# Patient Record
Sex: MALE | Race: WHITE | Employment: FULL TIME | URBAN - METROPOLITAN AREA
[De-identification: names, ages, dates, MRNs, and addresses within clinical notes are randomized per-mention and may not be internally consistent; named-entity substitution may affect disease eponyms.]

---

## 2017-01-06 ENCOUNTER — OFFICE VISIT (OUTPATIENT)
Dept: FAMILY MEDICINE | Facility: CLINIC | Age: 27
End: 2017-01-06
Payer: COMMERCIAL

## 2017-01-06 VITALS
HEIGHT: 73 IN | DIASTOLIC BLOOD PRESSURE: 82 MMHG | SYSTOLIC BLOOD PRESSURE: 144 MMHG | BODY MASS INDEX: 35.92 KG/M2 | TEMPERATURE: 98.4 F | WEIGHT: 271 LBS | HEART RATE: 72 BPM

## 2017-01-06 DIAGNOSIS — Z00.00 ROUTINE GENERAL MEDICAL EXAMINATION AT A HEALTH CARE FACILITY: Primary | ICD-10-CM

## 2017-01-06 DIAGNOSIS — M79.671 PAIN IN BOTH FEET: ICD-10-CM

## 2017-01-06 DIAGNOSIS — R03.0 ELEVATED BLOOD PRESSURE READING WITHOUT DIAGNOSIS OF HYPERTENSION: ICD-10-CM

## 2017-01-06 DIAGNOSIS — M79.672 PAIN IN BOTH FEET: ICD-10-CM

## 2017-01-06 DIAGNOSIS — E66.9 NON MORBID OBESITY, UNSPECIFIED OBESITY TYPE: ICD-10-CM

## 2017-01-06 PROCEDURE — 99395 PREV VISIT EST AGE 18-39: CPT | Performed by: FAMILY MEDICINE

## 2017-01-06 ASSESSMENT — PAIN SCALES - GENERAL: PAINLEVEL: NO PAIN (0)

## 2017-01-06 NOTE — PROGRESS NOTES
SUBJECTIVE:     CC: Bernard Chapman is an 26 year old male who presents for preventative health visit.   Chief Complaint   Patient presents with     Physical      Mole on back of neck.  Can hurt at times.   Feet smell.  They get sweaty.  Wears steel toe work boots.  Has tried wicking socks.     Healthy Habits:    Do you get at least three servings of calcium containing foods daily (dairy, green leafy vegetables, etc.)? yes    Amount of exercise or daily activities, outside of work: 3 day(s) per week-none lately.  Work out equipment.     Problems taking medications regularly No    Medication side effects: No    Have you had an eye exam in the past two years? yes    Do you see a dentist twice per year? yes  Do you have sleep apnea, excessive snoring or daytime drowsiness?no      Today's PHQ-2 Score:   PHQ-2 ( 1999 Pf[employer]) 1/6/2017 1/19/2015   Q1: Little interest or pleasure in doing things 0 0   Q2: Feeling down, depressed or hopeless 0 0   PHQ-2 Score 0 0     Abuse: Current or Past(Physical, Sexual or Emotional)- No  Do you feel safe in your environment - Yes    Social History   Substance Use Topics     Smoking status: Never Smoker      Smokeless tobacco: Never Used     Alcohol Use: No     The patient does not drink >3 drinks per day nor >7 drinks per week.    Patient Active Problem List    Diagnosis Date Noted     Elevated blood pressure reading without diagnosis of hypertension 04/15/2015     Priority: Medium     Obesity 04/15/2015     Priority: Medium     CARDIOVASCULAR SCREENING; LDL GOAL LESS THAN 160 10/31/2010     Priority: Medium        Family history:  CV disease: no  Prostate cancer: no  Colon cancer: no    Multivitamin or Vit D use: daily MVI    Vaccines:current     Past Colon cancer screening:na     ROS:  General: POSITIVE for:, weight gain, gradual in the past couple years.   Eyes: Negative for vision changes or eye problems  ENT: No problems with ears, nose or throat.  No difficulty  "swallowing.  Resp: No coughing, wheezing or shortness of breath  CV: No chest pains or palpitations  GI: No nausea, vomiting,  heartburn, abdominal pain, diarrhea, constipation or change in bowel habits  : No urinary frequency or dysuria, bladder or kidney problems  Musculoskeletal: POSITIVE  for:, pain in feet.  Some pain in plantar foot.  Worse after getting up in the AM or off feet for a while.   Neurologic: POSITIVE for:, Hx headaches, a little more than normal.  Takes aspirin or ibuprofen.   Psychiatric: No problems with anxiety, depression or mental health  Heme/immune/allergy: No history of bleeding or clotting problems or anemia.  No allergies or immune system problems  Endocrine: No history of thyroid disease, diabetes or other endocrine disorders  Skin: No rashes,worrisome lesions or skin problems    OBJECTIVE:                                                    OBJECTIVE:Blood pressure 144/82, pulse 72, temperature 98.4  F (36.9  C), temperature source Tympanic, height 6' 0.75\" (1.848 m), weight 271 lb (122.925 kg). BMI=Body mass index is 35.99 kg/(m^2).  GENERAL APPEARANCE ADULT: Alert, no acute distress, obese  EYES: PERRL, EOM normal, conjunctiva and lids normal  HENT: Ears and TMs normal, oral mucosa and posterior oropharynx normal  NECK: No adenopathy,masses or thyromegaly  RESP: lungs clear to auscultation   CV: normal rate, regular rhythm, no murmur or gallop  ABDOMEN: soft, no organomegaly, masses or tenderness   (male): penis, scrotum, testes normal, no hernias  MS: extremities normal, no peripheral edema  Feet appear normal.  No skin abnormalities.   SKIN: Scattered benign appearing nevi including three on back of head and neck.  Several large pigmented nevi in groin on either side.     ASSESSMENT/PLAN:                                                    Lifestyle recommendations:regular exercise, at least 150 minutes per week (average 30 minutes 5 times a week)  weight loss recommended (ideal " BMI-body mass index is <25)  always wear seat belt  The following exams/tests were recommended and discussed for health maintenance:  Colon cancer screening beginning at age 50 if at normal risk     (Z00.00) Routine general medical examination at a health care facility  (primary encounter diagnosis)    (R03.0) Elevated blood pressure reading without diagnosis of hypertension  Comment:   Plan: Check BP several times in the next few weeks.  This can be done at home, in clinic, at our pharmacy, at a store or by neighbor or relative with a blood pressure cuff.  You should be sitting and relaxed for several minutes when taking blood pressure.   If BP readings are persistently over 140/90, I recommend a clinic visit for further evaluation and treatment.  Normal blood pressure is 120/80.  Usually high blood pressure does not cause any symptoms but over time can lead to eye and kidney problems.  High blood pressure also increases the chances of having a heart attack or stroke.     Let us know if readings are often high, so we can help get your blood pressure under good control.     Lifestyle changes that can lower blood pressure include:  Limiting sodium in the diet.  Goal of <2.4 grams daily.  Avoiding salty and prepared foods and not adding salt at the table can help.   Regular exercise at least 30 minutes most days of the week.   Weight loss can help even if not dramatic or down to normal BMI range.  DASH type diet can actually lower blood pressure.   Cutting back on alcohol can help for women drinking more than a drink per day and men more than 2 drinks per day on average.   Quitting smoking can help.      (E66.9) Non morbid obesity, unspecified obesity type    (M79.671,  M79.672) Pain in both feet  Comment: some plantar fascitis  Plan: Stretches for feet can help.     Helpful treatments include, heel pads to cushion and elevate heel. Orthotics can also help.    Anti-inflammatory medications like Aleve or ibuprofen are  "useful.   Try hot or cold application.   The most important treatment is foot and calf stretches.  Stretches;  #1. Stand on balls of both foot on a step or a 2x4\" board.  Drop your heels to stretch calf muscles for 10-15 seconds at a time.   #2.  Face door, wall or counter and have arms outstretched.  Have one foot forward and one foot back and lean into door, wall or counter with knee straight and again hold stretch for 10-15 seconds.  #3. Pull toes and ball of foot back to stretch the bottom of your foot with you hand, towel or belt.  For muscle stretches, hold for 10-15 seconds at a time.  Do a set of 10 to 15 of these stretches for 10 minutes or so once or twice a day.     It may take a month or two to notice improvement.  If continuing symptoms, we could try injections, orthotics or night splints.      I can remove moles if desired.  None look like cancer.        COUNSELING:  Reviewed preventive health counseling, as reflected in patient instructions  Special attention given to:        hypertension and obesity    BP Screening:   Last 3 BP Readings:    BP Readings from Last 3 Encounters:   01/06/17 144/82   04/11/16 167/79   04/01/16 150/68       The following was recommended to the patient:  Recommend lifestyle modifications       reports that he has never smoked. He has never used smokeless tobacco.    Estimated body mass index is 35.99 kg/(m^2) as calculated from the following:    Height as of this encounter: 6' 0.75\" (1.848 m).    Weight as of this encounter: 271 lb (122.925 kg).       Counseling Resources:  ATP IV Guidelines  Pooled Cohorts Equation Calculator  FRAX Risk Assessment  ICSI Preventive Guidelines  Dietary Guidelines for Americans, 2010  USDA's MyPlate  ASA Prophylaxis  Lung CA Screening    [facility]  "

## 2017-01-06 NOTE — NURSING NOTE
"Chief Complaint   Patient presents with     Physical     /82 mmHg  Pulse 72  Temp(Src) 98.4  F (36.9  C) (Tympanic)  Ht 6' 0.75\" (1.848 m)  Wt 271 lb (122.925 kg)  BMI 35.99 kg/m2 Estimated body mass index is 35.99 kg/(m^2) as calculated from the following:    Height as of this encounter: 6' 0.75\" (1.848 m).    Weight as of this encounter: 271 lb (122.925 kg).  bp completed using cuff size: large      Health Maintenance that is potentially due pending provider review:  NONE    N/a  Ruba Del Rosario CMA      "

## 2017-01-06 NOTE — MR AVS SNAPSHOT
After Visit Summary   1/6/2017    Bernard Chapman    MRN: [id number]           Patient Information     Date Of Birth          1990        Visit Information        Provider Department      1/6/2017 8:20 AM Leonard Mercado MD [facility]        Today's Diagnoses     Routine general medical examination at a health care facility    -  1     Elevated blood pressure reading without diagnosis of hypertension         Non morbid obesity, unspecified obesity type         Pain in both feet           Care Instructions      Preventive Health Recommendations  Male Ages 26 - 39    Yearly exam:             See your health care provider every year in order to  o   Review health changes.   o   Discuss preventive care.    o   Review your medicines if your doctor has prescribed any.    You should be tested each year for STDs (sexually transmitted diseases), if you re at risk.     After age 35, talk to your provider about cholesterol testing. If you are at risk for heart disease, have your cholesterol tested at least every 5 years.     If you are at risk for diabetes, you should have a diabetes test (fasting glucose).  Shots: Get a flu shot each year. Get a tetanus shot every 10 years.     Nutrition:    Eat at least 5 servings of fruits and vegetables daily.     Eat whole-grain bread, whole-wheat pasta and brown rice instead of white grains and rice.     Talk to your provider about Calcium and Vitamin D.     Lifestyle    Exercise for at least 150 minutes a week (30 minutes a day, 5 days a week). This will help you control your weight and prevent disease.     Limit alcohol to one drink per day.     No smoking.     Wear sunscreen to prevent skin cancer.     See your dentist every six months for an exam and cleaning.     ASSESSMENT/PLAN:                                                    Lifestyle recommendations:regular exercise, at least 150 minutes per week (average 30 minutes 5 times a  week)  weight loss recommended (ideal BMI-body mass index is <25)  always wear seat belt  The following exams/tests were recommended and discussed for health maintenance:  Colon cancer screening beginning at age 50 if at normal risk     (Z00.00) Routine general medical examination at a health care facility  (primary encounter diagnosis)    (R03.0) Elevated blood pressure reading without diagnosis of hypertension  Comment:   Plan: Check BP several times in the next few weeks.  This can be done at home, in clinic, at our pharmacy, at a store or by neighbor or relative with a blood pressure cuff.  You should be sitting and relaxed for several minutes when taking blood pressure.   If BP readings are persistently over 140/90, I recommend a clinic visit for further evaluation and treatment.  Normal blood pressure is 120/80.  Usually high blood pressure does not cause any symptoms but over time can lead to eye and kidney problems.  High blood pressure also increases the chances of having a heart attack or stroke.     Let us know if readings are often high, so we can help get your blood pressure under good control.     Lifestyle changes that can lower blood pressure include:  Limiting sodium in the diet.  Goal of <2.4 grams daily.  Avoiding salty and prepared foods and not adding salt at the table can help.   Regular exercise at least 30 minutes most days of the week.   Weight loss can help even if not dramatic or down to normal BMI range.  DASH type diet can actually lower blood pressure.   Cutting back on alcohol can help for women drinking more than a drink per day and men more than 2 drinks per day on average.   Quitting smoking can help.      (E66.9) Non morbid obesity, unspecified obesity type    (M79.671,  M79.672) Pain in both feet  Comment: some plantar fascitis  Plan: Stretches for feet can help.     Helpful treatments include, heel pads to cushion and elevate heel. Orthotics can also help.    Anti-inflammatory  "medications like Aleve or ibuprofen are useful.   Try hot or cold application.   The most important treatment is foot and calf stretches.  Stretches;  #1. Stand on balls of both foot on a step or a 2x4\" board.  Drop your heels to stretch calf muscles for 10-15 seconds at a time.   #2.  Face door, wall or counter and have arms outstretched.  Have one foot forward and one foot back and lean into door, wall or counter with knee straight and again hold stretch for 10-15 seconds.  #3. Pull toes and ball of foot back to stretch the bottom of your foot with you hand, towel or belt.  For muscle stretches, hold for 10-15 seconds at a time.  Do a set of 10 to 15 of these stretches for 10 minutes or so once or twice a day.     It may take a month or two to notice improvement.  If continuing symptoms, we could try injections, orthotics or night splints.      I can remove moles if desired.  None look like cancer.         Follow-ups after your visit        Who to contact     If you have questions or need follow up information about today's clinic visit or your schedule please contact [facility] directly at [phone].  Normal or non-critical lab and imaging results will be communicated to you by Shineonhart, letter or phone within 4 business days after the clinic has received the results. If you do not hear from us within 7 days, please contact the clinic through iCrederityt or phone. If you have a critical or abnormal lab result, we will notify you by phone as soon as possible.  Submit refill requests through [email or web address] or call your pharmacy and they will forward the refill request to us. Please allow 3 business days for your refill to be completed.          Additional Information About Your Visit        [email or web address] Information     [email or web address] gives you secure access to your electronic health record. If you see a primary care provider, you can also send messages to your care team and make appointments. If you have questions, " "please call your primary care clinic.  If you do not have a primary care provider, please call [phone] and they will assist you.        Care EveryWhere ID     This is your Care EveryWhere ID. This could be used by other organizations to access your [facility] medical records  [id number]        Your Vitals Were     Pulse Temperature Height BMI (Body Mass Index)          72 98.4  F (36.9  C) (Tympanic) 6' 0.75\" (1.848 m) 35.99 kg/m2         Blood Pressure from Last 3 Encounters:   01/06/17 144/82   04/11/16 167/79   04/01/16 150/68    Weight from Last 3 Encounters:   01/06/17 271 lb (122.925 kg)   04/11/16 260 lb (117.935 kg)   04/01/16 266 lb 3.2 oz (120.748 kg)              Today, you had the following     No orders found for display       Primary Care Provider Office Phone # Fax #    Leonard Mercado [phone]       [facility] [address] [place] [id number]        Thank you!     Thank you for choosing [facility]  for your care. Our goal is always to provide you with excellent care. Hearing back from our patients is one way we can continue to improve our services. Please take a few minutes to complete the written survey that you may receive in the mail after your visit with us. Thank you!             Your Updated Medication List - Protect others around you: Learn how to safely use, store and throw away your medicines at www.disposemymeds.org.          This list is accurate as of: 1/6/17  9:06 AM.  Always use your most recent med list.                   Brand Name Dispense Instructions for use    ascorbic acid 500 MG tablet    VITAMIN C     i tab daily       FLAX SEED OIL PO      500 mg. one daily       Multi-vitamin Tabs tablet   Generic drug:  multivitamin, therapeutic with minerals      1 TABLET DAILY       PAPAYA ENZYME PO      three tabs daily         "

## 2017-01-06 NOTE — PATIENT INSTRUCTIONS
Preventive Health Recommendations  Male Ages 26 - 39    Yearly exam:             See your health care provider every year in order to  o   Review health changes.   o   Discuss preventive care.    o   Review your medicines if your doctor has prescribed any.    You should be tested each year for STDs (sexually transmitted diseases), if you re at risk.     After age 35, talk to your provider about cholesterol testing. If you are at risk for heart disease, have your cholesterol tested at least every 5 years.     If you are at risk for diabetes, you should have a diabetes test (fasting glucose).  Shots: Get a flu shot each year. Get a tetanus shot every 10 years.     Nutrition:    Eat at least 5 servings of fruits and vegetables daily.     Eat whole-grain bread, whole-wheat pasta and brown rice instead of white grains and rice.     Talk to your provider about Calcium and Vitamin D.     Lifestyle    Exercise for at least 150 minutes a week (30 minutes a day, 5 days a week). This will help you control your weight and prevent disease.     Limit alcohol to one drink per day.     No smoking.     Wear sunscreen to prevent skin cancer.     See your dentist every six months for an exam and cleaning.     ASSESSMENT/PLAN:                                                    Lifestyle recommendations:regular exercise, at least 150 minutes per week (average 30 minutes 5 times a week)  weight loss recommended (ideal BMI-body mass index is <25)  always wear seat belt  The following exams/tests were recommended and discussed for health maintenance:  Colon cancer screening beginning at age 50 if at normal risk     (Z00.00) Routine general medical examination at a health care facility  (primary encounter diagnosis)    (R03.0) Elevated blood pressure reading without diagnosis of hypertension  Comment:   Plan: Check BP several times in the next few weeks.  This can be done at home, in clinic, at our pharmacy, at a store or by neighbor or  "relative with a blood pressure cuff.  You should be sitting and relaxed for several minutes when taking blood pressure.   If BP readings are persistently over 140/90, I recommend a clinic visit for further evaluation and treatment.  Normal blood pressure is 120/80.  Usually high blood pressure does not cause any symptoms but over time can lead to eye and kidney problems.  High blood pressure also increases the chances of having a heart attack or stroke.     Let us know if readings are often high, so we can help get your blood pressure under good control.     Lifestyle changes that can lower blood pressure include:  Limiting sodium in the diet.  Goal of <2.4 grams daily.  Avoiding salty and prepared foods and not adding salt at the table can help.   Regular exercise at least 30 minutes most days of the week.   Weight loss can help even if not dramatic or down to normal BMI range.  DASH type diet can actually lower blood pressure.   Cutting back on alcohol can help for women drinking more than a drink per day and men more than 2 drinks per day on average.   Quitting smoking can help.      (E66.9) Non morbid obesity, unspecified obesity type    (M79.671,  M79.672) Pain in both feet  Comment: some plantar fascitis  Plan: Stretches for feet can help.     Helpful treatments include, heel pads to cushion and elevate heel. Orthotics can also help.    Anti-inflammatory medications like Aleve or ibuprofen are useful.   Try hot or cold application.   The most important treatment is foot and calf stretches.  Stretches;  #1. Stand on balls of both foot on a step or a 2x4\" board.  Drop your heels to stretch calf muscles for 10-15 seconds at a time.   #2.  Face door, wall or counter and have arms outstretched.  Have one foot forward and one foot back and lean into door, wall or counter with knee straight and again hold stretch for 10-15 seconds.  #3. Pull toes and ball of foot back to stretch the bottom of your foot with you hand, " towel or belt.  For muscle stretches, hold for 10-15 seconds at a time.  Do a set of 10 to 15 of these stretches for 10 minutes or so once or twice a day.     It may take a month or two to notice improvement.  If continuing symptoms, we could try injections, orthotics or night splints.      I can remove moles if desired.  None look like cancer.

## 2017-02-22 ENCOUNTER — OFFICE VISIT (OUTPATIENT)
Dept: FAMILY MEDICINE | Facility: CLINIC | Age: 27
End: 2017-02-22
Payer: COMMERCIAL

## 2017-02-22 VITALS
DIASTOLIC BLOOD PRESSURE: 78 MMHG | BODY MASS INDEX: 35.12 KG/M2 | HEART RATE: 68 BPM | TEMPERATURE: 97.3 F | HEIGHT: 73 IN | SYSTOLIC BLOOD PRESSURE: 135 MMHG | WEIGHT: 265 LBS

## 2017-02-22 DIAGNOSIS — L60.0 INGROWN TOENAIL: Primary | ICD-10-CM

## 2017-02-22 PROCEDURE — 99213 OFFICE O/P EST LOW 20 MIN: CPT | Performed by: PHYSICIAN ASSISTANT

## 2017-02-22 RX ORDER — CEPHALEXIN 500 MG/1
500 CAPSULE ORAL 3 TIMES DAILY
Qty: 30 CAPSULE | Refills: 0 | Status: SHIPPED | OUTPATIENT
Start: 2017-02-22

## 2017-02-22 ASSESSMENT — ENCOUNTER SYMPTOMS
EYE DISCHARGE: 0
ABDOMINAL PAIN: 0
VOMITING: 0
FREQUENCY: 0
NEUROLOGICAL NEGATIVE: 1
DIAPHORESIS: 0
FEVER: 0
WEAKNESS: 0
DIARRHEA: 0
NECK PAIN: 0
EYE PAIN: 0
PALPITATIONS: 0
MYALGIAS: 0
CHILLS: 0
NAUSEA: 0
CONSTIPATION: 0
WHEEZING: 0
HEADACHES: 0
HEARTBURN: 0
SORE THROAT: 0
SHORTNESS OF BREATH: 0
DYSURIA: 0
BACK PAIN: 0
PSYCHIATRIC NEGATIVE: 1
COUGH: 0

## 2017-02-22 NOTE — PATIENT INSTRUCTIONS
Ingrown Toenail, Infected (Antibiotics, No Excision)  An ingrown toenail occurs when the nail grows sideways into the skin alongside the nail. This can cause pain. It can also lead to an infection with redness, swelling and sometimes drainage.  Cause  The most common cause of an ingrown toenail is trimming your nails wrong. Most people trim the nails too close to the skin and try to round the nail too tightly around the shape of the toe. When you do this, the nail can grow into the skin of your toe. While it may look nice, your toenail can grow into the skin and cause an infection.  Other causes include injury or wearing shoes that are too short or tight. This can cause the same problem that happens when trimming your nails. Your genetics can also make this more likely to happen.  Symptoms  The following are the most common symptoms of an ingrown toenail:     Pain    Redness    Swelling    Drainage  Treatment  The best thing to do for an ingrown toenail is treat it as soon as you see there is a problem. The longer you wait to do something, the worse it is likely to get. Sometimes it gets worse quickly, other times it may take awhile. It can even feel better for a while, and then get worse.  Inflammation  If the infection is mild, you may be able to take care of it at home with the following measures:    Frequent warm water soaks    Keeping it clean    Wearing loose, comfortable shoes or sandals  Another method involves using a small piece of cotton or waxed dental floss to gently lift up the corner of the problem nail. Change the cotton or floss frequently, especially if it gets dirty.  Infection  If your infection is mild, and home care isn't working, or if the infection gets worse, see your health care provider. Signs of worsening infection include:    Swelling    Redness    Pus drainage  In some cases, you may need antibiotics along with warm soaks. If after 2 to 3 days of antibiotic  the toenail doesn't get  better or gets worse, part of the nail may need to be removed to drain the infection. With treatment, it can take 1 to 2 weeks to clear up completely.  Home care  Wound care  For the next 3 days, soak and clean your toe in warm water a few times a day.  1) Twice a day for the first 3 days, clean and soak the toe as follows:    Soak your foot in a tub of warm water for 5 minutes. Or, hold your toe under a faucet of warm running water for 5 minutes.    Clean any remaining crust away with soap and water using a cotton swab.    Put a small amount of antibiotic ointment on the infected area.  2) Change the dressing or bandage every time you soak or clean it, or whenever it becomes wet or dirty.  3) If you were prescribed antibiotics, take them as directed until they are all gone.  4) Wear comfortable shoes with a lot of toe room, or open-toe sandals, while your toe is healing.  Medications    You can take acetaminophen or ibuprofen for pain, unless you were given a different pain medicine to use. If you have chronic liver or kidney disease, ever had a stomach ulcer or gastrointestinal bleeding, or are taking blood thinner medications, talk with your doctor before using these medicines.    If you were given antibiotics, take them until they are used up or your doctor tells you to stop, even if the wound looks better.  Prevention  To prevent ingrown toenails:  1) Wear shoes that fit well. Avoid shoes that pinch the toes together.  2) When you trim your toenails, do not cut them too short. Cut straight across at the top and do not round the edges.  3) Do not use a sharp object to clean under your nail since this might cause an infection.  4) If the toenail starts to grow into the skin again, put a small piece of waxed dental floss or cotton under that side of the nail to help it grow out straight.  Follow-up care  Follow up with your doctor or this facility as advised by our staff.  When to seek medical care  Get prompt  medical attention if any of the following occur:    Increasing redness, pain or swelling of the toe    Red streaks in the skin leading away from the wound    Pus or fluid drainage    Fever of 100.4  F (38  C) or higher, or as directed by your health care provider    7526-3831 [employer]. [address] [place] [address]. All rights reserved. This information is not intended as a substitute for professional medical care. Always follow your healthcare professional's instructions.

## 2017-02-22 NOTE — MR AVS SNAPSHOT
After Visit Summary   2/22/2017    Bernard Chapman    MRN: [id number]           Patient Information     Date Of Birth          1990        Visit Information        Provider Department      2/22/2017 8:00 AM Juan Antonio Carroll PA-C [facility]        Today's Diagnoses     Ingrown toenail    -  1      Care Instructions      Ingrown Toenail, Infected (Antibiotics, No Excision)  An ingrown toenail occurs when the nail grows sideways into the skin alongside the nail. This can cause pain. It can also lead to an infection with redness, swelling and sometimes drainage.  Cause  The most common cause of an ingrown toenail is trimming your nails wrong. Most people trim the nails too close to the skin and try to round the nail too tightly around the shape of the toe. When you do this, the nail can grow into the skin of your toe. While it may look nice, your toenail can grow into the skin and cause an infection.  Other causes include injury or wearing shoes that are too short or tight. This can cause the same problem that happens when trimming your nails. Your genetics can also make this more likely to happen.  Symptoms  The following are the most common symptoms of an ingrown toenail:     Pain    Redness    Swelling    Drainage  Treatment  The best thing to do for an ingrown toenail is treat it as soon as you see there is a problem. The longer you wait to do something, the worse it is likely to get. Sometimes it gets worse quickly, other times it may take awhile. It can even feel better for a while, and then get worse.  Inflammation  If the infection is mild, you may be able to take care of it at home with the following measures:    Frequent warm water soaks    Keeping it clean    Wearing loose, comfortable shoes or sandals  Another method involves using a small piece of cotton or waxed dental floss to gently lift up the corner of the problem nail. Change the cotton or floss frequently,  especially if it gets dirty.  Infection  If your infection is mild, and home care isn't working, or if the infection gets worse, see your health care provider. Signs of worsening infection include:    Swelling    Redness    Pus drainage  In some cases, you may need antibiotics along with warm soaks. If after 2 to 3 days of antibiotic  the toenail doesn't get better or gets worse, part of the nail may need to be removed to drain the infection. With treatment, it can take 1 to 2 weeks to clear up completely.  Home care  Wound care  For the next 3 days, soak and clean your toe in warm water a few times a day.  1) Twice a day for the first 3 days, clean and soak the toe as follows:    Soak your foot in a tub of warm water for 5 minutes. Or, hold your toe under a faucet of warm running water for 5 minutes.    Clean any remaining crust away with soap and water using a cotton swab.    Put a small amount of antibiotic ointment on the infected area.  2) Change the dressing or bandage every time you soak or clean it, or whenever it becomes wet or dirty.  3) If you were prescribed antibiotics, take them as directed until they are all gone.  4) Wear comfortable shoes with a lot of toe room, or open-toe sandals, while your toe is healing.  Medications    You can take acetaminophen or ibuprofen for pain, unless you were given a different pain medicine to use. If you have chronic liver or kidney disease, ever had a stomach ulcer or gastrointestinal bleeding, or are taking blood thinner medications, talk with your doctor before using these medicines.    If you were given antibiotics, take them until they are used up or your doctor tells you to stop, even if the wound looks better.  Prevention  To prevent ingrown toenails:  1) Wear shoes that fit well. Avoid shoes that pinch the toes together.  2) When you trim your toenails, do not cut them too short. Cut straight across at the top and do not round the edges.  3) Do not use a sharp  object to clean under your nail since this might cause an infection.  4) If the toenail starts to grow into the skin again, put a small piece of waxed dental floss or cotton under that side of the nail to help it grow out straight.  Follow-up care  Follow up with your doctor or this facility as advised by our staff.  When to seek medical care  Get prompt medical attention if any of the following occur:    Increasing redness, pain or swelling of the toe    Red streaks in the skin leading away from the wound    Pus or fluid drainage    Fever of 100.4  F (38  C) or higher, or as directed by your health care provider    0585-6475 [employer]. [address] [place] [address]. All rights reserved. This information is not intended as a substitute for professional medical care. Always follow your healthcare professional's instructions.              Follow-ups after your visit        Who to contact     If you have questions or need follow up information about today's clinic visit or your schedule please contact [facility] directly at [phone].  Normal or non-critical lab and imaging results will be communicated to you by MyChart, letter or phone within 4 business days after the clinic has received the results. If you do not hear from us within 7 days, please contact the clinic through [employer] or phone. If you have a critical or abnormal lab result, we will notify you by phone as soon as possible.  Submit refill requests through [employer] or call your pharmacy and they will forward the refill request to us. Please allow 3 business days for your refill to be completed.          Additional Information About Your Visit        [employer] Information     [employer] gives you secure access to your electronic health record. If you see a primary care provider, you can also send messages to your care team and make appointments. If you have questions, please call your primary care clinic.  If you  "do not have a primary care provider, please call [phone] and they will assist you.        Care EveryWhere ID     This is your Care EveryWhere ID. This could be used by other organizations to access your [facility] medical records  [id number]        Your Vitals Were     Pulse Temperature Height BMI (Body Mass Index)          68 97.3  F (36.3  C) (Tympanic) 6' 0.75\" (1.848 m) 35.2 kg/m2         Blood Pressure from Last 3 Encounters:   02/22/17 135/78   01/06/17 144/82   04/11/16 167/79    Weight from Last 3 Encounters:   02/22/17 265 lb (120.2 kg)   01/06/17 271 lb (122.9 kg)   04/11/16 260 lb (117.9 kg)              Today, you had the following     No orders found for display         Today's Medication Changes          These changes are accurate as of: 2/22/17  8:26 AM.  If you have any questions, ask your nurse or doctor.               Start taking these medicines.        Dose/Directions    cephALEXin 500 MG capsule   Commonly known as:  KEFLEX   Used for:  Ingrown toenail   Started by:  Juan Antonio Carroll PA-C        Dose:  500 mg   Take 1 capsule (500 mg) by mouth 3 times daily   Quantity:  30 capsule   Refills:  0            Where to get your medicines      These medications were sent to [id number] [address]  [address] [facility] [address]    Hours:  Closed 10-16-08 business to [facility] Phone:  [phone]     cephALEXin 500 MG capsule                Primary Care Provider Office Phone # Fax #    Leonard Mercado [phone]       [facility] [address] [facility] [address]        Thank you!     Thank you for choosing [facility]  for your care. Our goal is always to provide you with excellent care. Hearing back from our patients is one way we can continue to improve our services. Please take a few minutes to complete the written survey that you may receive in the mail after your visit with us. Thank " you!             Your Updated Medication List - Protect others around you: Learn how to safely use, store and throw away your medicines at www.disposemymeds.org.          This list is accurate as of: 2/22/17  8:26 AM.  Always use your most recent med list.                   Brand Name Dispense Instructions for use    ascorbic acid 500 MG tablet    VITAMIN C     i tab daily       cephALEXin 500 MG capsule    KEFLEX    30 capsule    Take 1 capsule (500 mg) by mouth 3 times daily       FLAX SEED OIL PO      500 mg. one daily       Multi-vitamin Tabs tablet   Generic drug:  multivitamin, therapeutic with minerals      1 TABLET DAILY       PAPAYA ENZYME PO      three tabs daily

## 2017-02-22 NOTE — NURSING NOTE
"Chief Complaint   Patient presents with     Toenail       Initial /88 (BP Location: Right arm, Cuff Size: Adult Large)  Pulse 68  Temp 97.3  F (36.3  C) (Tympanic)  Ht 6' 0.75\" (1.848 m)  Wt 265 lb (120.2 kg)  BMI 35.2 kg/m2 Estimated body mass index is 35.2 kg/(m^2) as calculated from the following:    Height as of this encounter: 6' 0.75\" (1.848 m).    Weight as of this encounter: 265 lb (120.2 kg).  Medication Reconciliation: complete   Maddie Saldivar / Certified Medical Assistant......2/22/2017 8:13 AM    "

## 2017-02-22 NOTE — PROGRESS NOTES
HPI    SUBJECTIVE:                                                    Bernard Chapman is a 26 year old male who presents to clinic today for infected left great toenail for the past week. Patient reports toe became red, swollen and tender. He noticed discharge started four days ago. He has been soaking toe in warm Epson salt baths twice a day. He has a history of frequent infections of his left great toe, usually relieved without antibiotic treatment. He has not seen podiatry for this issue.      Problem list and histories reviewed & adjusted, as indicated.  Additional history: as documented    Patient Active Problem List   Diagnosis     CARDIOVASCULAR SCREENING; LDL GOAL LESS THAN 160     Elevated blood pressure reading without diagnosis of hypertension     Obesity     History reviewed. No pertinent past surgical history.    Social History   Substance Use Topics     Smoking status: Never Smoker     Smokeless tobacco: Never Used     Alcohol use No     Family History   Problem Relation Age of Onset     Respiratory Mother      asthma     Hypertension Maternal Grandmother      Arthritis Maternal Grandmother      DIABETES Paternal Grandmother      HEART DISEASE Paternal Grandfather      pacemaker     Colon Cancer No family hx of      Prostate Cancer No family hx of      Coronary Artery Disease No family hx of          Current Outpatient Prescriptions   Medication Sig Dispense Refill     cephALEXin (KEFLEX) 500 MG capsule Take 1 capsule (500 mg) by mouth 3 times daily 30 capsule 0     FLAX SEED OIL  mg. one daily       PAPAYA ENZYME OR three tabs daily       MULTI-VITAMIN OR TABS 1 TABLET DAILY       VITAMIN C 500 MG OR TABS i tab daily       No Known Allergies  Labs reviewed in EPIC  Problem list, Medication list, Allergies, and Medical/Social/Surgical histories reviewed in [facility] and updated as appropriate.      Review of Systems   Constitutional: Negative for chills, diaphoresis, fever and malaise/fatigue.    HENT: Negative for congestion, ear discharge, ear pain and sore throat.    Eyes: Negative for pain and discharge.   Respiratory: Negative for cough, shortness of breath and wheezing.    Cardiovascular: Negative for chest pain and palpitations.   Gastrointestinal: Negative for abdominal pain, constipation, diarrhea, heartburn, nausea and vomiting.   Genitourinary: Negative for dysuria, frequency and urgency.   Musculoskeletal: Negative for back pain, myalgias and neck pain.   Skin: Negative for itching and rash.   Neurological: Negative.  Negative for weakness and headaches.   Endo/Heme/Allergies: Negative.    Psychiatric/Behavioral: Negative.        Physical Exam   Constitutional: He is oriented to person, place, and time and well-developed, well-nourished, and in no distress.   HENT:   Head: Normocephalic and atraumatic.   Neck: Normal range of motion. Neck supple.   Cardiovascular: Normal rate, regular rhythm, normal heart sounds and intact distal pulses.    Pulmonary/Chest: Effort normal and breath sounds normal.   Musculoskeletal: Normal range of motion.        Left ankle: Normal.        Feet:    Infected ingrown toenail of left great toe in lateral nail fold with erythema, edema and purulent discharge   Neurological: He is alert and oriented to person, place, and time. Gait normal.   Skin: Skin is warm and dry.   Psychiatric: Mood, memory, affect and judgment normal.       (L60.0) Ingrown toenail  (primary encounter diagnosis)  Comment:   Plan: cephALEXin (KEFLEX) 500 MG capsule          Continue warm soaks three times daily with antibiotic treatment. Discussed prevention strategies. Patient will call if he would like a referral to podiatry due to frequent reoccurrence.       Abbie Epley, PA-S      I have examined this patient and reviewed above note  Juan Antonio Carroll MS, PA-C

## 2017-04-06 ENCOUNTER — TRANSFERRED RECORDS (OUTPATIENT)
Dept: HEALTH INFORMATION MANAGEMENT | Facility: CLINIC | Age: 27
End: 2017-04-06

## 2017-04-29 ENCOUNTER — OFFICE VISIT (OUTPATIENT)
Dept: URGENT CARE | Facility: URGENT CARE | Age: 27
End: 2017-04-29
Payer: COMMERCIAL

## 2017-04-29 VITALS
DIASTOLIC BLOOD PRESSURE: 91 MMHG | SYSTOLIC BLOOD PRESSURE: 155 MMHG | RESPIRATION RATE: 14 BRPM | TEMPERATURE: 98.5 F | HEART RATE: 84 BPM

## 2017-04-29 DIAGNOSIS — M62.830 BACK MUSCLE SPASM: Primary | ICD-10-CM

## 2017-04-29 DIAGNOSIS — K59.00 CONSTIPATION, UNSPECIFIED CONSTIPATION TYPE: ICD-10-CM

## 2017-04-29 PROCEDURE — 99214 OFFICE O/P EST MOD 30 MIN: CPT | Performed by: NURSE PRACTITIONER

## 2017-04-29 RX ORDER — METHOCARBAMOL 750 MG/1
750 TABLET, FILM COATED ORAL 4 TIMES DAILY PRN
Qty: 40 TABLET | Refills: 0 | Status: SHIPPED | OUTPATIENT
Start: 2017-04-29

## 2017-04-29 NOTE — PATIENT INSTRUCTIONS
Take your muscle relaxer with 400 mg Ibuprofen with food as directed.    Moist heat to back spasm    Increase fiber in your diet and make sure you drink enough water.    Follow up with your primary care provider if symptoms persist or worsen.      Indications for emergent return to emergency department discussed with patient, who verbalized good understanding and agreement.  Patient understands the limitations of today's evaluation.         Back Spasm (No Trauma)    Spasm of the back muscles can occur after a sudden forceful twisting or bending force (such as in a car accident), after a simple awkward movement, or after lifting something heavy with poor body positioning. In either case, muscle spasm adds to the pain. Sleeping in an awkward position or on a poor quality mattress can also cause this. Some persons respond to emotional stress by tensing the muscles of their back.  Pain that continues may require further evaluation or other types of treatment such as physical therapy.  Unless you had a physical injury (for example, a car accident or fall), X-rays are usually not ordered for the initial evaluation of back pain. If pain continues and does not respond to medical treatment, X-rays and other tests may be performed at a later time.   Home care  1. As soon as possible, begin sitting or walking to avoid problems from prolonged bedrest (muscle weakness, worsening back stiffness and pain, blood clots in the legs).  2. When in bed, try to find a position of comfort. A firm mattress is best. Try lying flat on your back with pillows under your knees. You can also try lying on your side with your knees bent up toward your chest and a pillow between your knees.  3. Avoid prolonged sitting, long car rides or travel. This puts more stress on the lower back than standing or walking.   4. During the first 24 to 72 hours after an injury of flare-up apply an ice pack to the painful area for 20 minutes, then remove it for 20  minutes over a period of 60 to 90 minutes or several times a day. This will reduce swelling and pain. Always wrap ice packs in a thin towel.  5. You can start with ice, then switch to heat. Heat (hot shower, hot bath, or heating pad) reduces swelling and pain, and works well for muscle spasms. Heat can be applied to the painful area for 20 minutes , then remove it for 20 minutes over a period of 60 to 90 minutes or several times a day. As a safety precaution, do not sleep on a heating pad as it can burn or damage skin.  6. Ice and heat therapies can be alternated.  7. Gentle stretching will help your back heal faster. Perform this simple routine 2 to 3 times a day until your back is feeling better.     Low back stretch    Lie on your back with your knees bent and both feet on the ground.    Slowly raise your left knee to your chest as you flatten your lower back against the floor. Hold for 20 to 30 seconds.    Relax and repeat the exercise with your right knee.    Do 2 to 3 of these exercises for each leg.    Repeat, hugging both knees to your chest at the same time.    Do not bounce, but use a gentle pull.    8. Be aware of safe lifting methods and do not lift anything over 15 pounds until all the pain is gone.  Medications  Talk to your doctor before using medications, especially if you have other medical problems or are taking other medicines.  You may use acetaminophen (such as Tylenol) or ibuprofen (such as Advil or Motrin) to control pain, unless another pain medicine was prescribed. If you have a chronic condition such as diabetes, liver or kidney disease, stomach ulcer, or gastrointestinal bleeding, or are taking blood thinners, talk with your doctor before taking any medications.  Be careful if you are given prescription pain medications, narcotics, or medication for muscle spasm. They can cause drowsiness, affect your coordination, reflexes or judgment. Do not drive or operate heavy machinery.  Follow-up  care  Follow up with your doctor or this facility if your symptoms do not start to improve after one week. Physical therapy or further tests may be needed.  If X-rays were taken, they will be reviewed by a radiologist. You will be notified of any new findings that may affect your care.  Call 911  Seek emergency medica care if any of the following occur:    Trouble breathing    Confusion    Drowsiness or trouble awakening    Fainting or loss of consciousness    Rapid or very slow heart rate    Loss of bowel or bladder control  When to seek medical care  Get prompt medicat attention if any of the following occur:    Pain becomes worse or spreads to your legs    Weakness or numbness in one or both legs    Numbness in the groin or genital area    Unexplained fever over 100.4 F (38.0 C)    Burning or pain when passing urine    0073-2950 [employer]. [address] [place] [address]. All rights reserved. This information is not intended as a substitute for professional medical care. Always follow your healthcare professional's instructions.        Constipation (Adult)  Constipation means that you have bowel movements that are less frequent than usual. Stools often become very hard and difficult to pass.  Constipation is very common. At some point in life it affects almost everyone. Since everyone's bowel habits are different, what is constipation to one person may not be to another. Your healthcare provider may do tests to diagnose constipation. It depends on what he or she finds when evaluating you.    Symptoms of constipation include:    Abdominal pain    Bloating    Vomiting    Painful bowel movements    Itching, swelling, bleeding, or pain around the anus  Causes  Constipation can have many causes. These include:    Diet low in fiber    Too much dairy    Not drinking enough liquids    Lack of exercise or physical activity. This is especially true for older adults.    Changes in lifestyle or  daily routine, including pregnancy, aging, work, and travel    Frequent use or misuse of laxatives    Ignoring the urge to have a bowel movement or delaying it until later    Medicines, such as certain prescription pain medicines, iron supplements, antacids, certain antidepressants, and calcium supplements    Diseases like irritable bowel syndrome, bowel obstructions, stroke, diabetes, thyroid disease, Parkinson disease, hemorrhoids, and colon cancer  Complications  Potential complications of constipation can include:    Hemorrhoids    Rectal bleeding from hemorrhoids or anal fissures (skin tears)    Hernias    Dependency on laxatives    Chronic constipation    Fecal impaction    Bowel obstruction or perforation  Home care  All treatment should be done after talking with your healthcare provider. This is especially true if you have another medical problems, are taking prescription medicines, or are an older adult. Treatment most often involves lifestyle changes. You may also need medicines. Your healthcare provider will tell you which will work best for you. Follow the advice below to help avoid this problem in the future.  Lifestyle changes  These lifestyle changes can help prevent constipation:    Diet. Eat a high-fiber diet, with fresh fruit and vegetables, and reduce dairy intake, meats, and processed foods    Fluids. It's important to get enough fluids each day. Drink plenty of water when you eat more fiber. If you are on diet that limits the amount of fluid you can have, talk about this with your healthcare provider.    Regular exercise. Check with your healthcare provider first.  Medications  Take any medicines as directed. Some laxatives are safe to use only every now and then. Others can be taken on a regular basis. Talk with your doctor or pharmacist if you have questions.  Prescription pain medicines can cause constipation. If you are taking this kind of medicine, ask your healthcare provider if you  should also take a stool softener.  Medicines you may take to treat constipation include:    Fiber supplements    Stool softeners    Laxatives    Enemas    Rectal suppositories  Follow-up care  Follow up with your healthcare provider if symptoms don't get better in the next few days. You may need to have more tests or see a specialist.  Call 911  Call 911 if any of these occur:    Trouble breathing    Stiff, rigid abdomen that is severely painful to touch    Confusion    Fainting or loss of consciousness    Rapid heart rate    Chest pain  When to seek medical advice  Call your healthcare provider right away if any of these occur:    Fever over 100.4 F (38 C)    Failure to resume normal bowel movements    Pain in your abdomen or back gets worse    Nausea or vomiting    Swelling in your abdomen    Blood in the stool    Black, tarry stool    Involuntary weight loss    Weakness    [id number] [employer]. [address] [place] [address]. All rights reserved. This information is not intended as a substitute for professional medical care. Always follow your healthcare professional's instructions.        Treating Constipation  Constipation is a common and often uncomfortable problem. Constipation means you have bowel movements fewer than three times per week, or strain to pass hard, dry stool. It can last a short time. Or it can be a problem that never seems to go away. The good news is that it can often be treated and controlled.    Eat more fiber  One of the best ways to help treat constipation is to increase your fiber intake. You can do this either through diet or by using fiber supplements. Fiber (in whole grains, fruits, and vegetables) adds bulk and absorbs water to soften the stool. This helps the stool pass through the colon more easily. When you increase your fiber intake, do it slowly to avoid side effects such as bloating. Also increase the amount of water that you drink. Eating more of  the following foods can add fiber to your diet.    High-fiber cereals    Whole grains, bran, and brown rice    Vegetables such as carrots, broccoli, and greens    Fresh fruits (especially apples, pears, and dried fruits like raisins and apricots)    Nuts and legumes (especially beans such as lentils, kidney beans, and lima beans)  Get physically active  Exercise helps improve the working of your colon which helps ease constipation. Try to get some physical activity every day. If you haven t been active for a while, talk to your health care provider before starting again.  Laxatives  Your health care provider may suggest an over-the-counter product to help ease your constipation. He or she may suggest the use of bulk-forming agents or laxatives. The use of laxatives, if used as directed, is common and safe. Follow directions carefully when using them. See your health care provider for new-onset constipation, to rule out other causes such as medications or thyroid disease.    9484-9204 [employer]. [address] [place] [address]. All rights reserved. This information is not intended as a substitute for professional medical care. Always follow your healthcare professional's instructions.        Eating a High-Fiber Diet  Fiber is what gives strength and structure to plants. Most grains, beans, vegetables, and fruits contain fiber. Foods rich in fiber are often low in calories and fat, and they fill you up more. They may also reduce your risks for certain health problems. To find out the amount of fiber in canned, packaged, or frozen foods, read the Nutrition Facts label. It tells you how much fiber is in a serving.    Types of fiber and their benefits  There are two types of fiber: insoluble and soluble. They both aid digestion and help you maintain a healthy weight.    Insoluble fiber. This is found in whole grains, cereals, certain fruits and vegetables such as apple skin, corn, and carrots.  Insoluble fiber may prevent constipation and reduce the risk for certain types of cancer.    Soluble fiber. This type of fiber is in oats, beans, and certain fruits and vegetables such as strawberries and peas. Soluble fiber can reduce cholesterol, which may help lower the risk for heart disease. It also helps control blood sugar levels.  Look for high-fiber foods  Try these foods to add fiber to your diet:    Whole-grain breads and cereals. Try to eat 6 to 8 ounces a day. Include wheat and oat bran cereals, whole-wheat muffins or toast, and corn tortillas in your meals.    Fruits. Try to eat 2 cups a day. Apples, oranges, strawberries, pears, and bananas are good sources. (Note: Fruit juice is low in fiber.)    Vegetables. Try to eat at least 2.5 cups a day. Add asparagus, carrots, broccoli, peas, and corn to your meals.    Beans. One cup of cooked lentils gives you over 15 grams of fiber. Try navy beans, lentils, and chickpeas.    Seeds. A small handful of seeds gives you about 3 grams of fiber. Try sunflower seeds.  Keep track of your fiber  Keep track of how much fiber you eat. Start by reading food labels. Then eat a variety of foods high in fiber. As you begin to eat more fiber, ask your healthcare provider how much water you should be drinking to keep your digestive system working smoothly.  You should aim for a certain amount of fiber in your diet each day. If you are a woman, that amount is between 25 and 28 grams per day. Men should aim for 30 to 33 grams per day. After age 50, your daily fiber needs drop to 22 grams for women and 28 grams for men.  Before you reach for the fiber supplements, think about this. Fiber is found naturally in healthy whole foods. It gives you that feeling of fullness after you eat. Taking fiber supplements or eating fiber-enriched foods will not give you this full feeling.  Your fiber intake is a good measure for the quality of your overall diet. If you are missing out on your  daily amount of fiber, you may be lacking other important nutrients as well.    [id number] [employer]. [address] [place] [address], [place] [address]. All rights reserved. This information is not intended as a substitute for professional medical care. Always follow your healthcare professional's instructions.        Discharge Instructions: Eating a High-Fiber Diet  Your health care provider has prescribed a high-fiber diet for you. Fiber is what gives strength and structure to plants. Most grains, beans, vegetables, and fruits contain fiber. Foods rich in fiber are often low in calories and fat, but they fill you up more. These foods may also reduce the risk of certain health problems.  There are two types of fiber:    Insoluble fiber. This is found in whole grains, cereals, and certain fruits and vegetables (such as apple skins, corn, and beans). Insoluble fiber is made up mainly of plant cell walls. It may prevent constipation and reduce the risk of certain types of cancer.    Soluble fiber. This type of fiber is found in oats, beans, nuts, and certain fruits and vegetables (such as strawberries and peas). Soluble fiber turns to gel in the digestive system, slowing the movement of the digestive tract. It helps control blood sugar levels and can reduce cholesterol, which may help lower the risk of heart disease. Soluble fiber can also help control appetite.     Home care    Know how much fiber you need a day. The recommended daily amount of fiber is 25 grams for women and 38 grams for men. After age 50, daily fiber needs drop to 21 grams for women and 30 grams for men.    Ask your doctor about a fiber supplement. (Always take fiber supplements with a large glass of water.)    Keep track of how much fiber you eat.    Eat a variety of foods high in fiber.    Learn to read and understand food labels.    Ask your healthcare provider how much water you should be drinking.    Look for these high-fiber  foods:    Whole-grain breads and cereals    6 ounces a day give you about 18 grams of fiber (1 ounce is equal to 1 slice of bread, 1 cup of dry cereal, or 1/2 cup of cooked rice).    Include wheat and oat bran cereals, whole-wheat muffins or toast, and corn tortillas in your meals.    Fruits     2 cups a day give you about 8 grams of fiber.    Apples, oranges, strawberries, pears, and bananas are good sources.    Fruit juice does not contain as much fiber as the fruit it was made from.    Vegetables    2  cups a day give you about 11 grams of fiber. Add asparagus, carrots, broccoli, peas, and corn to your meals.    Legumes    1/4 cup a day (in place of meat) gives you about 4 grams of fiber. Try navy beans, lentils, chickpeas, and soybeans.    Seeds     A small handful of seeds gives you about 3 grams of fiber. Try sunflower seeds.  Follow-up  Make a follow-up appointment with a nutritionist as directed by our staff.    [id number] [employer]. [address] [place] [address], [place] [address]. All rights reserved. This information is not intended as a substitute for professional medical care. Always follow your healthcare professional's instructions.

## 2017-04-29 NOTE — NURSING NOTE
"Chief Complaint   Patient presents with     Constipation       Initial BP (!) 155/91  Pulse 84  Temp 98.5  F (36.9  C) (Tympanic)  Resp 14 Estimated body mass index is 35.2 kg/(m^2) as calculated from the following:    Height as of 2/22/17: 6' 0.75\" (1.848 m).    Weight as of 2/22/17: 265 lb (120.2 kg).  Medication Reconciliation: complete    "

## 2017-04-29 NOTE — MR AVS SNAPSHOT
After Visit Summary   4/29/2017    Bernard Chapman    MRN: [id number]           Patient Information     Date Of Birth          1990        Visit Information        Provider Department      4/29/2017 11:45 AM Tamie Knapp APRN [facility]        Today's Diagnoses     Back muscle spasm    -  1    Constipation, unspecified constipation type          Care Instructions    Take your muscle relaxer with 400 mg Ibuprofen with food as directed.    Moist heat to back spasm    Increase fiber in your diet and make sure you drink enough water.    Follow up with your primary care provider if symptoms persist or worsen.      Indications for emergent return to emergency department discussed with patient, who verbalized good understanding and agreement.  Patient understands the limitations of today's evaluation.         Back Spasm (No Trauma)    Spasm of the back muscles can occur after a sudden forceful twisting or bending force (such as in a car accident), after a simple awkward movement, or after lifting something heavy with poor body positioning. In either case, muscle spasm adds to the pain. Sleeping in an awkward position or on a poor quality mattress can also cause this. Some persons respond to emotional stress by tensing the muscles of their back.  Pain that continues may require further evaluation or other types of treatment such as physical therapy.  Unless you had a physical injury (for example, a car accident or fall), X-rays are usually not ordered for the initial evaluation of back pain. If pain continues and does not respond to medical treatment, X-rays and other tests may be performed at a later time.   Home care  1. As soon as possible, begin sitting or walking to avoid problems from prolonged bedrest (muscle weakness, worsening back stiffness and pain, blood clots in the legs).  2. When in bed, try to find a position of comfort. A firm mattress is  best. Try lying flat on your back with pillows under your knees. You can also try lying on your side with your knees bent up toward your chest and a pillow between your knees.  3. Avoid prolonged sitting, long car rides or travel. This puts more stress on the lower back than standing or walking.   4. During the first 24 to 72 hours after an injury of flare-up apply an ice pack to the painful area for 20 minutes, then remove it for 20 minutes over a period of 60 to 90 minutes or several times a day. This will reduce swelling and pain. Always wrap ice packs in a thin towel.  5. You can start with ice, then switch to heat. Heat (hot shower, hot bath, or heating pad) reduces swelling and pain, and works well for muscle spasms. Heat can be applied to the painful area for 20 minutes , then remove it for 20 minutes over a period of 60 to 90 minutes or several times a day. As a safety precaution, do not sleep on a heating pad as it can burn or damage skin.  6. Ice and heat therapies can be alternated.  7. Gentle stretching will help your back heal faster. Perform this simple routine 2 to 3 times a day until your back is feeling better.     Low back stretch    Lie on your back with your knees bent and both feet on the ground.    Slowly raise your left knee to your chest as you flatten your lower back against the floor. Hold for 20 to 30 seconds.    Relax and repeat the exercise with your right knee.    Do 2 to 3 of these exercises for each leg.    Repeat, hugging both knees to your chest at the same time.    Do not bounce, but use a gentle pull.    8. Be aware of safe lifting methods and do not lift anything over 15 pounds until all the pain is gone.  Medications  Talk to your doctor before using medications, especially if you have other medical problems or are taking other medicines.  You may use acetaminophen (such as Tylenol) or ibuprofen (such as Advil or Motrin) to control pain, unless another pain medicine was  prescribed. If you have a chronic condition such as diabetes, liver or kidney disease, stomach ulcer, or gastrointestinal bleeding, or are taking blood thinners, talk with your doctor before taking any medications.  Be careful if you are given prescription pain medications, narcotics, or medication for muscle spasm. They can cause drowsiness, affect your coordination, reflexes or judgment. Do not drive or operate heavy machinery.  Follow-up care  Follow up with your doctor or this facility if your symptoms do not start to improve after one week. Physical therapy or further tests may be needed.  If X-rays were taken, they will be reviewed by a radiologist. You will be notified of any new findings that may affect your care.  Call 911  Seek emergency medica care if any of the following occur:    Trouble breathing    Confusion    Drowsiness or trouble awakening    Fainting or loss of consciousness    Rapid or very slow heart rate    Loss of bowel or bladder control  When to seek medical care  Get prompt medicat attention if any of the following occur:    Pain becomes worse or spreads to your legs    Weakness or numbness in one or both legs    Numbness in the groin or genital area    Unexplained fever over 100.4 F (38.0 C)    Burning or pain when passing urine    1657-2408 [employer]. [address] [place] [address]. All rights reserved. This information is not intended as a substitute for professional medical care. Always follow your healthcare professional's instructions.        Constipation (Adult)  Constipation means that you have bowel movements that are less frequent than usual. Stools often become very hard and difficult to pass.  Constipation is very common. At some point in life it affects almost everyone. Since everyone's bowel habits are different, what is constipation to one person may not be to another. Your healthcare provider may do tests to diagnose constipation. It depends on  what he or she finds when evaluating you.    Symptoms of constipation include:    Abdominal pain    Bloating    Vomiting    Painful bowel movements    Itching, swelling, bleeding, or pain around the anus  Causes  Constipation can have many causes. These include:    Diet low in fiber    Too much dairy    Not drinking enough liquids    Lack of exercise or physical activity. This is especially true for older adults.    Changes in lifestyle or daily routine, including pregnancy, aging, work, and travel    Frequent use or misuse of laxatives    Ignoring the urge to have a bowel movement or delaying it until later    Medicines, such as certain prescription pain medicines, iron supplements, antacids, certain antidepressants, and calcium supplements    Diseases like irritable bowel syndrome, bowel obstructions, stroke, diabetes, thyroid disease, Parkinson disease, hemorrhoids, and colon cancer  Complications  Potential complications of constipation can include:    Hemorrhoids    Rectal bleeding from hemorrhoids or anal fissures (skin tears)    Hernias    Dependency on laxatives    Chronic constipation    Fecal impaction    Bowel obstruction or perforation  Home care  All treatment should be done after talking with your healthcare provider. This is especially true if you have another medical problems, are taking prescription medicines, or are an older adult. Treatment most often involves lifestyle changes. You may also need medicines. Your healthcare provider will tell you which will work best for you. Follow the advice below to help avoid this problem in the future.  Lifestyle changes  These lifestyle changes can help prevent constipation:    Diet. Eat a high-fiber diet, with fresh fruit and vegetables, and reduce dairy intake, meats, and processed foods    Fluids. It's important to get enough fluids each day. Drink plenty of water when you eat more fiber. If you are on diet that limits the amount of fluid you can have,  talk about this with your healthcare provider.    Regular exercise. Check with your healthcare provider first.  Medications  Take any medicines as directed. Some laxatives are safe to use only every now and then. Others can be taken on a regular basis. Talk with your doctor or pharmacist if you have questions.  Prescription pain medicines can cause constipation. If you are taking this kind of medicine, ask your healthcare provider if you should also take a stool softener.  Medicines you may take to treat constipation include:    Fiber supplements    Stool softeners    Laxatives    Enemas    Rectal suppositories  Follow-up care  Follow up with your healthcare provider if symptoms don't get better in the next few days. You may need to have more tests or see a specialist.  Call 911  Call 911 if any of these occur:    Trouble breathing    Stiff, rigid abdomen that is severely painful to touch    Confusion    Fainting or loss of consciousness    Rapid heart rate    Chest pain  When to seek medical advice  Call your healthcare provider right away if any of these occur:    Fever over 100.4 F (38 C)    Failure to resume normal bowel movements    Pain in your abdomen or back gets worse    Nausea or vomiting    Swelling in your abdomen    Blood in the stool    Black, tarry stool    Involuntary weight loss    Weakness    8349-1607 [employer]. [address] [place], [place] [id number]. All rights reserved. This information is not intended as a substitute for professional medical care. Always follow your healthcare professional's instructions.        Treating Constipation  Constipation is a common and often uncomfortable problem. Constipation means you have bowel movements fewer than three times per week, or strain to pass hard, dry stool. It can last a short time. Or it can be a problem that never seems to go away. The good news is that it can often be treated and controlled.    Eat more fiber  One of the best  ways to help treat constipation is to increase your fiber intake. You can do this either through diet or by using fiber supplements. Fiber (in whole grains, fruits, and vegetables) adds bulk and absorbs water to soften the stool. This helps the stool pass through the colon more easily. When you increase your fiber intake, do it slowly to avoid side effects such as bloating. Also increase the amount of water that you drink. Eating more of the following foods can add fiber to your diet.    High-fiber cereals    Whole grains, bran, and brown rice    Vegetables such as carrots, broccoli, and greens    Fresh fruits (especially apples, pears, and dried fruits like raisins and apricots)    Nuts and legumes (especially beans such as lentils, kidney beans, and lima beans)  Get physically active  Exercise helps improve the working of your colon which helps ease constipation. Try to get some physical activity every day. If you haven t been active for a while, talk to your health care provider before starting again.  Laxatives  Your health care provider may suggest an over-the-counter product to help ease your constipation. He or she may suggest the use of bulk-forming agents or laxatives. The use of laxatives, if used as directed, is common and safe. Follow directions carefully when using them. See your health care provider for new-onset constipation, to rule out other causes such as medications or thyroid disease.    8817-8395 [employer]. [address] [place] [address]. All rights reserved. This information is not intended as a substitute for professional medical care. Always follow your healthcare professional's instructions.              Follow-ups after your visit        Follow-up notes from your care team     See patient instructions section of the AVS Return if symptoms worsen or fail to improve, for Follow up with your primary care provider.      Who to contact     If you have questions or need  follow up information about today's clinic visit or your schedule please contact [facility] directly at [phone].  Normal or non-critical lab and imaging results will be communicated to you by [employer], letter or phone within 4 business days after the clinic has received the results. If you do not hear from us within 7 days, please contact the clinic through [employer] or phone. If you have a critical or abnormal lab result, we will notify you by phone as soon as possible.  Submit refill requests through [employer] or call your pharmacy and they will forward the refill request to us. Please allow 3 business days for your refill to be completed.          Additional Information About Your Visit        [employer] Information     [employer] gives you secure access to your electronic health record. If you see a primary care provider, you can also send messages to your care team and make appointments. If you have questions, please call your primary care clinic.  If you do not have a primary care provider, please call [phone] and they will assist you.        Care EveryWhere ID     This is your Care EveryWhere ID. This could be used by other organizations to access your [facility] medical records  [id number]        Your Vitals Were     Pulse Temperature Respirations             84 98.5  F (36.9  C) (Tympanic) 14          Blood Pressure from Last 3 Encounters:   04/29/17 (!) 155/91   02/22/17 135/78   01/06/17 144/82    Weight from Last 3 Encounters:   02/22/17 265 lb (120.2 kg)   01/06/17 271 lb (122.9 kg)   04/11/16 260 lb (117.9 kg)              Today, you had the following     No orders found for display         Today's Medication Changes          These changes are accurate as of: 4/29/17 12:24 PM.  If you have any questions, ask your nurse or doctor.               Start taking these medicines.        Dose/Directions    methocarbamol 750 MG tablet   Commonly known as:  ROBAXIN   Used for:   Back muscle spasm        Dose:  750 mg   Take 1 tablet (750 mg) by mouth 4 times daily as needed for muscle spasms   Quantity:  40 tablet   Refills:  0            Where to get your medicines      These medications were sent to [id number] - [place], [place] - [address]  [address] [address]    Hours:  Closed 10-16-08 business to [facility] Phone:  [phone]     methocarbamol 750 MG tablet                Primary Care Provider Office Phone # Fax #    Leonard Mercado [phone]       [facility] [address] [facility] [address]        Thank you!     Thank you for choosing [facility]  for your care. Our goal is always to provide you with excellent care. Hearing back from our patients is one way we can continue to improve our services. Please take a few minutes to complete the written survey that you may receive in the mail after your visit with us. Thank you!             Your Updated Medication List - Protect others around you: Learn how to safely use, store and throw away your medicines at www.disposemymeds.org.          This list is accurate as of: 4/29/17 12:24 PM.  Always use your most recent med list.                   Brand Name Dispense Instructions for use    ascorbic acid 500 MG tablet    VITAMIN C     i tab daily       cephALEXin 500 MG capsule    KEFLEX    30 capsule    Take 1 capsule (500 mg) by mouth 3 times daily       FLAX SEED OIL PO      500 mg. one daily       methocarbamol 750 MG tablet    ROBAXIN    40 tablet    Take 1 tablet (750 mg) by mouth 4 times daily as needed for muscle spasms       Multi-vitamin Tabs tablet   Generic drug:  multivitamin, therapeutic with minerals      1 TABLET DAILY       PAPAYA ENZYME PO      three tabs daily

## 2017-04-29 NOTE — PROGRESS NOTES
"  SUBJECTIVE:                                                    Bernard Chapman is a 26 year old male who presents to clinic today for the following health issues:      Constipation     Onset: x2 weeks    Description:   Frequency of bowel movements: 1 normal; 1 small loose BM. Two Bm's in the last 2 weeks  Stool consistency: \"smooth\" \"log\"     Progression of Symptoms:  worsening and constant    Accompanying Signs & Symptoms:  Abdominal pain (cramping?): no   Blood in stool: no   Rectal pain: no   Nausea/vomiting: no   Weight loss or gain: no     Back pain/ groin pain intermittent- No known back injury    History:   History of abdominal surgery: no     Precipitating factors:   Recent use of narcotics, anticholinergics, calcium channel blockers, antacids, or iron supplements: no   Chronic Laxative Use: no        Therapies Tried and outcome: x lax       Back pain- intermittent over the last 2 weeks; no position that aggravates it; rt lower back   No numbness, tingling or weakness in legs; intermittent \"groin Pain\"           Problem list and histories reviewed & adjusted, as indicated.  Additional history: as documented    Patient Active Problem List   Diagnosis     CARDIOVASCULAR SCREENING; LDL GOAL LESS THAN 160     Elevated blood pressure reading without diagnosis of hypertension     Obesity     No past surgical history on file.    Social History   Substance Use Topics     Smoking status: Never Smoker     Smokeless tobacco: Never Used     Alcohol use No     Family History   Problem Relation Age of Onset     Respiratory Mother      asthma     Hypertension Maternal Grandmother      Arthritis Maternal Grandmother      DIABETES Paternal Grandmother      HEART DISEASE Paternal Grandfather      pacemaker     Colon Cancer No family hx of      Prostate Cancer No family hx of      Coronary Artery Disease No family hx of          Current Outpatient Prescriptions   Medication Sig Dispense Refill     methocarbamol (ROBAXIN) " 750 MG tablet Take 1 tablet (750 mg) by mouth 4 times daily as needed for muscle spasms 40 tablet 0     FLAX SEED OIL  mg. one daily       PAPAYA ENZYME OR three tabs daily       MULTI-VITAMIN OR TABS 1 TABLET DAILY       VITAMIN C 500 MG OR TABS i tab daily       cephALEXin (KEFLEX) 500 MG capsule Take 1 capsule (500 mg) by mouth 3 times daily 30 capsule 0     No Known Allergies  Labs reviewed in EPIC    Reviewed and updated as needed this visit by clinical staff  Allergies  Meds  Problems       Reviewed and updated as needed this visit by Provider  Allergies  Meds  Problems         ROS:  Constitutional, HEENT, cardiovascular, pulmonary, GI, , musculoskeletal, neuro, skin, endocrine and psych systems are negative, except as otherwise noted.    OBJECTIVE:                                                    BP (!) 155/91  Pulse 84  Temp 98.5  F (36.9  C) (Tympanic)  Resp 14  There is no height or weight on file to calculate BMI.  GENERAL: healthy, alert and no distress, nontoxic in appearance  EYES: Eyes grossly normal to inspection,  conjunctivae and sclerae normal  HENT: ear canals and TM's normal, nose and mouth without ulcers or lesions  NECK: no adenopathy, supple with full ROM  RESP: lungs clear to auscultation - no rales, rhonchi or wheezes  CV: regular rate and rhythm, normal S1 S2, no S3 or S4, no murmur, click or rub, no peripheral edema   ABDOMEN: soft, nontender, no hepatosplenomegaly, no masses and bowel sounds normal  MS: no gross musculoskeletal defects noted, no edema, spine nontender to palpation with normal curvature and alignment. Mild spasm in right lower back.    Diagnostic Test Results:  No results found for this or any previous visit (from the past 24 hour(s)).     ASSESSMENT/PLAN:                                                      Problem List Items Addressed This Visit     None      Visit Diagnoses     Back muscle spasm    -  Primary    Relevant Medications    methocarbamol  (ROBAXIN) 750 MG tablet    Constipation, unspecified constipation type                   Patient Instructions   Take your muscle relaxer with 400 mg Ibuprofen with food as directed.    Moist heat to back spasm    Increase fiber in your diet and make sure you drink enough water.    Follow up with your primary care provider if symptoms persist or worsen.      Indications for emergent return to emergency department discussed with patient, who verbalized good understanding and agreement.  Patient understands the limitations of today's evaluation.         Back Spasm (No Trauma)    Spasm of the back muscles can occur after a sudden forceful twisting or bending force (such as in a car accident), after a simple awkward movement, or after lifting something heavy with poor body positioning. In either case, muscle spasm adds to the pain. Sleeping in an awkward position or on a poor quality mattress can also cause this. Some persons respond to emotional stress by tensing the muscles of their back.  Pain that continues may require further evaluation or other types of treatment such as physical therapy.  Unless you had a physical injury (for example, a car accident or fall), X-rays are usually not ordered for the initial evaluation of back pain. If pain continues and does not respond to medical treatment, X-rays and other tests may be performed at a later time.   Home care  1. As soon as possible, begin sitting or walking to avoid problems from prolonged bedrest (muscle weakness, worsening back stiffness and pain, blood clots in the legs).  2. When in bed, try to find a position of comfort. A firm mattress is best. Try lying flat on your back with pillows under your knees. You can also try lying on your side with your knees bent up toward your chest and a pillow between your knees.  3. Avoid prolonged sitting, long car rides or travel. This puts more stress on the lower back than standing or walking.   4. During the first 24 to  72 hours after an injury of flare-up apply an ice pack to the painful area for 20 minutes, then remove it for 20 minutes over a period of 60 to 90 minutes or several times a day. This will reduce swelling and pain. Always wrap ice packs in a thin towel.  5. You can start with ice, then switch to heat. Heat (hot shower, hot bath, or heating pad) reduces swelling and pain, and works well for muscle spasms. Heat can be applied to the painful area for 20 minutes , then remove it for 20 minutes over a period of 60 to 90 minutes or several times a day. As a safety precaution, do not sleep on a heating pad as it can burn or damage skin.  6. Ice and heat therapies can be alternated.  7. Gentle stretching will help your back heal faster. Perform this simple routine 2 to 3 times a day until your back is feeling better.     Low back stretch    Lie on your back with your knees bent and both feet on the ground.    Slowly raise your left knee to your chest as you flatten your lower back against the floor. Hold for 20 to 30 seconds.    Relax and repeat the exercise with your right knee.    Do 2 to 3 of these exercises for each leg.    Repeat, hugging both knees to your chest at the same time.    Do not bounce, but use a gentle pull.    8. Be aware of safe lifting methods and do not lift anything over 15 pounds until all the pain is gone.  Medications  Talk to your doctor before using medications, especially if you have other medical problems or are taking other medicines.  You may use acetaminophen (such as Tylenol) or ibuprofen (such as Advil or Motrin) to control pain, unless another pain medicine was prescribed. If you have a chronic condition such as diabetes, liver or kidney disease, stomach ulcer, or gastrointestinal bleeding, or are taking blood thinners, talk with your doctor before taking any medications.  Be careful if you are given prescription pain medications, narcotics, or medication for muscle spasm. They can cause  drowsiness, affect your coordination, reflexes or judgment. Do not drive or operate heavy machinery.  Follow-up care  Follow up with your doctor or this facility if your symptoms do not start to improve after one week. Physical therapy or further tests may be needed.  If X-rays were taken, they will be reviewed by a radiologist. You will be notified of any new findings that may affect your care.  Call 911  Seek emergency medica care if any of the following occur:    Trouble breathing    Confusion    Drowsiness or trouble awakening    Fainting or loss of consciousness    Rapid or very slow heart rate    Loss of bowel or bladder control  When to seek medical care  Get prompt medicat attention if any of the following occur:    Pain becomes worse or spreads to your legs    Weakness or numbness in one or both legs    Numbness in the groin or genital area    Unexplained fever over 100.4 F (38.0 C)    Burning or pain when passing urine    4625-4719 [employer]. [address] [place] [address]. All rights reserved. This information is not intended as a substitute for professional medical care. Always follow your healthcare professional's instructions.        Constipation (Adult)  Constipation means that you have bowel movements that are less frequent than usual. Stools often become very hard and difficult to pass.  Constipation is very common. At some point in life it affects almost everyone. Since everyone's bowel habits are different, what is constipation to one person may not be to another. Your healthcare provider may do tests to diagnose constipation. It depends on what he or she finds when evaluating you.    Symptoms of constipation include:    Abdominal pain    Bloating    Vomiting    Painful bowel movements    Itching, swelling, bleeding, or pain around the anus  Causes  Constipation can have many causes. These include:    Diet low in fiber    Too much dairy    Not drinking enough  liquids    Lack of exercise or physical activity. This is especially true for older adults.    Changes in lifestyle or daily routine, including pregnancy, aging, work, and travel    Frequent use or misuse of laxatives    Ignoring the urge to have a bowel movement or delaying it until later    Medicines, such as certain prescription pain medicines, iron supplements, antacids, certain antidepressants, and calcium supplements    Diseases like irritable bowel syndrome, bowel obstructions, stroke, diabetes, thyroid disease, Parkinson disease, hemorrhoids, and colon cancer  Complications  Potential complications of constipation can include:    Hemorrhoids    Rectal bleeding from hemorrhoids or anal fissures (skin tears)    Hernias    Dependency on laxatives    Chronic constipation    Fecal impaction    Bowel obstruction or perforation  Home care  All treatment should be done after talking with your healthcare provider. This is especially true if you have another medical problems, are taking prescription medicines, or are an older adult. Treatment most often involves lifestyle changes. You may also need medicines. Your healthcare provider will tell you which will work best for you. Follow the advice below to help avoid this problem in the future.  Lifestyle changes  These lifestyle changes can help prevent constipation:    Diet. Eat a high-fiber diet, with fresh fruit and vegetables, and reduce dairy intake, meats, and processed foods    Fluids. It's important to get enough fluids each day. Drink plenty of water when you eat more fiber. If you are on diet that limits the amount of fluid you can have, talk about this with your healthcare provider.    Regular exercise. Check with your healthcare provider first.  Medications  Take any medicines as directed. Some laxatives are safe to use only every now and then. Others can be taken on a regular basis. Talk with your doctor or pharmacist if you have questions.  Prescription  pain medicines can cause constipation. If you are taking this kind of medicine, ask your healthcare provider if you should also take a stool softener.  Medicines you may take to treat constipation include:    Fiber supplements    Stool softeners    Laxatives    Enemas    Rectal suppositories  Follow-up care  Follow up with your healthcare provider if symptoms don't get better in the next few days. You may need to have more tests or see a specialist.  Call 911  Call 911 if any of these occur:    Trouble breathing    Stiff, rigid abdomen that is severely painful to touch    Confusion    Fainting or loss of consciousness    Rapid heart rate    Chest pain  When to seek medical advice  Call your healthcare provider right away if any of these occur:    Fever over 100.4 F (38 C)    Failure to resume normal bowel movements    Pain in your abdomen or back gets worse    Nausea or vomiting    Swelling in your abdomen    Blood in the stool    Black, tarry stool    Involuntary weight loss    Weakness    7655-7410 [employer]. [address] [place] [address]. All rights reserved. This information is not intended as a substitute for professional medical care. Always follow your healthcare professional's instructions.        Treating Constipation  Constipation is a common and often uncomfortable problem. Constipation means you have bowel movements fewer than three times per week, or strain to pass hard, dry stool. It can last a short time. Or it can be a problem that never seems to go away. The good news is that it can often be treated and controlled.    Eat more fiber  One of the best ways to help treat constipation is to increase your fiber intake. You can do this either through diet or by using fiber supplements. Fiber (in whole grains, fruits, and vegetables) adds bulk and absorbs water to soften the stool. This helps the stool pass through the colon more easily. When you increase your fiber intake, do  it slowly to avoid side effects such as bloating. Also increase the amount of water that you drink. Eating more of the following foods can add fiber to your diet.    High-fiber cereals    Whole grains, bran, and brown rice    Vegetables such as carrots, broccoli, and greens    Fresh fruits (especially apples, pears, and dried fruits like raisins and apricots)    Nuts and legumes (especially beans such as lentils, kidney beans, and lima beans)  Get physically active  Exercise helps improve the working of your colon which helps ease constipation. Try to get some physical activity every day. If you haven t been active for a while, talk to your health care provider before starting again.  Laxatives  Your health care provider may suggest an over-the-counter product to help ease your constipation. He or she may suggest the use of bulk-forming agents or laxatives. The use of laxatives, if used as directed, is common and safe. Follow directions carefully when using them. See your health care provider for new-onset constipation, to rule out other causes such as medications or thyroid disease.    9840-4205 [employer]. [address] [place] [address]. All rights reserved. This information is not intended as a substitute for professional medical care. Always follow your healthcare professional's instructions.            [facility]

## 2017-06-14 ENCOUNTER — TELEPHONE (OUTPATIENT)
Dept: FAMILY MEDICINE | Facility: CLINIC | Age: 27
End: 2017-06-14

## 2019-11-06 ENCOUNTER — HEALTH MAINTENANCE LETTER (OUTPATIENT)
Age: 29
End: 2019-11-06

## 2020-11-22 ENCOUNTER — HEALTH MAINTENANCE LETTER (OUTPATIENT)
Age: 30
End: 2020-11-22

## 2021-09-19 ENCOUNTER — HEALTH MAINTENANCE LETTER (OUTPATIENT)
Age: 31
End: 2021-09-19

## 2022-01-09 ENCOUNTER — HEALTH MAINTENANCE LETTER (OUTPATIENT)
Age: 32
End: 2022-01-09

## 2022-11-21 ENCOUNTER — HEALTH MAINTENANCE LETTER (OUTPATIENT)
Age: 32
End: 2022-11-21

## 2023-04-16 ENCOUNTER — HEALTH MAINTENANCE LETTER (OUTPATIENT)
Age: 33
End: 2023-04-16

## 2025-07-03 ENCOUNTER — TELEPHONE (OUTPATIENT)
Dept: HEMATOLOGY | Facility: CLINIC | Age: 35
End: 2025-07-03
Payer: COMMERCIAL

## 2025-07-03 NOTE — CONFIDENTIAL NOTE
[id number]  Bernard Chapman  35 year old male  [facility] Diagnosis: PE/DVT  [facility] Provider: Cogan    [phone] : referral     Incoming self referral from Bernard for PE/DVT and rash on Eliquis. He is scheduled for a 1 hour new in person visit with Dr. Cogan on 7/15 at 11 am. Appt notes: acute DVT/PE - rash on Eliquis. Records have been connected via CE.    Norma Caceres  MSN, RN, PHN  [facility]  Office: [phone]  Fax: [phone]

## 2025-07-11 NOTE — PROGRESS NOTES
[facility]  [facility] [place] [address]  Phone: [phone], Fax: [phone]    Outpatient Visit Note:    Patient: Bernard Chapman  MRN: [id number]  : 1990  FARRAH: Jul 15, 2025    Reason for consult: acute DVT/PE - rash on Eliquis     History of Present Illness:  Bernard Chapman is a 35 year old man with a history of hypertension, diabetes, possible ITP, ELENA on CPAP, VTE's, drug rash related to apixaban, referred to hematology.    He was admitted on  with chest pain.  CT showed acute bilateral pulmonary emboli with right heart strain.  Also found to have a DVT in the right popliteal vein.  Started on apixaban.  Then went to the ER on July 3 with a rash which was attributed to apixaban, and he was transitioned to rivaroxaban.    No personal or family history of thrombosis. No long trips or surgeries around time of event. No smoking. No testosterone use. Notes that leg pain predated chest pain.    Works at [employer].    On  he felt shaky and dizzy in the head. Notes spots in eyes. Mother notes that he is lethargic, not remembering things. Threw up in the car.    Medications:  Current Outpatient Medications   Medication Sig Dispense Refill    aluminum chloride (DRYSOL) 20 % external solution Apply 1 Application. topically.      Ascorbic Acid (VITAMIN C) 500 MG CAPS Take by mouth.      B Complex Vitamins (VITAMIN B-COMPLEX PO) Take by mouth.      cyclobenzaprine (FLEXERIL) 10 MG tablet Take 10 mg by mouth.      Dulaglutide 4.5 MG/0.5ML SOAJ Inject 4.5 mg subcutaneously once a week.      fluticasone (FLONASE) 50 MCG/ACT nasal spray Spray 1 spray in nostril.      hydrochlorothiazide (HYDRODIURIL) 25 MG tablet Take 25 mg by mouth daily.      losartan (COZAAR) 100 MG tablet Take 100 mg by mouth daily.      metoprolol succinate ER (TOPROL XL) 25 MG 24 hr tablet Take 25 mg by mouth.      Multiple Vitamin (MULTIVITAMIN PO) Take by mouth.       "Rivaroxaban ANTICOAGULANT 15 & 20 MG TBPK Starter Therapy Pack Take as directed on package.      valACYclovir (VALTREX) 1000 mg tablet Take 500 mg by mouth.          Objectives:  BP (!) 145/85 (BP Location: Right arm, Patient Position: Sitting)   Pulse 97   Temp 97.7  F (36.5  C) (Tympanic)   Ht 1.88 m (6' 2\")   Wt 113.3 kg (249 lb 12.8 oz)   SpO2 99%   BMI 32.07 kg/m      Assessment:  Bernard Chapman is a 35 year old man with a recent pulmonary embolism on rivaroxaban now with new neurologic symptoms.    He has several days of progressive lethargy, vision changes, abnormal arm movements, and cognitive issues.  Given that he recently started rivaroxaban, I was initially concerned for intracranial bleeding, and recommended that he go to the ER.    In the ER, he had a head CT which did not show any evidence of intracranial bleeding.  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  CBC in the ER also revealed severe thrombocytopenia.    I spoke extensively to the ER physician Dr. Haynes about Bernard's case.  Given his new cerebral infarcts and severe thrombocytopenia, I am concerned for thrombotic emergency such as TTP or catastrophic antiphospholipid syndrome.  We discussed that I would recommend starting IV steroids, and consulting the apheresis service for urgent plasma exchange.    I reached out to the apheresis service to discuss the case, and also communicated about the case to the inpatient hematology consult attending and overnight fellow on-call.    I also called the patient and his mother to discuss the situation extensively, the reason for urgent plasma exchange, the expected course of care (daily plasma exchange, awaiting labs to clarify the diagnosis), the need for central line for plasma exchange, and answered all of their questions about Bernard's situation.    I entered the note in the chart later in the day at 6:50 PM to describe my " recommendations for Bernard's management in the ER and initially in the hospital.  The hematology consult service will take over tomorrow.    Plan:  - Pending outcome of hospital admission, will determine plan for outpatient management moving forward  - Follow-up timing to be determined based on the above    Patient understands and agrees with the above plan and recommendation.    Jacob Cogan, MD  Hematology    180 minutes spent by me on the date of the encounter doing chart review, history and exam, documentation and further activities per the note    The longitudinal plan of care for the diagnosis(es)/condition(s) as documented were addressed during this visit. Due to the added complexity in care, I will continue to support Bernard in the subsequent management and with ongoing continuity of care.    This note was completed in part using dictation via the Dragon voice recognition software. Some word and grammatical errors may occur and must be interpreted in the appropriate clinical context. If there are any questions pertaining to this issue, please contact me for further clarification.

## 2025-07-15 ENCOUNTER — APPOINTMENT (OUTPATIENT)
Dept: CT IMAGING | Facility: CLINIC | Age: 35
DRG: 064 | End: 2025-07-15
Attending: FAMILY MEDICINE
Payer: COMMERCIAL

## 2025-07-15 ENCOUNTER — OFFICE VISIT (OUTPATIENT)
Dept: HEMATOLOGY | Facility: CLINIC | Age: 35
End: 2025-07-15
Attending: STUDENT IN AN ORGANIZED HEALTH CARE EDUCATION/TRAINING PROGRAM
Payer: COMMERCIAL

## 2025-07-15 ENCOUNTER — APPOINTMENT (OUTPATIENT)
Dept: CT IMAGING | Facility: CLINIC | Age: 35
DRG: 064 | End: 2025-07-15
Payer: COMMERCIAL

## 2025-07-15 ENCOUNTER — HOSPITAL ENCOUNTER (INPATIENT)
Facility: CLINIC | Age: 35
End: 2025-07-15
Attending: FAMILY MEDICINE | Admitting: PSYCHIATRY & NEUROLOGY
Payer: COMMERCIAL

## 2025-07-15 ENCOUNTER — APPOINTMENT (OUTPATIENT)
Dept: MRI IMAGING | Facility: CLINIC | Age: 35
DRG: 064 | End: 2025-07-15
Attending: FAMILY MEDICINE
Payer: COMMERCIAL

## 2025-07-15 ENCOUNTER — DOCUMENTATION ONLY (OUTPATIENT)
Dept: HEMATOLOGY | Facility: CLINIC | Age: 35
End: 2025-07-15

## 2025-07-15 VITALS
HEIGHT: 74 IN | BODY MASS INDEX: 32.06 KG/M2 | TEMPERATURE: 97.7 F | DIASTOLIC BLOOD PRESSURE: 85 MMHG | OXYGEN SATURATION: 99 % | WEIGHT: 249.8 LBS | HEART RATE: 97 BPM | SYSTOLIC BLOOD PRESSURE: 145 MMHG

## 2025-07-15 DIAGNOSIS — C34.90 PRIMARY ADENOCARCINOMA OF LUNG, UNSPECIFIED LATERALITY (H): ICD-10-CM

## 2025-07-15 DIAGNOSIS — Z86.73 HISTORY OF TIA (TRANSIENT ISCHEMIC ATTACK) AND STROKE: ICD-10-CM

## 2025-07-15 DIAGNOSIS — Z13.6 CARDIOVASCULAR SCREENING; LDL GOAL LESS THAN 160: ICD-10-CM

## 2025-07-15 DIAGNOSIS — R21 RASH AND NONSPECIFIC SKIN ERUPTION: ICD-10-CM

## 2025-07-15 DIAGNOSIS — G47.9 SLEEP DISORDER: ICD-10-CM

## 2025-07-15 DIAGNOSIS — R11.0 NAUSEA: ICD-10-CM

## 2025-07-15 DIAGNOSIS — R03.0 ELEVATED BLOOD PRESSURE READING WITHOUT DIAGNOSIS OF HYPERTENSION: Primary | ICD-10-CM

## 2025-07-15 DIAGNOSIS — G40.909 SEIZURE DISORDER (H): ICD-10-CM

## 2025-07-15 DIAGNOSIS — Z86.711 HISTORY OF PULMONARY EMBOLISM: Primary | ICD-10-CM

## 2025-07-15 DIAGNOSIS — I63.9 ISCHEMIC STROKE (H): ICD-10-CM

## 2025-07-15 DIAGNOSIS — C78.01: ICD-10-CM

## 2025-07-15 DIAGNOSIS — M31.19 T.T.P. SYNDROME (H): ICD-10-CM

## 2025-07-15 DIAGNOSIS — I10 BENIGN ESSENTIAL HYPERTENSION: ICD-10-CM

## 2025-07-15 DIAGNOSIS — C34.91 NON-SMALL CELL CANCER OF RIGHT LUNG (H): ICD-10-CM

## 2025-07-15 DIAGNOSIS — K59.01 SLOW TRANSIT CONSTIPATION: ICD-10-CM

## 2025-07-15 LAB
ABO + RH BLD: NORMAL
ALBUMIN SERPL BCG-MCNC: 4.8 G/DL (ref 3.5–5.2)
ALP SERPL-CCNC: 73 U/L (ref 40–150)
ALT SERPL W P-5'-P-CCNC: 37 U/L (ref 0–70)
ANION GAP SERPL CALCULATED.3IONS-SCNC: 13 MMOL/L (ref 7–15)
APTT PPP: 32 SECONDS (ref 22–38)
AST SERPL W P-5'-P-CCNC: 27 U/L (ref 0–45)
BASOPHILS # BLD AUTO: 0 10E3/UL (ref 0–0.2)
BASOPHILS # BLD AUTO: 0.1 10E3/UL (ref 0–0.2)
BASOPHILS NFR BLD AUTO: 0 %
BASOPHILS NFR BLD AUTO: 0 %
BILIRUB SERPL-MCNC: 1 MG/DL
BLD GP AB SCN SERPL QL: NEGATIVE
BUN SERPL-MCNC: 18.5 MG/DL (ref 6–20)
CALCIUM SERPL-MCNC: 9.5 MG/DL (ref 8.8–10.4)
CHLORIDE SERPL-SCNC: 101 MMOL/L (ref 98–107)
CREAT SERPL-MCNC: 1.06 MG/DL (ref 0.67–1.17)
EGFRCR SERPLBLD CKD-EPI 2021: >90 ML/MIN/1.73M2
EOSINOPHIL # BLD AUTO: 0.1 10E3/UL (ref 0–0.7)
EOSINOPHIL # BLD AUTO: 0.1 10E3/UL (ref 0–0.7)
EOSINOPHIL NFR BLD AUTO: 1 %
EOSINOPHIL NFR BLD AUTO: 1 %
ERYTHROCYTE [DISTWIDTH] IN BLOOD BY AUTOMATED COUNT: 13 % (ref 10–15)
ERYTHROCYTE [DISTWIDTH] IN BLOOD BY AUTOMATED COUNT: 13.1 % (ref 10–15)
GLUCOSE BLDC GLUCOMTR-MCNC: 130 MG/DL (ref 70–99)
GLUCOSE SERPL-MCNC: 123 MG/DL (ref 70–99)
HCO3 SERPL-SCNC: 25 MMOL/L (ref 22–29)
HCT VFR BLD AUTO: 37 % (ref 40–53)
HCT VFR BLD AUTO: 42.3 % (ref 40–53)
HGB BLD-MCNC: 12.5 G/DL (ref 13.3–17.7)
HGB BLD-MCNC: 14.1 G/DL (ref 13.3–17.7)
IMM GRANULOCYTES # BLD: 0.1 10E3/UL
IMM GRANULOCYTES # BLD: 0.1 10E3/UL
IMM GRANULOCYTES NFR BLD: 1 %
IMM GRANULOCYTES NFR BLD: 1 %
INR PPP: 2.12 (ref 0.85–1.15)
LYMPHOCYTES # BLD AUTO: 1.1 10E3/UL (ref 0.8–5.3)
LYMPHOCYTES # BLD AUTO: 1.2 10E3/UL (ref 0.8–5.3)
LYMPHOCYTES NFR BLD AUTO: 11 %
LYMPHOCYTES NFR BLD AUTO: 8 %
MAGNESIUM SERPL-MCNC: 2 MG/DL (ref 1.7–2.3)
MCH RBC QN AUTO: 26.9 PG (ref 26.5–33)
MCH RBC QN AUTO: 27.1 PG (ref 26.5–33)
MCHC RBC AUTO-ENTMCNC: 33.3 G/DL (ref 31.5–36.5)
MCHC RBC AUTO-ENTMCNC: 33.8 G/DL (ref 31.5–36.5)
MCV RBC AUTO: 80 FL (ref 78–100)
MCV RBC AUTO: 81 FL (ref 78–100)
MONOCYTES # BLD AUTO: 0.6 10E3/UL (ref 0–1.3)
MONOCYTES # BLD AUTO: 0.7 10E3/UL (ref 0–1.3)
MONOCYTES NFR BLD AUTO: 4 %
MONOCYTES NFR BLD AUTO: 6 %
NEUTROPHILS # BLD AUTO: 11.5 10E3/UL (ref 1.6–8.3)
NEUTROPHILS # BLD AUTO: 9.4 10E3/UL (ref 1.6–8.3)
NEUTROPHILS NFR BLD AUTO: 81 %
NEUTROPHILS NFR BLD AUTO: 86 %
NRBC # BLD AUTO: 0 10E3/UL
NRBC # BLD AUTO: 0 10E3/UL
NRBC BLD AUTO-RTO: 0 /100
NRBC BLD AUTO-RTO: 0 /100
PLAT MORPH BLD: NORMAL
PLATELET # BLD AUTO: 26 10E3/UL (ref 150–450)
PLATELET # BLD AUTO: 28 10E3/UL (ref 150–450)
POTASSIUM SERPL-SCNC: 4.2 MMOL/L (ref 3.4–5.3)
PROT SERPL-MCNC: 8.1 G/DL (ref 6.4–8.3)
PROTHROMBIN TIME: 24 SECONDS (ref 11.8–14.8)
RBC # BLD AUTO: 4.61 10E6/UL (ref 4.4–5.9)
RBC # BLD AUTO: 5.25 10E6/UL (ref 4.4–5.9)
RBC MORPH BLD: NORMAL
SODIUM SERPL-SCNC: 139 MMOL/L (ref 135–145)
SPECIMEN EXP DATE BLD: NORMAL
TROPONIN T SERPL HS-MCNC: 33 NG/L
TROPONIN T SERPL HS-MCNC: 34 NG/L
TSH SERPL DL<=0.005 MIU/L-ACNC: 1.18 UIU/ML (ref 0.3–4.2)
WBC # BLD AUTO: 11.5 10E3/UL (ref 4–11)
WBC # BLD AUTO: 13.3 10E3/UL (ref 4–11)

## 2025-07-15 PROCEDURE — 255N000002 HC RX 255 OP 636: Performed by: FAMILY MEDICINE

## 2025-07-15 PROCEDURE — 250N000009 HC RX 250: Performed by: FAMILY MEDICINE

## 2025-07-15 PROCEDURE — G2211 COMPLEX E/M VISIT ADD ON: HCPCS | Performed by: STUDENT IN AN ORGANIZED HEALTH CARE EDUCATION/TRAINING PROGRAM

## 2025-07-15 PROCEDURE — 84443 ASSAY THYROID STIM HORMONE: CPT

## 2025-07-15 PROCEDURE — 82607 VITAMIN B-12: CPT

## 2025-07-15 PROCEDURE — 85613 RUSSELL VIPER VENOM DILUTED: CPT

## 2025-07-15 PROCEDURE — 258N000003 HC RX IP 258 OP 636: Performed by: FAMILY MEDICINE

## 2025-07-15 PROCEDURE — 86147 CARDIOLIPIN ANTIBODY EA IG: CPT | Performed by: FAMILY MEDICINE

## 2025-07-15 PROCEDURE — 84484 ASSAY OF TROPONIN QUANT: CPT | Performed by: FAMILY MEDICINE

## 2025-07-15 PROCEDURE — 36415 COLL VENOUS BLD VENIPUNCTURE: CPT

## 2025-07-15 PROCEDURE — 3079F DIAST BP 80-89 MM HG: CPT | Performed by: STUDENT IN AN ORGANIZED HEALTH CARE EDUCATION/TRAINING PROGRAM

## 2025-07-15 PROCEDURE — 250N000013 HC RX MED GY IP 250 OP 250 PS 637: Performed by: EMERGENCY MEDICINE

## 2025-07-15 PROCEDURE — 86900 BLOOD TYPING SEROLOGIC ABO: CPT

## 2025-07-15 PROCEDURE — 99285 EMERGENCY DEPT VISIT HI MDM: CPT | Performed by: FAMILY MEDICINE

## 2025-07-15 PROCEDURE — 93005 ELECTROCARDIOGRAM TRACING: CPT | Performed by: FAMILY MEDICINE

## 2025-07-15 PROCEDURE — 85025 COMPLETE CBC W/AUTO DIFF WBC: CPT | Performed by: FAMILY MEDICINE

## 2025-07-15 PROCEDURE — 82248 BILIRUBIN DIRECT: CPT

## 2025-07-15 PROCEDURE — 82962 GLUCOSE BLOOD TEST: CPT

## 2025-07-15 PROCEDURE — 84439 ASSAY OF FREE THYROXINE: CPT

## 2025-07-15 PROCEDURE — 82728 ASSAY OF FERRITIN: CPT

## 2025-07-15 PROCEDURE — 70450 CT HEAD/BRAIN W/O DYE: CPT

## 2025-07-15 PROCEDURE — 96361 HYDRATE IV INFUSION ADD-ON: CPT | Performed by: FAMILY MEDICINE

## 2025-07-15 PROCEDURE — 250N000011 HC RX IP 250 OP 636: Performed by: FAMILY MEDICINE

## 2025-07-15 PROCEDURE — 83735 ASSAY OF MAGNESIUM: CPT | Performed by: FAMILY MEDICINE

## 2025-07-15 PROCEDURE — 85390 FIBRINOLYSINS SCREEN I&R: CPT | Mod: 26 | Performed by: PATHOLOGY

## 2025-07-15 PROCEDURE — 200N000002 HC R&B ICU UMMC

## 2025-07-15 PROCEDURE — 83010 ASSAY OF HAPTOGLOBIN QUANT: CPT

## 2025-07-15 PROCEDURE — 3077F SYST BP >= 140 MM HG: CPT | Performed by: STUDENT IN AN ORGANIZED HEALTH CARE EDUCATION/TRAINING PROGRAM

## 2025-07-15 PROCEDURE — 86146 BETA-2 GLYCOPROTEIN ANTIBODY: CPT | Performed by: FAMILY MEDICINE

## 2025-07-15 PROCEDURE — 99205 OFFICE O/P NEW HI 60 MIN: CPT | Performed by: STUDENT IN AN ORGANIZED HEALTH CARE EDUCATION/TRAINING PROGRAM

## 2025-07-15 PROCEDURE — A9585 GADOBUTROL INJECTION: HCPCS | Performed by: FAMILY MEDICINE

## 2025-07-15 PROCEDURE — 85025 COMPLETE CBC W/AUTO DIFF WBC: CPT

## 2025-07-15 PROCEDURE — 87389 HIV-1 AG W/HIV-1&-2 AB AG IA: CPT

## 2025-07-15 PROCEDURE — 70553 MRI BRAIN STEM W/O & W/DYE: CPT

## 2025-07-15 PROCEDURE — 70498 CT ANGIOGRAPHY NECK: CPT

## 2025-07-15 PROCEDURE — 83615 LACTATE (LD) (LDH) ENZYME: CPT

## 2025-07-15 PROCEDURE — 36415 COLL VENOUS BLD VENIPUNCTURE: CPT | Performed by: FAMILY MEDICINE

## 2025-07-15 PROCEDURE — 84443 ASSAY THYROID STIM HORMONE: CPT | Performed by: FAMILY MEDICINE

## 2025-07-15 PROCEDURE — 85730 THROMBOPLASTIN TIME PARTIAL: CPT | Performed by: FAMILY MEDICINE

## 2025-07-15 PROCEDURE — 99207 BLOOD MORPHOLOGY PATHOLOGIST REVIEW: CPT | Performed by: STUDENT IN AN ORGANIZED HEALTH CARE EDUCATION/TRAINING PROGRAM

## 2025-07-15 PROCEDURE — 83735 ASSAY OF MAGNESIUM: CPT

## 2025-07-15 PROCEDURE — 85045 AUTOMATED RETICULOCYTE COUNT: CPT

## 2025-07-15 PROCEDURE — 85610 PROTHROMBIN TIME: CPT | Performed by: FAMILY MEDICINE

## 2025-07-15 PROCEDURE — 99285 EMERGENCY DEPT VISIT HI MDM: CPT | Mod: 25 | Performed by: FAMILY MEDICINE

## 2025-07-15 PROCEDURE — 93010 ELECTROCARDIOGRAM REPORT: CPT | Performed by: FAMILY MEDICINE

## 2025-07-15 PROCEDURE — 85730 THROMBOPLASTIN TIME PARTIAL: CPT

## 2025-07-15 PROCEDURE — 82465 ASSAY BLD/SERUM CHOLESTEROL: CPT

## 2025-07-15 PROCEDURE — 84702 CHORIONIC GONADOTROPIN TEST: CPT

## 2025-07-15 PROCEDURE — 85610 PROTHROMBIN TIME: CPT

## 2025-07-15 PROCEDURE — 99417 PROLNG OP E/M EACH 15 MIN: CPT | Performed by: STUDENT IN AN ORGANIZED HEALTH CARE EDUCATION/TRAINING PROGRAM

## 2025-07-15 PROCEDURE — 99213 OFFICE O/P EST LOW 20 MIN: CPT | Performed by: STUDENT IN AN ORGANIZED HEALTH CARE EDUCATION/TRAINING PROGRAM

## 2025-07-15 PROCEDURE — 82947 ASSAY GLUCOSE BLOOD QUANT: CPT | Performed by: FAMILY MEDICINE

## 2025-07-15 PROCEDURE — 85397 CLOTTING FUNCT ACTIVITY: CPT

## 2025-07-15 PROCEDURE — 85384 FIBRINOGEN ACTIVITY: CPT

## 2025-07-15 PROCEDURE — 96374 THER/PROPH/DIAG INJ IV PUSH: CPT | Mod: 59 | Performed by: FAMILY MEDICINE

## 2025-07-15 PROCEDURE — 70496 CT ANGIOGRAPHY HEAD: CPT | Mod: XS

## 2025-07-15 RX ORDER — GADOBUTROL 604.72 MG/ML
11.2 INJECTION INTRAVENOUS ONCE
Status: COMPLETED | OUTPATIENT
Start: 2025-07-15 | End: 2025-07-15

## 2025-07-15 RX ORDER — IOPAMIDOL 755 MG/ML
100 INJECTION, SOLUTION INTRAVASCULAR ONCE
Status: COMPLETED | OUTPATIENT
Start: 2025-07-15 | End: 2025-07-15

## 2025-07-15 RX ORDER — ACETAMINOPHEN 500 MG
1000 TABLET ORAL ONCE
Status: COMPLETED | OUTPATIENT
Start: 2025-07-15 | End: 2025-07-15

## 2025-07-15 RX ORDER — METOPROLOL SUCCINATE 25 MG/1
25 TABLET, EXTENDED RELEASE ORAL
Status: ON HOLD | COMMUNITY
Start: 2025-03-25

## 2025-07-15 RX ORDER — SODIUM CHLORIDE 9 MG/ML
INJECTION, SOLUTION INTRAVENOUS CONTINUOUS
Status: DISCONTINUED | OUTPATIENT
Start: 2025-07-15 | End: 2025-07-16

## 2025-07-15 RX ORDER — IOPAMIDOL 755 MG/ML
100 INJECTION, SOLUTION INTRAVASCULAR ONCE
Status: DISCONTINUED | OUTPATIENT
Start: 2025-07-15 | End: 2025-07-16

## 2025-07-15 RX ORDER — HYDROCHLOROTHIAZIDE 25 MG/1
25 TABLET ORAL DAILY
Status: ON HOLD | COMMUNITY
Start: 2025-03-25

## 2025-07-15 RX ORDER — LOSARTAN POTASSIUM 100 MG/1
100 TABLET ORAL DAILY
Status: ON HOLD | COMMUNITY
Start: 2025-03-25

## 2025-07-15 RX ORDER — VALACYCLOVIR HYDROCHLORIDE 1 G/1
500 TABLET, FILM COATED ORAL
Status: ON HOLD | COMMUNITY

## 2025-07-15 RX ORDER — CYCLOBENZAPRINE HCL 10 MG
10 TABLET ORAL
Status: ON HOLD | COMMUNITY
Start: 2022-06-10

## 2025-07-15 RX ORDER — MULTIVIT-MIN/IRON/FOLIC ACID/K 18-600-40
CAPSULE ORAL
Status: ON HOLD | COMMUNITY

## 2025-07-15 RX ORDER — ONDANSETRON 2 MG/ML
4 INJECTION INTRAMUSCULAR; INTRAVENOUS ONCE
Status: COMPLETED | OUTPATIENT
Start: 2025-07-15 | End: 2025-07-15

## 2025-07-15 RX ORDER — FLUTICASONE PROPIONATE 50 MCG
1 SPRAY, SUSPENSION (ML) NASAL
Status: ON HOLD | COMMUNITY
Start: 2025-03-25

## 2025-07-15 RX ADMIN — ACETAMINOPHEN 1000 MG: 500 TABLET ORAL at 22:40

## 2025-07-15 RX ADMIN — SODIUM CHLORIDE 1000 ML: 0.9 INJECTION, SOLUTION INTRAVENOUS at 12:04

## 2025-07-15 RX ADMIN — SODIUM CHLORIDE: 0.9 INJECTION, SOLUTION INTRAVENOUS at 14:39

## 2025-07-15 RX ADMIN — IOPAMIDOL 67 ML: 755 INJECTION, SOLUTION INTRAVENOUS at 19:42

## 2025-07-15 RX ADMIN — ONDANSETRON 4 MG: 2 INJECTION INTRAMUSCULAR; INTRAVENOUS at 12:03

## 2025-07-15 RX ADMIN — GADOBUTROL 11.2 ML: 604.72 INJECTION INTRAVENOUS at 17:32

## 2025-07-15 RX ADMIN — SODIUM CHLORIDE 80 ML: 9 INJECTION, SOLUTION INTRAVENOUS at 19:42

## 2025-07-15 ASSESSMENT — ACTIVITIES OF DAILY LIVING (ADL)
ADLS_ACUITY_SCORE: 41

## 2025-07-15 ASSESSMENT — COLUMBIA-SUICIDE SEVERITY RATING SCALE - C-SSRS
1. IN THE PAST MONTH, HAVE YOU WISHED YOU WERE DEAD OR WISHED YOU COULD GO TO SLEEP AND NOT WAKE UP?: NO
6. HAVE YOU EVER DONE ANYTHING, STARTED TO DO ANYTHING, OR PREPARED TO DO ANYTHING TO END YOUR LIFE?: NO
2. HAVE YOU ACTUALLY HAD ANY THOUGHTS OF KILLING YOURSELF IN THE PAST MONTH?: NO

## 2025-07-15 NOTE — ED PROVIDER NOTES
"    [facility] [place] [facility] ([facility])    7/15/25      ED PROVIDER NOTE       History     Chief Complaint   Patient presents with    Altered Mental Status     Patient was at his MD \"anticoagulation clinic\" MD concerned that patient might have a brain bleed. Mother stated that \" this Sunday evening has been having altered mental status. Seeing sparklers L eye, like black gray spots\"     HPI  Bernard Chapman is a 35 year old male with a prior medical history of acute DVT and PE (diagnosed on 6/22/25), hypertension, T2DM, ITP, ELENA on CPAP who presents to the ED with altered mental status. Patient reports feeling dizzy and dropping his phone on Sunday. He describes this as spinning. He then went to sleep and was dizzy all day Monday. He has been seeing spots and getting \"sparklers\" which is why he was told to go to the eye doctor, nothing remarkable found. He denies head impact, but endorses mild headache. Patient has been off balance and had sparkler in the waiting room. Today, he presented to the anticoagulation clinic, and while on the way became nauseated and vomitted. While at the clinic, the doctor advised patient to present to the ED due to brain bleed symptoms. Denies fever, chils, diarrhea, drug use, alcohol use, pain, chest pain, tingling/numbness, double vision. He took his HTN meds today (losartan). He has been on xarelto for 10 days since PE after different reactions to other thinners.  He is not currently experiencing any severe headache, visual symptoms, numbness, weakness, tingling or speech problems.  He does admit he is having some trouble concentrating.          Past Medical History  No past medical history on file.  No past surgical history on file.  No current outpatient medications on file.    Allergies   Allergen Reactions    Apixaban Rash    Amlodipine Other (See Comments) and Rash     Family History  Family History   Problem Relation Age of Onset    Respiratory Mother         " "asthma    Hypertension Maternal Grandmother     Arthritis Maternal Grandmother     Diabetes Paternal Grandmother     Heart Disease Paternal Grandfather         pacemaker    Colon Cancer No family hx of     Prostate Cancer No family hx of     Coronary Artery Disease No family hx of      Social History   Social History     Tobacco Use    Smoking status: Never    Smokeless tobacco: Never   Substance Use Topics    Alcohol use: No    Drug use: No      Past medical history, past surgical history, medications, allergies, family history, and social history were reviewed with the patient. No additional pertinent items.   A complete review of systems was attempted but limited due to altered mental status.    Physical Exam   BP: 131/78  Pulse: 105  Temp: 98.4  F (36.9  C)  Resp: 16  Height: 188 cm (6' 2\")  Weight: 112.9 kg (249 lb)  SpO2: 97 %  Physical Exam  Vitals and nursing note reviewed.   Constitutional:       General: He is not in acute distress.     Appearance: Normal appearance. He is not toxic-appearing.   HENT:      Head: Atraumatic.   Eyes:      General: No scleral icterus.     Conjunctiva/sclera: Conjunctivae normal.   Cardiovascular:      Rate and Rhythm: Normal rate.      Heart sounds: Normal heart sounds.   Pulmonary:      Effort: Pulmonary effort is normal. No respiratory distress.      Breath sounds: Normal breath sounds.   Abdominal:      Palpations: Abdomen is soft.      Tenderness: There is no abdominal tenderness.   Musculoskeletal:         General: No deformity.      Cervical back: Neck supple.   Skin:     General: Skin is warm.      Findings: Rash (He has a petechial rash bilaterally in lower extremities consistent with his history of ITP) present.   Neurological:      Mental Status: He is alert and oriented to person, place, and time.      Cranial Nerves: Cranial nerves 2-12 are intact.      Sensory: Sensation is intact.      Motor: Motor function is intact.      Coordination: Coordination is intact. "      Deep Tendon Reflexes: Reflexes are normal and symmetric.      Comments: He answers questions slowly but appropriately.  He is fully oriented.  He appears to have some difficulty concentrating but no focal neurologic deficit.  His stroke score is 0           ED Course, Procedures, & Data      Procedures            EKG Interpretation:      Interpreted by Gabriel Estevez MD  Time reviewed: 1153  Symptoms at time of EKG: Dizziness  Rhythm: normal sinus   Rate: normal  Axis: normal  Ectopy: none  Conduction: normal  ST Segments/ T Waves: No ST-T wave changes  Q Waves: none  Comparison to prior: No old EKG available    Clinical Impression: normal EKG            Results for orders placed or performed during the hospital encounter of 07/15/25   Head CT w/o contrast    Impression    IMPRESSION:  1.  No evidence of acute intracranial abnormality.  2.  Callosal dysgenesis.     MR Brain w/o & w Contrast    Impression    IMPRESSION:  1.  Numerous multifocal acute cortical and white matter left cerebral hemisphere infarcts predominantly involving the left MCA territory with a few acute infarcts in the right cerebral hemisphere, as described. No significant mass effect.  2.  Trace acute subarachnoid hemorrhage versus slow flow or thrombosis of a cortical vessel along the parasagittal left frontal lobe, the latter favored. No confluent intraparenchymal hematoma.      CTA Head Neck with Contrast    Impression    IMPRESSION:   NECK CTA:  1.  Patent neck vasculature.    HEAD CTA:   1.  Near occlusion of the left MCA-M1 segment (8 mm length) with distal opacification throughout the M2 segments.    Findings discussed with Dr. Pond by Dr. Owens at 8:25 PM 7/15/2025.   CTV Head with Contrast    Impression    IMPRESSION:   1.  No intracranial venous thrombosis.   XR Chest Port 1 View    Impression    Impression:   Right IJ CVC with tip at the cavoatrial junction. No focal airspace  opacity.    I have personally reviewed the  examination and initial interpretation  and I agree with the findings.    KOSTAS HARRELL DO         SYSTEM ID:  [id number]   CT Chest/Abdomen/Pelvis w Contrast    Impression    RESIDENT PRELIMINARY INTERPRETATION  IMPRESSION:   1. Somewhat spiculated-appearing nodule within the right lung apex  measuring up to 11 mm, with central lucency. Adjacent  peribronchovascular opacities surrounding the subsegmental bronchi  inferior to this lesion. Findings are nonspecific, but given history  could potentially represent a region of small pulmonary infarction.  Other scattered groundglass nodules or potentially other regions of  infectious/auditory insult or parenchymal ischemia.  2. Bulky right paratracheal and right hilar adenopathy. These findings  are concerning for potential lymphoproliferative malignancy versus  metastatic spread from a unidentified primary site. Additionally,  borderline enlarged spleen further raises the suspicion for a  lymphoproliferative etiology.  3. Irregular soft tissue mass posterior to the upper right thyroid  lobe, potentially heterogenous and irregular extension of the thyroid  gland into the paraesophageal soft tissues or potentially  paraesophageal soheila enlargement. Consider thyroid ultrasound for  further evaluation.  4. Wedge-shaped hypodensity at the inferior lateral aspect of the  spleen, potentially a region of infarction.   CTA  Angiogram Heart    Impression    IMPRESSION:  1.  Non-diagnostic evaluation for left atrial appendage thrombus.   2.  Please review the separate Radiology report for incidental  noncardiac findings.    FINDINGS:      1.  Non-diagnostic evaluation for left atrial appendage thrombus.   2.  The left atrial appendage has a  chicken wing morphology.  3.   Coronary images are non-diagnostic for stenosis/atherosclerosis  due to cardiac motion artifact. No coronary artery calcifications  seen.   4.   The visualized aortic root, ascending aorta, and descending  thoracic  aorta are normal in caliber.    ABDOUL GARCIA MD         SYSTEM ID:  [id number]   Radiologist Consult For Cardiology    Impression    IMPRESSION:  1. Incidental pulmonary emboli bilaterally without obvious right heart  strain.  2. Bulky right hilar and subcarinal lymphadenopathy.    NATALIIA ALCAZAR MD         SYSTEM ID:  [id number]   INR   Result Value Ref Range    INR 2.12 (H) 0.85 - 1.15    PT 24.0 (H) 11.8 - 14.8 Seconds   Partial thromboplastin time   Result Value Ref Range    aPTT 32 22 - 38 Seconds   Comprehensive metabolic panel   Result Value Ref Range    Sodium 139 135 - 145 mmol/L    Potassium 4.2 3.4 - 5.3 mmol/L    Carbon Dioxide (CO2) 25 22 - 29 mmol/L    Anion Gap 13 7 - 15 mmol/L    Urea Nitrogen 18.5 6.0 - 20.0 mg/dL    Creatinine 1.06 0.67 - 1.17 mg/dL    GFR Estimate >90 >60 mL/min/1.73m2    Calcium 9.5 8.8 - 10.4 mg/dL    Chloride 101 98 - 107 mmol/L    Glucose 123 (H) 70 - 99 mg/dL    Alkaline Phosphatase 73 40 - 150 U/L    AST 27 0 - 45 U/L    ALT 37 0 - 70 U/L    Protein Total 8.1 6.4 - 8.3 g/dL    Albumin 4.8 3.5 - 5.2 g/dL    Bilirubin Total 1.0 <=1.2 mg/dL   Result Value Ref Range    Troponin T, High Sensitivity 34 (H) <=22 ng/L   Result Value Ref Range    Magnesium 2.0 1.7 - 2.3 mg/dL   TSH with free T4 reflex   Result Value Ref Range    TSH 1.18 0.30 - 4.20 uIU/mL   CBC with platelets and differential   Result Value Ref Range    WBC Count 13.3 (H) 4.0 - 11.0 10e3/uL    RBC Count 5.25 4.40 - 5.90 10e6/uL    Hemoglobin 14.1 13.3 - 17.7 g/dL    Hematocrit 42.3 40.0 - 53.0 %    MCV 81 78 - 100 fL    MCH 26.9 26.5 - 33.0 pg    MCHC 33.3 31.5 - 36.5 g/dL    RDW 13.0 10.0 - 15.0 %    Platelet Count 28 (LL) 150 - 450 10e3/uL    % Neutrophils 86 %    % Lymphocytes 8 %    % Monocytes 4 %    % Eosinophils 1 %    % Basophils 0 %    % Immature Granulocytes 1 %    NRBCs per 100 WBC 0 <1 /100    Absolute Neutrophils 11.5 (H) 1.6 - 8.3 10e3/uL    Absolute Lymphocytes 1.1 0.8 - 5.3 10e3/uL    Absolute  Monocytes 0.6 0.0 - 1.3 10e3/uL    Absolute Eosinophils 0.1 0.0 - 0.7 10e3/uL    Absolute Basophils 0.1 0.0 - 0.2 10e3/uL    Absolute Immature Granulocytes 0.1 <=0.4 10e3/uL    Absolute NRBCs 0.0 10e3/uL   Glucose by meter   Result Value Ref Range    GLUCOSE BY METER POCT 130 (H) 70 - 99 mg/dL   RBC and Platelet Morphology   Result Value Ref Range    RBC Morphology Confirmed RBC Indices     Platelet Assessment  Automated Count Confirmed. Platelet morphology is normal.     Automated Count Confirmed. Platelet morphology is normal.   Result Value Ref Range    Troponin T, High Sensitivity 33 (H) <=22 ng/L   CBC with platelets and differential   Result Value Ref Range    WBC Count 11.5 (H) 4.0 - 11.0 10e3/uL    RBC Count 4.61 4.40 - 5.90 10e6/uL    Hemoglobin 12.5 (L) 13.3 - 17.7 g/dL    Hematocrit 37.0 (L) 40.0 - 53.0 %    MCV 80 78 - 100 fL    MCH 27.1 26.5 - 33.0 pg    MCHC 33.8 31.5 - 36.5 g/dL    RDW 13.1 10.0 - 15.0 %    Platelet Count 26 (LL) 150 - 450 10e3/uL    % Neutrophils 81 %    % Lymphocytes 11 %    % Monocytes 6 %    % Eosinophils 1 %    % Basophils 0 %    % Immature Granulocytes 1 %    NRBCs per 100 WBC 0 <1 /100    Absolute Neutrophils 9.4 (H) 1.6 - 8.3 10e3/uL    Absolute Lymphocytes 1.2 0.8 - 5.3 10e3/uL    Absolute Monocytes 0.7 0.0 - 1.3 10e3/uL    Absolute Eosinophils 0.1 0.0 - 0.7 10e3/uL    Absolute Basophils 0.0 0.0 - 0.2 10e3/uL    Absolute Immature Granulocytes 0.1 <=0.4 10e3/uL    Absolute NRBCs 0.0 10e3/uL   Result Value Ref Range    [id number] Activity BERKLEY 0.96 0.40 - 1.30 IU/mL   Result Value Ref Range    Haptoglobin <10 (L) 30 - 200 mg/dL   Result Value Ref Range    Lactate Dehydrogenase 514 (H) 0 - 250 U/L   INR   Result Value Ref Range    INR 2.05 (H) 0.85 - 1.15    PT 22.8 (H) 11.8 - 14.8 Seconds   Partial thromboplastin time   Result Value Ref Range    aPTT 30 22 - 38 Seconds   Fibrinogen activity   Result Value Ref Range    Fibrinogen Activity 151 (L) 170 - 510 mg/dL   Result Value  Ref Range    Ferritin 535 (H) 31 - 409 ng/mL   CBC with platelets and differential   Result Value Ref Range    WBC Count 12.8 (H) 4.0 - 11.0 10e3/uL    RBC Count 4.54 4.40 - 5.90 10e6/uL    Hemoglobin 12.3 (L) 13.3 - 17.7 g/dL    Hematocrit 37.1 (L) 40.0 - 53.0 %    MCV 82 78 - 100 fL    MCH 27.1 26.5 - 33.0 pg    MCHC 33.2 31.5 - 36.5 g/dL    RDW 13.1 10.0 - 15.0 %    Platelet Count 25 (LL) 150 - 450 10e3/uL    % Neutrophils 81 %    % Lymphocytes 11 %    % Monocytes 6 %    % Eosinophils 1 %    % Basophils 0 %    % Immature Granulocytes 1 %    NRBCs per 100 WBC 0 <1 /100    Absolute Neutrophils 10.4 (H) 1.6 - 8.3 10e3/uL    Absolute Lymphocytes 1.5 0.8 - 5.3 10e3/uL    Absolute Monocytes 0.8 0.0 - 1.3 10e3/uL    Absolute Eosinophils 0.1 0.0 - 0.7 10e3/uL    Absolute Basophils 0.0 0.0 - 0.2 10e3/uL    Absolute Immature Granulocytes 0.1 <=0.4 10e3/uL    Absolute NRBCs 0.0 10e3/uL   Reticulocyte count   Result Value Ref Range    % Reticulocyte 2.3 (H) 0.5 - 2.0 %    Absolute Reticulocyte 0.104 (H) 0.025 - 0.095 10e6/uL   Hemoglobin A1c   Result Value Ref Range    Estimated Average Glucose 105 <117 mg/dL    Hemoglobin A1C 5.3 <5.7 %   Lipid panel reflex to direct LDL   Result Value Ref Range    Cholesterol 205 (H) <200 mg/dL    Triglycerides 202 (H) <150 mg/dL    Direct Measure HDL 30 (L) >=40 mg/dL    LDL Cholesterol Calculated 135 (H) <100 mg/dL    Non HDL Cholesterol 175 (H) <130 mg/dL   Phosphorus Lab   Result Value Ref Range    Phosphorus 2.3 (L) 2.5 - 4.5 mg/dL   Magnesium Lab   Result Value Ref Range    Magnesium 2.0 1.7 - 2.3 mg/dL   Result Value Ref Range    Vitamin B12 489 232 - 1,245 pg/mL   Result Value Ref Range    TSH 2.84 0.30 - 4.20 uIU/mL   Result Value Ref Range    Free T4 1.20 0.90 - 1.70 ng/dL   Glucose by meter   Result Value Ref Range    GLUCOSE BY METER POCT 122 (H) 70 - 99 mg/dL   Result Value Ref Range    Lactate Dehydrogenase 517 (H) 0 - 250 U/L   Hepatic panel   Result Value Ref Range     Protein Total 6.5 6.4 - 8.3 g/dL    Albumin 3.9 3.5 - 5.2 g/dL    Bilirubin Total 0.9 <=1.2 mg/dL    Alkaline Phosphatase 58 40 - 150 U/L    AST 24 0 - 45 U/L    ALT 25 0 - 70 U/L    Bilirubin Direct 0.32 0.00 - 0.45 mg/dL   Basic metabolic panel   Result Value Ref Range    Sodium 141 135 - 145 mmol/L    Potassium 4.7 3.4 - 5.3 mmol/L    Chloride 105 98 - 107 mmol/L    Carbon Dioxide (CO2) 17 (L) 22 - 29 mmol/L    Anion Gap 19 (H) 7 - 15 mmol/L    Urea Nitrogen 18.1 6.0 - 20.0 mg/dL    Creatinine 0.98 0.67 - 1.17 mg/dL    GFR Estimate >90 >60 mL/min/1.73m2    Calcium 9.5 8.8 - 10.4 mg/dL    Glucose 147 (H) 70 - 99 mg/dL   CBC with platelets and differential   Result Value Ref Range    WBC Count 13.8 (H) 4.0 - 11.0 10e3/uL    RBC Count 4.84 4.40 - 5.90 10e6/uL    Hemoglobin 13.0 (L) 13.3 - 17.7 g/dL    Hematocrit 38.5 (L) 40.0 - 53.0 %    MCV 80 78 - 100 fL    MCH 26.9 26.5 - 33.0 pg    MCHC 33.8 31.5 - 36.5 g/dL    RDW 12.9 10.0 - 15.0 %    Platelet Count 24 (LL) 150 - 450 10e3/uL    % Neutrophils 95 %    % Lymphocytes 4 %    % Monocytes 1 %    % Eosinophils 0 %    % Basophils 0 %    % Immature Granulocytes 1 %    NRBCs per 100 WBC 0 <1 /100    Absolute Neutrophils 13.1 (H) 1.6 - 8.3 10e3/uL    Absolute Lymphocytes 0.5 (L) 0.8 - 5.3 10e3/uL    Absolute Monocytes 0.1 0.0 - 1.3 10e3/uL    Absolute Eosinophils 0.0 0.0 - 0.7 10e3/uL    Absolute Basophils 0.0 0.0 - 0.2 10e3/uL    Absolute Immature Granulocytes 0.1 <=0.4 10e3/uL    Absolute NRBCs 0.0 10e3/uL   Result Value Ref Range    CRP Inflammation 17.50 (H) <5.00 mg/L   Result Value Ref Range    Rheumatoid Factor <10 <14 IU/mL   EKG 12-lead, tracing only   Result Value Ref Range    Systolic Blood Pressure  mmHg    Diastolic Blood Pressure  mmHg    Ventricular Rate 94 BPM    Atrial Rate 94 BPM    WY Interval 132 ms    QRS Duration 76 ms     ms    QTc 410 ms    P Axis 71 degrees    R AXIS 28 degrees    T Axis 3 degrees    Interpretation ECG       Sinus  rhythm  Normal ECG  Unconfirmed report - interpretation of this ECG is computer generated - see medical record for final interpretation    Confirmed by - EMERGENCY ROOM, PHYSICIAN ([id number]),  Eli Carver ([id number]) on 7/16/2025 7:01:13 AM     EKG 12-lead, Tracing only   Result Value Ref Range    Systolic Blood Pressure  mmHg    Diastolic Blood Pressure  mmHg    Ventricular Rate 76 BPM    Atrial Rate 76 BPM    ME Interval 142 ms    QRS Duration 90 ms     ms    QTc 398 ms    P Axis 45 degrees    R AXIS 21 degrees    T Axis 4 degrees    Interpretation ECG       Sinus rhythm with sinus arrhythmia  Normal ECG  Unconfirmed report - interpretation of this ECG is computer generated - see medical record for final interpretation    Confirmed by - EMERGENCY ROOM, PHYSICIAN ([id number]),  Eli Carver ([id number]) on 7/16/2025 7:00:48 AM     Echocardiogram Complete w Bubble study - For age 60 yrs or less   Result Value Ref Range    LVEF  60-65%    Adult Type and Screen   Result Value Ref Range    ABO/RH(D) A POS     Antibody Screen Negative Negative    SPECIMEN EXPIRATION DATE 7/18/2025 11:59:00 PM CDT    Prepare plasma (in mL)   Result Value Ref Range    Blood Component Type Plasma     Product Code P1929O49     Unit Status Issued     Unit Number [id number]     CODING SYSTEM [id number]     ISSUE DATE AND TIME 7/16/2025  1:22:00 AM CDT     UNIT ABO/RH AB+     UNIT TYPE ISBT 8400    Prepare plasma (unit)   Result Value Ref Range    Blood Component Type Plasma     Product Code Q3775A67     Unit Status Issued     Unit Number [id number]     CODING SYSTEM [id number]     ISSUE DATE AND TIME 7/16/2025  1:22:00 AM CDT     UNIT ABO/RH AB+     UNIT TYPE ISBT 8400    Prepare plasma (unit)   Result Value Ref Range    Blood Component Type Plasma     Product Code O7523B95     Unit Status Issued     Unit Number [id number]     CODING SYSTEM [id number]     ISSUE DATE AND TIME 7/16/2025  1:22:00 AM CDT     UNIT ABO/RH AB+     UNIT  TYPE ISBT 8400    Prepare plasma (unit)   Result Value Ref Range    Blood Component Type Plasma     Product Code E8948W85     Unit Status Issued     Unit Number [id number]     CODING SYSTEM [id number]     ISSUE DATE AND TIME 7/16/2025  1:22:00 AM CDT     UNIT ABO/RH AB+     UNIT TYPE ISBT 8400    Prepare plasma (unit)   Result Value Ref Range    Blood Component Type Plasma     Product Code Y6771V16     Unit Status Issued     Unit Number [id number]     CODING SYSTEM [id number]     ISSUE DATE AND TIME 7/16/2025  1:22:00 AM CDT     UNIT ABO/RH AB+     UNIT TYPE ISBT 8400    Prepare plasma (unit)   Result Value Ref Range    Blood Component Type Plasma     Product Code F2480S41     Unit Status Issued     Unit Number [id number]     CODING SYSTEM [id number]     ISSUE DATE AND TIME 7/16/2025  1:22:00 AM CDT     UNIT ABO/RH A-     UNIT TYPE ISBT 0600    Prepare plasma (unit)   Result Value Ref Range    Blood Component Type Plasma     Product Code O3506N97     Unit Status Issued     Unit Number [id number]     CODING SYSTEM [id number]     ISSUE DATE AND TIME 7/16/2025  1:22:00 AM CDT     UNIT ABO/RH AB+     UNIT TYPE ISBT 8400    Prepare plasma (unit)   Result Value Ref Range    Blood Component Type Plasma     Product Code W0734N41     Unit Status Issued     Unit Number [id number]     CODING SYSTEM [id number]     ISSUE DATE AND TIME 7/16/2025  1:22:00 AM CDT     UNIT ABO/RH A-     UNIT TYPE ISBT 0600    Prepare plasma (unit)   Result Value Ref Range    Blood Component Type Plasma     Product Code T5303U93     Unit Status Issued     Unit Number [id number]     CODING SYSTEM [id number]     ISSUE DATE AND TIME 7/16/2025  1:22:00 AM CDT     UNIT ABO/RH AB+     UNIT TYPE ISBT 8400    Prepare plasma (unit)   Result Value Ref Range    Blood Component Type Plasma     Product Code B9204H34     Unit Status Issued     Unit Number [id number]     CODING SYSTEM [id number]     ISSUE DATE AND TIME 7/16/2025  1:22:00 AM CDT     UNIT ABO/RH AB+      UNIT TYPE ISBT 8400    Prepare plasma (unit)   Result Value Ref Range    Blood Component Type Plasma     Product Code C5588X37     Unit Status Issued     Unit Number [id number]     CODING SYSTEM [id number]     ISSUE DATE AND TIME 7/16/2025  1:22:00 AM CDT     UNIT ABO/RH AB-     UNIT TYPE ISBT 2800    Prepare plasma (unit)   Result Value Ref Range    Blood Component Type Plasma     Product Code W8368I09     Unit Status Issued     Unit Number [id number]     CODING SYSTEM [id number]     ISSUE DATE AND TIME 7/16/2025  1:22:00 AM CDT     UNIT ABO/RH AB+     UNIT TYPE ISBT 8400    Prepare plasma (unit)   Result Value Ref Range    Blood Component Type Plasma     Product Code W9955Q63     Unit Status Issued     Unit Number [id number]     CODING SYSTEM WNSC327     ISSUE DATE AND TIME 7/16/2025  1:22:00 AM CDT     UNIT ABO/RH AB+     UNIT TYPE ISBT 8400    Prepare plasma (unit)   Result Value Ref Range    Blood Component Type Plasma     Product Code U1245Z82     Unit Status Issued     Unit Number [id number]     CODING SYSTEM UPST808     ISSUE DATE AND TIME 7/16/2025  1:22:00 AM CDT     UNIT ABO/RH AB+     UNIT TYPE ISBT 8400    Prepare plasma (unit)   Result Value Ref Range    Blood Component Type Plasma     Product Code V8167A69     Unit Status Issued     Unit Number [id number]     CODING SYSTEM [id number]     ISSUE DATE AND TIME 7/16/2025  1:22:00 AM CDT     UNIT ABO/RH AB+     UNIT TYPE ISBT 8400    Direct antiglobulin test, adult   Result Value Ref Range    TRINI Anti-IgG,-C3d Negative     SPECIMEN EXPIRATION DATE 7/19/2025 11:59:00 PM CDT    Prepare pheresed platelets (unit)   Result Value Ref Range    Blood Component Type Platelets     Product Code L9156H56     Unit Status Transfused     Unit Number [id number]     CODING SYSTEM GDMY075     ISSUE DATE AND TIME 7/16/2025  1:09:00 PM CDT     UNIT ABO/RH A+     UNIT TYPE ISBT 6200    Prepare pheresed platelets (unit)   Result Value Ref Range    Blood Component  Type Platelets     Product Code T6313W03     Unit Status Transfused     Unit Number [id number]     CODING SYSTEM [id number]     ISSUE DATE AND TIME 7/16/2025  1:09:00 PM CDT     UNIT ABO/RH A+     UNIT TYPE ISBT 6200    Bld morphology pathology review   Result Value Ref Range    Final Diagnosis       Peripheral Blood Smear:  -Slight normochromic, normocytic anemia with increased red blood cell regeneration  -Slight leukocytosis; neutrophilia  -Marked thrombocytopenia with normal platelet morphology      Comment       There is no definitive morphologic evidence of hemolysis.  However, if hemolysis is a clinical concern, an LDH, haptoglobin, fractionated bilirubin and reticulocyte count could be considered.        Clinical Information       From Epic electronic medical record; 35 year old male with a history of DVT/PE (June 22) on Xarelto, presented with new neurologic symptoms, found to have diffuse acute cerebral infarcts, and acute severe thrombocytopenia. Peripheral smear review requested for concern for TTP vs CAPS.      Peripheral Smear       The red cells appear normochromic. Poikilocytosis is minimal. Polychromasia is not increased. Rouleaux formation is not increased. The morphology of platelets overall unremarkable with rare subset being large in size. Lymphocytes are polymorphous. Neutrophils show unremarkable cytoplasmic granularity and nuclear morphology.       Peripheral Hematologic Data       CBC WITH DIFFERENTIAL (07/16/2025 12:32 AM CDT):     RESULT VALUE REF. RANGE UNITS    WBC Count   RBC Count   Hemoglobin    Hematocrit   MCV  MCH  MCHC  RDW   Platelet Count   12.8  ( H )    4.54 (NORMAL)    12.3  ( L )      37.1  ( L )  82 (NORMAL)     27.1 (NORMAL)     33.2 (NORMAL)     13.1 (NORMAL)   25  ( LL ) 4.0-11.0  4.40-5.90  13.3-17.7  40.0-53.0    26.5-33.0  31.5-36.5  10.0-15.0  150-450 10e3/uL  10e6/uL  g/dL  %  fL  pg  g/dL  %  10e3/uL   % Neutrophils  % Lymphocytes  % Monocytes  %  Eosinophils  % Basophils  % Immature Granulocytes   Absolute Neutrophils   Absolute Lymphocytes  Absolute Monocytes  Absolute Eosinophils  Absolute Basophils  Absolute Immature Granulocytes  NRBCs per 100 WBC  Absolute NRBCs 81  11  6  1  0  1   10.4  ( H )  1.5 (NORMAL)  0.8 (NORMAL)  0.1 (NORMAL)  0.0 (NORMAL)  0.1 (NORMAL)  0 (NORMAL)  0.0 () N/A  N/A  N/A  N/A  N/A  N/A  1.6-8.3  0.8-5.3  0.0-1.3  0.0-0.7  0.0-0.2  <=0.4  <1  <=0.0 %  %  %  %  %  %  10e3/uL  10e3/uL  10e3/uL  10e3/uL  10e3/uL  10e3/uL  /100  10e3/uL     RETICULOCYTE COUNT (07/16/2025 12:30 AM CDT):  RESULT VALUE REF. RANGE UNITS    % Reticulocyte    Absolute Reticulocyte   2.3  ( H )   0.104  ( H ) 0.5-2.0  0.025-0.095 %  10e6/uL         Performing Labs       The technical component of this testing was completed at [facility].     Stain controls for all stains resulted within this report have been reviewed and show appropriate reactivity.        Medications   sodium chloride 0.9 % bag for CT scan flush (has no administration in time range)   lidocaine 1 % 0.1-1 mL (has no administration in time range)   lidocaine (LMX4) cream (has no administration in time range)   sodium chloride (PF) 0.9% PF flush 3 mL (3 mLs Intracatheter Not Given 7/16/25 0630)   sodium chloride (PF) 0.9% PF flush 3 mL (has no administration in time range)   Medication Instruction - Avoid dextrose in IV solutions (has no administration in time range)   ondansetron (ZOFRAN ODT) ODT tab 4 mg (has no administration in time range)     Or   ondansetron (ZOFRAN) injection 4 mg (has no administration in time range)   acetaminophen (TYLENOL) tablet 1,000 mg (1,000 mg Oral or Feeding Tube $Given 7/16/25 1233)   labetalol (NORMODYNE/TRANDATE) injection 10 mg (has no administration in time range)   hydrALAZINE (APRESOLINE) injection 10 mg (has no administration in time range)   prochlorperazine (COMPAZINE)  injection 10 mg (has no administration in time range)     Or   prochlorperazine (COMPAZINE) tablet 10 mg (has no administration in time range)   sodium chloride bacteriostatic 0.9 % flush 5 mL (has no administration in time range)   rosuvastatin (CRESTOR) tablet 10 mg (10 mg Oral $Given 7/16/25 1144)   senna-docusate (SENOKOT-S/PERICOLACE) 8.6-50 MG per tablet 1-2 tablet (1 tablet Oral or Feeding Tube $Given 7/16/25 1144)   polyethylene glycol (MIRALAX) Packet 17 g (has no administration in time range)   ondansetron (ZOFRAN) injection 4 mg (4 mg Intravenous $Given 7/15/25 1203)   sodium chloride 0.9% BOLUS 1,000 mL (0 mLs Intravenous Stopped 7/15/25 1441)   iopamidol (ISOVUE-370) solution 100 mL (67 mLs Intravenous $Given 7/15/25 1942)   gadobutrol (GADAVIST) injection 11.2 mL (11.2 mLs Intravenous $Given 7/15/25 1732)   dexAMETHasone (DECADRON) 40 mg in sodium chloride 0.9 % 59 mL intermittent infusion (0 mg Intravenous Stopped 7/16/25 0140)   acetaminophen (TYLENOL) tablet 1,000 mg (1,000 mg Oral $Given 7/15/25 2240)   Anticoagulant Citrate Dextrose Formula A at ratio of 1:10 with blood (Apheresis Center) (930 mLs Apheresis $Given 7/16/25 0423)   calcium gluconate with plasma (administered by Apheresis Staff ONLY) (5 g Intravenous $New Bag 7/16/25 0423)   sodium chloride (PF) 0.9% PF flush 10 mL (10 mLs Intracatheter $Given 7/16/25 0615)   heparin lock flush 100 unit/mL injection 3 mL (3 mLs Intracatheter $Given 7/16/25 0615)   sodium chloride (PF) 0.9% PF flush 10 mL (10 mLs Intracatheter $Given 7/16/25 0615)   heparin lock flush 100 unit/mL injection 3 mL (3 mLs Intracatheter $Given 7/16/25 0614)   midazolam (VERSED) injection 2 mg (2 mg Intravenous $Given 7/16/25 0209)   fentaNYL (PF) (SUBLIMAZE) injection 100 mcg (100 mcg Intravenous $Given 7/16/25 0210)   Potassium Phosphate 15 mmol in NS intermittent infusion 15 mmol (15 mmol Intravenous $New Bag 7/16/25 0804)   Plasma-Lyte A (electrolyte A) BOLUS 1,000 mL  (1,000 mLs Intravenous $Given 7/16/25 0633)   perflutren diluted 1mL to 2mL with saline (OPTISON) diluted injection 5 mL (diluted) (5 mL (diluted) Intravenous $Given 7/16/25 0801)   iopamidol (ISOVUE-370) solution 135 mL (135 mLs Intravenous $Given 7/16/25 1107)   sodium chloride (PF) 0.9% PF flush 84 mL (84 mLs Intravenous $Given 7/16/25 1108)          Critical care was not performed.     Medical Decision Making  The patient's presentation was of high complexity (an acute health issue posing potential threat to life or bodily function).    The patient's evaluation involved:  an assessment requiring an independent historian (patient's mother provides collateral information)  review of external note(s) from 3+ sources (review of documentation from recent admission for pulmonary embolism, review of documentation from coagulation clinic, review of previous ED visit all June 2025 in July 2025)  review of 3+ test result(s) ordered prior to this encounter (review of prior PE study, prior echo, prior EKG, prior labs)  strong consideration of a test (CTA head and neck, CTV initially ordered but after discussion with neurology was deferred given the timing of his symptoms and lieu of plain head CT followed by MRI brain with and without contrast) that was ultimately deferred  ordering and/or review of 3+ test(s) in this encounter (see separate area of note for details)  independent interpretation of testing performed by another health professional (CT head images personally reviewed, reviewed with radiology)  discussion of management or test interpretation with another health professional (discussed with stroke neurology and general neurology)    The patient's management necessitated moderate risk (prescription drug management including medications given in the ED), moderate risk (IV contrast administration), high risk (a decision regarding hospitalization), and further care after sign-out to Dr. John (see their note  "for further management).    Assessment & Plan    35-year-old male with history of ITP, recent diagnosis of PE and starting anticoagulation currently on Xarelto, who complained of feeling dizzy (vertiginous), lightheaded, bilateral \"sparkles\" in his vision, and mild headache with difficulty concentrating and perceived altered mental status by his mother.  Reporting that he was having difficulty answering questions properly and following commands properly, denies any head trauma.  He was seen by an outside physician and referred emergently to the ED to rule out intracranial catastrophe  Differential diagnosis of acute stroke symptoms includes acute CVA (ischemic, embolic, hemorrhagic) migraine headache, seizure disorder, hypoglycemia (or hyperglycemia), peripheral neuropathy, CNS infection, Toxin ingestion, shock state (e.g. sepsis), also with dizziness consideration of vestibular syndrome peripheral or central.  Exam his vital signs are normal.  He is alert and oriented x 3.  He does answer questions very slowly but appropriately his pupils are equal round and reactive to light, his extract movements intact, his visual fields are intact, his vision is normal.  His cranial nerves are normal, his neurologic exam is normal, stroke score is 0.  He has a petechial rash on his lower extremities bilaterally has a history of ITP this is not acute.  He has no signs of acute trauma or focal neurologic deficit.  His physical exam is otherwise unremarkable.  This placed on the monitor, an EKG was obtained which is normal.  Discussed with neurology a plain head CT was ordered, initially ordered vascular imaging with CTA CTV but after that discussion as he is out of the stroke window this was deferred and a stroke code was not called, his symptoms predate this visit by 72 hours, with the plan to follow with MRI brain with and without contrast if negative.  His troponin is 34, actually lower than his troponin in June when he was " being evaluated for PE, a repeat troponin not significantly changed.  In the absence of EKG changes or chest pain I highly doubt ACS as the etiology of his symptoms.  His platelets are 28,000, down from his baseline of around 100,000.  He has a slight leukocytosis but no signs of sepsis or other acute infection.  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  An MRI of the brain with and without contrast was ordered.  He has been stable in the ED without change in stroke scale.  He is being signed out to Dr. John to review MRI and for further management.      I have reviewed the nursing notes. I have reviewed the findings, diagnosis, plan and need for follow up with the patient.    Current Discharge Medication List          Final diagnoses:   Ischemic stroke (H)   T.T.P. syndrome (H)     I, Jana Juares, am serving as a trained medical scribe to document services personally performed by Gabriel Estevez MD based on the provider's statements to me on July 15, 2025.  This document has been checked and approved by the attending provider.    I, Gabriel Estevez MD, was physically present and have reviewed and verified the accuracy of this note documented by Jana Juares medical scribe.      Gabriel Estevez MD   [facility] EMERGENCY DEPARTMENT  7/15/2025     Gabriel Estevez MD  07/16/25 1557

## 2025-07-15 NOTE — LETTER
Transition Communication Hand-off for Care Transitions to Next Level of Care Provider    Name: Bernard Chapman  : 1990  MRN #: [id number]  Primary Care Provider: Kushal Matthews     Primary Clinic: [facility] [address]     Reason for Hospitalization:  T.T.P. syndrome (H) [M31.19]  Ischemic stroke (H) [I63.9]  Admit Date/Time: 7/15/2025 11:43 AM  Discharge Date: 25  Payor Source: Payor: [employer] / Plan: [employer] / Product Type: HMO /     Readmission Assessment Measure (RIZWANA) Risk Score/category: 24.95         Reason for Communication Hand-off Referral: Other Continuity of care    Discharge Plan: [place] [facility]       Concern for non-adherence with plan of care:   Y/N N  Discharge Needs Assessment:  Needs      Flowsheet Row Most Recent Value   Equipment Currently Used at Home none       Already enrolled in Tele-monitoring program and name of program:  y  Follow-up specialty is recommended: TBD    Follow-up plan:    Future Appointments   Date Time Provider Department Center   2025  8:00 AM Adin Steward, PT [facility] O   2025  8:00 AM Janet Napoles OTR [facility] O   2025  9:00 AM Kiana Lynch, SLP [facility] O       Any outstanding tests or procedures:        Radiology & Cardiology Orders       Future Labs/Procedures Complete By Expires    PET Oncology Whole Body  2025 (Approximate) 2026    Process Instructions:    If your scan is being scheduled at the hospital, call [phone] for Pre-authorization.    If your scan is being scheduledat the [facility], call [phone] for scheduling and prior authorization.     Patient Preparation  1.  NPO (except water and medications) for 6 hours prior to exam  2.  No physical exercise for 24 hours prior to exam (for example: running)  3.  If possible, please schedule known diabetic patients for early AM time slots.  4.  Patient should arrive 1/2 hours prior to  exam scheduled start   5.  Patient should expect the exam to last 2-3 hours  6.  Patient may need to sign ABN or waiver prior to exam.      Technical Questions if scheduled at the hospital?   [phone]  Technical Questions if scheduled at [facility]?  [phone]     Call to schedule:    Monday through Friday [facility] [phone]  Monday through Saturday [facility] [phone]  Monday [facility] [phone]  [facility] [phone]  Wednesday [facility] [phone]  Thursday [facility] [phone]  Friday [facility] (AM) [phone]  Friday [facility] (PM) [phone]*    *Mobile services are also available at [employer] every other Tuesday (AM) [phone] or  *Mobile services are also available at [employer] every Wednesday (PM) [phone]  *Mobile services are also available at [facility] Wednesday (AM) [phone]            Referrals       Future Labs/Procedures    Occupational Therapy  Referral     Process Instructions:    If ordering DME please utilize the DME ordering process. If you are ordering services for pediatric feeding, please utilize order: Speech Therapy  Referral [[id number]]    Comments:    Please be aware that coverage of these services is subject to the terms and limitations of your health insurance plan.  Call member services at your health plan with any benefit or coverage questions.  [facility] will call you to coordinate your care as prescribed by your provider. If you don't hear from a representative within 2 business days, please call [phone].    Adult Oncology/Hematology  Referral     Process Instructions:    Consider Adult E-Consult to Hematology for the following conditions: anticoagulation established condition, isolated anemia, MGUS or elevated light chains, thrombocytopenia.   E-Consult Cost: 0-$125.     Comments:    Please be aware that coverage of these services is subject to the terms and limitations of your health insurance plan.  Call member services at your health plan with any benefit or coverage questions.  [facility] will call you to coordinate your care as prescribed by the provider.  If you don t hear from a representative within 2 business days, please call [phone]                [place] Recommendations:      KIESHA Garcia    AVS/Discharge Summary is the source of truth; this is a helpful guide for improved communication of patient story

## 2025-07-15 NOTE — NURSING NOTE
Report called to [facility] ED after patient arrived.   RN spoke with Juliette DAVENPORT. Let her know that patient was just seen at [facility] for an appt with Dr. Cogan. Dr. Cogan is concerned that patient is showing sign of possible brain bleed. Patient is on anticoagulation.      Philomena Root RN, BSN, PCCN  Nurse Clinician    [facility]  [address] [place] [address], [address], [place] [address]   Office, direct: [phone]  Main office number: [phone]  Pronouns: She, her, hers

## 2025-07-15 NOTE — ED TRIAGE NOTES
"  Patient was at his MD \"anticoagulation clinic\" MD concerned that patient might have a brain bleed. Mother stated that \" this Sunday evening has been having altered mental status. Seeing melissa L eye, like black gray spots\"   Triage Assessment (Adult)       Row Name 07/15/25 1141          Triage Assessment    Airway WDL WDL        Respiratory WDL    Respiratory WDL WDL        Skin Circulation/Temperature WDL    Skin Circulation/Temperature WDL WDL        Cardiac WDL    Cardiac WDL WDL        Peripheral/Neurovascular WDL    Peripheral Neurovascular WDL WDL        Cognitive/Neuro/Behavioral WDL    Cognitive/Neuro/Behavioral WDL WDL                     "

## 2025-07-15 NOTE — LETTER
[facility]: Cancer Care                                                                                          Re: Request for Coverage of Taletrectinib (Ibtrozi)  Patient: Bernard Chapman  Diagnosis: Stage IV Non-Small Cell Lung Cancer, ROS1 Rearrangement-Positive    To Whom It May Concern:    I am writing to request approval for taletrectinib (Ibtrozi) for my patient, who has been newly diagnosed with ROS1-rearranged metastatic non-small cell lung cancer (NSCLC). Taletrectinib received FDA approval on June 11, 2025, and is indicated for the treatment of adults with locally advanced or metastatic ROS1-positive NSCLC.    This request is based on clinical necessity given the patient s molecular profile, the superior efficacy of taletrectinib in both systemic and intracranial disease, and its clear advantages over other ROS1 inhibitors currently available, including entrectinib, repotrectinib, and lorlatinib.    ROS1 gene rearrangements occur in ~1-2% of NSCLC patients, typically younger, non-smoking individuals with adenocarcinoma histology. These tumors frequently metastasize to the brain. Targeted therapy is the standard of care for such patients and associated with improved outcomes versus chemotherapy or immunotherapy.     Taletrectinib was recently FDA Approved June 2025 for patients with advanced NSCLC harboring ROS1 mutations as Mr. Kam has.  This approval was based upon the TRUST-I/II pooled analysis (N=[id number]) which showed:    Confirmed DON (Quinn) in TKI-naïve patients: 88.8%    Median PFS: 45.6 months    Intracranial DON (IC-DON): 76.5% (in patients with measurable brain metastases)    Active against D5952I ROS1 resistance mutation, the most common on-target resistance mechanism to crizotinib and entrectinib.    While other ROS1 inhibitors have good activity for this disease, taletrectinib has these distinct advantages:     Longer PFS than other ROS1 inhibitors (longest PFS with any TKI for  ROS1-rearranged disease)    CNS penetration with durable IC responses    Active against known resistance mutations (genny. I9988K)    More favorable GI toxicity profile than repotrectinib     Therefore, I feel that taletrectinib is the optimal treatment choice for this patient due to:    Superior systemic and intracranial efficacy compared to entrectinib and repotrectinib    Durable PFS nearly double that seen with other ROS1 inhibitors    Activity against resistance mutations such as H6988E (not covered by entrectinib or lorlatinib)    Favorable safety profile, including fewer neurologic or GI adverse effects relative to alternatives    Endorsed in NCCN Guidelines (v5.2025) as a preferred first-line agent for advanced ROS1+ NSCLC    4. References  FDA Approval Notice, June 11, 2025 - FDA.gov    TRUST-I and TRUST-II Data: OncLive 2025 - OncLive    Entrectinib STARTRK-2 Pooled Data - Bayron BURROWS et al. Lancet Oncology. 2020    Repotrectinib TRIDENT-1 Trial - Alejandra RAMÍREZ, et al. ASCO 2023 Abstract 4312    NCCN Guidelines v5.2025: ROS1+ NSCLC pathway updated June 2025 - NCCN.org    Conclusion:  Given this patient s molecular profile, CNS disease risk, and the FDA approval and clinical data supporting taletrectinib, I strongly urge that coverage be authorized. No other currently available ROS1 inhibitor matches its efficacy, intracranial control, and mutation resistance profile. Delaying access to taletrectinib would compromise care quality and violate best-practice standards.    Please feel free to contact me directly for any supporting documentation or clarification.    Signature:  Bernardo aMckenzie, DO

## 2025-07-15 NOTE — ED NOTES
Nurse to nurse report from [facility].  Per nurse pt was seen at the clinic today and the doctor was concerned that patient is having  a brain bleed.   Patient is currently on a blood thinner.  Dropped off at the main ED by the doctor.

## 2025-07-15 NOTE — PROGRESS NOTES
Brief hematology note    35-year-old man with recent DVT/PE (June 22) on rivaroxaban, presenting today with new neurologic symptoms, found to have diffuse acute cerebral infarcts and acute severe thrombocytopenia concerning for thrombotic emergency such as TTP.    The differential includes TTP, catastrophic antiphospholipid syndrome (CAPS), less likely ITP or an unspecified significant thrombophilia refractory to rivaroxaban.    Recommendations:  -Consult apheresis for urgent plasma exchange  -Place central line for access for plasma exchange  -Send [id number] level  -Dexamethasone 40 mg IV once  -Consult benign (classical) hematology    Jacob Cogan, MD  Hematology

## 2025-07-16 ENCOUNTER — APPOINTMENT (OUTPATIENT)
Dept: LAB | Facility: CLINIC | Age: 35
DRG: 064 | End: 2025-07-16
Payer: COMMERCIAL

## 2025-07-16 ENCOUNTER — APPOINTMENT (OUTPATIENT)
Dept: GENERAL RADIOLOGY | Facility: CLINIC | Age: 35
DRG: 064 | End: 2025-07-16
Attending: PSYCHIATRY & NEUROLOGY
Payer: COMMERCIAL

## 2025-07-16 ENCOUNTER — APPOINTMENT (OUTPATIENT)
Dept: CT IMAGING | Facility: CLINIC | Age: 35
DRG: 064 | End: 2025-07-16
Attending: NURSE PRACTITIONER
Payer: COMMERCIAL

## 2025-07-16 ENCOUNTER — APPOINTMENT (OUTPATIENT)
Dept: CT IMAGING | Facility: CLINIC | Age: 35
End: 2025-07-16
Attending: NURSE PRACTITIONER
Payer: COMMERCIAL

## 2025-07-16 ENCOUNTER — APPOINTMENT (OUTPATIENT)
Dept: CARDIOLOGY | Facility: CLINIC | Age: 35
DRG: 064 | End: 2025-07-16
Payer: COMMERCIAL

## 2025-07-16 ENCOUNTER — APPOINTMENT (OUTPATIENT)
Dept: OCCUPATIONAL THERAPY | Facility: CLINIC | Age: 35
DRG: 064 | End: 2025-07-16
Payer: COMMERCIAL

## 2025-07-16 ENCOUNTER — APPOINTMENT (OUTPATIENT)
Dept: SPEECH THERAPY | Facility: CLINIC | Age: 35
DRG: 064 | End: 2025-07-16
Payer: COMMERCIAL

## 2025-07-16 LAB
ALBUMIN MFR UR ELPH: <6 MG/DL
ALBUMIN SERPL BCG-MCNC: 3.9 G/DL (ref 3.5–5.2)
ALBUMIN UR-MCNC: NEGATIVE MG/DL
ALP SERPL-CCNC: 58 U/L (ref 40–150)
ALT SERPL W P-5'-P-CCNC: 25 U/L (ref 0–70)
AMPHETAMINES UR QL SCN: ABNORMAL
ANION GAP SERPL CALCULATED.3IONS-SCNC: 19 MMOL/L (ref 7–15)
APPEARANCE UR: CLEAR
APTT PPP: 30 SECONDS (ref 22–38)
AST SERPL W P-5'-P-CCNC: 24 U/L (ref 0–45)
ATRIAL RATE - MUSE: 76 BPM
ATRIAL RATE - MUSE: 94 BPM
BARBITURATES UR QL SCN: ABNORMAL
BASOPHILS # BLD AUTO: 0 10E3/UL (ref 0–0.2)
BASOPHILS # BLD AUTO: 0 10E3/UL (ref 0–0.2)
BASOPHILS NFR BLD AUTO: 0 %
BASOPHILS NFR BLD AUTO: 0 %
BENZODIAZ UR QL SCN: ABNORMAL
BILIRUB SERPL-MCNC: 0.9 MG/DL
BILIRUB UR QL STRIP: NEGATIVE
BILIRUBIN DIRECT (ROCHE PRO & PURE): 0.32 MG/DL (ref 0–0.45)
BLD PROD TYP BPU: NORMAL
BLOOD COMPONENT TYPE: NORMAL
BUN SERPL-MCNC: 18.1 MG/DL (ref 6–20)
BZE UR QL SCN: ABNORMAL
CALCIUM SERPL-MCNC: 9.5 MG/DL (ref 8.8–10.4)
CANNABINOIDS UR QL SCN: ABNORMAL
CHLORIDE SERPL-SCNC: 105 MMOL/L (ref 98–107)
CHOLEST SERPL-MCNC: 205 MG/DL
CODING SYSTEM: NORMAL
COLOR UR AUTO: ABNORMAL
CREAT SERPL-MCNC: 0.98 MG/DL (ref 0.67–1.17)
CREAT UR-MCNC: 70.3 MG/DL
CREAT UR-MCNC: 71 MG/DL
CRP SERPL-MCNC: 17.5 MG/L
DAT POLY: NEGATIVE
DIASTOLIC BLOOD PRESSURE - MUSE: NORMAL MMHG
DIASTOLIC BLOOD PRESSURE - MUSE: NORMAL MMHG
EGFRCR SERPLBLD CKD-EPI 2021: >90 ML/MIN/1.73M2
EOSINOPHIL # BLD AUTO: 0 10E3/UL (ref 0–0.7)
EOSINOPHIL # BLD AUTO: 0.1 10E3/UL (ref 0–0.7)
EOSINOPHIL NFR BLD AUTO: 0 %
EOSINOPHIL NFR BLD AUTO: 1 %
ERYTHROCYTE [DISTWIDTH] IN BLOOD BY AUTOMATED COUNT: 12.9 % (ref 10–15)
ERYTHROCYTE [DISTWIDTH] IN BLOOD BY AUTOMATED COUNT: 12.9 % (ref 10–15)
ERYTHROCYTE [DISTWIDTH] IN BLOOD BY AUTOMATED COUNT: 13.1 % (ref 10–15)
ERYTHROCYTE [SEDIMENTATION RATE] IN BLOOD BY WESTERGREN METHOD: 4 MM/HR (ref 0–15)
EST. AVERAGE GLUCOSE BLD GHB EST-MCNC: 105 MG/DL
FENTANYL UR QL: ABNORMAL
FERRITIN SERPL-MCNC: 535 NG/ML (ref 31–409)
FIBRINOGEN PPP-MCNC: 151 MG/DL (ref 170–510)
GLUCOSE BLDC GLUCOMTR-MCNC: 122 MG/DL (ref 70–99)
GLUCOSE SERPL-MCNC: 147 MG/DL (ref 70–99)
GLUCOSE UR STRIP-MCNC: NEGATIVE MG/DL
HAPTOGLOB SERPL-MCNC: <10 MG/DL (ref 30–200)
HBA1C MFR BLD: 5.3 %
HCO3 SERPL-SCNC: 17 MMOL/L (ref 22–29)
HCT VFR BLD AUTO: 37.1 % (ref 40–53)
HCT VFR BLD AUTO: 37.7 % (ref 40–53)
HCT VFR BLD AUTO: 38.5 % (ref 40–53)
HDLC SERPL-MCNC: 30 MG/DL
HEPARIN PPP CHRO-ACNC: DETECTED
HGB BLD-MCNC: 12.3 G/DL (ref 13.3–17.7)
HGB BLD-MCNC: 12.6 G/DL (ref 13.3–17.7)
HGB BLD-MCNC: 13 G/DL (ref 13.3–17.7)
HGB UR QL STRIP: NEGATIVE
HIV 1+2 AB+HIV1 P24 AG SERPL QL IA: NONREACTIVE
IMM GRANULOCYTES # BLD: 0.1 10E3/UL
IMM GRANULOCYTES # BLD: 0.1 10E3/UL
IMM GRANULOCYTES NFR BLD: 1 %
IMM GRANULOCYTES NFR BLD: 1 %
INR PPP: 2.05 (ref 0.85–1.15)
INTERPRETATION ECG - MUSE: NORMAL
INTERPRETATION ECG - MUSE: NORMAL
INTERPRETATION SERPL IEP-IMP: NORMAL
ISSUE DATE AND TIME: NORMAL
KETONES UR STRIP-MCNC: NEGATIVE MG/DL
LAB TEST RESULTS REPORTED IN RPTPERIOD: NORMAL
LABORATORY COMMENT REPORT: ABNORMAL
LABORATORY COMMENT REPORT: POSITIVE
LDH SERPL L TO P-CCNC: 514 U/L (ref 0–250)
LDH SERPL L TO P-CCNC: 517 U/L (ref 0–250)
LDLC SERPL CALC-MCNC: 135 MG/DL
LEUKOCYTE ESTERASE UR QL STRIP: NEGATIVE
LVEF ECHO: NORMAL
LYMPHOCYTES # BLD AUTO: 0.5 10E3/UL (ref 0.8–5.3)
LYMPHOCYTES # BLD AUTO: 1.5 10E3/UL (ref 0.8–5.3)
LYMPHOCYTES NFR BLD AUTO: 11 %
LYMPHOCYTES NFR BLD AUTO: 4 %
MAGNESIUM SERPL-MCNC: 2 MG/DL (ref 1.7–2.3)
MCH RBC QN AUTO: 26.9 PG (ref 26.5–33)
MCH RBC QN AUTO: 27.1 PG (ref 26.5–33)
MCH RBC QN AUTO: 27.1 PG (ref 26.5–33)
MCHC RBC AUTO-ENTMCNC: 33.2 G/DL (ref 31.5–36.5)
MCHC RBC AUTO-ENTMCNC: 33.4 G/DL (ref 31.5–36.5)
MCHC RBC AUTO-ENTMCNC: 33.8 G/DL (ref 31.5–36.5)
MCV RBC AUTO: 80 FL (ref 78–100)
MCV RBC AUTO: 80 FL (ref 78–100)
MCV RBC AUTO: 81 FL (ref 78–100)
MCV RBC AUTO: 82 FL (ref 78–100)
MIXING DRVVT/NORMAL: 1.72 %
MONOCYTES # BLD AUTO: 0.1 10E3/UL (ref 0–1.3)
MONOCYTES # BLD AUTO: 0.8 10E3/UL (ref 0–1.3)
MONOCYTES NFR BLD AUTO: 1 %
MONOCYTES NFR BLD AUTO: 6 %
MUCOUS THREADS #/AREA URNS LPF: PRESENT /LPF
NEUTROPHILS # BLD AUTO: 10.4 10E3/UL (ref 1.6–8.3)
NEUTROPHILS # BLD AUTO: 13.1 10E3/UL (ref 1.6–8.3)
NEUTROPHILS NFR BLD AUTO: 81 %
NEUTROPHILS NFR BLD AUTO: 95 %
NITRATE UR QL: NEGATIVE
NONHDLC SERPL-MCNC: 175 MG/DL
NORMALIZED CONFIRM DRVVT STACLOT: 1.58 %
NRBC # BLD AUTO: 0 10E3/UL
NRBC # BLD AUTO: 0 10E3/UL
NRBC BLD AUTO-RTO: 0 /100
NRBC BLD AUTO-RTO: 0 /100
OPIATES UR QL SCN: ABNORMAL
P AXIS - MUSE: 45 DEGREES
P AXIS - MUSE: 71 DEGREES
PATH REPORT.COMMENTS IMP SPEC: NORMAL
PATH REPORT.COMMENTS IMP SPEC: NORMAL
PATH REPORT.FINAL DX SPEC: NORMAL
PATH REPORT.MICROSCOPIC SPEC OTHER STN: NORMAL
PATH REPORT.MICROSCOPIC SPEC OTHER STN: NORMAL
PATH REPORT.RELEVANT HX SPEC: NORMAL
PCP QUAL URINE (ROCHE): ABNORMAL
PH UR STRIP: 7.5 [PH] (ref 5–7)
PHOSPHATE SERPL-MCNC: 2.3 MG/DL (ref 2.5–4.5)
PLATELET # BLD AUTO: 24 10E3/UL (ref 150–450)
PLATELET # BLD AUTO: 25 10E3/UL (ref 150–450)
PLATELET # BLD AUTO: 59 10E3/UL (ref 150–450)
PLATELET # BLD AUTO: 61 10E3/UL (ref 150–450)
POTASSIUM SERPL-SCNC: 4.7 MMOL/L (ref 3.4–5.3)
PR INTERVAL - MUSE: 132 MS
PR INTERVAL - MUSE: 142 MS
PROT SERPL-MCNC: 6.5 G/DL (ref 6.4–8.3)
PROT/CREAT 24H UR: NORMAL MG/G{CREAT}
PROTHROMBIN TIME: 22.8 SECONDS (ref 11.8–14.8)
QRS DURATION - MUSE: 76 MS
QRS DURATION - MUSE: 90 MS
QT - MUSE: 328 MS
QT - MUSE: 354 MS
QTC - MUSE: 398 MS
QTC - MUSE: 410 MS
R AXIS - MUSE: 21 DEGREES
R AXIS - MUSE: 28 DEGREES
RBC # BLD AUTO: 4.54 10E6/UL (ref 4.4–5.9)
RBC # BLD AUTO: 4.65 10E6/UL (ref 4.4–5.9)
RBC # BLD AUTO: 4.84 10E6/UL (ref 4.4–5.9)
RBC URINE: 1 /HPF
RETICS # AUTO: 0.1 10E6/UL (ref 0.03–0.1)
RETICS/RBC NFR AUTO: 2.3 % (ref 0.5–2)
RHEUMATOID FACT SERPL-ACNC: <10 IU/ML
SCREEN APTT/NORMAL: 1.2
SCREEN DRVVT/NORMAL: 2.78 %
SEQUENCING METHOD PNL BLD/T: NORMAL
SODIUM SERPL-SCNC: 141 MMOL/L (ref 135–145)
SP GR UR STRIP: 1 (ref 1–1.03)
SPECIMEN EXP DATE BLD: NORMAL
SPECIMEN TYPE: NORMAL
SYSTOLIC BLOOD PRESSURE - MUSE: NORMAL MMHG
SYSTOLIC BLOOD PRESSURE - MUSE: NORMAL MMHG
T AXIS - MUSE: 3 DEGREES
T AXIS - MUSE: 4 DEGREES
T4 FREE SERPL-MCNC: 1.2 NG/DL (ref 0.9–1.7)
THROMBIN TIME: 22.5 SECONDS (ref 13–19)
TRIGL SERPL-MCNC: 202 MG/DL
TSH SERPL DL<=0.005 MIU/L-ACNC: 2.84 UIU/ML (ref 0.3–4.2)
UNIT ABO/RH: NORMAL
UNIT NUMBER: NORMAL
UNIT STATUS: NORMAL
UNIT TYPE ISBT: 2800
UNIT TYPE ISBT: 2800
UNIT TYPE ISBT: 600
UNIT TYPE ISBT: 6200
UNIT TYPE ISBT: 6200
UNIT TYPE ISBT: 8400
UROBILINOGEN UR STRIP-MCNC: NORMAL MG/DL
VENTRICULAR RATE- MUSE: 76 BPM
VENTRICULAR RATE- MUSE: 94 BPM
VIT B12 SERPL-MCNC: 489 PG/ML (ref 232–1245)
VWF CP ACT PPP CHRO-CCNC: 0.96 IU/ML (ref 0.4–1.3)
WBC # BLD AUTO: 12.8 10E3/UL (ref 4–11)
WBC # BLD AUTO: 13.8 10E3/UL (ref 4–11)
WBC # BLD AUTO: 14.4 10E3/UL (ref 4–11)
WBC URINE: <1 /HPF

## 2025-07-16 PROCEDURE — 97530 THERAPEUTIC ACTIVITIES: CPT | Mod: GO

## 2025-07-16 PROCEDURE — 86431 RHEUMATOID FACTOR QUANT: CPT

## 2025-07-16 PROCEDURE — 88185 FLOWCYTOMETRY/TC ADD-ON: CPT

## 2025-07-16 PROCEDURE — 255N000002 HC RX 255 OP 636: Performed by: INTERNAL MEDICINE

## 2025-07-16 PROCEDURE — 258N000003 HC RX IP 258 OP 636: Performed by: FAMILY MEDICINE

## 2025-07-16 PROCEDURE — 81270 JAK2 GENE: CPT

## 2025-07-16 PROCEDURE — 999N000065 XR CHEST PORT 1 VIEW

## 2025-07-16 PROCEDURE — 250N000009 HC RX 250

## 2025-07-16 PROCEDURE — 92523 SPEECH SOUND LANG COMPREHEN: CPT | Mod: GN

## 2025-07-16 PROCEDURE — 86140 C-REACTIVE PROTEIN: CPT

## 2025-07-16 PROCEDURE — 93306 TTE W/DOPPLER COMPLETE: CPT | Mod: 26 | Performed by: INTERNAL MEDICINE

## 2025-07-16 PROCEDURE — 83615 LACTATE (LD) (LDH) ENZYME: CPT

## 2025-07-16 PROCEDURE — G0452 MOLECULAR PATHOLOGY INTERPR: HCPCS | Mod: 26 | Performed by: PATHOLOGY

## 2025-07-16 PROCEDURE — 88350 IMFLUOR EA ADDL 1ANTB STN PX: CPT | Mod: 26 | Performed by: DERMATOLOGY

## 2025-07-16 PROCEDURE — 82595 ASSAY OF CRYOGLOBULIN: CPT

## 2025-07-16 PROCEDURE — 200N000002 HC R&B ICU UMMC

## 2025-07-16 PROCEDURE — 36011 PLACE CATHETER IN VEIN: CPT | Performed by: PSYCHIATRY & NEUROLOGY

## 2025-07-16 PROCEDURE — 71260 CT THORAX DX C+: CPT

## 2025-07-16 PROCEDURE — 88346 IMFLUOR 1ST 1ANTB STAIN PX: CPT | Mod: TC | Performed by: DERMATOLOGY

## 2025-07-16 PROCEDURE — 250N000011 HC RX IP 250 OP 636: Performed by: PATHOLOGY

## 2025-07-16 PROCEDURE — 81001 URINALYSIS AUTO W/SCOPE: CPT

## 2025-07-16 PROCEDURE — 36415 COLL VENOUS BLD VENIPUNCTURE: CPT

## 2025-07-16 PROCEDURE — 87040 BLOOD CULTURE FOR BACTERIA: CPT | Performed by: NURSE PRACTITIONER

## 2025-07-16 PROCEDURE — 75572 CT HRT W/3D IMAGE: CPT

## 2025-07-16 PROCEDURE — 74177 CT ABD & PELVIS W/CONTRAST: CPT

## 2025-07-16 PROCEDURE — 97165 OT EVAL LOW COMPLEX 30 MIN: CPT | Mod: GO

## 2025-07-16 PROCEDURE — 81339 MPL GENE SEQ ALYS EXON 10: CPT

## 2025-07-16 PROCEDURE — 84156 ASSAY OF PROTEIN URINE: CPT

## 2025-07-16 PROCEDURE — 85025 COMPLETE CBC W/AUTO DIFF WBC: CPT

## 2025-07-16 PROCEDURE — 88305 TISSUE EXAM BY PATHOLOGIST: CPT | Mod: 26 | Performed by: DERMATOLOGY

## 2025-07-16 PROCEDURE — 85049 AUTOMATED PLATELET COUNT: CPT

## 2025-07-16 PROCEDURE — 85014 HEMATOCRIT: CPT

## 2025-07-16 PROCEDURE — 80360 METHYLPHENIDATE: CPT

## 2025-07-16 PROCEDURE — 86880 COOMBS TEST DIRECT: CPT

## 2025-07-16 PROCEDURE — 99222 1ST HOSP IP/OBS MODERATE 55: CPT | Mod: 25 | Performed by: STUDENT IN AN ORGANIZED HEALTH CARE EDUCATION/TRAINING PROGRAM

## 2025-07-16 PROCEDURE — P9037 PLATE PHERES LEUKOREDU IRRAD: HCPCS

## 2025-07-16 PROCEDURE — 80307 DRUG TEST PRSMV CHEM ANLYZR: CPT

## 2025-07-16 PROCEDURE — 88187 FLOWCYTOMETRY/READ 2-8: CPT | Mod: XE | Performed by: PATHOLOGY

## 2025-07-16 PROCEDURE — 36514 APHERESIS PLASMA: CPT

## 2025-07-16 PROCEDURE — 250N000011 HC RX IP 250 OP 636: Performed by: NURSE PRACTITIONER

## 2025-07-16 PROCEDURE — 36415 COLL VENOUS BLD VENIPUNCTURE: CPT | Performed by: NURSE PRACTITIONER

## 2025-07-16 PROCEDURE — 99291 CRITICAL CARE FIRST HOUR: CPT | Mod: 25 | Performed by: PSYCHIATRY & NEUROLOGY

## 2025-07-16 PROCEDURE — 83516 IMMUNOASSAY NONANTIBODY: CPT

## 2025-07-16 PROCEDURE — 80048 BASIC METABOLIC PNL TOTAL CA: CPT

## 2025-07-16 PROCEDURE — 999N000208 ECHOCARDIOGRAM COMPLETE

## 2025-07-16 PROCEDURE — 0HBLXZX EXCISION OF LEFT LOWER LEG SKIN, EXTERNAL APPROACH, DIAGNOSTIC: ICD-10-PCS | Performed by: DERMATOLOGY

## 2025-07-16 PROCEDURE — 84100 ASSAY OF PHOSPHORUS: CPT

## 2025-07-16 PROCEDURE — 85652 RBC SED RATE AUTOMATED: CPT

## 2025-07-16 PROCEDURE — 99223 1ST HOSP IP/OBS HIGH 75: CPT | Mod: GC | Performed by: INTERNAL MEDICINE

## 2025-07-16 PROCEDURE — 88189 FLOWCYTOMETRY/READ 16 & >: CPT | Mod: GC | Performed by: PATHOLOGY

## 2025-07-16 PROCEDURE — 250N000013 HC RX MED GY IP 250 OP 250 PS 637: Performed by: NURSE PRACTITIONER

## 2025-07-16 PROCEDURE — 88346 IMFLUOR 1ST 1ANTB STAIN PX: CPT | Mod: 26 | Performed by: DERMATOLOGY

## 2025-07-16 PROCEDURE — 99292 CRITICAL CARE ADDL 30 MIN: CPT | Mod: 25 | Performed by: PSYCHIATRY & NEUROLOGY

## 2025-07-16 PROCEDURE — 71260 CT THORAX DX C+: CPT | Mod: 26 | Performed by: RADIOLOGY

## 2025-07-16 PROCEDURE — 87385 HISTOPLASMA CAPSUL AG IA: CPT

## 2025-07-16 PROCEDURE — 6A550Z2 PHERESIS OF PLATELETS, SINGLE: ICD-10-PCS | Performed by: PATHOLOGY

## 2025-07-16 PROCEDURE — 74177 CT ABD & PELVIS W/CONTRAST: CPT | Mod: 26 | Performed by: RADIOLOGY

## 2025-07-16 PROCEDURE — 250N000011 HC RX IP 250 OP 636: Performed by: FAMILY MEDICINE

## 2025-07-16 PROCEDURE — 71045 X-RAY EXAM CHEST 1 VIEW: CPT | Mod: 26 | Performed by: STUDENT IN AN ORGANIZED HEALTH CARE EDUCATION/TRAINING PROGRAM

## 2025-07-16 PROCEDURE — 250N000013 HC RX MED GY IP 250 OP 250 PS 637

## 2025-07-16 PROCEDURE — 250N000009 HC RX 250: Performed by: PATHOLOGY

## 2025-07-16 PROCEDURE — P9059 PLASMA, FRZ BETWEEN 8-24HOUR: HCPCS | Performed by: PATHOLOGY

## 2025-07-16 PROCEDURE — 92507 TX SP LANG VOICE COMM INDIV: CPT | Mod: GN

## 2025-07-16 PROCEDURE — 83036 HEMOGLOBIN GLYCOSYLATED A1C: CPT

## 2025-07-16 PROCEDURE — 92610 EVALUATE SWALLOWING FUNCTION: CPT | Mod: GN

## 2025-07-16 PROCEDURE — 258N000003 HC RX IP 258 OP 636

## 2025-07-16 PROCEDURE — 75572 CT HRT W/3D IMAGE: CPT | Mod: 26 | Performed by: STUDENT IN AN ORGANIZED HEALTH CARE EDUCATION/TRAINING PROGRAM

## 2025-07-16 PROCEDURE — 250N000011 HC RX IP 250 OP 636

## 2025-07-16 RX ORDER — HYDROCORTISONE SODIUM SUCCINATE 100 MG/2ML
100 INJECTION INTRAMUSCULAR; INTRAVENOUS
Status: CANCELLED | OUTPATIENT
Start: 2025-07-16

## 2025-07-16 RX ORDER — LIDOCAINE 40 MG/G
CREAM TOPICAL
Status: DISCONTINUED | OUTPATIENT
Start: 2025-07-16 | End: 2025-07-17

## 2025-07-16 RX ORDER — DIPHENHYDRAMINE HYDROCHLORIDE 50 MG/ML
50 INJECTION, SOLUTION INTRAMUSCULAR; INTRAVENOUS
Status: CANCELLED | OUTPATIENT
Start: 2025-07-16

## 2025-07-16 RX ORDER — LABETALOL HYDROCHLORIDE 5 MG/ML
10 INJECTION, SOLUTION INTRAVENOUS EVERY 10 MIN PRN
Status: DISCONTINUED | OUTPATIENT
Start: 2025-07-16 | End: 2025-08-21 | Stop reason: HOSPADM

## 2025-07-16 RX ORDER — ACYCLOVIR 200 MG/1
5 CAPSULE ORAL ONCE
Status: COMPLETED | OUTPATIENT
Start: 2025-07-16 | End: 2025-07-16

## 2025-07-16 RX ORDER — SODIUM CHLORIDE, SODIUM GLUCONATE, SODIUM ACETATE, POTASSIUM CHLORIDE AND MAGNESIUM CHLORIDE 526; 502; 368; 37; 30 MG/100ML; MG/100ML; MG/100ML; MG/100ML; MG/100ML
INJECTION, SOLUTION INTRAVENOUS CONTINUOUS
Status: DISCONTINUED | OUTPATIENT
Start: 2025-07-16 | End: 2025-07-18

## 2025-07-16 RX ORDER — POTASSIUM PHOSPHATES 4.72; 4.48 MG/ML; MG/ML
15 INJECTION, SOLUTION INTRAVENOUS ONCE
Status: COMPLETED | OUTPATIENT
Start: 2025-07-16 | End: 2025-07-16

## 2025-07-16 RX ORDER — HEPARIN SODIUM (PORCINE) LOCK FLUSH IV SOLN 100 UNIT/ML 100 UNIT/ML
3 SOLUTION INTRAVENOUS ONCE
Status: COMPLETED | OUTPATIENT
Start: 2025-07-16 | End: 2025-07-16

## 2025-07-16 RX ORDER — POLYETHYLENE GLYCOL 3350 17 G/17G
17 POWDER, FOR SOLUTION ORAL 2 TIMES DAILY PRN
Status: DISCONTINUED | OUTPATIENT
Start: 2025-07-16 | End: 2025-07-16

## 2025-07-16 RX ORDER — AMOXICILLIN 250 MG
1 CAPSULE ORAL 2 TIMES DAILY PRN
Status: DISCONTINUED | OUTPATIENT
Start: 2025-07-16 | End: 2025-07-16

## 2025-07-16 RX ORDER — HYDRALAZINE HYDROCHLORIDE 20 MG/ML
10 INJECTION INTRAMUSCULAR; INTRAVENOUS EVERY 30 MIN PRN
Status: DISCONTINUED | OUTPATIENT
Start: 2025-07-16 | End: 2025-08-21 | Stop reason: HOSPADM

## 2025-07-16 RX ORDER — CALCIUM GLUCONATE 100 MG/ML
AMPUL (ML) INTRAVENOUS
Status: COMPLETED | OUTPATIENT
Start: 2025-07-16 | End: 2025-07-16

## 2025-07-16 RX ORDER — AMOXICILLIN 250 MG
1-2 CAPSULE ORAL 2 TIMES DAILY
Status: DISCONTINUED | OUTPATIENT
Start: 2025-07-16 | End: 2025-08-21 | Stop reason: HOSPADM

## 2025-07-16 RX ORDER — PROCHLORPERAZINE MALEATE 10 MG
10 TABLET ORAL EVERY 6 HOURS PRN
Status: DISCONTINUED | OUTPATIENT
Start: 2025-07-16 | End: 2025-07-16

## 2025-07-16 RX ORDER — ONDANSETRON 4 MG/1
4 TABLET, ORALLY DISINTEGRATING ORAL EVERY 6 HOURS PRN
Status: DISCONTINUED | OUTPATIENT
Start: 2025-07-16 | End: 2025-08-09

## 2025-07-16 RX ORDER — ROSUVASTATIN CALCIUM 10 MG/1
10 TABLET, COATED ORAL DAILY
Status: DISCONTINUED | OUTPATIENT
Start: 2025-07-16 | End: 2025-07-19

## 2025-07-16 RX ORDER — SODIUM CHLORIDE, SODIUM GLUCONATE, SODIUM ACETATE, POTASSIUM CHLORIDE AND MAGNESIUM CHLORIDE 526; 502; 368; 37; 30 MG/100ML; MG/100ML; MG/100ML; MG/100ML; MG/100ML
INJECTION, SOLUTION INTRAVENOUS CONTINUOUS
Status: DISCONTINUED | OUTPATIENT
Start: 2025-07-16 | End: 2025-07-16

## 2025-07-16 RX ORDER — IOPAMIDOL 755 MG/ML
135 INJECTION, SOLUTION INTRAVASCULAR ONCE
Status: COMPLETED | OUTPATIENT
Start: 2025-07-16 | End: 2025-07-16

## 2025-07-16 RX ORDER — ONDANSETRON 2 MG/ML
4 INJECTION INTRAMUSCULAR; INTRAVENOUS EVERY 6 HOURS PRN
Status: DISCONTINUED | OUTPATIENT
Start: 2025-07-16 | End: 2025-08-09

## 2025-07-16 RX ORDER — SODIUM CHLORIDE, SODIUM GLUCONATE, SODIUM ACETATE, POTASSIUM CHLORIDE AND MAGNESIUM CHLORIDE 526; 502; 368; 37; 30 MG/100ML; MG/100ML; MG/100ML; MG/100ML; MG/100ML
1000 INJECTION, SOLUTION INTRAVENOUS ONCE
Status: COMPLETED | OUTPATIENT
Start: 2025-07-16 | End: 2025-07-16

## 2025-07-16 RX ORDER — FENTANYL CITRATE 50 UG/ML
100 INJECTION, SOLUTION INTRAMUSCULAR; INTRAVENOUS ONCE
Status: COMPLETED | OUTPATIENT
Start: 2025-07-16 | End: 2025-07-16

## 2025-07-16 RX ORDER — ACETAMINOPHEN 500 MG
1000 TABLET ORAL EVERY 8 HOURS PRN
Status: DISCONTINUED | OUTPATIENT
Start: 2025-07-16 | End: 2025-07-19

## 2025-07-16 RX ORDER — POLYETHYLENE GLYCOL 3350 17 G/17G
17 POWDER, FOR SOLUTION ORAL DAILY PRN
Status: DISCONTINUED | OUTPATIENT
Start: 2025-07-16 | End: 2025-07-19

## 2025-07-16 RX ORDER — PROCHLORPERAZINE MALEATE 10 MG
10 TABLET ORAL EVERY 6 HOURS PRN
Status: DISCONTINUED | OUTPATIENT
Start: 2025-07-16 | End: 2025-08-13

## 2025-07-16 RX ADMIN — SODIUM CHLORIDE: 0.9 INJECTION, SOLUTION INTRAVENOUS at 02:47

## 2025-07-16 RX ADMIN — SENNOSIDES AND DOCUSATE SODIUM 1 TABLET: 50; 8.6 TABLET ORAL at 20:09

## 2025-07-16 RX ADMIN — SODIUM CHLORIDE, SODIUM GLUCONATE, SODIUM ACETATE, POTASSIUM CHLORIDE AND MAGNESIUM CHLORIDE: 526; 502; 368; 37; 30 INJECTION, SOLUTION INTRAVENOUS at 03:55

## 2025-07-16 RX ADMIN — ACETAMINOPHEN 1000 MG: 500 TABLET ORAL at 21:24

## 2025-07-16 RX ADMIN — ANTICOAGULANT CITRATE DEXTROSE SOLUTION FORMULA A 930 ML: 12.25; 11; 3.65 SOLUTION INTRAVENOUS at 04:23

## 2025-07-16 RX ADMIN — Medication 3 ML: at 06:14

## 2025-07-16 RX ADMIN — ACETAMINOPHEN 1000 MG: 500 TABLET ORAL at 12:33

## 2025-07-16 RX ADMIN — SENNOSIDES AND DOCUSATE SODIUM 1 TABLET: 50; 8.6 TABLET ORAL at 11:44

## 2025-07-16 RX ADMIN — FENTANYL CITRATE 100 MCG: 50 INJECTION, SOLUTION INTRAMUSCULAR; INTRAVENOUS at 02:10

## 2025-07-16 RX ADMIN — SODIUM CHLORIDE, SODIUM GLUCONATE, SODIUM ACETATE, POTASSIUM CHLORIDE AND MAGNESIUM CHLORIDE: 526; 502; 368; 37; 30 INJECTION, SOLUTION INTRAVENOUS at 16:32

## 2025-07-16 RX ADMIN — SODIUM CHLORIDE, SODIUM GLUCONATE, SODIUM ACETATE, POTASSIUM CHLORIDE AND MAGNESIUM CHLORIDE: 526; 502; 368; 37; 30 INJECTION, SOLUTION INTRAVENOUS at 21:36

## 2025-07-16 RX ADMIN — IOPAMIDOL 135 ML: 755 INJECTION, SOLUTION INTRAVENOUS at 11:07

## 2025-07-16 RX ADMIN — SODIUM CHLORIDE, SODIUM GLUCONATE, SODIUM ACETATE, POTASSIUM CHLORIDE AND MAGNESIUM CHLORIDE: 526; 502; 368; 37; 30 INJECTION, SOLUTION INTRAVENOUS at 12:01

## 2025-07-16 RX ADMIN — DEXAMETHASONE SODIUM PHOSPHATE 40 MG: 10 INJECTION, SOLUTION INTRAMUSCULAR; INTRAVENOUS at 00:58

## 2025-07-16 RX ADMIN — SODIUM CHLORIDE, SODIUM GLUCONATE, SODIUM ACETATE, POTASSIUM CHLORIDE AND MAGNESIUM CHLORIDE 1000 ML: 526; 502; 368; 37; 30 INJECTION, SOLUTION INTRAVENOUS at 06:33

## 2025-07-16 RX ADMIN — HUMAN ALBUMIN MICROSPHERES AND PERFLUTREN 5 ML (DILUTED): 10; .22 INJECTION, SOLUTION INTRAVENOUS at 08:54

## 2025-07-16 RX ADMIN — POTASSIUM PHOSPHATE, MONOBASIC POTASSIUM PHOSPHATE, DIBASIC 15 MMOL: 224; 236 INJECTION, SOLUTION, CONCENTRATE INTRAVENOUS at 08:04

## 2025-07-16 RX ADMIN — ROSUVASTATIN CALCIUM 10 MG: 10 TABLET, FILM COATED ORAL at 11:44

## 2025-07-16 RX ADMIN — CALCIUM GLUCONATE 5 G: 98 INJECTION, SOLUTION INTRAVENOUS at 04:23

## 2025-07-16 RX ADMIN — Medication 3 ML: at 06:15

## 2025-07-16 RX ADMIN — MIDAZOLAM 2 MG: 1 INJECTION INTRAMUSCULAR; INTRAVENOUS at 02:09

## 2025-07-16 ASSESSMENT — ACTIVITIES OF DAILY LIVING (ADL)
ADLS_ACUITY_SCORE: 41
ADLS_ACUITY_SCORE: 59
ADLS_ACUITY_SCORE: 41
ADLS_ACUITY_SCORE: 59
ADLS_ACUITY_SCORE: 41
ADLS_ACUITY_SCORE: 41
ADLS_ACUITY_SCORE: 59

## 2025-07-16 NOTE — PROCEDURES
[facility]    Central line    Date/Time: 7/16/2025 2:30 AM    Performed by: Franklin Aguilera MD  Authorized by: Franklin Aguilera MD  Indications: vascular access      UNIVERSAL PROTOCOL   Site Marked: NA  Prior Images Obtained and Reviewed:  Yes  Required items: Required blood products, implants, devices and special equipment available    Patient identity confirmed:  Verbally with patient and arm band  Patient was reevaluated immediately before administering moderate or deep sedation or anesthesia  Confirmation Checklist:  Patient's identity using two indicators, relevant allergies, procedure was appropriate and matched the consent or emergent situation and correct equipment/implants were available  Time out: Immediately prior to the procedure a time out was called    Universal Protocol: the Joint Commission Universal Protocol was followed    Preparation: Patient was prepped and draped in usual sterile fashion    ESBL (mL):  4     ANESTHESIA    Anesthesia:  Local infiltration  Local Anesthetic:  Lidocaine 1% without epinephrine  Anesthetic Total (mL):  3      SEDATION  Patient Sedated: Yes    Sedation Type:  Moderate (conscious) sedation  Sedation:  Midazolam and fentanyl  Vital signs: Vital signs monitored during sedation      Preparation: skin prepped with 2% chlorhexidine  Skin prep agent dried: skin prep agent completely dried prior to procedure  Sterile barriers: all five maximum sterile barriers used - cap, mask, sterile gown, sterile gloves, and large sterile sheet  Hand hygiene: hand hygiene performed prior to central venous catheter insertion  Patient position: Trendelenburg  Catheter type: triple lumen  Catheter size: 12 Fr  Pre-procedure: landmarks identified  Ultrasound guidance: yes  Sterile ultrasound techniques: sterile gel and sterile probe covers were used  Number of attempts: 1  Successful placement: yes  Post-procedure: line sutured and  dressing applied  Assessment: blood return through all ports, free fluid flow, placement verified by x-ray and no pneumothorax on x-ray      PROCEDURE    Patient Tolerance:  Patient tolerated the procedure well with no immediate complications  Length of time physician/provider present for 1:1 monitoring during sedation: 20     Franklin Aguilera MD  Neurocritical Care  Vascular Neurology  Text Page - 0212

## 2025-07-16 NOTE — PROCEDURES
Laboratory Medicine and Pathology  Transfusion Medicine - Apheresis Procedure    Bernard Chapman MRN# [id number]   YOB: 1990 Age: 35 year old        Reason for consult: Plasma exchange requested for possible TTP or CAPS           Assessment and Plan:   The patient is a 35 year old male with recent PE and DVT admitted with new stroke and thrombocytopenia. Urgent plasma exchange was requested. He tolerated the procedure well. Will followup with team regarding plans for additional procedures.     Please do not start ACE inhibitors throughout the duration of the TPE series as these have been associated with reactions during apheresis.  Please notify the Transfusion Medicine physician of any upcoming procedures, surgeries, or biopsies as TPE with albumin replacement will affect coagulation factor levels.           Procedure Summary:   A single plasma volume plasma exchange was performed with a Spectra Optia cell separator.  The vascular access was a right internal jugular non-tunneled central venous catheter.  ACD-A was used for anticoagulation.  To offset the effects of the citrate, the patient was given calcium  gluconate IV in the return line.  The replacement fluid was plasma (15 units) due to diagnosis differential and thrombocytopenia.  The patient's vital signs were stable throughout.  The patient tolerated the procedure well.    Pre:  /58 P 96 T 98.1 RR 18 O2 sat 98%  Post: /77 P 77 T 96.8 RR 18 O2 sat 99%          Data:     CBC with platelets and differential   Result Value Ref Range    WBC Count 12.8 (H) 4.0 - 11.0 10e3/uL    RBC Count 4.54 4.40 - 5.90 10e6/uL    Hemoglobin 12.3 (L) 13.3 - 17.7 g/dL    Hematocrit 37.1 (L) 40.0 - 53.0 %    MCV 82 78 - 100 fL    MCH 27.1 26.5 - 33.0 pg    MCHC 33.2 31.5 - 36.5 g/dL    RDW 13.1 10.0 - 15.0 %    Platelet Count 25 (LL) 150 - 450 10e3/uL    % Neutrophils 81 %    % Lymphocytes 11 %    % Monocytes 6 %    % Eosinophils 1 %    %  Basophils 0 %    % Immature Granulocytes 1 %    NRBCs per 100 WBC 0 <1 /100    Absolute Neutrophils 10.4 (H) 1.6 - 8.3 10e3/uL    Absolute Lymphocytes 1.5 0.8 - 5.3 10e3/uL    Absolute Monocytes 0.8 0.0 - 1.3 10e3/uL    Absolute Eosinophils 0.1 0.0 - 0.7 10e3/uL    Absolute Basophils 0.0 0.0 - 0.2 10e3/uL    Absolute Immature Granulocytes 0.1 <=0.4 10e3/uL    Absolute NRBCs 0.0 10e3/uL   Reticulocyte count   Result Value Ref Range    % Reticulocyte 2.3 (H) 0.5 - 2.0 %    Absolute Reticulocyte 0.104 (H) 0.025 - 0.095 10e6/uL   Lipid panel reflex to direct LDL   Result Value Ref Range    Cholesterol 205 (H) <200 mg/dL    Triglycerides 202 (H) <150 mg/dL    Direct Measure HDL 30 (L) >=40 mg/dL    LDL Cholesterol Calculated 135 (H) <100 mg/dL    Non HDL Cholesterol 175 (H) <130 mg/dL   Magnesium Lab   Result Value Ref Range    Magnesium 2.0 1.7 - 2.3 mg/dL   Vitamin B12   Result Value Ref Range    Vitamin B12 489 232 - 1,245 pg/mL   TSH   Result Value Ref Range    TSH 2.84 0.30 - 4.20 uIU/mL   T4 free   Result Value Ref Range    Free T4 1.20 0.90 - 1.70 ng/dL   Hepatic panel   Result Value Ref Range    Protein Total 6.5 6.4 - 8.3 g/dL    Albumin 3.9 3.5 - 5.2 g/dL    Bilirubin Total 0.9 <=1.2 mg/dL    Alkaline Phosphatase 58 40 - 150 U/L    AST 24 0 - 45 U/L    ALT 25 0 - 70 U/L    Bilirubin Direct 0.32 0.00 - 0.45 mg/dL   EKG 12-lead, Tracing only   Result Value Ref Range    Systolic Blood Pressure  mmHg    Diastolic Blood Pressure  mmHg    Ventricular Rate 76 BPM    Atrial Rate 76 BPM    OH Interval 142 ms    QRS Duration 90 ms     ms    QTc 398 ms    P Axis 45 degrees    R AXIS 21 degrees    T Axis 4 degrees    Interpretation ECG Sinus rhythm with sinus arrhythmia  Normal ECG      Central line    Narrative    Franklin Aguilera MD     7/16/2025  3:06 AM  [facility]    Central line    Date/Time: 7/16/2025 2:30 AM    Performed by: Franklin Aguilera,  MD  Authorized by: Franklin Aguilera MD  Indications: vascular access      UNIVERSAL PROTOCOL   Site Marked: NA  Prior Images Obtained and Reviewed:  Yes  Required items: Required blood products, implants, devices and special   equipment available    Patient identity confirmed:  Verbally with patient and arm band  Patient was reevaluated immediately before administering moderate or deep   sedation or anesthesia  Confirmation Checklist:  Patient's identity using two indicators, relevant   allergies, procedure was appropriate and matched the consent or emergent   situation and correct equipment/implants were available  Time out: Immediately prior to the procedure a time out was called    Universal Protocol: the Joint Commission Universal Protocol was followed    Preparation: Patient was prepped and draped in usual sterile fashion    ESBL (mL):  4     ANESTHESIA    Anesthesia:  Local infiltration  Local Anesthetic:  Lidocaine 1% without epinephrine  Anesthetic Total (mL):  3      SEDATION  Patient Sedated: Yes    Sedation Type:  Moderate (conscious) sedation  Sedation:  Midazolam and fentanyl  Vital signs: Vital signs monitored during sedation      Preparation: skin prepped with 2% chlorhexidine  Skin prep agent dried: skin prep agent completely dried prior to procedure  Sterile barriers: all five maximum sterile barriers used - cap, mask,   sterile gown, sterile gloves, and large sterile sheet  Hand hygiene: hand hygiene performed prior to central venous catheter   insertion  Patient position: Trendelenburg  Catheter type: triple lumen  Catheter size: 12 Fr  Pre-procedure: landmarks identified  Ultrasound guidance: yes  Sterile ultrasound techniques: sterile gel and sterile probe covers were   used  Number of attempts: 1  Successful placement: yes  Post-procedure: line sutured and dressing applied  Assessment: blood return through all ports, free fluid flow, placement   verified by x-ray and no pneumothorax on  x-ray      PROCEDURE    Patient Tolerance:  Patient tolerated the procedure well with no immediate   complications  Length of time physician/provider present for 1:1 monitoring during   sedation: 20     Franklin Aguilera MD  Neurocritical Care  Vascular Neurology  Text Page - 3433     Glucose by meter   Result Value Ref Range    GLUCOSE BY METER POCT 122 (H) 70 - 99 mg/dL   XR Chest Port 1 View    Impression    RESIDENT PRELIMINARY INTERPRETATION  Impression: Right IJ CVC with tip at the cavoatrial junction. No focal  airspace opacity.   Hemoglobin A1c   Result Value Ref Range    Estimated Average Glucose 105 <117 mg/dL    Hemoglobin A1C 5.3 <5.7 %   Phosphorus Lab   Result Value Ref Range    Phosphorus 2.3 (L) 2.5 - 4.5 mg/dL   Lactate Dehydrogenase   Result Value Ref Range    Lactate Dehydrogenase 517 (H) 0 - 250 U/L       Attestation: During the procedure the patient was directly seen and evaluated by me, Ashely Butler MD, PhD.  I have reviewed the chart and pertinent laboratory findings, and discussed the patient and the current procedure with the Apheresis nursing staff.    Ashely Butler MD, PhD  Transfusion Medicine Attending  Laboratory Medicine & Pathology

## 2025-07-16 NOTE — ED PROVIDER NOTES
Patient arrived from [facility] for evaluation by neurology area.  He had presented to the ED initially with complaints of dizziness, some mental status changes well.  Imaging had shown signs of acute CVA, large vessel occlusion.  When the patient arrived neurology called as they had reviewed his CTA and given the vessel occlusion as well as symptoms had been recommended he go to the ICU for q1 neurochecks.  Stroke neurology was consulted here in the ED on his arrival and saw the patient and they did agree with this as well and admitted the patient to the ICU on their service.       Hemant Garcia MD  07/15/25 [id number]

## 2025-07-16 NOTE — PROGRESS NOTES
07/16/25 1550   Appointment Info   Signing Clinician's Name / Credentials (OT) Helen Palmer, OTR/L   Rehab Comments (OT) R sided weakness   Living Environment   People in Home alone   Current Living Arrangements apartment   Home Accessibility no concerns   Transportation Anticipated family or friend will provide   Living Environment Comments Pt lives alone in apartment with elevator access. Bathroom has tub/shower   Self-Care   Usual Activity Tolerance good   Current Activity Tolerance good   Equipment Currently Used at Home none   Fall history within last six months no   Activity/Exercise/Self-Care Comment Reports IND with ADLs/IADLs, walks without AD. Works at [employer]   General Information   Onset of Illness/Injury or Date of Surgery 07/16/25   Referring Physician Nishi Rodas MD   Additional Occupational Profile Info/Pertinent History of Current Problem Bernard Chapman is a 35 year old male with a past medical history of hypertension, diabetes, possible ITP, sleep apnea, PE and DVT on rivaroxaban admitted on 7/15/2025 for acute ischemic stroke in left MCA territory, near occlusion of left M1, and concern for thrombotic emergency such as TTP or catastrophic antiphospholipid syndrome (in setting of severe thrombocytopenia and acute cerebral infarcts). Admission to neuroICU for close neurologic monitoring and plan for urgent PLEX.   Existing Precautions/Restrictions fall   Left Upper Extremity (Weight-bearing Status) full weight-bearing (FWB)   Right Upper Extremity (Weight-bearing Status) full weight-bearing (FWB)   Left Lower Extremity (Weight-bearing Status) full weight-bearing (FWB)   Right Lower Extremity (Weight-bearing Status) full weight-bearing (FWB)   Cognitive Status Examination   Orientation Status orientation to person, place and time   Cognitive Status Comments Pt with flat affect, following all commands   Visual Perception   Visual Impairment/Limitations WFL   Sensory   Sensory Comments  No deficits reported   Range of Motion Comprehensive   Comment, General Range of Motion BUE WFL   Strength Comprehensive (MMT)   Comment, General Manual Muscle Testing (MMT) Assessment Gross 4/5 in RUE, 5/5 in LUE   Coordination   Upper Extremity Coordination Right UE impaired   Bed Mobility   Comment (Bed Mobility) SBA supine to sit   Transfers   Transfer Comments CGA STS   Activities of Daily Living   BADL Assessment/Intervention bathing;upper body dressing;lower body dressing;grooming;toileting   Bathing Assessment/Intervention   Comment, (Bathing) Per clinical judgment, Min A   Upper Body Dressing Assessment/Training   Comment, (Upper Body Dressing) Per clinical judgment, Min A   Lower Body Dressing Assessment/Training   Comment, (Lower Body Dressing) Per clinical judgment, Min A   Grooming Assessment/Training   Comment, (Grooming) Per clinical judgment, Min A   Toileting   Comment, (Toileting) Per clinical judgement, CGA   Clinical Impression   Criteria for Skilled Therapeutic Interventions Met (OT) Yes, treatment indicated   OT Diagnosis Decline in ADL/IADL performance   Influenced by the following impairments Decreased endurance, decreased strength, impaired balance, impaired coordination   OT Problem List-Impairments impacting ADL problems related to;activity tolerance impaired;balance;coordination;mobility;strength   Assessment of Occupational Performance 3-5 Performance Deficits   Identified Performance Deficits Dressing, grooming, toileting, showering, IADLs   Planned Therapy Interventions (OT) ADL retraining;IADL retraining;balance training;fine motor coordination training;motor coordination training;neuromuscular re-education;strengthening;transfer training;home program guidelines;progressive activity/exercise;risk factor education   Clinical Decision Making Complexity (OT) problem focused assessment/low complexity   Risk & Benefits of therapy have been explained evaluation/treatment results  reviewed;care plan/treatment goals reviewed;risks/benefits reviewed;current/potential barriers reviewed;participants voiced agreement with care plan;participants included;patient   OT Total Evaluation Time   OT Eval, Low Complexity Minutes (51950) 8   OT Goals   Therapy Frequency (OT) Daily   OT Predicted Duration/Target Date for Goal Attainment 07/23/25   OT Goals Hygiene/Grooming;Upper Body Dressing;Lower Body Dressing;Upper Body Bathing;Lower Body Bathing;Toilet Transfer/Toileting;Home Management;OT Goal 1   OT: Hygiene/Grooming modified independent   OT: Upper Body Dressing Modified independent   OT: Lower Body Dressing Modified independent   OT: Upper Body Bathing Modified independent   OT: Lower Body Bathing Modified independent   OT: Toilet Transfer/Toileting Modified independent   OT: Home Management Modified independent;with light demand household tasks   OT: Goal 1 Pt will perform tub transfer with SBA and DME as needed.   Interventions   Interventions Quick Adds Therapeutic Activity   Therapeutic Activities   Therapeutic Activity Minutes (11145) 14   Symptoms noted during/after treatment fatigue   Treatment Detail/Skilled Intervention OT: Upon completion of eval, treatment indicated. Facilitated SPT to recliner with CGA and no AD. Pt declining all ADLs d/t performing with RN prior. Faciliated functional mobility in hallway to progress functional endurance for ADLs/IADL routines. Pt ambulated ~75ft with CGA and no AD. Pt needing occasional Min A d/t lean to R. Pt returned to seated in recliner at end of session with all needs within reach.   OT Discharge Planning   OT Plan ADLs standing at sink, dressing, progress functional mobility, toilet transfer   OT Discharge Recommendation (DC Rec) home with assist;home with home care occupational therapy   OT Rationale for DC Rec Pt performing below functional baseline. Anticipate pt safe to d/c home with assist as needed and HHOT to maximize IND and safety with  ADLs/IADLs. Pt reports parents can assist as needed at home.   OT Brief overview of current status CGA-Min A with no AD   OT Total Distance Amb During Session (feet) 85   Total Session Time   Timed Code Treatment Minutes 14   Total Session Time (sum of timed and untimed services) 22

## 2025-07-16 NOTE — CONSULTS
Hematology Consult Note   Date of Service: 07/16/2025    Patient: Bernard Chapman  MRN: [id number]  Admission Date: 7/15/2025  Hospital Day # Hospital Day: 2  Primary Outpatient Hematologist: Jacob Cogan, MD    Reason for Consult: concern for TTP/CAPS     Assessment & Plan:   Bernard Chapman is a 35 year old male with history of DVT/PE (6/22/25) on Xarelto, presented yesterday with new neurologic symptoms, found to have diffuse acute cerebral infarcts, one of which is a partially occlusive Left MCA thrombus, and acute severe thrombocytopenia concerning for thrombotic thrombocytopenic purpura (TTP) versus catastrophic antiphospholipid syndrome (CAPS). He is s/p PLEX x1 this morning.     Given normal [id number] level and negative schistocytes on peripheral smear, this is less likely TTP.  Still pending labs for CAPS. Haptoglobin < 10 and  suggest some degree of hemolysis. Other differential includes vasculitis and myeloproliferative neoplasm.       Recommendations:   - Next Plasma Exchange to be determine  - Check  TRINI  - Check PNH testing due to thrombosis in atypical locations  - Check JAK2 to rule oue myeloproliferative neoplasm  - Okay to transfuse platelet up to 50k and start heparin   - Repeat CBC this afternoon  - Recommend Dermatology consult for skin biopsy  - Consider Rheumatology consult for vasculitis  - Pending Lupus anticoagulant, anti-beta 2 glycoprotein, and anti-cardiolipin   - Follow CBC and LDH daily      Patient was seen and plan of care was discussed with attending physician Dr. Borjas.    We will continue to follow this patient. Please don't hesitate to contact the Fellow On-Call with questions.    Viviana Steele MD  PGY-4 Fellow  Hematology, Oncology and Transplant  [facility]        History of Present Illness:    Bernard Chapman is a 35 year old male with history of DVT/PE (6/22/25) on Xarelto, hypertension, diabetes, ELENA on CPAP, VTE's, drug rash related to apixaban  (now on Xarelto), who presented to the ED from Vascular clinic after being noticed to be lethargic with vision changes and feeling shaky. In the ED, he had a platelet count of 28k. Reported headache, and nausea (he had vomited at home). Denies numbness or weakness. Head CT did not show any evidence of intracranial bleeding. Brain MRI showed scattered infarcts in the left MCA territory and some in the right cerebral hemisphere. MRI of the brain also mentions concern for CVST versus a trace subarachnoid hemorrhage. CTA showed near occlusion of the left MCA-MA segment. Due to finding on his images and severe thrombocytopenia, anticoagulation is being held. Hematology was consulted for workup of concern for TTP/CAPS.     Of note, he was admitted on 6/22/25 for acute bilateral pulmonary emboli with right heart strain. Also found to have a DVT in the right popliteal vein. Started on apixaban. Then went to the ER on July 3 with a rash which was attributed to apixaban, and he was transitioned to rivaroxaban. He was referred to Hematology for further workup on DVT/PE and rash with apixaban. At that visit, patient reported symptoms for which he was admitted for yesterday.     Upon my evaluation today, he has no history of blood transfusion, no personal or family history of thrombosis and no long trips or recent surgery. He denies diarrhea and smoking.       Hematologic History:  NA    ?Clot hx- DVT/PE (June 22)      Review of Systems: Pertinent positive and negative systems described in HPI; the remainder of the 14 systems are negative    Past Medical History:  No past medical history on file.    Past Surgical History:  No past surgical history on file.    Social History:  Social History     Socioeconomic History    Marital status: Single   Tobacco Use    Smoking status: Never    Smokeless tobacco: Never   Substance and Sexual Activity    Alcohol use: No    Drug use: No    Sexual activity: Yes     Partners: Female   Other  Topics Concern    Parent/sibling w/ CABG, MI or angioplasty before 65F 55M? No     Social Drivers of Health     Financial Resource Strain: Low Risk  (2/25/2025)    Received from [facility] [employer] [facility]    Financial Resource Strain     Difficulty of Paying Living Expenses: 3   Food Insecurity: No Food Insecurity (6/23/2025)    Received from [facility] [employer] [facility]    Food Insecurity     Do you worry your food will run out before you are able to buy more?: 1   Transportation Needs: No Transportation Needs (6/23/2025)    Received from [facility] [employer] [facility]    Transportation Needs     Does lack of transportation keep you from medical appointments?: 1     Does lack of transportation keep you from work, meetings or getting things that you need?: 1   Social Connections: Socially Integrated (6/23/2025)    Received from [facility] [employer] [facility]    Social Connections     Do you often feel lonely or isolated from those around you?: 0   Housing Stability: Low Risk  (6/23/2025)    Received from [facility] [employer] [facility]    Housing Stability     What is your housing situation today?: 1        Family History  Family History   Problem Relation Age of Onset    Respiratory Mother         asthma    Hypertension Maternal Grandmother     Arthritis Maternal Grandmother     Diabetes Paternal Grandmother     Heart Disease Paternal Grandfather         pacemaker    Colon Cancer No family hx of     Prostate Cancer No family hx of     Coronary Artery Disease No family hx of        Outpatient Medications:  Current Facility-Administered Medications   Medication Dose Route Frequency Provider Last Rate Last Admin    acetaminophen (TYLENOL) tablet 1,000 mg  1,000 mg Oral or Feeding Tube Q8H PRN Nishi Rodas MD   1,000 mg at 07/16/25 1233    hydrALAZINE (APRESOLINE) injection 10 mg  10 mg Intravenous Q30 Min PRN Nishi Rodas MD      "   labetalol (NORMODYNE/TRANDATE) injection 10 mg  10 mg Intravenous Q10 Min PRN Nishi Rodas MD        lidocaine (LMX4) cream   Topical Q1H PRN Nishi Rodas MD        lidocaine 1 % 0.1-1 mL  0.1-1 mL Other Q1H PRN Nishi Rodas MD        Medication Instruction - Avoid dextrose in IV solutions   Intravenous Continuous PRN Nishi Rodas MD        ondansetron (ZOFRAN ODT) ODT tab 4 mg  4 mg Oral Q6H PRN Nishi Rodas MD        Or    ondansetron (ZOFRAN) injection 4 mg  4 mg Intravenous Q6H PRN Nishi Rodas MD        Plasma-Lyte A (electrolyte A) infusion   Intravenous Continuous Nishi Rodas  mL/hr at 07/16/25 1201 New Bag at 07/16/25 1201    polyethylene glycol (MIRALAX) Packet 17 g  17 g Oral Daily PRN Griselda Ha CNP        prochlorperazine (COMPAZINE) injection 10 mg  10 mg Intravenous Q6H PRN Franklin Aguilera MD        Or    prochlorperazine (COMPAZINE) tablet 10 mg  10 mg Oral or Feeding Tube Q6H PRN Franklin Aguilera MD        rosuvastatin (CRESTOR) tablet 10 mg  10 mg Oral Daily Griselda Ha CNP   10 mg at 07/16/25 1144    senna-docusate (SENOKOT-S/PERICOLACE) 8.6-50 MG per tablet 1-2 tablet  1-2 tablet Oral or Feeding Tube BID Griselda Ha CNP   1 tablet at 07/16/25 1144    sodium chloride (PF) 0.9% PF flush 3 mL  3 mL Intracatheter Q8H MOHINDER Nishi Rodas MD        sodium chloride (PF) 0.9% PF flush 3 mL  3 mL Intracatheter q1 min prn Nishi Rodas MD        sodium chloride 0.9 % bag for CT scan flush  100 mL Intravenous Once Segundo Pond MD        sodium chloride bacteriostatic 0.9 % flush 5 mL  5 mL Intravenous Once PIPPA Freeman MD            Physical Exam:    BP (!) 151/84   Pulse 98   Temp 97.8  F (36.6  C) (Axillary)   Resp 23   Ht 1.88 m (6' 2\")   Wt 113.9 kg (251 lb 1.7 oz)   SpO2 94%   BMI 32.24 kg/m    Gen: Well appearing, in NAD  HEENT: EOMI, PERRL, mmm, oropharynx clear  CV: " Normal rate, regular rhythm. No m/r/g  Pulm: CTAB, no wheezing, normal work of breathing  Abd: Soft, nt/nd, no rebound/guarding  Ext: Warm and well perfused. No lower extremity edema  Skin: No rash, cyanosis or petechial lesion  Neuro: Alert and answering questions appropriately. CNII-XII grossly intact. Moving all extremities without issue or focal neurologic deficits. Biceps/tricpes/deltoid strength 3/5 in RUE and 5/5 in LUE. Strength 5/5 bilaterally with hip flexion, knee flexion/extension and dorsi/plantar flexion.    Labs & Studies: I personally reviewed the following studies:  ROUTINE LABS (Last four results):  CMP  Recent Labs   Lab 07/16/25  0414 07/16/25  0259 07/15/25  2349 07/15/25  1202 07/15/25  1158   NA  --   --   --   --  139   POTASSIUM  --   --   --   --  4.2   CHLORIDE  --   --   --   --  101   CO2  --   --   --   --  25   ANIONGAP  --   --   --   --  13   GLC  --  122*  --  130* 123*   BUN  --   --   --   --  18.5   CR  --   --   --   --  1.06   GFRESTIMATED  --   --   --   --  >90   SHARAN  --   --   --   --  9.5   MAG  --   --  2.0  --  2.0   PHOS 2.3*  --   --   --   --    PROTTOTAL  --   --  6.5  --  8.1   ALBUMIN  --   --  3.9  --  4.8   BILITOTAL  --   --  0.9  --  1.0   ALKPHOS  --   --  58  --  73   AST  --   --  24  --  27   ALT  --   --  25  --  37     CBC  Recent Labs   Lab 07/15/25  2349 07/15/25  1807 07/15/25  1158   WBC 12.8* 11.5* 13.3*   RBC 4.54 4.61 5.25   HGB 12.3* 12.5* 14.1   HCT 37.1* 37.0* 42.3   MCV 82 80 81   MCH 27.1 27.1 26.9   MCHC 33.2 33.8 33.3   RDW 13.1 13.1 13.0   PLT 25* 26* 28*     INR  Recent Labs   Lab 07/15/25  2349 07/15/25  1158   INR 2.05* 2.12*     Images:    MRI brain:  IMPRESSION:  1.  Numerous multifocal acute cortical and white matter left cerebral hemisphere infarcts predominantly involving the left MCA territory with a few acute infarcts in the right cerebral hemisphere, as described. No significant mass effect.  2.  Trace acute subarachnoid  hemorrhage versus slow flow or thrombosis of a cortical vessel along the parasagittal left frontal lobe, the latter favored. No confluent intraparenchymal hematoma      IMPRESSION:   NECK CTA:  1.  Patent neck vasculature.     HEAD CTA:   1.  Near occlusion of the left MCA-M1 segment (8 mm length) with distal opacification throughout the M2 segments.

## 2025-07-16 NOTE — ED NOTES
"Transferred from [facility] side for ischemic stroke and neuro eval. Denied chest pain or SOB.   BP (!) 144/79   Pulse 90   Temp 98.9  F (37.2  C) (Oral)   Resp 12   Ht 1.88 m (6' 2\")   Wt 112.9 kg (249 lb)   SpO2 98%   BMI 31.97 kg/m     "

## 2025-07-16 NOTE — CONSULTS
Rheumatology Consult Note     Bernard Chapman MRN# [id number]   Age: 35 year old YOB: 1990     Date of Admission: 7/15/2025    Reason for consult: Concern for vasculitis or (CAPS) catastrophic antiphospholipid syndrome     Requesting Physician: Noel Bains MBBS     Attestation:     Staff addendum    I confirmed key aspects of history and physical examination  and discussed the management with the fellow/resident. I reviewed the available lab and imaging studies. I was directly involved in medical decision making and management of this patient, and I agree with the documentation, findings, and plan and outlined below.     In brief, pt with recent DVT/PE now admitted with new CVA, likely splenic and renal infarcts, and filling defect in the left external iliac vein. CT C/A also showing spiculated-appearing nodule within the right lung apex (pulmonary infarct?), multiple enlarged mediastinal and hilar lymph nodes with some areas of necrotic changes, and mild splenomegaly most concerning for malignancy vs infection. Of note, pt does endorse night sweats and weight loss. Agree with empiric PLEX while awaiting remainder of APS/CAPS labs. I am less concerned for inflammatory etiologies such as sarcoid or vasculitis given the necrotic lymph nodes, but work-up for these entities is pending. No previous history of autoimmune disease. Recommend further work-up with lymph node biopsy.       60 MINUTES SPENT BY ME on the date of service doing chart review, history, exam, documentation & further activities per the note.     Elaine Romero MD  Rheumatology         ASSESSMENT & PLAN      ASSESSMENT:  Bernard Cahpman is a 35 year old male with PMHx: DVT & PE on 6/22/25 on Xarelto, diabetes on Trulicity with 75 lbs weight loss, and HTN, that presented with dizziness, abnormal vision, confusion, headache, and vomiting since the evening of 7/13 and was found to have partially occlusive left MCA  thrombus.    DDx includes TTP, APS, vasculitis, malignancy or infection related hypercoagulable state.     Pt with +LAC but on anticoagulation making interpretation difficult. Cardiolipin and beta -2 glycoprotein antibodies still pending.  Factors reassuring against CAPS include no known history of autoimmune disease or previous APS diagnosis and no current multiorgan involvement or schistocytes on peripheral smear.  ROS is reassuring against vasculitis, but vasculitis labs currently pending.   Given normal [id number] level and negative schistocytes on peripheral smear, this is less likely TTP. Haptoglobin < 10 and  suggest some degree of hemolysis, but Mark negative. Other differential includes vasculitis and myeloproliferative neoplasm. Patient endorses recent significant weight loss and night sweats (details in HPI).   Patient has not had overseas travel, does not smoke, and has no reason for a hypercoagulation state.  CT abdomen pelvis pending.       Problem List:  -- Elevated CRP  -- Hypercoagulable state   -- History of DVT & PE, and now MCA stroke  -- Rule out malignancy: recent significant weight loss, night sweats    Plan:  -- Labs pending: ESR, cryoglobulins, and AMY  Vasculitis: AL-3, MPO, ANCA  Catastrophic antiphospholipid syndrome (CAPS):  beta2 glycoprotein, anticardiolipin   Histo/blasto/quant gold pending    I discussed the findings and recommendations with the patient.  I communicated the assessment and plan to the consulting team.    Case seen and discussed with ROBERTO Prakash.     Thank you Franklin Aguilera,* for involving Rheumatology in the care of this patient. Please contact us if there are any questions.     Yamile Burnett MD   PGY1  Internal Medicine Resident    HISTORY OF PRESENT ILLNESS   Bernard Chapman is a 35 year old male with PMHx DVT/PE on 6/22/25 on Xarelto, diabetes on Trulicity with 75 lbs weight loss, and HTN, that presented with dizziness,  abnormal vision, confusion, headache, and vomiting since the evening of 7/13 and was found to have partially occlusive left MCA thrombus.    Patient's parents stated that they brought the patient to the ED because Bernard was confused as to how he got to their house and disoriented. They went to their hematologist appointment and the hematologist told them to come to the ER. Patient sees hematology due to a PE and DVT on 6/22/25 and was put on Elequis. He had an allergic reaction to this medication and was switched to Xarelto, which he has been on for the last 10 days (7/6). Patient has not had overseas travel, does not smoke, and has no reason for a hypercoagulation state. He has no history of prior thrombotic event.    Patient states that he had night sweats 2 months ago, and attributes this to the muscle relaxer he was talking for his back pain. He stated that his back pain was in the upper right side of his back. He states that his night sweats resolved after stopping the medication. He has been on Trulicity for diabetes for approximately the last 3 years and has lost 75 lbs in the last 2 years. He has noticed increasing weight loss with the increasing dose of the Trulicity.     Patient denies any current SOB or other symptoms.     Patient has no pertinent family history other than a grandmother with diabetes.     ROS: Patient denies any current fevers, chills, vision changes, headaches, focal weakness or numbness.  Denies any arthralgias, morning stiffness, Raynaud's, myalgias,  Alopecia, rash, vitiligo, photosensitivity, nasal or oral ulcers, ear fullness or drainage.   No cough or dyspnea, no hematuria.    Serology  Positive: CRP elevated at 17.5, platelets at 24  Negative/Normal: RF  Pending: ESR, cryoglobulins, and AMY  -Vasculitis: MS-3, MPO, ANCA  -Catastrophic antiphospholipid syndrome (CAPS): Lupus anticoagulant, beta2 glycoprotein, anticardiolipin    Other labs  Histo/Blasto: pending  HIV: Pending  Tb:  Quant gold pending    HISTORY REVIEW:  No past medical history on file.  No past surgical history on file.  Family History   Problem Relation Age of Onset    Respiratory Mother         asthma    Hypertension Maternal Grandmother     Arthritis Maternal Grandmother     Diabetes Paternal Grandmother     Heart Disease Paternal Grandfather         pacemaker    Colon Cancer No family hx of     Prostate Cancer No family hx of     Coronary Artery Disease No family hx of      Social History     Socioeconomic History    Marital status: Single     Spouse name: Not on file    Number of children: Not on file    Years of education: Not on file    Highest education level: Not on file   Occupational History    Not on file   Tobacco Use    Smoking status: Never    Smokeless tobacco: Never   Substance and Sexual Activity    Alcohol use: No    Drug use: No    Sexual activity: Yes     Partners: Female   Other Topics Concern    Parent/sibling w/ CABG, MI or angioplasty before 65F 55M? No   Social History Narrative    Not on file     Social Drivers of Health     Financial Resource Strain: Low Risk  (2/25/2025)    Received from [employer]    Financial Resource Strain     Difficulty of Paying Living Expenses: 3     Difficulty of Paying Living Expenses: Not on file   Food Insecurity: No Food Insecurity (6/23/2025)    [facility]    Food Insecurity     Do you worry your food will run out before you are able to buy more?: 1   Transportation Needs: No Transportation Needs (6/23/2025)    Received from [facility]    Transportation Needs     Does lack of transportation keep you from medical appointments?: 1     Does lack of transportation keep you from work, meetings or getting things that you need?: 1   Physical Activity: Not on file   Stress: Not on file (11/10/2024)   Social Connections: Socially Integrated (6/23/2025)    Received  "from [facility]    Social Connections     Do you often feel lonely or isolated from those around you?: 0   Interpersonal Safety: Not on file   Housing Stability: Low Risk  (6/23/2025)    Received from [facility]    Housing Stability     What is your housing situation today?: 1     Patient Active Problem List   Diagnosis    CARDIOVASCULAR SCREENING; LDL GOAL LESS THAN 160    Elevated blood pressure reading without diagnosis of hypertension    Obesity    T.T.P. syndrome (H)    Ischemic stroke (H)     Allergies   Allergen Reactions    Apixaban Rash    Amlodipine Other (See Comments) and Rash         ROS     A 10 point ROS was performed with pertinent findings listed above.      OBJECTIVE     PHYSICAL EXAM  BP (!) 140/76   Pulse 95   Temp 98.8  F (37.1  C) (Oral)   Resp 16   Ht 1.88 m (6' 2\")   Wt 113.9 kg (251 lb 1.7 oz)   SpO2 96%   BMI 32.24 kg/m    Wt Readings from Last 4 Encounters:   07/16/25 113.9 kg (251 lb 1.7 oz)   07/15/25 113.3 kg (249 lb 12.8 oz)   02/22/17 120.2 kg (265 lb)   01/06/17 122.9 kg (271 lb)     Constitutional: WD-WN-WG cooperative  Eyes: nl EOM, PERRLA, vision, conjunctiva, sclera  ENT: nl external ears, nose, hearing, lips, teeth, gums, throat  No mucous membrane lesions, normal saliva pool  Neck: no mass or thyroid enlargement  Resp: lungs clear to auscultation, nl to palpation  CV: No murmurs, rubs or gallops, no edema. Tachycardia present, regular rhythm.   GI: no ABD mass or tenderness, no HSM  : not tested  Lymph: no cervical, supraclavicular, inguinal or epitrochlear nodes  MS: All TMJ, neck, shoulder, elbow, wrist, MCP/PIP/DIP, spine, hip, knee, ankle, and foot MTP/IP joints were examined and  found normal. No active synovitis or deformity. Full ROM.  Normal  strength. Left calf greater in size than right calf. No pain with extension or flexion of foot.    Skin: no nail pitting, alopecia, rash, nodules or " "lesions  Neuro: nl cranial nerves, strength, sensation, DTRs.   Psych: nl judgement, orientation, memory, affect.    CBC:  Recent Labs   Lab Test 07/16/25  1227 07/15/25  2349 07/15/25  1807   WBC 13.8* 12.8* 11.5*   RBC 4.84 4.54 4.61   HGB 13.0* 12.3* 12.5*   HCT 38.5* 37.1* 37.0*   MCV 80 82 80   MCH 26.9 27.1 27.1   MCHC 33.8 33.2 33.8   RDW 12.9 13.1 13.1   PLT 24* 25* 26*       BMP:  Recent Labs   Lab Test 07/16/25  0414 07/16/25  0259 07/15/25  1202 07/15/25  1158     --   --  139   POTASSIUM 4.7  --   --  4.2   CHLORIDE 105  --   --  101   CO2 17*  --   --  25   ANIONGAP 19*  --   --  13   * 122* 130* 123*   BUN 18.1  --   --  18.5   CR 0.98  --   --  1.06   GFRESTIMATED >90  --   --  >90   SHARAN 9.5  --   --  9.5       LFT:  Recent Labs   Lab Test 07/15/25  2349 07/15/25  1158   PROTTOTAL 6.5 8.1   ALBUMIN 3.9 4.8   BILITOTAL 0.9 1.0   ALKPHOS 58 73   AST 24 27   ALT 25 37       No results found for: \"CKTOTAL\"  TSH   Date Value Ref Range Status   07/15/2025 2.84 0.30 - 4.20 uIU/mL Final     No results found for: \"URIC\"    Inflammatory markers  No results found for: \"CRP\"  No results found for: \"SED\"  Ferritin   Date Value Ref Range Status   07/15/2025 535 (H) 31 - 409 ng/mL Final       [facility] LABS     Lab Results   Component Value Date    RHF <10 07/16/2025     No results found for: \"CCPIGG\"  No results found for: \"ANCA\"  No results found for: \"R9DAJIO\"  No results found for: \"H8QAUVZ\"  No results found for: \"STAN\"  No results found for: \"DNA\"  No results found for: \"RNPIGG\", \"SMIGG\", \"SSAIGG\", \"SSBIGG\", \"SCLIGG\"    "

## 2025-07-16 NOTE — PLAN OF CARE
Admitted/transferred from: Emergency Dept.  Reason for admission/transfer: Altered mental status  2 RN skin assessment: completed by  and Linda TUCKER RN  Result of skin assessment and interventions/actions: Isolibrium bed.   Height, weight, drug calc weight: Done  Patient belongings (see Flowsheet)Ginger phone at bedside.  MDRO education added to care planN/A  ?

## 2025-07-16 NOTE — PROGRESS NOTES
Neurocritical Care Progress Note    Reason for critical care admission: left M1 subtotal occlusion, urgent PLEX   Admitting Team: Neurocritical Care   Date of Service:  07/16/2025  Date of Admission:  7/15/2025  Hospital Day: 2    Assessment/Plan  Bernard Chapman is a 35 year old male with a past medical history of hypertension, diabetes, possible ITP, sleep apnea, PE and DVT on rivaroxaban admitted on 7/15/2025 for acute ischemic stroke in left MCA territory, near occlusion of left M1, and concern for thrombotic emergency such as TTP or catastrophic antiphospholipid syndrome (in setting of severe thrombocytopenia and acute cerebral infarcts). Admission to neuroICU for close neurologic monitoring and plan for urgent PLEX.     24 hour events:    Neuro  #Multifocal Acute ischemic strokes, in L MCA territory, Right parietal lobe, right frontal centrum semiovale  #Sensory and visual extinction on right  -Neurochecks every 1 hrs  -neuroIR aware  -SBP goal < 220 mmHg  - HOB > 30   - PT/OT/SLP  -   - Triglycerides - 202  - A1c 5.3%  - TTE   - CT CAP r/o malignancy with 3T sign on MRI brain   - Cardiac CTA  - will repeat CTH once platelets > 75K    #Analgesics & sedation  RASS goal: 0  - PRN acetaminophen  - PRN oxycodone 4 mg Q6H      CV  #Hypertension  -Cardiac monitoring  -SBP goal < 220 mmHg  -PRN Labetalol and Hydralazine  - hold PTA metoprolol, hydrochlorothiazide, losartan  - 7/16: TTE with mobile echodensities on tricuspid valve and aortic valve  - TIMO evaluate for valvular vegetation, concern for endocarditis   - blood cultures x2 pending   - Crestor 10 mg daily        Resp  #recent PE  #ELENA on CPAP  Oxygen/vent: room air  -Continuous pulse ox  -Maintain O2 saturations greater than 92%  -home CPAP    GI  # SABRINA  Diet: NPO for now  Last BM: PTA  GI prophylaxis: not indicated  -Bowel regimen: PRN senna-docusate and Miralax    Renal/  #SABRINA  #urinary retention  -Daily BMP  -IV fluids: 100ml/hr  "NS  -Electrolyte replacement protocol  -bladder scan, consider reyes if retaining   -I's and O's    Endo  # Diabetes  -Hgb A1c 5.3%  -Monitor glucose levels    Heme  #C/f thrombotic emergency such as TTP vs CAPS  #Possible ITP  #recent DVT/PE  - s/p urgent PLEX 7/16  - hold rivaroxaban  - s/p dexamethasone 40mg in ED  - INR/PT 2.05/22.8 , PTT 30 (wnl)  - B2 glycoprotein and anticardiolipin pending  - Daily CBC  - Hgb goal >7, plt goal >100k  - Transfuse to meet Hgb and plt goals  - hematology consulted. Appreciate recommendations  - Ok to transfuse platelets to 50K then start heparin   - Recommend Dermatology consult for skin biopsy   - Rheumatology consult for vasculitis   - labs below   - transfused 2u platelets 7/16       Pending labs:   - 7/15: fibrinogen 151   - 7/15: Ferritin 535  - 7/15: haptoglobin < 10   - 7/15: retic 2.3   - 7/15: lupus anticoagulant pending   - 7/15: MKGHTR32 BERKLEY negative  - 7/16: beta2 glycoprotein pending   - 7/16: cardiolipin panel pending   - 7/16:    - 7/16: blood cultures x2 pending   - 7/16: CRP pending  - 7/16: ESR pending   - 7/16: proteinase 3 Ab IgG pending   - 7/16: ANCA IgG by IFA pending  - 7/16: myeloperoxidase pending   - 7/16: Jak2 antibody pending   - 7/16: TRINI Anti-IgG-C3d negative   - 7/16: PND panel pending   - 7/16: peripheral smear pending     ID  #SABRINA   -daily CBC  -follow temp curve     ICU Checklist  Lines/tubes/drains: PIV, central line   FEN: Regular diet, NPO midnight for possible TIMO 7/17  PPX: DVT - hold d/t thrombocytopenia ; GI - senna BID, PRN miralax.  Code: Full code, discussed with patient   Dispo: ICU - NCC         # Obesity: Estimated body mass index is 32.24 kg/m  as calculated from the following:    Height as of this encounter: 1.88 m (6' 2\").    Weight as of this encounter: 113.9 kg (251 lb 1.7 oz).               TIME SPENT ON THIS ENCOUNTER  I spent 60 minutes of critical care time on the unit/floor managing the care of Bernard Chapman " excluding time performing procedures. Upon evaluation, this patient had a high probability of imminent or life-threatening deterioration due to significant stroke burden and severe thrombocytopenia, which required my direct attention, intervention, and personal management. Greater than 50% of my time was spent at the bedside counseling the patient and/or coordinating care including chart review, history, exam, documentation, and further activities per this note. I have personally reviewed the following data/imaging over the past 24 hours.     The patient was seen and discussed with the NCC attending, Dr. Aguilera.    Nilsa Daly DO on 2025 at 3:16 PM     24 Hour Vital Signs Summary:  Temp: 96.8  F (36  C) Temp  Min: 96.8  F (36  C)  Max: 99.1  F (37.3  C)  Resp: 18 Resp  Min: 12  Max: 23  SpO2: 98 % SpO2  Min: 92 %  Max: 99 %  Pulse: 72 Pulse  Min: 64  Max: 105    No data recorded  BP: 131/77 Systolic (24hrs), Av , Min:116 , Max:168   Diastolic (24hrs), Av, Min:58, Max:102      Respiratory monitoring:   Resp: 18    I/O last 3 completed shifts:  In: 336.67 [I.V.:336.67]  Out: -     Current Medications:  Current Facility-Administered Medications   Medication Dose Route Frequency Provider Last Rate Last Admin    iopamidol (ISOVUE-370) solution 100 mL  100 mL Intravenous Once Segundo Pond MD        Potassium Phosphate 15 mmol in NS intermittent infusion 15 mmol  15 mmol Intravenous Once Nishi Rodas MD        sodium chloride (PF) 0.9% PF flush 3 mL  3 mL Intracatheter Q8H [facility] Nishi Rodas MD        sodium chloride 0.9 % bag for CT scan flush  100 mL Intravenous Once Segundo Pond MD           PRN Medications:  Current Facility-Administered Medications   Medication Dose Route Frequency Provider Last Rate Last Admin    acetaminophen (TYLENOL) tablet 1,000 mg  1,000 mg Oral or Feeding Tube Q8H PRN Nishi Rodas MD        hydrALAZINE (APRESOLINE) injection 10 mg  " 10 mg Intravenous Q30 Min PRN Nishi Rodas MD        labetalol (NORMODYNE/TRANDATE) injection 10 mg  10 mg Intravenous Q10 Min PRN Nishi Rodas MD        lidocaine (LMX4) cream   Topical Q1H PRN Nishi Rodas MD        lidocaine 1 % 0.1-1 mL  0.1-1 mL Other Q1H PRN Nishi Rodas MD        Medication Instruction - Avoid dextrose in IV solutions   Intravenous Continuous PRN Nishi Rodas MD        ondansetron (ZOFRAN ODT) ODT tab 4 mg  4 mg Oral Q6H PRN Nishi Rodas MD        Or    ondansetron (ZOFRAN) injection 4 mg  4 mg Intravenous Q6H PRN Nishi Rodas MD        polyethylene glycol (MIRALAX) Packet 17 g  17 g Oral or Feeding Tube BID PRN Franklin Aguilera MD        prochlorperazine (COMPAZINE) injection 10 mg  10 mg Intravenous Q6H PRN Franklin Aguilera MD        Or    prochlorperazine (COMPAZINE) tablet 10 mg  10 mg Oral or Feeding Tube Q6H PRN Franklin Aguilera MD        senna-docusate (SENOKOT-S/PERICOLACE) 8.6-50 MG per tablet 1 tablet  1 tablet Oral or Feeding Tube BID PRN Franklin Aguilera MD        sodium chloride (PF) 0.9% PF flush 3 mL  3 mL Intracatheter q1 min prn Nishi Rodas MD           Infusions:  Current Facility-Administered Medications   Medication Dose Route Frequency Provider Last Rate Last Admin    Medication Instruction - Avoid dextrose in IV solutions   Intravenous Continuous PRN Nishi Rodas MD        Plasma-Lyte A (electrolyte A) infusion   Intravenous Continuous Nishi Rodas  mL/hr at 07/16/25 0634 Rate Change at 07/16/25 0634       Allergies   Allergen Reactions    Apixaban Rash    Amlodipine Other (See Comments) and Rash       Physical Examination:  Vitals: /77   Pulse 72   Temp 96.8  F (36  C) (Oral)   Resp 18   Ht 1.88 m (6' 2\")   Wt 113.9 kg (251 lb 1.7 oz)   SpO2 98%   BMI 32.24 kg/m    General: Adult male patient, lying in bed, in no apparent distress  HEENT: " Normocephalic, atraumatic, no icterus, oral cavity/oropharynx pink and moist, wearing NC  Cardiac: RRR, s1/s2 auscultated without murmur  Pulm: CTAB, unlabored, expansion symmetric, no retractions or use of accessory muscles  Abdomen: Soft, non-distended, bowel sounds present  Extremities: Warm, no edema, distal pulses +2, well perfused  Skin: purpuric rash on BLE        Left leg          Right leg         Psych: Calm and cooperative, affect congruent with mood   Neuro:  Mental status: Awake, alert, attentive, oriented to self, time, place, and circumstance. Language is fluent and coherent with intact comprehension of complex commands, naming and repetition.  Cranial nerves: right hemianopia, PERRL, conjugate gaze, EOMI, facial sensation intact, mild right facial droop, shoulder shrug strong, tongue midline, no uvular deviation, mild-moderate dysarthria.   Motor: Normal bulk and tone. No abnormal movements. RUE drift but not to bed, BLE without drift, 5/5 hip flexion   Sensory: Intact to light touch x 4 extremities, extinction to simultaneous touch on right  Coordination: FNF and HS without ataxia or dysmetria  Gait: MILAN, deferred.  Reflexes: 2+ and symmetric, toes down going         NIHSS  1a. Level of Consciousness 0-->Alert, keenly responsive   1b. LOC Questions 0-->Answers both questions correctly   1c. LOC Commands 0-->Performs both tasks correctly   2.   Best Gaze 0-->Normal   3.   Visual 2 -> complete hemianopia    4.   Facial Palsy 1-->Minor paralysis (flattened nasolabial fold, asymmetry on smiling)   5a. Motor Arm, Left 0-->No drift, limb holds 90 (or 45) degrees for full 10 secs   5b. Motor Arm, Right 1-->Drift, limb holds 90 (or 45) degrees, but drifts down before full 10 secs, does not hit bed or other support   6a. Motor Leg, Left 0-->No drift, leg holds 30 degree position for full 5 secs   6b. Motor Leg, right 0-->No drift, leg holds 30 degree position for full 5 secs   7.   Limb Ataxia 0-->Absent    8.   Sensory 0-->Normal, no sensory loss   9.   Best Language 0-->No aphasia, normal   10. Dysarthria 0-->Normal   11. Extinction and Inattention  2-->Profound melvina-inattention/extinction more than 1 modality   Total 6 (07/16/25 1000)       Labs and Imaging:    All relevant imaging and laboratory values personally reviewed.     Cardiac CTA 7/16    IMPRESSION:  1.  Non-diagnostic evaluation for left atrial appendage thrombus.   2.  Please review the separate Radiology report for incidental  noncardiac findings.     FINDINGS:      1.  Non-diagnostic evaluation for left atrial appendage thrombus.   2.  The left atrial appendage has a  chicken wing morphology.  3.   Coronary images are non-diagnostic for stenosis/atherosclerosis  due to cardiac motion artifact. No coronary artery calcifications  seen.   4.   The visualized aortic root, ascending aorta, and descending  thoracic aorta are normal in caliber.     Radiologist Cardiology consult 7/16    INDICATION: Stroke     COMPARISON: None      unremarkable.  Detailed included portion of the lungs post punctate calcified  granuloma right lung there are dependent atelectasis bilaterally.  Calcified granuloma left lower lobe. No suspicious dominant nodule.  However, there is right hilar fullness consistent with enlarged hilar  lymph node measuring approximately 25 x 24 mm. Bulky subcarinal node  measures approximately 26 x 29 mm. The right hilar soheila mass appears  to abut the posterior surface of the right upper lobar pulmonary  artery. There are incidental hypodense filling defects in the  right-sided pulmonary arterial system consistent with pulmonary emboli  which appear nonobstructive. There is also left lower lobe hypodense  filling defect again nonobstructive consistent with pulmonary embolus.  No left atrial or obvious atrial appendage thrombus. No effusions. No  nodules.                                                                      IMPRESSION:  1.  Incidental pulmonary emboli bilaterally without obvious right heart  strain.  2. Bulky right hilar and subcarinal lymphadenopathy.    TTE 7/16    Interpretation Summary  Mobile echodensities noted on tricuspid valve and aortic valve. 2 masses on TV  measuring 1.5x1cm and 0.6x0.6cms. Aortic mass measures 0.8x0.8cm. Mi;ld TR.  Mild to moderate AI.cannot exclude vegetation.  The atrial septum is intact as assessed by agitated saline bubble study .

## 2025-07-16 NOTE — PLAN OF CARE
Major Shift Events:  Patient arrived near 0200 from ED for altered mental status.  Tera is alert, oriented x 4 with flat affect.  He displayed some gait imbalance when standing from   W/C to bed.  VSS.  Dr. Aguilera placed a dialysis catheter in right neck, then patient received  Plasmapheresis for 2.5 hours.  Labs drawn. Patient passed dysphagia screen.  Remains NPO except sips and chips per Order at this time.    Tera continues to display right arm pronator drift, and right sided sensory deficits.  He exhibits right hand weakness, intermittent word finding difficulty and some subtle  Slurred speech from right sided facial droop.    NIHSS scale done q hour.  Resident at bedside for checks often.  Plasmalyte IV increased to  150 ml/hr, and 1 liter bolus to increase blood pressure and cranial perfusion.  MAPs have been 80-90 so far.  Platelets 25.      Plan: Continue neuro exams q hour.  Repeat labs per order following PLEX.  For vital signs and complete assessments, please see documentation flowsheets.                                Take zyrtec - cetirizine start with one a day for 3 days if no improvement then can take twice a day until you see me. Use unscented soap such as Aveeno for eczema  Tide free detergent   no perfumes     Hives: Care Instructions  Your Care Instructions  Hives are raised, red, itchy patches of skin. They are also called wheals or welts. They usually have red borders and pale centers. Hives range in size from ¼ inch to 3 inches or more across. They may seem to move from place to place on the skin. Several hives may form a large area of raised, red skin. You can get hives after an insect sting, after taking medicine or eating certain foods, or because of infection or stress. Other causes include plants, things you breathe in, makeup, heat, cold, sunlight, and latex. You cannot spread hives to other people. Hives may last a few minutes or a few days, but a single spot may last less than 36 hours. Follow-up care is a key part of your treatment and safety. Be sure to make and go to all appointments, and call your doctor if you are having problems. It's also a good idea to know your test results and keep a list of the medicines you take. How can you care for yourself at home? · Avoid whatever you think may have caused your hives, such as a certain food or medicine. However, you may not know the cause. · Put a cool, wet towel on the area to relieve itching. · Take an over-the-counter antihistamine, such as diphenhydramine (Benadryl), cetirizine (Zyrtec), or loratadine (Claritin), to help stop the hives and calm the itching. Read and follow directions on the label. These medicines can make you feel sleepy. Do not drive while using them. · Stay away from strong soaps, detergents, and chemicals. These can make itching worse. When should you call for help? Call 911 anytime you think you may need emergency care. For example, call if:  ? · You have symptoms of a severe allergic reaction.  These may include:  ¨ Sudden raised, red areas (hives) all over your body. ¨ Swelling of the throat, mouth, lips, or tongue. ¨ Trouble breathing. ¨ Passing out (losing consciousness). Or you may feel very lightheaded or suddenly feel weak, confused, or restless. ?Call your doctor now or seek immediate medical care if:  ? · You have symptoms of an allergic reaction, such as:  ¨ A rash or hives (raised, red areas on the skin). ¨ Itching. ¨ Swelling. ¨ Belly pain, nausea, or vomiting. ? · You get hives after you start a new medicine. ? · Hives have not gone away after 24 hours. ? Watch closely for changes in your health, and be sure to contact your doctor if:  ? · You do not get better as expected. Where can you learn more? Go to http://eusebia-chuck.info/. Enter D566 in the search box to learn more about \"Hives: Care Instructions. \"  Current as of: March 20, 2017  Content Version: 11.4  © 7216-0741 [employer]. Care instructions adapted under license by [employer] (which disclaims liability or warranty for this information). If you have questions about a medical condition or this instruction, always ask your healthcare professional. [address] any warranty or liability for your use of this information.

## 2025-07-16 NOTE — ED PROVIDER NOTES
Patient initially seen and evaluated by Dr. 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  I have discussed the case with stroke neurology as well as extensive discussion with hematology.  It was noted that the patient has platelet count of 28,000 and I noted petechia on his lower extremities I discussed his presentation with his hematologist that sent him to the emergency room and at this time we agreed to start 40 mg of Decadron.  There is some concern that all of this may represent TTP and he may require plasmapheresis.  I contacted the on-call transfusion specialist to discuss the possibility of plasmapheresis.  Patient was then discussed with the [facility] emergency room and at this time was transported to the [facility] to be seen by neurology hematology and transfusion medicine.     Segundo Pond MD  07/16/25 0159

## 2025-07-16 NOTE — H&P
Neurocritical Care History & Physical    Reason for critical care admission: L M1 near occlusion  Admitting Team: SHEY  Date of Service:  7/15/25  Date of Admission:  7/15/2025    Assessment/Plan  Bernard Chapman is a 35 year old male with a past medical history of hypertension, diabetes, possible ITP, sleep apnea, PE and DVT on rivaroxaban admitted on 7/15/2025 for acute ischemic stroke in left MCA territory, near occlusion of left M1, and concern for thrombotic emergency such as TTP or catastrophic antiphospholipid syndrome (in setting of severe thrombocytopenia and acute cerebral infarcts). Admission to neuroICU for close neurologic monitoring and plan for urgent PLEX.     Neuro  #Multifocal Acute ischemic strokes, in L MCA territory, Right parietal lobe, right frontal centrum semiovale  #Sensory and visual extinction on right  -Neurochecks every 1 hrs  -neuroIR aware  -SBP goal < 220 mmHg  -HOB > 30   -PT/OT/SLP    #Analgesics & sedation  RASS goal: 0  - PRN acetaminophen    CV  #Hypertension  -Cardiac monitoring  -SBP goal < 220 mmHg  -PRN Labetalol and Hydralazine  - hold PTA metoprolol, hydrochlorothiazide, losartan  -TTE    Resp  #recent PE  #ELENA on CPAP  Oxygen/vent: room air  -Continuous pulse ox  -Maintain O2 saturations greater than 92%    GI  # SABRINA  Diet: NPO for now  Last BM: PTA  GI prophylaxis: not indicated  -Bowel regimen: PRN senna-docusate and Miralax    Renal/  #SABRINA  -Daily BMP  -IV fluids: 100ml/hr NS  -Electrolyte replacement protocol    Endo  # Diabetes  -Hgb A1c pending  -Monitor glucose levels    Heme  #C/f thrombotic emergency such as TTP vs CAPS  #Possible ITP  #recent DVT/PE  - hematology consult  - urgent PLEX, apheresis consult  - hold rivaroxaban  - s/p dexamethasone 40mg in ED  - type and screen  - Lupus anticoagulant, fibrinogen, LDH, haptoglobin, peripheral smear, INR/PT, PTT  - B2 glycoprotein and anticardiolipin pending  - Daily CBC  - Hgb goal >7, plt goal >100k  - Transfuse  "to meet Hgb and plt goals    ID  #SABRINA  -Daily CBC  -Follow temperature curve    ICU Checklist  Lines/tubes/drains: PIV, plan for central line placement  FEN: NPO; mIVF  PPX: DVT - SCDs; GI - not indicated.  Code: Full code, discussed with patient   Dispo: ICU - NCC    Clinically Significant Risk Factors Present on Admission                # Drug Induced Coagulation Defect: home medication list includes an anticoagulant medication  # Thrombocytopenia: Lowest platelets = 26 in last 2 days, will monitor for bleeding   # Hypertension: Home medication list includes antihypertensive(s)           # Obesity: Estimated body mass index is 31.97 kg/m  as calculated from the following:    Height as of this encounter: 1.88 m (6' 2\").    Weight as of this encounter: 112.9 kg (249 lb).                The patient was seen and discussed with the NCC attending, Dr. Aguilera.    Nishi Rodas MD  Neurology Resident, PGY-3    History of Present Illness:  Bernard Chapman is a 35 year old male with a past medical history of hypertension, diabetes, possible ITP, sleep apnea, PE and DVT on rivaroxaban admitted on 7/15/2025 for acute ischemic stroke in left MCA territory, near occlusion of left M1, and concern for thrombotic emergency such as TTP or catastrophic antiphospholipid syndrome (in setting of severe thrombocytopenia and acute cerebral infarcts).    Bernard shared that he started to have an episode of dizziness and abnormal vision in his left visual field on Sunday night.  He also did not feel well.  He is felt a little bit more confused than normal.  His mother shared that he has been more confused and has been having some difficulty finding the right word.  He was evaluated by his hematology team today and due to concern for neurologic symptoms, was recommended to present to the emergency department.    Today he reports feeling okay, he has had a mild headache, which he has had intermittently since Sunday.  He has not had any " "new symptoms, but continues to have some intermittent visual changes on the left side and intermittent word finding difficulty.  He is left-handed.    No abdominal pain.  He has had some intermittent cramping in his left lower extremity.    Allergies   Allergen Reactions    Apixaban Rash    Amlodipine Other (See Comments) and Rash       Current Medications:  Current Facility-Administered Medications   Medication Dose Route Frequency Provider Last Rate Last Admin    dexAMETHasone (DECADRON) 40 mg in sodium chloride 0.9 % 59 mL intermittent infusion  40 mg Intravenous Once Segundo Pond MD        iopamidol (ISOVUE-370) solution 100 mL  100 mL Intravenous Once Segundo Pond MD        sodium chloride 0.9 % bag for CT scan flush  100 mL Intravenous Once Segundo Pond MD           PRN Medications:  Current Facility-Administered Medications   Medication Dose Route Frequency Provider Last Rate Last Admin       Infusions:  Current Facility-Administered Medications   Medication Dose Route Frequency Provider Last Rate Last Admin    sodium chloride 0.9 % infusion   Intravenous Continuous Gabriel Estevez  mL/hr at 07/15/25 1439 New Bag at 07/15/25 1439       Physical Examination:  Vitals: BP (!) 144/79   Pulse 90   Temp 98.9  F (37.2  C) (Oral)   Resp 12   Ht 1.88 m (6' 2\")   Wt 112.9 kg (249 lb)   SpO2 98%   BMI 31.97 kg/m    General: Adult male patient, lying in bed  HEENT: Normocephalic, atraumatic, no icterus, oral cavity/oropharynx pink and moist  Cardiac: RRR, s1/s2 auscultated without murmur  Pulm: CTAB, unlabored, expansion symmetric, no retractions or use of accessory muscles  Abdomen: Soft, non-distended  Extremities: Warm, no edema  Skin: petechial rash on bilateral LE  Psych: Calm and cooperative  Neuro:  Mental status: Awake, alert, attentive, oriented to self, time, place, and circumstance. Language is fluent and coherent with intact comprehension of complex " commands, naming and repetition.  Cranial nerves: VFF but visual extinction on right on double simultaneous stimulation, PERRL, conjugate gaze, EOMI, facial sensation intact, face symmetric, shoulder shrug strong, tongue midline, no dysarthria.   Motor: Normal bulk and tone. No abnormal movements. No drift in all extremtiies. 5/5 strength in 4/4 extremities.   Sensory: Intact to light touch x 4 extremities, extinction on right on double simultaneous stimulation  Coordination: FNF and HS without ataxia or dysmetria. Rapid alternating movements intact.   Gait: MILAN, deferred.    NIHSS  Interval     1a. Level of Consciousness 0-->Alert, keenly responsive   1b. LOC Questions 0-->Answers both questions correctly   1c. LOC Commands 0-->Performs both tasks correctly   2.   Best Gaze 0-->Normal   3.   Visual 0-->No visual loss   4.   Facial Palsy 1-->Minor paralysis (flattened nasolabial fold, asymmetry on smiling)   5a. Motor Arm, Left 0-->No drift, limb holds 90 (or 45) degrees for full 10 secs   5b. Motor Arm, Right 0-->No drift, limb holds 90 (or 45) degrees for full 10 secs   6a. Motor Leg, Left 0-->No drift, leg holds 30 degree position for full 5 secs   6b. Motor Leg, right 0-->No drift, leg holds 30 degree position for full 5 secs   7.   Limb Ataxia 0-->Absent   8.   Sensory 0-->Normal, no sensory loss   9.   Best Language 0-->No aphasia, normal   10. Dysarthria 0-->Normal   11. Extinction and Inattention  2-->Profound melvina-inattention/extinction more than 1 modality   Total 3 (07/15/25 2200)       Labs and Imaging:    Recent Results (from the past 24 hours)   EKG 12-lead, tracing only   Result Value Ref Range    Systolic Blood Pressure  mmHg    Diastolic Blood Pressure  mmHg    Ventricular Rate 94 BPM    Atrial Rate 94 BPM    MI Interval 132 ms    QRS Duration 76 ms     ms    QTc 410 ms    P Axis 71 degrees    R AXIS 28 degrees    T Axis 3 degrees    Interpretation ECG Sinus rhythm  Normal ECG      CBC with  platelets differential    Narrative    The following orders were created for panel order CBC with platelets differential.  Procedure                               Abnormality         Status                     ---------                               -----------         ------                     CBC with platelets and ...[[id number]]  Abnormal            Final result               RBC and Platelet Morpho...[[id number]]                      Final result                 Please view results for these tests on the individual orders.   INR   Result Value Ref Range    INR 2.12 (H) 0.85 - 1.15    PT 24.0 (H) 11.8 - 14.8 Seconds   Partial thromboplastin time   Result Value Ref Range    aPTT 32 22 - 38 Seconds   Comprehensive metabolic panel   Result Value Ref Range    Sodium 139 135 - 145 mmol/L    Potassium 4.2 3.4 - 5.3 mmol/L    Carbon Dioxide (CO2) 25 22 - 29 mmol/L    Anion Gap 13 7 - 15 mmol/L    Urea Nitrogen 18.5 6.0 - 20.0 mg/dL    Creatinine 1.06 0.67 - 1.17 mg/dL    GFR Estimate >90 >60 mL/min/1.73m2    Calcium 9.5 8.8 - 10.4 mg/dL    Chloride 101 98 - 107 mmol/L    Glucose 123 (H) 70 - 99 mg/dL    Alkaline Phosphatase 73 40 - 150 U/L    AST 27 0 - 45 U/L    ALT 37 0 - 70 U/L    Protein Total 8.1 6.4 - 8.3 g/dL    Albumin 4.8 3.5 - 5.2 g/dL    Bilirubin Total 1.0 <=1.2 mg/dL   Troponin T, High Sensitivity   Result Value Ref Range    Troponin T, High Sensitivity 34 (H) <=22 ng/L   Magnesium   Result Value Ref Range    Magnesium 2.0 1.7 - 2.3 mg/dL   TSH with free T4 reflex   Result Value Ref Range    TSH 1.18 0.30 - 4.20 uIU/mL   CBC with platelets and differential   Result Value Ref Range    WBC Count 13.3 (H) 4.0 - 11.0 10e3/uL    RBC Count 5.25 4.40 - 5.90 10e6/uL    Hemoglobin 14.1 13.3 - 17.7 g/dL    Hematocrit 42.3 40.0 - 53.0 %    MCV 81 78 - 100 fL    MCH 26.9 26.5 - 33.0 pg    MCHC 33.3 31.5 - 36.5 g/dL    RDW 13.0 10.0 - 15.0 %    Platelet Count 28 (LL) 150 - 450 10e3/uL    % Neutrophils 86 %    %  Lymphocytes 8 %    % Monocytes 4 %    % Eosinophils 1 %    % Basophils 0 %    % Immature Granulocytes 1 %    NRBCs per 100 WBC 0 <1 /100    Absolute Neutrophils 11.5 (H) 1.6 - 8.3 10e3/uL    Absolute Lymphocytes 1.1 0.8 - 5.3 10e3/uL    Absolute Monocytes 0.6 0.0 - 1.3 10e3/uL    Absolute Eosinophils 0.1 0.0 - 0.7 10e3/uL    Absolute Basophils 0.1 0.0 - 0.2 10e3/uL    Absolute Immature Granulocytes 0.1 <=0.4 10e3/uL    Absolute NRBCs 0.0 10e3/uL   RBC and Platelet Morphology   Result Value Ref Range    RBC Morphology Confirmed RBC Indices     Platelet Assessment  Automated Count Confirmed. Platelet morphology is normal.     Automated Count Confirmed. Platelet morphology is normal.   Glucose by meter   Result Value Ref Range    GLUCOSE BY METER POCT 130 (H) 70 - 99 mg/dL   Head CT w/o contrast    Narrative    EXAM: CT HEAD W/O CONTRAST  LOCATION: [facility]  DATE: 7/15/2025    INDICATION: Nausea, headache, dizziness, on anticoagulation  COMPARISON: None.  TECHNIQUE: Routine CT Head without IV contrast. Multiplanar reformats. Dose reduction techniques were used.    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 Associated dysmorphic appearance of the lateral ventricles. No hydrocephalus. No intracranial hemorrhage, extraaxial   collection, or mass effect.  No CT evidence of acute infarct. Normal parenchymal attenuation.      VISUALIZED ORBITS/SINUSES/MASTOIDS: No intraorbital abnormality. No paranasal sinus mucosal disease. No middle ear or mastoid effusion.    BONES/SOFT TISSUES: No acute abnormality.      Impression    IMPRESSION:  1.  No evidence of acute intracranial abnormality.  2.  Callosal dysgenesis.     Extra Tube    Narrative    The following orders were created for panel order Extra Tube.  Procedure                               Abnormality         Status                      ---------                               -----------         ------                     Extra Urine Collection[[id number]]                                                       Please view results for these tests on the individual orders.   Troponin T, High Sensitivity   Result Value Ref Range    Troponin T, High Sensitivity 33 (H) <=22 ng/L   MR Brain w/o & w Contrast    Narrative    EXAM: MR BRAIN W/O and W CONTRAST  LOCATION: [facility]  DATE: 7/15/2025    INDICATION: Dizziness, altered mental status, and headache  COMPARISON: CT performed earlier today  CONTRAST: 11.2 mL Gadavist  TECHNIQUE: Routine multiplanar multisequence head MRI without and with intravenous contrast    FINDINGS:  INTRACRANIAL CONTENTS: Numerous scattered punctate and patchy cortical and white matter foci of restricted diffusion involving the left middle cerebral artery territory with relative sparing of the left temporal as well as parasagittal left frontal and   occipital lobes. A few smaller foci of restricted diffusion on the right predominantly centered at the occipitotemporal junction with additional foci in the right parietal lobe and frontal centrum semiovale. Corresponding low ADC values and FLAIR   hyperintensity without significant associated mass effect. Curvilinear FLAIR hyperintensity along the posterior aspect of the left middle frontal gyrus with corresponding loss of signal on the susceptibility weighted images without a correlate on the T1-   or diffusion-weighted images, which may represent slow flow or thrombosis of a cortical vessel versus trace subarachnoid hemorrhage (for example, series 9 image 24 and series 12 image 59). No confluent intraparenchymal hematoma. No mass or pathologic   enhancement. No significant brain parenchymal signal abnormality elsewhere. Redemonstrated callosal dysgenesis with absence of the genu as well as the posterior body, isthmus, and  splenium. Normal size of the ventricles and sulci for age. Normal position   of the cerebellar tonsils.     SELLA: Within technique limitations, no gross abnormality.    OSSEOUS STRUCTURES/SOFT TISSUES: No suspicious bone marrow signal intensity. The major intracranial vascular flow voids are maintained. Increased FLAIR signal intensity within multiple distal M2-M4 segment branches of the left MCA, likely due to slow   flow.    SINUSES/MASTOIDS: No evidence of significant paranasal sinus or mastoid mucosal disease.        Impression    IMPRESSION:  1.  Numerous multifocal acute cortical and white matter left cerebral hemisphere infarcts predominantly involving the left MCA territory with a few acute infarcts in the right cerebral hemisphere, as described. No significant mass effect.  2.  Trace acute subarachnoid hemorrhage versus slow flow or thrombosis of a cortical vessel along the parasagittal left frontal lobe, the latter favored. No confluent intraparenchymal hematoma.      CBC with platelets differential    Narrative    The following orders were created for panel order CBC with platelets differential.  Procedure                               Abnormality         Status                     ---------                               -----------         ------                     CBC with platelets and ...[[id number]]  Abnormal            Final result               RBC and Platelet Morpho...[[id number]]                                                   Please view results for these tests on the individual orders.   CBC with platelets and differential   Result Value Ref Range    WBC Count 11.5 (H) 4.0 - 11.0 10e3/uL    RBC Count 4.61 4.40 - 5.90 10e6/uL    Hemoglobin 12.5 (L) 13.3 - 17.7 g/dL    Hematocrit 37.0 (L) 40.0 - 53.0 %    MCV 80 78 - 100 fL    MCH 27.1 26.5 - 33.0 pg    MCHC 33.8 31.5 - 36.5 g/dL    RDW 13.1 10.0 - 15.0 %    Platelet Count 26 (LL) 150 - 450 10e3/uL    % Neutrophils 81 %    % Lymphocytes 11 %     % Monocytes 6 %    % Eosinophils 1 %    % Basophils 0 %    % Immature Granulocytes 1 %    NRBCs per 100 WBC 0 <1 /100    Absolute Neutrophils 9.4 (H) 1.6 - 8.3 10e3/uL    Absolute Lymphocytes 1.2 0.8 - 5.3 10e3/uL    Absolute Monocytes 0.7 0.0 - 1.3 10e3/uL    Absolute Eosinophils 0.1 0.0 - 0.7 10e3/uL    Absolute Basophils 0.0 0.0 - 0.2 10e3/uL    Absolute Immature Granulocytes 0.1 <=0.4 10e3/uL    Absolute NRBCs 0.0 10e3/uL   CTA Head Neck with Contrast    Narrative    EXAM: CTA HEAD NECK W CONTRAST  LOCATION: [facility]  DATE: 7/15/2025    INDICATION: stroke on MRI  COMPARISON: Same day noncontrast head CT and MRI brain.  CONTRAST: 67mL Isovue 370  TECHNIQUE: Head and neck CT angiogram with IV contrast. CT images of the head and neck vessels obtained during the arterial phase of intravenous contrast administration. Axial 2D reconstructed images and multiplanar 3D MIP reconstructed images of the   head and neck vessels were performed by the technologist. Dose reduction techniques were used. All stenosis measurements made according to NASCET criteria unless otherwise specified.    FINDINGS:   NECK CTA:  AORTIC ARCH: Normal caliber two-vessel aortic arch. Patent proximal great vessels.    RIGHT CAROTID: Patent.    LEFT CAROTID: Patent.    VERTEBRAL ARTERIES: Patent. Balanced vertebral arteries.    HEAD CTA:  ANTERIOR CIRCULATION: Patent intracranial carotid vasculature. Near occlusion of the left MCA-M1 segment (8 mm length), measuring 8 mm in length; distal opacification throughout the M2 segments.    POSTERIOR CIRCULATION: Patent intracranial vertebral vasculature. Patent basilar artery. No large vessel occlusion or aneurysm.    DURAL VENOUS SINUSES: Expected enhancement of the major dural venous sinuses.    NONVASCULAR STRUCTURES: Unremarkable.        Impression    IMPRESSION:   NECK CTA:  1.  Patent neck vasculature.    HEAD CTA:   1.  Near occlusion of the left  MCA-M1 segment (8 mm length) with distal opacification throughout the M2 segments.    Findings discussed with Dr. Pond by Dr. Owens at 8:25 PM 7/15/2025.   CTV Head with Contrast    Narrative    EXAM: CTV HEAD WITH CONTRAST  LOCATION: [facility]  DATE: 7/15/2025    INDICATION: r o CVST  COMPARISON: Same day noncontrast head CT and MRI.  CONTRAST: 67mL Isovue 370  TECHNIQUE: Head CT venogram with IV contrast. CT images of the intracranial vessels obtained during the venous phase of intravenous contrast administration. Axial helical 2D reconstructed images and multiplanar 3D MIP reconstructed images of the   intracranial vessels were performed by the technologist. Dose reduction techniques were used.     FINDINGS:   DURAL SINUSES: No significant stenosis or occlusion. Dominant right and smaller left transverse and sigmoid dural sinuses.    INTERNAL CEREBRAL VEINS: No significant stenosis or occlusion.      MAJOR CORTICAL VENOUS BRANCHES: No significant stenosis or occlusion; specifically, no findings to suggest thrombus along the left cerebral hemisphere as queried on recent MRI..      Impression    IMPRESSION:   1.  No intracranial venous thrombosis.   Haptoglobin   Result Value Ref Range    Haptoglobin <10 (L) 30 - 200 mg/dL   Lactate Dehydrogenase   Result Value Ref Range    Lactate Dehydrogenase 514 (H) 0 - 250 U/L   ABO/Rh type and screen    Narrative    The following orders were created for panel order ABO/Rh type and screen.  Procedure                               Abnormality         Status                     ---------                               -----------         ------                     Adult Type and Screen[[id number]]                           Final result                 Please view results for these tests on the individual orders.   INR   Result Value Ref Range    INR 2.05 (H) 0.85 - 1.15    PT 22.8 (H) 11.8 - 14.8 Seconds   Partial  thromboplastin time   Result Value Ref Range    aPTT 30 22 - 38 Seconds   Fibrinogen activity   Result Value Ref Range    Fibrinogen Activity 151 (L) 170 - 510 mg/dL   Ferritin   Result Value Ref Range    Ferritin 535 (H) 31 - 409 ng/mL   Lab Blood Morphology Pathologist Review    Narrative    The following orders were created for panel order Lab Blood Morphology Pathologist Review.  Procedure                               Abnormality         Status                     ---------                               -----------         ------                     Bld morphology patholog...[[id number]]                      In process                 CBC with platelets and ...[[id number]]  Abnormal            Final result               RBC and Platelet Morpho...[[id number]]                                                 Reticulocyte count[[id number]]          Abnormal            Final result               Morphology Tracking[[id number]]                             Final result                 Please view results for these tests on the individual orders.   Adult Type and Screen   Result Value Ref Range    ABO/RH(D) A POS     Antibody Screen Negative Negative    SPECIMEN EXPIRATION DATE 7/18/2025 11:59:00 PM CDT    CBC with platelets and differential   Result Value Ref Range    WBC Count 12.8 (H) 4.0 - 11.0 10e3/uL    RBC Count 4.54 4.40 - 5.90 10e6/uL    Hemoglobin 12.3 (L) 13.3 - 17.7 g/dL    Hematocrit 37.1 (L) 40.0 - 53.0 %    MCV 82 78 - 100 fL    MCH 27.1 26.5 - 33.0 pg    MCHC 33.2 31.5 - 36.5 g/dL    RDW 13.1 10.0 - 15.0 %    Platelet Count 25 (LL) 150 - 450 10e3/uL    % Neutrophils 81 %    % Lymphocytes 11 %    % Monocytes 6 %    % Eosinophils 1 %    % Basophils 0 %    % Immature Granulocytes 1 %    NRBCs per 100 WBC 0 <1 /100    Absolute Neutrophils 10.4 (H) 1.6 - 8.3 10e3/uL    Absolute Lymphocytes 1.5 0.8 - 5.3 10e3/uL    Absolute Monocytes 0.8 0.0 - 1.3 10e3/uL    Absolute Eosinophils 0.1 0.0 - 0.7 10e3/uL     Absolute Basophils 0.0 0.0 - 0.2 10e3/uL    Absolute Immature Granulocytes 0.1 <=0.4 10e3/uL    Absolute NRBCs 0.0 10e3/uL   Reticulocyte count   Result Value Ref Range    % Reticulocyte 2.3 (H) 0.5 - 2.0 %    Absolute Reticulocyte 0.104 (H) 0.025 - 0.095 10e6/uL   Lipid panel reflex to direct LDL   Result Value Ref Range    Cholesterol 205 (H) <200 mg/dL    Triglycerides 202 (H) <150 mg/dL    Direct Measure HDL 30 (L) >=40 mg/dL    LDL Cholesterol Calculated 135 (H) <100 mg/dL    Non HDL Cholesterol 175 (H) <130 mg/dL    Narrative    Cholesterol  Desirable: < 200 mg/dL  Borderline High: 200 - 239 mg/dL  High: >= 240 mg/dL    Triglycerides  Normal: < 150 mg/dL  Borderline High: 150 - 199 mg/dL  High: 200-499 mg/dL  Very High: >= 500 mg/dL    Direct Measure HDL  Female: >= 50 mg/dL   Male: >= 40 mg/dL    LDL Cholesterol  Desirable: < 100 mg/dL  Above Desirable: 100 - 129 mg/dL   Borderline High: 130 - 159 mg/dL   High:  160 - 189 mg/dL   Very High: >= 190 mg/dL    Non HDL Cholesterol  Desirable: < 130 mg/dL  Above Desirable: 130 - 159 mg/dL  Borderline High: 160 - 189 mg/dL  High: 190 - 219 mg/dL  Very High: >= 220 mg/dL   Magnesium Lab   Result Value Ref Range    Magnesium 2.0 1.7 - 2.3 mg/dL   Prepare plasma (in mL)   Result Value Ref Range    Blood Component Type Plasma     Product Code L0670W74     Unit Status Issued     Unit Number [id number]     CODING SYSTEM [id number]     ISSUE DATE AND TIME 7/16/2025  1:22:00 AM CDT     UNIT ABO/RH AB+     UNIT TYPE ISBT 8400    Prepare plasma (unit)   Result Value Ref Range    Blood Component Type Plasma     Product Code H5407R00     Unit Status Issued     Unit Number [id number]     CODING SYSTEM [id number]     ISSUE DATE AND TIME 7/16/2025  1:22:00 AM CDT     UNIT ABO/RH AB+     UNIT TYPE ISBT 8400    Prepare plasma (unit)   Result Value Ref Range    Blood Component Type Plasma     Product Code M9982P23     Unit Status Issued     Unit Number [id number]     CODING SYSTEM  [id number]     ISSUE DATE AND TIME 7/16/2025  1:22:00 AM CDT     UNIT ABO/RH AB+     UNIT TYPE ISBT 8400    Prepare plasma (unit)   Result Value Ref Range    Blood Component Type Plasma     Product Code K5033M67     Unit Status Issued     Unit Number [id number]     CODING SYSTEM [id number]     ISSUE DATE AND TIME 7/16/2025  1:22:00 AM CDT     UNIT ABO/RH AB+     UNIT TYPE ISBT 8400    Prepare plasma (unit)   Result Value Ref Range    Blood Component Type Plasma     Product Code J4370W00     Unit Status Issued     Unit Number [id number]     CODING SYSTEM [id number]     ISSUE DATE AND TIME 7/16/2025  1:22:00 AM CDT     UNIT ABO/RH AB+     UNIT TYPE ISBT 8400    Prepare plasma (unit)   Result Value Ref Range    Blood Component Type Plasma     Product Code R3181M88     Unit Status Issued     Unit Number [id number]     CODING SYSTEM [id number]     ISSUE DATE AND TIME 7/16/2025  1:22:00 AM CDT     UNIT ABO/RH A-     UNIT TYPE ISBT 0600    Prepare plasma (unit)   Result Value Ref Range    Blood Component Type Plasma     Product Code W4852P25     Unit Status Issued     Unit Number [id number]     CODING SYSTEM [id number]     ISSUE DATE AND TIME 7/16/2025  1:22:00 AM CDT     UNIT ABO/RH AB+     UNIT TYPE ISBT 8400    Prepare plasma (unit)   Result Value Ref Range    Blood Component Type Plasma     Product Code I1086L07     Unit Status Issued     Unit Number [id number]     CODING SYSTEM [id number]     ISSUE DATE AND TIME 7/16/2025  1:22:00 AM CDT     UNIT ABO/RH A-     UNIT TYPE ISBT 0600    Prepare plasma (unit)   Result Value Ref Range    Blood Component Type Plasma     Product Code Q3002A58     Unit Status Issued     Unit Number [id number]     CODING SYSTEM [id number]     ISSUE DATE AND TIME 7/16/2025  1:22:00 AM CDT     UNIT ABO/RH AB+     UNIT TYPE ISBT 8400    Prepare plasma (unit)   Result Value Ref Range    Blood Component Type Plasma     Product Code M5624A95     Unit Status Issued     Unit Number [id number]      CODING SYSTEM [id number]     ISSUE DATE AND TIME 7/16/2025  1:22:00 AM CDT     UNIT ABO/RH AB+     UNIT TYPE ISBT 8400    Prepare plasma (unit)   Result Value Ref Range    Blood Component Type Plasma     Product Code C7323N72     Unit Status Issued     Unit Number [id number]     CODING SYSTEM [id number]     ISSUE DATE AND TIME 7/16/2025  1:22:00 AM CDT     UNIT ABO/RH AB-     UNIT TYPE ISBT 2800    Prepare plasma (unit)   Result Value Ref Range    Blood Component Type Plasma     Product Code R2735V05     Unit Status Issued     Unit Number [id number]     CODING SYSTEM [id number]     ISSUE DATE AND TIME 7/16/2025  1:22:00 AM CDT     UNIT ABO/RH AB+     UNIT TYPE ISBT 8400    Prepare plasma (unit)   Result Value Ref Range    Blood Component Type Plasma     Product Code A1596E29     Unit Status Issued     Unit Number [id number]     CODING SYSTEM [id number]     ISSUE DATE AND TIME 7/16/2025  1:22:00 AM CDT     UNIT ABO/RH AB+     UNIT TYPE ISBT 8400    Prepare plasma (unit)   Result Value Ref Range    Blood Component Type Plasma     Product Code O5699Y06     Unit Status Issued     Unit Number [id number]     CODING SYSTEM [id number]     ISSUE DATE AND TIME 7/16/2025  1:22:00 AM CDT     UNIT ABO/RH AB+     UNIT TYPE ISBT 8400    Prepare plasma (unit)   Result Value Ref Range    Blood Component Type Plasma     Product Code L0002B84     Unit Status Issued     Unit Number [id number]     CODING SYSTEM [id number]     ISSUE DATE AND TIME 7/16/2025  1:22:00 AM CDT     UNIT ABO/RH AB+     UNIT TYPE ISBT 8400    EKG 12-lead, Tracing only   Result Value Ref Range    Systolic Blood Pressure  mmHg    Diastolic Blood Pressure  mmHg    Ventricular Rate 76 BPM    Atrial Rate 76 BPM    NC Interval 142 ms    QRS Duration 90 ms     ms    QTc 398 ms    P Axis 45 degrees    R AXIS 21 degrees    T Axis 4 degrees    Interpretation ECG Sinus rhythm with sinus arrhythmia  Normal ECG          All relevant imaging and laboratory values  personally reviewed.

## 2025-07-16 NOTE — CONSULTS
[facility]    Stroke Telephone Note    I was called by Gabriel Estevez on 07/15/25 regarding patient Bernard Chapman. The patient is a 35 year old male history of hypertension, diabetes, possible ITP, ELENA on CPAP, VTE's, drug rash related to apixaban (now on Xarelto) who comes into the ED from Vascular clinic after being noticed to be lethargic, potentially seeing spots in his eyes and feeling shaky and dizzy.  Upon chart review, he has noted to be lethargic and not remembering things and threw up in the car.    Upon discussion with the ED attending, the patient has a platelet count of 28k. Dr. Cogan, the patient's hematologist would like to provide the patient dexamethasone with concern for APLS flareup or TTP.     MRI of the brain was obtained and is revealing for scattered infarcts in the left MCA territory and some in the right cerebral hemisphere.  MRI of the brain also mentions concern for CVST versus a trace subarachnoid hemorrhage.  These strokes appear to be embolic in nature and likely  come from known DVT versus other embolic source.     Vitals  BP: 137/79   Pulse: 97   Resp: 12   Temp: 98.4  F (36.9  C)   Weight: 112.9 kg (249 lb)    Imaging Findings  CT head:   IMPRESSION:  1.  No evidence of acute intracranial abnormality.  2.  Callosal dysgenesis.    CTA head/neck: Pending    MRI brain:  IMPRESSION:  1.  Numerous multifocal acute cortical and white matter left cerebral hemisphere infarcts predominantly involving the left MCA territory with a few acute infarcts in the right cerebral hemisphere, as described. No significant mass effect.  2.  Trace acute subarachnoid hemorrhage versus slow flow or thrombosis of a cortical vessel along the parasagittal left frontal lobe, the latter favored. No confluent intraparenchymal hematoma    Impression:  35 year old male history of hypertension, diabetes, possible ITP, ELENA on CPAP, VTE's, drug rash related to apixaban  "(now on Xarelto) who comes into the ED from Vascular clinic after being noticed to be lethargic, potentially seeing spots in his eyes and feeling shaky and dizzy.  Upon chart review, he has noted to be lethargic and not remembering things and threw up in the car since Sunday evening.  Current presentation makes CVST concerning given mention of blurry vision, vomiting in setting of potential increased ICP, and lethargy and should be ruled out.    #Acute ischemic stroke of bilateral cerebral hemispheres due to embolic stroke of undetermined source (ESUS)  #Concern for CVST    Recommendations  - Recommend transfer to [facility] for further stroke workup  - Obtain stat CTA/CTV images prior to transfer  - Okay for dexamethasone per hematology recommendations from neurology standpoint  - TTE  - Use orderset: \"Ischemic Stroke Routine Admission\" or \"Ischemic Stroke No Thrombolytics/No Thrombectomy ICU Admission\"  - Place Neurology IP Stroke Consult order   - Neurochecks and Vital Signs every Q4H   - Permissive HTN; goal SBP < 220 mmHg  - Telemetry, EKG  - Bedside Glucose Monitoring  - A1c, Lipid Panel, Troponin x 3  - PT/OT/SLP  - Stroke Education  - Euthermia, Euglycemia    My recommendations are based on the information provided over the phone by Bernard Chapman's in-person providers. They are not intended to replace the clinical judgment of his in-person providers. I was not requested to personally see or examine the patient at this time.     The Stroke Staff is Dr. Conklin.    Kamlesh Hoang MD  Vascular Neurology Fellow    To page me or covering stroke neurology team member, click here: [email or web address]  Choose \"On Call\" tab at top, then select \"NEUROLOGY/ALL SITES\" from middle drop-down box, press Enter, then look for \"stroke\" or \"telestroke\" for your site.    "

## 2025-07-16 NOTE — PROGRESS NOTES
"Speech-Language Pathology: Clinical Swallow Evaluation and Speech & Language Evaluation     07/16/25 0900   Appointment Info   Signing Clinician's Name / Credentials (SLP) Cyndee Sunshine MS, CCC-SLP   General Information   Onset of Illness/Injury or Date of Surgery 07/15/25   Referring Physician Nishi Rodas MD   Pertinent History of Current Problem Per EMR, \"35 year old male with a past medical history of hypertension, diabetes, possible ITP, sleep apnea, PE and DVT on rivaroxaban admitted on 7/15/2025 for acute ischemic stroke in left MCA territory, near occlusion of left M1, and concern for thrombotic emergency such as TTP or catastrophic antiphospholipid syndrome (in setting of severe thrombocytopenia and acute cerebral infarcts). Admission to neuroICU for close neurologic monitoring and plan for urgent PLEX. \" Per primary, pt with blood clot in MCA that \"could break off at any time\", so warrants close monitoring. Clinical swallow evaluation & Speech and Language evaluation completed per MD orders.   General Observations Alert and cooperative.   Type of Evaluation   Type of Evaluation Swallow Evaluation;Speech, Language, Cognition   Oral Motor   Oral Musculature generally intact   Structural Abnormalities none present   Mucosal Quality good   Dentition (Oral Motor)   Dentition (Oral Motor) natural dentition;adequate dentition   Facial Symmetry (Oral Motor)   Facial Symmetry (Oral Motor) WNL   Lip Function (Oral Motor)   Lip Range of Motion (Oral Motor) WNL   Lip Strength (Oral Motor) WNL   Lip Coordination (Oral Motor) bilateral   Tongue Function (Oral Motor)   Tongue ROM (Oral Motor) WNL   Tongue Strength (Oral Motor) WNL   Tongue Coordination/Speed (Oral Motor) WNL   Jaw Function (Oral Motor)   Jaw Function (Oral Motor) WNL   Cough/Swallow/Gag Reflex (Oral Motor)   Soft Palate/Velum (Oral Motor) WNL   Volitional Throat Clear/Cough (Oral Motor) WNL   Volitional Swallow (Oral Motor) WNL   Vocal " Quality/Secretion Management (Oral Motor)   Vocal Quality (Oral Motor) WNL   Secretion Management (Oral Motor) WNL   General Swallowing Observations   Current Diet/Method of Nutritional Intake (General Swallowing Observations, NIS) NPO   Respiratory Support nasal cannula  (2L)   Past History of Dysphagia Per EMR review & pt report, none.   Comment, General Swallowing Observations RN endorsed pt passed nursing dysphagia screening.   Swallowing Evaluation Clinical swallow evaluation   Clinical Swallow Evaluation   Feeding Assistance set up only required   Additional evaluation(s) completed today No   Clinical Swallow Evaluation Textures Trialed thin liquids;pureed;solid foods   Clinical Swallow Eval: Thin Liquid Texture Trial   Mode of Presentation, Thin Liquids cup;straw;self-fed   Volume of Liquid or Food Presented 6 oz   Oral Phase of Swallow WFL   Pharyngeal Phase of Swallow intact   Diagnostic Statement No overt s/s of aspiration.   Clinical Swallow Evaluation: Puree Solid Texture Trial   Mode of Presentation, Puree spoon;self-fed   Volume of Puree Presented 2 oz   Oral Phase, Puree WFL   Pharyngeal Phase, Puree intact   Diagnostic Statement No overt s/s of aspiration or dysphagia. Pt denies globus sensation.   Clinical Swallow Evaluation: Solid Food Texture Trial   Mode of Presentation self-fed   Volume Presented 1 cracker   Oral Phase WFL   Pharyngeal Phase intact   Diagnostic Statement No overt s/s of aspiration or dysphagia. Pt denies globus sensation. Timely and complete mastication.   Esophageal Phase of Swallow   Patient reports or presents with symptoms of esophageal dysphagia No   Swallowing Recommendations   Diet Consistency Recommendations thin liquids (level 0);regular diet   Supervision Level for Intake patient independent   Mode of Delivery Recommendations bolus size, small;slow rate of intake   Postural Recommendations none   Monitoring/Assistance Required (Eating/Swallowing) stop eating activities  when fatigue is present;monitor for cough or change in vocal quality with intake   Recommended Feeding/Eating Techniques (Swallow Eval) maintain upright sitting position for eating;patient is independent, no specific recommendations   Medication Administration Recommendations, Swallowing (SLP) as tolerated   Instrumental Assessment Recommendations instrumental evaluation not recommended at this time   Motor Speech   Respiration (motor speech) None   Phonation (motor speech) Adequate   Rate/Prosody (Motor Speech) WFL;slow overall rate   Articulation (Motor Speech) imprecise articulation   Vocal Loudness (Motor Speech) WNL   Breath Support (Motor Speech) intact   Comment, Motor Speech Assessment Mild dysarthria   Western Aphasia Battery- Revised Bedside Record From   Spontaneous Speech Content Score (out of 10) 9   Spontaneous Speech Fluency Score (out of 10) 6   Auditory Verbal Comprehension Score (out of 10) 9   Sequential Commands Score (out of 10) 10   Repetition Score (out of 10) 8   Object Naming Score (out of 10) 9   Bedside Aphasia Sum 51   WAB-R Bedside Aphasia Score 85   Reading Score (out of 10) 4   Writing Score (out of 10) 6   Bedside Language Sum 61   Bedside Language Score 76.25   Bedside Aphasia Classification Anomic Aphasia   Aphasia Severity Level Mild Aphasia   General Therapy Interventions   Planned Therapy Interventions Language   Language Verbal expression;Written expression   Clinical Impression   Criteria for Skilled Therapeutic Interventions Met (SLP Eval) Yes, treatment indicated   SLP Diagnosis Functional oropharyngeal swallowing mechanism; Mild expressive aphasia   Risks & Benefits of therapy have been explained evaluation/treatment results reviewed;care plan/treatment goals reviewed;risks/benefits reviewed;current/potential barriers reviewed;participants voiced agreement with care plan;participants included;patient   Clinical Impression Comments Clinical Swallow Evaluation completed per MD  orders. Oral motor function was WNL. Patient's voice was WFL. Patient assessed with thin liquids, pureed textures, and regular textures. Oral phase c/b WNL. Timely & complete mastication. Pharyngeal phase c/b no overt s/s of aspiration. Pt denied globus sensation across all textures trialed. Recommend regular diet and thin liquids with safe swallowing strategies. Patient should be sitting fully upright and alert, take small bites/sips, alternate solids/liquids, and slow rate of intake. Meds as tolerated. No further ST for dysphagia therapy warranted at this time.         Speech & Language evaluation completed per MD orders. The Western Aphasia Battery - Revised (WAB-R) Bedside evaluation administered. Patient presents with mild anomic aphasia. Pt with a Bedside Aphasia Score of 85/100. Patient's verbal expression characterized by mildly halting speech, intermittently, at the conversational/sentence level; intact for simple confrontational naming tasks or concrete generative naming tasks. Auditory comprehension WNL. Oral reading c/b halting and effortful speech with frequent rereading words/sentences or intermittently missing short words. Reading comprehension impaired, which may have been impacted by effort/halting speech during oral reading, but pt with poor recall for simple questions. Written language impaired with notable including extra letters in writing and reduced awareness of spacing and writing in lines, though note grammatical simple sentences. Patient will benefit from direct therapy targeting aphasia. Recommend ongoing outpatient speech therapy upon discharge. SLP will continue to follow for language therapy.   SLP Total Evaluation Time   Eval: oral/pharyngeal swallow function, clinical swallow Minutes (52381) 12   Eval: Sound production with lang comprehension and expression Minutes (50390) 20   Interventions   Interventions Quick Adds Speech, Language, Voice & Communication   SLP Discharge Planning    SLP Plan dysarthria strategies; high level word-finding; train word-finding strategies; oral reading & written comprehension tasks   SLP Discharge Recommendation [facility] patient will benefit from intensive, interdisciplinary therapy.  Anticipate will be able to tolerate 3 hours of therapy per day   SLP Rationale for DC Rec Pt at baseline swallowing fx. Warrants ongoing speech therapy for language tx.   SLP Brief overview of current status  Recommend regular diet and thin liquids. Patient should be sitting fully upright & alert and take small bites/sips at a slow rate. Meds as tolerated. No further dysphagia therapy. Patient presents with mild expressive aphasia & mild dysarthria. Pt will warrant ongoing speech therapy. SLP will continue to follow.

## 2025-07-16 NOTE — CONSULTS
Laboratory Medicine and Pathology  Transfusion Medicine - Apheresis     Bernard Chapman MRN# [id number]   YOB: 1990 Age: 35 year old        Reason for consult: Plasma exchange requested for possible TTP or CAPS           Assessment and Plan:   The patient is a 35 year old male with recent PE and DVT admitted with new stroke and thrombocytopenia.  The requesting teams differential includes thrombotic thrombocytopenic purpura (TTP) and catastrophic antiphospholipid syndrome (CAPS).  A series of therapeutic plasma exchanges (TPE) have been requested. The procedure was reviewed with the patient including the potential risks and benefits. We discussed that due to recent stroke, he may be at increased risk for seizures during the procedure.  We discussed the use of blood in the procedures and the risks and benefits of transfusion. All of his questions were answered. Consent was obtained for a series of TPE and blood transfusion.    - First procedure to be performed urgent today and then daily. For TTP, this is typically until platelet count is >150x10^9/L.  - Patient to have central line placed - Neurology team coordinating placement  - Plasma for replacement is recommended initially for both TTP and CAPS, but may introduce some 5% albumin later in the series.    - ACD-A for anticoagulation - will give calcium gluconate in the return line  - Please check LDH and CBC each day to guide management.  - SREMYR01, lupus anticoagulant, anti-beta 2 glycoprotein, and anti-cardiolipin pending  - Please do not start ACE inhibitors throughout the duration of the TPE series as these have been associated with reactions during apheresis.    - Please notify the Transfusion Medicine physician of any upcoming procedures, surgeries, or biopsies as TPE with albumin replacement will affect coagulation factor levels.  - Please consider the impact of TPE on laboratory studies and medications. Drugs such as IVIG  and rituximab are effectively removed and should be dosed after the procedure.          Chief Complaint:   Confusion         History of Present Illness:   The patient is a 35 year old male admitted with new stroke and thrombocytopenia. He presented to an outside provider with bilateral calf pain on 6/2/2025. Then represented on 6/12/2025 with leg pain, rash, bruising, and chest tightness. On 6/22/2025 admitted with bilateral extensive PE and DVT. He was started on heparin and then switched to apixaban. He developed a rash and then was switched to rivaroxaban. The rash has improved. During this period he was noted to have thrombocytopenia. Platelet count in [facility] record was normal on 6/12/2025 and 2/2/2025, but subsequent below normal and on admission today 28x109/L.  He presented today with new confusion - dropped his phone and had difficulty coordinating to pick it up, headache, and nausea (he had vomited at home).  No numbness or weakness. MRI demonstrated multifocal acute cortical and white mater left cerebral infarcts involving the left MCA territory and a few acute infarcts in the right cerebral hemisphere. CTA showed near occlusion of the left MCA-MA segment. Anticoagulation is being held. He was transferred [facility] to the [place] ED and will be admitted to ICU.  No history of blood transfusion.  No known food allergies.         Past Medical History:   Hypertension  Sleep apnea - uses CPAP  Diabetes  Back injury  Hemorrhoids  Bilateral PE and DVT          Past Surgical History:   No history of surgery          Social History:   Single, no children, works at [employer]          Family History:   No family history of bleeding and clotting disorders          Immunizations:   Not reviewed with patient          Allergies:     Allergies   Allergen Reactions    Apixaban Rash    Amlodipine Other (See Comments) and Rash           Medications:     Current Facility-Administered Medications    Medication Dose Route Frequency Provider Last Rate Last Admin    dexAMETHasone (DECADRON) 40 mg in sodium chloride 0.9 % 59 mL intermittent infusion  40 mg Intravenous Once Segundo Podn MD        iopamidol (ISOVUE-370) solution 100 mL  100 mL Intravenous Once Segundo Pond MD        sodium chloride 0.9 % bag for CT scan flush  100 mL Intravenous Once Segundo Pond MD        sodium chloride 0.9 % infusion   Intravenous Continuous Gabriel Estevez  mL/hr at 07/15/25 1439 New Bag at 07/15/25 1439     Current Outpatient Medications   Medication Sig    aluminum chloride (DRYSOL) 20 % external solution Apply 1 Application. topically.    Ascorbic Acid (VITAMIN C) 500 MG CAPS Take by mouth.    B Complex Vitamins (VITAMIN B-COMPLEX PO) Take by mouth.    cyclobenzaprine (FLEXERIL) 10 MG tablet Take 10 mg by mouth.    Dulaglutide 4.5 MG/0.5ML SOAJ Inject 4.5 mg subcutaneously once a week.    fluticasone (FLONASE) 50 MCG/ACT nasal spray Spray 1 spray in nostril.    hydrochlorothiazide (HYDRODIURIL) 25 MG tablet Take 25 mg by mouth daily.    losartan (COZAAR) 100 MG tablet Take 100 mg by mouth daily.    metoprolol succinate ER (TOPROL XL) 25 MG 24 hr tablet Take 25 mg by mouth.    Multiple Vitamin (MULTIVITAMIN PO) Take by mouth.    Rivaroxaban ANTICOAGULANT 15 & 20 MG TBPK Starter Therapy Pack Take as directed on package.    valACYclovir (VALTREX) 1000 mg tablet Take 500 mg by mouth.           Review of Systems:   +headache - but now resolved  +nausea, vomiting  +leg swelling  Denied fever, chills, cough, shortness of breath, diarrhea         Exam:   P 92 /76 O2 sat 96% T 98.9  Alert, no apparent distress  No jaundice or scleral icterus  Breathing appears comfortable on room air  Moves all extremities  Right PIV          Data:     Recent Results (from the past 24 hours)   EKG 12-lead, tracing only   Result Value Ref Range    Systolic Blood Pressure  mmHg    Diastolic Blood  Pressure  mmHg    Ventricular Rate 94 BPM    Atrial Rate 94 BPM    MN Interval 132 ms    QRS Duration 76 ms     ms    QTc 410 ms    P Axis 71 degrees    R AXIS 28 degrees    T Axis 3 degrees    Interpretation ECG Sinus rhythm  Normal ECG      INR   Result Value Ref Range    INR 2.12 (H) 0.85 - 1.15    PT 24.0 (H) 11.8 - 14.8 Seconds   Partial thromboplastin time   Result Value Ref Range    aPTT 32 22 - 38 Seconds   Comprehensive metabolic panel   Result Value Ref Range    Sodium 139 135 - 145 mmol/L    Potassium 4.2 3.4 - 5.3 mmol/L    Carbon Dioxide (CO2) 25 22 - 29 mmol/L    Anion Gap 13 7 - 15 mmol/L    Urea Nitrogen 18.5 6.0 - 20.0 mg/dL    Creatinine 1.06 0.67 - 1.17 mg/dL    GFR Estimate >90 >60 mL/min/1.73m2    Calcium 9.5 8.8 - 10.4 mg/dL    Chloride 101 98 - 107 mmol/L    Glucose 123 (H) 70 - 99 mg/dL    Alkaline Phosphatase 73 40 - 150 U/L    AST 27 0 - 45 U/L    ALT 37 0 - 70 U/L    Protein Total 8.1 6.4 - 8.3 g/dL    Albumin 4.8 3.5 - 5.2 g/dL    Bilirubin Total 1.0 <=1.2 mg/dL   Troponin T, High Sensitivity   Result Value Ref Range    Troponin T, High Sensitivity 34 (H) <=22 ng/L   Magnesium   Result Value Ref Range    Magnesium 2.0 1.7 - 2.3 mg/dL   TSH with free T4 reflex   Result Value Ref Range    TSH 1.18 0.30 - 4.20 uIU/mL   CBC with platelets and differential   Result Value Ref Range    WBC Count 13.3 (H) 4.0 - 11.0 10e3/uL    RBC Count 5.25 4.40 - 5.90 10e6/uL    Hemoglobin 14.1 13.3 - 17.7 g/dL    Hematocrit 42.3 40.0 - 53.0 %    MCV 81 78 - 100 fL    MCH 26.9 26.5 - 33.0 pg    MCHC 33.3 31.5 - 36.5 g/dL    RDW 13.0 10.0 - 15.0 %    Platelet Count 28 (LL) 150 - 450 10e3/uL    % Neutrophils 86 %    % Lymphocytes 8 %    % Monocytes 4 %    % Eosinophils 1 %    % Basophils 0 %    % Immature Granulocytes 1 %    NRBCs per 100 WBC 0 <1 /100    Absolute Neutrophils 11.5 (H) 1.6 - 8.3 10e3/uL    Absolute Lymphocytes 1.1 0.8 - 5.3 10e3/uL    Absolute Monocytes 0.6 0.0 - 1.3 10e3/uL    Absolute  Eosinophils 0.1 0.0 - 0.7 10e3/uL    Absolute Basophils 0.1 0.0 - 0.2 10e3/uL    Absolute Immature Granulocytes 0.1 <=0.4 10e3/uL    Absolute NRBCs 0.0 10e3/uL   RBC and Platelet Morphology   Result Value Ref Range    RBC Morphology Confirmed RBC Indices     Platelet Assessment  Automated Count Confirmed. Platelet morphology is normal.     Automated Count Confirmed. Platelet morphology is normal.   Glucose by meter   Result Value Ref Range    GLUCOSE BY METER POCT 130 (H) 70 - 99 mg/dL   Head CT w/o contrast    Narrative    EXAM: CT HEAD W/O CONTRAST  LOCATION: [facility]  DATE: 7/15/2025    INDICATION: Nausea, headache, dizziness, on anticoagulation  COMPARISON: None.  TECHNIQUE: Routine CT Head without IV contrast. Multiplanar reformats. Dose reduction techniques were used.    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 Associated dysmorphic appearance of the lateral ventricles. No hydrocephalus. No intracranial hemorrhage, extraaxial   collection, or mass effect.  No CT evidence of acute infarct. Normal parenchymal attenuation.      VISUALIZED ORBITS/SINUSES/MASTOIDS: No intraorbital abnormality. No paranasal sinus mucosal disease. No middle ear or mastoid effusion.    BONES/SOFT TISSUES: No acute abnormality.      Impression    IMPRESSION:  1.  No evidence of acute intracranial abnormality.  2.  Callosal dysgenesis.     Troponin T, High Sensitivity   Result Value Ref Range    Troponin T, High Sensitivity 33 (H) <=22 ng/L   MR Brain w/o & w Contrast    Narrative    EXAM: MR BRAIN W/O and W CONTRAST  LOCATION: [facility]  DATE: 7/15/2025    INDICATION: Dizziness, altered mental status, and headache  COMPARISON: CT performed earlier today  CONTRAST: 11.2 mL Gadavist  TECHNIQUE: Routine multiplanar multisequence head MRI  without and with intravenous contrast    FINDINGS:  INTRACRANIAL CONTENTS: Numerous scattered punctate and patchy cortical and white matter foci of restricted diffusion involving the left middle cerebral artery territory with relative sparing of the left temporal as well as parasagittal left frontal and   occipital lobes. A few smaller foci of restricted diffusion on the right predominantly centered at the occipitotemporal junction with additional foci in the right parietal lobe and frontal centrum semiovale. Corresponding low ADC values and FLAIR   hyperintensity without significant associated mass effect. Curvilinear FLAIR hyperintensity along the posterior aspect of the left middle frontal gyrus with corresponding loss of signal on the susceptibility weighted images without a correlate on the T1-   or diffusion-weighted images, which may represent slow flow or thrombosis of a cortical vessel versus trace subarachnoid hemorrhage (for example, series 9 image 24 and series 12 image 59). No confluent intraparenchymal hematoma. No mass or pathologic   enhancement. No significant brain parenchymal signal abnormality elsewhere. Redemonstrated callosal dysgenesis with absence of the genu as well as the posterior body, isthmus, and splenium. Normal size of the ventricles and sulci for age. Normal position   of the cerebellar tonsils.     SELLA: Within technique limitations, no gross abnormality.    OSSEOUS STRUCTURES/SOFT TISSUES: No suspicious bone marrow signal intensity. The major intracranial vascular flow voids are maintained. Increased FLAIR signal intensity within multiple distal M2-M4 segment branches of the left MCA, likely due to slow   flow.    SINUSES/MASTOIDS: No evidence of significant paranasal sinus or mastoid mucosal disease.        Impression    IMPRESSION:  1.  Numerous multifocal acute cortical and white matter left cerebral hemisphere infarcts predominantly involving the left MCA territory with a few  acute infarcts in the right cerebral hemisphere, as described. No significant mass effect.  2.  Trace acute subarachnoid hemorrhage versus slow flow or thrombosis of a cortical vessel along the parasagittal left frontal lobe, the latter favored. No confluent intraparenchymal hematoma.      CBC with platelets and differential   Result Value Ref Range    WBC Count 11.5 (H) 4.0 - 11.0 10e3/uL    RBC Count 4.61 4.40 - 5.90 10e6/uL    Hemoglobin 12.5 (L) 13.3 - 17.7 g/dL    Hematocrit 37.0 (L) 40.0 - 53.0 %    MCV 80 78 - 100 fL    MCH 27.1 26.5 - 33.0 pg    MCHC 33.8 31.5 - 36.5 g/dL    RDW 13.1 10.0 - 15.0 %    Platelet Count 26 (LL) 150 - 450 10e3/uL    % Neutrophils 81 %    % Lymphocytes 11 %    % Monocytes 6 %    % Eosinophils 1 %    % Basophils 0 %    % Immature Granulocytes 1 %    NRBCs per 100 WBC 0 <1 /100    Absolute Neutrophils 9.4 (H) 1.6 - 8.3 10e3/uL    Absolute Lymphocytes 1.2 0.8 - 5.3 10e3/uL    Absolute Monocytes 0.7 0.0 - 1.3 10e3/uL    Absolute Eosinophils 0.1 0.0 - 0.7 10e3/uL    Absolute Basophils 0.0 0.0 - 0.2 10e3/uL    Absolute Immature Granulocytes 0.1 <=0.4 10e3/uL    Absolute NRBCs 0.0 10e3/uL   CTA Head Neck with Contrast    Narrative    EXAM: CTA HEAD NECK W CONTRAST  LOCATION: [facility]  DATE: 7/15/2025    INDICATION: stroke on MRI  COMPARISON: Same day noncontrast head CT and MRI brain.  CONTRAST: 67mL Isovue 370  TECHNIQUE: Head and neck CT angiogram with IV contrast. CT images of the head and neck vessels obtained during the arterial phase of intravenous contrast administration. Axial 2D reconstructed images and multiplanar 3D MIP reconstructed images of the   head and neck vessels were performed by the technologist. Dose reduction techniques were used. All stenosis measurements made according to NASCET criteria unless otherwise specified.    FINDINGS:   NECK CTA:  AORTIC ARCH: Normal caliber two-vessel aortic arch. Patent proximal great  vessels.    RIGHT CAROTID: Patent.    LEFT CAROTID: Patent.    VERTEBRAL ARTERIES: Patent. Balanced vertebral arteries.    HEAD CTA:  ANTERIOR CIRCULATION: Patent intracranial carotid vasculature. Near occlusion of the left MCA-M1 segment (8 mm length), measuring 8 mm in length; distal opacification throughout the M2 segments.    POSTERIOR CIRCULATION: Patent intracranial vertebral vasculature. Patent basilar artery. No large vessel occlusion or aneurysm.    DURAL VENOUS SINUSES: Expected enhancement of the major dural venous sinuses.    NONVASCULAR STRUCTURES: Unremarkable.        Impression    IMPRESSION:   NECK CTA:  1.  Patent neck vasculature.    HEAD CTA:   1.  Near occlusion of the left MCA-M1 segment (8 mm length) with distal opacification throughout the M2 segments.    Findings discussed with Dr. Pond by Dr. Owens at 8:25 PM 7/15/2025.   CTV Head with Contrast    Narrative    EXAM: CTV HEAD WITH CONTRAST  LOCATION: [facility]  DATE: 7/15/2025    INDICATION: r o CVST  COMPARISON: Same day noncontrast head CT and MRI.  CONTRAST: 67mL Isovue 370  TECHNIQUE: Head CT venogram with IV contrast. CT images of the intracranial vessels obtained during the venous phase of intravenous contrast administration. Axial helical 2D reconstructed images and multiplanar 3D MIP reconstructed images of the   intracranial vessels were performed by the technologist. Dose reduction techniques were used.     FINDINGS:   DURAL SINUSES: No significant stenosis or occlusion. Dominant right and smaller left transverse and sigmoid dural sinuses.    INTERNAL CEREBRAL VEINS: No significant stenosis or occlusion.      MAJOR CORTICAL VENOUS BRANCHES: No significant stenosis or occlusion; specifically, no findings to suggest thrombus along the left cerebral hemisphere as queried on recent MRI..      Impression    IMPRESSION:   1.  No intracranial venous thrombosis.   Lactate Dehydrogenase    Result Value Ref Range    Lactate Dehydrogenase 514 (H) 0 - 250 U/L   INR   Result Value Ref Range    INR 2.05 (H) 0.85 - 1.15    PT 22.8 (H) 11.8 - 14.8 Seconds   Partial thromboplastin time   Result Value Ref Range    aPTT 30 22 - 38 Seconds   Fibrinogen activity   Result Value Ref Range    Fibrinogen Activity 151 (L) 170 - 510 mg/dL   Ferritin   Result Value Ref Range    Ferritin 535 (H) 31 - 409 ng/mL   Adult Type and Screen   Result Value Ref Range    ABO/RH(D) A POS     Antibody Screen Negative Negative    SPECIMEN EXPIRATION DATE 7/18/2025 11:59:00 PM CDT    CBC with platelets and differential   Result Value Ref Range    WBC Count 12.8 (H) 4.0 - 11.0 10e3/uL    RBC Count 4.54 4.40 - 5.90 10e6/uL    Hemoglobin 12.3 (L) 13.3 - 17.7 g/dL    Hematocrit 37.1 (L) 40.0 - 53.0 %    MCV 82 78 - 100 fL    MCH 27.1 26.5 - 33.0 pg    MCHC 33.2 31.5 - 36.5 g/dL    RDW 13.1 10.0 - 15.0 %    Platelet Count 25 (LL) 150 - 450 10e3/uL    % Neutrophils 81 %    % Lymphocytes 11 %    % Monocytes 6 %    % Eosinophils 1 %    % Basophils 0 %    % Immature Granulocytes 1 %    NRBCs per 100 WBC 0 <1 /100    Absolute Neutrophils 10.4 (H) 1.6 - 8.3 10e3/uL    Absolute Lymphocytes 1.5 0.8 - 5.3 10e3/uL    Absolute Monocytes 0.8 0.0 - 1.3 10e3/uL    Absolute Eosinophils 0.1 0.0 - 0.7 10e3/uL    Absolute Basophils 0.0 0.0 - 0.2 10e3/uL    Absolute Immature Granulocytes 0.1 <=0.4 10e3/uL    Absolute NRBCs 0.0 10e3/uL   Reticulocyte count   Result Value Ref Range    % Reticulocyte 2.3 (H) 0.5 - 2.0 %    Absolute Reticulocyte 0.104 (H) 0.025 - 0.095 10e6/uL     Attestation:  I, Ashely Butler MD, PhD saw and evaluated this patient directly.  I have reviewed the patient's records and data, this includes all of the above testing results.  Proceed with series of therapeutic plasma exchanges.  45  minutes spent on the date of the encounter doing chart review, history and exam, documentation and review of test results.    Ashely Qureshi  MD Luke, PhD  Transfusion Medicine Attending  Laboratory Medicine and Pathology

## 2025-07-17 ENCOUNTER — APPOINTMENT (OUTPATIENT)
Dept: CARDIOLOGY | Facility: CLINIC | Age: 35
End: 2025-07-17
Payer: COMMERCIAL

## 2025-07-17 ENCOUNTER — PROCEDURE ONLY VISIT (OUTPATIENT)
Dept: MULTI SPECIALTY CLINIC | Facility: CLINIC | Age: 35
End: 2025-07-17

## 2025-07-17 ENCOUNTER — APPOINTMENT (OUTPATIENT)
Dept: CT IMAGING | Facility: CLINIC | Age: 35
End: 2025-07-17
Attending: PHYSICIAN ASSISTANT
Payer: COMMERCIAL

## 2025-07-17 ENCOUNTER — APPOINTMENT (OUTPATIENT)
Dept: CT IMAGING | Facility: CLINIC | Age: 35
DRG: 064 | End: 2025-07-17
Attending: PHYSICIAN ASSISTANT
Payer: COMMERCIAL

## 2025-07-17 ENCOUNTER — APPOINTMENT (OUTPATIENT)
Dept: PHYSICAL THERAPY | Facility: CLINIC | Age: 35
DRG: 064 | End: 2025-07-17
Payer: COMMERCIAL

## 2025-07-17 VITALS
TEMPERATURE: 98.9 F | DIASTOLIC BLOOD PRESSURE: 64 MMHG | BODY MASS INDEX: 36.36 KG/M2 | WEIGHT: 283.29 LBS | OXYGEN SATURATION: 97 % | RESPIRATION RATE: 16 BRPM | HEIGHT: 74 IN | SYSTOLIC BLOOD PRESSURE: 114 MMHG | HEART RATE: 86 BPM

## 2025-07-17 DIAGNOSIS — R59.1 LYMPHADENOPATHY: Primary | ICD-10-CM

## 2025-07-17 LAB
1,25(OH)2D SERPL-MCNC: 58.6 PG/ML (ref 19.9–79.3)
ANION GAP SERPL CALCULATED.3IONS-SCNC: 10 MMOL/L (ref 7–15)
APTT PPP: 28 SECONDS (ref 22–38)
APTT PPP: 30 SECONDS (ref 22–38)
APTT PPP: 51 SECONDS (ref 22–38)
B2 GLYCOPROT1 IGG SERPL IA-ACNC: <0.8 U/ML
B2 GLYCOPROT1 IGM SERPL IA-ACNC: <2.4 U/ML
BACTERIA SPEC CULT: NORMAL
BACTERIA SPEC CULT: NORMAL
BLD PROD TYP BPU: NORMAL
BLOOD COMPONENT TYPE: NORMAL
BUN SERPL-MCNC: 15.6 MG/DL (ref 6–20)
C3 SERPL-MCNC: 104 MG/DL (ref 81–157)
C4 SERPL-MCNC: 14 MG/DL (ref 13–39)
CALCIUM SERPL-MCNC: 8.7 MG/DL (ref 8.8–10.4)
CHLORIDE SERPL-SCNC: 104 MMOL/L (ref 98–107)
CODING SYSTEM: NORMAL
CREAT SERPL-MCNC: 0.91 MG/DL (ref 0.67–1.17)
CRP SERPL-MCNC: 9.23 MG/L
EGFRCR SERPLBLD CKD-EPI 2021: >90 ML/MIN/1.73M2
ERYTHROCYTE [DISTWIDTH] IN BLOOD BY AUTOMATED COUNT: 13.1 % (ref 10–15)
ERYTHROCYTE [DISTWIDTH] IN BLOOD BY AUTOMATED COUNT: 13.2 % (ref 10–15)
ERYTHROCYTE [SEDIMENTATION RATE] IN BLOOD BY WESTERGREN METHOD: 4 MM/HR (ref 0–15)
GLUCOSE BLDC GLUCOMTR-MCNC: 133 MG/DL (ref 70–99)
GLUCOSE BLDC GLUCOMTR-MCNC: 178 MG/DL (ref 70–99)
GLUCOSE SERPL-MCNC: 139 MG/DL (ref 70–99)
HBV SURFACE AG SERPL QL IA: NONREACTIVE
HCO3 SERPL-SCNC: 26 MMOL/L (ref 22–29)
HCT VFR BLD AUTO: 32.4 % (ref 40–53)
HCT VFR BLD AUTO: 33.8 % (ref 40–53)
HGB BLD-MCNC: 10.7 G/DL (ref 13.3–17.7)
HGB BLD-MCNC: 11.3 G/DL (ref 13.3–17.7)
HOLD SPECIMEN: NORMAL
IGA SERPL-MCNC: 173 MG/DL (ref 84–499)
IGG SERPL-MCNC: 810 MG/DL (ref 610–1616)
IGM SERPL-MCNC: 84 MG/DL (ref 35–242)
INR PPP: 1.71 (ref 0.85–1.15)
ISSUE DATE AND TIME: NORMAL
KAPPA LC FREE SER-MCNC: 1.35 MG/DL (ref 0.33–1.94)
KAPPA LC FREE/LAMBDA FREE SER NEPH: 1.25 {RATIO} (ref 0.26–1.65)
LAMBDA LC FREE SERPL-MCNC: 1.08 MG/DL (ref 0.57–2.63)
LDH SERPL L TO P-CCNC: 449 U/L (ref 0–250)
LVEF ECHO: NORMAL
MAGNESIUM SERPL-MCNC: 2.1 MG/DL (ref 1.7–2.3)
MCH RBC QN AUTO: 26.7 PG (ref 26.5–33)
MCH RBC QN AUTO: 26.8 PG (ref 26.5–33)
MCHC RBC AUTO-ENTMCNC: 33 G/DL (ref 31.5–36.5)
MCHC RBC AUTO-ENTMCNC: 33.4 G/DL (ref 31.5–36.5)
MCV RBC AUTO: 80 FL (ref 78–100)
MCV RBC AUTO: 81 FL (ref 78–100)
MCV RBC AUTO: 81 FL (ref 78–100)
MCV RBC AUTO: 82 FL (ref 78–100)
PATH REPORT.COMMENTS IMP SPEC: NORMAL
PATH REPORT.FINAL DX SPEC: NORMAL
PATH REPORT.GROSS SPEC: NORMAL
PATH REPORT.MICROSCOPIC SPEC OTHER STN: NORMAL
PATH REPORT.RELEVANT HX SPEC: NORMAL
PHOSPHATE SERPL-MCNC: 3.5 MG/DL (ref 2.5–4.5)
PHOTO IMAGE: NORMAL
PLATELET # BLD AUTO: 38 10E3/UL (ref 150–450)
PLATELET # BLD AUTO: 44 10E3/UL (ref 150–450)
PLATELET # BLD AUTO: 54 10E3/UL (ref 150–450)
PLATELET # BLD AUTO: 62 10E3/UL (ref 150–450)
POTASSIUM SERPL-SCNC: 3.9 MMOL/L (ref 3.4–5.3)
PROTHROMBIN TIME: 19.8 SECONDS (ref 11.8–14.8)
RBC # BLD AUTO: 3.99 10E6/UL (ref 4.4–5.9)
RBC # BLD AUTO: 4.23 10E6/UL (ref 4.4–5.9)
SODIUM SERPL-SCNC: 140 MMOL/L (ref 135–145)
UFH PPP CHRO-ACNC: <0.1 IU/ML (ref ?–1.1)
UNIT ABO/RH: NORMAL
UNIT NUMBER: NORMAL
UNIT STATUS: NORMAL
UNIT TYPE ISBT: 6200
WBC # BLD AUTO: 14 10E3/UL (ref 4–11)
WBC # BLD AUTO: 14.5 10E3/UL (ref 4–11)

## 2025-07-17 PROCEDURE — 87799 DETECT AGENT NOS DNA QUANT: CPT

## 2025-07-17 PROCEDURE — 71275 CT ANGIOGRAPHY CHEST: CPT

## 2025-07-17 PROCEDURE — 85730 THROMBOPLASTIN TIME PARTIAL: CPT

## 2025-07-17 PROCEDURE — 86038 ANTINUCLEAR ANTIBODIES: CPT

## 2025-07-17 PROCEDURE — P9037 PLATE PHERES LEUKOREDU IRRAD: HCPCS

## 2025-07-17 PROCEDURE — 85610 PROTHROMBIN TIME: CPT | Performed by: NURSE PRACTITIONER

## 2025-07-17 PROCEDURE — 97161 PT EVAL LOW COMPLEX 20 MIN: CPT | Mod: GP | Performed by: PHYSICAL THERAPIST

## 2025-07-17 PROCEDURE — 97530 THERAPEUTIC ACTIVITIES: CPT | Mod: GP | Performed by: PHYSICAL THERAPIST

## 2025-07-17 PROCEDURE — 99233 SBSQ HOSP IP/OBS HIGH 50: CPT | Mod: GC | Performed by: INTERNAL MEDICINE

## 2025-07-17 PROCEDURE — 82164 ANGIOTENSIN I ENZYME TEST: CPT | Performed by: STUDENT IN AN ORGANIZED HEALTH CARE EDUCATION/TRAINING PROGRAM

## 2025-07-17 PROCEDURE — 85652 RBC SED RATE AUTOMATED: CPT

## 2025-07-17 PROCEDURE — 85014 HEMATOCRIT: CPT | Performed by: NURSE PRACTITIONER

## 2025-07-17 PROCEDURE — 70496 CT ANGIOGRAPHY HEAD: CPT | Mod: 26 | Performed by: RADIOLOGY

## 2025-07-17 PROCEDURE — 83735 ASSAY OF MAGNESIUM: CPT | Performed by: NURSE PRACTITIONER

## 2025-07-17 PROCEDURE — 70498 CT ANGIOGRAPHY NECK: CPT

## 2025-07-17 PROCEDURE — 99291 CRITICAL CARE FIRST HOUR: CPT | Mod: GC | Performed by: PSYCHIATRY & NEUROLOGY

## 2025-07-17 PROCEDURE — 80048 BASIC METABOLIC PNL TOTAL CA: CPT | Performed by: NURSE PRACTITIONER

## 2025-07-17 PROCEDURE — 70450 CT HEAD/BRAIN W/O DYE: CPT

## 2025-07-17 PROCEDURE — 250N000009 HC RX 250

## 2025-07-17 PROCEDURE — 83521 IG LIGHT CHAINS FREE EACH: CPT

## 2025-07-17 PROCEDURE — 83615 LACTATE (LD) (LDH) ENZYME: CPT | Performed by: NURSE PRACTITIONER

## 2025-07-17 PROCEDURE — 87449 NOS EACH ORGANISM AG IA: CPT

## 2025-07-17 PROCEDURE — 0042T CT HEAD PERFUSION W CONTRAST: CPT

## 2025-07-17 PROCEDURE — 250N000011 HC RX IP 250 OP 636

## 2025-07-17 PROCEDURE — 99222 1ST HOSP IP/OBS MODERATE 55: CPT | Performed by: STUDENT IN AN ORGANIZED HEALTH CARE EDUCATION/TRAINING PROGRAM

## 2025-07-17 PROCEDURE — 86780 TREPONEMA PALLIDUM: CPT

## 2025-07-17 PROCEDURE — 86160 COMPLEMENT ANTIGEN: CPT

## 2025-07-17 PROCEDURE — 93320 DOPPLER ECHO COMPLETE: CPT | Mod: 26 | Performed by: STUDENT IN AN ORGANIZED HEALTH CARE EDUCATION/TRAINING PROGRAM

## 2025-07-17 PROCEDURE — 250N000011 HC RX IP 250 OP 636: Performed by: STUDENT IN AN ORGANIZED HEALTH CARE EDUCATION/TRAINING PROGRAM

## 2025-07-17 PROCEDURE — 85730 THROMBOPLASTIN TIME PARTIAL: CPT | Performed by: NURSE PRACTITIONER

## 2025-07-17 PROCEDURE — 99152 MOD SED SAME PHYS/QHP 5/>YRS: CPT | Performed by: STUDENT IN AN ORGANIZED HEALTH CARE EDUCATION/TRAINING PROGRAM

## 2025-07-17 PROCEDURE — 87040 BLOOD CULTURE FOR BACTERIA: CPT | Performed by: PSYCHIATRY & NEUROLOGY

## 2025-07-17 PROCEDURE — 82652 VIT D 1 25-DIHYDROXY: CPT | Performed by: STUDENT IN AN ORGANIZED HEALTH CARE EDUCATION/TRAINING PROGRAM

## 2025-07-17 PROCEDURE — 93325 DOPPLER ECHO COLOR FLOW MAPG: CPT | Mod: 26 | Performed by: STUDENT IN AN ORGANIZED HEALTH CARE EDUCATION/TRAINING PROGRAM

## 2025-07-17 PROCEDURE — 82784 ASSAY IGA/IGD/IGG/IGM EACH: CPT

## 2025-07-17 PROCEDURE — 76376 3D RENDER W/INTRP POSTPROCES: CPT

## 2025-07-17 PROCEDURE — 87340 HEPATITIS B SURFACE AG IA: CPT

## 2025-07-17 PROCEDURE — 71275 CT ANGIOGRAPHY CHEST: CPT | Mod: 26 | Performed by: STUDENT IN AN ORGANIZED HEALTH CARE EDUCATION/TRAINING PROGRAM

## 2025-07-17 PROCEDURE — 0042T CT HEAD PERFUSION W CONTRAST: CPT | Mod: GC | Performed by: RADIOLOGY

## 2025-07-17 PROCEDURE — 85049 AUTOMATED PLATELET COUNT: CPT

## 2025-07-17 PROCEDURE — 99153 MOD SED SAME PHYS/QHP EA: CPT | Performed by: STUDENT IN AN ORGANIZED HEALTH CARE EDUCATION/TRAINING PROGRAM

## 2025-07-17 PROCEDURE — 85520 HEPARIN ASSAY: CPT

## 2025-07-17 PROCEDURE — 36415 COLL VENOUS BLD VENIPUNCTURE: CPT | Performed by: PSYCHIATRY & NEUROLOGY

## 2025-07-17 PROCEDURE — 97116 GAIT TRAINING THERAPY: CPT | Mod: GP | Performed by: PHYSICAL THERAPIST

## 2025-07-17 PROCEDURE — 93325 DOPPLER ECHO COLOR FLOW MAPG: CPT

## 2025-07-17 PROCEDURE — 76376 3D RENDER W/INTRP POSTPROCES: CPT | Performed by: STUDENT IN AN ORGANIZED HEALTH CARE EDUCATION/TRAINING PROGRAM

## 2025-07-17 PROCEDURE — 70498 CT ANGIOGRAPHY NECK: CPT | Mod: 26 | Performed by: RADIOLOGY

## 2025-07-17 PROCEDURE — 93312 ECHO TRANSESOPHAGEAL: CPT | Mod: 26 | Performed by: STUDENT IN AN ORGANIZED HEALTH CARE EDUCATION/TRAINING PROGRAM

## 2025-07-17 PROCEDURE — 250N000009 HC RX 250: Performed by: STUDENT IN AN ORGANIZED HEALTH CARE EDUCATION/TRAINING PROGRAM

## 2025-07-17 PROCEDURE — 86036 ANCA SCREEN EACH ANTIBODY: CPT

## 2025-07-17 PROCEDURE — 250N000013 HC RX MED GY IP 250 OP 250 PS 637: Performed by: NURSE PRACTITIONER

## 2025-07-17 PROCEDURE — 86140 C-REACTIVE PROTEIN: CPT

## 2025-07-17 PROCEDURE — 84100 ASSAY OF PHOSPHORUS: CPT

## 2025-07-17 PROCEDURE — 85730 THROMBOPLASTIN TIME PARTIAL: CPT | Performed by: PSYCHIATRY & NEUROLOGY

## 2025-07-17 PROCEDURE — 250N000011 HC RX IP 250 OP 636: Performed by: PHYSICIAN ASSISTANT

## 2025-07-17 PROCEDURE — 200N000002 HC R&B ICU UMMC

## 2025-07-17 RX ORDER — FLUMAZENIL 0.1 MG/ML
0.2 INJECTION, SOLUTION INTRAVENOUS
Status: DISCONTINUED | OUTPATIENT
Start: 2025-07-17 | End: 2025-07-17

## 2025-07-17 RX ORDER — LIDOCAINE HYDROCHLORIDE 20 MG/ML
15 SOLUTION OROPHARYNGEAL ONCE
Status: COMPLETED | OUTPATIENT
Start: 2025-07-17 | End: 2025-07-17

## 2025-07-17 RX ORDER — NALOXONE HYDROCHLORIDE 0.4 MG/ML
0.4 INJECTION, SOLUTION INTRAMUSCULAR; INTRAVENOUS; SUBCUTANEOUS
Status: DISCONTINUED | OUTPATIENT
Start: 2025-07-17 | End: 2025-07-17

## 2025-07-17 RX ORDER — DIPHENHYDRAMINE HCL 25 MG
25 CAPSULE ORAL EVERY 6 HOURS PRN
Status: DISCONTINUED | OUTPATIENT
Start: 2025-07-17 | End: 2025-07-19

## 2025-07-17 RX ORDER — NALOXONE HYDROCHLORIDE 0.4 MG/ML
0.2 INJECTION, SOLUTION INTRAMUSCULAR; INTRAVENOUS; SUBCUTANEOUS
Status: DISCONTINUED | OUTPATIENT
Start: 2025-07-17 | End: 2025-07-17

## 2025-07-17 RX ORDER — FUROSEMIDE 10 MG/ML
20 INJECTION INTRAMUSCULAR; INTRAVENOUS ONCE
Status: COMPLETED | OUTPATIENT
Start: 2025-07-17 | End: 2025-07-17

## 2025-07-17 RX ORDER — HEPARIN SODIUM 10000 [USP'U]/100ML
0-5000 INJECTION, SOLUTION INTRAVENOUS CONTINUOUS
Status: DISCONTINUED | OUTPATIENT
Start: 2025-07-17 | End: 2025-07-19

## 2025-07-17 RX ORDER — SODIUM CHLORIDE 9 MG/ML
1000 INJECTION, SOLUTION INTRAVENOUS CONTINUOUS
Status: DISCONTINUED | OUTPATIENT
Start: 2025-07-17 | End: 2025-07-17

## 2025-07-17 RX ORDER — LIDOCAINE 40 MG/G
CREAM TOPICAL
Status: DISCONTINUED | OUTPATIENT
Start: 2025-07-17 | End: 2025-07-18 | Stop reason: HOSPADM

## 2025-07-17 RX ORDER — DIPHENHYDRAMINE HYDROCHLORIDE 50 MG/ML
25 INJECTION, SOLUTION INTRAMUSCULAR; INTRAVENOUS EVERY 6 HOURS PRN
Status: DISCONTINUED | OUTPATIENT
Start: 2025-07-17 | End: 2025-07-19

## 2025-07-17 RX ORDER — FENTANYL CITRATE 50 UG/ML
50 INJECTION, SOLUTION INTRAMUSCULAR; INTRAVENOUS
Refills: 0 | Status: COMPLETED | OUTPATIENT
Start: 2025-07-17 | End: 2025-07-17

## 2025-07-17 RX ORDER — IOPAMIDOL 755 MG/ML
188 INJECTION, SOLUTION INTRAVASCULAR ONCE
Status: COMPLETED | OUTPATIENT
Start: 2025-07-17 | End: 2025-07-17

## 2025-07-17 RX ORDER — ACYCLOVIR 200 MG/1
9.5 CAPSULE ORAL
Status: DISCONTINUED | OUTPATIENT
Start: 2025-07-17 | End: 2025-07-17

## 2025-07-17 RX ORDER — FENTANYL CITRATE 50 UG/ML
25 INJECTION, SOLUTION INTRAMUSCULAR; INTRAVENOUS
Refills: 0 | Status: DISCONTINUED | OUTPATIENT
Start: 2025-07-17 | End: 2025-07-17

## 2025-07-17 RX ADMIN — MIDAZOLAM 2 MG: 1 INJECTION INTRAMUSCULAR; INTRAVENOUS at 11:11

## 2025-07-17 RX ADMIN — MIDAZOLAM 2 MG: 1 INJECTION INTRAMUSCULAR; INTRAVENOUS at 11:19

## 2025-07-17 RX ADMIN — FENTANYL CITRATE 50 MCG: 50 INJECTION, SOLUTION INTRAMUSCULAR; INTRAVENOUS at 10:41

## 2025-07-17 RX ADMIN — MIDAZOLAM 2 MG: 1 INJECTION INTRAMUSCULAR; INTRAVENOUS at 10:50

## 2025-07-17 RX ADMIN — TOPICAL ANESTHETIC 0.5 ML: 200 SPRAY DENTAL; PERIODONTAL at 10:33

## 2025-07-17 RX ADMIN — FUROSEMIDE 20 MG: 10 INJECTION, SOLUTION INTRAMUSCULAR; INTRAVENOUS at 18:59

## 2025-07-17 RX ADMIN — MIDAZOLAM 2 MG: 1 INJECTION INTRAMUSCULAR; INTRAVENOUS at 10:43

## 2025-07-17 RX ADMIN — SENNOSIDES AND DOCUSATE SODIUM 1 TABLET: 50; 8.6 TABLET ORAL at 20:01

## 2025-07-17 RX ADMIN — MIDAZOLAM 2 MG: 1 INJECTION INTRAMUSCULAR; INTRAVENOUS at 10:41

## 2025-07-17 RX ADMIN — IOPAMIDOL 188 ML: 755 INJECTION, SOLUTION INTRAVENOUS at 12:08

## 2025-07-17 RX ADMIN — SODIUM CHLORIDE, SODIUM GLUCONATE, SODIUM ACETATE, POTASSIUM CHLORIDE AND MAGNESIUM CHLORIDE: 526; 502; 368; 37; 30 INJECTION, SOLUTION INTRAVENOUS at 15:28

## 2025-07-17 RX ADMIN — LIDOCAINE HYDROCHLORIDE 15 ML: 20 SOLUTION ORAL at 10:33

## 2025-07-17 RX ADMIN — SODIUM CHLORIDE, SODIUM GLUCONATE, SODIUM ACETATE, POTASSIUM CHLORIDE AND MAGNESIUM CHLORIDE 100 ML/HR: 526; 502; 368; 37; 30 INJECTION, SOLUTION INTRAVENOUS at 09:38

## 2025-07-17 RX ADMIN — HEPARIN SODIUM 1800 UNITS/HR: 10000 INJECTION, SOLUTION INTRAVENOUS at 17:43

## 2025-07-17 RX ADMIN — ROSUVASTATIN CALCIUM 10 MG: 10 TABLET, FILM COATED ORAL at 15:27

## 2025-07-17 ASSESSMENT — ACTIVITIES OF DAILY LIVING (ADL)
ADLS_ACUITY_SCORE: 63

## 2025-07-17 NOTE — CONSULTS
THORACIC SURGERY CONSULT NOTE    Consult Reason: multiple enlarged mediastinal and hilar lymph nodes with necrotic changes in the paraesophageal lymph nodes concerning for neoplastic vs lymphoproliferative vs metastasis     HPI: Bernard Chapman is a 35 year old male with a past medical history of hypertension, diabetes, possible ITP, sleep apnea, PE and DVT on rivaroxaban admitted on 7/15/2025 for acute ischemic stroke in left MCA territory, near occlusion of left M1, and concern for thrombotic emergency such as TTP or catastrophic antiphospholipid syndrome (in setting of severe thrombocytopenia and acute cerebral infarcts).     Bernard shared that he started to have an episode of dizziness and abnormal vision in his left visual field on Sunday night.  He also did not feel well.  He is felt a little bit more confused than normal.  His mother shared that he has been more confused and has been having some difficulty finding the right word.  He was evaluated by his hematology team today and due to concern for neurologic symptoms, was recommended to present to the emergency department.    A/P: Bernard Chapman is a 35 year old male admitted to the hospital for acute partial MCA occlusion stroke. CT scan showed enlarged mediastinal hilar lymph nodes with necrotic changes.     - Please get IR and IP onboard for core needle biopsy, if unsuccessful, please reach out to thoracic surgery again.    Patient and plan discussed with Staff    Thank you for the opportunity to participate in the care of this patient.    PMH:  No past medical history on file.      PSH:  No past surgical history on file.      FH:  family history includes Arthritis in his maternal grandmother; Diabetes in his paternal grandmother; Heart Disease in his paternal grandfather; Hypertension in his maternal grandmother; Respiratory in his mother.      SH:  Social History     Tobacco Use    Smoking status: Never    Smokeless tobacco: Never   Substance Use  Topics    Alcohol use: No    Drug use: No        Allergies:  Allergies   Allergen Reactions    Apixaban Rash    Amlodipine Other (See Comments) and Rash       Home Meds:  Medications Prior to Admission   Medication Sig Dispense Refill Last Dose/Taking    aluminum chloride (DRYSOL) 20 % external solution Apply 1 Application. topically.       Ascorbic Acid (VITAMIN C) 500 MG CAPS Take by mouth.       B Complex Vitamins (VITAMIN B-COMPLEX PO) Take by mouth.       cyclobenzaprine (FLEXERIL) 10 MG tablet Take 10 mg by mouth.       Dulaglutide 4.5 MG/0.5ML SOAJ Inject 4.5 mg subcutaneously once a week.       fluticasone (FLONASE) 50 MCG/ACT nasal spray Spray 1 spray in nostril.       hydrochlorothiazide (HYDRODIURIL) 25 MG tablet Take 25 mg by mouth daily.       losartan (COZAAR) 100 MG tablet Take 100 mg by mouth daily.       metoprolol succinate ER (TOPROL XL) 25 MG 24 hr tablet Take 25 mg by mouth.       Multiple Vitamin (MULTIVITAMIN PO) Take by mouth.       Rivaroxaban ANTICOAGULANT 15 & 20 MG TBPK Starter Therapy Pack Take as directed on package.       valACYclovir (VALTREX) 1000 mg tablet Take 500 mg by mouth.          ROS: Chart checked.  Remainder of pertinent 12pt ROS negative.    Physical Exam:  Temp:  [97  F (36.1  C)-98.9  F (37.2  C)] 98  F (36.7  C)  Pulse:  [] 84  Resp:  [0-28] 25  BP: (111-167)/(72-94) 158/84  SpO2:  [87 %-100 %] 100 %    Chart checked    Labs:  ABG No lab results found in last 7 days.  CBC  Recent Labs   Lab 07/17/25  0423 07/16/25  1648 07/16/25  1227 07/15/25  2349   WBC 14.5* 14.4* 13.8* 12.8*   HGB 11.3* 12.6* 13.0* 12.3*   PLT 38* 59*  61* 24* 25*     BMP  Recent Labs   Lab 07/17/25  0423 07/17/25  0028 07/16/25  0414 07/16/25  0259 07/15/25  1202 07/15/25  1158     --  141  --   --  139   POTASSIUM 3.9  --  4.7  --   --  4.2   CHLORIDE 104  --  105  --   --  101   CO2 26  --  17*  --   --  25   BUN 15.6  --  18.1  --   --  18.5   CR 0.91  --  0.98  --   --  1.06   GLC  139* 133* 147* 122*   < > 123*    < > = values in this interval not displayed.     LFT  Recent Labs   Lab 07/17/25  0423 07/15/25  2349 07/15/25  1158   AST  --  24 27   ALT  --  25 37   ALKPHOS  --  58 73   BILITOTAL  --  0.9 1.0   ALBUMIN  --  3.9 4.8   INR 1.71* 2.05* 2.12*     PancreasNo lab results found in last 7 days.    Imaging:    Chest CT scan (7/17/2025)  IMPRESSION:  1. Exam is negative for new pulmonary embolism. Multiple segmental  chronic pulmonary emboli as above. There is bowing of the interatrial  septum and mild enlargement of the pulmonary artery, consistent with  mild right heart strain.      2. Dependent subsegmental lower lobe atelectasis with engorgement of  the pulmonary vasculature & confluent left lower lobe atelectasis  & interlobular septal thickening may reflect a degree of  superimposed pulmonary edema.            Michael Kent MD   General Surgery PGY-1

## 2025-07-17 NOTE — PLAN OF CARE
Major Shift Events:  Neuro status unchanged. HA controlled w/ tylenol. Deficits include right facial droop, partial right visual field cut (RLQ), RUE pronator drift, RUE weakness, RLE weakness, aphasia / word finding difficulty improving. HR NSR. BP stable. Lungs clear, on room air. Voiding. Last BM prior to admission. Numerous specimens sent to lab including, punch biopsy, blood, urine. Patient denies pain, denies nausea. Up w/ SBA, minor gait instability. Tolerating regular diet, taking adequate PO.  Plan: NPO at midnight for TIMO tomorrow AM.  For vital signs and complete assessments, please see documentation flowsheets.

## 2025-07-17 NOTE — PLAN OF CARE
Major Shift Events:  Patient A&Ox4. Aphasic. Slight right sided facial droop. RUE weakness and pronator drift. RLE slight weakness but barely noticeable. Sensation impaired in all extremities, unable to distinguished which side at times. Slight garbled speech and word finding difficulty noted. Right sided visual field cut in both eyes coming from lower, pupils equal, round, reactive, 4-5 mm. Denies numbness and tingling. Tylenol given x1 for headache 2/10 with relief of symptoms. Sinus - sinus tachycardia 60s-110s, afebrile. Maintaining SBP<220 without interventions. On room air when awake, 2 L NC when sleeping for ELENA. Clear lung sounds. NPO at midnight, swallows pills whole. Skin unchanged from previous shift. Plasmalyte A infusing 100 ml/hr. 2 units of platelets given for plt count 38.    Plan: TIMO today. Continue serial neuro exams. Notify primary team of any changes, continue with plan of care.     For vital signs and complete assessments, please see documentation flowsheets.   Goal Outcome Evaluation:    Overall Patient Progress: no changeOverall Patient Progress: no change    Outcome Evaluation: Neuros unchanged. VSS. No pain noted. NPO at midnight for TIMO today.

## 2025-07-17 NOTE — CONSULTS
"Consult received for Vascular Access Team.  See LDA for details. For additional needs place \"Consult for Inpatient Vascular Access Care\"  [id number] order in EPIC.  "

## 2025-07-17 NOTE — PROGRESS NOTES
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 He is s/p PLEX x1 on 7/16.      Lupus anticoagulant positive and beta2 glycoprotein undetected but cardiolipin panel still pending. Labs notable for negative HIV, hepatitis B sAg, complement 3 and 4, serum free light chains and immunoglobulins, and normal vitamin D level. He has CVT type picture along with evidence of hemolysis raising concern for malignancy-associated process such as myeloproliferative neoplasm and lymphoma. Workup as below.       Recommendations:   - continue to transfuse platelet up to 50k   - hold PLEX today. Next Plasma Exchange to be determine   - would hold off on prednisone given concern of lymphoma  - Pending cardiolipin panel, Jak2 antibody and PNH panel   - check Flow cytometry, EBV and CMV  - Recommend obtaining excisional biopsy for better tissue analysis  - follow CBC and LDH daily        Patient was seen and plan of care was discussed with attending physician Dr. Borjas.     We will continue to follow this patient. Please don't hesitate to contact the Fellow On-Call with questions.    Viviana Steele MD  PGY-4 Fellow  Hematology, Oncology and Transplant  [facility]      ___________________________________________________________________    Subjective & Interval History:    No acute events noted overnight.   Transfused 2u platelets this morning for plt  "38K.       Physical Exam:    BP (!) 158/84 (BP Location: Left arm)   Pulse 84   Temp 98  F (36.7  C)   Resp 25   Ht 1.88 m (6' 2\")   Wt 117 kg (257 lb 15 oz)   SpO2 100%   BMI 33.12 kg/m    Gen: Well appearing, in NAD  HEENT: EOMI, PERRL, mmm, oropharynx clear  CV: Normal rate, regular rhythm. No m/r/g  Pulm: CTAB, no wheezing, normal work of breathing  Abd: Soft, nt/nd, no rebound/guarding  Ext: Warm and well perfused. No lower extremity edema  Skin: No rash, cyanosis or petechial lesion  Neuro: Alert and answering questions appropriately. CNII-XII grossly intact. Moving all extremities without issue or focal neurologic deficits. Biceps/tricpes/deltoid strength 5/5 bilaterally. Strength 5/5 bilaterally with hip flexion, knee flexion/extension and dorsi/plantar flexion.    Labs & Studies: I personally reviewed the following studies:  ROUTINE LABS (Last four results):  CMP  Recent Labs   Lab 07/17/25  0423 07/17/25  0028 07/16/25  0414 07/16/25  0259 07/15/25  2349 07/15/25  1202 07/15/25  1158     --  141  --   --   --  139   POTASSIUM 3.9  --  4.7  --   --   --  4.2   CHLORIDE 104  --  105  --   --   --  101   CO2 26  --  17*  --   --   --  25   ANIONGAP 10  --  19*  --   --   --  13   * 133* 147* 122*  --    < > 123*   BUN 15.6  --  18.1  --   --   --  18.5   CR 0.91  --  0.98  --   --   --  1.06   GFRESTIMATED >90  --  >90  --   --   --  >90   SHARAN 8.7*  --  9.5  --   --   --  9.5   MAG 2.1  --   --   --  2.0  --  2.0   PHOS 3.5  --  2.3*  --   --   --   --    PROTTOTAL  --   --   --   --  6.5  --  8.1   ALBUMIN  --   --   --   --  3.9  --  4.8   BILITOTAL  --   --   --   --  0.9  --  1.0   ALKPHOS  --   --   --   --  58  --  73   AST  --   --   --   --  24  --  27   ALT  --   --   --   --  25  --  37    < > = values in this interval not displayed.     CBC  Recent Labs   Lab 07/17/25  1333 07/17/25  0423 07/16/25  1648 07/16/25  1227 07/15/25  2349   WBC  --  14.5* 14.4* 13.8* 12.8*   RBC  --  " 4.23* 4.65 4.84 4.54   HGB  --  11.3* 12.6* 13.0* 12.3*   HCT  --  33.8* 37.7* 38.5* 37.1*   MCV 81 80 81  80 80 82   MCH  --  26.7 27.1 26.9 27.1   MCHC  --  33.4 33.4 33.8 33.2   RDW  --  13.1 12.9 12.9 13.1   PLT 54* 38* 59*  61* 24* 25*     INR  Recent Labs   Lab 07/17/25  0423 07/15/25  2349 07/15/25  1158   INR 1.71* 2.05* 2.12*       Medications list for reference:  Current Facility-Administered Medications   Medication Dose Route Frequency Provider Last Rate Last Admin    [Held by provider] acetaminophen (TYLENOL) tablet 1,000 mg  1,000 mg Oral or Feeding Tube Q8H PRN Nishi Rodas MD   1,000 mg at 07/16/25 2124    hydrALAZINE (APRESOLINE) injection 10 mg  10 mg Intravenous Q30 Min PRN Zora Boone PA-C        labetalol (NORMODYNE/TRANDATE) injection 10 mg  10 mg Intravenous Q10 Min PRN Zora Boone PA-C        Medication Instruction - Avoid dextrose in IV solutions   Intravenous Continuous PRN Nishi Rodas MD        ondansetron (ZOFRAN ODT) ODT tab 4 mg  4 mg Oral Q6H PRN Nishi Rodas MD        Or    ondansetron (ZOFRAN) injection 4 mg  4 mg Intravenous Q6H PRN Nishi Rodas MD        Plasma-Lyte A (electrolyte A) infusion   Intravenous Continuous Nilsa Daly  mL/hr at 07/17/25 1528 New Bag at 07/17/25 1528    polyethylene glycol (MIRALAX) Packet 17 g  17 g Oral Daily PRN Griselda Ha CNP        prochlorperazine (COMPAZINE) injection 10 mg  10 mg Intravenous Q6H PRN Franklin Aguilera MD        Or    prochlorperazine (COMPAZINE) tablet 10 mg  10 mg Oral or Feeding Tube Q6H PRN Franklin Aguilera MD        rosuvastatin (CRESTOR) tablet 10 mg  10 mg Oral Daily Griselda Ha CNP   10 mg at 07/17/25 1527    senna-docusate (SENOKOT-S/PERICOLACE) 8.6-50 MG per tablet 1-2 tablet  1-2 tablet Oral or Feeding Tube BID Griselda aH CNP   1 tablet at 07/16/25 2009    sodium chloride (PF) 0.9% PF flush 3 mL  3 mL Intracatheter Q8H Nishi Hernandez,  MD   3 mL at 07/17/25 1528    sodium chloride (PF) 0.9% PF flush 3 mL  3 mL Intracatheter q1 min prn Nishi Rodas MD        sodium chloride 0.9 % bag for CT scan flush  100 mL Intravenous Once Segundo Pond MD

## 2025-07-17 NOTE — CONSULTS
"    Interventional Pulmonology  Initial Inpatient Consultation Note                                     July 17, 2025            Patient: Bernard Chapman    Date of Admission: 7/15/2025  Reason for Consultation: Lymphadenopathy.   Requesting Physician: No referring provider defined for this encounter.      Assessment and recommendations:   Bernard Chapman is a 35 year old male who presents with thrombocytopenia, blood clots, leukocytosis, skin rash, diffuse lymphadenopathy in his chest.    Interventional pulmonary consulted for possible biopsy of his lymphadenopathy in his chest.  I reviewed his CT scan from today which reveals prominent mediastinal and hilar nodes.     Unclear if this is all related to his other problems, but it is certainly not normal to have lymph nodes this large with necrosis and bulky appearance.     I explained to him how we do EBUS and that given his thrombocytopenia he is at an increased risk of bleeding.    Will plan on EBUS TBNA tomorrow in the OR.     Please consult anesthesia prior to tomorrow as this is high risk given hx of PE and right heart strain and try to have the PLT > 50 prior to the case. We will run a bag of PLT during the case as well.     Binh Bains  Interventional Pulmonary Staff            Chief Complaint: \"lymphadenopathy\"    History of Present Illness:   Bernard Chapman is a 35 year old male who was relatively healthy up until last month.  June 22 patient was having chest pain and went to the hospital and was found to have a pulmonary embolism in addition to lower extremity DVTs.  He was placed on apixaban however ended up having a allergic reaction to this and was subsequently switched to Xarelto.  Over the past few days his mother has noticed he has been not speaking properly and just not himself.  While he was on his way to the hematology appointment for his blood clots patient vomited and was instructed to go to the emergency department.  In the emergency " department patient was found to have thrombocytopenia, leukocytosis, and a rash on his lower extremity.  Brain imaging was performed which revealed numerous multifocal acute cortical and white matter left cerebellar hemisphere infarcts.  My evaluation patient has some weakness on his right upper and lower extremity and he is slurring some of his words.  Denies any shortness of breath currently although he was short of breath originally in June.           Data:   All pertinent laboratory and imaging data reviewed.           Past Medical History:   No past medical history on file.         Past Surgical History:   No past surgical history on file.         Social History:     Social History     Socioeconomic History    Marital status: Single     Spouse name: Not on file    Number of children: Not on file    Years of education: Not on file    Highest education level: Not on file   Occupational History    Not on file   Tobacco Use    Smoking status: Never    Smokeless tobacco: Never   Substance and Sexual Activity    Alcohol use: No    Drug use: No    Sexual activity: Yes     Partners: Female   Other Topics Concern    Parent/sibling w/ CABG, MI or angioplasty before 65F 55M? No   Social History Narrative    Not on file     Social Drivers of Health     Financial Resource Strain: Low Risk  (2/25/2025)    Received from [facility]    Financial Resource Strain     Difficulty of Paying Living Expenses: 3     Difficulty of Paying Living Expenses: Not on file   Food Insecurity: No Food Insecurity (6/23/2025)    Received from [facility]    Food Insecurity     Do you worry your food will run out before you are able to buy more?: 1   Transportation Needs: No Transportation Needs (6/23/2025)    Received from "[facility]    Transportation Needs     Does lack of transportation keep you from medical appointments?: 1     Does lack of  transportation keep you from work, meetings or getting things that you need?: 1   Physical Activity: Not on file   Stress: Not on file (11/10/2024)   Social Connections: Socially Integrated (6/23/2025)    Received from [facility]    Social Connections     Do you often feel lonely or isolated from those around you?: 0   Interpersonal Safety: Not on file   Housing Stability: Low Risk  (6/23/2025)    Received from [facility]    Housing Stability     What is your housing situation today?: 1            Family History:     Family History   Problem Relation Age of Onset    Respiratory Mother         asthma    Hypertension Maternal Grandmother     Arthritis Maternal Grandmother     Diabetes Paternal Grandmother     Heart Disease Paternal Grandfather         pacemaker    Colon Cancer No family hx of     Prostate Cancer No family hx of     Coronary Artery Disease No family hx of             Allergies:     Allergies   Allergen Reactions    Apixaban Rash    Amlodipine Other (See Comments) and Rash            Medications:     Current Facility-Administered Medications   Medication Dose Route Frequency Provider Last Rate Last Admin    rosuvastatin (CRESTOR) tablet 10 mg  10 mg Oral Daily Griselda Ha CNP   10 mg at 07/16/25 1144    senna-docusate (SENOKOT-S/PERICOLACE) 8.6-50 MG per tablet 1-2 tablet  1-2 tablet Oral or Feeding Tube BID Griselda Ha CNP   1 tablet at 07/16/25 2009    sodium chloride (PF) 0.9% PF flush 3 mL  3 mL Intracatheter Q8H [facility] Nishi Rodas MD   3 mL at 07/16/25 1702    sodium chloride 0.9 % bag for CT scan flush  100 mL Intravenous Once Segundo Pond MD                Physical Exam:   Temp:  [97  F (36.1  C)-98.9  F (37.2  C)] 98  F (36.7  C)  Pulse:  [] 84  Resp:  [0-28] 25  BP: (111-167)/(72-94) 158/84  SpO2:  [87 %-100 %] 100 %  General: Awake, alert and in no apparent distress

## 2025-07-17 NOTE — PROGRESS NOTES
Pt arrived in ECHO department for scheduled TIMO.   Procedure explained, questions answered and consent signed.   Pt's throat sprayed at 1030, therefore pt will not be able to eat or drink until 2 hours after at 1230. Informed pt of this time and encouraged to start with warm fluids and soft foods.    Pt tolerated procedure well, and was given a total of 50 mcg IV fentanyl and 10 mg IV versed for conscious sedation. Pt denied throat or chest pain after TIMO complete.   TIMO probe 61 used for procedure.  Pt denied chest or throat pain after procedure and was transferred back to 4E after awake and VSS.   Report given to TALIA Valle RN.

## 2025-07-17 NOTE — CONSULTS
[facility] Inpatient Dermatology Consult Note    Assessment and Plan:  # Numerous palpable purpura across the scalp and lower extremities  This 35-year-old male with a history of DVT/PE on Xarelto presenting with new neurologic symptoms, diffuse acute cerebral infarcts including a partially occlusive left MCA thrombus, acute severe thrombocytopenia, and palpable purpura has a broad differential diagnosis in dermatology. Small vessel vasculitis, particularly leukocytoclastic vasculitis, is a leading consideration given the presence of palpable purpura and systemic involvement, which can affect the central nervous system and cause thrombotic complications. Vasculitis may be triggered by medications such as anticoagulants or associated with underlying autoimmune disorders. Although catastrophic antiphospholipid syndrome (CAPS) remains an important possibility--especially in light of his prior thrombotic events, current thrombocytopenia, and evidence of hemolysis--it is still pending laboratory confirmation. Thrombotic thrombocytopenic purpura (TTP) is less likely given the normal UCVRLK03 level and absence of schistocytes on peripheral smear. Other considerations include disseminated intravascular coagulation (DIC), which can present with microvascular thrombosis and purpura in severe systemic illness, and myeloproliferative neoplasms that can promote thrombosis and vascular injury with skin manifestations. Infectious causes such as infective endocarditis with septic emboli and drug-induced vasculitis should also be considered. Immune complex-mediated disorders like cryoglobulinemia or IgA vasculitis, though less common in adults, remain on the differential. A skin biopsy was obtained for diagnostic clarification.  -- L leg s/p punch biopsy 7/26/25 -- change dressing daily and remove stitches 7/30/25  -- Obtain complements, blood cultures, AMY for now    Procedure note:   After verbal consent was  obtained, using alcohol cleansing and 1% Lidocaine without epinephrine for anesthetic, a 4mm punch biopsy was used to obtain a biopsy specimen of the lesion of the L leg. Hemostasis was obtained by pressure and the wound was sutured. The specimen was labeled and sent to pathology for evaluation. The procedure was well tolerated without complications.       Discussed with staff.     Marito Camarena MD   Staff: Dr. Aguilera    Encounter Date: Jul 15, 2025    Reason for Consultation: Skin Biopsy      History of Present Illness:  He is a 35-year-old male with a history of DVT/PE on Xarelto presenting with new neurologic symptoms, diffuse acute cerebral infarcts including a partially occlusive left MCA thrombus, acute severe thrombocytopenia, and palpable purpura. He developed a morbilliform rash on his first anticoagulant, Eliquis, that has since resolved. Over the last week or so, he has developed pink and red bumps across his scalp and lower extremities. They have bled. Otherwise they are asymptomatic. He has no personal or family history of any skin or autoimmune conditions. He has no oral or genital lesions.     Past Medical History:   No past medical history on file.  No past surgical history on file.    Social History:  Social History     Socioeconomic History    Marital status: Single     Spouse name: Not on file    Number of children: Not on file    Years of education: Not on file    Highest education level: Not on file   Occupational History    Not on file   Tobacco Use    Smoking status: Never    Smokeless tobacco: Never   Substance and Sexual Activity    Alcohol use: No    Drug use: No    Sexual activity: Yes     Partners: Female   Other Topics Concern    Parent/sibling w/ CABG, MI or angioplasty before 65F 55M? No   Social History Narrative    Not on file     Social Drivers of Health     Financial Resource Strain: Low Risk  (2/25/2025)    Received from [employer] [facility]    Financial  Resource Strain     Difficulty of Paying Living Expenses: 3     Difficulty of Paying Living Expenses: Not on file   Food Insecurity: No Food Insecurity (6/23/2025)    [facility]    Food Insecurity     Do you worry your food will run out before you are able to buy more?: 1   Transportation Needs: No Transportation Needs (6/23/2025)    Received from [facility]    Transportation Needs     Does lack of transportation keep you from medical appointments?: 1     Does lack of transportation keep you from work, meetings or getting things that you need?: 1   Physical Activity: Not on file   Stress: Not on file (11/10/2024)   Social Connections: Socially Integrated (6/23/2025)    Received from [facility]    Social Connections     Do you often feel lonely or isolated from those around you?: 0   Interpersonal Safety: Not on file   Housing Stability: Low Risk  (6/23/2025)    [facility]    Housing Stability     What is your housing situation today?: 1       Family History:  Family History   Problem Relation Age of Onset    Respiratory Mother         asthma    Hypertension Maternal Grandmother     Arthritis Maternal Grandmother     Diabetes Paternal Grandmother     Heart Disease Paternal Grandfather         pacemaker    Colon Cancer No family hx of     Prostate Cancer No family hx of     Coronary Artery Disease No family hx of         Medications:  Current Facility-Administered Medications   Medication Dose Route Frequency Provider Last Rate Last Admin    acetaminophen (TYLENOL) tablet 1,000 mg  1,000 mg Oral or Feeding Tube Q8H PRN Nishi Rodas MD   1,000 mg at 07/16/25 2124    hydrALAZINE (APRESOLINE) injection 10 mg  10 mg Intravenous Q30 Min PRN Nishi Rodas MD        labetalol (NORMODYNE/TRANDATE) injection 10 mg  10 mg Intravenous Q10 Min PRN  "Nishi Rodas MD        lidocaine (LMX4) cream   Topical Q1H PRN Nishi Rodas MD        lidocaine 1 % 0.1-1 mL  0.1-1 mL Other Q1H PRN Nishi Rodas MD        Medication Instruction - Avoid dextrose in IV solutions   Intravenous Continuous PRN Nishi Rodas MD        ondansetron (ZOFRAN ODT) ODT tab 4 mg  4 mg Oral Q6H PRN Nishi Rodas MD        Or    ondansetron (ZOFRAN) injection 4 mg  4 mg Intravenous Q6H PRN Nishi Rodas MD        Plasma-Lyte A (electrolyte A) infusion   Intravenous Continuous Nilsa Daly  mL/hr at 07/16/25 2136 New Bag at 07/16/25 2136    polyethylene glycol (MIRALAX) Packet 17 g  17 g Oral Daily PRN Griselda Ha CNP        prochlorperazine (COMPAZINE) injection 10 mg  10 mg Intravenous Q6H PRN Franklin Aguilera MD        Or    prochlorperazine (COMPAZINE) tablet 10 mg  10 mg Oral or Feeding Tube Q6H PRN Franklin Aguilera MD        rosuvastatin (CRESTOR) tablet 10 mg  10 mg Oral Daily Griselda Ha CNP   10 mg at 07/16/25 1144    senna-docusate (SENOKOT-S/PERICOLACE) 8.6-50 MG per tablet 1-2 tablet  1-2 tablet Oral or Feeding Tube BID Griselda Ha CNP   1 tablet at 07/16/25 2009    sodium chloride (PF) 0.9% PF flush 3 mL  3 mL Intracatheter Q8H MOHINDER Nishi Rodas MD   3 mL at 07/16/25 1702    sodium chloride (PF) 0.9% PF flush 3 mL  3 mL Intracatheter q1 min prn Nishi Rodas MD        sodium chloride 0.9 % bag for CT scan flush  100 mL Intravenous Once Segundo Pond MD            Allergies:  Allergies   Allergen Reactions    Apixaban Rash    Amlodipine Other (See Comments) and Rash       Review of Systems:  As noted in HPI.     Physical exam:  /80   Pulse 103   Temp 98.5  F (36.9  C) (Oral)   Resp 23   Ht 1.88 m (6' 2\")   Wt 117 kg (257 lb 15 oz)   SpO2 96%   BMI 33.12 kg/m    GEN:This is a well developed, well-nourished male in no acute distress, in a pleasant mood.    SKIN: " Wooten type I. Total skin excluding the undergarment areas was performed. The exam included the head/face, neck, both arms, chest, back, abdomen, both legs, digits and/or nails.   - Numerous palpable purpura across the scalp and lower extremities   - Prior morbilliform-like rash has resolved     Laboratory:    Recent Results (from the past 24 hours)   LJBNGI51 Activity BERKLEY   Result Value Ref Range    HKOHNQ84 Activity BERKLEY 0.96 0.40 - 1.30 IU/mL   Haptoglobin   Result Value Ref Range    Haptoglobin <10 (L) 30 - 200 mg/dL   Lactate Dehydrogenase   Result Value Ref Range    Lactate Dehydrogenase 514 (H) 0 - 250 U/L   ABO/Rh type and screen    Narrative    The following orders were created for panel order ABO/Rh type and screen.  Procedure                               Abnormality         Status                     ---------                               -----------         ------                     Adult Type and Screen[[id number]]                           Final result                 Please view results for these tests on the individual orders.   Lupus Anticoagulant Panel   Result Value Ref Range    PTT Ratio 1.20 <1.30    DRVVT Screen Ratio 2.78 (H) <1.08    DRVVT Confirm Normalized Ratio 1.58 (H) <1.21    DRVVT Screen Mix Ratio 1.72 (H) <1.08    Thrombin Time 22.5 (H) 13.0 - 19.0 Seconds    Lupus ANTI-XA Detected (A) Not Detected    Lupus Result Positive (A) Negative    Lupus Interpretation       Results  APTT ratio is normal.  DRVVT Screen ratio is elevated.  DRVVT Confirm Normalized ratio is positive.  DRVVT Screen Mix ratio is elevated.  The PT/INR is elevated.  Thrombin time is prolonged.  Reactivity is detected in the Anti-Xa assay.    Interpretation  POSITIVE TEST; LUPUS ANTICOAGULANT TESTING IS POSITIVE; HOWEVER ANTICOAGULATION DETECTED     Recommendations  DRVVT Screen ratio, DRVVT Confirm Normalized ratio, and DRVVT Screen Mix ratio  are consistent with a Lupus Anticoagulant.  Clinical  correlation is recommended.  Recommend repeat testing in 12 weeks to determine if the Lupus Anticoagulant is persisite or transient, since a transient one does not confer an increased thrombotic risk.  Patient with a Lupus Anticoagulant on vitamin K antagonist therapy (e.g., warfarin) should be considered for monitoring with a chromogenic factor 10 (factor X) assay or a factor 2 (factor II) assay.  Reactivity is detected in the Anti-Xa assay.  The patient has recently been on  rivaroxaban, which may potentially explain the anti-Xa, PT/INR, and DRVVT findings.  Recommend repeat testing without anticoagulant interference, as anticoagulation may cause false positive or false negative   Testing for lupus anticoagulant during the acute phase of illness, such as during a hospital admission,  may cause false positive or false negative findings.  If the clinical picture is strongly suggestive of an antiphospholipid syndrome, recommend anticardiolipin and beta-2-glycoprotein (IgG and IgM) antibody tests.    Ashely Butler MD, PhD  [facility]       INR   Result Value Ref Range    INR 2.05 (H) 0.85 - 1.15    PT 22.8 (H) 11.8 - 14.8 Seconds   Partial thromboplastin time   Result Value Ref Range    aPTT 30 22 - 38 Seconds   Fibrinogen activity   Result Value Ref Range    Fibrinogen Activity 151 (L) 170 - 510 mg/dL   Ferritin   Result Value Ref Range    Ferritin 535 (H) 31 - 409 ng/mL   Lab Blood Morphology Pathologist Review    Narrative    The following orders were created for panel order Lab Blood Morphology Pathologist Review.  Procedure                               Abnormality         Status                     ---------                               -----------         ------                     Bld morphology patholog...[[id number]]                      Final result               CBC with platelets and ...[[id number]]  Abnormal            Final result               RBC and Platelet Morpho...[[id number]]                                                  Reticulocyte count[[id number]]          Abnormal            Final result               Morphology Tracking[[id number]]                             Final result                 Please view results for these tests on the individual orders.   Adult Type and Screen   Result Value Ref Range    ABO/RH(D) A POS     Antibody Screen Negative Negative    SPECIMEN EXPIRATION DATE 7/18/2025 11:59:00 PM CDT    Bld morphology pathology review   Result Value Ref Range    Final Diagnosis       Peripheral Blood Smear:  -Slight normochromic, normocytic anemia with increased red blood cell regeneration  -Slight leukocytosis; neutrophilia  -Marked thrombocytopenia with normal platelet morphology      Comment       There is no definitive morphologic evidence of hemolysis.  However, if hemolysis is a clinical concern, an LDH, haptoglobin, fractionated bilirubin and reticulocyte count could be considered.        Clinical Information       From Epic electronic medical record; 35 year old male with a history of DVT/PE (June 22) on Xarelto, presented with new neurologic symptoms, found to have diffuse acute cerebral infarcts, and acute severe thrombocytopenia. Peripheral smear review requested for concern for TTP vs CAPS.      Peripheral Smear       The red cells appear normochromic. Poikilocytosis is minimal. Polychromasia is not increased. Rouleaux formation is not increased. The morphology of platelets overall unremarkable with rare subset being large in size. Lymphocytes are polymorphous. Neutrophils show unremarkable cytoplasmic granularity and nuclear morphology.       Peripheral Hematologic Data       CBC WITH DIFFERENTIAL (07/16/2025 12:32 AM CDT):     RESULT VALUE REF. RANGE UNITS    WBC Count   RBC Count   Hemoglobin    Hematocrit   MCV  MCH  MCHC  RDW   Platelet Count   12.8  ( H )    4.54 (NORMAL)    12.3  ( L )      37.1  ( L )  82 (NORMAL)     27.1 (NORMAL)     33.2 (NORMAL)      13.1 (NORMAL)   25  ( LL ) 4.0-11.0  4.40-5.90  13.3-17.7  40.0-53.0    26.5-33.0  31.5-36.5  10.0-15.0  150-450 10e3/uL  10e6/uL  g/dL  %  fL  pg  g/dL  %  10e3/uL   % Neutrophils  % Lymphocytes  % Monocytes  % Eosinophils  % Basophils  % Immature Granulocytes   Absolute Neutrophils   Absolute Lymphocytes  Absolute Monocytes  Absolute Eosinophils  Absolute Basophils  Absolute Immature Granulocytes  NRBCs per 100 WBC  Absolute NRBCs 81  11  6  1  0  1   10.4  ( H )  1.5 (NORMAL)  0.8 (NORMAL)  0.1 (NORMAL)  0.0 (NORMAL)  0.1 (NORMAL)  0 (NORMAL)  0.0 () N/A  N/A  N/A  N/A  N/A  N/A  1.6-8.3  0.8-5.3  0.0-1.3  0.0-0.7  0.0-0.2  <=0.4  <1  <=0.0 %  %  %  %  %  %  10e3/uL  10e3/uL  10e3/uL  10e3/uL  10e3/uL  10e3/uL  /100  10e3/uL     RETICULOCYTE COUNT (07/16/2025 12:30 AM CDT):  RESULT VALUE REF. RANGE UNITS    % Reticulocyte    Absolute Reticulocyte   2.3  ( H )   0.104  ( H ) 0.5-2.0  0.025-0.095 %  10e6/uL         Performing Labs       The technical component of this testing was completed at [facility] and [facility].     Stain controls for all stains resulted within this report have been reviewed and show appropriate reactivity.      CBC with platelets and differential   Result Value Ref Range    WBC Count 12.8 (H) 4.0 - 11.0 10e3/uL    RBC Count 4.54 4.40 - 5.90 10e6/uL    Hemoglobin 12.3 (L) 13.3 - 17.7 g/dL    Hematocrit 37.1 (L) 40.0 - 53.0 %    MCV 82 78 - 100 fL    MCH 27.1 26.5 - 33.0 pg    MCHC 33.2 31.5 - 36.5 g/dL    RDW 13.1 10.0 - 15.0 %    Platelet Count 25 (LL) 150 - 450 10e3/uL    % Neutrophils 81 %    % Lymphocytes 11 %    % Monocytes 6 %    % Eosinophils 1 %    % Basophils 0 %    % Immature Granulocytes 1 %    NRBCs per 100 WBC 0 <1 /100    Absolute Neutrophils 10.4 (H) 1.6 - 8.3 10e3/uL    Absolute Lymphocytes 1.5 0.8 - 5.3 10e3/uL    Absolute Monocytes 0.8 0.0 - 1.3 10e3/uL    Absolute Eosinophils 0.1 0.0 - 0.7 10e3/uL     Absolute Basophils 0.0 0.0 - 0.2 10e3/uL    Absolute Immature Granulocytes 0.1 <=0.4 10e3/uL    Absolute NRBCs 0.0 10e3/uL   Reticulocyte count   Result Value Ref Range    % Reticulocyte 2.3 (H) 0.5 - 2.0 %    Absolute Reticulocyte 0.104 (H) 0.025 - 0.095 10e6/uL   Lipid panel reflex to direct LDL   Result Value Ref Range    Cholesterol 205 (H) <200 mg/dL    Triglycerides 202 (H) <150 mg/dL    Direct Measure HDL 30 (L) >=40 mg/dL    LDL Cholesterol Calculated 135 (H) <100 mg/dL    Non HDL Cholesterol 175 (H) <130 mg/dL    Narrative    Cholesterol  Desirable: < 200 mg/dL  Borderline High: 200 - 239 mg/dL  High: >= 240 mg/dL    Triglycerides  Normal: < 150 mg/dL  Borderline High: 150 - 199 mg/dL  High: 200-499 mg/dL  Very High: >= 500 mg/dL    Direct Measure HDL  Female: >= 50 mg/dL   Male: >= 40 mg/dL    LDL Cholesterol  Desirable: < 100 mg/dL  Above Desirable: 100 - 129 mg/dL   Borderline High: 130 - 159 mg/dL   High:  160 - 189 mg/dL   Very High: >= 190 mg/dL    Non HDL Cholesterol  Desirable: < 130 mg/dL  Above Desirable: 130 - 159 mg/dL  Borderline High: 160 - 189 mg/dL  High: 190 - 219 mg/dL  Very High: >= 220 mg/dL   Magnesium Lab   Result Value Ref Range    Magnesium 2.0 1.7 - 2.3 mg/dL   Vitamin B12   Result Value Ref Range    Vitamin B12 489 232 - 1,245 pg/mL   TSH   Result Value Ref Range    TSH 2.84 0.30 - 4.20 uIU/mL   T4 free   Result Value Ref Range    Free T4 1.20 0.90 - 1.70 ng/dL   Hepatic panel   Result Value Ref Range    Protein Total 6.5 6.4 - 8.3 g/dL    Albumin 3.9 3.5 - 5.2 g/dL    Bilirubin Total 0.9 <=1.2 mg/dL    Alkaline Phosphatase 58 40 - 150 U/L    AST 24 0 - 45 U/L    ALT 25 0 - 70 U/L    Bilirubin Direct 0.32 0.00 - 0.45 mg/dL   Prepare plasma (in mL)   Result Value Ref Range    Blood Component Type Plasma     Product Code Z5251I76     Unit Status Issued     Unit Number [id number]     CODING SYSTEM [id number]     ISSUE DATE AND TIME 7/16/2025  1:22:00 AM CDT     UNIT ABO/RH AB+      UNIT TYPE ISBT 8400    Prepare plasma (in mL)   Result Value Ref Range    ISSUE DATE AND TIME 7/16/2025  1:22:00 AM CDT     Blood Component Type Plasma     Product Code V9364L42     Unit Status Transfused     Unit Number [id number]     UNIT ABO/RH AB+     CODING SYSTEM [id number]     UNIT TYPE ISBT 8400    Prepare plasma (unit)   Result Value Ref Range    Blood Component Type Plasma     Product Code U8405E24     Unit Status Issued     Unit Number [id number]     CODING SYSTEM [id number]     ISSUE DATE AND TIME 7/16/2025  1:22:00 AM CDT     UNIT ABO/RH AB+     UNIT TYPE ISBT 8400    Prepare plasma (unit)   Result Value Ref Range    Blood Component Type Plasma     Product Code I1624D74     Unit Status Issued     Unit Number [id number]     CODING SYSTEM ZMQT129     ISSUE DATE AND TIME 7/16/2025  1:22:00 AM CDT     UNIT ABO/RH AB+     UNIT TYPE ISBT 8400    Prepare plasma (unit)   Result Value Ref Range    Blood Component Type Plasma     Product Code I4695U41     Unit Status Issued     Unit Number [id number]     CODING SYSTEM JVXH195     ISSUE DATE AND TIME 7/16/2025  1:22:00 AM CDT     UNIT ABO/RH AB+     UNIT TYPE ISBT 8400    Prepare plasma (unit)   Result Value Ref Range    Blood Component Type Plasma     Product Code O7598M92     Unit Status Issued     Unit Number [id number]     CODING SYSTEM [id number]     ISSUE DATE AND TIME 7/16/2025  1:22:00 AM CDT     UNIT ABO/RH AB+     UNIT TYPE ISBT 8400    Prepare plasma (unit)   Result Value Ref Range    Blood Component Type Plasma     Product Code K0631W40     Unit Status Issued     Unit Number [id number]     CODING SYSTEM BHUM445     ISSUE DATE AND TIME 7/16/2025  1:22:00 AM CDT     UNIT ABO/RH A-     UNIT TYPE ISBT 0600    Prepare plasma (unit)   Result Value Ref Range    Blood Component Type Plasma     Product Code M3708J89     Unit Status Issued     Unit Number [id number]     CODING SYSTEM BOBJ076     ISSUE DATE AND TIME 7/16/2025  1:22:00 AM CDT     UNIT  ABO/RH AB+     UNIT TYPE ISBT 8400    Prepare plasma (unit)   Result Value Ref Range    Blood Component Type Plasma     Product Code E7336T19     Unit Status Issued     Unit Number [id number]     CODING SYSTEM LLQF595     ISSUE DATE AND TIME 7/16/2025  1:22:00 AM CDT     UNIT ABO/RH A-     UNIT TYPE ISBT 0600    Prepare plasma (unit)   Result Value Ref Range    Blood Component Type Plasma     Product Code V7023T75     Unit Status Issued     Unit Number [id number]     CODING SYSTEM [id number]     ISSUE DATE AND TIME 7/16/2025  1:22:00 AM CDT     UNIT ABO/RH AB+     UNIT TYPE ISBT 8400    Prepare plasma (unit)   Result Value Ref Range    Blood Component Type Plasma     Product Code F6474C03     Unit Status Issued     Unit Number [id number]     CODING SYSTEM VTEZ203     ISSUE DATE AND TIME 7/16/2025  1:22:00 AM CDT     UNIT ABO/RH AB+     UNIT TYPE ISBT 8400    Prepare plasma (unit)   Result Value Ref Range    Blood Component Type Plasma     Product Code I1370X19     Unit Status Issued     Unit Number [id number]     CODING SYSTEM LPER953     ISSUE DATE AND TIME 7/16/2025  1:22:00 AM CDT     UNIT ABO/RH AB-     UNIT TYPE ISBT 2800    Prepare plasma (unit)   Result Value Ref Range    Blood Component Type Plasma     Product Code W9129W09     Unit Status Issued     Unit Number [id number]     CODING SYSTEM YDLT042     ISSUE DATE AND TIME 7/16/2025  1:22:00 AM CDT     UNIT ABO/RH AB+     UNIT TYPE ISBT 8400    Prepare plasma (unit)   Result Value Ref Range    Blood Component Type Plasma     Product Code K2979U03     Unit Status Issued     Unit Number [id number]     CODING SYSTEM INPJ837     ISSUE DATE AND TIME 7/16/2025  1:22:00 AM CDT     UNIT ABO/RH AB+     UNIT TYPE ISBT 8400    Prepare plasma (unit)   Result Value Ref Range    Blood Component Type Plasma     Product Code L1200X67     Unit Status Issued     Unit Number [id number]     CODING SYSTEM NTIU422     ISSUE DATE AND TIME 7/16/2025  1:22:00 AM CDT      UNIT ABO/RH AB+     UNIT TYPE ISBT 8400    Prepare plasma (unit)   Result Value Ref Range    Blood Component Type Plasma     Product Code N6977W14     Unit Status Issued     Unit Number [id number]     CODING SYSTEM HYEU609     ISSUE DATE AND TIME 7/16/2025  1:22:00 AM CDT     UNIT ABO/RH AB+     UNIT TYPE ISBT 8400    Prepare plasma (unit)   Result Value Ref Range    ISSUE DATE AND TIME 7/16/2025  1:22:00 AM CDT     Blood Component Type Plasma     Product Code L8153C90     Unit Status Transfused     Unit Number [id number]     UNIT ABO/RH AB+     CODING SYSTEM WYVE871     UNIT TYPE ISBT 8400    Prepare plasma (unit)   Result Value Ref Range    ISSUE DATE AND TIME 7/16/2025  1:22:00 AM CDT     Blood Component Type Plasma     Product Code D7451K34     Unit Status Transfused     Unit Number [id number]     UNIT ABO/RH AB+     CODING SYSTEM SECJ083     UNIT TYPE ISBT 8400    Prepare plasma (unit)   Result Value Ref Range    ISSUE DATE AND TIME 7/16/2025  1:22:00 AM CDT     Blood Component Type Plasma     Product Code A3285O48     Unit Status Transfused     Unit Number [id number]     UNIT ABO/RH AB+     CODING SYSTEM KNLW472     UNIT TYPE ISBT 8400    Prepare plasma (unit)   Result Value Ref Range    ISSUE DATE AND TIME 7/16/2025  1:22:00 AM CDT     Blood Component Type Plasma     Product Code O0017E60     Unit Status Transfused     Unit Number [id number]     UNIT ABO/RH AB+     CODING SYSTEM JTUP432     UNIT TYPE ISBT 8400    Prepare plasma (unit)   Result Value Ref Range    ISSUE DATE AND TIME 7/16/2025  1:22:00 AM CDT     Blood Component Type Plasma     Product Code D3811X24     Unit Status Transfused     Unit Number [id number]     UNIT ABO/RH A-     CODING SYSTEM QVBT572     UNIT TYPE ISBT 0600    Prepare plasma (unit)   Result Value Ref Range    ISSUE DATE AND TIME 7/16/2025  1:22:00 AM CDT     Blood Component Type Plasma     Product Code W1566O16     Unit Status Transfused     Unit Number  [id number]     UNIT ABO/RH A-     CODING SYSTEM BULI700     UNIT TYPE ISBT 0600    Prepare plasma (unit)   Result Value Ref Range    ISSUE DATE AND TIME 7/16/2025  1:22:00 AM CDT     Blood Component Type Plasma     Product Code O6845N68     Unit Status Transfused     Unit Number [id number]     UNIT ABO/RH AB+     CODING SYSTEM UYYE737     UNIT TYPE ISBT 8400    Prepare plasma (unit)   Result Value Ref Range    ISSUE DATE AND TIME 7/16/2025  1:22:00 AM CDT     Blood Component Type Plasma     Product Code Z2403A94     Unit Status Transfused     Unit Number [id number]     UNIT ABO/RH AB+     CODING SYSTEM [id number]     UNIT TYPE ISBT 8400    Prepare plasma (unit)   Result Value Ref Range    ISSUE DATE AND TIME 7/16/2025  1:22:00 AM CDT     Blood Component Type Plasma     Product Code O7157E98     Unit Status Transfused     Unit Number [id number]     UNIT ABO/RH AB-     CODING SYSTEM GXYE286     UNIT TYPE ISBT 2800    Prepare plasma (unit)   Result Value Ref Range    ISSUE DATE AND TIME 7/16/2025  1:22:00 AM CDT     Blood Component Type Plasma     Product Code Z7219T31     Unit Status Transfused     Unit Number [id number]     UNIT ABO/RH AB+     CODING SYSTEM ABVK960     UNIT TYPE ISBT 8400    Prepare plasma (unit)   Result Value Ref Range    ISSUE DATE AND TIME 7/16/2025  1:22:00 AM CDT     Blood Component Type Plasma     Product Code U5543M15     Unit Status Transfused     Unit Number [id number]     UNIT ABO/RH AB+     CODING SYSTEM MYXU034     UNIT TYPE ISBT 8400    Prepare plasma (unit)   Result Value Ref Range    ISSUE DATE AND TIME 7/16/2025  1:22:00 AM CDT     Blood Component Type Plasma     Product Code H1543D97     Unit Status Transfused     Unit Number [id number]     UNIT ABO/RH AB+     CODING SYSTEM HKOI223     UNIT TYPE ISBT 8400    Prepare plasma (unit)   Result Value Ref Range    ISSUE DATE AND TIME 7/16/2025  1:22:00 AM CDT     Blood Component Type Plasma     Product Code S2947C55      Unit Status Transfused     Unit Number [id number]     UNIT ABO/RH AB+     CODING SYSTEM [id number]     UNIT TYPE ISBT 8400    Prepare plasma (unit)   Result Value Ref Range    ISSUE DATE AND TIME 7/16/2025  1:22:00 AM CDT     Blood Component Type Plasma     Product Code J3920B29     Unit Status Transfused     Unit Number [id number]     UNIT ABO/RH AB+     CODING SYSTEM IVTK613     UNIT TYPE ISBT 8400    EKG 12-lead, Tracing only   Result Value Ref Range    Systolic Blood Pressure  mmHg    Diastolic Blood Pressure  mmHg    Ventricular Rate 76 BPM    Atrial Rate 76 BPM    HI Interval 142 ms    QRS Duration 90 ms     ms    QTc 398 ms    P Axis 45 degrees    R AXIS 21 degrees    T Axis 4 degrees    Interpretation ECG       Sinus rhythm with sinus arrhythmia  Normal ECG  Unconfirmed report - interpretation of this ECG is computer generated - see medical record for final interpretation    Confirmed by - EMERGENCY ROOM, PHYSICIAN ([id number]),  Eli Carver ([id number]) on 7/16/2025 7:00:48 AM     Central line    Narrative    Franklin Aguilera MD     7/16/2025  3:06 AM  [facility]    Central line    Date/Time: 7/16/2025 2:30 AM    Performed by: Franklin Aguilera MD  Authorized by: Franklin Aguilera MD  Indications: vascular access      UNIVERSAL PROTOCOL   Site Marked: NA  Prior Images Obtained and Reviewed:  Yes  Required items: Required blood products, implants, devices and special   equipment available    Patient identity confirmed:  Verbally with patient and arm band  Patient was reevaluated immediately before administering moderate or deep   sedation or anesthesia  Confirmation Checklist:  Patient's identity using two indicators, relevant   allergies, procedure was appropriate and matched the consent or emergent   situation and correct equipment/implants were available  Time out: Immediately prior to the procedure a time out was called     Universal Protocol: the Joint Commission Universal Protocol was followed    Preparation: Patient was prepped and draped in usual sterile fashion    ESBL (mL):  4     ANESTHESIA    Anesthesia:  Local infiltration  Local Anesthetic:  Lidocaine 1% without epinephrine  Anesthetic Total (mL):  3      SEDATION  Patient Sedated: Yes    Sedation Type:  Moderate (conscious) sedation  Sedation:  Midazolam and fentanyl  Vital signs: Vital signs monitored during sedation      Preparation: skin prepped with 2% chlorhexidine  Skin prep agent dried: skin prep agent completely dried prior to procedure  Sterile barriers: all five maximum sterile barriers used - cap, mask,   sterile gown, sterile gloves, and large sterile sheet  Hand hygiene: hand hygiene performed prior to central venous catheter   insertion  Patient position: Trendelenburg  Catheter type: triple lumen  Catheter size: 12 Fr  Pre-procedure: landmarks identified  Ultrasound guidance: yes  Sterile ultrasound techniques: sterile gel and sterile probe covers were   used  Number of attempts: 1  Successful placement: yes  Post-procedure: line sutured and dressing applied  Assessment: blood return through all ports, free fluid flow, placement   verified by x-ray and no pneumothorax on x-ray      PROCEDURE    Patient Tolerance:  Patient tolerated the procedure well with no immediate   complications  Length of time physician/provider present for 1:1 monitoring during   sedation: 20     Franklin Aguilera MD  Neurocritical Care  Vascular Neurology  Text Page - [phone]     Glucose by meter   Result Value Ref Range    GLUCOSE BY METER POCT 122 (H) 70 - 99 mg/dL   XR Chest Port 1 View    Narrative    Exam: XR CHEST PORT 1 VIEW, 7/16/2025 3:40 AM    Comparison: 11/3/2015    History: line placement    Findings:  Portable AP view of the semiupright chest. Right IJ CVC with tip at  the cavoatrial junction. Trachea is midline. Cardiomediastinal  silhouette is within normal limits. No  focal airspace opacity. No  pneumothorax or pleural effusion. The visualized upper abdomen is  unremarkable. No acute osseous abnormalities.      Impression    Impression:   Right IJ CVC with tip at the cavoatrial junction. No focal airspace  opacity.    I have personally reviewed the examination and initial interpretation  and I agree with the findings.    KOSTAS LACEYDO ALPHONSO         SYSTEM ID:  [id number]   Hemoglobin A1c   Result Value Ref Range    Estimated Average Glucose 105 <117 mg/dL    Hemoglobin A1C 5.3 <5.7 %   Phosphorus Lab   Result Value Ref Range    Phosphorus 2.3 (L) 2.5 - 4.5 mg/dL   Lactate Dehydrogenase   Result Value Ref Range    Lactate Dehydrogenase 517 (H) 0 - 250 U/L   Basic metabolic panel   Result Value Ref Range    Sodium 141 135 - 145 mmol/L    Potassium 4.7 3.4 - 5.3 mmol/L    Chloride 105 98 - 107 mmol/L    Carbon Dioxide (CO2) 17 (L) 22 - 29 mmol/L    Anion Gap 19 (H) 7 - 15 mmol/L    Urea Nitrogen 18.1 6.0 - 20.0 mg/dL    Creatinine 0.98 0.67 - 1.17 mg/dL    GFR Estimate >90 >60 mL/min/1.73m2    Calcium 9.5 8.8 - 10.4 mg/dL    Glucose 147 (H) 70 - 99 mg/dL   Direct antiglobulin test    Narrative    The following orders were created for panel order Direct antiglobulin test.  Procedure                               Abnormality         Status                     ---------                               -----------         ------                     Direct antiglobulin jamar...[[id number]]                      Final result                 Please view results for these tests on the individual orders.   Direct antiglobulin test, adult   Result Value Ref Range    TRINI Anti-IgG,-C3d Negative     SPECIMEN EXPIRATION DATE 7/19/2025 11:59:00 PM CDT    CRP inflammation   Result Value Ref Range    CRP Inflammation 17.50 (H) <5.00 mg/L   Rheumatoid factor   Result Value Ref Range    Rheumatoid Factor <10 <14 IU/mL   Apheresis Plasma Exchange    Ashely Bateman MD     7/16/2025 12:31 PM                 Laboratory Medicine and Pathology  Transfusion Medicine - Apheresis Procedure    Bernard Chapman MRN# [id number]   YOB: 1990 Age: 35 year old        Reason for consult: Plasma exchange requested for possible TTP or   CAPS           Assessment and Plan:   The patient is a 35 year old male with recent PE and DVT admitted   with new stroke and thrombocytopenia. Urgent plasma exchange was   requested. He tolerated the procedure well. Will followup with   team regarding plans for additional procedures.     Please do not start ACE inhibitors throughout the duration of the   TPE series as these have been associated with reactions during   apheresis.  Please notify the Transfusion Medicine physician of   any upcoming procedures, surgeries, or biopsies as TPE with   albumin replacement will affect coagulation factor levels.           Procedure Summary:   A single plasma volume plasma exchange was performed with a   Spectra Optia cell separator.  The vascular access was a right   internal jugular non-tunneled central venous catheter.  ACD-A was   used for anticoagulation.  To offset the effects of the citrate,   the patient was given calcium  gluconate IV in the return line.    The replacement fluid was plasma (15 units) due to diagnosis   differential and thrombocytopenia.  The patient's vital signs   were stable throughout.  The patient tolerated the procedure   well.    Pre:  /58 P 96 T 98.1 RR 18 O2 sat 98%  Post: /77 P 77 T 96.8 RR 18 O2 sat 99%          Data:     CBC with platelets and differential   Result Value Ref Range    WBC Count 12.8 (H) 4.0 - 11.0 10e3/uL    RBC Count 4.54 4.40 - 5.90 10e6/uL    Hemoglobin 12.3 (L) 13.3 - 17.7 g/dL    Hematocrit 37.1 (L) 40.0 - 53.0 %    MCV 82 78 - 100 fL    MCH 27.1 26.5 - 33.0 pg    MCHC 33.2 31.5 - 36.5 g/dL    RDW 13.1 10.0 - 15.0 %    Platelet Count 25 (LL) 150 - 450 10e3/uL    % Neutrophils 81 %    % Lymphocytes 11 %    % Monocytes  6 %    % Eosinophils 1 %    % Basophils 0 %    % Immature Granulocytes 1 %    NRBCs per 100 WBC 0 <1 /100    Absolute Neutrophils 10.4 (H) 1.6 - 8.3 10e3/uL    Absolute Lymphocytes 1.5 0.8 - 5.3 10e3/uL    Absolute Monocytes 0.8 0.0 - 1.3 10e3/uL    Absolute Eosinophils 0.1 0.0 - 0.7 10e3/uL    Absolute Basophils 0.0 0.0 - 0.2 10e3/uL    Absolute Immature Granulocytes 0.1 <=0.4 10e3/uL    Absolute NRBCs 0.0 10e3/uL   Reticulocyte count   Result Value Ref Range    % Reticulocyte 2.3 (H) 0.5 - 2.0 %    Absolute Reticulocyte 0.104 (H) 0.025 - 0.095 10e6/uL   Lipid panel reflex to direct LDL   Result Value Ref Range    Cholesterol 205 (H) <200 mg/dL    Triglycerides 202 (H) <150 mg/dL    Direct Measure HDL 30 (L) >=40 mg/dL    LDL Cholesterol Calculated 135 (H) <100 mg/dL    Non HDL Cholesterol 175 (H) <130 mg/dL   Magnesium Lab   Result Value Ref Range    Magnesium 2.0 1.7 - 2.3 mg/dL   Vitamin B12   Result Value Ref Range    Vitamin B12 489 232 - 1,245 pg/mL   TSH   Result Value Ref Range    TSH 2.84 0.30 - 4.20 uIU/mL   T4 free   Result Value Ref Range    Free T4 1.20 0.90 - 1.70 ng/dL   Hepatic panel   Result Value Ref Range    Protein Total 6.5 6.4 - 8.3 g/dL    Albumin 3.9 3.5 - 5.2 g/dL    Bilirubin Total 0.9 <=1.2 mg/dL    Alkaline Phosphatase 58 40 - 150 U/L    AST 24 0 - 45 U/L    ALT 25 0 - 70 U/L    Bilirubin Direct 0.32 0.00 - 0.45 mg/dL   EKG 12-lead, Tracing only   Result Value Ref Range    Systolic Blood Pressure  mmHg    Diastolic Blood Pressure  mmHg    Ventricular Rate 76 BPM    Atrial Rate 76 BPM    NE Interval 142 ms    QRS Duration 90 ms     ms    QTc 398 ms    P Axis 45 degrees    R AXIS 21 degrees    T Axis 4 degrees    Interpretation ECG Sinus rhythm with sinus arrhythmia  Normal ECG      Central line    Narrative    Franklin Aguilera MD     7/16/2025  3:06 AM  [facility]    Central line    Date/Time: 7/16/2025 2:30 AM    Performed by:  Franklin Aguilera MD  Authorized by: Franklin Aguilera MD  Indications: vascular   access      UNIVERSAL PROTOCOL   Site Marked: NA  Prior Images Obtained and Reviewed:  Yes  Required items: Required blood products, implants, devices and   special   equipment available    Patient identity confirmed:  Verbally with patient and arm band  Patient was reevaluated immediately before administering moderate   or deep   sedation or anesthesia  Confirmation Checklist:  Patient's identity using two indicators,   relevant   allergies, procedure was appropriate and matched the consent or   emergent   situation and correct equipment/implants were available  Time out: Immediately prior to the procedure a time out was   called    Universal Protocol: the Joint Commission Universal Protocol was   followed    Preparation: Patient was prepped and draped in usual sterile   fashion    ESBL (mL):  4     ANESTHESIA    Anesthesia:  Local infiltration  Local Anesthetic:  Lidocaine 1% without epinephrine  Anesthetic Total (mL):  3      SEDATION  Patient Sedated: Yes    Sedation Type:  Moderate (conscious) sedation  Sedation:  Midazolam and fentanyl  Vital signs: Vital signs monitored during sedation      Preparation: skin prepped with 2% chlorhexidine  Skin prep agent dried: skin prep agent completely dried prior to   procedure  Sterile barriers: all five maximum sterile barriers used - cap,   mask,   sterile gown, sterile gloves, and large sterile sheet  Hand hygiene: hand hygiene performed prior to central venous   catheter   insertion  Patient position: Trendelenburg  Catheter type: triple lumen  Catheter size: 12 Fr  Pre-procedure: landmarks identified  Ultrasound guidance: yes  Sterile ultrasound techniques: sterile gel and sterile probe   covers were   used  Number of attempts: 1  Successful placement: yes  Post-procedure: line sutured and dressing applied  Assessment: blood return through all ports, free fluid flow,    placement   verified by x-ray and no pneumothorax on x-ray      PROCEDURE    Patient Tolerance:  Patient tolerated the procedure well with no   immediate   complications  Length of time physician/provider present for 1:1 monitoring   during   sedation: 20     Franklin Aguilera MD  Neurocritical Care  Vascular Neurology  Text Page - [phone]     Glucose by meter   Result Value Ref Range    GLUCOSE BY METER POCT 122 (H) 70 - 99 mg/dL   XR Chest Port 1 View    Impression    RESIDENT PRELIMINARY INTERPRETATION  Impression: Right IJ CVC with tip at the cavoatrial junction. No   focal  airspace opacity.   Hemoglobin A1c   Result Value Ref Range    Estimated Average Glucose 105 <117 mg/dL    Hemoglobin A1C 5.3 <5.7 %   Phosphorus Lab   Result Value Ref Range    Phosphorus 2.3 (L) 2.5 - 4.5 mg/dL   Lactate Dehydrogenase   Result Value Ref Range    Lactate Dehydrogenase 517 (H) 0 - 250 U/L       Attestation: During the procedure the patient was directly seen   and evaluated by me, Ashely Butler MD, PhD.  I have   reviewed the chart and pertinent laboratory findings, and   discussed the patient and the current procedure with the   Apheresis nursing staff.    Ashely Butler MD, PhD  Transfusion Medicine Attending  Laboratory Medicine & Pathology              Echocardiogram Complete w Bubble study - For age 60 yrs or less   Result Value Ref Range    LVEF  60-65%     Narrative    [id number]  [id number]  [id number]  [id number]^JAY^MEGAN^BARON     [facility],[place]  Echocardiography Laboratory  [address] [place] [id number]     Name: RONI TAYLOR  MRN: [id number]  : 1990  Study Date: 2025 07:51 AM  Age: 35 yrs  Gender: Male  Patient Location: [facility]  Reason For Study: CVA  Ordering Physician: MEGAN THOMPSON  Performed By: Susannah Fowler     BSA: 2.4 m2  Height: 74 in  Weight: 249 lb  HR: 76  BP: 144/79  mmHg  ______________________________________________________________________________  Procedure  Echocardiogram with two-dimensional, color and spectral Doppler. Contrast  Optison. Optison (NDC #4087-4318-40) given intravenously. Patient was given 5  ml mixture of 3 ml Optison and 6 ml saline. 4 ml wasted.  ______________________________________________________________________________  Interpretation Summary  Mobile echodensities noted on tricuspid valve and aortic valve. 2 masses on TV  measuring 1.5x1cm and 0.6x0.6cms. Aortic mass measures 0.8x0.8cm. Mi;ld TR.  Mild to moderate AI.cannot exclude vegetation.  The atrial septum is intact as assessed by agitated saline bubble study .  ______________________________________________________________________________  Left Ventricle  Global and regional left ventricular function is normal with an EF of 60-65%.  Left ventricular wall thickness is normal. Left ventricular size is normal.  Left ventricular diastolic function is normal. No regional wall motion  abnormalities are seen.     Right Ventricle  Right ventricular function, chamber size, wall motion, and thickness are  normal.     Atria  Both atria appear normal. The atrial septum is intact as assessed by agitated  saline bubble study .     Mitral Valve  The mitral valve is normal. Trace mitral insufficiency is present.     Aortic Valve  Mobile echodensities noted on tricuspid valve and aortic valve. 2 masses on TV  measuring 1.5x1cm and 0.6x0.6cms. Aortic mass measures 0.8x0.8cm. Mi;ld TR.  Mild to moderate AI.cannot exclude vegetation.     Pulmonic Valve  The pulmonic valve is normal.     Vessels  The thoracic aorta is normal. The pulmonary artery and bifurcation cannot be  assessed. The inferior vena cava is normal.     Pericardium  No pericardial effusion is present.     Compared to Previous Study  There is no prior study for direct comparison.      ______________________________________________________________________________  MMode/2D Measurements & Calculations  IVSd: 0.71 cm  LVIDd: 5.3 cm  LVIDs: 3.2 cm  LVPWd: 0.76 cm  FS: 38.4 %     LV mass(C)d: 133.4 grams  LV mass(C)dI: 55.9 grams/m2  Ao root diam: 2.5 cm  asc Aorta Diam: 2.8 cm  LVOT diam: 2.0 cm  LVOT area: 3.1 cm2  Ao root diam index Ht(cm/m): 1.3  Ao root diam index BSA (cm/m2): 1.1  Asc Ao diam index BSA (cm/m2): 1.2  Asc Ao diam index Ht(cm/m): 1.5  LA Volume (BP): 46.7 ml  LA Volume Index (BP): 19.5 ml/m2     RWT: 0.29  TAPSE: 2.6 cm     Doppler Measurements & Calculations  MV E max tomer: 69.5 cm/sec  MV A max tomer: 52.2 cm/sec  MV E/A: 1.3  MV dec slope: 365.4 cm/sec2  MV dec time: 0.19 sec  Ao V2 max: 208.5 cm/sec  Ao max P.9 mmHg  Ao V2 mean: 172.1 cm/sec  Ao mean P.6 mmHg  Ao V2 VTI: 35.0 cm  ROGERS(I,D): 1.8 cm2  ROGERS(V,D): 1.6 cm2  LV V1 max P.9 mmHg  LV V1 max: 110.1 cm/sec  LV V1 VTI: 19.7 cm  SV(LVOT): 61.5 ml  SI(LVOT): 25.8 ml/m2  TV V2 max: 68.8 cm/sec  TV max P.9 mmHg  TV mean P.2 mmHg  TV V2 VTI: 14.8 cm  PA acc time: 0.08 sec  TR max tomer: 215.8 cm/sec  TR max P.7 mmHg  AV Tomer Ratio (DI): 0.53  ROGERS Index (cm2/m2): 0.74  E/E' av.7  Lateral E/e': 5.5     Medial E/e': 5.8  RV S Tomer: 16.6 cm/sec     ______________________________________________________________________________  Report approved by: ESTEPHANIE Latham MD on 2025 09:45 AM         CT Chest/Abdomen/Pelvis w Contrast    Narrative    EXAMINATION: CT CHEST/ABDOMEN/PELVIS W CONTRAST, 2025 12:08 PM    INDICATION: malignancy, 3 vessel territrory stroke    COMPARISON STUDY: CTA chest 2025    TECHNIQUE: CT scan of the chest, abdomen and pelvis was performed on  multidetector CT scanner using volumetric acquisition technique and  images were reconstructed in multiple planes with variable thickness  and reviewed on dedicated workstations.     CONTRAST: 135cc of isovue 370 injected IV without oral  contrast    CT scan radiation dose is optimized to minimum requisite dose using  automated dose modulation techniques.    FINDINGS:    Lungs/pleura: 11 x 11 mm spiculated density with central lucency  within the right apex. Inferior peribronchovascular pulmonary  opacities within the subsegmental right upper lobe bronchi leading to  this lesion. Bronchial wall thickening most notably within the right  upper lobe. Scattered groundglass pulmonary nodules in the left upper  lobe and right middle and lower lobes. Solid pulmonary nodule in the  lateral right lung base adjacent to the dome of the liver, measuring  approximately 9 mm (8/105). Bibasilar atelectasis.    Mediastinum: Superior left thyroid lobe nodule measuring approximately  10 x 7 mm. Irregular heterogenous nodular extension of the superior  aspect of the right thyroid lobe projecting into the paraesophageal  soft tissues; consider sonographic correlation. The trachea and  central airways are clear. Heart size is normal without pericardial  effusion. Normal caliber ascending thoracic aorta and pulmonary trunk.  There are scattered areas of filling defect in bilateral lower lobe  segmental pulmonary arteries. Right IJ central line with tip in the  low SVC. Enlarged right paratracheal nodes measuring up to 14 mm in  short axis, best appreciated on coronal image (series 10 image 58).  Necrotic-appearing paraesophageal node inferior to the gerald near the  right hilum (6/100), measuring up to 1.9 cm. Additional enlarged right  hilar nodes measuring up to 14 mm (6/85).    Liver: No mass. No intrahepatic biliary ductal dilation.    Biliary System: Normal gallbladder. No extrahepatic biliary ductal  dilation.    Pancreas: No mass or pancreatic ductal dilation.    Adrenal glands: No mass or nodules    Spleen: Measures approximately 14 mm, borderline enlarged.  Wedge-shaped hypodensity at the periphery of the spleen (6/186 and  10/83).    Kidneys: No suspicious mass,  obstructing calculus or hydronephrosis.  Subtle scattered wedge-shaped hypodensities in both renal parenchymas.    Gastrointestinal tract: Normal appendix. Normal caliber small bowel.    Mesentery/peritoneum/retroperitoneum: 11 mm soft tissue density nodule  in the left retroperitoneum (6/307). No free fluid or air.    Lymph nodes: No mesenteric or retroperitoneal lymphadenopathy.     Vasculature: Patent major abdominal vasculature.  Eccentric filling  defect in the left external iliac vein (10/66).    Pelvis: Urinary bladder is normal.    Osseous structures: No aggressive or acute osseous lesion.      Soft tissues: Fat-containing left inguinal hernia.      Impression    IMPRESSION:     1. Multiple enlarged mediastinal and hilar lymph nodes with necrotic  changes in the paraesophageal lymph nodes. Leading consideration is  neoplastic etiology, lymphoproliferative versus metastasis. Recommend  correlation with tissue sampling.    2. Spiculated-appearing nodule within the right lung apex measuring up  to 11 mm with central lucency. Adjacent peribronchovascular opacities  surrounding the subsegmental bronchi inferior to this lesion. Findings  are nonspecific, but given history could potentially represent a  region of small pulmonary infarction. Other scattered subcentimeter  groundglass nodules are probably infective/inflammatory in etiology.  Recommend comparison with any prior imaging (none available in the  system) and attention on short interval follow-up.    3. Mild splenomegaly.    4. 11 mm soft tissue density nodule in the left retroperitoneum is  indeterminate, metastasis is not excluded.    5. Small wedge-shaped hypodensity at the periphery of the spleen and  few scattered wedge-shaped areas in both kidneys likely represent  infarcts.    6. Scattered areas of filling defect in bilateral lower lobe segmental  pulmonary arteries are indeterminate for flow artifacts versus emboli  on this nondedicated exam.  Consider CT PE for further evaluation.  Additionally, focal filling defect in the left external iliac vein is  indeterminate, consider dedicated Doppler for further evaluation.        I have personally reviewed the examination and initial interpretation  and I agree with the findings.    WARNER ROBLES MD         SYSTEM ID:  [id number]   CTA  Angiogram Heart    Narrative    Procedure: CTA HEART STRUCTURE   Examination Date: 7/16/2025 12:09 PM   INDICATION: Evaluate for intracardiac thrombus after stroke.  Ordering Provider: Neurology.  Overall quality of the study: Non-diagnostic due to poor contrast  opacification and cardiac motion artifact. .     PROCEDURE: ECG gated multi-slice computed tomography of the heart   with intravenous contrast (Isovue 370, 135 mL) was performed on a  [employer] Dual Source Flash scanner without incident. CTA was performed  in the spiral mode at a heart rate of 101 bpm with 120 kVp. A delayed  Flash scan was performed after 1 minute to evaluate for thrombus.  Images were reconstructed and analyzed on a [employer] workstation.  Scan protocol was optimized to minimize radiation exposure. The total  radiation exposure was calculated to be 2123 DLP and 29.7 mSv.      Impression    IMPRESSION:  1.  Non-diagnostic evaluation for left atrial appendage thrombus.   2.  Please review the separate Radiology report for incidental  noncardiac findings.    FINDINGS:      1.  Non-diagnostic evaluation for left atrial appendage thrombus.   2.  The left atrial appendage has a  chicken wing morphology.  3.   Coronary images are non-diagnostic for stenosis/atherosclerosis  due to cardiac motion artifact. No coronary artery calcifications  seen.   4.   The visualized aortic root, ascending aorta, and descending  thoracic aorta are normal in caliber.    ABDOUL GARCIA MD         SYSTEM ID:  [id number]   Radiologist Consult For Cardiology    Narrative    Radiologist consult cardiology    INDICATION:  Stroke    COMPARISON: None     unremarkable.  Detailed included portion of the lungs post punctate calcified  granuloma right lung there are dependent atelectasis bilaterally.  Calcified granuloma left lower lobe. No suspicious dominant nodule.  However, there is right hilar fullness consistent with enlarged hilar  lymph node measuring approximately 25 x 24 mm. Bulky subcarinal node  measures approximately 26 x 29 mm. The right hilar soheila mass appears  to abut the posterior surface of the right upper lobar pulmonary  artery. There are incidental hypodense filling defects in the  right-sided pulmonary arterial system consistent with pulmonary emboli  which appear nonobstructive. There is also left lower lobe hypodense  filling defect again nonobstructive consistent with pulmonary embolus.  No left atrial or obvious atrial appendage thrombus. No effusions. No  nodules.      Impression    IMPRESSION:  1. Incidental pulmonary emboli bilaterally without obvious right heart  strain.  2. Bulky right hilar and subcarinal lymphadenopathy.    NATALIIA ALCAZAR MD         SYSTEM ID:  [id number]   Prepare pheresed platelets (unit)   Result Value Ref Range    Blood Component Type Platelets     Product Code [id number]     Unit Status Transfused     Unit Number [id number]     CODING SYSTEM [id number]     ISSUE DATE AND TIME 7/16/2025  1:09:00 PM CDT     UNIT ABO/RH A+     UNIT TYPE ISBT 6200    Prepare pheresed platelets (unit)   Result Value Ref Range    Blood Component Type Platelets     Product Code [id number]     Unit Status Transfused     Unit Number [id number]     CODING SYSTEM [id number]     ISSUE DATE AND TIME 7/16/2025  1:09:00 PM CDT     UNIT ABO/RH A+     UNIT TYPE ISBT 6200    CBC with Platelets & Differential    Narrative    The following orders were created for panel order CBC with Platelets & Differential.  Procedure                               Abnormality         Status                     ---------                                -----------         ------                     CBC with platelets and ...[[id number]]  Abnormal            Final result               RBC and Platelet Morpho...[[id number]]                                                   Please view results for these tests on the individual orders.   CBC with platelets and differential   Result Value Ref Range    WBC Count 13.8 (H) 4.0 - 11.0 10e3/uL    RBC Count 4.84 4.40 - 5.90 10e6/uL    Hemoglobin 13.0 (L) 13.3 - 17.7 g/dL    Hematocrit 38.5 (L) 40.0 - 53.0 %    MCV 80 78 - 100 fL    MCH 26.9 26.5 - 33.0 pg    MCHC 33.8 31.5 - 36.5 g/dL    RDW 12.9 10.0 - 15.0 %    Platelet Count 24 (LL) 150 - 450 10e3/uL    % Neutrophils 95 %    % Lymphocytes 4 %    % Monocytes 1 %    % Eosinophils 0 %    % Basophils 0 %    % Immature Granulocytes 1 %    NRBCs per 100 WBC 0 <1 /100    Absolute Neutrophils 13.1 (H) 1.6 - 8.3 10e3/uL    Absolute Lymphocytes 0.5 (L) 0.8 - 5.3 10e3/uL    Absolute Monocytes 0.1 0.0 - 1.3 10e3/uL    Absolute Eosinophils 0.0 0.0 - 0.7 10e3/uL    Absolute Basophils 0.0 0.0 - 0.2 10e3/uL    Absolute Immature Granulocytes 0.1 <=0.4 10e3/uL    Absolute NRBCs 0.0 10e3/uL   Urine Drug Screen    Narrative    The following orders were created for panel order Urine Drug Screen.  Procedure                               Abnormality         Status                     ---------                               -----------         ------                     Urine Drug Screen Panel[[id number]]     Abnormal            Final result                 Please view results for these tests on the individual orders.   UA with Microscopic   Result Value Ref Range    Color Urine Light Yellow Colorless, Straw, Light Yellow, Yellow    Appearance Urine Clear Clear    Glucose Urine Negative Negative mg/dL    Bilirubin Urine Negative Negative    Ketones Urine Negative Negative mg/dL    Specific Gravity Urine 1.005 1.003 - 1.035    Blood Urine Negative Negative    pH Urine 7.5 (H)  5.0 - 7.0    Protein Albumin Urine Negative Negative mg/dL    Urobilinogen Urine Normal Normal mg/dL    Nitrite Urine Negative Negative    Leukocyte Esterase Urine Negative Negative    Mucus Urine Present (A) None Seen /LPF    RBC Urine 1 <=2 /HPF    WBC Urine <1 <=5 /HPF   Protein  random urine   Result Value Ref Range    Total Protein Urine mg/dL <6.0   mg/dL    Total Protein Urine mg/mg Creat      Creatinine Urine mg/dL 70.3 mg/dL   Urine Drug Screen Panel   Result Value Ref Range    Amphetamines Urine Screen Negative Screen Negative    Barbituates Urine Screen Negative Screen Negative    Benzodiazepine Urine Screen Positive (A) Screen Negative    Cannabinoids Urine Screen Negative Screen Negative    Cocaine Urine Screen Negative Screen Negative    Fentanyl Qual Urine Screen Positive (A) Screen Negative    Opiates Urine Screen Negative Screen Negative    PCP Urine Screen Negative Screen Negative   Urine Creatinine for Drug Screen Panel   Result Value Ref Range    Creatinine Urine for Drug Screen 71 mg/dL   Platelet count   Result Value Ref Range    Platelet Count 61 (L) 150 - 450 10e3/uL    MCV 80 78 - 100 fL   JAK2 Reflex to CALR MPL Myeloproliferative Neoplasms NGS Panel    Narrative    The following orders were created for panel order JAK2 Reflex to CALR MPL Myeloproliferative Neoplasms NGS Panel.  Procedure                               Abnormality         Status                     ---------                               -----------         ------                     JAK2 Reflex to CALR MPL...[[id number]]                      In process                 CALR MPL Myeloprolifera...[[id number]]                      In process                   Please view results for these tests on the individual orders.   CBC with platelets   Result Value Ref Range    WBC Count 14.4 (H) 4.0 - 11.0 10e3/uL    RBC Count 4.65 4.40 - 5.90 10e6/uL    Hemoglobin 12.6 (L) 13.3 - 17.7 g/dL    Hematocrit 37.7 (L) 40.0 - 53.0 %    MCV 81  78 - 100 fL    MCH 27.1 26.5 - 33.0 pg    MCHC 33.4 31.5 - 36.5 g/dL    RDW 12.9 10.0 - 15.0 %    Platelet Count 59 (L) 150 - 450 10e3/uL   Erythrocyte sedimentation rate auto   Result Value Ref Range    Erythrocyte Sedimentation Rate 4 0 - 15 mm/hr   Quantiferon-TB Gold Plus    Specimen: Line, venous; Blood    Narrative    The following orders were created for panel order Quantiferon-TB Gold Plus.  Procedure                               Abnormality         Status                     ---------                               -----------         ------                     Quantiferon TB Gold Plu...[[id number]]                                                   Please view results for these tests on the individual orders.       Staff Involved:  Resident(Marito Camarena MD)/Staff(as above)

## 2025-07-17 NOTE — CONSULTS
SPIRITUAL HEALTH SERVICES   [facility] ([place]) [address]     Summary: Saw pt Bernard Chapman per consult order request for emotional/spiritual support.    Pt/family declines spiritual care support at this time.     Plan: Pt/ family is oriented to availability of chaplains during admission.     Mauri Roblero MA  Associate Staff   [facility]      To reach Spiritual Health, securely message with the [employer] Web Console   or   enter an ASAP/STAT consult in [employer], which will also page the on-call .

## 2025-07-17 NOTE — PROGRESS NOTES
07/17/25 1400   Appointment Info   Signing Clinician's Name / Credentials (PT) keren marshall,pt   Living Environment   People in Home alone   Current Living Arrangements apartment   Home Accessibility no concerns   Transportation Anticipated car, drives self;family or friend will provide   Living Environment Comments Pt lives alone in apartment with elevator access. Bathroom has tub/shower   Self-Care   Usual Activity Tolerance good   Current Activity Tolerance good   Regular Exercise No   Equipment Currently Used at Home none   Fall history within last six months no   Activity/Exercise/Self-Care Comment IND with functional mobility and ADLs prior to admit.  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   General Information   Onset of Illness/Injury or Date of Surgery 07/15/25   Referring Physician Nishi Rodas MD   Patient/Family Therapy Goals Statement (PT) return home   Pertinent History of Current Problem (include personal factors and/or comorbidities that impact the POC) Bernard Chapman is a 35 year old male with a past medical history of hypertension, diabetes, possible ITP, sleep apnea, PE and DVT on rivaroxaban admitted on 7/15/2025 for acute ischemic stroke in left MCA territory, near occlusion of left M1, and concern for thrombotic emergency such as TTP or catastrophic antiphospholipid syndrome (in setting of severe thrombocytopenia and acute cerebral infarcts).Admission to neuroICU for close neurologic monitoring and plan for urgent PLEX.   Cognition   Orientation Status (Cognition) oriented x 4   Follows Commands (Cognition) WFL   Posture    Posture Not impaired   Range of Motion (ROM)   ROM Comment B LE grossly WFL   Strength (Manual Muscle Testing)   Strength Comments R dorsiflexion 4+/5; rest of B LE grossly 5/5   Bed Mobility   Comment, (Bed Mobility) supine > sit IND   Transfers   Comment, (Transfers) sit > stand IND   Gait/Stairs (Locomotion)   Comment,  (Gait/Stairs) ambulated in room with min assist.  slow ngoc, decreased step length.   Balance   Balance Comments LOB with romberg eyes closed   Sensory Examination   Sensory Perception Comments light touch/proprioception intact B LE   Coordination   Coordination Comments heel > shin intact B   Muscle Tone   Muscle Tone no deficits were identified   Clinical Impression   Criteria for Skilled Therapeutic Intervention Yes, treatment indicated   PT Diagnosis (PT) impaired functional mobility in setting of stroke   Influenced by the following impairments decreased strength, impaired balance, decreased actvity tolerance   Functional limitations due to impairments impaired gait   Clinical Presentation (PT Evaluation Complexity) evolving   Clinical Presentation Rationale clinical judgement   Clinical Decision Making (Complexity) low complexity   Planned Therapy Interventions (PT) balance training;gait training;neuromuscular re-education;progressive activity/exercise   Risk & Benefits of therapy have been explained evaluation/treatment results reviewed;care plan/treatment goals reviewed   PT Total Evaluation Time   PT Darian, Low Complexity Minutes (22944) 8   Physical Therapy Goals   PT Frequency Daily   PT Predicted Duration/Target Date for Goal Attainment 07/24/25   PT Goals Gait   PT: Gait Independent;Greater than 200 feet   PT Discharge Planning   PT Plan gait without AD, static/dynamic balance   PT Discharge Recommendation (DC Rec) home with assist;home with outpatient physical therapy   PT Rationale for DC Rec mobilizing well, anticipate he will IND/mod IND with functional mobility for household distances during expected length of acute stay. will benefit from OP to continue to work on balance/gait and R sided deficits.   PT Brief overview of current status OOB IND.  ambulated 225' with min assist   PT Total Distance Amb During Session (feet) 225   Physical Therapy Time and Intention   Total Session Time (sum of  timed and untimed services) 8

## 2025-07-17 NOTE — PROGRESS NOTES
Neurocritical Care Progress Note    Reason for critical care admission: left M1 subtotal occlusion, urgent PLEX   Admitting Team: Neurocritical Care   Date of Service:  07/17/2025  Date of Admission:  7/15/2025  Hospital Day: 3    Assessment/Plan  Bernard Cahpman is a 35 year old male with a past medical history of hypertension, diabetes, possible ITP, sleep apnea, PE and DVT on rivaroxaban admitted on 7/15/2025 for acute ischemic stroke in left MCA territory, near occlusion of left M1, and concern for thrombotic emergency such as TTP or catastrophic antiphospholipid syndrome (in setting of severe thrombocytopenia and acute cerebral infarcts). Admission to neuroICU for close neurologic monitoring and plan for urgent PLEX.     24 hour events:  - no acute overnight events  - transfused 2u platelets for plt 38K       Neuro  #Multifocal Acute ischemic strokes, in L MCA territory, Right parietal lobe, right frontal centrum semiovale of unclear etiology   #subtotal Left M1 occlusion   #Sensory and visual extinction on right  Etiology remains unclear, though malignancy high on the differential given multiple findings on CT CAP, h/o hypercoagulability, and 3 territory CVA on head imaging. Endocarditis also a possibility given mobile masses on tricuspid and aortic valves on TTE 7/16  - repeat CTH, CTA head and neck, CTP to evaluate for thrombotic burden   - Neurochecks every 2 hrs  - SBP goal < 180 mmHg  - HOB > 30   - PT/OT/SLP  -   - Triglycerides - 202  - A1c 5.3%  - Continue Crestor 10 mg once daily   - neuroIR aware  - HOLD antiplatelet and anticoagulation for secondary stroke prevention due to severe thrombocytopenia     #Analgesics & sedation  RASS goal: 0  - HOLD PRN acetaminophen to prevent masking fever   - PRN oxycodone 4 mg Q6H      CV  #Hypertension  # mobile echodensity on tricuspid and aortic valve 7/16  - TIMO 7/17 to evaluate for valvular vegetation, concerning for endocarditis   -Cardiac  monitoring  -SBP goal < 180 mmHg  -DBP goal < 105  -PRN Labetalol and Hydralazine  -hold PTA metoprolol, hydrochlorothiazide, losartan  -blood cultures x2 NG 12H   -Crestor 10 mg daily   -HOLD anticoagulation and antiplatelets due to severe thrombocytopenia      Workup thus far:   - 7/16: cardiac CTA non diagnostic for left atrial appendage thrombus and stenosis/athero  - 7/16: TTE with mobile echodensities on tricuspid valve and aortic valve, EF 60-65%  - 7/17: TIMO diffuse investment of the aortic and tricuspid valves with thrombus, moderate aortic and tricuspid regurgitation       Resp  #recent PE  #ELENA on CPAP  Oxygen/vent: room air  -Continuous pulse ox  -Maintain O2 saturations greater than 92%  -patient refusing home CPAP  -2L NC while sleeping  - DVT US BLE     #11 mm right lung nodule  # multiple enlarged mediastinal, paraesophageal, and hilar lymph nodes cf paraneoplastic vs lymphoproliferative vs malignancy   - CT CAP 7/16 with spiculated-appearing nodule in the right lung apex    - Thoracic vs Interventional Pulmonology Consult for Core needle biopsy   - 7/17 CT PE protocol     GI  # SABRINA  Diet: NPO for now  Last BM: PTA  GI prophylaxis: not indicated  -Bowel regimen: PRN senna-docusate and Miralax  Last BM 7/16     #splenic infarction  - CT CAP 7/16 showed wedge-shaped hypodensity at the periphery of the spleen   - HOLD off on anticoagulation due to severe thrombocytopenia    Renal/  #urinary retention, resolved  #renal infarct on   -Daily BMP  -IV fluids: 100ml/hr plasmalyte   -Electrolyte replacement protocol  -bladder scan, consider reyes if retaining   -I's and O's  - HOLD anticoagulation (see above)     Endo  # Diabetes  -Hgb A1c 5.3%  -POCT glucose       Heme  #C/f thrombotic emergency such as TTP vs CAPS  #Possible ITP  #recent DVT/PE  #hemolytic anemia   - s/p urgent PLEX 7/16  - hold rivaroxaban  - s/p dexamethasone 40mg in ED  - INR/PT 2.05/22.8 , PTT 30 (wnl)  - Daily CBC  - Hgb goal >7, plt  "goal >100k  - Transfuse to meet Hgb and plt goals  - hematology consulted. Appreciate recommendations  - core needle biopsy of lymph nodes preferred   - transfused 2u platelets 7/17 for plt 38K       Pending labs:   - 7/15: fibrinogen 151   - 7/15: Ferritin 535  - 7/15: haptoglobin < 10   - 7/15: retic 2.3   - 7/15: lupus anticoagulant positive   - 7/15: [id number] BERKLEY negative  - 7/16: beta2 glycoprotein undetected   - 7/16: cardiolipin panel pending   - 7/16:    - 7/16: blood cultures x2 NGTD   - 7/16: CRP 17.50 -> 9.3   - 7/16: ESR 4   - 7/16: proteinase 3 Ab IgG pending   - 7/16: ANCA IgG by IFA pending  - 7/16: myeloperoxidase pending   - 7/16: Jak2 antibody pending   - 7/16: TRINI Anti-IgG-C3d negative   - 7/16: PND panel pending   - 7/16: peripheral smear normochromic, normocytic anemia, increased RBC regeneration, leukocytosis with neutrophilia, marked thrombocytopenia with normal platelet morphology, normal RBC morphology  - 7/16: hepatitis B sAg negative   - 7/16: complement 3 and 4 negative   - 7/16: ghislaine/norma light chain ratio wnl  - 7/16: IgA, IgG, IgM negative   - 7/17: vitamin d level wnl    ID  #Leukocytosis  - UA 7/16 unremarkable   - CXR 7/16 without signs of infection   - Afebrile however receiving tylenol  -daily CBC  -follow temp curve  - blood cultures repeated 7/17     Integument/skin  #palpable purpura bilateral lower extremity   - s/p LLE biopsy 7/16 - follow results     ICU Checklist  Lines/tubes/drains: PIV, central line   FEN: NPO for TIMO   PPX: DVT - hold d/t thrombocytopenia ; GI - senna BID, PRN miralax.  Code: Full code, discussed with patient   Dispo: ICU - NCC           # Obesity: Estimated body mass index is 33.12 kg/m  as calculated from the following:    Height as of this encounter: 1.88 m (6' 2\").    Weight as of this encounter: 117 kg (257 lb 15 oz)., PRESENT ON ADMISSION              TIME SPENT ON THIS ENCOUNTER  I spent 60 minutes of critical care time on the unit/floor " managing the care of Bernard Chapman excluding time performing procedures. Upon evaluation, this patient had a high probability of imminent or life-threatening deterioration due to significant stroke burden and severe thrombocytopenia, which required my direct attention, intervention, and personal management. Greater than 50% of my time was spent at the bedside counseling the patient and/or coordinating care including chart review, history, exam, documentation, and further activities per this note. I have personally reviewed the following data/imaging over the past 24 hours.     The patient was seen and discussed with the NCC attending, Dr. Aguilera.    Nilsa Daly DO on 2025 at 3:16 PM     24 Hour Vital Signs Summary:  Temp: 97.8  F (36.6  C) Temp  Min: 97.7  F (36.5  C)  Max: 98.9  F (37.2  C)  Resp: 16 Resp  Min: 9  Max: 28  SpO2: 100 % SpO2  Min: 94 %  Max: 100 %  Pulse: 73 Pulse  Min: 60  Max: 114    No data recorded  BP: 127/88 Systolic (24hrs), Av , Min:111 , Max:151   Diastolic (24hrs), Av, Min:72, Max:88      Respiratory monitoring:   Resp: 16    I/O last 3 completed shifts:  In: 4935.34 [P.O.:480; I.V.:3618.34; IV Piggyback:250]  Out: 3050 [Urine:3050]    Current Medications:  Current Facility-Administered Medications   Medication Dose Route Frequency Provider Last Rate Last Admin    rosuvastatin (CRESTOR) tablet 10 mg  10 mg Oral Daily Griselda Ha CNP   10 mg at 25 1144    senna-docusate (SENOKOT-S/PERICOLACE) 8.6-50 MG per tablet 1-2 tablet  1-2 tablet Oral or Feeding Tube BID Griselda Ha CNP   1 tablet at 25    sodium chloride (PF) 0.9% PF flush 3 mL  3 mL Intracatheter Q8H [facility] Nishi Rodas MD   3 mL at 25 1702    sodium chloride 0.9 % bag for CT scan flush  100 mL Intravenous Once Segundo Pond MD           PRN Medications:  Current Facility-Administered Medications   Medication Dose Route Frequency Provider Last Rate Last Admin  "   acetaminophen (TYLENOL) tablet 1,000 mg  1,000 mg Oral or Feeding Tube Q8H PRN Nishi Rodas MD   1,000 mg at 07/16/25 2124    hydrALAZINE (APRESOLINE) injection 10 mg  10 mg Intravenous Q30 Min PRN Nishi Rodas MD        labetalol (NORMODYNE/TRANDATE) injection 10 mg  10 mg Intravenous Q10 Min PRN Nishi Rodas MD        lidocaine (LMX4) cream   Topical Q1H PRN Nishi Rodas MD        lidocaine 1 % 0.1-1 mL  0.1-1 mL Other Q1H PRN Nishi Rodas MD        Medication Instruction - Avoid dextrose in IV solutions   Intravenous Continuous PRN Nishi Rodas MD        ondansetron (ZOFRAN ODT) ODT tab 4 mg  4 mg Oral Q6H PRN Nishi Rodas MD        Or    ondansetron (ZOFRAN) injection 4 mg  4 mg Intravenous Q6H PRN Nishi Rodas MD        polyethylene glycol (MIRALAX) Packet 17 g  17 g Oral Daily PRN Griselda Ha CNP        prochlorperazine (COMPAZINE) injection 10 mg  10 mg Intravenous Q6H PRN Franklin Aguilera MD        Or    prochlorperazine (COMPAZINE) tablet 10 mg  10 mg Oral or Feeding Tube Q6H PRN Franklin Aguilera MD        sodium chloride (PF) 0.9% PF flush 3 mL  3 mL Intracatheter q1 min prn Nishi Rodas MD           Infusions:  Current Facility-Administered Medications   Medication Dose Route Frequency Provider Last Rate Last Admin    Medication Instruction - Avoid dextrose in IV solutions   Intravenous Continuous PRN Nishi Rodas MD        Plasma-Lyte A (electrolyte A) infusion   Intravenous Continuous Nilsa Daly  mL/hr at 07/17/25 0700 Rate Verify at 07/17/25 0700       Allergies   Allergen Reactions    Apixaban Rash    Amlodipine Other (See Comments) and Rash       Physical Examination:  Vitals: /88   Pulse 73   Temp 97.8  F (36.6  C) (Oral)   Resp 16   Ht 1.88 m (6' 2\")   Wt 117 kg (257 lb 15 oz)   SpO2 100%   BMI 33.12 kg/m    General: Adult male patient, lying in bed, in no apparent " distress  HEENT: Normocephalic, atraumatic, no icterus, oral cavity/oropharynx pink and moist, wearing NC  Cardiac: RRR, s1/s2 auscultated without murmur  Pulm: CTAB, unlabored, expansion symmetric, no retractions or use of accessory muscles  Abdomen: Soft, non-distended, bowel sounds present  Extremities: Warm, no edema, distal pulses +2, well perfused  Skin: purpuric rash on BLE        Left leg 7/16         Right leg 7/16        Psych: Calm and cooperative, affect congruent with mood   Neuro:  Mental status: Awake, alert, attentive, oriented to self, time, place, and circumstance. Language is fluent and coherent with intact comprehension of complex commands, naming and repetition.  Cranial nerves: right hemianopia, PERRL, conjugate gaze, EOMI, facial sensation intact, mild right facial droop, no uvular deviation, mild-moderate dysarthria.   Motor: RUE drift but not to bed, BLE without drift, 5/5 hip flexion bilaterally   Sensory: Intact to light touch x 4 extremities, extinction to simultaneous touch on right in LE but not in UE   Coordination: FNF and HS without ataxia or dysmetria  Gait: MILAN, deferred.  Reflexes: deferred        NIHSS  1a. Level of Consciousness 0-->Alert, keenly responsive   1b. LOC Questions 0-->Answers both questions correctly   1c. LOC Commands 0-->Performs both tasks correctly   2.   Best Gaze 0-->Normal   3.   Visual 1 -> partial hemianopia    4.   Facial Palsy 1-->Minor paralysis (flattened nasolabial fold, asymmetry on smiling)   5a. Motor Arm, Left 0-->No drift, limb holds 90 (or 45) degrees for full 10 secs   5b. Motor Arm, Right 1-->Drift, limb holds 90 (or 45) degrees, but drifts down before full 10 secs, does not hit bed or other support   6a. Motor Leg, Left 0-->No drift, leg holds 30 degree position for full 5 secs   6b. Motor Leg, right 0-->No drift, leg holds 30 degree position for full 5 secs   7.   Limb Ataxia 0-->Absent   8.   Sensory 0-->Normal, no sensory loss   9.   Best  Language 0-->No aphasia, normal   10. Dysarthria 1-->mild-to-moderate dysarthria    11. Extinction and Inattention  1-->Visual, tactile, auditory, spatial, or personal inattention or extinction to bilateral simultaneous stimulation in one of the sensory modalities    Total 5 (07/17/25 1345)       Labs and Imaging:    All relevant imaging and laboratory values personally reviewed.       The atrial septum is intact as assessed by agitated saline bubble study .    CT CAP 7/16/25    IMPRESSION:      1. Multiple enlarged mediastinal and hilar lymph nodes with necrotic  changes in the paraesophageal lymph nodes. Leading consideration is  neoplastic etiology, lymphoproliferative versus metastasis. Recommend  correlation with tissue sampling.     2. Spiculated-appearing nodule within the right lung apex measuring up  to 11 mm with central lucency. Adjacent peribronchovascular opacities  surrounding the subsegmental bronchi inferior to this lesion. Findings  are nonspecific, but given history could potentially represent a  region of small pulmonary infarction. Other scattered subcentimeter  groundglass nodules are probably infective/inflammatory in etiology.  Recommend comparison with any prior imaging (none available in the  system) and attention on short interval follow-up.     3. Mild splenomegaly.     4. 11 mm soft tissue density nodule in the left retroperitoneum is  indeterminate, metastasis is not excluded.     5. Small wedge-shaped hypodensity at the periphery of the spleen and  few scattered wedge-shaped areas in both kidneys likely represent  infarcts.     6. Scattered areas of filling defect in bilateral lower lobe segmental  pulmonary arteries are indeterminate for flow artifacts versus emboli  on this nondedicated exam. Consider CT PE for further evaluation.  Additionally, focal filling defect in the left external iliac vein is  indeterminate, consider dedicated Doppler for further evaluation.    TIMO  7/17/25    Interpretation Summary  TIMO ordered to assess valvular disorder.     There is diffuse investment of the aortic and tricuspid valves with thrombus  that result in moderate aortic regurgitation and moderate tricuspid  regurgitation. Normal mitral and pulmonic valves.     Of note, an infectious etiology of the valvular lesions cannot be excluded on  the basis of this study alone.  ______________________________________________________________________________  Procedure  Transesophageal Echocardiogram with two-dimensional, color and spectral  Doppler. 3D image acquisition, reconstruction, and real-time interpretation  was performed. Procedure location Echo Lab. Informed consent for  Transesophegeal echo obtained. TIMO Probe #61 was used during the procedure.  Patient was sedated using Fentanyl 50 mcg. Patient was sedated using Versed 10  mg. The heart rate, respiratory rate, oxygen saturations, blood pressure, and  response to care were monitored throughout the procedure with the assistance  of the nurse. I determined this patient to be an appropriate candidate for the  planned sedation and procedure and have reassessed the patient immediately  prior to sedation and procedure. Total sedation time: 45 minutes of continuous  bedside 1:1 monitoring. The Transducer was inserted without difficulty . The  patient tolerated the procedure well. Complications None. Good quality two-  dimensional was performed and interpreted.     Left Ventricle  Left ventricular size, wall motion and function are normal. The ejection  fraction is 60-65%.     Right Ventricle  The right ventricle is normal size. Global right ventricular function is  normal.     Atria  The left atrial appendage is normal. It is free of spontaneous echo contrast  and thrombus. The atrial septum is intact as assessed by color Doppler .     Mitral Valve  The mitral valve is normal. Trace mitral insufficiency is present.     Aortic Valve  There is diffuse  investment of the aortic valve with echodense material. This  involves both the ventricular and aortic size of the valve and includes a  independently mobile component attached to the ventricular side of the  noncoronary cusp that measures 0.8 x 0.4 cm and right coronary cusp that  measures 1.2 x 0.67 cm. There is concurrent prolapse of the right coronary  cusp resulting in moderate aortic regurgitation. No aortic stenosis is  present. Holodiastolic flow reversal is not visualized in the descending  aorta.     Tricuspid Valve  There is diffuse investment of the tricuspid valve with echodense material  predominantly attached to the atrial side of the valve. Appears to be  predominantly involving the posterior leaflet though there is thickening of  the anterior and septal leaflet as well. The largest mass attached to the  posterior leaflet is 1.9 x 0.9 cm and results in moderate tricuspid  regurgitation. Moderate tricuspid insufficiency is present. Pulmonary artery  systolic pressure is normal.     Pulmonic Valve  The pulmonic valve is normal. Trace pulmonic insufficiency is present.     Vessels  The aorta root is normal. The thoracic aorta is normal.     Pericardium  Trivial pericardial effusion.     Compared to Previous Study  No prior TIMO for comparison.

## 2025-07-17 NOTE — PLAN OF CARE
Problem: Stroke, Ischemic (Includes Transient Ischemic Attack)  Goal: Optimal Coping  Outcome: Progressing  Intervention: Support Psychosocial Response to Stroke  Recent Flowsheet Documentation  Taken 7/17/2025 1700 by Leonard Betancourt RN  Supportive Measures:   verbalization of feelings encouraged   self-responsibility promoted   self-care encouraged   relaxation techniques promoted  Taken 7/17/2025 1600 by Leonard Betancourt RN  Supportive Measures:   verbalization of feelings encouraged   self-responsibility promoted   self-care encouraged   relaxation techniques promoted  Taken 7/17/2025 1400 by Leonard Betancourt RN  Supportive Measures:   verbalization of feelings encouraged   self-responsibility promoted   self-care encouraged   relaxation techniques promoted  Taken 7/17/2025 1300 by Leonard Betancourt RN  Supportive Measures:   verbalization of feelings encouraged   self-responsibility promoted   self-care encouraged   relaxation techniques promoted  Taken 7/17/2025 1200 by Leonard Betancourt RN  Supportive Measures:   verbalization of feelings encouraged   self-responsibility promoted   self-care encouraged   relaxation techniques promoted  Taken 7/17/2025 0800 by Leonard Betancourt RN  Supportive Measures:   verbalization of feelings encouraged   self-responsibility promoted   self-care encouraged   relaxation techniques promoted  Goal: Optimal Cerebral Tissue Perfusion  Intervention: Protect and Optimize Cerebral Perfusion  Recent Flowsheet Documentation  Taken 7/17/2025 1700 by Leonard Betancourt RN  Cerebral Perfusion Promotion:   blood pressure monitored   normothermia promoted  Taken 7/17/2025 1600 by Leonard Betancourt RN  Cerebral Perfusion Promotion:   blood pressure monitored   normothermia promoted  Taken 7/17/2025 1400 by Leonard Betancourt RN  Cerebral Perfusion Promotion:   blood pressure monitored   normothermia promoted  Taken 7/17/2025 1300 by Leonard Betancourt RN  Cerebral Perfusion  Promotion:   blood pressure monitored   normothermia promoted  Taken 7/17/2025 1200 by Leonard Betancourt RN  Cerebral Perfusion Promotion:   blood pressure monitored   normothermia promoted  Taken 7/17/2025 0800 by Leonard Betancourt RN  Cerebral Perfusion Promotion:   blood pressure monitored   normothermia promoted  Goal: Optimal Cognitive Function  Intervention: Optimize Cognitive Function  Recent Flowsheet Documentation  Taken 7/17/2025 1700 by Leonard Betancourt RN  Environment Familiarity/Consistency: daily routine followed  Taken 7/17/2025 1600 by Leonard Betancourt RN  Environment Familiarity/Consistency: daily routine followed  Taken 7/17/2025 1400 by Leonard Betancourt RN  Environment Familiarity/Consistency: daily routine followed  Taken 7/17/2025 1300 by Leonard Betancourt RN  Environment Familiarity/Consistency: daily routine followed  Taken 7/17/2025 1200 by Leonard Betancourt RN  Environment Familiarity/Consistency: daily routine followed  Taken 7/17/2025 0800 by Leonard Betancourt RN  Environment Familiarity/Consistency: daily routine followed  Goal: Optimal Functional Ability  Intervention: Optimize Functional Ability  Recent Flowsheet Documentation  Taken 7/17/2025 1700 by Leonard Betancourt RN  Activity Management:   activity adjusted per tolerance   ambulated in room  Taken 7/17/2025 1600 by Leonard Betancourt RN  Activity Management:   activity adjusted per tolerance   ambulated in room  Taken 7/17/2025 1400 by Leonard Betancourt RN  Activity Management:   activity adjusted per tolerance   ambulated in room  Taken 7/17/2025 1300 by Leonard Betancourt, RN  Activity Management:   activity adjusted per tolerance   ambulated in room  Taken 7/17/2025 1200 by Leonard Betancourt, RN  Activity Management:   activity adjusted per tolerance   ambulated in room  Taken 7/17/2025 0800 by Leonard Betancourt RN  Activity Management: (Simultaneous filing. User may be unaware of other data.)   activity adjusted per tolerance    ambulated in room  Goal: Effective Oxygenation and Ventilation  Intervention: Optimize Oxygenation and Ventilation  Recent Flowsheet Documentation  Taken 7/17/2025 1700 by Leonard Betancourt RN  Head of Bed (HOB) Positioning: HOB at 30 degrees  Taken 7/17/2025 1600 by Leonard Betancourt RN  Head of Bed (HOB) Positioning: HOB at 30 degrees  Taken 7/17/2025 1400 by Leonard Betancourt RN  Head of Bed (HOB) Positioning: HOB at 30 degrees  Taken 7/17/2025 1300 by Leonard Betancourt RN  Head of Bed (HOB) Positioning: HOB at 30 degrees  Taken 7/17/2025 1200 by Leonard Betancourt RN  Head of Bed (HOB) Positioning: HOB at 30 degrees  Taken 7/17/2025 0800 by Leonard Betancourt RN  Head of Bed (HOB) Positioning: HOB at 30 degrees  Goal: Improved Sensorimotor Function  Intervention: Optimize Range of Motion, Motor Control and Function  Recent Flowsheet Documentation  Taken 7/17/2025 1700 by Leonard Betancourt RN  Positioning/Transfer Devices:   pillows   in use  Taken 7/17/2025 1600 by Leonard Betancourt RN  Positioning/Transfer Devices:   pillows   in use  Taken 7/17/2025 1400 by Leonard Betancourt RN  Positioning/Transfer Devices:   pillows   in use  Taken 7/17/2025 1300 by Leonard Betancourt RN  Positioning/Transfer Devices:   pillows   in use  Taken 7/17/2025 1200 by Leonard Betancourt RN  Positioning/Transfer Devices:   pillows   in use  Taken 7/17/2025 0800 by Leonard Betancourt RN  Positioning/Transfer Devices:   pillows   in use  Goal: Safe and Effective Swallow  Intervention: Promote and Optimize Fluid and Food Intake  Recent Flowsheet Documentation  Taken 7/17/2025 1700 by Leonard Betancourt RN  Aspiration Precautions: awake/alert before oral intake  Taken 7/17/2025 1600 by Leonard Betancourt RN  Aspiration Precautions: awake/alert before oral intake  Taken 7/17/2025 1400 by Leonard Betancourt RN  Aspiration Precautions: awake/alert before oral intake  Taken 7/17/2025 1300 by Leonard Betancourt RN  Aspiration Precautions: awake/alert  before oral intake  Taken 7/17/2025 1200 by Leonard Betancourt, RN  Aspiration Precautions: awake/alert before oral intake  Taken 7/17/2025 0800 by Leonard Betancourt, RN  Aspiration Precautions: awake/alert before oral intake   Goal Outcome Evaluation:       D.Awake alert follows commands - cont to be weak R side - Arm more than leg - up out of bed ambulating.  Good po intake . Post procedure- visual impairment cont - R side field cut No change from earlier.   A stable  neuro  I cont to monitor.

## 2025-07-18 ENCOUNTER — APPOINTMENT (OUTPATIENT)
Dept: PHYSICAL THERAPY | Facility: CLINIC | Age: 35
DRG: 064 | End: 2025-07-18
Payer: COMMERCIAL

## 2025-07-18 ENCOUNTER — APPOINTMENT (OUTPATIENT)
Dept: CT IMAGING | Facility: CLINIC | Age: 35
End: 2025-07-18
Payer: COMMERCIAL

## 2025-07-18 ENCOUNTER — APPOINTMENT (OUTPATIENT)
Dept: OCCUPATIONAL THERAPY | Facility: CLINIC | Age: 35
DRG: 064 | End: 2025-07-18
Payer: COMMERCIAL

## 2025-07-18 LAB
AFP SERPL-MCNC: <1.8 NG/ML
ANA PAT SER IF-IMP: ABNORMAL
ANA SER QL IF: ABNORMAL
ANA TITR SER IF: ABNORMAL {TITER}
ANCA AB PATTERN SER IF-IMP: NORMAL
ANION GAP SERPL CALCULATED.3IONS-SCNC: 10 MMOL/L (ref 7–15)
APTT PPP: 64 SECONDS (ref 22–38)
BLD PROD TYP BPU: NORMAL
BLOOD COMPONENT TYPE: NORMAL
BUN SERPL-MCNC: 15.1 MG/DL (ref 6–20)
C-ANCA TITR SER IF: NORMAL {TITER}
CA-I BLD-MCNC: 4.6 MG/DL (ref 4.4–5.2)
CALCIUM SERPL-MCNC: 8.1 MG/DL (ref 8.8–10.4)
CARDIOLIPIN IGA SER IA-ACNC: 2.2 APL-U/ML
CARDIOLIPIN IGA SER IA-ACNC: NEGATIVE
CARDIOLIPIN IGG SER IA-ACNC: <2 GPL-U/ML
CARDIOLIPIN IGG SER IA-ACNC: NEGATIVE
CARDIOLIPIN IGM SER IA-ACNC: <2 MPL-U/ML
CARDIOLIPIN IGM SER IA-ACNC: NEGATIVE
CHLORIDE SERPL-SCNC: 107 MMOL/L (ref 98–107)
CMV DNA SPEC NAA+PROBE-ACNC: NOT DETECTED IU/ML
CODING SYSTEM: NORMAL
CREAT SERPL-MCNC: 1.11 MG/DL (ref 0.67–1.17)
EBV DNA SERPL NAA+PROBE-ACNC: NOT DETECTED IU/ML
EGFRCR SERPLBLD CKD-EPI 2021: 89 ML/MIN/1.73M2
ERYTHROCYTE [DISTWIDTH] IN BLOOD BY AUTOMATED COUNT: 13.4 % (ref 10–15)
GLUCOSE BLDC GLUCOMTR-MCNC: 121 MG/DL (ref 70–99)
GLUCOSE BLDC GLUCOMTR-MCNC: 98 MG/DL (ref 70–99)
GLUCOSE SERPL-MCNC: 112 MG/DL (ref 70–99)
H CAPSUL AG UR QL IA: NOT DETECTED
H CAPSUL AG UR-MCNC: NOT DETECTED NG/ML
HCG-TM SERPL-ACNC: <3 IU/L
HCO3 SERPL-SCNC: 26 MMOL/L (ref 22–29)
HCT VFR BLD AUTO: 31.2 % (ref 40–53)
HGB BLD-MCNC: 10.2 G/DL (ref 13.3–17.7)
HOLD SPECIMEN: NORMAL
INR PPP: 1.5 (ref 0.85–1.15)
ISSUE DATE AND TIME: NORMAL
LDH SERPL L TO P-CCNC: 562 U/L (ref 0–250)
MAGNESIUM SERPL-MCNC: 2 MG/DL (ref 1.7–2.3)
MCH RBC QN AUTO: 26.6 PG (ref 26.5–33)
MCHC RBC AUTO-ENTMCNC: 32.7 G/DL (ref 31.5–36.5)
MCV RBC AUTO: 82 FL (ref 78–100)
MCV RBC AUTO: 83 FL (ref 78–100)
MYELOPEROXIDASE AB SER IA-ACNC: 0.7 U/ML
MYELOPEROXIDASE AB SER IA-ACNC: NEGATIVE
PHOSPHATE SERPL-MCNC: 3.3 MG/DL (ref 2.5–4.5)
PLATELET # BLD AUTO: 56 10E3/UL (ref 150–450)
PLATELET # BLD AUTO: 57 10E3/UL (ref 150–450)
POTASSIUM SERPL-SCNC: 3.7 MMOL/L (ref 3.4–5.3)
PROTEINASE3 AB SER IA-ACNC: <1 U/ML
PROTEINASE3 AB SER IA-ACNC: NEGATIVE
PROTHROMBIN TIME: 18.3 SECONDS (ref 11.8–14.8)
RBC # BLD AUTO: 3.83 10E6/UL (ref 4.4–5.9)
SODIUM SERPL-SCNC: 143 MMOL/L (ref 135–145)
SPECIMEN TYPE: NORMAL
UNIT ABO/RH: NORMAL
UNIT NUMBER: NORMAL
UNIT STATUS: NORMAL
UNIT TYPE ISBT: 6200
WBC # BLD AUTO: 13.5 10E3/UL (ref 4–11)

## 2025-07-18 PROCEDURE — 99232 SBSQ HOSP IP/OBS MODERATE 35: CPT | Mod: 25 | Performed by: STUDENT IN AN ORGANIZED HEALTH CARE EDUCATION/TRAINING PROGRAM

## 2025-07-18 PROCEDURE — 85049 AUTOMATED PLATELET COUNT: CPT

## 2025-07-18 PROCEDURE — 80048 BASIC METABOLIC PNL TOTAL CA: CPT | Performed by: NURSE PRACTITIONER

## 2025-07-18 PROCEDURE — 83615 LACTATE (LD) (LDH) ENZYME: CPT | Performed by: NURSE PRACTITIONER

## 2025-07-18 PROCEDURE — 200N000002 HC R&B ICU UMMC

## 2025-07-18 PROCEDURE — 250N000009 HC RX 250

## 2025-07-18 PROCEDURE — 85730 THROMBOPLASTIN TIME PARTIAL: CPT | Performed by: PSYCHIATRY & NEUROLOGY

## 2025-07-18 PROCEDURE — 88344 IMHCHEM/IMCYTCHM EA MLT ANTB: CPT | Mod: 26 | Performed by: PATHOLOGY

## 2025-07-18 PROCEDURE — 250N000011 HC RX IP 250 OP 636

## 2025-07-18 PROCEDURE — 97530 THERAPEUTIC ACTIVITIES: CPT | Mod: GP | Performed by: PHYSICAL THERAPIST

## 2025-07-18 PROCEDURE — 250N000009 HC RX 250: Performed by: STUDENT IN AN ORGANIZED HEALTH CARE EDUCATION/TRAINING PROGRAM

## 2025-07-18 PROCEDURE — 97530 THERAPEUTIC ACTIVITIES: CPT | Mod: GO

## 2025-07-18 PROCEDURE — 250N000013 HC RX MED GY IP 250 OP 250 PS 637: Performed by: PHYSICIAN ASSISTANT

## 2025-07-18 PROCEDURE — 99291 CRITICAL CARE FIRST HOUR: CPT | Mod: GC | Performed by: PSYCHIATRY & NEUROLOGY

## 2025-07-18 PROCEDURE — 250N000011 HC RX IP 250 OP 636: Performed by: PHYSICIAN ASSISTANT

## 2025-07-18 PROCEDURE — 70450 CT HEAD/BRAIN W/O DYE: CPT

## 2025-07-18 PROCEDURE — 85610 PROTHROMBIN TIME: CPT | Performed by: NURSE PRACTITIONER

## 2025-07-18 PROCEDURE — 88305 TISSUE EXAM BY PATHOLOGIST: CPT | Mod: 26 | Performed by: PATHOLOGY

## 2025-07-18 PROCEDURE — 0BJ08ZZ INSPECTION OF TRACHEOBRONCHIAL TREE, VIA NATURAL OR ARTIFICIAL OPENING ENDOSCOPIC: ICD-10-PCS | Performed by: STUDENT IN AN ORGANIZED HEALTH CARE EDUCATION/TRAINING PROGRAM

## 2025-07-18 PROCEDURE — 88341 IMHCHEM/IMCYTCHM EA ADD ANTB: CPT | Mod: 26 | Performed by: PATHOLOGY

## 2025-07-18 PROCEDURE — 84100 ASSAY OF PHOSPHORUS: CPT | Performed by: NURSE PRACTITIONER

## 2025-07-18 PROCEDURE — 250N000013 HC RX MED GY IP 250 OP 250 PS 637: Performed by: PSYCHIATRY & NEUROLOGY

## 2025-07-18 PROCEDURE — 31652 BRONCH EBUS SAMPLNG 1/2 NODE: CPT | Performed by: STUDENT IN AN ORGANIZED HEALTH CARE EDUCATION/TRAINING PROGRAM

## 2025-07-18 PROCEDURE — 88344 IMHCHEM/IMCYTCHM EA MLT ANTB: CPT | Mod: TC | Performed by: STUDENT IN AN ORGANIZED HEALTH CARE EDUCATION/TRAINING PROGRAM

## 2025-07-18 PROCEDURE — 70450 CT HEAD/BRAIN W/O DYE: CPT | Mod: 26 | Performed by: RADIOLOGY

## 2025-07-18 PROCEDURE — 99233 SBSQ HOSP IP/OBS HIGH 50: CPT | Performed by: INTERNAL MEDICINE

## 2025-07-18 PROCEDURE — 250N000013 HC RX MED GY IP 250 OP 250 PS 637: Performed by: NURSE PRACTITIONER

## 2025-07-18 PROCEDURE — 88173 CYTOPATH EVAL FNA REPORT: CPT | Mod: 26 | Performed by: PATHOLOGY

## 2025-07-18 PROCEDURE — 82330 ASSAY OF CALCIUM: CPT | Performed by: PHYSICIAN ASSISTANT

## 2025-07-18 PROCEDURE — 97535 SELF CARE MNGMENT TRAINING: CPT | Mod: GO

## 2025-07-18 PROCEDURE — 88342 IMHCHEM/IMCYTCHM 1ST ANTB: CPT | Mod: 26 | Performed by: PATHOLOGY

## 2025-07-18 PROCEDURE — 83735 ASSAY OF MAGNESIUM: CPT | Performed by: NURSE PRACTITIONER

## 2025-07-18 PROCEDURE — 360N000084 HC SURGERY LEVEL 4 W/ FLUORO, PER MIN: Performed by: STUDENT IN AN ORGANIZED HEALTH CARE EDUCATION/TRAINING PROGRAM

## 2025-07-18 PROCEDURE — P9037 PLATE PHERES LEUKOREDU IRRAD: HCPCS

## 2025-07-18 PROCEDURE — 97116 GAIT TRAINING THERAPY: CPT | Mod: GP | Performed by: PHYSICAL THERAPIST

## 2025-07-18 PROCEDURE — 82105 ALPHA-FETOPROTEIN SERUM: CPT | Performed by: INTERNAL MEDICINE

## 2025-07-18 PROCEDURE — 370N000017 HC ANESTHESIA TECHNICAL FEE, PER MIN: Performed by: STUDENT IN AN ORGANIZED HEALTH CARE EDUCATION/TRAINING PROGRAM

## 2025-07-18 PROCEDURE — 88172 CYTP DX EVAL FNA 1ST EA SITE: CPT | Mod: 26 | Performed by: PATHOLOGY

## 2025-07-18 PROCEDURE — 272N000001 HC OR GENERAL SUPPLY STERILE: Performed by: STUDENT IN AN ORGANIZED HEALTH CARE EDUCATION/TRAINING PROGRAM

## 2025-07-18 PROCEDURE — 85027 COMPLETE CBC AUTOMATED: CPT | Performed by: NURSE PRACTITIONER

## 2025-07-18 PROCEDURE — 88360 TUMOR IMMUNOHISTOCHEM/MANUAL: CPT | Mod: 26 | Performed by: PATHOLOGY

## 2025-07-18 RX ORDER — POTASSIUM CHLORIDE 750 MG/1
20 TABLET, EXTENDED RELEASE ORAL ONCE
Status: COMPLETED | OUTPATIENT
Start: 2025-07-18 | End: 2025-07-18

## 2025-07-18 RX ORDER — FUROSEMIDE 10 MG/ML
20 INJECTION INTRAMUSCULAR; INTRAVENOUS ONCE
Status: COMPLETED | OUTPATIENT
Start: 2025-07-18 | End: 2025-07-18

## 2025-07-18 RX ORDER — MAGNESIUM OXIDE 400 MG/1
400 TABLET ORAL EVERY 4 HOURS
Status: COMPLETED | OUTPATIENT
Start: 2025-07-18 | End: 2025-07-18

## 2025-07-18 RX ORDER — LIDOCAINE HYDROCHLORIDE 20 MG/ML
INJECTION, SOLUTION INFILTRATION; PERINEURAL PRN
Status: DISCONTINUED | OUTPATIENT
Start: 2025-07-18 | End: 2025-07-18 | Stop reason: HOSPADM

## 2025-07-18 RX ORDER — CALCIUM GLUCONATE 20 MG/ML
2 INJECTION, SOLUTION INTRAVENOUS ONCE
Status: COMPLETED | OUTPATIENT
Start: 2025-07-18 | End: 2025-07-18

## 2025-07-18 RX ADMIN — SODIUM CHLORIDE, SODIUM GLUCONATE, SODIUM ACETATE, POTASSIUM CHLORIDE AND MAGNESIUM CHLORIDE: 526; 502; 368; 37; 30 INJECTION, SOLUTION INTRAVENOUS at 11:47

## 2025-07-18 RX ADMIN — HEPARIN SODIUM 1950 UNITS/HR: 10000 INJECTION, SOLUTION INTRAVENOUS at 06:21

## 2025-07-18 RX ADMIN — ACETAMINOPHEN 1000 MG: 500 TABLET ORAL at 20:31

## 2025-07-18 RX ADMIN — ROSUVASTATIN CALCIUM 10 MG: 10 TABLET, FILM COATED ORAL at 08:00

## 2025-07-18 RX ADMIN — SODIUM CHLORIDE, SODIUM GLUCONATE, SODIUM ACETATE, POTASSIUM CHLORIDE AND MAGNESIUM CHLORIDE: 526; 502; 368; 37; 30 INJECTION, SOLUTION INTRAVENOUS at 01:39

## 2025-07-18 RX ADMIN — SENNOSIDES AND DOCUSATE SODIUM 2 TABLET: 50; 8.6 TABLET ORAL at 20:16

## 2025-07-18 RX ADMIN — POTASSIUM CHLORIDE 20 MEQ: 750 TABLET, EXTENDED RELEASE ORAL at 06:21

## 2025-07-18 RX ADMIN — MAGNESIUM OXIDE TAB 400 MG (241.3 MG ELEMENTAL MG) 400 MG: 400 (241.3 MG) TAB at 10:13

## 2025-07-18 RX ADMIN — MAGNESIUM OXIDE TAB 400 MG (241.3 MG ELEMENTAL MG) 400 MG: 400 (241.3 MG) TAB at 06:21

## 2025-07-18 RX ADMIN — CALCIUM GLUCONATE 2 G: 20 INJECTION, SOLUTION INTRAVENOUS at 11:47

## 2025-07-18 RX ADMIN — SENNOSIDES AND DOCUSATE SODIUM 1 TABLET: 50; 8.6 TABLET ORAL at 08:00

## 2025-07-18 ASSESSMENT — ACTIVITIES OF DAILY LIVING (ADL)
ADLS_ACUITY_SCORE: 63
ADLS_ACUITY_SCORE: 64
ADLS_ACUITY_SCORE: 63
ADLS_ACUITY_SCORE: 64
ADLS_ACUITY_SCORE: 63
ADLS_ACUITY_SCORE: 64
ADLS_ACUITY_SCORE: 63
ADLS_ACUITY_SCORE: 64
ADLS_ACUITY_SCORE: 63
DEPENDENT_IADLS:: INDEPENDENT
ADLS_ACUITY_SCORE: 63
ADLS_ACUITY_SCORE: 65
ADLS_ACUITY_SCORE: 64
ADLS_ACUITY_SCORE: 63
ADLS_ACUITY_SCORE: 63
ADLS_ACUITY_SCORE: 64
ADLS_ACUITY_SCORE: 63

## 2025-07-18 NOTE — PLAN OF CARE
Neuro intact, A&Ox4, word finding difficulty and garbled at times. Able to make needs known. R field cut. Slight R droop. RUE slightly weaker than LUE. Decreased sensation on R side. R pronator drift. SR/ST. On hep gtt, following PTT protocol, next check at 1130. NPO since midnight d/t procedure today.    No

## 2025-07-18 NOTE — PLAN OF CARE
Writer cared for patient from [id number].    Major Shift Events:  Patient A&Ox4.  Aphasic. Slight right sided facial droop. RUE weakness and pronator drift. RLE slight weakness but barely noticeable. Sensation impaired in right extremities, unable to distinguished which side at times. Slight garbled speech and word finding difficulty noted. Right sided visual field cut in both eyes coming from lower, pupils equal, round, reactive, 4-5 mm. Denies pain, numbness and tingling. Sinus rhythm 70s-100s, afebrile. Maintaining SBP<160 without interventions. On room air, clear lung sounds. NPO for procedure today. Voids spontaneously without difficulty, BM PTA. Plasmalyte 100 ml/hr.     Pt left for Bronchoscopy at 1300.    Plan: Continue serial neuro exams. Notify primary team of any changes, continue with plan of care.     For vital signs and complete assessments, please see documentation flowsheets.  Goal Outcome Evaluation:      Plan of Care Reviewed With: patient    Overall Patient Progress: no changeOverall Patient Progress: no change    Outcome Evaluation: Neuros unchanged, VSS, pt in OR for procedure.

## 2025-07-18 NOTE — PROVIDER NOTIFICATION
Notified DO Bains regarding no pre-op order besides the NPO order. DO Bains responded and said he will place pre-op orders.

## 2025-07-18 NOTE — PLAN OF CARE
N: AO, flat affect, some word finding difficulty. Pupils equal, brisk. Right field cut. Right facial droop - mild. R pronator drift and slight ataxia. Reports decreased sensation in right site.   C: NSR. SBP < 160. Pulses palpable. Afebrile.   R: RA. Lungs clear. No cough/ sputum. 1U plt in OR with bronch/biopsy. Recheck > 50  GI: Regular diet, tolerating well with tray set up. LBM PTA   : Voiding per urinal  SKIN: Rash BLE  ACCESS: PIV, Trialysis   GTTS: Heparin - PTT check due at 0000  ACTIVITY: Assist X1  SOCIAL: Parents at bedside    PLAN: Monitor closely, notify MD of changes/concerns.

## 2025-07-18 NOTE — PLAN OF CARE
Goal Outcome Evaluation:      Plan of Care Reviewed With: patient          Outcome Evaluation: Patient will discharge home with OP physical therapy when medically stable.

## 2025-07-18 NOTE — PROGRESS NOTES
Neurocritical Care Progress Note    Reason for critical care admission: left M1 subtotal occlusion, urgent PLEX   Admitting Team: Neurocritical Care   Date of Service:  07/18/2025  Date of Admission:  7/15/2025  Hospital Day: 4    Assessment/Plan  Bernard Chapman is a 35 year old male with a past medical history of hypertension, diabetes, possible ITP, sleep apnea, PE and DVT on rivaroxaban admitted on 7/15/2025 for acute ischemic stroke in left MCA territory, near occlusion of left M1, and concern for thrombotic emergency such as TTP or catastrophic antiphospholipid syndrome (in setting of severe thrombocytopenia and acute cerebral infarcts). Admission to neuroICU for close neurologic monitoring and plan for urgent PLEX.     24 hour events:  - started on heparin gtt 7/17 for subacute PE and Left M1 occlusion  - s/p 1u platelets 7/17    - s/p 20 mg lasix IV 7/17   - no acute overnight events       Neuro  #Multifocal Acute ischemic strokes, in L MCA territory, Right parietal lobe, right frontal centrum semiovale of unclear etiology   #subtotal Left M1 occlusion   #Sensory and visual extinction on right  Etiology remains unclear, though malignancy high on the differential given multiple findings on CT CAP, h/o hypercoagulability, and 3 territory CVA on head imaging. Endocarditis also a possibility given mobile masses on tricuspid and aortic valves on TTE 7/16  - 7/18 CTH stable   - Neurochecks every 2 hrs  - SBP goal < 160 mmHg, DBP < 100  - HOB > 30   - PT/OT/SLP  -   - Triglycerides - 202  - A1c 5.3%  - Continue Crestor 10 mg once daily   - high intensity heparin gtt started 7/17     #Analgesics & sedation  RASS goal: 0  - resume Tylenol PRN (no fevers overnight)   - PRN oxycodone 4 mg Q6H      CV  #Hypertension  # mobile echodensity on tricuspid and aortic valve 7/16  - 7/17 TIMO with diffuse investment of the aortic and tricuspid valves with thrombus, moderate aortic and tricuspid regurgitation   -Cardiac  monitoring  -SBP goal < 160 mmHg  -DBP goal < 100  -PRN Labetalol and Hydralazine  -hold PTA metoprolol, hydrochlorothiazide, losartan  -blood cultures x2 NGTD  -Crestor 10 mg daily   -heparin gtt started 7/17      Workup thus far:   - 7/16: cardiac CTA non diagnostic for left atrial appendage thrombus and stenosis/athero  - 7/16: TTE with mobile echodensities on tricuspid valve and aortic valve, EF 60-65%  - 7/17: TIMO diffuse investment of the aortic and tricuspid valves with thrombus, moderate aortic and tricuspid regurgitation       Resp  #recent PE  #ELENA on CPAP  Oxygen/vent: room air  -Continuous pulse ox  -Maintain O2 saturations greater than 92%  -patient refusing home CPAP  -2L NC while sleeping  - anesthesia consult prior to OR     #11 mm right lung nodule  # multiple enlarged mediastinal, paraesophageal, and hilar lymph nodes cf paraneoplastic vs lymphoproliferative vs malignancy   - CT CAP 7/16 with spiculated-appearing nodule in the right lung apex    - EBUS and TBNA with interventional pulmonology 7/18   - transfuse 1u platelets en route to OR 7/18  - lasix 20 mg prior to transfusion to prevent pulmonary edema     #Pulmonary embolism  - 7/17 CT PE protocol positive for PE unchanged from 7/16/25   - heparin gtt started 7/17  - monitor PTT    GI  # SABRINA  Diet: NPO for procedure  Last BM: 7/16  GI prophylaxis: not indicated  -Bowel regimen: PRN senna-docusate and Miralax    #splenic infarction  - CT CAP 7/16 showed wedge-shaped hypodensity at the periphery of the spleen   - heparin gtt 7/17     Renal/  #urinary retention, resolved  #renal infarct   #hypocalcemia    -Daily BMP  -IV fluids: 100ml/hr plasmalyte   -Electrolyte replacement protocol  -bladder scan, consider reyes if retaining   -I's and O's  - see above for anticoagulation   - 2g calcium IV replaced   - ionized calcium ordered       Endo  # Diabetes  -Hgb A1c 5.3%  -POCT glucose       Heme  #C/f thrombotic emergency such as TTP vs  CAPS  #Possible ITP  #recent DVT/PE  #hemolytic anemia   - s/p urgent PLEX 7/16  - hold rivaroxaban  - s/p dexamethasone 40mg in ED  - INR/PT 2.05/22.8 , PTT 30 (wnl)  - Daily CBC  - Hgb goal >7, plt goal >50k  - Transfuse to meet Hgb and plt goals  - hematology consulted. Appreciate recommendations  - excisional biopsy of lymph nodes preferred   - no further PLEX  - daily CBC, retic, LDH   - transfuse 1u platelets en route to OR       Pending labs:   - 7/15: fibrinogen 151   - 7/15: Ferritin 535  - 7/15: haptoglobin < 10   - 7/15: retic 2.3   - 7/15: lupus anticoagulant positive   - 7/15: [id number] BERKLEY negative  - 7/16: beta2 glycoprotein undetected   - 7/16: cardiolipin panel negative   - 7/16:    - 7/16: blood cultures x2 NGTD   - 7/16: CRP 17.50 -> 9.3   - 7/16: ESR 4   - 7/16: proteinase 3 Ab IgG negative   - 7/16: ANCA IgG by IFA pending  - 7/16: myeloperoxidase negative   - 7/16: Jak2 antibody pending   - 7/16: TRINI Anti-IgG-C3d negative   - 7/16: PND panel pending   - 7/16: peripheral smear normochromic, normocytic anemia, increased RBC regeneration, leukocytosis with neutrophilia, marked thrombocytopenia with normal platelet morphology, normal RBC morphology  - 7/16: hepatitis B sAg negative   - 7/16: complement 3 and 4 negative   - 7/16: ghislaine/norma light chain ratio wnl  - 7/16: IgA, IgG, IgM negative   - 7/17: vitamin d level wnl  - 7/16: flow cytometry with polytypic B cells, no aberrant ummnophenotype on T cells, no immunophenotypic evidence of non-Hodgkin lymphoma lymphoid leukiema, or acute leukemia   - 7/18: AFP negative   - 7/18: HcG marker pending    ID  #Leukocytosis, improving   - UA 7/16 unremarkable   - CXR 7/16 without signs of infection   - no fevers despite holding tylenol   -daily CBC  -follow temp curve  - blood cultures NGTD    Integument/skin  #palpable purpura bilateral lower extremity   - 7/16 LLE skin biopsy with ruptured folliculitis, bacterial vs pityrosporum folliculitis  "possible, no definite features of cutaneous small vessel vasculitis or thrombotic vasculopathy      ICU Checklist  Lines/tubes/drains: PIV, central line   FEN: NPO for lymph node biopsy, plasmalyte    PPX: DVT - heparin gtt ; GI - senna BID, PRN miralax.  Code: Full code, discussed with patient   Dispo: ICU - NCC           # Obesity: Estimated body mass index is 36.37 kg/m  as calculated from the following:    Height as of this encounter: 1.88 m (6' 2\").    Weight as of this encounter: 128.5 kg (283 lb 4.7 oz)., PRESENT ON ADMISSION              TIME SPENT ON THIS ENCOUNTER  I spent 60 minutes of critical care time on the unit/floor managing the care of Bernard Chapman excluding time performing procedures. Upon evaluation, this patient had a high probability of imminent or life-threatening deterioration due to significant stroke burden and severe thrombocytopenia, which required my direct attention, intervention, and personal management. Greater than 50% of my time was spent at the bedside counseling the patient and/or coordinating care including chart review, history, exam, documentation, and further activities per this note. I have personally reviewed the following data/imaging over the past 24 hours.     The patient was seen and discussed with the NCC attending, Dr. Matt.    Nilsa Daly DO on 2025 at 3:16 PM     24 Hour Vital Signs Summary:  Temp: 99  F (37.2  C) Temp  Min: 97  F (36.1  C)  Max: 99  F (37.2  C)  Resp: 16 Resp  Min: 0  Max: 28  SpO2: 95 % SpO2  Min: 93 %  Max: 100 %  Pulse: 86 Pulse  Min: 79  Max: 129    No data recorded  BP: 117/66 Systolic (24hrs), Av , Min:114 , Max:167   Diastolic (24hrs), Av, Min:51, Max:94      Respiratory monitoring:   Resp: 16    I/O last 3 completed shifts:  In: 3205.03 [P.O.:200; I.V.:2430.03]  Out: 3250 [Urine:3250]    Current Medications:  Current Facility-Administered Medications   Medication Dose Route Frequency Provider Last Rate Last Admin    " magnesium oxide (MAG-OX) tablet 400 mg  400 mg Oral Q4H Franklin Aguilera MD   400 mg at 07/18/25 0621    rosuvastatin (CRESTOR) tablet 10 mg  10 mg Oral Daily Griselda Ha CNP   10 mg at 07/17/25 1527    senna-docusate (SENOKOT-S/PERICOLACE) 8.6-50 MG per tablet 1-2 tablet  1-2 tablet Oral or Feeding Tube BID Griselda Ha CNP   1 tablet at 07/17/25 2001    sodium chloride (PF) 0.9% PF flush 3 mL  3 mL Intravenous Q8H Nilsa Daly DO   3 mL at 07/18/25 0410    sodium chloride (PF) 0.9% PF flush 3 mL  3 mL Intracatheter Q8H MOHINDER Nishi Rodas MD   3 mL at 07/18/25 0624    sodium chloride 0.9 % bag for CT scan flush  100 mL Intravenous Once Segundo Pond MD           PRN Medications:  Current Facility-Administered Medications   Medication Dose Route Frequency Provider Last Rate Last Admin    [Held by provider] acetaminophen (TYLENOL) tablet 1,000 mg  1,000 mg Oral or Feeding Tube Q8H PRN Nishi Rodas MD   1,000 mg at 07/16/25 2124    diphenhydrAMINE (BENADRYL) capsule 25 mg  25 mg Oral Q6H PRN Nilsa Daly DO        Or    diphenhydrAMINE (BENADRYL) injection 25 mg  25 mg Intravenous Q6H PRN Nilsa Daly DO        hydrALAZINE (APRESOLINE) injection 10 mg  10 mg Intravenous Q30 Min PRN Zora Boone PA-C        labetalol (NORMODYNE/TRANDATE) injection 10 mg  10 mg Intravenous Q10 Min PRN Zora Boone PA-C        lidocaine (LMX4) cream   Topical Q1H PRN Nilsa Daly DO        lidocaine 1 % 1 mL  1 mL Other Q1H PRN Nilsa Daly DO        May take oral meds with a sip of water, the morning of TIMO procedure.   Does not apply Continuous PRN Nilsa Daly DO        Medication Instruction - Avoid dextrose in IV solutions   Intravenous Continuous PRN Nishi Rodas MD        ondansetron (ZOFRAN ODT) ODT tab 4 mg  4 mg Oral Q6H PRN Nishi Rodas MD        Or    ondansetron (ZOFRAN) injection 4 mg  4 mg Intravenous Q6H PRN Nishi Rodas MD         "polyethylene glycol (MIRALAX) Packet 17 g  17 g Oral Daily PRN Griselda Ha, SAADIA        prochlorperazine (COMPAZINE) injection 10 mg  10 mg Intravenous Q6H PRN Franklin Aguilera MD        Or    prochlorperazine (COMPAZINE) tablet 10 mg  10 mg Oral or Feeding Tube Q6H PRN Franklin Aguilera MD        sodium chloride (PF) 0.9% PF flush 3 mL  3 mL Intravenous Q1H PRN Nilsa Daly DO        sodium chloride (PF) 0.9% PF flush 3 mL  3 mL Intracatheter q1 min prn Nishi Rodas MD           Infusions:  Current Facility-Administered Medications   Medication Dose Route Frequency Provider Last Rate Last Admin    heparin 25,000 units in 0.45% NaCl 250 mL ANTICOAGULANT infusion  0-5,000 Units/hr Intravenous Continuous Nilsa Daly DO 19.5 mL/hr at 07/18/25 0621 1,950 Units/hr at 07/18/25 0621    May take oral meds with a sip of water, the morning of TIMO procedure.   Does not apply Continuous PRN Nilsa Daly DO        Medication Instruction - Avoid dextrose in IV solutions   Intravenous Continuous PRN Nishi Rodas MD        Plasma-Lyte A (electrolyte A) infusion   Intravenous Continuous Nilsa Daly  mL/hr at 07/18/25 0300 Rate Verify at 07/18/25 0300       Allergies   Allergen Reactions    Apixaban Rash    Amlodipine Other (See Comments) and Rash       Physical Examination:  Vitals: /66   Pulse 86   Temp 99  F (37.2  C) (Oral)   Resp 16   Ht 1.88 m (6' 2\")   Wt 128.5 kg (283 lb 4.7 oz)   SpO2 95%   BMI 36.37 kg/m    General: Adult male patient, sitting up in chair, in no apparent distress  HEENT: Normocephalic, atraumatic, no icterus, oral cavity/oropharynx pink and moist, wearing NC  Cardiac: RRR, s1/s2 auscultated without murmur  Pulm: CTAB, unlabored, expansion symmetric, no retractions or use of accessory muscles  Abdomen: Soft, non-distended, bowel sounds present  Extremities: Warm, no edema, distal pulses +2, well perfused  Skin: purpuric rash on " BLE        Left leg 7/16         Right leg 7/16        Psych: Calm and cooperative, affect congruent with mood   Neuro:  Mental status: Awake, alert, attentive, oriented to self, time, place, and circumstance. Language is fluent and coherent with intact comprehension of complex commands, naming and repetition.  Cranial nerves: right partial hemianopia, PERRL, conjugate gaze, EOMI, facial sensation intact, mild right facial droop, no uvular deviation, mild-moderate dysarthria.   Motor: RUE drift but not to bed, BLE without drift, 5/5 hip flexion bilaterally   Sensory: Intact to light touch x 4 extremities, extinction to simultaneous touch on right in UE but not in LE   Coordination: FNF and HS without ataxia or dysmetria  Gait: MILAN, deferred.  Reflexes: deferred        NIHSS  1a. Level of Consciousness 0-->Alert, keenly responsive   1b. LOC Questions 0-->Answers both questions correctly   1c. LOC Commands 0-->Performs both tasks correctly   2.   Best Gaze 0-->Normal   3.   Visual 1 -> partial hemianopia    4.   Facial Palsy 1-->Minor paralysis (flattened nasolabial fold, asymmetry on smiling)   5a. Motor Arm, Left 0-->No drift, limb holds 90 (or 45) degrees for full 10 secs   5b. Motor Arm, Right 1-->Drift, limb holds 90 (or 45) degrees, but drifts down before full 10 secs, does not hit bed or other support   6a. Motor Leg, Left 0-->No drift, leg holds 30 degree position for full 5 secs   6b. Motor Leg, right 0-->No drift, leg holds 30 degree position for full 5 secs   7.   Limb Ataxia 0-->Absent   8.   Sensory 0-->Normal, no sensory loss   9.   Best Language 0-->No aphasia, normal   10. Dysarthria 1-->mild-to-moderate dysarthria    11. Extinction and Inattention  1-->Visual, tactile, auditory, spatial, or personal inattention or extinction to bilateral simultaneous stimulation in one of the sensory modalities    Total 5 (07/18/25 1100)       Labs and Imaging:    All relevant imaging and laboratory values personally  reviewed.       The atrial septum is intact as assessed by agitated saline bubble study .    CTH/CTA/CTP 7/17    Impression:   Noncontrast head CT: Patchy hypodensities in the left frontal,  parietal, temporal and occipital lobes, which correlate with the  diffusion restricting infarcts seen on MR brain from 7/15/2025. No  hemorrhage. No midline shift.     CTA head and neck: Similar to slightly increased occlusion of a 7 mm  segment of incompletely opacified distal M1 branch of the left middle  cerebral artery. The remaining major intracranial arterial vasculature  is patent without significant stenosis.     CT PERFUSION: There is hypoperfusion demonstrated diffusely in the  left middle cerebral artery territory.      I have personally reviewed the examination and initial interpretation  and I agree with the findings    TIMO 7/17/25    Interpretation Summary  TIMO ordered to assess valvular disorder.     There is diffuse investment of the aortic and tricuspid valves with thrombus  that result in moderate aortic regurgitation and moderate tricuspid  regurgitation. Normal mitral and pulmonic valves.     Of note, an infectious etiology of the valvular lesions cannot be excluded on  the basis of this study alone.  ______________________________________________________________________________  Procedure  Transesophageal Echocardiogram with two-dimensional, color and spectral  Doppler. 3D image acquisition, reconstruction, and real-time interpretation  was performed. Procedure location Echo Lab. Informed consent for  Transesophegeal echo obtained. TIMO Probe #61 was used during the procedure.  Patient was sedated using Fentanyl 50 mcg. Patient was sedated using Versed 10  mg. The heart rate, respiratory rate, oxygen saturations, blood pressure, and  response to care were monitored throughout the procedure with the assistance  of the nurse. I determined this patient to be an appropriate candidate for the  planned sedation and  procedure and have reassessed the patient immediately  prior to sedation and procedure. Total sedation time: 45 minutes of continuous  bedside 1:1 monitoring. The Transducer was inserted without difficulty . The  patient tolerated the procedure well. Complications None. Good quality two-  dimensional was performed and interpreted.     Left Ventricle  Left ventricular size, wall motion and function are normal. The ejection  fraction is 60-65%.     Right Ventricle  The right ventricle is normal size. Global right ventricular function is  normal.     Atria  The left atrial appendage is normal. It is free of spontaneous echo contrast  and thrombus. The atrial septum is intact as assessed by color Doppler .     Mitral Valve  The mitral valve is normal. Trace mitral insufficiency is present.     Aortic Valve  There is diffuse investment of the aortic valve with echodense material. This  involves both the ventricular and aortic size of the valve and includes a  independently mobile component attached to the ventricular side of the  noncoronary cusp that measures 0.8 x 0.4 cm and right coronary cusp that  measures 1.2 x 0.67 cm. There is concurrent prolapse of the right coronary  cusp resulting in moderate aortic regurgitation. No aortic stenosis is  present. Holodiastolic flow reversal is not visualized in the descending  aorta.     Tricuspid Valve  There is diffuse investment of the tricuspid valve with echodense material  predominantly attached to the atrial side of the valve. Appears to be  predominantly involving the posterior leaflet though there is thickening of  the anterior and septal leaflet as well. The largest mass attached to the  posterior leaflet is 1.9 x 0.9 cm and results in moderate tricuspid  regurgitation. Moderate tricuspid insufficiency is present. Pulmonary artery  systolic pressure is normal.     Pulmonic Valve  The pulmonic valve is normal. Trace pulmonic insufficiency is present.     Vessels  The  aorta root is normal. The thoracic aorta is normal.     Pericardium  Trivial pericardial effusion.     Compared to Previous Study  No prior TIMO for comparison.    CT PE 7/17/25    FINDINGS:      Vascular: There is good contrast opacification of the pulmonary  arterial vasculature. Proximal segmental pulmonary emboli (e.G. on the  left on series 5 image 124, in the right lower lobe image 185). Heart  is normal size without pericardial effusion. There is bowing of the  interatrial septum. No thoracic aortic aneurysm. The main pulmonary  artery measures at the upper limit of normal, 3.1 cm (series 7 image  106).     Remaining Chest: Central tracheobronchial tree is patent. Right apical  confluent opacity with spiculated margins adjacent to a thin-walled  pulmonary cyst. (series 7 image 44). Subpleural right lower lobe 9 mm  nodule (series 7 image 166). No pleural effusion or pneumothorax.  Bulky right hilar and subcarinal soft tissue attenuating confluent  adenopathy, with necrotic changes. Bibasilar subsegmental atelectasis  with confluent nodular atelectasis/consolidation in the left lung  base.     Upper Abdomen: Limited evaluation demonstrates no acute findings.  Excreted contrast in the kidneys.     Bones/Soft Tissues: No acute abnormalities or suspicious lesions.                                                                      IMPRESSION:  1. Exam is positive for pulmonary embolism, unchanged from 7/16/2025.  Multiple segmental pulmonary emboli as above. There is bowing of the  interatrial septum and mild enlargement of the pulmonary artery,  consistent with mild right heart strain.      2. Dependent subsegmental lower lobe atelectasis with engorgement of  the pulmonary vasculature & interlobular septal thickening may  reflect a degree of superimposed pulmonary edema.

## 2025-07-18 NOTE — CONSULTS
Care Management Initial Consult    General Information  Assessment completed with: Patient, VM-chart review,    Type of CM/SW Visit: Initial Assessment    Primary Care Provider verified and updated as needed: Yes   Readmission within the last 30 days: no previous admission in last 30 days      Reason for Consult: discharge planning  Advance Care Planning: Advance Care Planning Reviewed: no concerns identified          Communication Assessment  Patient's communication style: spoken language (English or Bilingual)             Cognitive  Cognitive/Neuro/Behavioral: .WDL except  Level of Consciousness: alert  Arousal Level: opens eyes spontaneously  Orientation: oriented x 4  Mood/Behavior: behavior appropriate to situation  Best Language: 1 - Mild to moderate  Speech: spontaneous    Living Environment:   People in home: alone     Current living Arrangements: apartment      Able to return to prior arrangements: yes       Family/Social Support:  Care provided by: self  Provides care for: no one  Marital Status: Single  Support system: Parent(s)          Description of Support System: Supportive, Involved         Current Resources:   Patient receiving home care services: No        Community Resources: None  Equipment currently used at home: none  Supplies currently used at home: None    Employment/Financial:  Employment Status: employed full-time        Financial Concerns: none           Does the patient's insurance plan have a 3 day qualifying hospital stay waiver?  No    Lifestyle & Psychosocial Needs:  Social Drivers of Health     Food Insecurity: No Food Insecurity (6/23/2025)    Received from [employer]    Food Insecurity     Do you worry your food will run out before you are able to buy more?: 1   Depression: Not at risk (6/30/2025)    Received from [employer]    PHQ-2     PHQ-2 TOTAL SCORE: 0   Housing Stability: Low Risk  (6/23/2025)    Received  from [facility] [employer] [facility]    Housing Stability     What is your housing situation today?: 1   Tobacco Use: Low Risk  (7/15/2025)    Patient History     Smoking Tobacco Use: Never     Smokeless Tobacco Use: Never     Passive Exposure: Not on file   Financial Resource Strain: Low Risk  (2/25/2025)    Received from [employer] [facility]    Financial Resource Strain     Difficulty of Paying Living Expenses: 3     Difficulty of Paying Living Expenses: Not on file   Alcohol Use: Not on file   Transportation Needs: No Transportation Needs (6/23/2025)    Received from [employer] [facility]    Transportation Needs     Does lack of transportation keep you from medical appointments?: 1     Does lack of transportation keep you from work, meetings or getting things that you need?: 1   Physical Activity: Not on file   Interpersonal Safety: Not on file   Stress: Not on file (11/10/2024)   Social Connections: Socially Integrated (6/23/2025)    Received from [employer] [facility]    Social Connections     Do you often feel lonely or isolated from those around you?: 0   Health Literacy: Not on file       Functional Status:  Prior to admission patient needed assistance:   Dependent ADLs:: Independent  Dependent IADLs:: Independent       Mental Health Status:  Mental Health Status: No Current Concerns       Chemical Dependency Status:  Chemical Dependency Status: No Current Concerns             Values/Beliefs:  Spiritual, Cultural Beliefs, Taoist Practices, Values that affect care: no               Discussed  Partnership in Safe Discharge Planning  document with patient/family: Yes    Additional Information:  Care management consulted to assist in discharge planning. Care management assessment completed at bedside with patient after chart review. Patient reportedly lives alone. He is independent of ADL/IADL. No current services or DME.  Family can transport at discharge. Physical and Occupational therapy recommending home with assist; home with outpatient physical/occupational therapy. Patient denies any additional discharge needs at this time.     Next Steps:   Continue to follow for discharge planning.     Hemant Harvey RN  FloatCovering Nurse Care Coordinator  [facility] Acute Care Management  Searchable on Vocera: 4A CVICU RNCC

## 2025-07-18 NOTE — PROGRESS NOTES
Brief inpatient dermatology progress note:    Skin biopsy report, as below, is consistent with folliculitis. This does not appear to be consistent with his clinical exam. Pending discussion with staff, we may consider re-biopsy, especially if his rash worsens.     Marito Camarena MD  Staff: Dr. العراقي    Final Diagnosis   Left leg:  - Consistent with ruptured folliculitis - (see comment)     Direct immunofluorescence:  - Negative study, with no immunoreactant deposition identified - (see description)    Electronically signed by James Aguilera MD on 7/17/2025 at 1955 CDT   Comment  [id number]   Chart history and clinical photos were reviewed with this case.  Either a bacterial or Pityrosporum folliculitis is possible, as both bacterial colonies and spore forms are present within the affected follicle.  No definite features of cutaneous small vessel vasculitis or thrombotic vasculopathy are identified on level sections or on the negative DIF study.  Ongoing clinical correlation is recommended.

## 2025-07-18 NOTE — PROCEDURES
INTERVENTIONAL PULMONOLOGY       Procedure(s):    A flexible bronchoscopy  Airway exam  EBUS-TBNA (1 sites)    Indication:  Lymphadenopathy    Attending of Record:  Binh Bains DO    Interventional Pulmonary Fellow   none    Trainees Present:   None     Medications:    General Anesthesia - See anesthesia flowsheet for details    Sedation Time:   Per Anesthesia Care Provider    Time Out:  Performed    The patient's medical record has been reviewed.  The indication for the procedure was reviewed.  The necessary history and physical examination was performed and reviewed.  The risks, benefits and alternatives of the procedure were discussed with the the patient in detail and the patients respresentative (Mother, joel) had the opportunity to ask questions.  I discussed in particular the potential complications including risks of minor or life-threatening bleeding and/or infection, respiratory failure, vocal cord trauma / paralysis, pneumothorax, and discomfort. Sedation risks were also discussed including abnormal heart rhythms, low blood pressure, and respiratory failure. All questions were answered to the best of my ability.  Verbal and written informed consent was obtained.  The proposed procedure and the patient's identification were verified prior to the procedure by the physician and the nurse.    The patient was assessed for the adequacy for the procedure and to receive medications.   Mental Status:  Alert and oriented x 3  Airway examination:  Class I (Complete visualization of soft palate)  Pulmonary:  Non labored respirations  CV:  Regular rate  ASA Grade:  (II)  Mild systemic disease    After clinical evaluation and reviewing the indication, risks, alternatives and benefits of the procedure the patient was deemed to be in satisfactory condition to undergo the procedure.           A Tuberculosis risk assessment was performed:  The patient has no known RISK of Tuberculosis    The procedure was  performed in a negative airflow room: The patient could not be moved to a negative airflow room because of needed OR for the procedure    Maneuvers / Procedure:      Airway Examination: A complete airway examination was performed from the distal trachea to the subsegmental level in each lobe of both lungs.  Pertinent findings include normal appearing airways with the exception of petechia in the airways L>R. No endobronchial lesions identified.     Next EBUS TBNA was performed.   Station 7 FNA 7 passes. JIMENEZ present.       All lymph nodes examined very large and bulky including 11L, 4L, 4R, and 11R.     Any disposable equipment was visually inspected and deemed to be intact immediately post procedure.      Relevant Pictures  none    Assessment and Recommendations:     Successful completion of EBUS TBNA station 7. Malignancy seen by JIMENEZ.  Return to ICU  Ok to resume heparin drip at 3pm today. Ok to resume diet now.          Binh Bains  Staff Interventional Pulmonologist

## 2025-07-18 NOTE — CONSULTS
"      [facility] Consult Service Note  Interventional Radiology  07/18/25   9:06 AM    Consult Requested: \"large biopsy of one multiple enlarged lymph nodes (mediastinal, paraesophagea, hilar, left retroperitoneal)\"    Recommendations/Plan:    Left RP mass is too small to obtain core biopsy.    Recommend endobronchial biopsy of hilar mass as planned for today.      Case and imaging discussed with IR attending Dr. Wendi Bonner MD with Cass Olivia DNP.     Recommendations were reviewed with Dr. Anastasiya Daly    History of Present Illness:  Bernard Chapman is a 35 year old English speaking male who was relatively healthy until last month who developed chest pain on June 22 and was found to have PE with LE DVT.  Placed on apixaban,  He presented to ED and was found to have thrombocytopenia, leukocytosis, and a rash on his lower extremity.  Brain imaging was performed which revealed numerous multifocal acute cortical and white matter left cerebellar hemisphere infarcts.    CT Chest/Abdomen/Pelvis w Contrast (07/16/2025 12:08 PM): Multiple enlarged mediastinal and hilar lymph nodes with necrotic  changes in the paraesophageal lymph nodes. Leading consideration is neoplastic etiology, lymphoproliferative versus metastasis and is now requested for biopsy.      Also see IP Consults by Binh Bains DO (07/17/2025 2:34 PM)   With plan for EBUS TBNA today in OR.      Pertinent Imaging Reviewed:   CT Chest/Abdomen/Pelvis w Contrast (07/16/2025 12:08 PM)     Expected date of discharge:  07/23/2025    Vitals:   /66   Pulse 86   Temp 98.7  F (37.1  C) (Oral)   Resp 16   Ht 1.88 m (6' 2\")   Wt 128.5 kg (283 lb 4.7 oz)   SpO2 95%   BMI 36.37 kg/m      Pertinent Labs Reviewed:  CBC:  Lab Results   Component Value Date    WBC 13.5 (H) 07/18/2025    WBC 14.0 (H) 07/17/2025    WBC 14.5 (H) 07/17/2025     Lab Results   Component Value Date    HGB 10.2 07/18/2025    HGB 10.7 07/17/2025    HGB 11.3 " 07/17/2025     Lab Results   Component Value Date    PLT 57 07/18/2025    PLT 62 07/17/2025    PLT 44 07/17/2025    INR:  Lab Results   Component Value Date    INR 1.50 (H) 07/18/2025    PTT 64 (H) 07/18/2025          COVID Results:  COVID-19 Antibody Results, Testing for Immunity           No data to display              COVID-19 PCR Results           No data to display             Potassium   Date Value Ref Range Status   07/18/2025 3.7 3.4 - 5.3 mmol/L Final          Rina Gusman PA-C  Interventional Radiology  Pager: [phone]

## 2025-07-18 NOTE — PROGRESS NOTES
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 He is s/p PLEX x1 on 7/16.      Multiple enlarged mediastinal and hilar lymph nodes, potential lymphoma  Acute thrombocytopenia  Acute anemia, potential hemolytic, TRINI negative, PNH negative, [id number] normal with no schistocytes  Acute bilateral PE with Left popliteal thrombosis. Ddx 6/23/25  Multifocal acute ischemic strokes in L MCA, R parietal lobe, R frontal centrum semiovale. Ddx 7/15/25  Diffuse deposition of thrombus on aortic and tricuspid valves    Bernard continues to improve neurologically.  Workup continues to not reveal a unifying diagnosis.  Most recently patient was noted to have negative PNH testing.  Peripheral blood flow cytometry did not yield any abnormal cells, however this was expected given that he did not have any leukocytosis.  I think continues to need supportive platelet transfusions to maintain a platelet count greater than 50 so he can continue anticoagulation for his known pulmonary embolisms.  Unfortunately his thrombocytopenia is also limiting options for performing a adequate biopsy of his mediastinal and hilar lymph nodes.  Ongoing multidisciplinary discussion with primary team, interventional pulmonology, thoracic surgery, vascular interventional radiology.  Fearful that in the setting of documented necrosis on the imaging along with concerned that he may have a  "lymphoma that FNA or EBUS which only is a 21-gauge needle will not yield enough tissue or histologic morphology to diagnose a lymphoma.  However does not appear to have any other identifiable sources.  To avoid fixation bias we will also broaden differential to include other malignancies that are common for individuals in his age type which include testicular cancer.  Have ordered beta-hCG and alpha-fetoprotein to assess those markers.    T EE and echo both had thrombus on aortic and tricuspid valves.  Blood cultures have been negative to date.  At this juncture endocarditis secondary to an infectious complication is lower on differential.  Patient also did not have a PFO.    Patient had negative beta-2 glycoprotein and cardiolipin Actilyse with a lupus inhibitor screen that was mildly positive in the context of patient recently taking Xarelto.  Overall catastrophic antiphospholipid antibody syndrome is excluded from this possibility.  Other inflammatory markers including complement levels, IgG, and kappa lambda free light chains are also normal.    Recommendations:   - continue to transfuse platelet up to 50k   - do not anticipate further episodes of PLEX   - would hold off on prednisone given concern of lymphoma  - Pending  Jak2 molecular testing  - Recommend obtaining excisional biopsy for better tissue analysis  - follow CBC, reticulocyte, and LDH daily           We will continue to follow this patient. Please don't hesitate to contact the Fellow On-Call with questions.    Kerry Borjas MD/PhD   of Medicine  Division of Hematology, Oncology and Transplantation        ___________________________________________________________________    Subjective & Interval History:    No acute events noted overnight.   Throat sore from TIMO. NPO at present. Less neglect of right side.      Physical Exam:    /66   Pulse 86   Temp 98.7  F (37.1  C) (Oral)   Resp 16   Ht 1.88 m (6' 2\")   Wt 128.5 " kg (283 lb 4.7 oz)   SpO2 95%   BMI 36.37 kg/m    Gen: Well appearing, in NAD  HEENT: EOMI, PERRL, mmm, oropharynx clear  CV: Normal rate, regular rhythm. No m/r/g  Pulm: CTAB, no wheezing, normal work of breathing  Abd: Soft, nt/nd, no rebound/guarding  Ext: Warm and well perfused. No lower extremity edema  Skin: No rash, cyanosis or petechial lesion  Neuro: Alert and answering questions appropriately. CNII-XII grossly intact.     Labs & Studies: I personally reviewed the following studies:  ROUTINE LABS (Last four results):  CMP  Recent Labs   Lab 07/18/25  0759 07/18/25  0403 07/17/25 2000 07/17/25  0423 07/17/25  0028 07/16/25  0414 07/16/25  0259 07/15/25  2349 07/15/25  1202 07/15/25  1158   NA  --  143  --  140  --  141  --   --   --  139   POTASSIUM  --  3.7  --  3.9  --  4.7  --   --   --  4.2   CHLORIDE  --  107  --  104  --  105  --   --   --  101   CO2  --  26  --  26  --  17*  --   --   --  25   ANIONGAP  --  10  --  10  --  19*  --   --   --  13   * 112* 178* 139*   < > 147*   < >  --    < > 123*   BUN  --  15.1  --  15.6  --  18.1  --   --   --  18.5   CR  --  1.11  --  0.91  --  0.98  --   --   --  1.06   GFRESTIMATED  --  89  --  >90  --  >90  --   --   --  >90   SHARAN  --  8.1*  --  8.7*  --  9.5  --   --   --  9.5   MAG  --  2.0  --  2.1  --   --   --  2.0  --  2.0   PHOS  --  3.3  --  3.5  --  2.3*  --   --   --   --    PROTTOTAL  --   --   --   --   --   --   --  6.5  --  8.1   ALBUMIN  --   --   --   --   --   --   --  3.9  --  4.8   BILITOTAL  --   --   --   --   --   --   --  0.9  --  1.0   ALKPHOS  --   --   --   --   --   --   --  58  --  73   AST  --   --   --   --   --   --   --  24  --  27   ALT  --   --   --   --   --   --   --  25  --  37    < > = values in this interval not displayed.     CBC  Recent Labs   Lab 07/18/25  0403 07/17/25  2046 07/17/25  1730 07/17/25  1333 07/17/25  0423 07/16/25  1648   WBC 13.5*  --  14.0*  --  14.5* 14.4*   RBC 3.83*  --  3.99*  --  4.23* 4.65    HGB 10.2*  --  10.7*  --  11.3* 12.6*   HCT 31.2*  --  32.4*  --  33.8* 37.7*   MCV 82 82 81 81 80 81  80   MCH 26.6  --  26.8  --  26.7 27.1   MCHC 32.7  --  33.0  --  33.4 33.4   RDW 13.4  --  13.2  --  13.1 12.9   PLT 57* 62* 44* 54* 38* 59*  61*     INR  Recent Labs   Lab 07/18/25  0403 07/17/25  0423 07/15/25  2349 07/15/25  1158   INR 1.50* 1.71* 2.05* 2.12*       Medications list for reference:  Current Facility-Administered Medications   Medication Dose Route Frequency Provider Last Rate Last Admin    [Held by provider] acetaminophen (TYLENOL) tablet 1,000 mg  1,000 mg Oral or Feeding Tube Q8H PRN Nishi Rodas MD   1,000 mg at 07/16/25 2124    diphenhydrAMINE (BENADRYL) capsule 25 mg  25 mg Oral Q6H PRN Nilsa Daly DO        Or    diphenhydrAMINE (BENADRYL) injection 25 mg  25 mg Intravenous Q6H PRN Nilsa Daly DO        [Held by provider] heparin 25,000 units in 0.45% NaCl 250 mL ANTICOAGULANT infusion  0-5,000 Units/hr Intravenous Continuous Nilsa Daly DO   Stopped at 07/18/25 0852    hydrALAZINE (APRESOLINE) injection 10 mg  10 mg Intravenous Q30 Min PRN Zora Boone PA-C        labetalol (NORMODYNE/TRANDATE) injection 10 mg  10 mg Intravenous Q10 Min PRN Zora Boone PA-C        lidocaine (LMX4) cream   Topical Q1H PRN Nilsa Daly DO        lidocaine 1 % 1 mL  1 mL Other Q1H PRN Nilsa Daly DO        May take oral meds with a sip of water, the morning of TIMO procedure.   Does not apply Continuous PRN Nilsa Daly DO        Medication Instruction - Avoid dextrose in IV solutions   Intravenous Continuous PRN Nishi Rodas MD        ondansetron (ZOFRAN ODT) ODT tab 4 mg  4 mg Oral Q6H PRN Nishi Rodas MD        Or    ondansetron (ZOFRAN) injection 4 mg  4 mg Intravenous Q6H PRN Nishi Rodas MD        Plasma-Lyte A (electrolyte A) infusion   Intravenous Continuous Nilsa Daly  mL/hr at 07/18/25 0300 Rate Verify at 07/18/25 0300     polyethylene glycol (MIRALAX) Packet 17 g  17 g Oral Daily PRN Griselda Ha CNP        prochlorperazine (COMPAZINE) injection 10 mg  10 mg Intravenous Q6H PRN Franklin Aguilera MD        Or    prochlorperazine (COMPAZINE) tablet 10 mg  10 mg Oral or Feeding Tube Q6H PRN Franklin Aguilera MD        rosuvastatin (CRESTOR) tablet 10 mg  10 mg Oral Daily Griselda Ha CNP   10 mg at 07/18/25 0800    senna-docusate (SENOKOT-S/PERICOLACE) 8.6-50 MG per tablet 1-2 tablet  1-2 tablet Oral or Feeding Tube BID Griselda Ha CNP   1 tablet at 07/18/25 0800    sodium chloride (PF) 0.9% PF flush 3 mL  3 mL Intravenous Q1H PRN Nilsa Daly DO        sodium chloride (PF) 0.9% PF flush 3 mL  3 mL Intravenous Q8H Nilsa Daly DO   3 mL at 07/18/25 1018    sodium chloride (PF) 0.9% PF flush 3 mL  3 mL Intracatheter Q8H MOHINDER Nishi Rodas MD   3 mL at 07/18/25 0624    sodium chloride (PF) 0.9% PF flush 3 mL  3 mL Intracatheter q1 min prn Nishi Rodas MD        sodium chloride 0.9 % bag for CT scan flush  100 mL Intravenous Once Segundo Pond MD

## 2025-07-18 NOTE — PROGRESS NOTES
INTERVENTIONAL PULMONARY  Progress Note                                       July 18, 2025            Patient: Bernard Chapman  Date of Service: 7/18/2025    Date of Admission: 7/15/2025      Assessment and recommendations:   Bernard Chapman is a 35 year old male who presents with multiple symptoms including thrombocytopenia, DVT, skin rash, and embolic strokes.    CT chest shows necrotic lymphadenopathy concerning for malignancy vs infection vs inflammatory disorder.     Primary team would like to proceed with EBUS TBNA. I explained to him this high risk given thrombocytopenia. He understands.     Plan for EBUS TBNA later this afternoon.       Binh Bains  Interventional Pulmonology Staff           Interval History:   No acute overnight events.                        Physical Exam:   Temp:  [98  F (36.7  C)-99  F (37.2  C)] 98.7  F (37.1  C)  Pulse:  [] 87  Resp:  [13-28] 22  BP: (114-158)/(51-90) 128/67  SpO2:  [93 %-100 %] 98 %    Intake/Output Summary (Last 24 hours) at 7/18/2025 1158  Last data filed at 7/18/2025 1100  Gross per 24 hour   Intake 2985.93 ml   Output 2575 ml   Net 410.93 ml

## 2025-07-19 ENCOUNTER — APPOINTMENT (OUTPATIENT)
Dept: CT IMAGING | Facility: CLINIC | Age: 35
DRG: 064 | End: 2025-07-19
Payer: COMMERCIAL

## 2025-07-19 ENCOUNTER — APPOINTMENT (OUTPATIENT)
Dept: ULTRASOUND IMAGING | Facility: CLINIC | Age: 35
End: 2025-07-19
Payer: COMMERCIAL

## 2025-07-19 ENCOUNTER — APPOINTMENT (OUTPATIENT)
Dept: CT IMAGING | Facility: CLINIC | Age: 35
End: 2025-07-19
Payer: COMMERCIAL

## 2025-07-19 ENCOUNTER — APPOINTMENT (OUTPATIENT)
Dept: MRI IMAGING | Facility: CLINIC | Age: 35
End: 2025-07-19
Payer: COMMERCIAL

## 2025-07-19 ENCOUNTER — APPOINTMENT (OUTPATIENT)
Dept: CT IMAGING | Facility: CLINIC | Age: 35
DRG: 064 | End: 2025-07-19
Attending: NURSE PRACTITIONER
Payer: COMMERCIAL

## 2025-07-19 ENCOUNTER — APPOINTMENT (OUTPATIENT)
Dept: SPEECH THERAPY | Facility: CLINIC | Age: 35
DRG: 064 | End: 2025-07-19
Payer: COMMERCIAL

## 2025-07-19 LAB
ACE SERPL-CCNC: 28 U/L
ANION GAP SERPL CALCULATED.3IONS-SCNC: 9 MMOL/L (ref 7–15)
APTT PPP: 71 SECONDS (ref 22–38)
BLD PROD TYP BPU: NORMAL
BLD PROD TYP BPU: NORMAL
BLOOD COMPONENT TYPE: NORMAL
BLOOD COMPONENT TYPE: NORMAL
BUN SERPL-MCNC: 14.8 MG/DL (ref 6–20)
CA-I BLD-MCNC: 4.6 MG/DL (ref 4.4–5.2)
CALCIUM SERPL-MCNC: 8.5 MG/DL (ref 8.8–10.4)
CHLORIDE SERPL-SCNC: 106 MMOL/L (ref 98–107)
CODING SYSTEM: NORMAL
CODING SYSTEM: NORMAL
CREAT SERPL-MCNC: 1.02 MG/DL (ref 0.67–1.17)
EGFRCR SERPLBLD CKD-EPI 2021: >90 ML/MIN/1.73M2
ERYTHROCYTE [DISTWIDTH] IN BLOOD BY AUTOMATED COUNT: 13.4 % (ref 10–15)
ERYTHROCYTE [DISTWIDTH] IN BLOOD BY AUTOMATED COUNT: 13.4 % (ref 10–15)
GLUCOSE SERPL-MCNC: 115 MG/DL (ref 70–99)
HCO3 SERPL-SCNC: 25 MMOL/L (ref 22–29)
HCT VFR BLD AUTO: 30.2 % (ref 40–53)
HCT VFR BLD AUTO: 31.7 % (ref 40–53)
HGB BLD-MCNC: 10.4 G/DL (ref 13.3–17.7)
HGB BLD-MCNC: 9.8 G/DL (ref 13.3–17.7)
INR PPP: 1.46 (ref 0.85–1.15)
ISSUE DATE AND TIME: NORMAL
ISSUE DATE AND TIME: NORMAL
LDH SERPL L TO P-CCNC: 562 U/L (ref 0–250)
MAGNESIUM SERPL-MCNC: 1.9 MG/DL (ref 1.7–2.3)
MCH RBC QN AUTO: 26.7 PG (ref 26.5–33)
MCH RBC QN AUTO: 26.8 PG (ref 26.5–33)
MCHC RBC AUTO-ENTMCNC: 32.5 G/DL (ref 31.5–36.5)
MCHC RBC AUTO-ENTMCNC: 32.8 G/DL (ref 31.5–36.5)
MCV RBC AUTO: 82 FL (ref 78–100)
MCV RBC AUTO: 83 FL (ref 78–100)
OSMOLALITY SERPL: 292 MMOL/KG (ref 275–295)
OSMOLALITY UR: 389 MMOL/KG (ref 100–1200)
PHOSPHATE SERPL-MCNC: 3.4 MG/DL (ref 2.5–4.5)
PLAT MORPH BLD: NORMAL
PLATELET # BLD AUTO: 35 10E3/UL (ref 150–450)
PLATELET # BLD AUTO: 53 10E3/UL (ref 150–450)
POTASSIUM SERPL-SCNC: 3.9 MMOL/L (ref 3.4–5.3)
PROTHROMBIN TIME: 17.6 SECONDS (ref 11.8–14.8)
RADIOLOGIST FLAGS: ABNORMAL
RBC # BLD AUTO: 3.66 10E6/UL (ref 4.4–5.9)
RBC # BLD AUTO: 3.89 10E6/UL (ref 4.4–5.9)
RBC MORPH BLD: NORMAL
SODIUM SERPL-SCNC: 136 MMOL/L (ref 135–145)
SODIUM SERPL-SCNC: 140 MMOL/L (ref 135–145)
UFH PPP CHRO-ACNC: <0.1 IU/ML (ref ?–1.1)
UNIT ABO/RH: NORMAL
UNIT ABO/RH: NORMAL
UNIT NUMBER: NORMAL
UNIT NUMBER: NORMAL
UNIT STATUS: NORMAL
UNIT STATUS: NORMAL
UNIT TYPE ISBT: 6200
UNIT TYPE ISBT: 6200
WBC # BLD AUTO: 12.3 10E3/UL (ref 4–11)
WBC # BLD AUTO: 14.1 10E3/UL (ref 4–11)

## 2025-07-19 PROCEDURE — 70496 CT ANGIOGRAPHY HEAD: CPT | Mod: 26 | Performed by: RADIOLOGY

## 2025-07-19 PROCEDURE — 85027 COMPLETE CBC AUTOMATED: CPT | Performed by: NURSE PRACTITIONER

## 2025-07-19 PROCEDURE — 83930 ASSAY OF BLOOD OSMOLALITY: CPT

## 2025-07-19 PROCEDURE — 250N000011 HC RX IP 250 OP 636

## 2025-07-19 PROCEDURE — 70450 CT HEAD/BRAIN W/O DYE: CPT

## 2025-07-19 PROCEDURE — A9585 GADOBUTROL INJECTION: HCPCS

## 2025-07-19 PROCEDURE — 250N000009 HC RX 250: Performed by: FAMILY MEDICINE

## 2025-07-19 PROCEDURE — 200N000002 HC R&B ICU UMMC

## 2025-07-19 PROCEDURE — 93976 VASCULAR STUDY: CPT

## 2025-07-19 PROCEDURE — 93005 ELECTROCARDIOGRAM TRACING: CPT

## 2025-07-19 PROCEDURE — 250N000011 HC RX IP 250 OP 636: Performed by: NURSE PRACTITIONER

## 2025-07-19 PROCEDURE — 258N000003 HC RX IP 258 OP 636

## 2025-07-19 PROCEDURE — 92610 EVALUATE SWALLOWING FUNCTION: CPT | Mod: GN

## 2025-07-19 PROCEDURE — 85730 THROMBOPLASTIN TIME PARTIAL: CPT | Performed by: PSYCHIATRY & NEUROLOGY

## 2025-07-19 PROCEDURE — 92526 ORAL FUNCTION THERAPY: CPT | Mod: GN

## 2025-07-19 PROCEDURE — 250N000009 HC RX 250

## 2025-07-19 PROCEDURE — 70498 CT ANGIOGRAPHY NECK: CPT

## 2025-07-19 PROCEDURE — P9037 PLATE PHERES LEUKOREDU IRRAD: HCPCS

## 2025-07-19 PROCEDURE — 83735 ASSAY OF MAGNESIUM: CPT | Performed by: NURSE PRACTITIONER

## 2025-07-19 PROCEDURE — 70450 CT HEAD/BRAIN W/O DYE: CPT | Mod: 76

## 2025-07-19 PROCEDURE — 85520 HEPARIN ASSAY: CPT

## 2025-07-19 PROCEDURE — 250N000013 HC RX MED GY IP 250 OP 250 PS 637: Performed by: NURSE PRACTITIONER

## 2025-07-19 PROCEDURE — 250N000011 HC RX IP 250 OP 636: Performed by: PHYSICIAN ASSISTANT

## 2025-07-19 PROCEDURE — 70498 CT ANGIOGRAPHY NECK: CPT | Mod: 26 | Performed by: RADIOLOGY

## 2025-07-19 PROCEDURE — 82248 BILIRUBIN DIRECT: CPT | Performed by: INTERNAL MEDICINE

## 2025-07-19 PROCEDURE — 85610 PROTHROMBIN TIME: CPT | Performed by: NURSE PRACTITIONER

## 2025-07-19 PROCEDURE — 76870 US EXAM SCROTUM: CPT | Mod: 26 | Performed by: RADIOLOGY

## 2025-07-19 PROCEDURE — 70553 MRI BRAIN STEM W/O & W/DYE: CPT

## 2025-07-19 PROCEDURE — 84100 ASSAY OF PHOSPHORUS: CPT | Performed by: NURSE PRACTITIONER

## 2025-07-19 PROCEDURE — 0042T CT HEAD PERFUSION W CONTRAST: CPT

## 2025-07-19 PROCEDURE — 99233 SBSQ HOSP IP/OBS HIGH 50: CPT | Performed by: INTERNAL MEDICINE

## 2025-07-19 PROCEDURE — 83615 LACTATE (LD) (LDH) ENZYME: CPT | Performed by: NURSE PRACTITIONER

## 2025-07-19 PROCEDURE — 99291 CRITICAL CARE FIRST HOUR: CPT | Mod: 25 | Performed by: PSYCHIATRY & NEUROLOGY

## 2025-07-19 PROCEDURE — 82330 ASSAY OF CALCIUM: CPT | Performed by: PHYSICIAN ASSISTANT

## 2025-07-19 PROCEDURE — 255N000002 HC RX 255 OP 636

## 2025-07-19 PROCEDURE — 70450 CT HEAD/BRAIN W/O DYE: CPT | Mod: 77

## 2025-07-19 PROCEDURE — 3E043XZ INTRODUCTION OF VASOPRESSOR INTO CENTRAL VEIN, PERCUTANEOUS APPROACH: ICD-10-PCS | Performed by: PSYCHIATRY & NEUROLOGY

## 2025-07-19 PROCEDURE — 93010 ELECTROCARDIOGRAM REPORT: CPT | Performed by: INTERNAL MEDICINE

## 2025-07-19 PROCEDURE — 999N000175 HC STATISTIC STROKE CODE W/ ACCESS

## 2025-07-19 PROCEDURE — 0042T CT HEAD PERFUSION W CONTRAST: CPT | Performed by: RADIOLOGY

## 2025-07-19 PROCEDURE — 85014 HEMATOCRIT: CPT

## 2025-07-19 PROCEDURE — 84295 ASSAY OF SERUM SODIUM: CPT

## 2025-07-19 PROCEDURE — 36620 INSERTION CATHETER ARTERY: CPT | Mod: GC | Performed by: PSYCHIATRY & NEUROLOGY

## 2025-07-19 PROCEDURE — 80048 BASIC METABOLIC PNL TOTAL CA: CPT | Performed by: NURSE PRACTITIONER

## 2025-07-19 PROCEDURE — 250N000013 HC RX MED GY IP 250 OP 250 PS 637: Performed by: PSYCHIATRY & NEUROLOGY

## 2025-07-19 PROCEDURE — 70553 MRI BRAIN STEM W/O & W/DYE: CPT | Mod: 26 | Performed by: RADIOLOGY

## 2025-07-19 PROCEDURE — 83935 ASSAY OF URINE OSMOLALITY: CPT

## 2025-07-19 RX ORDER — POLYETHYLENE GLYCOL 3350 17 G/17G
17 POWDER, FOR SOLUTION ORAL 2 TIMES DAILY
Status: DISCONTINUED | OUTPATIENT
Start: 2025-07-19 | End: 2025-07-20

## 2025-07-19 RX ORDER — ACETAMINOPHEN 325 MG/1
650 TABLET ORAL EVERY 4 HOURS
Status: DISCONTINUED | OUTPATIENT
Start: 2025-07-19 | End: 2025-07-20

## 2025-07-19 RX ORDER — POLYETHYLENE GLYCOL 3350 17 G/17G
17 POWDER, FOR SOLUTION ORAL DAILY PRN
Status: DISCONTINUED | OUTPATIENT
Start: 2025-07-19 | End: 2025-07-19

## 2025-07-19 RX ORDER — HYDROCORTISONE 25 MG/G
CREAM TOPICAL 2 TIMES DAILY PRN
Status: ON HOLD | COMMUNITY

## 2025-07-19 RX ORDER — IOPAMIDOL 755 MG/ML
117 INJECTION, SOLUTION INTRAVASCULAR ONCE
Status: COMPLETED | OUTPATIENT
Start: 2025-07-19 | End: 2025-07-19

## 2025-07-19 RX ORDER — DIPHENHYDRAMINE HCL 25 MG
25 CAPSULE ORAL EVERY 6 HOURS PRN
Status: DISCONTINUED | OUTPATIENT
Start: 2025-07-19 | End: 2025-08-21 | Stop reason: HOSPADM

## 2025-07-19 RX ORDER — FUROSEMIDE 10 MG/ML
20 INJECTION INTRAMUSCULAR; INTRAVENOUS ONCE
Status: COMPLETED | OUTPATIENT
Start: 2025-07-19 | End: 2025-07-19

## 2025-07-19 RX ORDER — GABAPENTIN 100 MG/1
100 CAPSULE ORAL 3 TIMES DAILY PRN
Status: ON HOLD | COMMUNITY

## 2025-07-19 RX ORDER — BISACODYL 10 MG
10 SUPPOSITORY, RECTAL RECTAL DAILY PRN
Status: DISCONTINUED | OUTPATIENT
Start: 2025-07-19 | End: 2025-08-21 | Stop reason: HOSPADM

## 2025-07-19 RX ORDER — OXYCODONE HYDROCHLORIDE 5 MG/1
5 TABLET ORAL EVERY 6 HOURS PRN
Status: ON HOLD | COMMUNITY

## 2025-07-19 RX ORDER — HEPARIN SODIUM 10000 [USP'U]/100ML
0-5000 INJECTION, SOLUTION INTRAVENOUS CONTINUOUS
Status: DISCONTINUED | OUTPATIENT
Start: 2025-07-19 | End: 2025-07-21

## 2025-07-19 RX ORDER — NOREPINEPHRINE BITARTRATE 0.06 MG/ML
.01-.6 INJECTION, SOLUTION INTRAVENOUS CONTINUOUS
Status: DISCONTINUED | OUTPATIENT
Start: 2025-07-19 | End: 2025-07-19

## 2025-07-19 RX ORDER — GADOBUTROL 604.72 MG/ML
10 INJECTION INTRAVENOUS ONCE
Status: COMPLETED | OUTPATIENT
Start: 2025-07-19 | End: 2025-07-19

## 2025-07-19 RX ORDER — ROSUVASTATIN CALCIUM 10 MG/1
10 TABLET, COATED ORAL DAILY
Status: DISCONTINUED | OUTPATIENT
Start: 2025-07-19 | End: 2025-08-21 | Stop reason: HOSPADM

## 2025-07-19 RX ORDER — DIPHENHYDRAMINE HYDROCHLORIDE 50 MG/ML
25 INJECTION, SOLUTION INTRAMUSCULAR; INTRAVENOUS EVERY 6 HOURS PRN
Status: DISCONTINUED | OUTPATIENT
Start: 2025-07-19 | End: 2025-08-21 | Stop reason: HOSPADM

## 2025-07-19 RX ORDER — ACETAMINOPHEN 650 MG/1
650 SUPPOSITORY RECTAL EVERY 4 HOURS
Status: DISCONTINUED | OUTPATIENT
Start: 2025-07-19 | End: 2025-07-20

## 2025-07-19 RX ADMIN — FUROSEMIDE 20 MG: 10 INJECTION, SOLUTION INTRAMUSCULAR; INTRAVENOUS at 17:57

## 2025-07-19 RX ADMIN — ROSUVASTATIN CALCIUM 10 MG: 10 TABLET, FILM COATED ORAL at 10:18

## 2025-07-19 RX ADMIN — NICARDIPINE HYDROCHLORIDE 2.5 MG/HR: 0.2 INJECTION INTRAVENOUS at 09:01

## 2025-07-19 RX ADMIN — GADOBUTROL 10 ML: 604.72 INJECTION INTRAVENOUS at 08:19

## 2025-07-19 RX ADMIN — POLYETHYLENE GLYCOL 3350 17 G: 17 POWDER, FOR SOLUTION ORAL at 20:00

## 2025-07-19 RX ADMIN — POLYETHYLENE GLYCOL 3350 17 G: 17 POWDER, FOR SOLUTION ORAL at 10:19

## 2025-07-19 RX ADMIN — SENNOSIDES AND DOCUSATE SODIUM 1 TABLET: 50; 8.6 TABLET ORAL at 19:59

## 2025-07-19 RX ADMIN — NOREPINEPHRINE BITARTRATE 0.03 MCG/KG/MIN: 0.06 INJECTION, SOLUTION INTRAVENOUS at 06:13

## 2025-07-19 RX ADMIN — SODIUM CHLORIDE 1000 ML: 0.9 INJECTION, SOLUTION INTRAVENOUS at 06:01

## 2025-07-19 RX ADMIN — ACETAMINOPHEN 650 MG: 325 TABLET ORAL at 13:00

## 2025-07-19 RX ADMIN — HEPARIN SODIUM 1200 UNITS/HR: 10000 INJECTION, SOLUTION INTRAVENOUS at 17:02

## 2025-07-19 RX ADMIN — ACETAMINOPHEN 650 MG: 325 TABLET ORAL at 10:19

## 2025-07-19 RX ADMIN — SENNOSIDES AND DOCUSATE SODIUM 1 TABLET: 50; 8.6 TABLET ORAL at 10:18

## 2025-07-19 RX ADMIN — SODIUM CHLORIDE 100 ML: 9 INJECTION, SOLUTION INTRAVENOUS at 04:54

## 2025-07-19 RX ADMIN — HYDRALAZINE HYDROCHLORIDE 10 MG: 20 INJECTION INTRAMUSCULAR; INTRAVENOUS at 07:28

## 2025-07-19 RX ADMIN — ACETAMINOPHEN 650 MG: 325 TABLET ORAL at 21:47

## 2025-07-19 RX ADMIN — ACETAMINOPHEN 650 MG: 325 TABLET ORAL at 17:57

## 2025-07-19 RX ADMIN — IOPAMIDOL 117 ML: 755 INJECTION, SOLUTION INTRAVENOUS at 04:53

## 2025-07-19 RX ADMIN — HYDRALAZINE HYDROCHLORIDE 10 MG: 20 INJECTION INTRAMUSCULAR; INTRAVENOUS at 08:01

## 2025-07-19 RX ADMIN — HEPARIN SODIUM 1950 UNITS/HR: 10000 INJECTION, SOLUTION INTRAVENOUS at 01:43

## 2025-07-19 ASSESSMENT — ACTIVITIES OF DAILY LIVING (ADL)
ADLS_ACUITY_SCORE: 65
ADLS_ACUITY_SCORE: 69
ADLS_ACUITY_SCORE: 62
ADLS_ACUITY_SCORE: 65
ADLS_ACUITY_SCORE: 64
ADLS_ACUITY_SCORE: 65
ADLS_ACUITY_SCORE: 62
ADLS_ACUITY_SCORE: 68
ADLS_ACUITY_SCORE: 69
ADLS_ACUITY_SCORE: 65
ADLS_ACUITY_SCORE: 62
ADLS_ACUITY_SCORE: 65
ADLS_ACUITY_SCORE: 64
ADLS_ACUITY_SCORE: 69
ADLS_ACUITY_SCORE: 62
ADLS_ACUITY_SCORE: 65
ADLS_ACUITY_SCORE: 68
ADLS_ACUITY_SCORE: 62

## 2025-07-19 NOTE — PROCEDURES
[facility]    Arterial line placement    Date/Time: July 19, 2025    Performed by: Carlos Lind MD  Authorized by: Carlos Lind MD      UNIVERSAL PROTOCOL   Site Marked: Yes  Prior Images Obtained and Reviewed:  Yes  Required items: Required blood products, implants, devices and special equipment available    Patient identity confirmed:  Arm band and provided demographic data  NA - No sedation, light sedation, or local anesthesia  Confirmation Checklist:  Patient's identity using two indicators, relevant allergies, correct equipment/implants were available and procedure was appropriate and matched the consent or emergent situation  Time out: Immediately prior to the procedure a time out was called    Universal Protocol: the Joint Commission Universal Protocol was followed    Preparation: Patient was prepped and draped in usual sterile fashion    ESBL (mL):  5  Indication:  multiple ABGs hemodynamic monitoring  Location: LEFT Radial       ANESTHESIA    Anesthesia:  Local infiltration  Local Anesthetic:  Lidocaine 1% without epinephrine  Anesthetic Total (mL):  3      SEDATION  Patient Sedated: Yes    Sedation Type:  Anxiolysis  Sedation:  None  Vital signs: Vital signs monitored during sedation      PROCEDURE DETAILS  Josh's Test Normal?: Josh's test not abnormal    Seldinger technique: Seldinger technique used    Number of Attempts:  1  Post-procedure:  Line sutured and dressing applied      PROCEDURE    Length of time physician/provider present for 1:1 monitoring during sedation: 15         present

## 2025-07-19 NOTE — INTERIM SUMMARY
Brief Neurology Note:     CT head showed findings concerning for subarachnoid hemorrhage in left frontoparietal region. Highest BP was 158/81 on cuff pressure.     Plan:   - continue off heparin  - STAT MRI brain  - SBP<140  - neurochecks q1hr  - stopped levophed (had been briefly started prior to sudden onset headache)    The patient was discussed with neurology attending, Dr. Matt.     Nishi Rodas MD  Neurology Resident, PGY-3

## 2025-07-19 NOTE — PROGRESS NOTES
Neurocritical Care Progress Note    Reason for critical care admission: left M1 subtotal occlusion, urgent PLEX   Admitting Team: Neurocritical Care   Date of Service:  07/19/2025  Date of Admission:  7/15/2025  Hospital Day: 5    Assessment/Plan  Bernard Chapman is a 35 year old male with a past medical history of hypertension, diabetes, possible ITP, sleep apnea, PE and DVT on rivaroxaban admitted on 7/15/2025 for acute ischemic stroke in left MCA territory, near occlusion of left M1, and concern for thrombotic emergency such as TTP or catastrophic antiphospholipid syndrome (in setting of severe thrombocytopenia and acute cerebral infarcts). Admission to neuroICU for close neurologic monitoring and plan for urgent PLEX.     CTH showed new subarachnoid hemorrhage in left parietal region, MRI showing some increased areas of diffusion restriction with ADC correlate in left MCA territory including left basal ganglia structures compared to prior. Will hold heparin drip for now and repeat CTH at 1500. If stable, can resume heparin low intensity.     24 hour events:  - stroke code called 0430 due to new dense right hemiplegia, aphasia, and increase right facial droop while on heparin gt. BP by cuff 122/77 at that time. Heparin gtt was stopped. CTH without evidence of hemorrhage, CTA with near total M1 occulsion with distal recon, no high-grade stnoesis, CTP without core infarct. Patient discussed with neuro IR who did not feel thrombectomy was indicated at this time as risk would likely outweigh benefit.   - started on levo, however discontinued after patient became whitley to 50s  - Shortly after stroke code was de-escalated, heparin gtt was resumed and patient endorsed acute onset 10/10 headache with stable neuro exam. Repeat CTH revealed new subarachnoid hemorrhage in the left parietal region.   - /76 on A line       Neuro  #Multifocal Acute ischemic strokes, in L MCA territory, Right parietal lobe, right  frontal centrum semiovale of unclear etiology   #subtotal Left M1 occlusion   #Sensory and visual extinction on right  #left parietal SAH   Etiology of stroke remains unclear, though malignancy high on the differential given multiple findings on CT CAP, h/o hypercoagulability, and 3 territory CVA on head imaging. Endocarditis also a possibility given mobile masses on tricuspid and aortic valves on TTE 7/16  - 7/19 CTH with new SAH    - STAT MRI brain   - repeat CTH in the afternoon   - Neurochecks every 1 hrs  - SBP goal < 120-160 mmHg, DBP < 100 via A line   - HOB > 30   - PT/OT/SLP  -   - Triglycerides - 202  - A1c 5.3%  - Continue Crestor 10 mg once daily   - HOLD heparin gtt until repeat CTH   - discontinue levophed gtt       #Analgesics & sedation  RASS goal: 0  - Tylenol 650 Q4H    - PRN oxycodone 4 mg Q6H        CV  #Hypertension  # mobile echodensity on tricuspid and aortic valve 7/16  - 7/17 TIMO with diffuse investment of the aortic and tricuspid valves with thrombus, moderate aortic and tricuspid regurgitation   -Cardiac monitoring  -SBP goal < 120 - 160 mmHg  -DBP goal < 100  -BP measurements via a line   -PRN Labetalol and Hydralazine  -celvidipine gtt   -hold PTA metoprolol, hydrochlorothiazide, losartan  -blood cultures x2 NGTD  -Crestor 10 mg daily   - HOLD heparin gtt      Workup thus far:   - 7/16: cardiac CTA non diagnostic for left atrial appendage thrombus and stenosis/athero  - 7/16: TTE with mobile echodensities on tricuspid valve and aortic valve, EF 60-65%  - 7/17: TIMO diffuse investment of the aortic and tricuspid valves with thrombus, moderate aortic and tricuspid regurgitation       Resp  #recent PE  #ELENA on CPAP  Oxygen/vent: room air  -Continuous pulse ox  -Maintain O2 saturations greater than 92%  -patient refusing home CPAP  -2L NC while sleeping  - anesthesia consult prior to OR     #11 mm right lung nodule  # multiple enlarged mediastinal, paraesophageal, and hilar lymph nodes  cf paraneoplastic vs lymphoproliferative vs malignancy   - CT CAP 7/16 with spiculated-appearing nodule in the right lung apex    - s/p EBUS and TBNA with interventional pulmonology 7/18   - transfuse 1u platelets en route to OR 7/18    - 7/18: EBUS and TBNA of mediastinal lymph nodes, path pending     #Pulmonary embolism  - 7/17 CT PE protocol positive for PE unchanged from 7/16/25   - hold heparin gtt until repeat CTH   - Monitor Xa levels once heparin gtt resumed     GI  # SABRINA  Diet: NPO for procedure  Last BM: 7/16  GI prophylaxis: not indicated  -Bowel regimen: PRN senna-docusate, Miralax, and bisacodyl suppository     #splenic infarction  - CT CAP 7/16 showed wedge-shaped hypodensity at the periphery of the spleen   - hold heparin for reasons above     Renal/  #urinary retention, resolved  #renal infarct   #hypocalcemia, improved    -Daily BMP  -IV fluids: 100ml/hr plasmalyte   -Electrolyte replacement protocol  -bladder scan, consider reyes if retaining   -I's and O's  - see above for anticoagulation   - 2g calcium IV replaced   - ionized calcium wnl       Endo  # Diabetes  -Hgb A1c 5.3%  -POCT glucose       Heme  #C/f thrombotic emergency such as TTP vs CAPS  #Possible ITP  #recent DVT/PE  #hemolytic anemia   - s/p urgent PLEX 7/16  - hold rivaroxaban  - s/p dexamethasone 40mg in ED  - INR/PT 2.05/22.8 , PTT 30 (wnl)  - Daily CBC  - Hgb goal >7, plt goal >50k  - Transfuse to meet Hgb and plt goals  - hematology consulted. Appreciate recommendations      Pending labs:   - 7/15: fibrinogen 151   - 7/15: Ferritin 535  - 7/15: haptoglobin < 10   - 7/15: retic 2.3   - 7/15: lupus anticoagulant positive   - 7/15: [id number] BERKLEY negative  - 7/16: beta2 glycoprotein undetected   - 7/16: cardiolipin panel negative   - 7/16:    - 7/16: blood cultures x2 NGTD   - 7/16: CRP 17.50 -> 9.3   - 7/16: ESR 4   - 7/16: proteinase 3 Ab IgG negative   - 7/16: ANCA IgG by IFA negative   - 7/16: myeloperoxidase negative  "  - 7/16: Jak2 antibody pending   - 7/16: TRINI Anti-IgG-C3d negative   - 7/16: PND panel pending   - 7/16: peripheral smear normochromic, normocytic anemia, increased RBC regeneration, leukocytosis with neutrophilia, marked thrombocytopenia with normal platelet morphology, normal RBC morphology  - 7/16: hepatitis B sAg negative   - 7/16: complement 3 and 4 negative   - 7/16: ghislaine/norma light chain ratio wnl  - 7/16: IgA, IgG, IgM negative   - 7/17: vitamin d level wnl  - 7/16: flow cytometry with polytypic B cells, no aberrant ummnophenotype on T cells, no immunophenotypic evidence of non-Hodgkin lymphoma lymphoid leukiema, or acute leukemia   - 7/18: AFP negative   - 7/18: HcG marker negative   - 7/18: AMY borderline positve   - 7/18: histoplasma negative     ID  #Leukocytosis  - UA 7/16 unremarkable   - CXR 7/16 without signs of infection   -tylenol scheduled   -daily CBC  -follow temp curve  - blood cultures NGTD    Integument/skin  #palpable purpura bilateral lower extremity   - 7/16 LLE skin biopsy with ruptured folliculitis, bacterial vs pityrosporum folliculitis possible, no definite features of cutaneous small vessel vasculitis or thrombotic vasculopathy      ICU Checklist  Lines/tubes/drains: PIV, central line   FEN: Regular diet  PPX: DVT - HOLD heparin gtt ; GI - senna BID, PRN miralax, bisacodyl supp.  Code: Full code, discussed with patient   Dispo: ICU - NCC           # Obesity: Estimated body mass index is 36.37 kg/m  as calculated from the following:    Height as of this encounter: 1.88 m (6' 2\").    Weight as of this encounter: 128.5 kg (283 lb 4.7 oz)., PRESENT ON ADMISSION     # Financial/Environmental Concerns: none           TIME SPENT ON THIS ENCOUNTER  I spent 60 minutes of critical care time on the unit/floor managing the care of Bernard Chapman excluding time performing procedures. Upon evaluation, this patient had a high probability of imminent or life-threatening deterioration due to " significant stroke burden and severe thrombocytopenia, which required my direct attention, intervention, and personal management. Greater than 50% of my time was spent at the bedside counseling the patient and/or coordinating care including chart review, history, exam, documentation, and further activities per this note. I have personally reviewed the following data/imaging over the past 24 hours.     The patient was seen and discussed with the NCC attending, Dr. Matt.    Nilsa Daly DO on 2025 at 3:16 PM     24 Hour Vital Signs Summary:  Temp: 99.3  F (37.4  C) Temp  Min: 98.1  F (36.7  C)  Max: 99.3  F (37.4  C)  Resp: 16 Resp  Min: 12  Max: 31  SpO2: 93 % SpO2  Min: 88 %  Max: 99 %  Pulse: 83 Pulse  Min: 72  Max: 102    No data recorded  BP: 136/75 Systolic (24hrs), Av , Min:103 , Max:145   Diastolic (24hrs), Av, Min:54, Max:91      Respiratory monitoring:   Resp: 16    I/O last 3 completed shifts:  In: 2430.65 [P.O.:480; I.V.:1647.65]  Out: 1550 [Urine:1550]    Current Medications:  Current Facility-Administered Medications   Medication Dose Route Frequency Provider Last Rate Last Admin    rosuvastatin (CRESTOR) tablet 10 mg  10 mg Oral or Feeding Tube Daily Carine Matt MD        senna-docusate (SENOKOT-S/PERICOLACE) 8.6-50 MG per tablet 1-2 tablet  1-2 tablet Oral or Feeding Tube BID Griselda Ha CNP   2 tablet at 25    sodium chloride (PF) 0.9% PF flush 3 mL  3 mL Intracatheter Q8H [facility] Nishi Rodas MD   3 mL at 25    sodium chloride 0.9 % bag for CT scan flush  100 mL As instructed Once Nishi Rodas MD           PRN Medications:  Current Facility-Administered Medications   Medication Dose Route Frequency Provider Last Rate Last Admin    acetaminophen (TYLENOL) tablet 1,000 mg  1,000 mg Oral or Feeding Tube Q8H PRN Zora Boone PA-C   1,000 mg at 25    diphenhydrAMINE (BENADRYL) capsule 25 mg  25 mg Oral or Feeding Tube Q6H PRN  "Carine Matt MD        Or    diphenhydrAMINE (BENADRYL) injection 25 mg  25 mg Intravenous Q6H PRN Carine Matt MD        hydrALAZINE (APRESOLINE) injection 10 mg  10 mg Intravenous Q30 Min PRN Zora Boone PA-C   10 mg at 07/19/25 0728    labetalol (NORMODYNE/TRANDATE) injection 10 mg  10 mg Intravenous Q10 Min PRN Zora Boone PA-C        Medication Instruction - Avoid dextrose in IV solutions   Intravenous Continuous PRN Nishi Rodas MD        ondansetron (ZOFRAN ODT) ODT tab 4 mg  4 mg Oral Q6H PRN Nishi Rodas MD        Or    ondansetron (ZOFRAN) injection 4 mg  4 mg Intravenous Q6H PRN Nishi Rodas MD        polyethylene glycol (MIRALAX) Packet 17 g  17 g Oral or Feeding Tube Daily PRN Carine Matt MD        prochlorperazine (COMPAZINE) injection 10 mg  10 mg Intravenous Q6H PRN Franklin Aguilera MD        Or    prochlorperazine (COMPAZINE) tablet 10 mg  10 mg Oral or Feeding Tube Q6H PRN Franklin Aguilera MD        sodium chloride (PF) 0.9% PF flush 3 mL  3 mL Intracatheter q1 min prn Nishi Rodas MD           Infusions:  Current Facility-Administered Medications   Medication Dose Route Frequency Provider Last Rate Last Admin    [Held by provider] heparin 25,000 units in 0.45% NaCl 250 mL ANTICOAGULANT infusion  0-5,000 Units/hr Intravenous Continuous Griselda Ha CNP   Stopped at 07/19/25 0655    Medication Instruction - Avoid dextrose in IV solutions   Intravenous Continuous PRN Nishi Rodas MD        [Held by provider] norepinephrine (LEVOPHED) 16 mg in  mL infusion MAX CONC CENTRAL LINE  0.01-0.6 mcg/kg/min (Dosing Weight) Intravenous Continuous Nishi Rodas MD   Stopped at 07/19/25 0658       Allergies   Allergen Reactions    Apixaban Rash    Amlodipine Other (See Comments) and Rash       Physical Examination:  Vitals: /75   Pulse 83   Temp 99.3  F (37.4  C) (Axillary)   Resp 16   Ht 1.88 m (6' 2\")   " Wt 128.5 kg (283 lb 4.7 oz)   SpO2 93%   BMI 36.37 kg/m    General: Adult male patient, sitting up in chair, in no apparent distress  HEENT: Normocephalic, atraumatic, no icterus, oral cavity/oropharynx pink and moist, wearing NC  Cardiac: tachy, regular rhythm via monito  Pulm: unlabored, expansion symmetric, no retractions or use of accessory muscles  Abdomen: Soft, non-distended  Extremities: no edema, well perfused  Skin: purpuric rash on BLE        Left leg 7/16         Right leg 7/16        Psych: Calm and cooperative, affect congruent with mood   Neuro:  Mental status: Awake, alert, attentive, oriented to self, time, place, and circumstance. Language is fluent and coherent with intact comprehension of complex commands, naming and repetition.  Cranial nerves: right partial hemianopia, PERRL, conjugate gaze, EOMI, facial sensation intact, mild right facial droop, no uvular deviation, mild-moderate dysarthria.   Motor: RUE drift but not to bed, BLE without drift, 5/5 hip flexion bilaterally   Sensory: Intact to light touch x 4 extremities, extinction to simultaneous touch on right in UE but not in LE   Coordination: FNF and HS without ataxia or dysmetria  Gait: MILAN, deferred.  Reflexes: deferred        [employer] Stroke Scale  Exam Interval: Baseline   Score    Level of consciousness: (0)   Alert, keenly responsive    LOC questions: (0)   Answers both questions correctly    LOC commands: (0)   Performs both tasks correctly    Best gaze: (0)   Normal    Visual: (1)   Partial hemianopia    Facial palsy: (2)   Partial paralysis (total/near total of lower face)    Motor arm (left): (0)   No drift    Motor arm (right): (2)   Some effort against gravity    Motor leg (left): (0)   No drift    Motor leg (right): (1)   Drift    Limb ataxia: (0)   Absent    Sensory: (0)   Normal- no sensory loss    Best language: (1)   Mild to moderate aphasia    Dysarthria: (1)   Mild to moderate dysarthria     Extinction and inattention: (1)   Visual, tacile, auditory, spatial, person inattention        Total Score:  9 (7/19/25 1100)          Labs and Imaging:    All relevant imaging and laboratory values personally reviewed.     CTH 7/19/25      Findings: Subarachnoid hemorrhage in the sulci along the left cerebral  convexity. Evolution of the multifocal, patchy acute to subacute  infarcts in the left middle cerebral artery territory. 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 Ventricles are proportionate to the  cerebral sulci. The basal cisterns are clear.     The bony calvaria and the bones of the skull base are normal. The  visualized portions of the paranasal sinuses and mastoid air cells are  clear.                                                                      Impression:  1. Subarachnoid hemorrhage in the sulci along the left cerebral  convexity.  2. Evolution of the patchy left middle cerebral artery territory  infarcts.  2. 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    MRI brain w and wo     FINDINGS:  New areas of diffusion restriction noted in the left anterior temporal  lobe and left caudate nucleus and putamen as compared to previous MRI  dated 7/15/2025, however these correlate with the hypodensities seen  on CT dated 7/19/2025 suggestive of acute to subacute infarcts  and  appear stable with other unchanged areas of diffusion restriction  noted in the left middle cerebral artery territory in the cortex and  subcortical white matter.  FLAIR hyperintensity noted in the sulci overlying the left cerebral  convexity with blooming on corresponding SWI images consistent with  known subarachnoid hemorrhage..   Hyperintensity in left M2 MCA segmental branches remains unchanged.  Corpus callosum dysgenesis remains unchanged.     There is no mass effect or midline shift, . The ventricles are  proportionate to the cerebral sulci.   No suspicious abnormality of the skull marrow signal. Clear  paranasal  sinuses. Mastoid air cells are clear. No suspicious abnormality of the  pituitary gland, sella, skull base and upper cervical spinal  structures on sagittal images. Orbits are within normal limits.                                                                      IMPRESSION:  1. Increased areas of diffusion restriction with corresponding ADC  dropout in the left MCA territory cortical and subcortical regions  including the left basal ganglia structures when compared to the prior  MRI, but likely similar to the hypodensities seen on the recent CT.  Areas of restricted diffusion with corresponding ADC dropout in the  right cerebral hemisphere appear unchanged as compared to the previous  MRI dated 07/15/2025.  2. FLAIR hyperintensity with corresponding blooming on SWI left  cerebral convexity sulci consistent with known subarachnoid  hemorrhage.

## 2025-07-19 NOTE — PHARMACY-ADMISSION MEDICATION HISTORY
Pharmacy Intern Admission Medication History    Admission medication history is complete. The information provided in this note is only as accurate as the sources available at the time of the update.    Information Source(s): Patient, Family member, and CareEverywhere/SureScripts via in-person    Pertinent Information:   I went over the patient's home medications with him and his parents.   All the vitamins and blood pressure meds are currently on hold since the a week from the day he came to the hospital.  Dulaglutide injection is injected every Wednesday, and the last dose was on the Wednesday of the week before he came to the hospital, which was 7/9.  Both patient and his parents are not sure about valacyclovir. [facility], it is stated to take as needed for cold sores.   For FISH-OIL (docosahexaenoic acid/epa), patient explained he is not taking it because he experienced a bad reaction like ibuprofen.   Patient confirmed he still has gabapentin, oxycodone and hydrocortisone and they are used as needed.   For rivaroxaban starter pack, patient explained that he started with 20 mg and he gets to the first month now.     Changes made to PTA medication list:  Added:   Gabapentin capsule  Hydrocortisone rectal cream  Oxycodone tablet   Deleted: None  Changed:   Updated Sig or Strength for the following medications based on the Current Outpatient Medications section from the [facility] Note on 07/14:  Aluminum chloride (DRYSOL) solution  Ascorbic Acid (Vitamin C)  B Complex Vitamins  Cyclobenzaprine (FLEXERIL) tablet  Flonase nasal spray  Multiple Vitamin PO  Valacyclovir    Allergies reviewed with patient and updates made in EHR: yes    Medication History Completed By: Kasey Marroquin 7/19/2025 2:45 PM    PTA Med List   Medication Sig Last Dose/Taking    aluminum chloride (DRYSOL) 20 % external solution Apply 35 mLs topically nightly as needed. Taking As Needed    Ascorbic Acid (VITAMIN C) 500 MG  CHEW Take 1 chew tab by mouth daily. 7/8/2025    B Complex Vitamins (VITAMIN B-COMPLEX PO) Take 1 capsule by mouth daily. 7/8/2025    gabapentin (NEURONTIN) 100 MG capsule Take 100 mg by mouth 3 times daily as needed for other (back and leg pain flare ups). Taking As Needed    hydrocortisone 2.5 % cream Place rectally 2 times daily as needed. Taking As Needed    oxyCODONE (ROXICODONE) 5 MG tablet Take 5 mg by mouth every 6 hours as needed for pain. Taking As Needed    valACYclovir (VALTREX) 1000 mg tablet Take 500 mg by mouth daily as needed. Taking As Needed    cyclobenzaprine (FLEXERIL) 10 MG tablet Take 10 mg by mouth 3 times daily as needed for muscle spasms. Taking As Needed    Dulaglutide 4.5 MG/0.5ML SOAJ Inject 4.5 mg subcutaneously once a week. 7/9/2025    fluticasone (FLONASE) 50 MCG/ACT nasal spray Spray 1 spray into both nostrils daily. 7/15/2025    hydrochlorothiazide (HYDRODIURIL) 25 MG tablet Take 25 mg by mouth daily. 7/8/2025    losartan (COZAAR) 100 MG tablet Take 100 mg by mouth daily. 7/8/2025    metoprolol succinate ER (TOPROL XL) 25 MG 24 hr tablet Take 25 mg by mouth. 7/8/2025    Multiple Vitamin (MULTIVITAMIN PO) Take 1 tablet by mouth daily. 7/8/2025    Rivaroxaban ANTICOAGULANT 15 & 20 MG TBPK Starter Therapy Pack Take as directed on package. 7/15/2025

## 2025-07-19 NOTE — CARE PLAN
Major Shift Events:  Stroke code called at 0412 due to neuro changes of right sided drift, right sided weakness and droop, MD called to the bedside, CT was completed. Levo was started at 0613 to keep SBP > 160.  At 0632 patient complained of a severe headache and was whitley. MD notified. 2nd CT completed. Levo and hep gtt stopped.   Tracey placed at 0710.   Patient was sent to MRI  Plan: Serial neuro checks, close vitals monitoring, NPO until cleared by speech.   For vital signs and complete assessments, please see documentation flowsheets.

## 2025-07-19 NOTE — PLAN OF CARE
N: MRI completed. Follow up CT completed. Tylenol scheduled q4 - headache once this morning, improved. Pupils equal and reactive. Right field cut. Right facial droop. RUE weakness. Unable to planter/dorsi right foot and slightly more weak gross. Right side decreased sensation.   C: -160, nicardipine on this morning. Plt 35 -2U ordered. Pulses palpable. Afebrile.   R: RA. Lungs clear.   GI: SLP cleared for soft and bite sized diet, thin liquids. Passing gas. Mirilax added.   : Voiding large volumes - sodium and osmo sent.   SKIN: Derm at bedside to assess.   ACCESS: RIJ trialysis, PIV  GTTS: Heparin  ACTIVITY: Refused PT/ OOB   SOCIAL: Family at bedside    Plan: 2u plt to be given. XA check due at 2230. Monitor closely.

## 2025-07-19 NOTE — PROGRESS NOTES
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 Admission to neuroICU for close neurologic monitoring and plan for urgent PLEX.  Stroke code called 0430 due to new dense right hemiplegia, aphasia, and increase right facial droop. Clinical swallow eval completed per MD order.   General Observations Pt alert, w/ flat affect, but willing to participate. Pt's family present.   Type of Evaluation   Type of Evaluation Swallow Evaluation   Oral Motor   Oral Musculature anomalies present   Structural Abnormalities none present   Mucosal Quality good   Dentition (Oral Motor)   Dentition (Oral Motor) natural dentition;adequate dentition   Facial Symmetry (Oral Motor)   Facial Symmetry (Oral Motor) right side impairment   Right Side Facial Asymmetry moderate impairment   Lip Function (Oral Motor)   Lip Range of Motion (Oral Motor) retraction impairment   Retraction, Lip Range of Motion right side;moderate impairment   Tongue Function (Oral Motor)   Tongue ROM (Oral Motor) WNL   Jaw Function (Oral Motor)   Jaw Function (Oral Motor) WNL   Cough/Swallow/Gag Reflex (Oral Motor)   Volitional Throat Clear/Cough (Oral Motor) WNL   Vocal Quality/Secretion Management (Oral Motor)   Vocal Quality (Oral Motor) WFL   Secretion Management (Oral Motor) WNL   General Swallowing  Observations   Current Diet/Method of Nutritional Intake (General Swallowing Observations, NIS) NPO   Past History of Dysphagia Pt was tolerating a regular diet and thin liquids and working w/ SLP on language. Stroke code called overnight, pt w/ new facial droop and worseing dysarthria.   Swallowing Evaluation Clinical swallow evaluation   Clinical Swallow Evaluation   Feeding Assistance frequent cues/help required   Clinical Swallow Evaluation Textures Trialed thin liquids;pureed;soft & bite-sized;solid foods   Clinical Swallow Eval: Thin Liquid Texture Trial   Mode of Presentation, Thin Liquids cup;straw;fed by clinician   Volume of Liquid or Food Presented 4 oz   Oral Phase of Swallow   (anterior loss of the bolus)   Pharyngeal Phase of Swallow repeated swallows   Diagnostic Statement No overt s/sx of aspiration   Clinical Swallow Evaluation: Puree Solid Texture Trial   Mode of Presentation, Puree spoon;self-fed   Volume of Puree Presented 4 tbsp   Oral Phase, Puree WFL   Pharyngeal Phase, Puree intact   Diagnostic Statement No overt s/sx of aspiration   Clinical Swallow Eval: Soft & Bite Sized   Mode of Presentation spoon;fed by clinician   Volume Presented 2 tbsp   Oral Phase   (Prolonged mastication)   Oral Residue mid posterior tongue   Pharyngeal Phase intact   Diagnostic Statement No overt s/sx of aspiration   Clinical Swallow Evaluation: Solid Food Texture Trial   Mode of Presentation self-fed   Volume Presented 1 cracker   Oral Phase   (prolonged mastication)   Oral Residue mid posterior tongue   Pharyngeal Phase intact   Diagnostic Statement No overt s/sx of aspiration   Esophageal Phase of Swallow   Patient reports or presents with symptoms of esophageal dysphagia No   Swallowing Recommendations   Diet Consistency Recommendations soft & bite-sized (level 6);thin liquids (level 0)   Supervision Level for Intake close supervision needed   Mode of Delivery Recommendations bolus size, small;slow rate of  intake   Swallowing Maneuver Recommendations alternate food and liquid intake   Medication Administration Recommendations, Swallowing (SLP) As tolerated   Instrumental Assessment Recommendations instrumental evaluation not recommended at this time   General Therapy Interventions   Planned Therapy Interventions Dysphagia Treatment   Dysphagia treatment Oropharyngeal exercise training;Modified diet education;Instruction of safe swallow strategies;Compensatory strategies for swallowing   Clinical Impression   Criteria for Skilled Therapeutic Interventions Met (SLP Eval) Yes, treatment indicated   SLP Diagnosis Mild oral stage dysphagia in setting of new R sided facial weakness   Risks & Benefits of therapy have been explained evaluation/treatment results reviewed;care plan/treatment goals reviewed;risks/benefits reviewed;current/potential barriers reviewed;participants voiced agreement with care plan;participants included;patient   Clinical Impression Comments Clinical swallow eval completed per MD order. Pt presents w/ mild oral stage dysphagia in setting of new R sided facial weakness. Pt assessed w/ thin liquids, puree, semisolids, and solids. Oral phase c/b anterior loss of the bolus from the R. Mild-mod oral residue w/ solids. Pharyngeal phase unremarkable for overt s/sx of aspiration, however, noted double swallows w/ liquids. Recommend soft and bite sized diet (IDDSI 6) and thin liquids w/ close supervision. Meds OK as tolerated. Please encourage pt to perform finger sweep b/w bites and sips. SLP to follow for dysphagia management and language therapy.   SLP Total Evaluation Time   Eval: oral/pharyngeal swallow function, clinical swallow Minutes (86990) 15   SLP Discharge Planning   SLP Plan Diet tolerance/upgrade, train finger sweep, dysarthria strategies; high level word-finding; train word-finding strategies; oral reading & written comprehension tasks   SLP Discharge Recommendation Acute Rehab  Center-Motivated patient will benefit from intensive, interdisciplinary therapy.  Anticipate will be able to tolerate 3 hours of therapy per day   SLP Rationale for DC Rec dysphagia, aphasia   SLP Brief overview of current status  Recommend soft and bite sized diet (IDDSI 6) and thin liquids w/ close supervision. Meds OK as tolerated. Patient presents with mild expressive aphasia & mild dysarthria. SLP will continue to follow.   SLP Time and Intention   Total Session Time (sum of timed and untimed services) 19

## 2025-07-19 NOTE — PROGRESS NOTES
Brief dermatology inpatient progress note:    I went to the bedside to perform a skin exam of the patient given possibly non-diagnostic results from a prior biopsy of his lower leg. This exam was largely unchanged from his initial exam. As such, there is not a new lesion to biopsy. Dermatology will continue to follow peripherally.     Marito Camarena MD  Staff: Dr. العراقي     Staff Physician Comments:  I discussed and evaluated the patient with the resident and I agree with the assessment and plan as documented in the resident's note.    Daquan العراقي MD  Professor  Head of Dermato-Allergy Division  Department of Dermatology  [facility]

## 2025-07-19 NOTE — PROGRESS NOTES
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 He is s/p PLEX x1 on 7/16.      Malignancy- etiology not yet determined by pathology- currently known to be in multiple enlarged mediastinal and hilar lymph nodes, potential lymphoma verses metastatic p  Acute thrombocytopenia  Acute anemia, potential hemolytic, TRINI negative, PNH negative, [id number] normal with no schistocytes  Acute bilateral PE with Left popliteal thrombosis. Ddx 6/23/25  Multifocal acute ischemic strokes in L MCA, R parietal lobe, R frontal centrum semiovale. Ddx 7/15/25  Diffuse deposition of thrombus on aortic and tricuspid valves  subarachnoid hemorrhage in right frontoparietal region.     Biopsy yesterday with pathology (JIMENEZ) on sit confirming malignancy. LIkely not lymphoma- however formal evaluation of specimens is on-going.   Overnight patient had deterioration of his neurologic status. Was found to have a new subarachnoid hemorrhage along with further embolization in new locations. Currently has anticoagulation on HOLD    Discussed with patient and family that he has cancer, which is the likely unifying diagnosis for his bleeding and clotting. We will need information from pathology to determine what type, prognosis, and treatment options. This will likely NOT come over the weekend and more likely will be early to mid week.   For this bleeding and clotting, he has known  "L femoral vein and vavular deposition of his thrombosis along with MCA, Would not like to hold anticoagulation long. If >12 h would entertain IVC filter. Discussed with neurocritical care team and will rescan and if not progressing will restart and low intensity no bolus. Goal platelet level of >50.     T EE and echo both had thrombus on aortic and tricuspid valves.  Blood cultures have been negative to date.  At this juncture endocarditis secondary to an infectious complication is lower on differential.  Patient also did not have a PFO.      Recommendations:   - continue to transfuse platelet up to 50k   - given biopsy has been obtain- if needed steroids are an option- however will continue to defer at present  - Pending  Jak2 molecular testing  - follow CBC, reticulocyte, and LDH daily         We will continue to follow this patient. Please don't hesitate to contact the Fellow On-Call with questions.    Kerry Borjas MD/PhD   of Medicine  Division of Hematology, Oncology and Transplantation    I spent 60 minutes in the care of this patient today, which included review of chart, vitals, medications, obtaining history, ordering medications/tests/procedures as medically indicated, review of pertinent medical literature, counseling of the patient and/or family, communication of recommendations to the care team, and documentation time.     ___________________________________________________________________    Subjective & Interval History:    O/N patient had deterioration of his neurologic exam prompting STAT imaging which revealed subarchnoid hemorrhage. In room this AM patient is tired, continues to have word finding difficulty. Parents are with him.       Physical Exam:    BP (!) 145/79 (BP Location: Right arm)   Pulse 101   Temp 98.8  F (37.1  C) (Oral)   Resp 20   Ht 1.88 m (6' 2\")   Wt 128.5 kg (283 lb 4.7 oz)   SpO2 93%   BMI 36.37 kg/m    Gen: Sleepy  CV: Normal rate, regular " rhythm. No m/r/g  Pulm: CTAB, no wheezing, normal work of breathing  Abd: Soft, nt/nd, no rebound/guarding  Ext: Warm and well perfused. No lower extremity edema  Skin: No rash, cyanosis or petechial lesion    Labs & Studies: I personally reviewed the following studies:  ROUTINE LABS (Last four results):  CMP  Recent Labs   Lab 07/19/25  0327 07/18/25  1611 07/18/25  0759 07/18/25  0403 07/17/25 2000 07/17/25  0423 07/17/25  0028 07/16/25  0414 07/16/25  0259 07/15/25  2349 07/15/25  1202 07/15/25  1158     --   --  143  --  140  --  141  --   --   --  139   POTASSIUM 3.9  --   --  3.7  --  3.9  --  4.7  --   --   --  4.2   CHLORIDE 106  --   --  107  --  104  --  105  --   --   --  101   CO2 25  --   --  26  --  26  --  17*  --   --   --  25   ANIONGAP 9  --   --  10  --  10  --  19*  --   --   --  13   * 98 121* 112*   < > 139*   < > 147*   < >  --    < > 123*   BUN 14.8  --   --  15.1  --  15.6  --  18.1  --   --   --  18.5   CR 1.02  --   --  1.11  --  0.91  --  0.98  --   --   --  1.06   GFRESTIMATED >90  --   --  89  --  >90  --  >90  --   --   --  >90   SHARAN 8.5*  --   --  8.1*  --  8.7*  --  9.5  --   --   --  9.5   MAG 1.9  --   --  2.0  --  2.1  --   --   --  2.0  --  2.0   PHOS 3.4  --   --  3.3  --  3.5  --  2.3*  --   --   --   --    PROTTOTAL  --   --   --   --   --   --   --   --   --  6.5  --  8.1   ALBUMIN  --   --   --   --   --   --   --   --   --  3.9  --  4.8   BILITOTAL  --   --   --   --   --   --   --   --   --  0.9  --  1.0   ALKPHOS  --   --   --   --   --   --   --   --   --  58  --  73   AST  --   --   --   --   --   --   --   --   --  24  --  27   ALT  --   --   --   --   --   --   --   --   --  25  --  37    < > = values in this interval not displayed.     CBC  Recent Labs   Lab 07/19/25  0327 07/18/25  1804 07/18/25  0403 07/17/25  2046 07/17/25  1730 07/17/25  1333 07/17/25  0423   WBC 14.1*  --  13.5*  --  14.0*  --  14.5*   RBC 3.66*  --  3.83*  --  3.99*  --  4.23*    HGB 9.8*  --  10.2*  --  10.7*  --  11.3*   HCT 30.2*  --  31.2*  --  32.4*  --  33.8*   MCV 83 83 82 82 81   < > 80   MCH 26.8  --  26.6  --  26.8  --  26.7   MCHC 32.5  --  32.7  --  33.0  --  33.4   RDW 13.4  --  13.4  --  13.2  --  13.1   PLT 53* 56* 57* 62* 44*   < > 38*    < > = values in this interval not displayed.     INR  Recent Labs   Lab 07/19/25  0327 07/18/25  0403 07/17/25  0423 07/15/25  2349   INR 1.46* 1.50* 1.71* 2.05*     Results for orders placed or performed during the hospital encounter of 07/15/25   Head CT w/o contrast    Impression    IMPRESSION:  1.  No evidence of acute intracranial abnormality.  2.  Callosal dysgenesis.     MR Brain w/o & w Contrast    Impression    IMPRESSION:  1.  Numerous multifocal acute cortical and white matter left cerebral hemisphere infarcts predominantly involving the left MCA territory with a few acute infarcts in the right cerebral hemisphere, as described. No significant mass effect.  2.  Trace acute subarachnoid hemorrhage versus slow flow or thrombosis of a cortical vessel along the parasagittal left frontal lobe, the latter favored. No confluent intraparenchymal hematoma.      CTA Head Neck with Contrast    Impression    IMPRESSION:   NECK CTA:  1.  Patent neck vasculature.    HEAD CTA:   1.  Near occlusion of the left MCA-M1 segment (8 mm length) with distal opacification throughout the M2 segments.    Findings discussed with Dr. Pond by Dr. Owens at 8:25 PM 7/15/2025.   CTV Head with Contrast    Impression    IMPRESSION:   1.  No intracranial venous thrombosis.   XR Chest Port 1 View    Impression    Impression:   Right IJ CVC with tip at the cavoatrial junction. No focal airspace  opacity.    I have personally reviewed the examination and initial interpretation  and I agree with the findings.    KOSTAS HARRELL DO         SYSTEM ID:  [id number]   CT Chest/Abdomen/Pelvis w Contrast    Impression    IMPRESSION:     1. Multiple enlarged mediastinal  and hilar lymph nodes with necrotic  changes in the paraesophageal lymph nodes. Leading consideration is  neoplastic etiology, lymphoproliferative versus metastasis. Recommend  correlation with tissue sampling.    2. Spiculated-appearing nodule within the right lung apex measuring up  to 11 mm with central lucency. Adjacent peribronchovascular opacities  surrounding the subsegmental bronchi inferior to this lesion. Findings  are nonspecific, but given history could potentially represent a  region of small pulmonary infarction. Other scattered subcentimeter  groundglass nodules are probably infective/inflammatory in etiology.  Recommend comparison with any prior imaging (none available in the  system) and attention on short interval follow-up.    3. Mild splenomegaly.    4. 11 mm soft tissue density nodule in the left retroperitoneum is  indeterminate, metastasis is not excluded.    5. Small wedge-shaped hypodensity at the periphery of the spleen and  few scattered wedge-shaped areas in both kidneys likely represent  infarcts.    6. Scattered areas of filling defect in bilateral lower lobe segmental  pulmonary arteries are indeterminate for flow artifacts versus emboli  on this nondedicated exam. Consider CT PE for further evaluation.  Additionally, focal filling defect in the left external iliac vein is  indeterminate, consider dedicated Doppler for further evaluation.        I have personally reviewed the examination and initial interpretation  and I agree with the findings.    WARNER ROBLES MD         SYSTEM ID:  [id number]   CTA  Angiogram Heart    Impression    IMPRESSION:  1.  Non-diagnostic evaluation for left atrial appendage thrombus.   2.  Please review the separate Radiology report for incidental  noncardiac findings.    FINDINGS:      1.  Non-diagnostic evaluation for left atrial appendage thrombus.   2.  The left atrial appendage has a  chicken wing morphology.  3.   Coronary images are non-diagnostic  for stenosis/atherosclerosis  due to cardiac motion artifact. No coronary artery calcifications  seen.   4.   The visualized aortic root, ascending aorta, and descending  thoracic aorta are normal in caliber.    ABDOUL GARCIA MD         SYSTEM ID:  [id number]   Radiologist Consult For Cardiology    Impression    IMPRESSION:  1. Incidental pulmonary emboli bilaterally without obvious right heart  strain.  2. Bulky right hilar and subcarinal lymphadenopathy.    NATALIIA ALCAZAR MD         SYSTEM ID:  [id number]   CT Head w/o Contrast    Impression    Impression:   Noncontrast head CT: Patchy hypodensities in the left frontal,  parietal, temporal and occipital lobes, which correlate with the  diffusion restricting infarcts seen on MR brain from 7/15/2025. No  hemorrhage. No midline shift.    CTA head and neck: Similar to slightly increased occlusion of a 7 mm  segment of incompletely opacified distal M1 branch of the left middle  cerebral artery. The remaining major intracranial arterial vasculature  is patent without significant stenosis.    CT PERFUSION: There is hypoperfusion demonstrated diffusely in the  left middle cerebral artery territory.     I have personally reviewed the examination and initial interpretation  and I agree with the findings.    ANUJ YOO MD         SYSTEM ID:  [id number]   CT Chest Pulmonary Embolism w Contrast    Impression    IMPRESSION:  1. Exam is positive for pulmonary embolism, unchanged from 7/16/2025.  Multiple segmental pulmonary emboli as above. There is bowing of the  interatrial septum and mild enlargement of the pulmonary artery,  consistent with mild right heart strain.     2. Dependent subsegmental lower lobe atelectasis with engorgement of  the pulmonary vasculature & interlobular septal thickening may  reflect a degree of superimposed pulmonary edema.     SYSTEM ID:  [id number]   CTA Head Neck with Contrast    Impression    Impression:   Noncontrast head CT: Patchy  hypodensities in the left frontal,  parietal, temporal and occipital lobes, which correlate with the  diffusion restricting infarcts seen on MR brain from 7/15/2025. No  hemorrhage. No midline shift.    CTA head and neck: Similar to slightly increased occlusion of a 7 mm  segment of incompletely opacified distal M1 branch of the left middle  cerebral artery. The remaining major intracranial arterial vasculature  is patent without significant stenosis.    CT PERFUSION: There is hypoperfusion demonstrated diffusely in the  left middle cerebral artery territory.     I have personally reviewed the examination and initial interpretation  and I agree with the findings.    ANUJ YOO MD         SYSTEM ID:  [id number]   CT Head Perfusion w Contrast    Impression    Impression:   Noncontrast head CT: Patchy hypodensities in the left frontal,  parietal, temporal and occipital lobes, which correlate with the  diffusion restricting infarcts seen on MR brain from 7/15/2025. No  hemorrhage. No midline shift.    CTA head and neck: Similar to slightly increased occlusion of a 7 mm  segment of incompletely opacified distal M1 branch of the left middle  cerebral artery. The remaining major intracranial arterial vasculature  is patent without significant stenosis.    CT PERFUSION: There is hypoperfusion demonstrated diffusely in the  left middle cerebral artery territory.     I have personally reviewed the examination and initial interpretation  and I agree with the findings.    ANUJ YOO MD         SYSTEM ID:  [id number]   CT Head w/o Contrast    Impression    IMPRESSION: Continued demonstration of left MCA territory scattered  infarctions, in a patient with hypogenesis of the corpus callosum. The  right-sided lesions seen on the DWI of the 7/15/2025 MRI are not well  visualized on the CT scan. However, one small lesion in the right  parietal lobe exhibits some increased density, especially on the  coronal view. This could  represent focal cortical laminar necrosis, or  perhaps less likely, a small hemorrhage. If clinically indicated,  follow up exam might help differentiate this. Susceptibility-weighted  MR imaging would likely resolve this question. Either way, this is a  small focus.    MARIAM SALGUERO MD         SYSTEM ID:  [id number]   CT Head w/o Contrast    Impression    IMPRESSION:   HEAD CT:  1.  New small acute to subacute infarct left lentiform nucleus.  2.  Tiny acute to subacute infarct right caudate head appears new when compared to the prior MRI probably not significantly changed when compared to the prior CT.  3.  Scattered subacute infarcts left middle cerebral artery territory probably not significant changed.  4.  Few scattered small acute to subacute infarcts in the right cerebral hemisphere seen on the MRI are not readily apparent by CT.  5.  No acute intracranial hemorrhage.  6.  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    HEAD CTA:  1.  Stable subtotal occlusion mid to distal M1 segment left middle cerebral artery.  2.  New focal high-grade stenosis posterior parietal branch left middle cerebral artery near the M2/M3 junction.    NECK CTA:  1.  No hemodynamically significant stenosis in the neck vessels.  2.  No evidence for dissection.  3.  New bilateral pleural effusions and patchy infiltrates in both upper lobes and superior segments of the lower lobes.  4.  Mediastinal and right hilar adenopathy was incompletely imaged on the prior study and concerning for metastatic disease or lymphoma.    CT PERFUSION:  1.  No evidence of core infarct by perfusion imaging.  2.  Small focal area of increased Tmax greater than 6 seconds in the left middle cerebral artery territory in the region of the left corona radiata/centrum semiovale concerning for at risk brain.  3.  If the threshold for the Tmax is decreased to greater than 4 seconds, there is a much larger of area of oligemia in the left middle  cerebral artery territory as well as a small area in the anterior right frontal lobe.    4.  Findings discussed with Dr. Rodas at 5:41 AM CST on 07/19/2025.     CTA Head Neck w Contrast    Impression    IMPRESSION:   HEAD CT:  1.  New small acute to subacute infarct left lentiform nucleus.  2.  Tiny acute to subacute infarct right caudate head appears new when compared to the prior MRI probably not significantly changed when compared to the prior CT.  3.  Scattered subacute infarcts left middle cerebral artery territory probably not significant changed.  4.  Few scattered small acute to subacute infarcts in the right cerebral hemisphere seen on the MRI are not readily apparent by CT.  5.  No acute intracranial hemorrhage.  6.  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    HEAD CTA:  1.  Stable subtotal occlusion mid to distal M1 segment left middle cerebral artery.  2.  New focal high-grade stenosis posterior parietal branch left middle cerebral artery near the M2/M3 junction.    NECK CTA:  1.  No hemodynamically significant stenosis in the neck vessels.  2.  No evidence for dissection.  3.  New bilateral pleural effusions and patchy infiltrates in both upper lobes and superior segments of the lower lobes.  4.  Mediastinal and right hilar adenopathy was incompletely imaged on the prior study and concerning for metastatic disease or lymphoma.    CT PERFUSION:  1.  No evidence of core infarct by perfusion imaging.  2.  Small focal area of increased Tmax greater than 6 seconds in the left middle cerebral artery territory in the region of the left corona radiata/centrum semiovale concerning for at risk brain.  3.  If the threshold for the Tmax is decreased to greater than 4 seconds, there is a much larger of area of oligemia in the left middle cerebral artery territory as well as a small area in the anterior right frontal lobe.    4.  Findings discussed with Dr. Rodas at 5:41 AM CST on  07/19/2025.     CT Head Perfusion w Contrast    Impression    IMPRESSION:   HEAD CT:  1.  New small acute to subacute infarct left lentiform nucleus.  2.  Tiny acute to subacute infarct right caudate head appears new when compared to the prior MRI probably not significantly changed when compared to the prior CT.  3.  Scattered subacute infarcts left middle cerebral artery territory probably not significant changed.  4.  Few scattered small acute to subacute infarcts in the right cerebral hemisphere seen on the MRI are not readily apparent by CT.  5.  No acute intracranial hemorrhage.  6.  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    HEAD CTA:  1.  Stable subtotal occlusion mid to distal M1 segment left middle cerebral artery.  2.  New focal high-grade stenosis posterior parietal branch left middle cerebral artery near the M2/M3 junction.    NECK CTA:  1.  No hemodynamically significant stenosis in the neck vessels.  2.  No evidence for dissection.  3.  New bilateral pleural effusions and patchy infiltrates in both upper lobes and superior segments of the lower lobes.  4.  Mediastinal and right hilar adenopathy was incompletely imaged on the prior study and concerning for metastatic disease or lymphoma.    CT PERFUSION:  1.  No evidence of core infarct by perfusion imaging.  2.  Small focal area of increased Tmax greater than 6 seconds in the left middle cerebral artery territory in the region of the left corona radiata/centrum semiovale concerning for at risk brain.  3.  If the threshold for the Tmax is decreased to greater than 4 seconds, there is a much larger of area of oligemia in the left middle cerebral artery territory as well as a small area in the anterior right frontal lobe.    4.  Findings discussed with Dr. Rodas at 5:41 AM CST on 07/19/2025.     CT Head w/o Contrast   Result Value Ref Range    Radiologist flags Subarachnoid hemorrhage (Urgent)     Impression    Impression:  1.  Subarachnoid hemorrhage in the sulci along the left cerebral  convexity.  2. Evolution of the patchy left middle cerebral artery territory  infarcts.  2. 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    [Urgent Result: Subarachnoid hemorrhage]    Finding was identified on 7/19/2025 7:07 AM.     Dr. Rodas was contacted by Dr. Hidalgo at 7/19/2025 7:08 AM and  verbalized understanding of the urgent finding.          I have personally reviewed the examination and initial interpretation  and I agree with the findings.    ANUJ YOO MD         SYSTEM ID:  [id number]   Echocardiogram Complete w Bubble study - For age 60 yrs or less   Result Value Ref Range    LVEF  60-65%    Transesophageal Echocardiogram   Result Value Ref Range    LVEF  60-65%        Medications list for reference:  Current Facility-Administered Medications   Medication Dose Route Frequency Provider Last Rate Last Admin    acetaminophen (TYLENOL) tablet 650 mg  650 mg Oral Q4H Yamile Gutierrez APRN CNP   650 mg at 07/19/25 1019    Or    acetaminophen (TYLENOL) Suppository 650 mg  650 mg Rectal Q4H Yamile Gutierrez APRN CNP        bisacodyl (DULCOLAX) suppository 10 mg  10 mg Rectal Daily PRN Yamile Gutierrez APRN CNP        clevidipine (CLEVIPREX) 0.5 mg/mL infusion 100 mL  0-16 mg/hr Intravenous Continuous Nilsa Daly DO        diphenhydrAMINE (BENADRYL) capsule 25 mg  25 mg Oral or Feeding Tube Q6H PRN Carine Matt MD        Or    diphenhydrAMINE (BENADRYL) injection 25 mg  25 mg Intravenous Q6H PRN Carine Matt MD        hydrALAZINE (APRESOLINE) injection 10 mg  10 mg Intravenous Q30 Min PRN Zora Boone PA-C   10 mg at 07/19/25 0801    labetalol (NORMODYNE/TRANDATE) injection 10 mg  10 mg Intravenous Q10 Min PRN Zora Boone PA-C        Medication Instruction - Avoid dextrose in IV solutions   Intravenous Continuous PRN Nishi Rodas MD        ondansetron (ZOFRAN ODT) ODT tab 4 mg  4 mg Oral Q6H PRN Nishi Rodas  MD Mayda        Or    ondansetron (ZOFRAN) injection 4 mg  4 mg Intravenous Q6H PRN Nishi Rodas MD        polyethylene glycol (MIRALAX) Packet 17 g  17 g Oral or Feeding Tube BID Yamile Gutierrez APRN CNP   17 g at 07/19/25 1019    prochlorperazine (COMPAZINE) injection 10 mg  10 mg Intravenous Q6H PRN Franklin Aguilera MD        Or    prochlorperazine (COMPAZINE) tablet 10 mg  10 mg Oral or Feeding Tube Q6H PRN Franklin Aguilera MD        rosuvastatin (CRESTOR) tablet 10 mg  10 mg Oral or Feeding Tube Daily Carine Matt MD   10 mg at 07/19/25 1018    senna-docusate (SENOKOT-S/PERICOLACE) 8.6-50 MG per tablet 1-2 tablet  1-2 tablet Oral or Feeding Tube BID Griselda Ha CNP   1 tablet at 07/19/25 1018    sodium chloride (PF) 0.9% PF flush 3 mL  3 mL Intracatheter Q8H MOHINDER Nishi Rodas MD   3 mL at 07/19/25 0603    sodium chloride (PF) 0.9% PF flush 3 mL  3 mL Intracatheter q1 min prn Nishi Rodas MD        sodium chloride 0.9 % bag for CT scan flush  100 mL As instructed Once Nishi Rodas MD

## 2025-07-19 NOTE — CONSULTS
"STROKE CODE.  See LDA for details. For additional needs place \"Consult for Inpatient Vascular Access Care\"  [id number] order in EPIC.  "

## 2025-07-19 NOTE — CONSULTS
"[facility]     Stroke Code Note          History of Present Illness     Chief Complaint: Altered Mental Status (Patient was at his MD \"anticoagulation clinic\" MD concerned that patient might have a brain bleed. Mother stated that \" this Sunday evening has been having altered mental status. Seeing sparklers L eye, like black gray spots\")      Bernard Chapman is a 35 year old male with a past medical history of hypertension, diabetes, possible ITP, sleep apnea, PE and DVT on rivaroxaban admitted on 7/15/2025 for acute ischemic stroke in left MCA territory, near occlusion of left M1, and initial concern for thrombotic emergency such as TTP or catastrophic antiphospholipid syndrome (in setting of severe thrombocytopenia and acute cerebral infarcts). Admission to neuroICU for close neurologic monitoring and plan for urgent PLEX.  On further evaluation, there is concern that the patient may have malignancy, lower concern for TTP or catastrophic antiphospholipid antibody syndrome based on laboratory results.    I was notified by staff that the patient had changes in his neurologic examination around 4:20 AM, and I immediately went and assessed the patient and noted that he had significant right arm weakness and right facial droop and requested that a stroke code be called.  His last known well was 2 AM, when he was last assessed by nursing staff and noted to have baseline exam, his NIHSS has been fluctuating between 4-7 for at least the last 48 hours.  His blood pressure was 122/77.  His heparin was stopped.  Patient was sitting up but was laid flat.    On further evaluation, the patient had right arm weakness, right leg weakness, worsening facial droop, dysarthria, and significant speech reduction, although the patient was able to name, repeat, and follow commands.  Additionally the patient had complete hemianopia on the right.  The patient said that he had no " headache.         Past Medical History     Stroke risk factors: Suspected malignancy, known left M1 partial occlusion    Preadmission antithrombotic regimen: Heparin infusion    Modified Jordana Score (Pre-morbid)  1-No significant disability despite symptoms                   Assessment and Plan       1.  Left M1 partial occlusion  2. Worsening right arm, leg weakness, dysarthria, aphasia, hemianopia   Bernard Chapman is a 35 year old male with a past medical history of hypertension, diabetes, possible ITP, sleep apnea, PE and DVT on rivaroxaban admitted on 7/15/2025 for acute ischemic stroke in left MCA territory, near occlusion of left M1.  The patient has been on heparin infusion and his exam is been stable.  He had an acute change in his neurologic examination on 7/19 prompting stroke code.  CT head did not show evidence of hemorrhage.  CTA showed near total occlusion of left M1, but still has distal reconstitution, there is new focal high-grade stenosis of posterior parietal branch of left MCA near M2/M3 junction.  CT perfusion did not show any evidence of a core infarct.  There was a small focal area of increased Tmax in the left MCA distribution.    Patient's case was discussed with neurointerventional team, and did not feel that thrombectomy was indicated at this time as risk would likely outweigh the benefit.    Will plan for further evaluation with stat MRI brain, and maintain blood pressures between 120-160.    Shortly after stroke it was de-escalated, and heparin was restarted the patient had an acute onset headache, but neurologic examination was stable.  Given his sudden onset headache that is 10 out of 10, will repeat head CT.    Intravenous Thrombolysis  Not given due to:   - on heparin  - platelet count < 100,000     Endovascular Treatment  Proximal vessel occlusion present, but endovascular treatment not initiated due to subtotal occlusion, risks outweigh benefits     Plan:  - MRI brain wwo  contrast STAT  - -160, will work keep closer to 160  - levophed to maintain BP close to 160  - s/p 1L NS bolus  - head of bed flat  - neurochecks q1hr  - heparin held then restarted then held again due to sudden onset headache  ___________________________________________________________________    The patient was discussed with stroke attending, Dr. Zheng, and neurocritical critical care attending, Dr. Matt.    MEGAN THOMPSON MD  Neurology Resident, PGY-3  Pager:  [phone]  ___________________________________________________________________        Imaging/Labs   (personally reviewed)    CT Head, CTA H/N, CT Head Perfusion  IMPRESSION:   HEAD CT:  1.  New small acute to subacute infarct left lentiform nucleus.  2.  Tiny acute to subacute infarct right caudate head appears new when compared to the prior MRI probably not significantly changed when compared to the prior CT.  3.  Scattered subacute infarcts left middle cerebral artery territory probably not significant changed.  4.  Few scattered small acute to subacute infarcts in the right cerebral hemisphere seen on the MRI are not readily apparent by CT.  5.  No acute intracranial hemorrhage.  6.  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     HEAD CTA:  1.  Stable subtotal occlusion mid to distal M1 segment left middle cerebral artery.  2.  New focal high-grade stenosis posterior parietal branch left middle cerebral artery near the M2/M3 junction.     NECK CTA:  1.  No hemodynamically significant stenosis in the neck vessels.  2.  No evidence for dissection.  3.  New bilateral pleural effusions and patchy infiltrates in both upper lobes and superior segments of the lower lobes.  4.  Mediastinal and right hilar adenopathy was incompletely imaged on the prior study and concerning for metastatic disease or lymphoma.     CT PERFUSION:  1.  No evidence of core infarct by perfusion imaging.  2.  Small focal area of increased Tmax greater than 6  seconds in the left middle cerebral artery territory in the region of the left corona radiata/centrum semiovale concerning for at risk brain.  3.  If the threshold for the Tmax is decreased to greater than 4 seconds, there is a much larger of area of oligemia in the left middle cerebral artery territory as well as a small area in the anterior right frontal lobe.         Physical Examination     BP: 122/77   Pulse: 85   Resp: 14   Temp: 99.3  F (37.4  C)   Temp src: Axillary   SpO2: 93 %   O2 Device: Oxymask   Weight: 128.5 kg (283 lb 4.7 oz)    Wt Readings from Last 2 Encounters:   07/18/25 128.5 kg (283 lb 4.7 oz)   07/15/25 113.3 kg (249 lb 12.8 oz)       General Exam  General:  patient lying in bed without any acute distress    HEENT:  normocephalic/atraumatic  Cardio:  RRR  Pulmonary:  no respiratory distress  Abdomen:  nondistended  Extremities:  no edema  Skin:  bruising present     Neuro Exam  Mental Status: Not alert but able to be aroused by stimulation, oriented x 3, speech is dysarthric but fluent, but has significant reduction in speech, naming and repetition intact, follows commands   Cranial Nerves:  PERRL, complete right hemianopia, visual extinction on right on double simultaneous stimulation, extraocular movements intact, facial sensation reduced on the right, right-sided facial droop, tongue is midline   Motor: No abnormal movements, no word against gravity in right arm, drift but not to bed in right leg, no drift in left arm or leg   Reflexes: No ankle clonus bilaterally  Sensory: Reduced sensation to light touch on right arm and leg compared to left, extinction on right on bilateral double simultaneous stimulation  Coordination: Unable to assess on right arm/leg due to weakness, no ataxia on left FNF or HS  Station/Gait:  deferred        Stroke Scales       NIHSS  1a. Level of Consciousness 1-->Not alert, but arousable by minor stimulation to obey, answer, or respond   1b. LOC Questions  0-->Answers both questions correctly   1c. LOC Commands 0-->Performs both tasks correctly   2.   Best Gaze 0-->Normal   3.   Visual 2-->Complete hemianopia   4.   Facial Palsy 2-->Partial paralysis (total or near-total paralysis of lower face)   5a. Motor Arm, Left 0-->No drift, limb holds 90 (or 45) degrees for full 10 secs   5b. Motor Arm, Right 3-->No effort against gravity, limb falls   6a. Motor Leg, Left 0-->No drift, leg holds 30 degree position for full 5 secs   6b. Motor Leg, right 1-->Drift, leg falls by the end of the 5-sec period but does not hit bed   7.   Limb Ataxia 0-->Absent   8.   Sensory 1-->Mild-to-moderate sensory loss, patient feels pinprick is less sharp or is dull on the affected side, or there is a loss of superficial pain with pinprick, but patient is aware of being touched   9.   Best Language 1-->Mild-to-moderate aphasia, some obvious loss of fluency or facility of comprehension, without significant limitation on ideas expressed or form of expression. Reduction of speech and/or comprehension, however, makes conversation. . . (see row details)   10. Dysarthria 1-->Mild-to-moderate dysarthria, patient slurs at least some words and, at worst, can be understood with some difficulty   11. Extinction and Inattention  2-->Profound melvina-inattention/extinction more than 1 modality   Total 14 (07/19/25 1935)            Labs     CBC  Lab Results   Component Value Date    HGB 9.8 (L) 07/19/2025    HCT 30.2 (L) 07/19/2025    WBC 14.1 (H) 07/19/2025    PLT 53 (L) 07/19/2025       BMP  Lab Results   Component Value Date     07/19/2025    POTASSIUM 3.9 07/19/2025    CHLORIDE 106 07/19/2025    CO2 25 07/19/2025    BUN 14.8 07/19/2025    CR 1.02 07/19/2025     (H) 07/19/2025    SHARAN 8.5 (L) 07/19/2025       INR  INR   Date Value Ref Range Status   07/19/2025 1.46 (H) 0.85 - 1.15 Final   07/18/2025 1.50 (H) 0.85 - 1.15 Final   07/17/2025 1.71 (H) 0.85 - 1.15 Final       Data   Stroke Code Data   "(for stroke code without tele)  Stroke code activated 07/19/25  0431   First stroke provider response 07/19/25  0431   Last known normal 07/19/25  0200   Time of discovery (or onset of symptoms) 07/19/25  0415   Head CT read by Stroke Neuro Provider 07/19/25  0501   Was stroke code de-escalated? Yes  07/19/25  0601        Clinically Significant Risk Factors                # Coagulation Defect: INR = 1.46 (Ref range: 0.85 - 1.15) and/or PTT = 71 Seconds (Ref range: 22 - 38 Seconds), will monitor for bleeding  # Thrombocytopenia: Lowest platelets = 44 in last 2 days, will monitor for bleeding              # Obesity: Estimated body mass index is 36.37 kg/m  as calculated from the following:    Height as of this encounter: 1.88 m (6' 2\").    Weight as of this encounter: 128.5 kg (283 lb 4.7 oz)., PRESENT ON ADMISSION       # Financial/Environmental Concerns: none          Time Spent on this Encounter     "

## 2025-07-20 ENCOUNTER — APPOINTMENT (OUTPATIENT)
Dept: PHYSICAL THERAPY | Facility: CLINIC | Age: 35
DRG: 064 | End: 2025-07-20
Payer: COMMERCIAL

## 2025-07-20 ENCOUNTER — APPOINTMENT (OUTPATIENT)
Dept: SPEECH THERAPY | Facility: CLINIC | Age: 35
DRG: 064 | End: 2025-07-20
Payer: COMMERCIAL

## 2025-07-20 ENCOUNTER — APPOINTMENT (OUTPATIENT)
Dept: GENERAL RADIOLOGY | Facility: CLINIC | Age: 35
End: 2025-07-20
Attending: NURSE PRACTITIONER
Payer: COMMERCIAL

## 2025-07-20 LAB
ALBUMIN SERPL BCG-MCNC: 3.5 G/DL (ref 3.5–5.2)
ALBUMIN SERPL BCG-MCNC: 3.6 G/DL (ref 3.5–5.2)
ALP SERPL-CCNC: 55 U/L (ref 40–150)
ALP SERPL-CCNC: 56 U/L (ref 40–150)
ALT SERPL W P-5'-P-CCNC: 25 U/L (ref 0–70)
ALT SERPL W P-5'-P-CCNC: 29 U/L (ref 0–70)
ANION GAP SERPL CALCULATED.3IONS-SCNC: 10 MMOL/L (ref 7–15)
APTT PPP: 42 SECONDS (ref 22–38)
AST SERPL W P-5'-P-CCNC: 25 U/L (ref 0–45)
AST SERPL W P-5'-P-CCNC: 34 U/L (ref 0–45)
BILIRUB SERPL-MCNC: 1 MG/DL
BILIRUB SERPL-MCNC: 1.1 MG/DL
BILIRUBIN DIRECT (ROCHE PRO & PURE): 0.44 MG/DL (ref 0–0.45)
BILIRUBIN DIRECT (ROCHE PRO & PURE): 0.48 MG/DL (ref 0–0.45)
BUN SERPL-MCNC: 15.2 MG/DL (ref 6–20)
CA-I BLD-MCNC: 4.4 MG/DL (ref 4.4–5.2)
CALCIUM SERPL-MCNC: 8.4 MG/DL (ref 8.8–10.4)
CHLORIDE SERPL-SCNC: 108 MMOL/L (ref 98–107)
CREAT SERPL-MCNC: 0.96 MG/DL (ref 0.67–1.17)
EGFRCR SERPLBLD CKD-EPI 2021: >90 ML/MIN/1.73M2
ERYTHROCYTE [DISTWIDTH] IN BLOOD BY AUTOMATED COUNT: 13.3 % (ref 10–15)
FIBRINOGEN PPP-MCNC: 374 MG/DL (ref 170–510)
GLUCOSE BLDC GLUCOMTR-MCNC: 137 MG/DL (ref 70–99)
GLUCOSE BLDC GLUCOMTR-MCNC: 162 MG/DL (ref 70–99)
GLUCOSE SERPL-MCNC: 104 MG/DL (ref 70–99)
HCO3 SERPL-SCNC: 22 MMOL/L (ref 22–29)
HCT VFR BLD AUTO: 30.3 % (ref 40–53)
HGB BLD-MCNC: 9.9 G/DL (ref 13.3–17.7)
INR PPP: 1.48 (ref 0.85–1.15)
LDH SERPL L TO P-CCNC: 590 U/L (ref 0–250)
MAGNESIUM SERPL-MCNC: 2.1 MG/DL (ref 1.7–2.3)
MCH RBC QN AUTO: 26.8 PG (ref 26.5–33)
MCHC RBC AUTO-ENTMCNC: 32.7 G/DL (ref 31.5–36.5)
MCV RBC AUTO: 80 FL (ref 78–100)
MCV RBC AUTO: 81 FL (ref 78–100)
MCV RBC AUTO: 82 FL (ref 78–100)
PHOSPHATE SERPL-MCNC: 3.2 MG/DL (ref 2.5–4.5)
PLATELET # BLD AUTO: 64 10E3/UL (ref 150–450)
PLATELET # BLD AUTO: 72 10E3/UL (ref 150–450)
PLATELET # BLD AUTO: 76 10E3/UL (ref 150–450)
POTASSIUM SERPL-SCNC: 3.9 MMOL/L (ref 3.4–5.3)
PROT SERPL-MCNC: 5.8 G/DL (ref 6.4–8.3)
PROT SERPL-MCNC: 5.8 G/DL (ref 6.4–8.3)
PROTHROMBIN TIME: 17.8 SECONDS (ref 11.8–14.8)
RBC # BLD AUTO: 3.69 10E6/UL (ref 4.4–5.9)
RETICS # AUTO: 0.14 10E6/UL (ref 0.03–0.1)
RETICS/RBC NFR AUTO: 3.6 % (ref 0.5–2)
SODIUM SERPL-SCNC: 140 MMOL/L (ref 135–145)
T PALLIDUM AB SER QL AGGL: NON REACTIVE
UFH PPP CHRO-ACNC: 0.13 IU/ML (ref ?–1.1)
UFH PPP CHRO-ACNC: 0.19 IU/ML (ref ?–1.1)
UFH PPP CHRO-ACNC: <0.1 IU/ML (ref ?–1.1)
UFH PPP CHRO-ACNC: <0.1 IU/ML (ref ?–1.1)
URATE SERPL-MCNC: 6 MG/DL (ref 3.4–7)
URATE SERPL-MCNC: 6.1 MG/DL (ref 3.4–7)
WBC # BLD AUTO: 11.9 10E3/UL (ref 4–11)

## 2025-07-20 PROCEDURE — 84100 ASSAY OF PHOSPHORUS: CPT | Performed by: NURSE PRACTITIONER

## 2025-07-20 PROCEDURE — 250N000013 HC RX MED GY IP 250 OP 250 PS 637: Performed by: NURSE PRACTITIONER

## 2025-07-20 PROCEDURE — 80048 BASIC METABOLIC PNL TOTAL CA: CPT | Performed by: NURSE PRACTITIONER

## 2025-07-20 PROCEDURE — 92526 ORAL FUNCTION THERAPY: CPT | Mod: GN

## 2025-07-20 PROCEDURE — 85049 AUTOMATED PLATELET COUNT: CPT | Performed by: NURSE PRACTITIONER

## 2025-07-20 PROCEDURE — 83615 LACTATE (LD) (LDH) ENZYME: CPT | Performed by: NURSE PRACTITIONER

## 2025-07-20 PROCEDURE — 85520 HEPARIN ASSAY: CPT | Performed by: PSYCHIATRY & NEUROLOGY

## 2025-07-20 PROCEDURE — 250N000013 HC RX MED GY IP 250 OP 250 PS 637: Performed by: PSYCHIATRY & NEUROLOGY

## 2025-07-20 PROCEDURE — 97116 GAIT TRAINING THERAPY: CPT | Mod: GP

## 2025-07-20 PROCEDURE — 85730 THROMBOPLASTIN TIME PARTIAL: CPT | Performed by: NURSE PRACTITIONER

## 2025-07-20 PROCEDURE — 200N000002 HC R&B ICU UMMC

## 2025-07-20 PROCEDURE — 85610 PROTHROMBIN TIME: CPT | Performed by: NURSE PRACTITIONER

## 2025-07-20 PROCEDURE — 92507 TX SP LANG VOICE COMM INDIV: CPT | Mod: GN

## 2025-07-20 PROCEDURE — 99291 CRITICAL CARE FIRST HOUR: CPT | Mod: FS | Performed by: NURSE PRACTITIONER

## 2025-07-20 PROCEDURE — 85384 FIBRINOGEN ACTIVITY: CPT | Performed by: INTERNAL MEDICINE

## 2025-07-20 PROCEDURE — 97530 THERAPEUTIC ACTIVITIES: CPT | Mod: GP

## 2025-07-20 PROCEDURE — 99233 SBSQ HOSP IP/OBS HIGH 50: CPT | Performed by: INTERNAL MEDICINE

## 2025-07-20 PROCEDURE — 85049 AUTOMATED PLATELET COUNT: CPT

## 2025-07-20 PROCEDURE — 85045 AUTOMATED RETICULOCYTE COUNT: CPT | Performed by: INTERNAL MEDICINE

## 2025-07-20 PROCEDURE — 250N000012 HC RX MED GY IP 250 OP 636 PS 637: Performed by: NURSE PRACTITIONER

## 2025-07-20 PROCEDURE — 97161 PT EVAL LOW COMPLEX 20 MIN: CPT | Mod: GP

## 2025-07-20 PROCEDURE — 82248 BILIRUBIN DIRECT: CPT | Performed by: INTERNAL MEDICINE

## 2025-07-20 PROCEDURE — 71045 X-RAY EXAM CHEST 1 VIEW: CPT | Mod: 26 | Performed by: RADIOLOGY

## 2025-07-20 PROCEDURE — 71045 X-RAY EXAM CHEST 1 VIEW: CPT

## 2025-07-20 PROCEDURE — 85014 HEMATOCRIT: CPT | Performed by: NURSE PRACTITIONER

## 2025-07-20 PROCEDURE — 82330 ASSAY OF CALCIUM: CPT | Performed by: NURSE PRACTITIONER

## 2025-07-20 PROCEDURE — 99292 CRITICAL CARE ADDL 30 MIN: CPT | Mod: FS | Performed by: NURSE PRACTITIONER

## 2025-07-20 PROCEDURE — 250N000011 HC RX IP 250 OP 636

## 2025-07-20 PROCEDURE — 250N000011 HC RX IP 250 OP 636: Performed by: NURSE PRACTITIONER

## 2025-07-20 PROCEDURE — 999N000127 HC STATISTIC PERIPHERAL IV START W US GUIDANCE

## 2025-07-20 PROCEDURE — 84550 ASSAY OF BLOOD/URIC ACID: CPT | Performed by: INTERNAL MEDICINE

## 2025-07-20 PROCEDURE — 83735 ASSAY OF MAGNESIUM: CPT | Performed by: NURSE PRACTITIONER

## 2025-07-20 RX ORDER — DEXAMETHASONE 4 MG/1
40 TABLET ORAL DAILY
Status: DISCONTINUED | OUTPATIENT
Start: 2025-07-20 | End: 2025-07-20

## 2025-07-20 RX ORDER — POLYETHYLENE GLYCOL 3350 17 G/17G
17 POWDER, FOR SOLUTION ORAL 3 TIMES DAILY
Status: DISCONTINUED | OUTPATIENT
Start: 2025-07-20 | End: 2025-07-21

## 2025-07-20 RX ORDER — DEXTROSE MONOHYDRATE 25 G/50ML
25-50 INJECTION, SOLUTION INTRAVENOUS
Status: DISCONTINUED | OUTPATIENT
Start: 2025-07-20 | End: 2025-07-21

## 2025-07-20 RX ORDER — NICOTINE POLACRILEX 4 MG
15-30 LOZENGE BUCCAL
Status: DISCONTINUED | OUTPATIENT
Start: 2025-07-20 | End: 2025-07-21

## 2025-07-20 RX ORDER — ACETAMINOPHEN 650 MG/1
650 SUPPOSITORY RECTAL EVERY 4 HOURS
Status: DISCONTINUED | OUTPATIENT
Start: 2025-07-20 | End: 2025-07-21

## 2025-07-20 RX ORDER — CALCIUM GLUCONATE 20 MG/ML
1 INJECTION, SOLUTION INTRAVENOUS ONCE
Status: COMPLETED | OUTPATIENT
Start: 2025-07-20 | End: 2025-07-20

## 2025-07-20 RX ORDER — DEXAMETHASONE 4 MG/1
40 TABLET ORAL DAILY
Status: DISCONTINUED | OUTPATIENT
Start: 2025-07-21 | End: 2025-07-22

## 2025-07-20 RX ORDER — BISACODYL 10 MG
10 SUPPOSITORY, RECTAL RECTAL ONCE
Status: COMPLETED | OUTPATIENT
Start: 2025-07-20 | End: 2025-07-20

## 2025-07-20 RX ORDER — ACETAMINOPHEN 325 MG/1
650 TABLET ORAL EVERY 4 HOURS
Status: DISCONTINUED | OUTPATIENT
Start: 2025-07-20 | End: 2025-07-21

## 2025-07-20 RX ADMIN — HEPARIN SODIUM 1950 UNITS/HR: 10000 INJECTION, SOLUTION INTRAVENOUS at 17:22

## 2025-07-20 RX ADMIN — CALCIUM GLUCONATE 1 G: 20 INJECTION, SOLUTION INTRAVENOUS at 13:07

## 2025-07-20 RX ADMIN — ACETAMINOPHEN 650 MG: 325 TABLET ORAL at 06:02

## 2025-07-20 RX ADMIN — ACETAMINOPHEN 650 MG: 325 TABLET ORAL at 01:57

## 2025-07-20 RX ADMIN — ACETAMINOPHEN 650 MG: 325 TABLET ORAL at 13:08

## 2025-07-20 RX ADMIN — ACETAMINOPHEN 650 MG: 325 TABLET ORAL at 09:17

## 2025-07-20 RX ADMIN — DEXAMETHASONE 40 MG: 4 TABLET ORAL at 13:14

## 2025-07-20 RX ADMIN — HEPARIN SODIUM 1500 UNITS/HR: 10000 INJECTION, SOLUTION INTRAVENOUS at 05:03

## 2025-07-20 RX ADMIN — SENNOSIDES AND DOCUSATE SODIUM 2 TABLET: 50; 8.6 TABLET ORAL at 19:51

## 2025-07-20 RX ADMIN — SENNOSIDES AND DOCUSATE SODIUM 2 TABLET: 50; 8.6 TABLET ORAL at 09:16

## 2025-07-20 RX ADMIN — ROSUVASTATIN CALCIUM 10 MG: 10 TABLET, FILM COATED ORAL at 09:16

## 2025-07-20 RX ADMIN — ACETAMINOPHEN 650 MG: 325 TABLET ORAL at 17:23

## 2025-07-20 RX ADMIN — ACETAMINOPHEN 650 MG: 325 TABLET ORAL at 21:37

## 2025-07-20 ASSESSMENT — ACTIVITIES OF DAILY LIVING (ADL)
ADLS_ACUITY_SCORE: 63
ADLS_ACUITY_SCORE: 63
ADLS_ACUITY_SCORE: 62
ADLS_ACUITY_SCORE: 62
ADLS_ACUITY_SCORE: 63
ADLS_ACUITY_SCORE: 63
ADLS_ACUITY_SCORE: 69
ADLS_ACUITY_SCORE: 63
ADLS_ACUITY_SCORE: 69
ADLS_ACUITY_SCORE: 63
ADLS_ACUITY_SCORE: 63
ADLS_ACUITY_SCORE: 62
ADLS_ACUITY_SCORE: 62
ADLS_ACUITY_SCORE: 63
ADLS_ACUITY_SCORE: 65
ADLS_ACUITY_SCORE: 63
ADLS_ACUITY_SCORE: 62
ADLS_ACUITY_SCORE: 65
ADLS_ACUITY_SCORE: 63
ADLS_ACUITY_SCORE: 63
ADLS_ACUITY_SCORE: 65

## 2025-07-20 NOTE — PLAN OF CARE
N: Neuro exam unchanged. Pupils equal and reactive. Right field cut. Right facial droop. RUE weakness. Unable to planter/dorsi right foot and slightly more weak gross. Denies headache. Decadron ordered.   C: NSR/ST, no ectopy noted. SBP < 160 without intervention. Pulses palpable. Afebrile. Plt > 50  R: RA. Lungs clear. Using IS up to 1200. No productive cough.   GI: SLP advanced to regular diet. Tolerating well. Sliding scale insulin. Bowel regimen escalated today. Large BM X1.   : Voiding per urinal. 1g calcium given.   SKIN: Rash to scalp and lower extremities.   ACCESS: PIV X2  GTTS: Heparin, not therapeutic. XA check at midnight  ACTIVITY: Up to chair/ ambulating in hallway with assist 2 and GB   SOCIAL: mom and zia at bedside     PLAN: CT when heparin therapeutic.

## 2025-07-20 NOTE — PROGRESS NOTES
Neurocritical Care Progress Note    Reason for critical care admission: Acute stroke, severe thrombocytopenia  Admitting Team: Neurocritical Care   Date of Service:  07/20/2025  Date of Admission:  7/15/2025  Hospital Day: 6    Assessment/Plan  Bernard Chapman is a 35 year old male with a past medical history of hypertension, diabetes, possible ITP, sleep apnea, PE and DVT on rivaroxaban.    Admitted on 7/15/2025 for acute ischemic stroke in left MCA territory, near occlusion of left M1, and concern for thrombotic emergency such as TTP or catastrophic antiphospholipid syndrome (in setting of severe thrombocytopenia and acute cerebral infarcts).     24 hour events:  -chest XR ordered  -repeat head CT once Xa therapeutic  -calcium gluconate 1gm  -Decadron 40mg oral daily per hematology recs  -Continue to await pathology reports  -Encouraged pt to be up in chair, lights on, as much as possible    Neuro  #Multifocal acute ischemic strokes, in L MCA territory  #Subtotal occlusive thrombus in left M1   #Left parietal SAH ISO heparin drip   #ICU delirium  Etiology of stroke remains unclear, though malignancy high on the differential given multiple findings on CT CAP, h/o hypercoagulability, and 3 territory CVA on head imaging. Mobile masses on tricuspid and aortic valves on TTE 7/16, thought to be thrombus  - Head CT once antiXa is therapeutic  - Neurochecks every 2 hrs  - SBP goal < 120-160 mmHg, DBP < 100 via A line   - HOB > 30   - PT/OT/SLP  - , Triglycerides - 202,A1c 5.3%  - Continue Crestor 10 mg once daily   - Delirium precautions    #Analgesics & sedation  - Tylenol 650 Q4H    - PRN oxycodone 4 mg Q6H    CV  #Hypertension  #Likely cardiac valve thrombus on tricuspid and aortic valve 7/16 on TIMO, although endocarditis on the differential  #Risk of CHF with new TV and AV regurgitation  - 7/17 TIMO with diffuse investment of the aortic and tricuspid valves with thrombus, moderate aortic and tricuspid  regurgitation   -Cardiac monitoring  -SBP goal < 120 - 160 mmHg  -DBP goal < 100  -BP measurements via a line   -PRN Labetalol and Hydralazine  -hold PTA metoprolol, hydrochlorothiazide, losartan  -Crestor 10 mg daily   -Heparin gtt   -Repeat echo in the future for reassessment of valve function     Workup thus far:   - 7/16: cardiac CTA non diagnostic for left atrial appendage thrombus and stenosis/athero  - 7/16: TTE with mobile echodensities on tricuspid valve and aortic valve, EF 60-65%  - 7/17: TIMO diffuse investment of the aortic and tricuspid valves with thrombus, moderate aortic and tricuspid regurgitation; no MEGAN clot   - BC 7/16 and 7/17 negative    Resp  #ELENA on CPAP  Oxygen/vent: room air  -Continuous pulse ox  -Maintain O2 saturations greater than 92%  -CPAP at night ordered    #Right lung nodule  #Multiple enlarged mediastinal, paraesophageal, and hilar lymph nodes cf paraneoplastic vs lymphoproliferative vs malignancy   - CT CAP 7/16 with spiculated-appearing nodule in the right lung apex    - 7/18: EBUS and TBNA of mediastinal lymph nodes, path pending     #Pulmonary embolism  - 7/17 CT PE protocol positive for PE unchanged from 7/16/25   -Heparin drip    GI  #Obesity  #Constipation  Diet:soft and bite size  Working with SLP  Last BM: 7/20  GI prophylaxis: not indicated  -Bowel regimen: PRN senna-docusate, Miralax, and bisacodyl suppository     #splenic infarction  - CT CAP 7/16 showed wedge-shaped hypodensity at the periphery of the spleen     Renal/G  #Renal infarct   #Hypocalcemia, improved    -Daily BMP  -Electrolyte replacement protocol  -I's and O's  -ical 4.4, gave 1 gm calcium gluconate    Endo  # Diabetes  -Hgb A1c 5.3%  -Started steroid 7/20, initiated glucose checks and SSI    Heme  #Acute thrombocytopenia  #Acute anemia, potential hemolytic, TRINI negative, PNH negative, [id number] normal with no schistocytes  #Concern for thrombotic thrombocytopenic purpura (TTP) versus catastrophic  "antiphospholipid syndrome (CAPS)   - s/p urgent PLEX x1 7/16  - hold rivaroxaban  - dexamethasone 40mg daily started 7/20 per hematology for ITP-like picture  - Daily CBC, coags, platelet Q12  - Hgb goal >7, plt goal >50k  - Transfuse to meet Hgb and plt goals  - hematology consulted. Appreciate recommendations    ID  #Leukocytosis  -daily CBC  -follow temp curve, afebrile  -blood cultures NGTD    Integument/skin  #Folliculitis  - 7/16 LLE skin biopsy with ruptured folliculitis, bacterial vs pityrosporum folliculitis possible, no definite features of cutaneous small vessel vasculitis or thrombotic vasculopathy      ICU Checklist  Lines/tubes/drains: PIV x2  FEN: Regular diet  PPX: DVT - heparin gtt ; GI NA  Code: Full code  Dispo: ICU - NCC     # Obesity: Estimated body mass index is 36.37 kg/m  as calculated from the following:    Height as of this encounter: 1.88 m (6' 2\").    Weight as of this encounter: 128.5 kg (283 lb 4.7 oz).      # Financial/Environmental Concerns: none         TIME SPENT ON THIS ENCOUNTER  I spent 40 minutes of critical care time on the unit/floor managing the care of Bernard Chapman excluding time performing procedures. Upon evaluation, this patient had a high probability of imminent or life-threatening deterioration due to significant stroke burden and severe thrombocytopenia, which required my direct attention, intervention, and personal management. Greater than 50% of my time was spent at the bedside counseling the patient and/or coordinating care including chart review, history, exam, documentation, and further activities per this note. I have personally reviewed the following data/imaging over the past 24 hours.     The patient was seen and discussed with the NCC attending, Dr. Matt.    Yamile Gutierrez, NEO CNP    24 Hour Vital Signs Summary:  Temp: 99  F (37.2  C) Temp  Min: 97.3  F (36.3  C)  Max: 99  F (37.2  C)  Resp: 25 Resp  Min: 16  Max: 32  SpO2: 99 % SpO2  Min: 93 %  Max: " 100 %  Pulse: 75 Pulse  Min: 74  Max: 115    No data recorded    No data recorded.  No data recorded.      I/O last 3 completed shifts:  In: 1518.84 [P.O.:730; I.V.:325.84]  Out: 4330 [Urine:4330]    Current Medications:  Current Facility-Administered Medications   Medication Dose Route Frequency Provider Last Rate Last Admin    acetaminophen (TYLENOL) tablet 650 mg  650 mg Oral Q4H Yamile Gutierrez APRN CNP   650 mg at 07/20/25 0602    Or    acetaminophen (TYLENOL) Suppository 650 mg  650 mg Rectal Q4H Yamile Gutierrez APRN CNP        bisacodyl (DULCOLAX) suppository 10 mg  10 mg Rectal Once Yamile Gutierrez APRN CNP        magnesium hydroxide (MILK OF MAGNESIA) suspension 30 mL  30 mL Oral Daily Yamile Gutierrez APRN CNP        polyethylene glycol (MIRALAX) Packet 17 g  17 g Oral or Feeding Tube TID Yamile Gutierrez APRN CNP        rosuvastatin (CRESTOR) tablet 10 mg  10 mg Oral or Feeding Tube Daily Carine Matt MD   10 mg at 07/19/25 1018    senna-docusate (SENOKOT-S/PERICOLACE) 8.6-50 MG per tablet 1-2 tablet  1-2 tablet Oral or Feeding Tube BID Griselda Ha CNP   1 tablet at 07/19/25 1959    sodium chloride (PF) 0.9% PF flush 3 mL  3 mL Intracatheter Q8H MOHINDER Nishi Rodas MD   3 mL at 07/19/25 2148    sodium chloride 0.9 % bag for CT scan flush  100 mL As instructed Once Nishi Rodas MD           PRN Medications:  Current Facility-Administered Medications   Medication Dose Route Frequency Provider Last Rate Last Admin    bisacodyl (DULCOLAX) suppository 10 mg  10 mg Rectal Daily PRN Yamile Gutierrez APRN CNP        diphenhydrAMINE (BENADRYL) capsule 25 mg  25 mg Oral or Feeding Tube Q6H PRN Carine Matt MD        Or    diphenhydrAMINE (BENADRYL) injection 25 mg  25 mg Intravenous Q6H PRN Carine Matt MD        hydrALAZINE (APRESOLINE) injection 10 mg  10 mg Intravenous Q30 Min PRN Zora Boone PA-C   10 mg at 07/19/25 0801    labetalol (NORMODYNE/TRANDATE) injection 10 mg  " 10 mg Intravenous Q10 Min PRN Zora Boone PA-C        Medication Instruction - Avoid dextrose in IV solutions   Intravenous Continuous PRN Nishi Rodas MD        ondansetron (ZOFRAN ODT) ODT tab 4 mg  4 mg Oral Q6H PRN Nishi Rodas MD        Or    ondansetron (ZOFRAN) injection 4 mg  4 mg Intravenous Q6H PRN Nishi Rodas MD        prochlorperazine (COMPAZINE) injection 10 mg  10 mg Intravenous Q6H PRN Franklin Aguilera MD        Or    prochlorperazine (COMPAZINE) tablet 10 mg  10 mg Oral or Feeding Tube Q6H PRN Franklin Aguilera MD        sodium chloride (PF) 0.9% PF flush 3 mL  3 mL Intracatheter q1 min prn Nishi Rodas MD           Infusions:  Current Facility-Administered Medications   Medication Dose Route Frequency Provider Last Rate Last Admin    clevidipine (CLEVIPREX) 0.5 mg/mL infusion 100 mL  0-16 mg/hr Intravenous Continuous Daly, Nilsa, DO        heparin 25,000 units in 0.45% NaCl 250 mL ANTICOAGULANT infusion  0-5,000 Units/hr Intravenous Continuous Daly, Nilsa, DO 18 mL/hr at 07/20/25 0600 1,800 Units/hr at 07/20/25 0600    Medication Instruction - Avoid dextrose in IV solutions   Intravenous Continuous PRN Nishi Rodas MD           Allergies   Allergen Reactions    Apixaban Rash    Amlodipine Other (See Comments) and Rash       Physical Examination:  Vitals: BP (!) 145/79 (BP Location: Right arm)   Pulse 75   Temp 99  F (37.2  C) (Axillary)   Resp 25   Ht 1.88 m (6' 2\")   Wt 128.5 kg (283 lb 4.7 oz)   SpO2 99%   BMI 36.37 kg/m    General: Adult male patient, sitting up in chair, in no apparent distress  HEENT: Normocephalic, atraumatic, no icterus, oral cavity/oropharynx pink and moist  Cardiac: regular rhythm via monitor  Pulm: unlabored, expansion symmetric, no retractions or use of accessory muscles  Abdomen: Soft, non-distended  Extremities: no edema, well perfused  Skin: purpuric-like rash on BLE  Psych: Calm and cooperative, " affect congruent with mood   Neuro:  Mental status: Awake, alert, attentive, oriented to self, time, place, and circumstance. Language is fluent, mild dysarthria, and coherent with intact comprehension of complex commands, naming and repetition.  Cranial nerves: right partial hemianopia, PERRL, conjugate gaze, EOMI, facial sensation intact, mild right facial droop, mild dysarthria. Head tilted to the right at rest  Motor: RUE lifts against gravity, right shoulder shrug weaker than left, unable to activate wrist or move fingers. BLE without drift, 5/5 hip flexion bilaterally although right is slightly weaker than left  Sensory: Intact to light touch x 4 extremities, extinction to simultaneous touch resolved  Coordination: FNF and HS without ataxia or dysmetria  Gait: MILAN, deferred.  Reflexes: deferred    [employer] Stroke Scale  Exam Interval: Baseline   Score    Level of consciousness: (0)   Alert, keenly responsive    LOC questions: (0)   Answers both questions correctly    LOC commands: (0)   Performs both tasks correctly    Best gaze: (0)   Normal    Visual: (1)   Partial hemianopia    Facial palsy: (2)   Partial paralysis (total/near total of lower face)    Motor arm (left): (0)   No drift    Motor arm (right): (2)   Some effort against gravity    Motor leg (left): (0)   No drift    Motor leg (right): (1)   Drift    Limb ataxia: (0)   Absent    Sensory: (0)   Normal- no sensory loss    Best language: (1)   Mild to moderate aphasia    Dysarthria: (1)   Mild to moderate dysarthria    Extinction and inattention: (1)   Visual, tacile, auditory, spatial, person inattention        Total Score:  9 (7/19/25 1100)        Labs and Imaging:    All relevant imaging and laboratory values personally reviewed.

## 2025-07-20 NOTE — PROGRESS NOTES
Brief dermatology inpatient progress note:     I was able to further discuss his skin biopsy result with staff and we would recommend chlorhexidine wash when washing or cleaning his skin. Dermatology will sign off.      Marito Camarena MD  Staff: Dr. العراقي

## 2025-07-20 NOTE — PROGRESS NOTES
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 He is s/p PLEX x1 on 7/16.      Malignancy- etiology not yet determined by pathology- currently known to be in multiple enlarged mediastinal and hilar lymph nodes, potential lymphoma verses metastatic p  Acute thrombocytopenia  Acute anemia, potential hemolytic, TRINI negative, PNH negative, [id number] normal with no schistocytes  Acute bilateral PE with Left popliteal thrombosis. Ddx 6/23/25  Multifocal acute ischemic strokes in L MCA, R parietal lobe, R frontal centrum semiovale. Ddx 7/15/25  Diffuse deposition of thrombus on aortic and tricuspid valves  subarachnoid hemorrhage in right frontoparietal region.     Biopsy Friday 7/18 with pathology (JIMENEZ) on sit confirming malignancy. LIkely not lymphoma- however formal evaluation of specimens is on-going.     Continues to need platelet transfusion. Labs with normal fibrinogen and rising LDH. Uric acid normal.   Given status will empirically start steroids for ITP-like picture.     Recommendations:   - continue to transfuse platelet up to 50k   - START dexamethasone 40 mg PO daily x4 day  - please check uric acid, phosphorus, magnesium, fibrinogen, LFTs daily     We will continue to follow this patient. Please don't hesitate to contact the Fellow On-Call with questions.    Kerry Borjas MD/PhD   of Medicine  Division of  "Hematology, Oncology and Transplantation    I spent 60 minutes in the care of this patient today, which included review of chart, vitals, medications, obtaining history, ordering medications/tests/procedures as medically indicated, review of pertinent medical literature, counseling of the patient and/or family, communication of recommendations to the care team, and documentation time.     ___________________________________________________________________    Subjective & Interval History:    No new neuro changes overnight. Is up in chair today.     Physical Exam:    BP (!) 145/79 (BP Location: Right arm)   Pulse 75   Temp 99  F (37.2  C) (Axillary)   Resp 25   Ht 1.88 m (6' 2\")   Wt 128.5 kg (283 lb 4.7 oz)   SpO2 99%   BMI 36.37 kg/m    Gen: Sleepy  CV: Normal rate, regular rhythm. No m/r/g  Pulm: CTAB, no wheezing, normal work of breathing  Abd: Soft, nt/nd, no rebound/guarding  Ext: Warm and well perfused. No lower extremity edema  Skin: No rash, cyanosis or petechial lesion    Labs & Studies: I personally reviewed the following studies:  ROUTINE LABS (Last four results):  CMP  Recent Labs   Lab 07/20/25  0417 07/19/25  1659 07/19/25  0327 07/18/25  1611 07/18/25  0759 07/18/25  0403 07/17/25 2000 07/17/25  0423 07/16/25  0259 07/15/25  2349 07/15/25  1202 07/15/25  1158    136 140  --   --  143  --  140   < >  --   --  139   POTASSIUM 3.9  --  3.9  --   --  3.7  --  3.9   < >  --   --  4.2   CHLORIDE 108*  --  106  --   --  107  --  104   < >  --   --  101   CO2 22  --  25  --   --  26  --  26   < >  --   --  25   ANIONGAP 10  --  9  --   --  10  --  10   < >  --   --  13   *  --  115* 98 121* 112*   < > 139*   < >  --    < > 123*   BUN 15.2  --  14.8  --   --  15.1  --  15.6   < >  --   --  18.5   CR 0.96  --  1.02  --   --  1.11  --  0.91   < >  --   --  1.06   GFRESTIMATED >90  --  >90  --   --  89  --  >90   < >  --   --  >90   SHARAN 8.4*  --  8.5*  --   --  8.1*  --  8.7*   < >  --   --  " 9.5   MAG 2.1  --  1.9  --   --  2.0  --  2.1  --  2.0  --  2.0   PHOS 3.2  --  3.4  --   --  3.3  --  3.5   < >  --   --   --    PROTTOTAL  --   --   --   --   --   --   --   --   --  6.5  --  8.1   ALBUMIN  --   --   --   --   --   --   --   --   --  3.9  --  4.8   BILITOTAL  --   --   --   --   --   --   --   --   --  0.9  --  1.0   ALKPHOS  --   --   --   --   --   --   --   --   --  58  --  73   AST  --   --   --   --   --   --   --   --   --  24  --  27   ALT  --   --   --   --   --   --   --   --   --  25  --  37    < > = values in this interval not displayed.     CBC  Recent Labs   Lab 07/20/25 0417 07/20/25  0024 07/19/25  1659 07/19/25  0327 07/18/25  1804 07/18/25  0403   WBC 11.9*  --  12.3* 14.1*  --  13.5*   RBC 3.69*  --  3.89* 3.66*  --  3.83*   HGB 9.9*  --  10.4* 9.8*  --  10.2*   HCT 30.3*  --  31.7* 30.2*  --  31.2*   MCV 82 81 82 83   < > 82   MCH 26.8  --  26.7 26.8  --  26.6   MCHC 32.7  --  32.8 32.5  --  32.7   RDW 13.3  --  13.4 13.4  --  13.4   PLT 64* 72* 35* 53*   < > 57*    < > = values in this interval not displayed.     INR  Recent Labs   Lab 07/20/25 0417 07/19/25  0327 07/18/25  0403 07/17/25  0423   INR 1.48* 1.46* 1.50* 1.71*     Results for orders placed or performed during the hospital encounter of 07/15/25   Head CT w/o contrast    Impression    IMPRESSION:  1.  No evidence of acute intracranial abnormality.  2.  Callosal dysgenesis.     MR Brain w/o & w Contrast    Impression    IMPRESSION:  1.  Numerous multifocal acute cortical and white matter left cerebral hemisphere infarcts predominantly involving the left MCA territory with a few acute infarcts in the right cerebral hemisphere, as described. No significant mass effect.  2.  Trace acute subarachnoid hemorrhage versus slow flow or thrombosis of a cortical vessel along the parasagittal left frontal lobe, the latter favored. No confluent intraparenchymal hematoma.      CTA Head Neck with Contrast    Impression     IMPRESSION:   NECK CTA:  1.  Patent neck vasculature.    HEAD CTA:   1.  Near occlusion of the left MCA-M1 segment (8 mm length) with distal opacification throughout the M2 segments.    Findings discussed with Dr. Pond by Dr. Owens at 8:25 PM 7/15/2025.   CTV Head with Contrast    Impression    IMPRESSION:   1.  No intracranial venous thrombosis.   XR Chest Port 1 View    Impression    Impression:   Right IJ CVC with tip at the cavoatrial junction. No focal airspace  opacity.    I have personally reviewed the examination and initial interpretation  and I agree with the findings.    KOSTAS HARRELL DO         SYSTEM ID:  [id number]   CT Chest/Abdomen/Pelvis w Contrast    Impression    IMPRESSION:     1. Multiple enlarged mediastinal and hilar lymph nodes with necrotic  changes in the paraesophageal lymph nodes. Leading consideration is  neoplastic etiology, lymphoproliferative versus metastasis. Recommend  correlation with tissue sampling.    2. Spiculated-appearing nodule within the right lung apex measuring up  to 11 mm with central lucency. Adjacent peribronchovascular opacities  surrounding the subsegmental bronchi inferior to this lesion. Findings  are nonspecific, but given history could potentially represent a  region of small pulmonary infarction. Other scattered subcentimeter  groundglass nodules are probably infective/inflammatory in etiology.  Recommend comparison with any prior imaging (none available in the  system) and attention on short interval follow-up.    3. Mild splenomegaly.    4. 11 mm soft tissue density nodule in the left retroperitoneum is  indeterminate, metastasis is not excluded.    5. Small wedge-shaped hypodensity at the periphery of the spleen and  few scattered wedge-shaped areas in both kidneys likely represent  infarcts.    6. Scattered areas of filling defect in bilateral lower lobe segmental  pulmonary arteries are indeterminate for flow artifacts versus emboli  on this  nondedicated exam. Consider CT PE for further evaluation.  Additionally, focal filling defect in the left external iliac vein is  indeterminate, consider dedicated Doppler for further evaluation.        I have personally reviewed the examination and initial interpretation  and I agree with the findings.    WARNER ROBLES MD         SYSTEM ID:  [id number]   CTA  Angiogram Heart    Impression    IMPRESSION:  1.  Non-diagnostic evaluation for left atrial appendage thrombus.   2.  Please review the separate Radiology report for incidental  noncardiac findings.    FINDINGS:      1.  Non-diagnostic evaluation for left atrial appendage thrombus.   2.  The left atrial appendage has a  chicken wing morphology.  3.   Coronary images are non-diagnostic for stenosis/atherosclerosis  due to cardiac motion artifact. No coronary artery calcifications  seen.   4.   The visualized aortic root, ascending aorta, and descending  thoracic aorta are normal in caliber.    ABDOUL GARCIA MD         SYSTEM ID:  [id number]   Radiologist Consult For Cardiology    Impression    IMPRESSION:  1. Incidental pulmonary emboli bilaterally without obvious right heart  strain.  2. Bulky right hilar and subcarinal lymphadenopathy.    NATALIIA ALCAZAR MD         SYSTEM ID:  [id number]   CT Head w/o Contrast    Impression    Impression:   Noncontrast head CT: Patchy hypodensities in the left frontal,  parietal, temporal and occipital lobes, which correlate with the  diffusion restricting infarcts seen on MR brain from 7/15/2025. No  hemorrhage. No midline shift.    CTA head and neck: Similar to slightly increased occlusion of a 7 mm  segment of incompletely opacified distal M1 branch of the left middle  cerebral artery. The remaining major intracranial arterial vasculature  is patent without significant stenosis.    CT PERFUSION: There is hypoperfusion demonstrated diffusely in the  left middle cerebral artery territory.     I have personally reviewed the  examination and initial interpretation  and I agree with the findings.    ANUJ YOO MD         SYSTEM ID:  [id number]   CT Chest Pulmonary Embolism w Contrast    Impression    IMPRESSION:  1. Exam is positive for pulmonary embolism, unchanged from 7/16/2025.  Multiple segmental pulmonary emboli as above. There is bowing of the  interatrial septum and mild enlargement of the pulmonary artery,  consistent with mild right heart strain.     2. Dependent subsegmental lower lobe atelectasis with engorgement of  the pulmonary vasculature & interlobular septal thickening may  reflect a degree of superimposed pulmonary edema.     SYSTEM ID:  [id number]   CTA Head Neck with Contrast    Impression    Impression:   Noncontrast head CT: Patchy hypodensities in the left frontal,  parietal, temporal and occipital lobes, which correlate with the  diffusion restricting infarcts seen on MR brain from 7/15/2025. No  hemorrhage. No midline shift.    CTA head and neck: Similar to slightly increased occlusion of a 7 mm  segment of incompletely opacified distal M1 branch of the left middle  cerebral artery. The remaining major intracranial arterial vasculature  is patent without significant stenosis.    CT PERFUSION: There is hypoperfusion demonstrated diffusely in the  left middle cerebral artery territory.     I have personally reviewed the examination and initial interpretation  and I agree with the findings.    ANUJ YOO MD         SYSTEM ID:  [id number]   CT Head Perfusion w Contrast    Impression    Impression:   Noncontrast head CT: Patchy hypodensities in the left frontal,  parietal, temporal and occipital lobes, which correlate with the  diffusion restricting infarcts seen on MR brain from 7/15/2025. No  hemorrhage. No midline shift.    CTA head and neck: Similar to slightly increased occlusion of a 7 mm  segment of incompletely opacified distal M1 branch of the left middle  cerebral artery. The remaining major intracranial  arterial vasculature  is patent without significant stenosis.    CT PERFUSION: There is hypoperfusion demonstrated diffusely in the  left middle cerebral artery territory.     I have personally reviewed the examination and initial interpretation  and I agree with the findings.    ANUJ YOO MD         SYSTEM ID:  [id number]   CT Head w/o Contrast    Impression    IMPRESSION: Continued demonstration of left MCA territory scattered  infarctions, in a patient with hypogenesis of the corpus callosum. The  right-sided lesions seen on the DWI of the 7/15/2025 MRI are not well  visualized on the CT scan. However, one small lesion in the right  parietal lobe exhibits some increased density, especially on the  coronal view. This could represent focal cortical laminar necrosis, or  perhaps less likely, a small hemorrhage. If clinically indicated,  follow up exam might help differentiate this. Susceptibility-weighted  MR imaging would likely resolve this question. Either way, this is a  small focus.    MARIAM SALGUERO MD         SYSTEM ID:  [id number]   CT Head w/o Contrast    Impression    IMPRESSION:   HEAD CT:  1.  New small acute to subacute infarct left lentiform nucleus.  2.  Tiny acute to subacute infarct right caudate head appears new when compared to the prior MRI probably not significantly changed when compared to the prior CT.  3.  Scattered subacute infarcts left middle cerebral artery territory probably not significant changed.  4.  Few scattered small acute to subacute infarcts in the right cerebral hemisphere seen on the MRI are not readily apparent by CT.  5.  No acute intracranial hemorrhage.  6.  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    HEAD CTA:  1.  Stable subtotal occlusion mid to distal M1 segment left middle cerebral artery.  2.  New focal high-grade stenosis posterior parietal branch left middle cerebral artery near the M2/M3 junction.    NECK CTA:  1.  No hemodynamically  significant stenosis in the neck vessels.  2.  No evidence for dissection.  3.  New bilateral pleural effusions and patchy infiltrates in both upper lobes and superior segments of the lower lobes.  4.  Mediastinal and right hilar adenopathy was incompletely imaged on the prior study and concerning for metastatic disease or lymphoma.    CT PERFUSION:  1.  No evidence of core infarct by perfusion imaging.  2.  Small focal area of increased Tmax greater than 6 seconds in the left middle cerebral artery territory in the region of the left corona radiata/centrum semiovale concerning for at risk brain.  3.  If the threshold for the Tmax is decreased to greater than 4 seconds, there is a much larger of area of oligemia in the left middle cerebral artery territory as well as a small area in the anterior right frontal lobe.    4.  Findings discussed with Dr. Rodas at 5:41 AM CST on 07/19/2025.     CTA Head Neck w Contrast    Impression    IMPRESSION:   HEAD CT:  1.  New small acute to subacute infarct left lentiform nucleus.  2.  Tiny acute to subacute infarct right caudate head appears new when compared to the prior MRI probably not significantly changed when compared to the prior CT.  3.  Scattered subacute infarcts left middle cerebral artery territory probably not significant changed.  4.  Few scattered small acute to subacute infarcts in the right cerebral hemisphere seen on the MRI are not readily apparent by CT.  5.  No acute intracranial hemorrhage.  6.  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    HEAD CTA:  1.  Stable subtotal occlusion mid to distal M1 segment left middle cerebral artery.  2.  New focal high-grade stenosis posterior parietal branch left middle cerebral artery near the M2/M3 junction.    NECK CTA:  1.  No hemodynamically significant stenosis in the neck vessels.  2.  No evidence for dissection.  3.  New bilateral pleural effusions and patchy infiltrates in both upper lobes and  superior segments of the lower lobes.  4.  Mediastinal and right hilar adenopathy was incompletely imaged on the prior study and concerning for metastatic disease or lymphoma.    CT PERFUSION:  1.  No evidence of core infarct by perfusion imaging.  2.  Small focal area of increased Tmax greater than 6 seconds in the left middle cerebral artery territory in the region of the left corona radiata/centrum semiovale concerning for at risk brain.  3.  If the threshold for the Tmax is decreased to greater than 4 seconds, there is a much larger of area of oligemia in the left middle cerebral artery territory as well as a small area in the anterior right frontal lobe.    4.  Findings discussed with Dr. Rodas at 5:41 AM CST on 07/19/2025.     CT Head Perfusion w Contrast    Impression    IMPRESSION:   HEAD CT:  1.  New small acute to subacute infarct left lentiform nucleus.  2.  Tiny acute to subacute infarct right caudate head appears new when compared to the prior MRI probably not significantly changed when compared to the prior CT.  3.  Scattered subacute infarcts left middle cerebral artery territory probably not significant changed.  4.  Few scattered small acute to subacute infarcts in the right cerebral hemisphere seen on the MRI are not readily apparent by CT.  5.  No acute intracranial hemorrhage.  6.  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    HEAD CTA:  1.  Stable subtotal occlusion mid to distal M1 segment left middle cerebral artery.  2.  New focal high-grade stenosis posterior parietal branch left middle cerebral artery near the M2/M3 junction.    NECK CTA:  1.  No hemodynamically significant stenosis in the neck vessels.  2.  No evidence for dissection.  3.  New bilateral pleural effusions and patchy infiltrates in both upper lobes and superior segments of the lower lobes.  4.  Mediastinal and right hilar adenopathy was incompletely imaged on the prior study and concerning for metastatic  disease or lymphoma.    CT PERFUSION:  1.  No evidence of core infarct by perfusion imaging.  2.  Small focal area of increased Tmax greater than 6 seconds in the left middle cerebral artery territory in the region of the left corona radiata/centrum semiovale concerning for at risk brain.  3.  If the threshold for the Tmax is decreased to greater than 4 seconds, there is a much larger of area of oligemia in the left middle cerebral artery territory as well as a small area in the anterior right frontal lobe.    4.  Findings discussed with Dr. Rodas at 5:41 AM CST on 07/19/2025.     CT Head w/o Contrast   Result Value Ref Range    Radiologist flags Subarachnoid hemorrhage (Urgent)     Impression    Impression:  1. Subarachnoid hemorrhage in the sulci along the left cerebral  convexity.  2. Evolution of the patchy left middle cerebral artery territory  infarcts.  2. 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    [Urgent Result: Subarachnoid hemorrhage]    Finding was identified on 7/19/2025 7:07 AM.     Dr. Rodas was contacted by Dr. Hidalgo at 7/19/2025 7:08 AM and  verbalized understanding of the urgent finding.          I have personally reviewed the examination and initial interpretation  and I agree with the findings.    ANUJ YOO MD         SYSTEM ID:  [id number]   Echocardiogram Complete w Bubble study - For age 60 yrs or less   Result Value Ref Range    LVEF  60-65%    Transesophageal Echocardiogram   Result Value Ref Range    LVEF  60-65%        Medications list for reference:  Current Facility-Administered Medications   Medication Dose Route Frequency Provider Last Rate Last Admin    acetaminophen (TYLENOL) tablet 650 mg  650 mg Oral Q4H Yamile Gutierrez APRN CNP   650 mg at 07/20/25 0602    Or    acetaminophen (TYLENOL) Suppository 650 mg  650 mg Rectal Q4H Yaimle Gutierrez APRN CNP        bisacodyl (DULCOLAX) suppository 10 mg  10 mg Rectal Daily PRN Yamlie Gutierrez APRN CNP        clevidipine  (CLEVIPREX) 0.5 mg/mL infusion 100 mL  0-16 mg/hr Intravenous Continuous Nilsa Daly,         diphenhydrAMINE (BENADRYL) capsule 25 mg  25 mg Oral or Feeding Tube Q6H PRN Carine Matt MD        Or    diphenhydrAMINE (BENADRYL) injection 25 mg  25 mg Intravenous Q6H PRN Carine Matt MD        heparin 25,000 units in 0.45% NaCl 250 mL ANTICOAGULANT infusion  0-5,000 Units/hr Intravenous Continuous Nilsa Daly DO 18 mL/hr at 07/20/25 0600 1,800 Units/hr at 07/20/25 0600    hydrALAZINE (APRESOLINE) injection 10 mg  10 mg Intravenous Q30 Min PRN Zora Boone PA-C   10 mg at 07/19/25 0801    labetalol (NORMODYNE/TRANDATE) injection 10 mg  10 mg Intravenous Q10 Min PRN Zora Boone PA-C        magnesium hydroxide (MILK OF MAGNESIA) suspension 30 mL  30 mL Oral Daily Yamile Gutierrez APRN CNP        Medication Instruction - Avoid dextrose in IV solutions   Intravenous Continuous PRN Nishi Rodas MD        ondansetron (ZOFRAN ODT) ODT tab 4 mg  4 mg Oral Q6H PRN Nishi Rodas MD        Or    ondansetron (ZOFRAN) injection 4 mg  4 mg Intravenous Q6H PRN Nishi Rodas MD        polyethylene glycol (MIRALAX) Packet 17 g  17 g Oral or Feeding Tube TID Yamile Gutierrez APRN CNP        prochlorperazine (COMPAZINE) injection 10 mg  10 mg Intravenous Q6H PRN Franklin Aguilera MD        Or    prochlorperazine (COMPAZINE) tablet 10 mg  10 mg Oral or Feeding Tube Q6H PRN Franklin Aguilera MD        rosuvastatin (CRESTOR) tablet 10 mg  10 mg Oral or Feeding Tube Daily Carine Matt MD   10 mg at 07/19/25 1018    senna-docusate (SENOKOT-S/PERICOLACE) 8.6-50 MG per tablet 1-2 tablet  1-2 tablet Oral or Feeding Tube BID Griselda Ha CNP   1 tablet at 07/19/25 1959    sodium chloride (PF) 0.9% PF flush 3 mL  3 mL Intracatheter Q8H MOHINDER Nishi Rodas MD   3 mL at 07/19/25 2148    sodium chloride (PF) 0.9% PF flush 3 mL  3 mL Intracatheter q1 min prn Nishi Rodas  MD Mayda        sodium chloride 0.9 % bag for CT scan flush  100 mL As instructed Once Nishi Rodas MD

## 2025-07-20 NOTE — PROGRESS NOTES
IP PT Re-evaluation:      07/20/25 1116   Appointment Info   Signing Clinician's Name / Credentials (PT) Davonte Saldaña, PT, DPT   Rehab Comments (PT) re-eval   Living Environment   People in Home alone   Current Living Arrangements apartment   Home Accessibility no concerns   Transportation Anticipated car, drives self;family or friend will provide   Self-Care   Equipment Currently Used at Home none   Fall history within last six months no   Activity/Exercise/Self-Care Comment IND with mobility and ADLs at baseline.   General Information   Onset of Illness/Injury or Date of Surgery 07/18/25   Referring Physician Nishi Rodas MD   Patient/Family Therapy Goals Statement (PT) Return home   Pertinent History of Current Problem (include personal factors and/or comorbidities that impact the POC) Per EMR: Bernard Chapman is a 35 year old male with a past medical history of hypertension, diabetes, possible ITP, sleep apnea, PE and DVT on rivaroxaban. Admitted on 7/15/2025 for acute ischemic stroke in left MCA territory, near occlusion of left M1, and concern for thrombotic emergency such as TTP or catastrophic antiphospholipid syndrome (in setting of severe thrombocytopenia and acute cerebral infarcts).   Existing Precautions/Restrictions fall   General Observations Pt greeted up in their chair visiting with family, agreeable to session.   Cognition   Affect/Mental Status (Cognition) WFL   Posture    Posture Not impaired   Range of Motion (ROM)   Range of Motion ROM deficits secondary to weakness   ROM Comment R hemiparesis (UE>LE)   Strength (Manual Muscle Testing)   Strength (Manual Muscle Testing) Deficits observed during functional mobility   Strength Comments RUE grossly 2+/5, RLE grossly 3-4/5   Transfers   Comment, (Transfers) modA sit<>stand   Gait/Stairs (Locomotion)   Comment, (Gait/Stairs) mod-maxA gait   Balance   Balance Comments mod-maxA static/dynamic balance 2/2 R trunk lean   Sensory Examination    Sensory Perception Comments Light touch and proprioception intact BLE   Coordination   Coordination Comments B heel to shin intact   Muscle Tone   Muscle Tone Comments R ankle clonus   Clinical Impression   Criteria for Skilled Therapeutic Intervention Yes, treatment indicated   PT Diagnosis (PT) Impaired functional mobility   Influenced by the following impairments R hemiparesis, clonus   Functional limitations due to impairments transfers, gait, balance, stairs, activity tolerance, self cares   Clinical Presentation (PT Evaluation Complexity) stable   Clinical Presentation Rationale clinical judgement   Clinical Decision Making (Complexity) low complexity   Planned Therapy Interventions (PT) balance training;bed mobility training;gait training;home exercise program;neuromuscular re-education;patient/family education;strengthening;stair training;transfer training;progressive activity/exercise;risk factor education;home program guidelines   Risk & Benefits of therapy have been explained evaluation/treatment results reviewed;care plan/treatment goals reviewed;risks/benefits reviewed;current/potential barriers reviewed;participants voiced agreement with care plan;participants included;patient;mother;father   PT Total Evaluation Time   PT Eval, Low Complexity Minutes (74854) 6   Physical Therapy Goals   PT Frequency Daily   PT Predicted Duration/Target Date for Goal Attainment 08/03/25   PT Goals Bed Mobility;Transfers;Gait;PT Goal 1   PT: Bed Mobility Independent;Supine to/from sit   PT: Transfers Independent;Sit to/from stand   PT: Gait Independent;Greater than 200 feet   PT: Goal 1 Demonstrate low falls risk with a balance assessment score of < 13.5 seconds on TUG,  > 19/24 on Dynamic Gait Index, > 9/12 on 4-Item Dynamic Gait Index, >22/30 on Functional Gait Assessment, or > 45/56 on Solorzano Balance Assessment.   PT Discharge Planning   PT Plan gait with chair follow, trail ADs as needed, balance activities   PT  Discharge Recommendation (DC Rec) [facility] patient will benefit from intensive, interdisciplinary therapy.  Anticipate will be able to tolerate 3 hours of therapy per day   PT Rationale for DC Rec Patient's mobility limited by R hemiparesis and R ankle clonus. Requiring mod-max assist for standing and gait. High fall risk. IND at baseline, highly motivated and has excellent family support. Recommend ARU to max functional recovery.   PT Brief overview of current status mod-maxA transfers and short distance gait; recommend wc for longer distances   PT Total Distance Amb During Session (feet) 80   Physical Therapy Time and Intention   Total Session Time (sum of timed and untimed services) 6

## 2025-07-20 NOTE — PLAN OF CARE
ICU End of Shift Summary: Please see flowsheet for detail assessments and vital signs.     Changes this Shift:  Neuro:  Q1 neuro checks. Patient predominantly A&Ox4 with the exception of disoriented to time x2. Denied pain throughout shift. Pupils equal, round and reactive. Right field cut. Right facial droop. RUE weakness. RLE - plantar/dorsi weak. Sensation on right side intermittently decreased.  CV:  to 160 without intervention. Administered one unit of platelets. Pulses palpable. T-max 99 axillary.   Resp: LS clear and while sleeping on 5L Oxymask.  GI/: No bowel movement this shift and passing gas. Voided total of 1000 mL for this shift.   Skin: petechia on right/left scalp, shin, calf.  Access: RIJ trialysis and PIV  Gtts: Heparin @ 1800 units/hr.   Activity: Not observed out of bed.   Labs: 2nd unit platelets administered. Midnight platelet lab @ 72, notified provider and no new orders placed. Heparin checked at 2200 was <0.10 and heparin increased to 1500 units/hr. Heparin rechecked @ 0500 was <0.10 and heparin increased to 1800 units/hr.    Plan: Continue with Q1 neuro checks and monitor closely. Follow plan of care and notify provider with any changes.     Goal Outcome Evaluation:  Problem: Adult Inpatient Plan of Care  Goal: Absence of Hospital-Acquired Illness or Injury  Intervention: Prevent Skin Injury  Recent Flowsheet Documentation  Taken 7/20/2025 0300 by Cece Emerson RN  Body Position:   position changed independently   lower extremity elevated   upper extremity elevated   weight shifting  Taken 7/20/2025 0200 by Cece Emerson RN  Body Position:   position changed independently   lower extremity elevated   upper extremity elevated   weight shifting  Taken 7/20/2025 0100 by Cece Emerson RN  Body Position:   position changed independently   lower extremity elevated   upper extremity elevated   weight shifting  Taken 7/20/2025 0000 by Cece Emerson RN  Body  Position:   turned   position changed independently   lower extremity elevated   upper extremity elevated   weight shifting  Taken 7/19/2025 2200 by Cece Emerson RN  Body Position:   position changed independently   heels elevated   legs elevated   lower extremity elevated   upper extremity elevated   weight shifting  Taken 7/19/2025 2100 by Cece Emerson RN  Body Position:   heels elevated   legs elevated   lower extremity elevated   upper extremity elevated   weight shifting  Taken 7/19/2025 2000 by Cece Emerson RN  Body Position:   heels elevated   legs elevated   lower extremity elevated   upper extremity elevated   weight shifting     Problem: Adult Inpatient Plan of Care  Goal: Optimal Comfort and Wellbeing  Outcome: Progressing  Intervention: Monitor Pain and Promote Comfort  Recent Flowsheet Documentation  Taken 7/19/2025 2000 by Cece Emerson RN  Pain Management Interventions:   care clustered   emotional support   pillow support provided   repositioned   rest  Intervention: Provide Person-Centered Care  Recent Flowsheet Documentation  Taken 7/20/2025 0000 by Cece Emerson RN  Trust Relationship/Rapport:   care explained   choices provided   emotional support provided   empathic listening provided   questions answered   questions encouraged   reassurance provided   thoughts/feelings acknowledged  Taken 7/19/2025 2000 by Cece Emerson RN  Trust Relationship/Rapport:   care explained   choices provided   emotional support provided   empathic listening provided   questions answered   questions encouraged   reassurance provided   thoughts/feelings acknowledged     Problem: Adult Inpatient Plan of Care  Goal: Optimal Comfort and Wellbeing  Intervention: Provide Person-Centered Care  Recent Flowsheet Documentation  Taken 7/20/2025 0000 by Cece Emerson RN  Trust Relationship/Rapport:   care explained   choices provided   emotional support provided   empathic  listening provided   questions answered   questions encouraged   reassurance provided   thoughts/feelings acknowledged  Taken 7/19/2025 2000 by Cece Emerson RN  Trust Relationship/Rapport:   care explained   choices provided   emotional support provided   empathic listening provided   questions answered   questions encouraged   reassurance provided   thoughts/feelings acknowledged     Plan of Care Reviewed With: patient    Overall Patient Progress: improving

## 2025-07-21 ENCOUNTER — APPOINTMENT (OUTPATIENT)
Dept: PHYSICAL THERAPY | Facility: CLINIC | Age: 35
DRG: 064 | End: 2025-07-21
Payer: COMMERCIAL

## 2025-07-21 ENCOUNTER — APPOINTMENT (OUTPATIENT)
Dept: CT IMAGING | Facility: CLINIC | Age: 35
End: 2025-07-21
Payer: COMMERCIAL

## 2025-07-21 ENCOUNTER — APPOINTMENT (OUTPATIENT)
Dept: CT IMAGING | Facility: CLINIC | Age: 35
DRG: 064 | End: 2025-07-21
Payer: COMMERCIAL

## 2025-07-21 LAB
1OH-MIDAZOLAM UR QL SCN: PRESENT
ALBUMIN SERPL BCG-MCNC: 3.7 G/DL (ref 3.5–5.2)
ALP SERPL-CCNC: 64 U/L (ref 40–150)
ALT SERPL W P-5'-P-CCNC: 18 U/L (ref 0–70)
ANION GAP SERPL CALCULATED.3IONS-SCNC: 12 MMOL/L (ref 7–15)
APTT PPP: 45 SECONDS (ref 22–38)
AST SERPL W P-5'-P-CCNC: 19 U/L (ref 0–45)
BACTERIA SPEC CULT: NO GROWTH
BACTERIA SPEC CULT: NO GROWTH
BASE EXCESS BLDV CALC-SCNC: -0.4 MMOL/L (ref -3–3)
BILIRUB SERPL-MCNC: 0.6 MG/DL
BILIRUBIN DIRECT (ROCHE PRO & PURE): 0.25 MG/DL (ref 0–0.45)
BUN SERPL-MCNC: 20.4 MG/DL (ref 6–20)
CALCIUM SERPL-MCNC: 8.7 MG/DL (ref 8.8–10.4)
CHLORIDE SERPL-SCNC: 107 MMOL/L (ref 98–107)
CREAT SERPL-MCNC: 0.78 MG/DL (ref 0.67–1.17)
DAT POLY: NEGATIVE
EGFRCR SERPLBLD CKD-EPI 2021: >90 ML/MIN/1.73M2
ERYTHROCYTE [DISTWIDTH] IN BLOOD BY AUTOMATED COUNT: 13.4 % (ref 10–15)
FIBRINOGEN PPP-MCNC: 433 MG/DL (ref 170–510)
GLUCOSE BLDC GLUCOMTR-MCNC: 171 MG/DL (ref 70–99)
GLUCOSE BLDC GLUCOMTR-MCNC: 190 MG/DL (ref 70–99)
GLUCOSE BLDC GLUCOMTR-MCNC: 320 MG/DL (ref 70–99)
GLUCOSE SERPL-MCNC: 180 MG/DL (ref 70–99)
HCO3 BLDV-SCNC: 22 MMOL/L (ref 21–28)
HCO3 SERPL-SCNC: 19 MMOL/L (ref 22–29)
HCT VFR BLD AUTO: 33.6 % (ref 40–53)
HGB BLD-MCNC: 11 G/DL (ref 13.3–17.7)
HOLD SPECIMEN: NORMAL
INR PPP: 1.22 (ref 0.85–1.15)
LACTATE SERPL-SCNC: 1.1 MMOL/L (ref 0.7–2)
LDH SERPL L TO P-CCNC: 617 U/L (ref 0–250)
MAGNESIUM SERPL-MCNC: 2.2 MG/DL (ref 1.7–2.3)
MCH RBC QN AUTO: 27 PG (ref 26.5–33)
MCHC RBC AUTO-ENTMCNC: 32.7 G/DL (ref 31.5–36.5)
MCV RBC AUTO: 80 FL (ref 78–100)
MCV RBC AUTO: 82 FL (ref 78–100)
O2/TOTAL GAS SETTING VFR VENT: 0 %
OXYHGB MFR BLDV: 90 % (ref 70–75)
PCO2 BLDV: 31 MM HG (ref 40–50)
PH BLDV: 7.47 [PH] (ref 7.32–7.43)
PHOSPHATE SERPL-MCNC: 3.9 MG/DL (ref 2.5–4.5)
PLATELET # BLD AUTO: 70 10E3/UL (ref 150–450)
PLATELET # BLD AUTO: 91 10E3/UL (ref 150–450)
PO2 BLDV: 62 MM HG (ref 25–47)
POTASSIUM SERPL-SCNC: 4.5 MMOL/L (ref 3.4–5.3)
PROT SERPL-MCNC: 6.4 G/DL (ref 6.4–8.3)
PROTHROMBIN TIME: 15.7 SECONDS (ref 11.8–14.8)
RBC # BLD AUTO: 4.08 10E6/UL (ref 4.4–5.9)
SAO2 % BLDV: 92 % (ref 70–75)
SCANNED LAB RESULT: NORMAL
SODIUM SERPL-SCNC: 138 MMOL/L (ref 135–145)
SPECIMEN EXP DATE BLD: NORMAL
UFH PPP CHRO-ACNC: 0.25 IU/ML (ref ?–1.1)
UFH PPP CHRO-ACNC: 0.29 IU/ML (ref ?–1.1)
UFH PPP CHRO-ACNC: 0.32 IU/ML (ref ?–1.1)
URATE SERPL-MCNC: 5.5 MG/DL (ref 3.4–7)
WBC # BLD AUTO: 12 10E3/UL (ref 4–11)

## 2025-07-21 PROCEDURE — 85520 HEPARIN ASSAY: CPT | Performed by: PSYCHIATRY & NEUROLOGY

## 2025-07-21 PROCEDURE — 99233 SBSQ HOSP IP/OBS HIGH 50: CPT | Mod: GC | Performed by: INTERNAL MEDICINE

## 2025-07-21 PROCEDURE — 999N000248 HC STATISTIC IV INSERT WITH US BY RN

## 2025-07-21 PROCEDURE — 97116 GAIT TRAINING THERAPY: CPT | Mod: GP

## 2025-07-21 PROCEDURE — 83735 ASSAY OF MAGNESIUM: CPT | Performed by: NURSE PRACTITIONER

## 2025-07-21 PROCEDURE — 85041 AUTOMATED RBC COUNT: CPT | Performed by: NURSE PRACTITIONER

## 2025-07-21 PROCEDURE — 99291 CRITICAL CARE FIRST HOUR: CPT | Mod: GC | Performed by: PSYCHIATRY & NEUROLOGY

## 2025-07-21 PROCEDURE — 250N000011 HC RX IP 250 OP 636: Performed by: PHYSICIAN ASSISTANT

## 2025-07-21 PROCEDURE — 83615 LACTATE (LD) (LDH) ENZYME: CPT | Performed by: NURSE PRACTITIONER

## 2025-07-21 PROCEDURE — 86880 COOMBS TEST DIRECT: CPT | Performed by: PSYCHIATRY & NEUROLOGY

## 2025-07-21 PROCEDURE — 97530 THERAPEUTIC ACTIVITIES: CPT | Mod: GP

## 2025-07-21 PROCEDURE — 85730 THROMBOPLASTIN TIME PARTIAL: CPT | Performed by: NURSE PRACTITIONER

## 2025-07-21 PROCEDURE — 250N000011 HC RX IP 250 OP 636

## 2025-07-21 PROCEDURE — 70450 CT HEAD/BRAIN W/O DYE: CPT | Mod: 26 | Performed by: RADIOLOGY

## 2025-07-21 PROCEDURE — 82248 BILIRUBIN DIRECT: CPT | Performed by: NURSE PRACTITIONER

## 2025-07-21 PROCEDURE — 250N000013 HC RX MED GY IP 250 OP 250 PS 637: Performed by: NURSE PRACTITIONER

## 2025-07-21 PROCEDURE — 200N000002 HC R&B ICU UMMC

## 2025-07-21 PROCEDURE — 80048 BASIC METABOLIC PNL TOTAL CA: CPT | Performed by: NURSE PRACTITIONER

## 2025-07-21 PROCEDURE — 250N000013 HC RX MED GY IP 250 OP 250 PS 637: Performed by: PSYCHIATRY & NEUROLOGY

## 2025-07-21 PROCEDURE — 250N000012 HC RX MED GY IP 250 OP 636 PS 637: Performed by: PSYCHIATRY & NEUROLOGY

## 2025-07-21 PROCEDURE — 36415 COLL VENOUS BLD VENIPUNCTURE: CPT | Performed by: NURSE PRACTITIONER

## 2025-07-21 PROCEDURE — 99233 SBSQ HOSP IP/OBS HIGH 50: CPT | Mod: GC | Performed by: STUDENT IN AN ORGANIZED HEALTH CARE EDUCATION/TRAINING PROGRAM

## 2025-07-21 PROCEDURE — 83605 ASSAY OF LACTIC ACID: CPT | Performed by: NURSE PRACTITIONER

## 2025-07-21 PROCEDURE — 85049 AUTOMATED PLATELET COUNT: CPT | Performed by: NURSE PRACTITIONER

## 2025-07-21 PROCEDURE — 84100 ASSAY OF PHOSPHORUS: CPT | Performed by: NURSE PRACTITIONER

## 2025-07-21 PROCEDURE — 82805 BLOOD GASES W/O2 SATURATION: CPT | Performed by: NURSE PRACTITIONER

## 2025-07-21 PROCEDURE — 85610 PROTHROMBIN TIME: CPT | Performed by: NURSE PRACTITIONER

## 2025-07-21 PROCEDURE — 250N000011 HC RX IP 250 OP 636: Performed by: NURSE PRACTITIONER

## 2025-07-21 PROCEDURE — 36415 COLL VENOUS BLD VENIPUNCTURE: CPT | Performed by: PSYCHIATRY & NEUROLOGY

## 2025-07-21 PROCEDURE — 250N000013 HC RX MED GY IP 250 OP 250 PS 637

## 2025-07-21 PROCEDURE — 70450 CT HEAD/BRAIN W/O DYE: CPT | Mod: 76

## 2025-07-21 PROCEDURE — 85520 HEPARIN ASSAY: CPT | Performed by: NURSE PRACTITIONER

## 2025-07-21 PROCEDURE — 70450 CT HEAD/BRAIN W/O DYE: CPT

## 2025-07-21 PROCEDURE — 97112 NEUROMUSCULAR REEDUCATION: CPT | Mod: GP

## 2025-07-21 PROCEDURE — 84550 ASSAY OF BLOOD/URIC ACID: CPT | Performed by: NURSE PRACTITIONER

## 2025-07-21 PROCEDURE — 999N000203 HC STATISTICAL VASC ACCESS NURSE TIME, 16-31 MINUTES

## 2025-07-21 PROCEDURE — 85384 FIBRINOGEN ACTIVITY: CPT | Performed by: NURSE PRACTITIONER

## 2025-07-21 RX ORDER — ACETAMINOPHEN 325 MG/1
650 TABLET ORAL EVERY 4 HOURS PRN
Status: DISCONTINUED | OUTPATIENT
Start: 2025-07-21 | End: 2025-07-22

## 2025-07-21 RX ORDER — FLUTICASONE PROPIONATE 50 MCG
1 SPRAY, SUSPENSION (ML) NASAL DAILY
Status: DISCONTINUED | OUTPATIENT
Start: 2025-07-21 | End: 2025-08-21 | Stop reason: HOSPADM

## 2025-07-21 RX ORDER — POLYETHYLENE GLYCOL 3350 17 G/17G
17 POWDER, FOR SOLUTION ORAL DAILY
Status: DISCONTINUED | OUTPATIENT
Start: 2025-07-22 | End: 2025-08-21 | Stop reason: HOSPADM

## 2025-07-21 RX ORDER — ACETAMINOPHEN 650 MG/1
650 SUPPOSITORY RECTAL EVERY 4 HOURS PRN
Status: DISCONTINUED | OUTPATIENT
Start: 2025-07-21 | End: 2025-07-22

## 2025-07-21 RX ORDER — HEPARIN SODIUM 10000 [USP'U]/100ML
0-5000 INJECTION, SOLUTION INTRAVENOUS CONTINUOUS
Status: DISPENSED | OUTPATIENT
Start: 2025-07-21 | End: 2025-07-22

## 2025-07-21 RX ORDER — DEXTROSE MONOHYDRATE 25 G/50ML
25-50 INJECTION, SOLUTION INTRAVENOUS
Status: DISCONTINUED | OUTPATIENT
Start: 2025-07-21 | End: 2025-08-21 | Stop reason: HOSPADM

## 2025-07-21 RX ORDER — NICOTINE POLACRILEX 4 MG
15-30 LOZENGE BUCCAL
Status: DISCONTINUED | OUTPATIENT
Start: 2025-07-21 | End: 2025-08-21 | Stop reason: HOSPADM

## 2025-07-21 RX ADMIN — ACETAMINOPHEN 650 MG: 325 TABLET ORAL at 01:52

## 2025-07-21 RX ADMIN — ACETAMINOPHEN 650 MG: 325 TABLET ORAL at 09:18

## 2025-07-21 RX ADMIN — ROSUVASTATIN CALCIUM 10 MG: 10 TABLET, FILM COATED ORAL at 09:18

## 2025-07-21 RX ADMIN — DEXAMETHASONE 40 MG: 4 TABLET ORAL at 09:18

## 2025-07-21 RX ADMIN — SENNOSIDES AND DOCUSATE SODIUM 1 TABLET: 50; 8.6 TABLET ORAL at 09:18

## 2025-07-21 RX ADMIN — ACETAMINOPHEN 650 MG: 325 TABLET ORAL at 05:34

## 2025-07-21 RX ADMIN — HYDRALAZINE HYDROCHLORIDE 10 MG: 20 INJECTION INTRAMUSCULAR; INTRAVENOUS at 15:31

## 2025-07-21 RX ADMIN — HEPARIN SODIUM 2250 UNITS/HR: 10000 INJECTION, SOLUTION INTRAVENOUS at 16:41

## 2025-07-21 RX ADMIN — FLUTICASONE PROPIONATE 1 SPRAY: 50 SPRAY, METERED NASAL at 14:39

## 2025-07-21 RX ADMIN — HYDRALAZINE HYDROCHLORIDE 10 MG: 20 INJECTION INTRAMUSCULAR; INTRAVENOUS at 18:24

## 2025-07-21 RX ADMIN — HEPARIN SODIUM 2100 UNITS/HR: 10000 INJECTION, SOLUTION INTRAVENOUS at 05:34

## 2025-07-21 ASSESSMENT — ACTIVITIES OF DAILY LIVING (ADL)
ADLS_ACUITY_SCORE: 65
ADLS_ACUITY_SCORE: 65
ADLS_ACUITY_SCORE: 69
ADLS_ACUITY_SCORE: 69
ADLS_ACUITY_SCORE: 65
ADLS_ACUITY_SCORE: 65
ADLS_ACUITY_SCORE: 69
ADLS_ACUITY_SCORE: 65
ADLS_ACUITY_SCORE: 67
ADLS_ACUITY_SCORE: 65
ADLS_ACUITY_SCORE: 69
ADLS_ACUITY_SCORE: 65

## 2025-07-21 ASSESSMENT — ENCOUNTER SYMPTOMS
FOCAL WEAKNESS: 1
SPEECH CHANGE: 0

## 2025-07-21 ASSESSMENT — VISUAL ACUITY
OU: BASELINE
OU: BASELINE

## 2025-07-21 NOTE — PROGRESS NOTES
"  RHEUMATOLOGY PROGRESS NOTE     Subjective      Bernard Chapman was seen and examined today. He was sitting up in the chair with his parents present. Patient has new right sided weakness in his right arm and leg since his stroke over the weekend.     Review of Systems   Neurological:  Positive for focal weakness. Negative for speech change.       Objective   BP (!) 163/78   Pulse 81   Temp 97.8  F (36.6  C)   Resp 24   Ht 1.88 m (6' 2\")   Wt 127.8 kg (281 lb 12 oz)   SpO2 97%   BMI 36.17 kg/m        PHYSICAL EXAMINATION  Physical Exam  Constitutional:       General: He is awake.      Appearance: Normal appearance. He is not toxic-appearing or diaphoretic.   Skin:     Findings: No bruising or rash.   Neurological:      Mental Status: He is alert.      Sensory: No sensory deficit.      Motor: Weakness present.      Gait: Gait abnormal.      Comments: Right arm paralysis and right leg weakness.   Psychiatric:         Behavior: Behavior is cooperative.         Assessment & Plan   -- Rule out malignancy: recent weight loss, night sweats  -- Elevated CRP  -- Hypercoagulable state   -- History of DVT & PE  -- MCA stroke, basal ganglia stroke  -- Sub arachnoid hemorrhage     Over the weekend patient had left frontal SAH and a left basal ganglia stroke, in which a stroke alert was called on the patient. Patient was restarted on heparin. Today head CT was unchanged from prior CT.    In working up the reason this patient has had multiple CVAs, catastrophic antiphospholipid syndrome (CAPS) is unlikely due to negative beta 2 glycoprotein and anticardiolipin. Vasculitis is also unlikely with negative MPO, PR3, and ANCA, ESR of 4 and AMY 1:40. An autoimmune process causing the hyper coagulable state in this patent is unlikely at this point, and rheumatology will sign off. Hematology remains onboard while patient is getting worked up for underlying malignancy being the cause of patient's hypercoagulable state.     Other " labs that came back negative were histo/blasto.        Latest Ref Rng & Units 7/20/2025     4:17 AM 7/20/2025     5:18 PM 7/21/2025     5:28 AM   RHEUM RESULTS   Albumin 3.5 - 5.2 g/dL 3.5   3.7    ALT 0 - 70 U/L 25   18    AST 0 - 45 U/L 25   19    Creatinine 0.67 - 1.17 mg/dL 0.96   0.78    GFR Estimate >60 mL/min/1.73m2 >90   >90    Hematocrit 40.0 - 53.0 % 30.3   33.6    Hemoglobin 13.3 - 17.7 g/dL 9.9   11.0    WBC 4.0 - 11.0 10e3/uL 11.9   12.0    RBC Count 4.40 - 5.90 10e6/uL 3.69   4.08    RDW 10.0 - 15.0 % 13.3   13.4    MCHC 31.5 - 36.5 g/dL 32.7   32.7    MCV 78 - 100 fL 82  80  82    Platelet Count 150 - 450 10e3/uL 64  76  70       ,   Proteinase 3 Antibody IgG   Date Value Ref Range Status   07/16/2025 Negative Negative Final   ,   Myeloperoxidase Antibody IgG   Date Value Ref Range Status   07/16/2025 Negative Negative Final         Plan:  -- Labs pending: cryoglobulins, quant gold (will will follow up)   -- Pending lymph node biopsy  -- Rheumatology will sign off at this time     Patient seen and case discussed with attending: Dr. MACIAS, ROBERTO Burnett MD   PGY1  Internal Medicine Resident

## 2025-07-21 NOTE — PROGRESS NOTES
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 He is s/p PLEX x1 on 7/16.      Malignancy- etiology not yet determined by pathology- currently known to be in multiple enlarged mediastinal and hilar lymph nodes, potential lymphoma verses metastatic.  Acute thrombocytopenia  Acute anemia, potential hemolytic, TRINI negative, PNH negative, [id number] normal with no schistocytes  Acute bilateral PE with Left popliteal thrombosis. Ddx 6/23/25  Multifocal acute ischemic strokes in L MCA, R parietal lobe, R frontal centrum semiovale. Ddx 7/15/25  Diffuse deposition of thrombus on aortic and tricuspid valves  subarachnoid hemorrhage in right frontoparietal region.     Biopsy Friday 7/18 with pathology (JIMENEZ) on sit confirming malignancy. LIkely not lymphoma - however formal evaluation of specimens is on-going.     Platelets stable. No platelet transfusion overnight but but continue to monitor for future needs. Labs with normal fibrinogen and rising LDH. Uric acid normal. Continue empirically steroids for ITP-like picture.     Recommendations:   - continue to transfuse platelet up to 50k   - Continue dexamethasone 40 mg PO daily x4 day  - BUS and TBNA of mediastinal lymph nodes, path pending   - Consider PET scan if path is not back by Wednesday     We will continue to follow this patient. Please don't hesitate to contact the Fellow On-Call with  "questions.    Viviana Steele MD  PGY-4 Fellow  Hematology, Oncology and Transplant  [facility]    ___________________________________________________________________    Subjective & Interval History:    No new neuro changes overnight. Seen sitting in chair. No platelet transfusion overnight.      Physical Exam:    BP (!) 161/76   Pulse 93   Temp 97.8  F (36.6  C)   Resp 23   Ht 1.88 m (6' 2\")   Wt 127.8 kg (281 lb 12 oz)   SpO2 98%   BMI 36.17 kg/m    Gen: Sleepy  CV: Normal rate, regular rhythm. No m/r/g  Pulm: CTAB, no wheezing, normal work of breathing  Abd: Soft, nt/nd, no rebound/guarding  Ext: Warm and well perfused. No lower extremity edema  Skin: No rash, cyanosis or petechial lesion    Labs & Studies: I personally reviewed the following studies:  ROUTINE LABS (Last four results):  CMP  Recent Labs   Lab 07/21/25  1150 07/21/25  0919 07/21/25  0528 07/20/25  2249 07/20/25  1709 07/20/25  0417 07/19/25  1659 07/19/25  0327 07/18/25  0759 07/18/25  0403 07/16/25  0259 07/15/25  2349   NA  --   --  138  --   --  140 136 140  --  143   < >  --    POTASSIUM  --   --  4.5  --   --  3.9  --  3.9  --  3.7   < >  --    CHLORIDE  --   --  107  --   --  108*  --  106  --  107   < >  --    CO2  --   --  19*  --   --  22  --  25  --  26   < >  --    ANIONGAP  --   --  12  --   --  10  --  9  --  10   < >  --    * 171* 180* 162*   < > 104*  --  115*   < > 112*   < >  --    BUN  --   --  20.4*  --   --  15.2  --  14.8  --  15.1   < >  --    CR  --   --  0.78  --   --  0.96  --  1.02  --  1.11   < >  --    GFRESTIMATED  --   --  >90  --   --  >90  --  >90  --  89   < >  --    SHARAN  --   --  8.7*  --   --  8.4*  --  8.5*  --  8.1*   < >  --    MAG  --   --  2.2  --   --  2.1  --  1.9  --  2.0   < > 2.0   PHOS  --   --  3.9  --   --  3.2  --  3.4  --  3.3   < >  --    PROTTOTAL  --   --  6.4  --   --  5.8* 5.8*  --   --   --   --  6.5   ALBUMIN  --   --  3.7  --   --  3.5 3.6  --   --   --   --  3.9 "   BILITOTAL  --   --  0.6  --   --  1.0 1.1  --   --   --   --  0.9   ALKPHOS  --   --  64  --   --  55 56  --   --   --   --  58   AST  --   --  19  --   --  25 34  --   --   --   --  24   ALT  --   --  18  --   --  25 29  --   --   --   --  25    < > = values in this interval not displayed.     CBC  Recent Labs   Lab 07/21/25  0528 07/20/25  1718 07/20/25  0417 07/20/25  0024 07/19/25  1659 07/19/25  0327   WBC 12.0*  --  11.9*  --  12.3* 14.1*   RBC 4.08*  --  3.69*  --  3.89* 3.66*   HGB 11.0*  --  9.9*  --  10.4* 9.8*   HCT 33.6*  --  30.3*  --  31.7* 30.2*   MCV 82 80 82 81 82 83   MCH 27.0  --  26.8  --  26.7 26.8   MCHC 32.7  --  32.7  --  32.8 32.5   RDW 13.4  --  13.3  --  13.4 13.4   PLT 70* 76* 64* 72* 35* 53*     INR  Recent Labs   Lab 07/21/25  0528 07/20/25  0417 07/19/25  0327 07/18/25  0403   INR 1.22* 1.48* 1.46* 1.50*     Results for orders placed or performed during the hospital encounter of 07/15/25   Head CT w/o contrast    Impression    IMPRESSION:  1.  No evidence of acute intracranial abnormality.  2.  Callosal dysgenesis.     MR Brain w/o & w Contrast    Impression    IMPRESSION:  1.  Numerous multifocal acute cortical and white matter left cerebral hemisphere infarcts predominantly involving the left MCA territory with a few acute infarcts in the right cerebral hemisphere, as described. No significant mass effect.  2.  Trace acute subarachnoid hemorrhage versus slow flow or thrombosis of a cortical vessel along the parasagittal left frontal lobe, the latter favored. No confluent intraparenchymal hematoma.      CTA Head Neck with Contrast    Impression    IMPRESSION:   NECK CTA:  1.  Patent neck vasculature.    HEAD CTA:   1.  Near occlusion of the left MCA-M1 segment (8 mm length) with distal opacification throughout the M2 segments.    Findings discussed with Dr. Pond by Dr. Owens at 8:25 PM 7/15/2025.   CTV Head with Contrast    Impression    IMPRESSION:   1.  No intracranial  venous thrombosis.   XR Chest Port 1 View    Impression    Impression:   Right IJ CVC with tip at the cavoatrial junction. No focal airspace  opacity.    I have personally reviewed the examination and initial interpretation  and I agree with the findings.    KOSTAS HARRELL DO         SYSTEM ID:  [id number]   CT Chest/Abdomen/Pelvis w Contrast    Impression    IMPRESSION:     1. Multiple enlarged mediastinal and hilar lymph nodes with necrotic  changes in the paraesophageal lymph nodes. Leading consideration is  neoplastic etiology, lymphoproliferative versus metastasis. Recommend  correlation with tissue sampling.    2. Spiculated-appearing nodule within the right lung apex measuring up  to 11 mm with central lucency. Adjacent peribronchovascular opacities  surrounding the subsegmental bronchi inferior to this lesion. Findings  are nonspecific, but given history could potentially represent a  region of small pulmonary infarction. Other scattered subcentimeter  groundglass nodules are probably infective/inflammatory in etiology.  Recommend comparison with any prior imaging (none available in the  system) and attention on short interval follow-up.    3. Mild splenomegaly.    4. 11 mm soft tissue density nodule in the left retroperitoneum is  indeterminate, metastasis is not excluded.    5. Small wedge-shaped hypodensity at the periphery of the spleen and  few scattered wedge-shaped areas in both kidneys likely represent  infarcts.    6. Scattered areas of filling defect in bilateral lower lobe segmental  pulmonary arteries are indeterminate for flow artifacts versus emboli  on this nondedicated exam. Consider CT PE for further evaluation.  Additionally, focal filling defect in the left external iliac vein is  indeterminate, consider dedicated Doppler for further evaluation.        I have personally reviewed the examination and initial interpretation  and I agree with the findings.    WARNER ROBLES MD         SYSTEM ID:   [id number]   CTA  Angiogram Heart    Impression    IMPRESSION:  1.  Non-diagnostic evaluation for left atrial appendage thrombus.   2.  Please review the separate Radiology report for incidental  noncardiac findings.    FINDINGS:      1.  Non-diagnostic evaluation for left atrial appendage thrombus.   2.  The left atrial appendage has a  chicken wing morphology.  3.   Coronary images are non-diagnostic for stenosis/atherosclerosis  due to cardiac motion artifact. No coronary artery calcifications  seen.   4.   The visualized aortic root, ascending aorta, and descending  thoracic aorta are normal in caliber.    ABDOUL GARCIA MD         SYSTEM ID:  [id number]   Radiologist Consult For Cardiology    Impression    IMPRESSION:  1. Incidental pulmonary emboli bilaterally without obvious right heart  strain.  2. Bulky right hilar and subcarinal lymphadenopathy.    NATALIIA ALCAZAR MD         SYSTEM ID:  [id number]   CT Head w/o Contrast    Impression    Impression:   Noncontrast head CT: Patchy hypodensities in the left frontal,  parietal, temporal and occipital lobes, which correlate with the  diffusion restricting infarcts seen on MR brain from 7/15/2025. No  hemorrhage. No midline shift.    CTA head and neck: Similar to slightly increased occlusion of a 7 mm  segment of incompletely opacified distal M1 branch of the left middle  cerebral artery. The remaining major intracranial arterial vasculature  is patent without significant stenosis.    CT PERFUSION: There is hypoperfusion demonstrated diffusely in the  left middle cerebral artery territory.     I have personally reviewed the examination and initial interpretation  and I agree with the findings.    ANUJ YOO MD         SYSTEM ID:  [id number]   CT Chest Pulmonary Embolism w Contrast    Impression    IMPRESSION:  1. Exam is positive for pulmonary embolism, unchanged from 7/16/2025.  Multiple segmental pulmonary emboli as above. There is bowing of the  interatrial  septum and mild enlargement of the pulmonary artery,  consistent with mild right heart strain.     2. Dependent subsegmental lower lobe atelectasis with engorgement of  the pulmonary vasculature & interlobular septal thickening may  reflect a degree of superimposed pulmonary edema.     SYSTEM ID:  [id number]   CTA Head Neck with Contrast    Impression    Impression:   Noncontrast head CT: Patchy hypodensities in the left frontal,  parietal, temporal and occipital lobes, which correlate with the  diffusion restricting infarcts seen on MR brain from 7/15/2025. No  hemorrhage. No midline shift.    CTA head and neck: Similar to slightly increased occlusion of a 7 mm  segment of incompletely opacified distal M1 branch of the left middle  cerebral artery. The remaining major intracranial arterial vasculature  is patent without significant stenosis.    CT PERFUSION: There is hypoperfusion demonstrated diffusely in the  left middle cerebral artery territory.     I have personally reviewed the examination and initial interpretation  and I agree with the findings.    ANUJ YOO MD         SYSTEM ID:  [id number]   CT Head Perfusion w Contrast    Impression    Impression:   Noncontrast head CT: Patchy hypodensities in the left frontal,  parietal, temporal and occipital lobes, which correlate with the  diffusion restricting infarcts seen on MR brain from 7/15/2025. No  hemorrhage. No midline shift.    CTA head and neck: Similar to slightly increased occlusion of a 7 mm  segment of incompletely opacified distal M1 branch of the left middle  cerebral artery. The remaining major intracranial arterial vasculature  is patent without significant stenosis.    CT PERFUSION: There is hypoperfusion demonstrated diffusely in the  left middle cerebral artery territory.     I have personally reviewed the examination and initial interpretation  and I agree with the findings.    ANUJ YOO MD         SYSTEM ID:  [id number]   CT Head w/o  Contrast    Impression    IMPRESSION: Continued demonstration of left MCA territory scattered  infarctions, in a patient with hypogenesis of the corpus callosum. The  right-sided lesions seen on the DWI of the 7/15/2025 MRI are not well  visualized on the CT scan. However, one small lesion in the right  parietal lobe exhibits some increased density, especially on the  coronal view. This could represent focal cortical laminar necrosis, or  perhaps less likely, a small hemorrhage. If clinically indicated,  follow up exam might help differentiate this. Susceptibility-weighted  MR imaging would likely resolve this question. Either way, this is a  small focus.    MARIAM SALGUERO MD         SYSTEM ID:  [id number]   CT Head w/o Contrast    Impression    IMPRESSION:   HEAD CT:  1.  New small acute to subacute infarct left lentiform nucleus.  2.  Tiny acute to subacute infarct right caudate head appears new when compared to the prior MRI probably not significantly changed when compared to the prior CT.  3.  Scattered subacute infarcts left middle cerebral artery territory probably not significant changed.  4.  Few scattered small acute to subacute infarcts in the right cerebral hemisphere seen on the MRI are not readily apparent by CT.  5.  No acute intracranial hemorrhage.  6.  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    HEAD CTA:  1.  Stable subtotal occlusion mid to distal M1 segment left middle cerebral artery.  2.  New focal high-grade stenosis posterior parietal branch left middle cerebral artery near the M2/M3 junction.    NECK CTA:  1.  No hemodynamically significant stenosis in the neck vessels.  2.  No evidence for dissection.  3.  New bilateral pleural effusions and patchy infiltrates in both upper lobes and superior segments of the lower lobes.  4.  Mediastinal and right hilar adenopathy was incompletely imaged on the prior study and concerning for metastatic disease or lymphoma.    CT  PERFUSION:  1.  No evidence of core infarct by perfusion imaging.  2.  Small focal area of increased Tmax greater than 6 seconds in the left middle cerebral artery territory in the region of the left corona radiata/centrum semiovale concerning for at risk brain.  3.  If the threshold for the Tmax is decreased to greater than 4 seconds, there is a much larger of area of oligemia in the left middle cerebral artery territory as well as a small area in the anterior right frontal lobe.    4.  Findings discussed with Dr. Rodas at 5:41 AM CST on 07/19/2025.     CTA Head Neck w Contrast    Impression    IMPRESSION:   HEAD CT:  1.  New small acute to subacute infarct left lentiform nucleus.  2.  Tiny acute to subacute infarct right caudate head appears new when compared to the prior MRI probably not significantly changed when compared to the prior CT.  3.  Scattered subacute infarcts left middle cerebral artery territory probably not significant changed.  4.  Few scattered small acute to subacute infarcts in the right cerebral hemisphere seen on the MRI are not readily apparent by CT.  5.  No acute intracranial hemorrhage.  6.  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    HEAD CTA:  1.  Stable subtotal occlusion mid to distal M1 segment left middle cerebral artery.  2.  New focal high-grade stenosis posterior parietal branch left middle cerebral artery near the M2/M3 junction.    NECK CTA:  1.  No hemodynamically significant stenosis in the neck vessels.  2.  No evidence for dissection.  3.  New bilateral pleural effusions and patchy infiltrates in both upper lobes and superior segments of the lower lobes.  4.  Mediastinal and right hilar adenopathy was incompletely imaged on the prior study and concerning for metastatic disease or lymphoma.    CT PERFUSION:  1.  No evidence of core infarct by perfusion imaging.  2.  Small focal area of increased Tmax greater than 6 seconds in the left middle cerebral  artery territory in the region of the left corona radiata/centrum semiovale concerning for at risk brain.  3.  If the threshold for the Tmax is decreased to greater than 4 seconds, there is a much larger of area of oligemia in the left middle cerebral artery territory as well as a small area in the anterior right frontal lobe.    4.  Findings discussed with Dr. Rodas at 5:41 AM CST on 07/19/2025.     CT Head Perfusion w Contrast    Impression    IMPRESSION:   HEAD CT:  1.  New small acute to subacute infarct left lentiform nucleus.  2.  Tiny acute to subacute infarct right caudate head appears new when compared to the prior MRI probably not significantly changed when compared to the prior CT.  3.  Scattered subacute infarcts left middle cerebral artery territory probably not significant changed.  4.  Few scattered small acute to subacute infarcts in the right cerebral hemisphere seen on the MRI are not readily apparent by CT.  5.  No acute intracranial hemorrhage.  6.  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    HEAD CTA:  1.  Stable subtotal occlusion mid to distal M1 segment left middle cerebral artery.  2.  New focal high-grade stenosis posterior parietal branch left middle cerebral artery near the M2/M3 junction.    NECK CTA:  1.  No hemodynamically significant stenosis in the neck vessels.  2.  No evidence for dissection.  3.  New bilateral pleural effusions and patchy infiltrates in both upper lobes and superior segments of the lower lobes.  4.  Mediastinal and right hilar adenopathy was incompletely imaged on the prior study and concerning for metastatic disease or lymphoma.    CT PERFUSION:  1.  No evidence of core infarct by perfusion imaging.  2.  Small focal area of increased Tmax greater than 6 seconds in the left middle cerebral artery territory in the region of the left corona radiata/centrum semiovale concerning for at risk brain.  3.  If the threshold for the Tmax is decreased  to greater than 4 seconds, there is a much larger of area of oligemia in the left middle cerebral artery territory as well as a small area in the anterior right frontal lobe.    4.  Findings discussed with Dr. Rodas at 5:41 AM CST on 07/19/2025.     CT Head w/o Contrast   Result Value Ref Range    Radiologist flags Subarachnoid hemorrhage (Urgent)     Impression    Impression:  1. Subarachnoid hemorrhage in the sulci along the left cerebral  convexity.  2. Evolution of the patchy left middle cerebral artery territory  infarcts.  2. 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    [Urgent Result: Subarachnoid hemorrhage]    Finding was identified on 7/19/2025 7:07 AM.     Dr. Rodas was contacted by Dr. Hidalgo at 7/19/2025 7:08 AM and  verbalized understanding of the urgent finding.          I have personally reviewed the examination and initial interpretation  and I agree with the findings.    ANUJ YOO MD         SYSTEM ID:  [id number]   Echocardiogram Complete w Bubble study - For age 60 yrs or less   Result Value Ref Range    LVEF  60-65%    Transesophageal Echocardiogram   Result Value Ref Range    LVEF  60-65%        Medications list for reference:  Current Facility-Administered Medications   Medication Dose Route Frequency Provider Last Rate Last Admin    acetaminophen (TYLENOL) tablet 650 mg  650 mg Oral or Feeding Tube Q4H Carine Matt MD   650 mg at 07/21/25 0918    Or    acetaminophen (TYLENOL) Suppository 650 mg  650 mg Rectal Q4H Carine Matt MD        bisacodyl (DULCOLAX) suppository 10 mg  10 mg Rectal Daily PRN Yamile Gutierrez APRN CNP        dexAMETHasone (DECADRON) tablet 40 mg  40 mg Oral or Feeding Tube Daily Carine Matt MD   40 mg at 07/21/25 0918    glucose gel 15-30 g  15-30 g Oral Q15 Min PRN Yamile Gutierrez APRN CNP        Or    dextrose 50 % injection 25-50 mL  25-50 mL Intravenous Q15 Min PRN Yamile Gutierrez APRN CNP        Or    glucagon injection 1  mg  1 mg Subcutaneous Q15 Min PRN Yamile Gutierrez APRN CNP        diphenhydrAMINE (BENADRYL) capsule 25 mg  25 mg Oral or Feeding Tube Q6H PRN Carine Matt MD        Or    diphenhydrAMINE (BENADRYL) injection 25 mg  25 mg Intravenous Q6H PRN Carine Matt MD        heparin 25,000 units in 0.45% NaCl 250 mL ANTICOAGULANT infusion  0-5,000 Units/hr Intravenous Continuous Yamile Gutierrez APRN CNP 22.5 mL/hr at 07/21/25 1143 2,250 Units/hr at 07/21/25 1143    hydrALAZINE (APRESOLINE) injection 10 mg  10 mg Intravenous Q30 Min PRN Zora Boone PA-C   10 mg at 07/19/25 0801    insulin aspart (NovoLOG) injection (RAPID ACTING)  1-7 Units Subcutaneous TID AC Yamile Gutierrez APRN CNP   2 Units at 07/21/25 1151    insulin aspart (NovoLOG) injection (RAPID ACTING)  1-5 Units Subcutaneous At Bedtime Yamile Gutierrez APRN CNP        labetalol (NORMODYNE/TRANDATE) injection 10 mg  10 mg Intravenous Q10 Min PRN Zora Boone PA-C        magnesium hydroxide (MILK OF MAGNESIA) suspension 30 mL  30 mL Oral or Feeding Tube Daily Carine Matt MD        Medication Instruction - Avoid dextrose in IV solutions   Intravenous Continuous PRN Nishi Rodas MD        ondansetron (ZOFRAN ODT) ODT tab 4 mg  4 mg Oral Q6H PRN Nishi Rodas MD        Or    ondansetron (ZOFRAN) injection 4 mg  4 mg Intravenous Q6H PRN Nishi Rodas MD        polyethylene glycol (MIRALAX) Packet 17 g  17 g Oral or Feeding Tube TID Yamile Gutierrez APRN CNP        prochlorperazine (COMPAZINE) injection 10 mg  10 mg Intravenous Q6H PRN Franklin Aguilera MD        Or    prochlorperazine (COMPAZINE) tablet 10 mg  10 mg Oral or Feeding Tube Q6H PRN Franklin Aguilera MD        rosuvastatin (CRESTOR) tablet 10 mg  10 mg Oral or Feeding Tube Daily Carine Matt MD   10 mg at 07/21/25 0918    senna-docusate (SENOKOT-S/PERICOLACE) 8.6-50 MG per tablet 1-2 tablet  1-2 tablet Oral or Feeding Tube BID Dilcia  Griselda, CNP   1 tablet at 07/21/25 0918    sodium chloride (PF) 0.9% PF flush 3 mL  3 mL Intracatheter Q8H MOHINDER Nishi Rodas MD   3 mL at 07/19/25 2148    sodium chloride (PF) 0.9% PF flush 3 mL  3 mL Intracatheter q1 min prn Nishi Rodas MD        sodium chloride 0.9 % bag for CT scan flush  100 mL As instructed Once Nishi Rodas MD

## 2025-07-21 NOTE — PROGRESS NOTES
Neurocritical Care Progress Note    Reason for critical care admission: Acute stroke, severe thromboyctopenia  Admitting Team: Neurocritical Care  Date of Service:  07/21/2025  Date of Admission:  7/15/2025  Hospital Day: 7    Assessment/Plan  Bernard Chapman is a 35 year old male with a past medical history of hypertension, diabetes, possible ITP, sleep apnea, PE and DVT on rivaroxaban admitted on 7/15/2025 for acute ischemic stroke in left MCA territory, near occlusion of left M1, and concern for thrombotic emergency such as TTP or catastrophic antiphospholipid syndrome (in setting of severe thrombocytopenia and acute cerebral infarcts).    24 hour events:  -Started Decadron 40mg oral daily per hematology recs  -Platelets are stable around 70 with last transfusion 7/19  -Chlorhexidine wash for folliculitis per dermatology recs  -XR Chest shows volume overload (edema/atelectasis)  -CTH shows unchanged SAH, increasing mass effect on L lateral ventricle by L MCA infarction  -A line removed due to clots    Neuro  #Multifocal acute ischemic strokes, in L MCA territory and new L basal ganglia stroke; Increasing mass effect on L lateral ventricle by L MCA infarction  #Subtotal occlusive thrombus in left M1   #Left parietal SAH   Etiology of stroke remains unclear, though malignancy high on the differential given multiple findings on CT CAP, h/o hypercoagulability, and 3 territory CVA on head imaging. Mobile masses on tricuspid and aortic valves on TTE 7/16, thought to be thrombus.  - Neurochecks every 2 hrs, Q4 overnight if CT is stable  - Repeat CTH when therapeutically stable  -Increase heparin drip to high intensity  - SBP goal < 120-160 mmHg, DBP < 100   - HOB > 30   - PT/OT/SLP     #Analgesics & sedation  - Tylenol 650 Q4H       CV  #Hypertension  #Likely cardiac valve thrombus on tricuspid and aortic valve 7/16 on TIMO, although endocarditis on the differential  #Risk of CHF with new TV and AV regurgitation  -  7/17 TIMO with diffuse investment of the aortic and tricuspid valves with thrombus, moderate aortic and tricuspid regurgitation   -Cardiac monitoring  -SBP goal < 120 - 160 mmHg  -DBP goal < 100  -BP measurements via BP cuff on left arm, as right arm shows readings 30 points below a line  -a line removed due to clots  -PRN Labetalol and Hydralazine  -hold PTA metoprolol, hydrochlorothiazide, losartan  -, Triglycerides - 202,A1c 5.3%  -Continue Crestor 10 mg daily   -Heparin gtt   -Consider repeating echo in the future for reassessment of valve function     Workup thus far:   - 7/16: cardiac CTA non diagnostic for left atrial appendage thrombus and stenosis/athero  - 7/16: TTE with mobile echodensities on tricuspid valve and aortic valve, EF 60-65%  - 7/17: TIMO diffuse investment of the aortic and tricuspid valves with thrombus, moderate aortic and tricuspid regurgitation; no MEGAN clot   - 7/16: Blood culture NGTD  - 7/17: blood culture NGTD      Resp  #ELENA on CPAP  Oxygen/vent: room air  -Continuous pulse ox  -Maintain O2 saturations greater than 92%  -CPAP at night ordered     #Right lung nodule  #Multiple enlarged mediastinal, paraesophageal, and hilar lymph nodes cf paraneoplastic vs lymphoproliferative vs malignancy   - s/p EBUS and TBNA: initial pathology showed malignancy (not lymphoma). Follow pathology   - CT CAP 7/16 with spiculated-appearing nodule in the right lung apex    - 7/18: EBUS and TBNA of mediastinal lymph nodes, path pending      #Pulmonary embolism  - 7/17 CT PE protocol positive for PE unchanged from 7/16/25   - Heparin drip transition to high intensity  - VBG pending      GI  #Obesity  #Constipation  Diet:soft and bite size  Working with SLP  Last BM: 7/20  GI prophylaxis: not indicated  -Bowel regimen: PRN senna-docusate, Miralax, and bisacodyl suppository      #splenic infarction  - CT CAP 7/16 showed wedge-shaped hypodensity at the periphery of the spleen   - see above for  "anticoagulation      Renal/G  #Renal infarct   #Hypocalcemia, improved    -Daily BMP  -Electrolyte replacement protocol  -I's and O's  -ical 4.4, gave 1 gm calcium gluconate, calcium now 8.7  VF: none  #Replete electrolytes, Mg++ 2, PO4- 3, K+ 4  #Renal infarcts  #Hyperchloremia  Repletion protocols: yes  -Fluid Balance Goal: even to negative  -Na goal normonatremia  -Bicarb low, VBG ordered      Endo  # Diabetes  -Hgb A1c 5.3%  -Started steroid 7/20, initiated glucose checks and SSI     Heme  #Acute thrombocytopenia  #Acute anemia, potential hemolytic, TRINI negative, PNH negative, [id number] normal with no schistocytes  #Concern for thrombotic thrombocytopenic purpura (TTP) versus catastrophic antiphospholipid syndrome (CAPS)   - s/p urgent PLEX x1 7/16  - hold rivaroxaban  - dexamethasone 40mg daily started 7/20 per hematology for ITP-like picture  - Daily CBC, coags, platelet Q12  - Hgb goal >7, plt goal >50k  - Transfuse to meet Hgb and plt goals  - hematology consulted. Appreciate recommendations     ID  #Leukocytosis  -daily CBC  -follow temp curve, afebrile  -blood cultures NGTD     Integument/skin  #Folliculitis  -Chlorhexidine wash per dermatology recs  - 7/16 LLE skin biopsy with ruptured folliculitis, bacterial vs pityrosporum folliculitis possible, no definite features of cutaneous small vessel vasculitis or thrombotic vasculopathy       ICU Checklist  Lines/tubes/drains: PIV x2  FEN: Regular diet  PPX: DVT - heparin gtt ; GI NA  Code: Full code  Dispo: ICU - NCC     # Obesity: Estimated body mass index is 36.37 kg/m  as calculated from the following:    Height as of this encounter: 1.88 m (6' 2\").    Weight as of this encounter: 128.5 kg (283 lb 4.7 oz).      # Financial/Environmental Concerns: none      Clinically Significant Risk Factors          # Hyperchloremia: Highest Cl = 108 mmol/L in last 2 days, will monitor as appropriate      # Hypocalcemia: Lowest Ca = 8.4 mg/dL in last 2 days, will monitor " "and replace as appropriate        # Coagulation Defect: INR = 1.22 (Ref range: 0.85 - 1.15) and/or PTT = 45 Seconds (Ref range: 22 - 38 Seconds), will monitor for bleeding  # Thrombocytopenia: Lowest platelets = 35 in last 2 days, will monitor for bleeding              # Obesity: Estimated body mass index is 36.17 kg/m  as calculated from the following:    Height as of this encounter: 1.88 m (6' 2\").    Weight as of this encounter: 127.8 kg (281 lb 12 oz).        # Financial/Environmental Concerns: none              The patient was seen and discussed with the [facility] attending, Dr. Carine Matt.    Shelley Bosch MS3    Resident/Fellow Attestation   I, Nilsa Daly DO, was present with the medical/SAMSON student who participated in the service and in the documentation of the note.  I have verified the history and personally performed the physical exam and medical decision making.  I agree with the assessment and plan of care as documented in the note.        Nilsa Daly DO  PGY2  Date of Service (when I saw the patient): 25     24 Hour Vital Signs Summary:  Temp: 97.8  F (36.6  C) Temp  Min: 97.7  F (36.5  C)  Max: 98  F (36.7  C)  Resp: 23 Resp  Min: 11  Max: 30  SpO2: 98 % SpO2  Min: 92 %  Max: 99 %  Pulse: 93 Pulse  Min: 57  Max: 104    No data recorded  BP: (!) 161/76 Systolic (24hrs), Av , Min:97 , Max:161   Diastolic (24hrs), Av, Min:50, Max:85        Respiratory monitoring:   Resp: 23    I/O last 3 completed shifts:  In: 2706.71 [P.O.:2130; I.V.:576.71]  Out: 2625 [Urine:2625]    Current Medications:  Current Facility-Administered Medications   Medication Dose Route Frequency Provider Last Rate Last Admin    acetaminophen (TYLENOL) tablet 650 mg  650 mg Oral or Feeding Tube Q4H Carine Matt MD   650 mg at 25 0918    Or    acetaminophen (TYLENOL) Suppository 650 mg  650 mg Rectal Q4H Carine Matt MD        dexAMETHasone (DECADRON) tablet 40 mg  40 mg Oral or Feeding Tube " Daily Carine Matt MD   40 mg at 07/21/25 0918    insulin aspart (NovoLOG) injection (RAPID ACTING)  1-7 Units Subcutaneous TID AC Yamile Gutierrez APRN CNP   2 Units at 07/21/25 1151    insulin aspart (NovoLOG) injection (RAPID ACTING)  1-5 Units Subcutaneous At Bedtime Yamile Gutierrez APRN CNP        magnesium hydroxide (MILK OF MAGNESIA) suspension 30 mL  30 mL Oral or Feeding Tube Daily Carine Matt MD        polyethylene glycol (MIRALAX) Packet 17 g  17 g Oral or Feeding Tube TID Yamile Gutierrez APRN CNP        rosuvastatin (CRESTOR) tablet 10 mg  10 mg Oral or Feeding Tube Daily Carine Matt MD   10 mg at 07/21/25 0918    senna-docusate (SENOKOT-S/PERICOLACE) 8.6-50 MG per tablet 1-2 tablet  1-2 tablet Oral or Feeding Tube BID Griselda Ha CNP   1 tablet at 07/21/25 0918    sodium chloride (PF) 0.9% PF flush 3 mL  3 mL Intracatheter Q8H [facility] Nishi Rodas MD   3 mL at 07/19/25 2148    sodium chloride 0.9 % bag for CT scan flush  100 mL As instructed Once Nishi Rodas MD           PRN Medications:  Current Facility-Administered Medications   Medication Dose Route Frequency Provider Last Rate Last Admin    bisacodyl (DULCOLAX) suppository 10 mg  10 mg Rectal Daily PRN Yamile Gutierrez APRN CNP        glucose gel 15-30 g  15-30 g Oral Q15 Min PRN Yamile Gutierrez APRN CNP        Or    dextrose 50 % injection 25-50 mL  25-50 mL Intravenous Q15 Min PRN Yamile Gutierrez APRN CNP        Or    glucagon injection 1 mg  1 mg Subcutaneous Q15 Min PRN Yamile Gutierrez APRN CNP        diphenhydrAMINE (BENADRYL) capsule 25 mg  25 mg Oral or Feeding Tube Q6H PRN Carine Matt MD        Or    diphenhydrAMINE (BENADRYL) injection 25 mg  25 mg Intravenous Q6H PRN Carine Matt MD        hydrALAZINE (APRESOLINE) injection 10 mg  10 mg Intravenous Q30 Min PRN Zora Boone PA-C   10 mg at 07/19/25 0801    labetalol (NORMODYNE/TRANDATE) injection 10 mg  10 mg Intravenous Q10  "Min PRN Zora Boone PA-C        Medication Instruction - Avoid dextrose in IV solutions   Intravenous Continuous PRN Nishi Rodas MD        ondansetron (ZOFRAN ODT) ODT tab 4 mg  4 mg Oral Q6H PRN Nishi Rodas MD        Or    ondansetron (ZOFRAN) injection 4 mg  4 mg Intravenous Q6H PRN Nishi Rodas MD        prochlorperazine (COMPAZINE) injection 10 mg  10 mg Intravenous Q6H PRN Franklin Aguilera MD        Or    prochlorperazine (COMPAZINE) tablet 10 mg  10 mg Oral or Feeding Tube Q6H PRN Franklin Aguilera MD        sodium chloride (PF) 0.9% PF flush 3 mL  3 mL Intracatheter q1 min prn Nishi Rodas MD           Infusions:  Current Facility-Administered Medications   Medication Dose Route Frequency Provider Last Rate Last Admin    heparin 25,000 units in 0.45% NaCl 250 mL ANTICOAGULANT infusion  0-5,000 Units/hr Intravenous Continuous Yamile Gutierrez APRN CNP 22.5 mL/hr at 07/21/25 1143 2,250 Units/hr at 07/21/25 1143    Medication Instruction - Avoid dextrose in IV solutions   Intravenous Continuous PRN Nishi Rodas MD           Allergies   Allergen Reactions    Apixaban Rash    Amlodipine Other (See Comments) and Rash       Physical Examination:  Vitals: BP (!) 161/76   Pulse 93   Temp 97.8  F (36.6  C)   Resp 23   Ht 1.88 m (6' 2\")   Wt 127.8 kg (281 lb 12 oz)   SpO2 98%   BMI 36.17 kg/m    General: Adult male patient, lying in bed, critically-ill  HEENT: Normocephalic, atraumatic, no icterus, oral cavity/oropharynx pink and moist  Cardiac: RRR via monitor  Pulm: unlabored, expansion symmetric, no retractions or use of accessory muscles  Abdomen: Soft, non-distended  Extremities: Warm, no edema, well perfused  Skin: No rash or lesion  Psych: Calm and cooperative, more interactive   Neuro:  Mental status: Awake, alert, attentive, oriented to self, time, place, and circumstance. Language is fluent and coherent with intact comprehension of complex commands, " naming and repetition.  Cranial nerves: right partial hemianopia, PERRL, conjugate gaze, EOMI, facial sensation intact, mild right facial droop, mild dysarthria. Head tilted to the right at rest   Motor: Uses left hand to lift RUE against gravity, unable to activate wrist or move fingers on right. BLE antigravity without drift   Sensory: Intact to light touch x 4 extremities  Coordination: FNF without ataxia or dysmetria.   Gait: MILAN, deferred.        [employer] Stroke Scale  Exam Interval: Baseline   Score    Level of consciousness: (0)   Alert, keenly responsive    LOC questions: (0)   Answers both questions correctly    LOC commands: (0)   Performs both tasks correctly    Best gaze: (0)   Normal    Visual: (1)   Partial hemianopia    Facial palsy: (1)   Minor paralysis (flat nasolabial fold, smile asymmetry)    Motor arm (left): (0)   No drift    Motor arm (right): (1)   Drift    Motor leg (left): (0)   No drift    Motor leg (right): (0)   No drift    Limb ataxia: (0)   Absent    Sensory: (0)   Normal- no sensory loss    Best language: (0)   Normal- no aphasia    Dysarthria: (1)   Mild to moderate dysarthria    Extinction and inattention: (0)   No abnormality        Total Score:  4 (7/21/25 0900)          Labs:  Recent Labs   Lab 07/21/25  0528 07/20/25  0417 07/19/25  1659 07/19/25  0327 07/18/25  0403 07/16/25  0414 07/15/25  2349    140 136 140 143   < >  --    POTASSIUM 4.5 3.9  --  3.9 3.7   < >  --    CHLORIDE 107 108*  --  106 107   < >  --    CO2 19* 22  --  25 26   < >  --    BUN 20.4* 15.2  --  14.8 15.1   < >  --    CR 0.78 0.96  --  1.02 1.11   < >  --    SHARAN 8.7* 8.4*  --  8.5* 8.1*   < >  --    BILITOTAL 0.6 1.0 1.1  --   --   --  0.9   ALKPHOS 64 55 56  --   --   --  58   ALT 18 25 29  --   --   --  25   AST 19 25 34  --   --   --  24    < > = values in this interval not displayed.       Recent Labs   Lab 07/21/25  0528 07/20/25  1718 07/20/25  0417 07/20/25  0024  "07/19/25  1659 07/19/25  0327   WBC 12.0*  --  11.9*  --  12.3* 14.1*   HGB 11.0*  --  9.9*  --  10.4* 9.8*   PLT 70* 76* 64* 72* 35* 53*       No results for input(s): \"PH\", \"PCO2\", \"PO2\", \"HCO3\" in the last 168 hours.    All cultures:  Recent Labs   Lab 07/17/25  1449 07/17/25  1440 07/16/25  1009   CULTURE No growth after 3 days No growth after 3 days No Growth  No Growth       Imaging:    Recent Results (from the past 24 hours)   Glucose by meter   Result Value Ref Range    GLUCOSE BY METER POCT 137 (H) 70 - 99 mg/dL   Platelet count   Result Value Ref Range    Platelet Count 76 (L) 150 - 450 10e3/uL    MCV 80 78 - 100 fL   Heparin Unfractionated Anti Xa Level   Result Value Ref Range    Anti Xa Unfractionated Heparin 0.19 For Reference Range, See Comment IU/mL    Narrative    Therapeutic Range: UFH: 0.25-0.50 IU/mL for low intensity dosing,  0.30-0.70 IU/mL for high intensity dosing DVT and PE.  This test is not validated for other direct factor X inhibitors (e.g. rivaroxaban, apixaban, edoxaban, betrixaban, fondaparinux) and should not be used for monitoring of other medications.   Glucose by meter   Result Value Ref Range    GLUCOSE BY METER POCT 162 (H) 70 - 99 mg/dL   Heparin Unfractionated Anti Xa Level   Result Value Ref Range    Anti Xa Unfractionated Heparin 0.25 For Reference Range, See Comment IU/mL    Narrative    Therapeutic Range: UFH: 0.25-0.50 IU/mL for low intensity dosing,  0.30-0.70 IU/mL for high intensity dosing DVT and PE.  This test is not validated for other direct factor X inhibitors (e.g. rivaroxaban, apixaban, edoxaban, betrixaban, fondaparinux) and should not be used for monitoring of other medications.   CT Head w/o Contrast    Narrative    EXAM: CT HEAD W/O CONTRAST  7/21/2025 2:46 AM     HISTORY:  therapeutic on heparin; pt with L MCA stroke and SAH       COMPARISON:  CT head 7/19/2025. Brain MRI 7/19/2025. Multiple priors.    TECHNIQUE: Using multidetector thin collimation " helical acquisition  technique, axial, coronal and sagittal CT images from the skull base  to the vertex were obtained without intravenous contrast.   (topogram) image(s) also obtained and reviewed.    FINDINGS:  Similar degree of small volume subarachnoid hemorrhage along the sulci  of the left cerebral convexity. Interval increase in conspicuity of  periventricular and basal ganglia hypoattenuation in the region of  known left MCA infarction. Slightly increased effacement of the left  lateral ventricle secondary to edema, and very mild left hemispheric  sulcal effacement. Subtle small known posterolateral right temporal  infarct. No evidence for new intracranial hemorrhage. No midline  shift. No hydrocephalus.    The bony calvaria and the bones of the skull base are normal. The  visualized portions of the paranasal sinuses and mastoid air cells are  clear. Grossly normal orbits.       Impression    IMPRESSION:   1. Unchanged small volume subarachnoid hemorrhage along the left  cerebral convexity. No new hemorrhage.  2. Expected evolution of known left MCA infarction with increasing  mass effect on the left lateral ventricle which is now mildly effaced.  No evidence for hydrocephalus.  3. No evidence for new acute intracranial pathology..     I have personally reviewed the examination and initial interpretation  and I agree with the findings.    ROSMERY BONILLA MD         SYSTEM ID:  [id number]   Basic metabolic panel   Result Value Ref Range    Sodium 138 135 - 145 mmol/L    Potassium 4.5 3.4 - 5.3 mmol/L    Chloride 107 98 - 107 mmol/L    Carbon Dioxide (CO2) 19 (L) 22 - 29 mmol/L    Anion Gap 12 7 - 15 mmol/L    Urea Nitrogen 20.4 (H) 6.0 - 20.0 mg/dL    Creatinine 0.78 0.67 - 1.17 mg/dL    GFR Estimate >90 >60 mL/min/1.73m2    Calcium 8.7 (L) 8.8 - 10.4 mg/dL    Glucose 180 (H) 70 - 99 mg/dL   CBC with platelets   Result Value Ref Range    WBC Count 12.0 (H) 4.0 - 11.0 10e3/uL    RBC Count 4.08 (L) 4.40 -  5.90 10e6/uL    Hemoglobin 11.0 (L) 13.3 - 17.7 g/dL    Hematocrit 33.6 (L) 40.0 - 53.0 %    MCV 82 78 - 100 fL    MCH 27.0 26.5 - 33.0 pg    MCHC 32.7 31.5 - 36.5 g/dL    RDW 13.4 10.0 - 15.0 %    Platelet Count 70 (L) 150 - 450 10e3/uL   INR   Result Value Ref Range    INR 1.22 (H) 0.85 - 1.15    PT 15.7 (H) 11.8 - 14.8 Seconds   Lactate Dehydrogenase   Result Value Ref Range    Lactate Dehydrogenase 617 (H) 0 - 250 U/L   Partial thromboplastin time   Result Value Ref Range    aPTT 45 (H) 22 - 38 Seconds   Magnesium   Result Value Ref Range    Magnesium 2.2 1.7 - 2.3 mg/dL   Phosphorus   Result Value Ref Range    Phosphorus 3.9 2.5 - 4.5 mg/dL   Uric acid   Result Value Ref Range    Uric Acid 5.5 3.4 - 7.0 mg/dL   Fibrinogen activity   Result Value Ref Range    Fibrinogen Activity 433 170 - 510 mg/dL   Hepatic panel   Result Value Ref Range    Protein Total 6.4 6.4 - 8.3 g/dL    Albumin 3.7 3.5 - 5.2 g/dL    Bilirubin Total 0.6 <=1.2 mg/dL    Alkaline Phosphatase 64 40 - 150 U/L    AST 19 0 - 45 U/L    ALT 18 0 - 70 U/L    Bilirubin Direct 0.25 0.00 - 0.45 mg/dL   Heparin Unfractionated Anti Xa Level   Result Value Ref Range    Anti Xa Unfractionated Heparin 0.29 For Reference Range, See Comment IU/mL    Narrative    Therapeutic Range: UFH: 0.25-0.50 IU/mL for low intensity dosing,  0.30-0.70 IU/mL for high intensity dosing DVT and PE.  This test is not validated for other direct factor X inhibitors (e.g. rivaroxaban, apixaban, edoxaban, betrixaban, fondaparinux) and should not be used for monitoring of other medications.   Extra Tube    Narrative    The following orders were created for panel order Extra Tube.  Procedure                               Abnormality         Status                     ---------                               -----------         ------                     Extra Purple Top Tube[[id number]]                           Final result                 Please view results for these tests on the  individual orders.   Extra Purple Top Tube   Result Value Ref Range    Hold Specimen JIC    Extra Tube    Narrative    The following orders were created for panel order Extra Tube.  Procedure                               Abnormality         Status                     ---------                               -----------         ------                     Extra Green Top (Lithiu...[[id number]]                      Final result                 Please view results for these tests on the individual orders.   Extra Green Top (Lithium Heparin) Tube   Result Value Ref Range    Hold Specimen JIC    Glucose by meter   Result Value Ref Range    GLUCOSE BY METER POCT 171 (H) 70 - 99 mg/dL   Heparin Unfractionated Anti Xa Level   Result Value Ref Range    Anti Xa Unfractionated Heparin 0.29 For Reference Range, See Comment IU/mL    Narrative    Therapeutic Range: UFH: 0.25-0.50 IU/mL for low intensity dosing,  0.30-0.70 IU/mL for high intensity dosing DVT and PE.  This test is not validated for other direct factor X inhibitors (e.g. rivaroxaban, apixaban, edoxaban, betrixaban, fondaparinux) and should not be used for monitoring of other medications.   Blood gas venous   Result Value Ref Range    pH Venous 7.47 (H) 7.32 - 7.43    pCO2 Venous 31 (L) 40 - 50 mm Hg    pO2 Venous 62 (H) 25 - 47 mm Hg    Bicarbonate Venous 22 21 - 28 mmol/L    Base Excess/Deficit Venous -0.4 -3.0 - 3.0 mmol/L    FIO2 0     Oxyhemoglobin Venous 90 (H) 70 - 75 %    O2 Sat, Venous 92.0 (H) 70.0 - 75.0 %    Narrative    In healthy individuals, oxyhemoglobin (O2Hb) and oxygen saturation (SO2) are approximately equal. In the presence of dyshemoglobins, oxyhemoglobin can be considerably lower than oxygen saturation.   Lactic acid whole blood   Result Value Ref Range    Lactic Acid 1.1 0.7 - 2.0 mmol/L   Extra Tube    Narrative    The following orders were created for panel order Extra Tube.  Procedure                               Abnormality         Status                      ---------                               -----------         ------                     Extra Purple Top Tube[[id number]]                           Final result                 Please view results for these tests on the individual orders.   Extra Purple Top Tube   Result Value Ref Range    Hold Specimen [facility]    Glucose by meter   Result Value Ref Range    GLUCOSE BY METER POCT 190 (H) 70 - 99 mg/dL       All relevant imaging and laboratory values personally reviewed.

## 2025-07-21 NOTE — PLAN OF CARE
Shift:  0700- 1930        Events:     Pt remains in ICU. Up to the chair. Assistance x 2.     Assessment:     Neuro: Pt is alert. O x 4. Moves  x 4. Following commands.     CV: HR is 80s, rhythm is SR. BP is stable Pulses are palpated/ doppler. MAP >65. SBP <160.     Pulm:  Lungs are clear.     : Urinal    GI: Abd is soft. Regular diet.     Skin: Intact. Generalized bruising noted.         Drips:    Heparin - 2400      Lines:    PIV x 1    Shift:    Labs drawn per orders.  Hourly rounded and call light in place. Delirium interventions in place. Patient turned every 2-4 hours and PRN. (See flow sheets for additional data). (Labs reviewed). MD informed of all critical labs and patient condition as needed throughout the shift.

## 2025-07-21 NOTE — PLAN OF CARE
ICU End of Shift Summary: Please see flowsheet for detailed assessments and vital signs.     Changes this Shift:  Neuro:  Q2 neuro checks. Patient predominantly A&Ox4 with the exception of disoriented to place x3 and time x1. Denied pain throughout shift. Pupils equal, round and reactive. Right field cut. Right facial droop. RUE weakness. RLE - plantar/dorsi weak. Sensation on right side intermittently decreased.  CV:  to 160 without intervention. Pulses palpable. Afebrile.   Resp: LS clear and while sleeping on 5L Oxymask.  GI/: No bowel movement this shift and passing gas. Voided total of 400 mL for this shift.   Skin: Rash on chest and back. Petechia on right scalp and calf. LLE has previous biopsy site with sutures.   Access: R-PIV x2  Gtts: Heparin @ 2100 units/hr.   Activity: SBA @ bedside to use urinal.  Labs: Heparin checked at 2300 was <0.25, dose maintained @ 2100 units/hr, CT ordered and obtained. Heparin rechecked @ 0650 and results are pending.       Plan: Continue with Q2 neuro checks and monitor closely. Follow plan of care and notify provider with any changes.     Goal Outcome Evaluation:  Problem: Adult Inpatient Plan of Care  Goal: Absence of Hospital-Acquired Illness or Injury  Intervention: Identify and Manage Fall Risk  Recent Flowsheet Documentation  Taken 7/21/2025 0000 by Cece Emerson RN  Safety Promotion/Fall Prevention:   activity supervised   assistive device/personal items within reach   clutter free environment maintained   nonskid shoes/slippers when out of bed   patient and family education   room door open   room near nurse's station   room organization consistent   safety round/check completed  Taken 7/20/2025 2000 by Cece Emerson RN  Safety Promotion/Fall Prevention:   activity supervised   assistive device/personal items within reach   clutter free environment maintained   nonskid shoes/slippers when out of bed   patient and family education   room door  open   room near nurse's station   room organization consistent   safety round/check completed     Problem: Adult Inpatient Plan of Care  Goal: Optimal Comfort and Wellbeing  Intervention: Monitor Pain and Promote Comfort  Recent Flowsheet Documentation  Taken 7/21/2025 0000 by Cece Emerson RN  Pain Management Interventions:   care clustered   emotional support   pillow support provided   repositioned   rest  Taken 7/20/2025 2000 by Cece Emerson RN  Pain Management Interventions:   medication (see MAR)   care clustered   emotional support   pillow support provided   repositioned   rest     Problem: Adult Inpatient Plan of Care  Goal: Optimal Comfort and Wellbeing  Intervention: Provide Person-Centered Care  Recent Flowsheet Documentation  Taken 7/21/2025 0000 by Cece Emerson RN  Trust Relationship/Rapport:   care explained   choices provided   emotional support provided   empathic listening provided   questions answered   questions encouraged   reassurance provided   thoughts/feelings acknowledged  Taken 7/20/2025 2000 by Cece Emerson RN  Trust Relationship/Rapport:   care explained   choices provided   emotional support provided   empathic listening provided   questions answered   questions encouraged   reassurance provided   thoughts/feelings acknowledged     Plan of Care Reviewed With: patient    Overall Patient Progress: improving

## 2025-07-22 ENCOUNTER — APPOINTMENT (OUTPATIENT)
Dept: PHYSICAL THERAPY | Facility: CLINIC | Age: 35
DRG: 064 | End: 2025-07-22
Payer: COMMERCIAL

## 2025-07-22 ENCOUNTER — APPOINTMENT (OUTPATIENT)
Dept: SPEECH THERAPY | Facility: CLINIC | Age: 35
DRG: 064 | End: 2025-07-22
Payer: COMMERCIAL

## 2025-07-22 ENCOUNTER — APPOINTMENT (OUTPATIENT)
Dept: OCCUPATIONAL THERAPY | Facility: CLINIC | Age: 35
DRG: 064 | End: 2025-07-22
Payer: COMMERCIAL

## 2025-07-22 LAB
ALBUMIN SERPL BCG-MCNC: 3.7 G/DL (ref 3.5–5.2)
ALP SERPL-CCNC: 59 U/L (ref 40–150)
ALT SERPL W P-5'-P-CCNC: 30 U/L (ref 0–70)
ANION GAP SERPL CALCULATED.3IONS-SCNC: 11 MMOL/L (ref 7–15)
APTT PPP: 63 SECONDS (ref 22–38)
AST SERPL W P-5'-P-CCNC: 23 U/L (ref 0–45)
ATRIAL RATE - MUSE: 74 BPM
ATRIAL RATE - MUSE: 85 BPM
BACTERIA SPEC CULT: NO GROWTH
BACTERIA SPEC CULT: NO GROWTH
BILIRUB SERPL-MCNC: 0.5 MG/DL
BILIRUBIN DIRECT (ROCHE PRO & PURE): 0.18 MG/DL (ref 0–0.45)
BUN SERPL-MCNC: 25.3 MG/DL (ref 6–20)
CALCIUM SERPL-MCNC: 8.7 MG/DL (ref 8.8–10.4)
CHLORIDE SERPL-SCNC: 110 MMOL/L (ref 98–107)
CREAT SERPL-MCNC: 0.89 MG/DL (ref 0.67–1.17)
CRYOGLOB SER QL: NEGATIVE
DIASTOLIC BLOOD PRESSURE - MUSE: NORMAL MMHG
DIASTOLIC BLOOD PRESSURE - MUSE: NORMAL MMHG
EGFRCR SERPLBLD CKD-EPI 2021: >90 ML/MIN/1.73M2
ERYTHROCYTE [DISTWIDTH] IN BLOOD BY AUTOMATED COUNT: 13.7 % (ref 10–15)
FIBRINOGEN PPP-MCNC: 309 MG/DL (ref 170–510)
GLUCOSE BLDC GLUCOMTR-MCNC: 161 MG/DL (ref 70–99)
GLUCOSE BLDC GLUCOMTR-MCNC: 162 MG/DL (ref 70–99)
GLUCOSE BLDC GLUCOMTR-MCNC: 167 MG/DL (ref 70–99)
GLUCOSE BLDC GLUCOMTR-MCNC: 181 MG/DL (ref 70–99)
GLUCOSE BLDC GLUCOMTR-MCNC: 192 MG/DL (ref 70–99)
GLUCOSE SERPL-MCNC: 195 MG/DL (ref 70–99)
HCO3 SERPL-SCNC: 19 MMOL/L (ref 22–29)
HCT VFR BLD AUTO: 31.8 % (ref 40–53)
HGB BLD-MCNC: 10.6 G/DL (ref 13.3–17.7)
INR PPP: 1.32 (ref 0.85–1.15)
INTERPRETATION ECG - MUSE: NORMAL
INTERPRETATION ECG - MUSE: NORMAL
INTERPRETATION SERPL IEP-IMP: NORMAL
LAB TEST RESULTS REPORTED IN RPTPERIOD: NORMAL
LAB TESTS NARRATIVE: ABNORMAL
LABORATORY COMMENT REPORT: ABNORMAL
LDH SERPL L TO P-CCNC: 587 U/L (ref 0–250)
MAGNESIUM SERPL-MCNC: 2.2 MG/DL (ref 1.7–2.3)
MCH RBC QN AUTO: 26.8 PG (ref 26.5–33)
MCHC RBC AUTO-ENTMCNC: 33.3 G/DL (ref 31.5–36.5)
MCV RBC AUTO: 80 FL (ref 78–100)
P AXIS - MUSE: 32 DEGREES
P AXIS - MUSE: 66 DEGREES
PATH INTERP SPEC-IMP: ABNORMAL
PHOSPHATE SERPL-MCNC: 3.8 MG/DL (ref 2.5–4.5)
PLATELET # BLD AUTO: 87 10E3/UL (ref 150–450)
POTASSIUM SERPL-SCNC: 4.3 MMOL/L (ref 3.4–5.3)
POTASSIUM SERPL-SCNC: 4.5 MMOL/L (ref 3.4–5.3)
PR INTERVAL - MUSE: 118 MS
PR INTERVAL - MUSE: 144 MS
PROT SERPL-MCNC: 6.1 G/DL (ref 6.4–8.3)
PROTHROMBIN TIME: 16.3 SECONDS (ref 11.8–14.8)
QRS DURATION - MUSE: 76 MS
QRS DURATION - MUSE: 80 MS
QT - MUSE: 366 MS
QT - MUSE: 376 MS
QTC - MUSE: 406 MS
QTC - MUSE: 447 MS
R AXIS - MUSE: 30 DEGREES
R AXIS - MUSE: 41 DEGREES
RBC # BLD AUTO: 3.96 10E6/UL (ref 4.4–5.9)
SEQUENCING METHOD PNL BLD/T: ABNORMAL
SEQUENCING METHOD PNL BLD/T: ABNORMAL
SEQUENCING METHOD PNL BLD/T: NORMAL
SODIUM SERPL-SCNC: 140 MMOL/L (ref 135–145)
SPECIMEN TYPE: ABNORMAL
SPECIMEN TYPE: NORMAL
SYSTOLIC BLOOD PRESSURE - MUSE: NORMAL MMHG
SYSTOLIC BLOOD PRESSURE - MUSE: NORMAL MMHG
T AXIS - MUSE: -12 DEGREES
T AXIS - MUSE: -13 DEGREES
UFH PPP CHRO-ACNC: 0.33 IU/ML (ref ?–1.1)
URATE SERPL-MCNC: 5.6 MG/DL (ref 3.4–7)
VENTRICULAR RATE- MUSE: 74 BPM
VENTRICULAR RATE- MUSE: 85 BPM
WBC # BLD AUTO: 16.7 10E3/UL (ref 4–11)

## 2025-07-22 PROCEDURE — 36415 COLL VENOUS BLD VENIPUNCTURE: CPT | Performed by: PSYCHIATRY & NEUROLOGY

## 2025-07-22 PROCEDURE — 99233 SBSQ HOSP IP/OBS HIGH 50: CPT | Mod: GC | Performed by: STUDENT IN AN ORGANIZED HEALTH CARE EDUCATION/TRAINING PROGRAM

## 2025-07-22 PROCEDURE — 250N000013 HC RX MED GY IP 250 OP 250 PS 637: Performed by: NURSE PRACTITIONER

## 2025-07-22 PROCEDURE — 97165 OT EVAL LOW COMPLEX 30 MIN: CPT | Mod: GO

## 2025-07-22 PROCEDURE — 250N000011 HC RX IP 250 OP 636

## 2025-07-22 PROCEDURE — 82248 BILIRUBIN DIRECT: CPT | Performed by: NURSE PRACTITIONER

## 2025-07-22 PROCEDURE — 99233 SBSQ HOSP IP/OBS HIGH 50: CPT | Mod: FS

## 2025-07-22 PROCEDURE — 92526 ORAL FUNCTION THERAPY: CPT | Mod: GN

## 2025-07-22 PROCEDURE — 84550 ASSAY OF BLOOD/URIC ACID: CPT | Performed by: NURSE PRACTITIONER

## 2025-07-22 PROCEDURE — 97168 OT RE-EVAL EST PLAN CARE: CPT | Mod: GO

## 2025-07-22 PROCEDURE — 83615 LACTATE (LD) (LDH) ENZYME: CPT | Performed by: NURSE PRACTITIONER

## 2025-07-22 PROCEDURE — 93010 ELECTROCARDIOGRAM REPORT: CPT | Performed by: INTERNAL MEDICINE

## 2025-07-22 PROCEDURE — 36415 COLL VENOUS BLD VENIPUNCTURE: CPT | Performed by: NURSE PRACTITIONER

## 2025-07-22 PROCEDURE — 250N000013 HC RX MED GY IP 250 OP 250 PS 637

## 2025-07-22 PROCEDURE — 85027 COMPLETE CBC AUTOMATED: CPT | Performed by: NURSE PRACTITIONER

## 2025-07-22 PROCEDURE — 120N000005 HC R&B MS OVERFLOW UMMC

## 2025-07-22 PROCEDURE — 250N000012 HC RX MED GY IP 250 OP 636 PS 637: Performed by: PSYCHIATRY & NEUROLOGY

## 2025-07-22 PROCEDURE — 82435 ASSAY OF BLOOD CHLORIDE: CPT | Performed by: NURSE PRACTITIONER

## 2025-07-22 PROCEDURE — 97530 THERAPEUTIC ACTIVITIES: CPT | Mod: GP

## 2025-07-22 PROCEDURE — 99233 SBSQ HOSP IP/OBS HIGH 50: CPT | Mod: GC | Performed by: PSYCHIATRY & NEUROLOGY

## 2025-07-22 PROCEDURE — 85610 PROTHROMBIN TIME: CPT | Performed by: NURSE PRACTITIONER

## 2025-07-22 PROCEDURE — 250N000011 HC RX IP 250 OP 636: Performed by: NURSE PRACTITIONER

## 2025-07-22 PROCEDURE — 84100 ASSAY OF PHOSPHORUS: CPT | Performed by: NURSE PRACTITIONER

## 2025-07-22 PROCEDURE — 85520 HEPARIN ASSAY: CPT | Performed by: PSYCHIATRY & NEUROLOGY

## 2025-07-22 PROCEDURE — 97116 GAIT TRAINING THERAPY: CPT | Mod: GP

## 2025-07-22 PROCEDURE — 99232 SBSQ HOSP IP/OBS MODERATE 35: CPT | Performed by: INTERNAL MEDICINE

## 2025-07-22 PROCEDURE — 83735 ASSAY OF MAGNESIUM: CPT | Performed by: NURSE PRACTITIONER

## 2025-07-22 PROCEDURE — 36415 COLL VENOUS BLD VENIPUNCTURE: CPT

## 2025-07-22 PROCEDURE — 258N000003 HC RX IP 258 OP 636

## 2025-07-22 PROCEDURE — 250N000013 HC RX MED GY IP 250 OP 250 PS 637: Performed by: PSYCHIATRY & NEUROLOGY

## 2025-07-22 PROCEDURE — 92507 TX SP LANG VOICE COMM INDIV: CPT | Mod: GN

## 2025-07-22 PROCEDURE — 85730 THROMBOPLASTIN TIME PARTIAL: CPT | Performed by: NURSE PRACTITIONER

## 2025-07-22 PROCEDURE — 85384 FIBRINOGEN ACTIVITY: CPT | Performed by: NURSE PRACTITIONER

## 2025-07-22 PROCEDURE — 97530 THERAPEUTIC ACTIVITIES: CPT | Mod: GO

## 2025-07-22 PROCEDURE — 97535 SELF CARE MNGMENT TRAINING: CPT | Mod: GO

## 2025-07-22 PROCEDURE — 93005 ELECTROCARDIOGRAM TRACING: CPT

## 2025-07-22 PROCEDURE — 84132 ASSAY OF SERUM POTASSIUM: CPT | Performed by: NURSE PRACTITIONER

## 2025-07-22 RX ORDER — NALOXONE HYDROCHLORIDE 0.4 MG/ML
0.2 INJECTION, SOLUTION INTRAMUSCULAR; INTRAVENOUS; SUBCUTANEOUS
Status: DISCONTINUED | OUTPATIENT
Start: 2025-07-22 | End: 2025-08-21 | Stop reason: HOSPADM

## 2025-07-22 RX ORDER — CHLORPROMAZINE HYDROCHLORIDE 25 MG/1
25 TABLET, FILM COATED ORAL EVERY 6 HOURS PRN
Status: DISCONTINUED | OUTPATIENT
Start: 2025-07-22 | End: 2025-08-12

## 2025-07-22 RX ORDER — FUROSEMIDE 10 MG/ML
20 INJECTION INTRAMUSCULAR; INTRAVENOUS ONCE
Status: COMPLETED | OUTPATIENT
Start: 2025-07-22 | End: 2025-07-22

## 2025-07-22 RX ORDER — CHLORHEXIDINE GLUCONATE 40 MG/ML
SOLUTION TOPICAL DAILY PRN
Status: DISCONTINUED | OUTPATIENT
Start: 2025-07-22 | End: 2025-08-21 | Stop reason: HOSPADM

## 2025-07-22 RX ORDER — GABAPENTIN 100 MG/1
100 CAPSULE ORAL 3 TIMES DAILY PRN
Status: DISCONTINUED | OUTPATIENT
Start: 2025-07-22 | End: 2025-08-21 | Stop reason: HOSPADM

## 2025-07-22 RX ORDER — HYDROCHLOROTHIAZIDE 12.5 MG/1
25 TABLET ORAL DAILY
Status: DISCONTINUED | OUTPATIENT
Start: 2025-07-22 | End: 2025-08-21 | Stop reason: HOSPADM

## 2025-07-22 RX ORDER — NALOXONE HYDROCHLORIDE 0.4 MG/ML
0.4 INJECTION, SOLUTION INTRAMUSCULAR; INTRAVENOUS; SUBCUTANEOUS
Status: DISCONTINUED | OUTPATIENT
Start: 2025-07-22 | End: 2025-08-21 | Stop reason: HOSPADM

## 2025-07-22 RX ORDER — METOPROLOL SUCCINATE 25 MG/1
25 TABLET, EXTENDED RELEASE ORAL DAILY
Status: DISCONTINUED | OUTPATIENT
Start: 2025-07-22 | End: 2025-08-21 | Stop reason: HOSPADM

## 2025-07-22 RX ORDER — ENOXAPARIN SODIUM 150 MG/ML
1 INJECTION SUBCUTANEOUS EVERY 12 HOURS
Status: DISCONTINUED | OUTPATIENT
Start: 2025-07-22 | End: 2025-08-07

## 2025-07-22 RX ORDER — ACETAMINOPHEN 500 MG
1000 TABLET ORAL EVERY 6 HOURS
Status: DISCONTINUED | OUTPATIENT
Start: 2025-07-22 | End: 2025-08-21 | Stop reason: HOSPADM

## 2025-07-22 RX ORDER — OXYCODONE HYDROCHLORIDE 5 MG/1
5 TABLET ORAL EVERY 6 HOURS PRN
Status: DISCONTINUED | OUTPATIENT
Start: 2025-07-22 | End: 2025-08-21 | Stop reason: HOSPADM

## 2025-07-22 RX ORDER — CYCLOBENZAPRINE HCL 10 MG
10 TABLET ORAL 3 TIMES DAILY PRN
Status: DISCONTINUED | OUTPATIENT
Start: 2025-07-22 | End: 2025-08-21 | Stop reason: HOSPADM

## 2025-07-22 RX ORDER — LOSARTAN POTASSIUM 100 MG/1
100 TABLET ORAL DAILY
Status: DISCONTINUED | OUTPATIENT
Start: 2025-07-22 | End: 2025-08-21 | Stop reason: HOSPADM

## 2025-07-22 RX ORDER — SIMETHICONE 80 MG
40 TABLET,CHEWABLE ORAL EVERY 6 HOURS PRN
Status: DISCONTINUED | OUTPATIENT
Start: 2025-07-22 | End: 2025-08-21 | Stop reason: HOSPADM

## 2025-07-22 RX ADMIN — FUROSEMIDE 20 MG: 10 INJECTION, SOLUTION INTRAMUSCULAR; INTRAVENOUS at 11:17

## 2025-07-22 RX ADMIN — Medication 3 MG: at 01:23

## 2025-07-22 RX ADMIN — ACETAMINOPHEN 1000 MG: 500 TABLET ORAL at 11:57

## 2025-07-22 RX ADMIN — FLUTICASONE PROPIONATE 1 SPRAY: 50 SPRAY, METERED NASAL at 09:10

## 2025-07-22 RX ADMIN — ENOXAPARIN SODIUM 135 MG: 150 INJECTION SUBCUTANEOUS at 21:46

## 2025-07-22 RX ADMIN — DEXAMETHASONE SODIUM PHOSPHATE 40 MG: 10 INJECTION, SOLUTION INTRAMUSCULAR; INTRAVENOUS at 18:11

## 2025-07-22 RX ADMIN — HEPARIN SODIUM 2400 UNITS/HR: 10000 INJECTION, SOLUTION INTRAVENOUS at 02:39

## 2025-07-22 RX ADMIN — HEPARIN SODIUM 2400 UNITS/HR: 10000 INJECTION, SOLUTION INTRAVENOUS at 14:26

## 2025-07-22 RX ADMIN — Medication 40 MG: at 15:52

## 2025-07-22 RX ADMIN — CHLORPROMAZINE HYDROCHLORIDE 25 MG: 25 TABLET, FILM COATED ORAL at 14:25

## 2025-07-22 RX ADMIN — ACETAMINOPHEN 1000 MG: 500 TABLET ORAL at 20:07

## 2025-07-22 RX ADMIN — ROSUVASTATIN CALCIUM 10 MG: 10 TABLET, FILM COATED ORAL at 08:06

## 2025-07-22 ASSESSMENT — ACTIVITIES OF DAILY LIVING (ADL)
ADLS_ACUITY_SCORE: 41
ADLS_ACUITY_SCORE: 71
ADLS_ACUITY_SCORE: 41
ADLS_ACUITY_SCORE: 45
ADLS_ACUITY_SCORE: 41

## 2025-07-22 ASSESSMENT — VISUAL ACUITY
OU: BASELINE

## 2025-07-22 NOTE — PLAN OF CARE
Major Shift Events:  no major shift events to report.  Plan: Transfer to  when bed available  For vital signs and complete assessments, please see documentation flowsheets.

## 2025-07-22 NOTE — CONSULTS
"Consult received for Vascular Access Team.  See LDA for details. For additional needs place \"Consult for Inpatient Vascular Access Care\"  [id number] order in EPIC.  "

## 2025-07-22 NOTE — CONSULTS
SW acknowledging consult for elevated risk score. CMA completed, see consult note from 7/18 Hemant Harvey RNCC. CM will continue to follow.    JUJU Tate, KIESHA  Float   [facility] Acute Care Management  Available on [employer]

## 2025-07-22 NOTE — CONSULTS
Solid Tumor Oncology  Consult Note   Date of Service: 07/22/2025    Patient: Bernard Chapman  MRN: [id number]  Admission Date: 7/15/2025  Hospital Day # 7  Cancer Diagnosis: Metastatic lung adenocarcinoma   Primary Outpatient Oncologist: MARKOS  Current Treatment Plan: TBD     Reason for Consult: new diagnosis of lung adenocarcinoma    Recommendations:   - 7/18 EBUS-TBNA w/ metastatic lung adenocarcinoma; follow PDL-1 and NGS added on. Would additionally obtain Guardant 360 (ordered for you).   - Agree and appreciate ordering PET. Follow for results/scheduling  - Continue daily CBC w/ plts and diff. Continue to transfuse for Hgb 7.0 or less, agree with plts transfusion parameters per primary team.   - Remainder of care per primary and consulting teams.     Assessment & Plan:   Bernard Chapman is a 35 year old year old male with a past medical history of HTN, DM, ELENA, PE/DVT 6/22/25 (previously on rivaroxaban) who presented 7/15/2025 with new neurological symptoms and found acute multifocal ischemic strokes, partial occlusive L MCA thrombus, L frontal SAH, and in setting of TCP c/f ITP vs catastrophic antiphospholipid syndrome (CAPS) vs malignancy s/p PLEX x7/16. CT CAP 7/16/2025 revealing LAD of hilar, mediastinal, and paraesophageal LNs and spiculated-appearing nodule of R apical lung s/p EBUS-TBNA 7/18 revealing metastatic lung adenocarcinoma.     # New metastatic lung adenocarcinoma  # Thrombocytopenia  # Anemia  Presented 7/15/2025 with new neurological symptoms and found acute multifocal ischemic strokes, partial occlusive L MCA thrombus, L frontal SAH, and in setting of TCP c/f ITP vs catastrophic antiphospholipid syndrome (CAPS) vs malignancy s/p PLEX x7/16. CT CAP 7/16/2025 revealing LAD of hilar, mediastinal, and paraesophageal LNs and spiculated-appearing nodule of R apical lung s/p EBUS-TBNA 7/18 revealing metastatic lung adenocarcinoma, + for CK7 and TTF-1.   - Follow PDL-1 and NGS studies  - Ordered  Guardant 360, follow for results   - PET scan for staging    # H/o DVT/PE  Presented 6/22/25 with chest pain and SOB and found to have PE and lower extremity DVTs, he was placed on apixaban though had an allergic reaction and rotated to Xarelto.   - Agree with AC (heparin drip, Lovenox) per primary team     Oncology will continue to follow this patient. Please do not hesitate to page with any questions or concerns.    Patient was seen and recommendations discussed with attending physician, Dr. Vela.    Clarissa Amos PA-C  Hematology/Oncology  Pager: [phone]       Past Medical History:  No past medical history on file.    Past Surgical History:  Past Surgical History:   Procedure Laterality Date    BRONCHOSCOPY RIDID OR FLEXIBLE W/ENDOBRONCHIAL ULTRASOUND GUIDED 1 OR 2 NODE STATIONS N/A 7/18/2025    Procedure: BRONCHOSCOPY, WITH BIOPSY OF LYMPH NODE STATION WITH ENDOBRONCHIAL ULTRASOUND GUIDANCE;  Surgeon: Binh Bains DO;  Location:  OR       Social History:  Social History     Socioeconomic History    Marital status: Single     Spouse name: None    Number of children: None    Years of education: None    Highest education level: None   Tobacco Use    Smoking status: Never    Smokeless tobacco: Never   Substance and Sexual Activity    Alcohol use: No    Drug use: No    Sexual activity: Yes     Partners: Female   Other Topics Concern    Parent/sibling w/ CABG, MI or angioplasty before 65F 55M? No     Social Drivers of Health     Financial Resource Strain: Low Risk  (7/22/2025)    Financial Resource Strain     Within the past 12 months, have you or your family members you live with been unable to get utilities (heat, electricity) when it was really needed?: No   Food Insecurity: Low Risk  (7/22/2025)    Food Insecurity     Within the past 12 months, did you worry that your food would run out before you got money to buy more?: No     Within the past 12 months, did the food you bought just not last and  you didn t have money to get more?: No   Transportation Needs: Low Risk  (7/22/2025)    Transportation Needs     Within the past 12 months, has lack of transportation kept you from medical appointments, getting your medicines, non-medical meetings or appointments, work, or from getting things that you need?: No   Social Connections: Socially Integrated (6/23/2025)    Received from [facility]    Social Connections     Do you often feel lonely or isolated from those around you?: 0   Interpersonal Safety: Low Risk  (7/22/2025)    Interpersonal Safety     Do you feel physically and emotionally safe where you currently live?: Yes     Within the past 12 months, have you been hit, slapped, kicked or otherwise physically hurt by someone?: No     Within the past 12 months, have you been humiliated or emotionally abused in other ways by your partner or ex-partner?: No   Housing Stability: Low Risk  (7/22/2025)    Housing Stability     Do you have housing? : Yes     Are you worried about losing your housing?: No        Family History:  Family History   Problem Relation Age of Onset    Respiratory Mother         asthma    Hypertension Maternal Grandmother     Arthritis Maternal Grandmother     Diabetes Paternal Grandmother     Heart Disease Paternal Grandfather         pacemaker    Colon Cancer No family hx of     Prostate Cancer No family hx of     Coronary Artery Disease No family hx of        Outpatient Medications:  Current Facility-Administered Medications   Medication Dose Route Frequency Provider Last Rate Last Admin    acetaminophen (TYLENOL) tablet 1,000 mg  1,000 mg Oral or Feeding Tube Q6H Dee Shah MD   1,000 mg at 07/22/25 1157    bisacodyl (DULCOLAX) suppository 10 mg  10 mg Rectal Daily PRN Dee Shah MD        chlorproMAZINE (THORAZINE) tablet 25 mg  25 mg Oral or Feeding Tube Q6H PRN Yamile Gutierrez APRN CNP   25 mg at 07/22/25 1425    [Held by provider]  cyclobenzaprine (FLEXERIL) tablet 10 mg  10 mg Oral TID PRN Dee Shah MD        dexAMETHasone (DECADRON) tablet 40 mg  40 mg Oral or Feeding Tube Daily Dee Shah MD   40 mg at 07/21/25 0918    glucose gel 15-30 g  15-30 g Oral Q15 Min PRN Dee Shah MD        Or    dextrose 50 % injection 25-50 mL  25-50 mL Intravenous Q15 Min PRN Dee Shah MD        Or    glucagon injection 1 mg  1 mg Subcutaneous Q15 Min PRN Dee Shah MD        diphenhydrAMINE (BENADRYL) capsule 25 mg  25 mg Oral or Feeding Tube Q6H PRN Dee Shah MD        Or    diphenhydrAMINE (BENADRYL) injection 25 mg  25 mg Intravenous Q6H PRN Dee Shah MD        enoxaparin ANTICOAGULANT (LOVENOX) injection 135 mg  1 mg/kg Subcutaneous Q12H Dee Shah MD        fluticasone (FLONASE) 50 MCG/ACT spray 1 spray  1 spray Both Nostrils Daily Dee Shah MD   1 spray at 07/22/25 0910    [Held by provider] gabapentin (NEURONTIN) capsule 100 mg  100 mg Oral TID PRN Dee Shah MD        heparin 25,000 units in 0.45% NaCl 250 mL ANTICOAGULANT infusion  0-5,000 Units/hr Intravenous Continuous Dee Shah MD 24 mL/hr at 07/22/25 1426 2,400 Units/hr at 07/22/25 1426    hydrALAZINE (APRESOLINE) injection 10 mg  10 mg Intravenous Q30 Min PRN Dee Shah MD   10 mg at 07/21/25 1824    [Held by provider] hydroCHLOROthiazide tablet 25 mg  25 mg Oral Daily Dee Shah MD        insulin aspart (NovoLOG) injection (RAPID ACTING)  1-10 Units Subcutaneous TID AC Dee Shah MD   1 Units at 07/22/25 1158    insulin aspart (NovoLOG) injection (RAPID ACTING)  1-7 Units Subcutaneous At Bedtime Dee Shah MD        labetalol (NORMODYNE/TRANDATE) injection 10 mg  10 mg Intravenous Q10 Min PRN Dee Shah MD        [Held by provider] losartan (COZAAR) tablet 100 mg  100 mg Oral Daily Dee Shah MD        magnesium hydroxide (MILK OF MAGNESIA) suspension 30 mL  30 mL Oral or Feeding Tube Daily PRN Bohjanen,  MD Dee        melatonin tablet 3 mg  3 mg Oral At Bedtime PRN Dee Shah MD   3 mg at 07/22/25 0123    [Held by provider] metoprolol succinate ER (TOPROL XL) 24 hr tablet 25 mg  25 mg Oral Daily Dee Shah MD        naloxone (NARCAN) injection 0.2 mg  0.2 mg Intravenous Q2 Min PRN Carine Matt MD        Or    naloxone (NARCAN) injection 0.4 mg  0.4 mg Intravenous Q2 Min PRN Carine Matt MD        Or    naloxone (NARCAN) injection 0.2 mg  0.2 mg Intramuscular Q2 Min PRN Carine Matt MD        Or    naloxone (NARCAN) injection 0.4 mg  0.4 mg Intramuscular Q2 Min PRN Carine Matt MD        ondansetron (ZOFRAN ODT) ODT tab 4 mg  4 mg Oral Q6H PRN Dee Shah MD        Or    ondansetron (ZOFRAN) injection 4 mg  4 mg Intravenous Q6H PRN Dee Shah MD        [Held by provider] oxyCODONE (ROXICODONE) tablet 5 mg  5 mg Oral Q6H PRN Dee Shah MD        polyethylene glycol (MIRALAX) Packet 17 g  17 g Oral or Feeding Tube Daily Dee Shah MD        prochlorperazine (COMPAZINE) injection 10 mg  10 mg Intravenous Q6H PRN Dee Shah MD        Or    prochlorperazine (COMPAZINE) tablet 10 mg  10 mg Oral or Feeding Tube Q6H PRN Dee hSah MD        rosuvastatin (CRESTOR) tablet 10 mg  10 mg Oral or Feeding Tube Daily Dee Shah MD   10 mg at 07/22/25 0806    senna-docusate (SENOKOT-S/PERICOLACE) 8.6-50 MG per tablet 1-2 tablet  1-2 tablet Oral or Feeding Tube BID Dee Shah MD   1 tablet at 07/21/25 0918    simethicone (MYLICON) chewable half-tab 40 mg  40 mg Oral Q6H PRN Dee Shah MD   40 mg at 07/22/25 1552    sodium chloride (PF) 0.9% PF flush 3 mL  3 mL Intracatheter Q8H [facility] Dee Shah MD   3 mL at 07/21/25 2151    sodium chloride (PF) 0.9% PF flush 3 mL  3 mL Intracatheter q1 min prn Dee Shah MD            Physical Exam:    Blood pressure (!) 153/61, pulse 82, temperature 98.4  F (36.9  C), temperature source Oral, resp.  "rate 22, height 1.88 m (6' 2\"), weight 127.4 kg (280 lb 13.9 oz), SpO2 96%.    General: alert and cooperative, lying in bed  HEENT: sclera anicteric, EOMI, MMM  Neck: supple, normal ROM, no palpable LAD  CV: RRR, no murmurs  Resp: CTAB, normal respiratory effort on ambient air  GI: soft, non-tender, non-distended, bowel sounds present and normoactive  MSK: warm and well-perfused, normal tone  Skin:scattered purpura of BLE on limited exam, no jaundice  Neuro: Alert and interactive, mild R facial droop, significant R sided extremity deficits. L sided plantar flexion/extension intact L side, able to move L sided extremities, squeeze fingers.     Labs & Studies: I personally reviewed the following studies:  ROUTINE LABS (Last four results):  CMP  Recent Labs   Lab 07/22/25  1346 07/22/25  1119 07/22/25  0814 07/22/25  0522 07/21/25  2239 07/21/25  0919 07/21/25  0528 07/20/25  1709 07/20/25  0417 07/19/25  1659 07/19/25  0327   NA  --   --   --  140  --   --  138  --  140 136 140   POTASSIUM 4.3  --   --  4.5  --   --  4.5  --  3.9  --  3.9   CHLORIDE  --   --   --  110*  --   --  107  --  108*  --  106   CO2  --   --   --  19*  --   --  19*  --  22  --  25   ANIONGAP  --   --   --  11  --   --  12  --  10  --  9   GLC  --  167* 162* 195* 192*   < > 180*   < > 104*  --  115*   BUN  --   --   --  25.3*  --   --  20.4*  --  15.2  --  14.8   CR  --   --   --  0.89  --   --  0.78  --  0.96  --  1.02   GFRESTIMATED  --   --   --  >90  --   --  >90  --  >90  --  >90   SHARAN  --   --   --  8.7*  --   --  8.7*  --  8.4*  --  8.5*   MAG  --   --   --  2.2  --   --  2.2  --  2.1  --  1.9   PHOS  --   --   --  3.8  --   --  3.9  --  3.2  --  3.4   PROTTOTAL  --   --   --  6.1*  --   --  6.4  --  5.8* 5.8*  --    ALBUMIN  --   --   --  3.7  --   --  3.7  --  3.5 3.6  --    BILITOTAL  --   --   --  0.5  --   --  0.6  --  1.0 1.1  --    ALKPHOS  --   --   --  59  --   --  64  --  55 56  --    AST  --   --   --  23  --   --  19  --  25 " 34  --    ALT  --   --   --  30  --   --  18  --  25 29  --     < > = values in this interval not displayed.     CBC  Recent Labs   Lab 07/22/25  0522 07/21/25  1631 07/21/25  0528 07/20/25  1718 07/20/25 0417 07/20/25  0024 07/19/25  1659   WBC 16.7*  --  12.0*  --  11.9*  --  12.3*   RBC 3.96*  --  4.08*  --  3.69*  --  3.89*   HGB 10.6*  --  11.0*  --  9.9*  --  10.4*   HCT 31.8*  --  33.6*  --  30.3*  --  31.7*   MCV 80 80 82 80 82   < > 82   MCH 26.8  --  27.0  --  26.8  --  26.7   MCHC 33.3  --  32.7  --  32.7  --  32.8   RDW 13.7  --  13.4  --  13.3  --  13.4   PLT 87* 91* 70* 76* 64*   < > 35*    < > = values in this interval not displayed.     INR  Recent Labs   Lab 07/22/25  0522 07/21/25  0528 07/20/25 0417 07/19/25  0327   INR 1.32* 1.22* 1.48* 1.46*

## 2025-07-22 NOTE — PROGRESS NOTES
Neurocritical Care Progress Note    Reason for critical care admission: Acute stroke, severe thromboyctopenia  Admitting Team: Neurocritical Care  Date of Service:  07/22/2025  Date of Admission:  7/15/2025  Hospital Day: 8    Assessment/Plan  Bernard Chapman is a 35 year old male with a past medical history of hypertension, diabetes, possible ITP, sleep apnea, PE and DVT on rivaroxaban admitted on 7/15/2025 for acute ischemic stroke in left MCA territory, near occlusion of left M1, and concern for thrombotic emergency such as TTP or catastrophic antiphospholipid syndrome (in setting of severe thrombocytopenia and acute cerebral infarcts).    24 hour events:  - restless sleep overnight due to hiccups  - CTH with stable SAH, evolution of L MCA territory and new L basal ganglia stroke   - increased sliding scale insulin to high intensity     Neuro  #Embolic Multifocal acute ischemic strokes, in L MCA territory and new L basal ganglia stroke; Increasing mass effect on L lateral ventricle by L MCA infarction  #Subtotal occlusive thrombus in left M1   #Left parietal SAH   Etiology of stroke remains unclear, though malignancy high on the differential given multiple findings on CT CAP, h/o hypercoagulability, and 3 territory CVA on head imaging. Mobile masses on tricuspid and aortic valves on TTE 7/16, thought to be thrombus.  - CTH 7/22 stable   - neurochecks Q4H  - continue high intensity heparin for secondary stroke prevention - will defer to hematology/medicine for plan to transition to PO pending further workup  - SBP goal < 120-160 mmHg, DBP < 100   - HOB > 30   - PT/OT/SLP    #hiccups  - Physical maneuvers: valsalva, pulling knees to chest, drink cold water, breath-holding   - Thorazine 25 mg Q6H PRN if physical maneuvers unsuccessful      #Analgesics & sedation  - Tylenol 650 Q4H         CV  #Hypertension  #Likely cardiac valve thrombus on tricuspid and aortic valve 7/16 on TIMO, although endocarditis on the  differential  #Risk of CHF with new TV and AV regurgitation   -Cardiac monitoring  -SBP goal < 120 - 160 mmHg   -DBP goal < 100  -BP measurements via BP cuff on left arm preferred   -PRN Labetalol and Hydralazine  -hold PTA metoprolol, hydrochlorothiazide, losartan  -, Triglycerides - 202,A1c 5.3%  -Continue Crestor 10 mg daily   -high intensity Heparin gtt - transition to PO per hematology   -Consider repeating TTE in the future for reassessment of valve function     Workup thus far:   - 7/16: cardiac CTA non diagnostic for left atrial appendage thrombus and stenosis/athero  - 7/16: TTE with mobile echodensities on tricuspid valve and aortic valve, EF 60-65%, moderate tricuspid and aortic valve regurgitation  - 7/17: TIMO diffuse investment of the aortic and tricuspid valves with thrombus, moderate aortic and tricuspid regurgitation; no MEGAN clot   - 7/16: Blood culture NGTD  - 7/17: blood culture NGTD      Resp  #ELENA on CPAP  Oxygen/vent: room air  -Continuous pulse ox  -Maintain O2 saturations greater than 92%  -CPAP at night ordered - patient does not wear   - 2L O2 while asleep, otherwise on room air      #Right lung nodule  #Multiple enlarged mediastinal, paraesophageal, and hilar lymph nodes cf paraneoplastic vs lymphoproliferative vs malignancy   - 7/18: s/p EBUS and TBNA: initial pathology showed malignancy (not lymphoma). Follow pathology   - CT CAP 7/16 with spiculated-appearing nodule in the right lung apex    - PET whole body ordered      #Pulmonary embolism  #Intermittent Tachypnea   #Fluid overload   - 7/17 CT PE protocol positive for PE unchanged from 7/16/25   - on high intensity heparin gtt - defer to hematology for transition to PO  - Lasix 20 mg IV once today  - Repeat CXR     GI  #Obesity  #Constipation  Diet: regular   Working with SLP  Last BM: 7/21  GI prophylaxis: not indicated  -Bowel regimen: PRN senna-docusate, Miralax, and bisacodyl suppository      #splenic infarction  - CT CAP  "7/16 showed wedge-shaped hypodensity at the periphery of the spleen   - see above for anticoagulation      Renal/G  #Hypocalcemia, improved  #respiratory alkalosis    #Hyperchloremia  #Replete electrolytes, Mg++ 2, PO4- 3, K+ 4  -Daily BMP  -I's and O's  VF: none  Repletion protocols: yes  -Fluid Balance Goal: even to negative  -Na goal normonatremia  -lasix 30 mg IV once today  -repeat BMP this afternoon     #Renal infarct   - anticoagulation as above      Endo  # Diabetes  #steroid induced hyperglycemia   -Hgb A1c 5.3%  -Started steroid 7/20  - high intensity sliding scale insulin      Heme  #Acute severe thrombocytopenia, improving   #Acute anemia, potential hemolytic, TRINI negative, PNH negative, [id number] normal with no schistocytes  #? Malignancy induced coagulopathy   - s/p urgent PLEX x1 7/16  - hold PTA rivaroxaban  - dexamethasone 40 mg every day x4 (7/20-7/23) per hematology    - Daily CBC, coags, platelet Q12  - Hgb goal >7, plt goal >50k  - Transfuse to meet Hgb and plt goals  - hematology consulted. Appreciate recommendations  - PET whole body ordered      ID  #Leukocytosis  - on steroids   -daily CBC  -follow temp curve, afebrile  -blood cultures NGTD     Integument/skin  #Folliculitis  -Chlorhexidine wash per dermatology recs  - 7/16 LLE skin biopsy with ruptured folliculitis, bacterial vs pityrosporum folliculitis possible, no definite features of cutaneous small vessel vasculitis or thrombotic vasculopathy       ICU Checklist  Lines/tubes/drains: PIV x2  FEN: Regular diet  PPX: DVT - heparin gtt ; GI NA  Code: Full code  Dispo: ICU - NCC     # Obesity: Estimated body mass index is 36.37 kg/m  as calculated from the following:    Height as of this encounter: 1.88 m (6' 2\").    Weight as of this encounter: 128.5 kg (283 lb 4.7 oz).      # Financial/Environmental Concerns: none      Clinically Significant Risk Factors          # Hyperchloremia: Highest Cl = 110 mmol/L in last 2 days, will monitor as " "appropriate             # Coagulation Defect: INR = 1.32 (Ref range: 0.85 - 1.15) and/or PTT = 63 Seconds (Ref range: 22 - 38 Seconds), will monitor for bleeding  # Thrombocytopenia: Lowest platelets = 70 in last 2 days, will monitor for bleeding              # Obesity: Estimated body mass index is 36.06 kg/m  as calculated from the following:    Height as of this encounter: 1.88 m (6' 2\").    Weight as of this encounter: 127.4 kg (280 lb 13.9 oz).        # Financial/Environmental Concerns: none              The patient was seen and discussed with the [facility] attending, Dr. Carine Matt.    Nilsa Daly DO   PGY2 Neurology Resident      24 Hour Vital Signs Summary:  Temp: 97.9  F (36.6  C) Temp  Min: 97.7  F (36.5  C)  Max: 98.5  F (36.9  C)  Resp: 26 Resp  Min: 11  Max: 34  SpO2: 95 % SpO2  Min: 92 %  Max: 100 %  Pulse: 60 Pulse  Min: 60  Max: 136    No data recorded  BP: 134/64 Systolic (24hrs), Av , Min:97 , Max:168   Diastolic (24hrs), Av, Min:57, Max:85        Respiratory monitoring:   Resp: 26    I/O last 3 completed shifts:  In: 1923.46 [P.O.:1280; I.V.:643.46]  Out: 1475 [Urine:1475]    Current Medications:  Current Facility-Administered Medications   Medication Dose Route Frequency Provider Last Rate Last Admin    dexAMETHasone (DECADRON) tablet 40 mg  40 mg Oral or Feeding Tube Daily Carine Matt MD   40 mg at 25 0918    fluticasone (FLONASE) 50 MCG/ACT spray 1 spray  1 spray Both Nostrils Daily Nilsa Daly DO   1 spray at 25 1439    insulin aspart (NovoLOG) injection (RAPID ACTING)  1-10 Units Subcutaneous TID AC Nilsa Daly DO   4 Units at 25 1826    insulin aspart (NovoLOG) injection (RAPID ACTING)  1-7 Units Subcutaneous At Bedtime Nilsa Daly DO        polyethylene glycol (MIRALAX) Packet 17 g  17 g Oral or Feeding Tube Daily Carine Matt MD        rosuvastatin (CRESTOR) tablet 10 mg  10 mg Oral or Feeding Tube Daily Carine Matt MD   " 10 mg at 07/21/25 0918    senna-docusate (SENOKOT-S/PERICOLACE) 8.6-50 MG per tablet 1-2 tablet  1-2 tablet Oral or Feeding Tube BID Griselda Ha CNP   1 tablet at 07/21/25 0918    sodium chloride (PF) 0.9% PF flush 3 mL  3 mL Intracatheter Q8H MOHINDER Nishi Rodas MD   3 mL at 07/21/25 2151    sodium chloride 0.9 % bag for CT scan flush  100 mL As instructed Once Nishi Rodas MD           PRN Medications:  Current Facility-Administered Medications   Medication Dose Route Frequency Provider Last Rate Last Admin    acetaminophen (TYLENOL) tablet 650 mg  650 mg Oral or Feeding Tube Q4H PRN Nilsa Daly DO        Or    acetaminophen (TYLENOL) Suppository 650 mg  650 mg Rectal Q4H PRN Nilsa Daly DO        bisacodyl (DULCOLAX) suppository 10 mg  10 mg Rectal Daily PRN Yamile Gutierrez APRN CNP        glucose gel 15-30 g  15-30 g Oral Q15 Min PRN Nilsa Daly DO        Or    dextrose 50 % injection 25-50 mL  25-50 mL Intravenous Q15 Min PRN Nilsa Daly DO        Or    glucagon injection 1 mg  1 mg Subcutaneous Q15 Min PRN Nilsa Daly DO        diphenhydrAMINE (BENADRYL) capsule 25 mg  25 mg Oral or Feeding Tube Q6H PRN Carine Matt MD        Or    diphenhydrAMINE (BENADRYL) injection 25 mg  25 mg Intravenous Q6H PRN Carine Matt MD        hydrALAZINE (APRESOLINE) injection 10 mg  10 mg Intravenous Q30 Min PRN Zora Boone PA-C   10 mg at 07/21/25 1824    labetalol (NORMODYNE/TRANDATE) injection 10 mg  10 mg Intravenous Q10 Min PRN Zora Boone PA-C        magnesium hydroxide (MILK OF MAGNESIA) suspension 30 mL  30 mL Oral or Feeding Tube Daily PRN Carine Matt MD        Medication Instruction - Avoid dextrose in IV solutions   Intravenous Continuous PRN Nishi Rodas MD        melatonin tablet 3 mg  3 mg Oral At Bedtime PRN Nishi Rodas MD   3 mg at 07/22/25 0123    ondansetron (ZOFRAN ODT) ODT tab 4 mg  4 mg Oral Q6H PRN Nishi Rodas MD   "      Or    ondansetron (ZOFRAN) injection 4 mg  4 mg Intravenous Q6H PRN Nishi Rodas MD        prochlorperazine (COMPAZINE) injection 10 mg  10 mg Intravenous Q6H PRN Franklin Aguilera MD        Or    prochlorperazine (COMPAZINE) tablet 10 mg  10 mg Oral or Feeding Tube Q6H PRN Franklin Aguilera MD        sodium chloride (PF) 0.9% PF flush 3 mL  3 mL Intracatheter q1 min prn Nishi Rodas MD           Infusions:  Current Facility-Administered Medications   Medication Dose Route Frequency Provider Last Rate Last Admin    heparin 25,000 units in 0.45% NaCl 250 mL ANTICOAGULANT infusion  0-5,000 Units/hr Intravenous Continuous Yamile Gutierrez APRN CNP 24 mL/hr at 07/22/25 0700 2,400 Units/hr at 07/22/25 0700    Medication Instruction - Avoid dextrose in IV solutions   Intravenous Continuous PRN Nishi Rodas MD           Allergies   Allergen Reactions    Apixaban Rash    Amlodipine Other (See Comments) and Rash       Physical Examination:  Vitals: /64   Pulse 60   Temp 97.9  F (36.6  C) (Oral)   Resp 26   Ht 1.88 m (6' 2\")   Wt 127.4 kg (280 lb 13.9 oz)   SpO2 95%   BMI 36.06 kg/m    General: Adult male patient, lying in bed, critically-ill  HEENT: Normocephalic, atraumatic, no icterus, oral cavity/oropharynx pink and moist  Cardiac: RRR via monitor  Pulm: unlabored, expansion symmetric, no retractions or use of accessory muscles  Abdomen: Soft, non-distended  Extremities: Warm, no edema, well perfused  Skin: No rash or lesion  Psych: Calm and cooperative, more interactive   Neuro:  Mental status: Awake, alert, attentive, oriented to self, time, place, and circumstance. Language is fluent and coherent with intact comprehension of complex commands, naming and repetition.  Cranial nerves: right partial hemianopia, PERRL, conjugate gaze, EOMI, facial sensation intact, mild right facial droop, mild dysarthria. Head tilted to the right at rest   Motor: Uses left hand to lift " "RUE against gravity, unable to activate wrist or move fingers on right. BLE antigravity without drift   Sensory: Intact to light touch x 4 extremities  Coordination: FNF without ataxia or dysmetria.   Gait: MILAN, deferred.        [employer] Stroke Scale  Exam Interval: Baseline   Score    Level of consciousness: (0)   Alert, keenly responsive    LOC questions: (0)   Answers both questions correctly    LOC commands: (0)   Performs both tasks correctly    Best gaze: (0)   Normal    Visual: (1)   Partial hemianopia    Facial palsy: (1)   Minor paralysis (flat nasolabial fold, smile asymmetry)    Motor arm (left): (0)   No drift    Motor arm (right): (1)   Drift    Motor leg (left): (0)   No drift    Motor leg (right): (0)   No drift    Limb ataxia: (0)   Absent    Sensory: (0)   Normal- no sensory loss    Best language: (0)   Normal- no aphasia    Dysarthria: (1)   Mild to moderate dysarthria    Extinction and inattention: (0)   No abnormality        Total Score:  4 (7/21/25 0900)          Labs:  Recent Labs   Lab 07/22/25 0522 07/21/25  0528 07/20/25  0417 07/19/25  1659 07/19/25  0327    138 140 136 140   POTASSIUM 4.5 4.5 3.9  --  3.9   CHLORIDE 110* 107 108*  --  106   CO2 19* 19* 22  --  25   BUN 25.3* 20.4* 15.2  --  14.8   CR 0.89 0.78 0.96  --  1.02   SHARAN 8.7* 8.7* 8.4*  --  8.5*   BILITOTAL 0.5 0.6 1.0 1.1  --    ALKPHOS 59 64 55 56  --    ALT 30 18 25 29  --    AST 23 19 25 34  --        Recent Labs   Lab 07/22/25  0522 07/21/25  1631 07/21/25  0528 07/20/25  1718 07/20/25  0417 07/20/25  0024 07/19/25  1659   WBC 16.7*  --  12.0*  --  11.9*  --  12.3*   HGB 10.6*  --  11.0*  --  9.9*  --  10.4*   PLT 87* 91* 70* 76* 64*   < > 35*    < > = values in this interval not displayed.       No results for input(s): \"PH\", \"PCO2\", \"PO2\", \"HCO3\" in the last 168 hours.    All cultures:  Recent Labs   Lab 07/17/25  1449 07/17/25  1440 07/16/25  1009   CULTURE No growth after 4 days No growth " after 4 days No Growth  No Growth       Imaging:    Recent Results (from the past 24 hours)   Glucose by meter   Result Value Ref Range    GLUCOSE BY METER POCT 171 (H) 70 - 99 mg/dL   Heparin Unfractionated Anti Xa Level   Result Value Ref Range    Anti Xa Unfractionated Heparin 0.29 For Reference Range, See Comment IU/mL    Narrative    Therapeutic Range: UFH: 0.25-0.50 IU/mL for low intensity dosing,  0.30-0.70 IU/mL for high intensity dosing DVT and PE.  This test is not validated for other direct factor X inhibitors (e.g. rivaroxaban, apixaban, edoxaban, betrixaban, fondaparinux) and should not be used for monitoring of other medications.   Blood gas venous   Result Value Ref Range    pH Venous 7.47 (H) 7.32 - 7.43    pCO2 Venous 31 (L) 40 - 50 mm Hg    pO2 Venous 62 (H) 25 - 47 mm Hg    Bicarbonate Venous 22 21 - 28 mmol/L    Base Excess/Deficit Venous -0.4 -3.0 - 3.0 mmol/L    FIO2 0     Oxyhemoglobin Venous 90 (H) 70 - 75 %    O2 Sat, Venous 92.0 (H) 70.0 - 75.0 %    Narrative    In healthy individuals, oxyhemoglobin (O2Hb) and oxygen saturation (SO2) are approximately equal. In the presence of dyshemoglobins, oxyhemoglobin can be considerably lower than oxygen saturation.   Lactic acid whole blood   Result Value Ref Range    Lactic Acid 1.1 0.7 - 2.0 mmol/L   Extra Tube    Narrative    The following orders were created for panel order Extra Tube.  Procedure                               Abnormality         Status                     ---------                               -----------         ------                     Extra Purple Top Tube[[id number]]                           Final result                 Please view results for these tests on the individual orders.   Extra Purple Top Tube   Result Value Ref Range    Hold Specimen JI    Direct antiglobulin test *Canceled*    Narrative    The following orders were created for panel order Direct antiglobulin test.  Procedure                                Abnormality         Status                     ---------                               -----------         ------                     Direct antiglobulin jamar...[[id number]]                                                   Please view results for these tests on the individual orders.   Glucose by meter   Result Value Ref Range    GLUCOSE BY METER POCT 190 (H) 70 - 99 mg/dL   Direct antiglobulin test *Canceled*    Narrative    The following orders were created for panel order Direct antiglobulin test.  Procedure                               Abnormality         Status                     ---------                               -----------         ------                       Please view results for these tests on the individual orders.   Heparin Unfractionated Anti Xa Level   Result Value Ref Range    Anti Xa Unfractionated Heparin 0.29 For Reference Range, See Comment IU/mL    Narrative    Therapeutic Range: UFH: 0.25-0.50 IU/mL for low intensity dosing,  0.30-0.70 IU/mL for high intensity dosing DVT and PE.  This test is not validated for other direct factor X inhibitors (e.g. rivaroxaban, apixaban, edoxaban, betrixaban, fondaparinux) and should not be used for monitoring of other medications.   Platelet count   Result Value Ref Range    Platelet Count 91 (L) 150 - 450 10e3/uL    MCV 80 78 - 100 fL   Glucose by meter   Result Value Ref Range    GLUCOSE BY METER POCT 320 (H) 70 - 99 mg/dL   Direct antiglobulin test    Narrative    The following orders were created for panel order Direct antiglobulin test.  Procedure                               Abnormality         Status                     ---------                               -----------         ------                     Direct antiglobulin jamar...[[id number]]                      Final result                 Please view results for these tests on the individual orders.   Heparin Unfractionated Anti Xa Level   Result Value Ref Range    Anti Xa Unfractionated  Heparin 0.32 For Reference Range, See Comment IU/mL    Narrative    Therapeutic Range: UFH: 0.25-0.50 IU/mL for low intensity dosing,  0.30-0.70 IU/mL for high intensity dosing DVT and PE.  This test is not validated for other direct factor X inhibitors (e.g. rivaroxaban, apixaban, edoxaban, betrixaban, fondaparinux) and should not be used for monitoring of other medications.   Direct antiglobulin test, adult   Result Value Ref Range    TRINI Anti-IgG,-C3d Negative     SPECIMEN EXPIRATION DATE 7/24/2025 11:59:00 PM CDT    CT Head w/o Contrast    Narrative    CT HEAD W/O CONTRAST 7/21/2025 10:37 PM    History: monitor for interval changes of SAH/L MCA stroke on heparin     Comparison: Same day 2:39    Technique: Using multidetector thin collimation helical acquisition  technique, axial, coronal and sagittal CT images from the skull base  to the vertex were obtained without intravenous contrast.   (topogram) image(s) also obtained and reviewed.    Findings: Unchanged small volume subarachnoid hemorrhage along the  left frontoparietal convexity. Slightly increased density of the  periventricular region of the hyperattenuation in the distribution of  the known left MCA infarction. No additional acute loss of gray-white  matter differentiation. Streak artifact obscuring detail of the  cranial fossa. Hypoplasia of the corpus callosum. Ventricles are  proportionate to the cerebral sulci. The basal cisterns are clear.    The bony calvaria and the bones of the skull base are normal. The  visualized portions of the paranasal sinuses and mastoid air cells are  clear.      Impression    Impression:  1. Unchanged small volume left frontoparietal subarachnoid hemorrhage.  2. Evolving left MCA infarction. No new loss of gray-white matter  differentiation.    I have personally reviewed the examination and initial interpretation  and I agree with the findings.    JS BARBOSA MD         SYSTEM ID:  [id number]   Heparin  Unfractionated Anti Xa Level   Result Value Ref Range    Anti Xa Unfractionated Heparin 0.33 For Reference Range, See Comment IU/mL    Narrative    Therapeutic Range: UFH: 0.25-0.50 IU/mL for low intensity dosing,  0.30-0.70 IU/mL for high intensity dosing DVT and PE.  This test is not validated for other direct factor X inhibitors (e.g. rivaroxaban, apixaban, edoxaban, betrixaban, fondaparinux) and should not be used for monitoring of other medications.   Basic metabolic panel   Result Value Ref Range    Sodium 140 135 - 145 mmol/L    Potassium 4.5 3.4 - 5.3 mmol/L    Chloride 110 (H) 98 - 107 mmol/L    Carbon Dioxide (CO2) 19 (L) 22 - 29 mmol/L    Anion Gap 11 7 - 15 mmol/L    Urea Nitrogen 25.3 (H) 6.0 - 20.0 mg/dL    Creatinine 0.89 0.67 - 1.17 mg/dL    GFR Estimate >90 >60 mL/min/1.73m2    Calcium 8.7 (L) 8.8 - 10.4 mg/dL    Glucose 195 (H) 70 - 99 mg/dL   CBC with platelets   Result Value Ref Range    WBC Count 16.7 (H) 4.0 - 11.0 10e3/uL    RBC Count 3.96 (L) 4.40 - 5.90 10e6/uL    Hemoglobin 10.6 (L) 13.3 - 17.7 g/dL    Hematocrit 31.8 (L) 40.0 - 53.0 %    MCV 80 78 - 100 fL    MCH 26.8 26.5 - 33.0 pg    MCHC 33.3 31.5 - 36.5 g/dL    RDW 13.7 10.0 - 15.0 %    Platelet Count 87 (L) 150 - 450 10e3/uL   INR   Result Value Ref Range    INR 1.32 (H) 0.85 - 1.15    PT 16.3 (H) 11.8 - 14.8 Seconds   Lactate Dehydrogenase   Result Value Ref Range    Lactate Dehydrogenase 587 (H) 0 - 250 U/L   Partial thromboplastin time   Result Value Ref Range    aPTT 63 (H) 22 - 38 Seconds   Magnesium   Result Value Ref Range    Magnesium 2.2 1.7 - 2.3 mg/dL   Phosphorus   Result Value Ref Range    Phosphorus 3.8 2.5 - 4.5 mg/dL   Uric acid   Result Value Ref Range    Uric Acid 5.6 3.4 - 7.0 mg/dL   Fibrinogen activity   Result Value Ref Range    Fibrinogen Activity 309 170 - 510 mg/dL   Hepatic panel   Result Value Ref Range    Protein Total 6.1 (L) 6.4 - 8.3 g/dL    Albumin 3.7 3.5 - 5.2 g/dL    Bilirubin Total 0.5 <=1.2 mg/dL     Alkaline Phosphatase 59 40 - 150 U/L    AST 23 0 - 45 U/L    ALT 30 0 - 70 U/L    Bilirubin Direct 0.18 0.00 - 0.45 mg/dL       All relevant imaging and laboratory values personally reviewed.    CTH 7/22/25    Findings: Unchanged small volume subarachnoid hemorrhage along the  left frontoparietal convexity. Slightly increased density of the  periventricular region of the hyperattenuation in the distribution of  the known left MCA infarction. No additional acute loss of gray-white  matter differentiation. Streak artifact obscuring detail of the  cranial fossa. Hypoplasia of the corpus callosum. Ventricles are  proportionate to the cerebral sulci. The basal cisterns are clear.     The bony calvaria and the bones of the skull base are normal. The  visualized portions of the paranasal sinuses and mastoid air cells are  clear.                                                                      Impression:  1. Unchanged small volume left frontoparietal subarachnoid hemorrhage.  2. Evolving left MCA infarction. No new loss of gray-white matter  differentiation.

## 2025-07-22 NOTE — PROGRESS NOTES
Hematology Consult Progress Note    I reviewed the patient's history and pertinent medical records on 07/22/25.  I met with the patient and discussed my impression and recommendations.      EVENTS/SUBJECTIVE  Hiccups continue to be an issue today. More alert.     DATA  Review of pertinent results and my independent interpretation as follows:  -- pathology has confirmed a lung adenocarcinoma  - last platelet transfusion 7/19/25  Platelet Count   Date Value Ref Range Status   07/22/2025 87 (L) 150 - 450 10e3/uL Final   07/21/2025 91 (L) 150 - 450 10e3/uL Final   07/21/2025 70 (L) 150 - 450 10e3/uL Final   07/20/2025 76 (L) 150 - 450 10e3/uL Final   07/20/2025 64 (L) 150 - 450 10e3/uL Final   07/20/2025 72 (L) 150 - 450 10e3/uL Final       IMPRESSION  Metastatic lung adenocarcinoma: further testing, staging per Oncology team  Acute thrombocytopenia, like consumptive verses immunemediated  Acute cancer- provoked bilateral PE with Left popliteal thrombosis. Ddx 6/23/25  Multifocal acute ischemic strokes in L MCA, R parietal lobe, R frontal centrum semiovale. Ddx 7/15/25  Diffuse deposition of thrombus on aortic and tricuspid valves  Subarachnoid hemorrhage in right frontoparietal region.     RECOMMENDATIONS  - as platelets are stable can change to 1 mg/kg LMWH BID  - continue with dexamethasone 40 mg x 4 days (started 7/20, end 7/23)    Hematology consult team will continue to follow regarding cytopenia and anticoagulation management. Please page the fellow on call with questions    Kerry Borjas MD/PhD   of Medicine  Division of Hematology, Oncology and Transplantation      Date of Service: 07/22/25    I spent 45 min in the care of this patient today included: seeing and evaluating the patient in person, review of his chart including labs and notes from primary team and other providers, intensive monitoring of drug therapy (anticoagulation) for toxicity, decision regarding prescription drug  management, discussion of his management with his primary team, and documentation in the electronic medical record.

## 2025-07-22 NOTE — PROGRESS NOTES
[facility]    Progress Note - Medicine Service, MAROON TEAM 3       Date of Admission:  7/15/2025    Assessment & Plan   Bernard Chapman is a 35 year old male with a past medical history of hypertension, diabetes, ELENA, PE/DVT (on rivaroxaban) admitted on 7/15/2025 for lethargy, N/V, and blurry vision.     Brain MRI notable for multifocal acute ischemic strokes in left MCA territory, R parietal lobe, and R frontal centrum semiovale. Also found to have thrombocytopenia (28) with concerns for TTP vs CAPS (s/p PLEX). TTP labs unremarkable (normal [id number] level and no schistocytes on smear). Rheumatology reassured against CAPS. Received multiple PLT transfusions (8 units), last one 7/19.     CT c/a/p with [id number] spiculated density in R apex, 9mm pulmonary nodule in R lung base, L thyroid nodule, paratracheal LAD, splenic infarct, bilateral renal infarcts, and L retroperitoneal nodule. TTE with mobile echodensities on tricuspid and aortic valve. Hypercoagulable state presumed to be most likely 2/2 malignancy, but still getting steroids (Dexamethasone 40mg 7/16, 7/20-*).     Started on heparin gtt 7/17, but briefly held for 7/18 endobronchial needle aspiration of lymph nodes. Stroke code called 7/19 for worsening R-sided hemiparesis and hemianopia. Repeat CTA with new focal high-grade stenosis of other part of left MCA. Heparin gtt was stopped during code stroke, but restarting (heparin gtt + levophed) led to 10/10 headache. CTH with small left frontal SAH. Brain MRI with new L basal ganglia stroke. Heparin gtt resumed, and repeat CTH stable.     Lymph node aspiration positive for metastatic lung adenocarcinoma (resulted 7/22).       #Metastatic lung adenocarcinoma  #Right lung nodule  #Multiple enlarged mediastinal, paraesophageal, and hilar lymph nodes  FNA resulted today (7/22) for metastatic lung adenocarcinoma. Awaiting PDL-1 and molecular studies to direct therapy. 11  x 11 mm right apical lung nodule as probable primary lesion.  -Oncology consulted, appreciate recs   -PDL-1 and NGS pending   -Guardant 360 pending   -PET scan for staging    #Hypercoagulability d/t metastatic lung adenocarcinoma  #Infarcts of brain, kidney, spleen  #Cardiac valve thrombus on tricuspid and aortic valve 7/16 on TIMO  #Hx pulmonary embolism (6/23/25)  Diffuse emboli likely stemming from tricuspid and aortic valves. On heparin gtt, but will transition to therapeutic Lovenox today.   Plan:  - heparin gtt ends 1800  -Lovenox starts 2000  - trend daily electrolytes and CBC      #Multifocal acute ischemic strokes, in L MCA territory and new L basal ganglia stroke; Increasing mass effect on L lateral ventricle by L MCA infarction  #Subtotal occlusive thrombus in left M1   #Left frontoparietal SAH (7/19)  #Hypertension chronic  #HLD, chronic  Etiology most likely hypercoagulability d/t malignancy. Emboli stemming from tricuspid and aortic valves, appreciated on TTE 7/16. He continues to have R facial droop, extremity weakness. No apparent aphasia, but he is mildly dysarthric. Left frontoparietal subarachnoid hemorrhage stable per CT.  >Neuro-ICU signing off   - SBP goal of 120 - 160 mmHg and DBP goal < 100    -Hold PTA antihypertensives    -Has prn labetalol and hydralazine  - repeat TTE later to ensure improvement of AR and TR (and vegetations)  - cont 10 mg rosuvastatin  > neuro-stroke consulted, appreciate recs  > OT/PT/Speech  > nutrition consult   -monitor oral intake, weights  - q4h neuro check      #Acute thrombocytopenia, in the setting of metastatic lung adenocarcinoma d/t ITP vs drug-induced vs consumptive coagulopathy  #Acute anemia  > Hematology consult   - goal hgb>7, INR<1.5, platelets>50k    - continue dexamethasone 40 mg daily x4 day   - BUS and TBNA of mediastinal lymph nodes, path pending      # Numerous palpable purpura across the scalp and lower extremities   -s/p L leg s/p punch biopsy  "with dermatology: rupture folliculitis. No definite features of cutaneous small vessel vasculitis or thrombotic vasculopathy   -Chlorhexidine wash       #DM2, A1c 5.3  - POC glucose ACHS  - High intensity insulin sliding scale    #ELENA  Need to clarify if uses CPAP at home.    #Hiccups  #chronic back pain  Chronic back pain. On acetaminophen only (given frequent need for neuro checks). Hiccups (drug-induced (dexamethasone) vs central) have been exacerbating back pain and making it difficult to sleep. Neuro recommended thorazine (EKG wnl).   Plan:  - Thorazine 25mg q6hr prn  - Melatonin 3mg daily  - acetaminophen 1000 mg every 6 hours  - hold cyclobenzaprine 10 mg 3x daily PRN  - hold PTA gabapentin 100 mg 3x daily  - hold PTA oxycodone 5 mg PRN every 6h     #allergies  - fluticasone 1 spray daily        Diet: Regular Diet Adult    DVT Prophylaxis: Heparin drip-->Lovenox  Avalos Catheter: Not present  Fluids: None  Lines: None     Cardiac Monitoring: ACTIVE order. Indication: Stroke, acute (48 hours)  Code Status: Full Code      Clinically Significant Risk Factors          # Hyperchloremia: Highest Cl = 110 mmol/L in last 2 days, will monitor as appropriate           # Coagulation Defect: INR = 1.32 (Ref range: 0.85 - 1.15) and/or PTT = 63 Seconds (Ref range: 22 - 38 Seconds), will monitor for bleeding  # Thrombocytopenia: Lowest platelets = 70 in last 2 days, will monitor for bleeding              # Obesity: Estimated body mass index is 36.06 kg/m  as calculated from the following:    Height as of this encounter: 1.88 m (6' 2\").    Weight as of this encounter: 127.4 kg (280 lb 13.9 oz).      # Financial/Environmental Concerns: none         Social Drivers of Health          Disposition Plan     Medically Ready for Discharge: Anticipated in 2-4 Days         The patient's care was discussed with the Attending Physician, Dr. Randhawa.    [facility] Hospitalherbert  Medical Student  Medicine Service, [address] " [facility]  Securely message with [employer] (more info)  Text page via [facility] Paging/Directory   See signed in provider for up to date coverage information    Resident/Fellow Attestation   I, BOB SMITH MD, was present with the medical student who participated in the service and in the documentation of the note.  I have verified the history and personally performed the physical exam and medical decision making.  I agree with the assessment and plan of care as documented in the note.      BOB SMITH MD  PGY1  Date of Service (when I saw the patient): 07/22/25  ______________________________________________________________________    Interval History   Overnight he was unable to sleep because of hiccups (hiccups led to back pain). Back pain is bad, but manageable with just Tylenol. Expresses understanding of why we are trying to avoid stronger pain meds (given need for frequent neuro exams). Not able to move RUE. Significant R homonymous hemianopsia. Has cough for past while. No bloody BM.     Physical Exam   Vital Signs: Temp: 98.4  F (36.9  C) Temp src: Axillary BP: (!) 140/84 Pulse: 65   Resp: 23 SpO2: 95 % O2 Device: Oxymask Oxygen Delivery: 2 LPM  Weight: 280 lbs 13.86 oz  Mental status: Awake, alert, attentive, oriented to self, time, place, and circumstance. Language is fluent with mild dysarthria, comprehension intact, able to follow complex commands and naming.  Cranial nerves: R homonymous hemianopia, PERRL, conjugate gaze, EOMI, facial sensation intact, right facial droop, mild dysarthria. Head tilted to the right at rest.   Motor: RUE lifts against gravity, right shoulder shrug weaker than left, unable to activate R wrist or move fingers. 5/5 hip flexion bilaterally although right is slightly weaker than left. 3/5 R dorsiflexion, plantar flexion.5/5 L dorsiflexion/plantarflexion  Sensory: Intact to light touch x 4 extremities  Coordination: L-sided FNF without ataxia or  dysmetria  Gait: deferred.  Reflexes: deferred    RESP: CTAB anteriorly, no wheezing, normal WOB on RA  CV: RRR, no extra heart sounds,   Extremities: RUE swelling      Medical Decision Making       Please see A&P for additional details of medical decision making.      Data     I have personally reviewed the following data over the past 24 hrs:    16.7 (H)  \   10.6 (L)   / 87 (L)     140 110 (H) 25.3 (H) /  167 (H)   4.3 19 (L) 0.89 \     ALT: 30 AST: 23 AP: 59 TBILI: 0.5   ALB: 3.7 TOT PROTEIN: 6.1 (L) LIPASE: N/A     INR:  1.32 (H) PTT:  63 (H)   D-dimer:  N/A Fibrinogen:  309     Ferritin:  N/A % Retic:  N/A LDH:  587 (H)       Imaging results reviewed over the past 24 hrs:   Recent Results (from the past 24 hours)   CT Head w/o Contrast    Narrative    CT HEAD W/O CONTRAST 7/21/2025 10:37 PM    History: monitor for interval changes of SAH/L MCA stroke on heparin     Comparison: Same day 2:39    Technique: Using multidetector thin collimation helical acquisition  technique, axial, coronal and sagittal CT images from the skull base  to the vertex were obtained without intravenous contrast.   (topogram) image(s) also obtained and reviewed.    Findings: Unchanged small volume subarachnoid hemorrhage along the  left frontoparietal convexity. Slightly increased density of the  periventricular region of the hyperattenuation in the distribution of  the known left MCA infarction. No additional acute loss of gray-white  matter differentiation. Streak artifact obscuring detail of the  cranial fossa. Hypoplasia of the corpus callosum. Ventricles are  proportionate to the cerebral sulci. The basal cisterns are clear.    The bony calvaria and the bones of the skull base are normal. The  visualized portions of the paranasal sinuses and mastoid air cells are  clear.      Impression    Impression:  1. Unchanged small volume left frontoparietal subarachnoid hemorrhage.  2. Evolving left MCA infarction. No new loss of  gray-white matter  differentiation.    I have personally reviewed the examination and initial interpretation  and I agree with the findings.    JS BARBOSA MD         SYSTEM ID:  [id number]

## 2025-07-22 NOTE — PLAN OF CARE
Major Shift Events: CT head completed this shift.   Neuros grossly unchanged. A&Ox4. Forgetful. Right side extremities remain weaker than left side extremities. Follows commands. Decreased sensation on RLE/RUE, unchanged per pt. R facial droop remains. Denied headache or other pain. Afebrile. Sinus rhythm to sinus tachycardia, HR: 60-110s. SBP goal < 160 met without intervention. BP obtained on right arm per orders. Palpable pulses throughout. On RA to 2 L oxymask while sleeping. LS clear. Tolerating regular diet. Swallows pills without difficulty. Large, loose BM x1, up to commode with Ax2+gait belt. Voids with urinal with assistance. Hiccups throughout majority of shift, NCC resident notified/aware. Some sleep between cares, received PRN melatonin (see MAR). Pt denies other symptoms, complaints, or pain. No new skin concerns. PIV x2. Heparin gtt @ 2400 units/hr.         Plan: Continue to follow plan of care and notify team of any changes.        For vital signs and complete assessments, please see documentation flowsheets. Report given to oncoming RN.

## 2025-07-22 NOTE — PROGRESS NOTES
07/22/25 1257   Appointment Info   Signing Clinician's Name / Credentials (OT) JAYESH Solorzano   Rehab Comments (OT) Re-Eval   Living Environment   People in Home alone   Current Living Arrangements apartment   Home Accessibility no concerns   Transportation Anticipated car, drives self;family or friend will provide   Living Environment Comments See initial eval 7/16   Self-Care   Usual Activity Tolerance good   Current Activity Tolerance fair   Regular Exercise No   Equipment Currently Used at Home none   Fall history within last six months no   General Information   Onset of Illness/Injury or Date of Surgery 07/19/25   Referring Physician Nishi Rodas MD   Patient/Family Therapy Goal Statement (OT) to improve IND   Additional Occupational Profile Info/Pertinent History of Current Problem per chart Bernard Chapman is a 35 year old male with a past medical history of hypertension, diabetes, possible ITP, sleep apnea, PE and DVT on rivaroxaban admitted on 7/15/2025 for acute ischemic stroke in left MCA territory, near occlusion of left M1, and concern for thrombotic emergency such as TTP or catastrophic antiphospholipid syndrome (in setting of severe thrombocytopenia and acute cerebral infarcts).   Existing Precautions/Restrictions fall   Cognitive Status Examination   Cognitive Status Comments pt flat affect, follows commands appropriately, occasional cuing for waiting/safety   Visual Perception   Visual Field Deficit homonymous hemianopsia, right   Sensory   Sensory Quick Adds sensation intact   Posture   Posture Comments slgihtly lean to R-side   Range of Motion Comprehensive   Comment, General Range of Motion RUE limited   Strength Comprehensive (MMT)   Comment, General Manual Muscle Testing (MMT) Assessment RUE no purposeful movement noted   Coordination   Upper Extremity Coordination Right UE impaired   Bed Mobility   Bed Mobility supine-sit   Supine-Sit [place] (Bed Mobility) contact  guard   Transfers   Transfers sit-stand transfer;toilet transfer;shower transfer   Sit-Stand Transfer   Sit-Stand [place] (Transfers) minimum assist (75% patient effort);2 person assist   Shower Transfer   Type (Shower Transfer) lateral   [place] Level (Shower Transfer) minimum assist (75% patient effort);2 person assist   Shower Transfer Comments per clinical judgement   Toilet Transfer   Type (Toilet Transfer) sit-stand   [place] Level (Toilet Transfer) minimum assist (75% patient effort);2 person assist   Toilet Transfer Comments per clinical judgement   Balance   Balance Comments good seated balance, standing balance with min A x 1-2   Activities of Daily Living   BADL Assessment/Intervention bathing;lower body dressing;toileting   Bathing Assessment/Intervention   [place] Level (Bathing) moderate assist (50% patient effort)   Comment, (Bathing) per clinical judgement   Upper Body Dressing Assessment/Training   [place] Level (Upper Body Dressing) minimum assist (75% patient effort)   Comment, (Upper Body Dressing) per clinical judgement   Lower Body Dressing Assessment/Training   [place] Level (Lower Body Dressing) moderate assist (50% patient effort)   Grooming Assessment/Training   [place] Level (Grooming) minimum assist (75% patient effort)   Toileting   [place] Level (Toileting) moderate assist (50% patient effort)   Comment, (Toileting) per clinical judgement   Clinical Impression   Criteria for Skilled Therapeutic Interventions Met (OT) Yes, treatment indicated   OT Diagnosis pt is below baseline for ADLs   OT Problem List-Impairments impacting ADL problems related to;activity tolerance impaired;balance;coordination;mobility;strength;pain;post-surgical precautions;postural control   Assessment of Occupational Performance 3-5 Performance Deficits   Identified Performance Deficits Dressing, grooming, toileting, showering, IADLs   Planned Therapy Interventions (OT) ADL  retraining;IADL retraining;balance training;fine motor coordination training;motor coordination training;neuromuscular re-education;strengthening;transfer training;home program guidelines;progressive activity/exercise;risk factor education   Clinical Decision Making Complexity (OT) problem focused assessment/low complexity   Risk & Benefits of therapy have been explained evaluation/treatment results reviewed;care plan/treatment goals reviewed;risks/benefits reviewed;current/potential barriers reviewed;participants voiced agreement with care plan;participants included;patient   Clinical Impression Comments pt presents below baseline, limited by activity tolerance, R-sided weakness and discoordination. pt will benefit from skilled OT to progress toward PLOF   OT Total Evaluation Time   OT Eval, Low Complexity Minutes (28794) 5   OT Goals   Therapy Frequency (OT) 6 times/week   OT Predicted Duration/Target Date for Goal Attainment 08/08/25   OT: Hygiene/Grooming independent;while standing   OT: Upper Body Dressing Modified independent   OT: Lower Body Dressing Modified independent   OT: Lower Body Bathing Modified independent   OT: Toilet Transfer/Toileting Modified independent;cleaning and garment management;toilet transfer   Self-Care/Home Management   Self-Care/Home Mgmt/ADL, Compensatory, Meal Prep Minutes (55340) 8   Symptoms Noted During/After Treatment (Meal Preparation/Planning Training) fatigue   Treatment Detail/Skilled Intervention Facilitated standing ADLs to progress pt IND, improve attention to R-side. Pt stands at sink, PT provides CGA-Antonio for balance, OT providing cuing and set up. Th A with placing RUE on counter, maintaining RUE throughout activity in supported position. Pt washes face and brushes teeth with above A, encourages WBing through RUE while reaching with LUE for items.   Therapeutic Activities   Therapeutic Activity Minutes (75445) 25   Symptoms noted during/after treatment fatigue    Treatment Detail/Skilled Intervention facilitated hallway ambulation to progress pt activtiy tolerance and awareness to R-side. Co-treat w/pt due to pt complexity. Pt supine > sit EOB with CGA, cues for waiting at EOB for Th to be ready. pt STS from EOB with min A x 2, melvina walker in LUE. Pt ambulates 30ft, 15ft, 15ft, 15ft, initially with min A and hemiwalker, second person provides CGA and follows with chair. Pt then challenged to ambulate final two bouts w/o hemiwalker for increased challenged w/balance and proprioception. For final bouts pt Antonio x2 with bilat hand held A. Cues for weight shifting and foot clearance. pt takes seated rest between bouts, CGA-min A x 2 for additional x 3 STSs.   OT Discharge Planning   OT Plan monitor vision, standing ADLs, MIRNA neuro monet   OT Discharge Recommendation (DC Rec) [facility]-Mot[facility] patient will benefit from intensive, interdisciplinary therapy.  Anticipate will be able to tolerate 3 hours of therapy per day   OT Rationale for DC Rec pt significantly below baseline after second stroke, requiring increased A for ambulating and ADLs. pt likely to benefit from ARU setting to progress IND and safety prior to d/c home   OT Brief overview of current status min A x 2   OT Total Distance Amb During Session (feet) 75   Total Session Time   Timed Code Treatment Minutes 33   Total Session Time (sum of timed and untimed services) 38

## 2025-07-22 NOTE — PROGRESS NOTES
"CLINICAL NUTRITION SERVICES - ASSESSMENT NOTE    Registered Dietitian Interventions:  None today    Future/Additional Recommendations:  Monitor oral intake, weights.     REASON FOR ASSESSMENT  LOS and Positive admission nutrition risk screen    INFORMATION OBTAINED  Assessed patient in room. Parents present. They report that pt's appetite is decreased. He never says he's hungry and they sometimes have to make sure he orders a meal. He's mostly been eating 3 meals a day here, same as at home.    NUTRITION HISTORY  PMH of hypertension, diabetes, possible ITP, sleep apnea, PE and DVT on rivaroxaban admitted on 7/15/2025 for acute ischemic stroke in left MCA territory, near occlusion of left M1, and concern for thrombotic emergency such as TTP or catastrophic antiphospholipid syndrome (in setting of severe thrombocytopenia and acute cerebral infarcts).     CURRENT NUTRITION ORDERS  Diet: Regular    CURRENT INTAKE/TOLERANCE  Pt consuming % of meals per flowsheet. Pt ordering 1-3 meals a day per [facility].    LABS  Nutrition-relevant labs: Reviewed    MEDICATIONS  Nutrition-relevant medications: novolog, miralax, senna    ANTHROPOMETRICS  Height: 188 cm (6' 2\")  Admission Weight: 112.9 kg (249 lb) (07/15/25 1137)   Most Recent Weight: 127.4 kg (280 lb 13.9 oz) (07/22/25 0200)  IBW: 86.4 kg (131% IBW)  BMI: Body mass index is 36.06 kg/m .   Weight History:   Wt Readings from Last 15 Encounters:   07/22/25 127.4 kg (280 lb 13.9 oz)   07/15/25 113.3 kg (249 lb 12.8 oz)   06/23/25   111.2 kg (245 lb 3.2 oz)   05/22/25   118.8 kg (262 lb)   02/25/25   130.9 kg (288 lb 8 oz)     Dosing Weight: 93 kg, based on adjusted wt using admit wt    ASSESSED NUTRITION NEEDS  Estimated Energy Needs: 1860 - 2325 kcals/day (20 - 25 kcals/kg)  Justification: Maintenance  Estimated Protein Needs: 112 - 140 grams protein/day (1.2 - 1.5 grams of pro/kg)  Justification: Increased needs  Estimated Fluid Needs: 1 mL/kcal  Justification: " "Maintenance    SYSTEM AND PHYSICAL FINDINGS    GI symptoms: Reviewed  Skin/wounds: Reviewed    MALNUTRITION  % Intake: Decreased intake does not meet criteria  % Weight Loss: None noted, difficult to assess with fluctuations  Subcutaneous Fat Loss: None observed  Muscle Loss: None observed  Fluid Accumulation/Edema: None noted  Malnutrition Diagnosis: Patient does not meet two of the established criteria necessary for diagnosing malnutrition  Malnutrition Present on Admission: No    NUTRITION DIAGNOSIS  Predicted inadequate nutrient intake (kcal/protein) related to decreased appetite but family involved and encouraging PO.     INTERVENTIONS  None today    GOALS  Patient to consume % of nutritionally adequate meal trays TID, or the equivalent with supplements/snacks.     MONITORING/EVALUATION  Progress toward goals will be monitored and evaluated per policy.    Norm David RD, LD  Available on [employer]  Weekend/Holiday JAYSON Zapien - \"Weekend Clinical Dietitian\"   "

## 2025-07-23 ENCOUNTER — APPOINTMENT (OUTPATIENT)
Dept: CT IMAGING | Facility: CLINIC | Age: 35
DRG: 064 | End: 2025-07-23
Payer: COMMERCIAL

## 2025-07-23 ENCOUNTER — APPOINTMENT (OUTPATIENT)
Dept: PHYSICAL THERAPY | Facility: CLINIC | Age: 35
DRG: 064 | End: 2025-07-23
Payer: COMMERCIAL

## 2025-07-23 ENCOUNTER — APPOINTMENT (OUTPATIENT)
Dept: SPEECH THERAPY | Facility: CLINIC | Age: 35
DRG: 064 | End: 2025-07-23
Payer: COMMERCIAL

## 2025-07-23 ENCOUNTER — APPOINTMENT (OUTPATIENT)
Dept: OCCUPATIONAL THERAPY | Facility: CLINIC | Age: 35
DRG: 064 | End: 2025-07-23
Payer: COMMERCIAL

## 2025-07-23 LAB
ALBUMIN SERPL BCG-MCNC: 3.8 G/DL (ref 3.5–5.2)
ALP SERPL-CCNC: 62 U/L (ref 40–150)
ALT SERPL W P-5'-P-CCNC: 30 U/L (ref 0–70)
ANION GAP SERPL CALCULATED.3IONS-SCNC: 15 MMOL/L (ref 7–15)
APTT PPP: 29 SECONDS (ref 22–38)
AST SERPL W P-5'-P-CCNC: 17 U/L (ref 0–45)
BILIRUB SERPL-MCNC: 0.6 MG/DL
BILIRUBIN DIRECT (ROCHE PRO & PURE): 0.26 MG/DL (ref 0–0.45)
BUN SERPL-MCNC: 32.6 MG/DL (ref 6–20)
CALCIUM SERPL-MCNC: 9 MG/DL (ref 8.8–10.4)
CHLORIDE SERPL-SCNC: 108 MMOL/L (ref 98–107)
CREAT SERPL-MCNC: 0.9 MG/DL (ref 0.67–1.17)
EGFRCR SERPLBLD CKD-EPI 2021: >90 ML/MIN/1.73M2
ERYTHROCYTE [DISTWIDTH] IN BLOOD BY AUTOMATED COUNT: 13.7 % (ref 10–15)
FIBRINOGEN PPP-MCNC: 157 MG/DL (ref 170–510)
GLUCOSE BLDC GLUCOMTR-MCNC: 191 MG/DL (ref 70–99)
GLUCOSE BLDC GLUCOMTR-MCNC: 199 MG/DL (ref 70–99)
GLUCOSE BLDC GLUCOMTR-MCNC: 230 MG/DL (ref 70–99)
GLUCOSE BLDC GLUCOMTR-MCNC: 262 MG/DL (ref 70–99)
GLUCOSE SERPL-MCNC: 207 MG/DL (ref 70–99)
HCO3 SERPL-SCNC: 18 MMOL/L (ref 22–29)
HCT VFR BLD AUTO: 34.1 % (ref 40–53)
HGB BLD-MCNC: 11.2 G/DL (ref 13.3–17.7)
INR PPP: 1.43 (ref 0.85–1.15)
LDH SERPL L TO P-CCNC: 594 U/L (ref 0–250)
MAGNESIUM SERPL-MCNC: 2.2 MG/DL (ref 1.7–2.3)
MCH RBC QN AUTO: 26.9 PG (ref 26.5–33)
MCHC RBC AUTO-ENTMCNC: 32.8 G/DL (ref 31.5–36.5)
MCV RBC AUTO: 82 FL (ref 78–100)
PATH REPORT.ADDENDUM SPEC: ABNORMAL
PATH REPORT.COMMENTS IMP SPEC: ABNORMAL
PATH REPORT.COMMENTS IMP SPEC: YES
PATH REPORT.FINAL DX SPEC: ABNORMAL
PATH REPORT.GROSS SPEC: ABNORMAL
PATH REPORT.MICROSCOPIC SPEC OTHER STN: ABNORMAL
PATH REPORT.RELEVANT HX SPEC: ABNORMAL
PHOSPHATE SERPL-MCNC: 4.9 MG/DL (ref 2.5–4.5)
PLATELET # BLD AUTO: 71 10E3/UL (ref 150–450)
POTASSIUM SERPL-SCNC: 4.3 MMOL/L (ref 3.4–5.3)
PROT SERPL-MCNC: 6.2 G/DL (ref 6.4–8.3)
PROTHROMBIN TIME: 17.7 SECONDS (ref 11.8–14.8)
RBC # BLD AUTO: 4.16 10E6/UL (ref 4.4–5.9)
SODIUM SERPL-SCNC: 141 MMOL/L (ref 135–145)
UFH PPP CHRO-ACNC: 0.46 IU/ML (ref ?–1.1)
WBC # BLD AUTO: 15.3 10E3/UL (ref 4–11)

## 2025-07-23 PROCEDURE — 250N000013 HC RX MED GY IP 250 OP 250 PS 637

## 2025-07-23 PROCEDURE — 83615 LACTATE (LD) (LDH) ENZYME: CPT

## 2025-07-23 PROCEDURE — 85384 FIBRINOGEN ACTIVITY: CPT

## 2025-07-23 PROCEDURE — 99233 SBSQ HOSP IP/OBS HIGH 50: CPT | Mod: GC | Performed by: STUDENT IN AN ORGANIZED HEALTH CARE EDUCATION/TRAINING PROGRAM

## 2025-07-23 PROCEDURE — G0452 MOLECULAR PATHOLOGY INTERPR: HCPCS | Mod: 26 | Performed by: PATHOLOGY

## 2025-07-23 PROCEDURE — 82248 BILIRUBIN DIRECT: CPT

## 2025-07-23 PROCEDURE — 92507 TX SP LANG VOICE COMM INDIV: CPT | Mod: GN

## 2025-07-23 PROCEDURE — 80048 BASIC METABOLIC PNL TOTAL CA: CPT

## 2025-07-23 PROCEDURE — 81455 SO/HL 51/>GSAP DNA/DNA&RNA: CPT | Performed by: STUDENT IN AN ORGANIZED HEALTH CARE EDUCATION/TRAINING PROGRAM

## 2025-07-23 PROCEDURE — 85730 THROMBOPLASTIN TIME PARTIAL: CPT

## 2025-07-23 PROCEDURE — 99232 SBSQ HOSP IP/OBS MODERATE 35: CPT | Mod: GC | Performed by: INTERNAL MEDICINE

## 2025-07-23 PROCEDURE — 258N000003 HC RX IP 258 OP 636

## 2025-07-23 PROCEDURE — 250N000013 HC RX MED GY IP 250 OP 250 PS 637: Performed by: NURSE PRACTITIONER

## 2025-07-23 PROCEDURE — 85027 COMPLETE CBC AUTOMATED: CPT

## 2025-07-23 PROCEDURE — 70450 CT HEAD/BRAIN W/O DYE: CPT | Mod: 26 | Performed by: RADIOLOGY

## 2025-07-23 PROCEDURE — 250N000011 HC RX IP 250 OP 636

## 2025-07-23 PROCEDURE — 36415 COLL VENOUS BLD VENIPUNCTURE: CPT

## 2025-07-23 PROCEDURE — 97530 THERAPEUTIC ACTIVITIES: CPT | Mod: GP

## 2025-07-23 PROCEDURE — 85610 PROTHROMBIN TIME: CPT

## 2025-07-23 PROCEDURE — 70450 CT HEAD/BRAIN W/O DYE: CPT

## 2025-07-23 PROCEDURE — 83735 ASSAY OF MAGNESIUM: CPT

## 2025-07-23 PROCEDURE — 84100 ASSAY OF PHOSPHORUS: CPT

## 2025-07-23 PROCEDURE — 97760 ORTHOTIC MGMT&TRAING 1ST ENC: CPT | Mod: GO | Performed by: OCCUPATIONAL THERAPIST

## 2025-07-23 PROCEDURE — 97116 GAIT TRAINING THERAPY: CPT | Mod: GP

## 2025-07-23 PROCEDURE — 120N000002 HC R&B MED SURG/OB UMMC

## 2025-07-23 PROCEDURE — 85520 HEPARIN ASSAY: CPT

## 2025-07-23 PROCEDURE — 99233 SBSQ HOSP IP/OBS HIGH 50: CPT | Mod: GC | Performed by: PSYCHIATRY & NEUROLOGY

## 2025-07-23 RX ORDER — FAMOTIDINE 10 MG
10 TABLET ORAL 2 TIMES DAILY
Status: DISCONTINUED | OUTPATIENT
Start: 2025-07-23 | End: 2025-08-12

## 2025-07-23 RX ORDER — FAMOTIDINE 10 MG
10 TABLET ORAL 2 TIMES DAILY
Status: DISCONTINUED | OUTPATIENT
Start: 2025-07-23 | End: 2025-07-23

## 2025-07-23 RX ADMIN — ENOXAPARIN SODIUM 135 MG: 150 INJECTION SUBCUTANEOUS at 11:08

## 2025-07-23 RX ADMIN — ENOXAPARIN SODIUM 135 MG: 150 INJECTION SUBCUTANEOUS at 22:04

## 2025-07-23 RX ADMIN — FAMOTIDINE 10 MG: 10 TABLET ORAL at 19:58

## 2025-07-23 RX ADMIN — HYDRALAZINE HYDROCHLORIDE 10 MG: 20 INJECTION INTRAMUSCULAR; INTRAVENOUS at 02:03

## 2025-07-23 RX ADMIN — HYDRALAZINE HYDROCHLORIDE 10 MG: 20 INJECTION INTRAMUSCULAR; INTRAVENOUS at 01:28

## 2025-07-23 RX ADMIN — ACETAMINOPHEN 1000 MG: 500 TABLET ORAL at 07:53

## 2025-07-23 RX ADMIN — ACETAMINOPHEN 1000 MG: 500 TABLET ORAL at 01:30

## 2025-07-23 RX ADMIN — ACETAMINOPHEN 1000 MG: 500 TABLET ORAL at 15:10

## 2025-07-23 RX ADMIN — FLUTICASONE PROPIONATE 1 SPRAY: 50 SPRAY, METERED NASAL at 07:54

## 2025-07-23 RX ADMIN — FAMOTIDINE 10 MG: 10 TABLET ORAL at 15:10

## 2025-07-23 RX ADMIN — POLYETHYLENE GLYCOL 3350 17 G: 17 POWDER, FOR SOLUTION ORAL at 07:54

## 2025-07-23 RX ADMIN — SENNOSIDES AND DOCUSATE SODIUM 1 TABLET: 50; 8.6 TABLET ORAL at 07:54

## 2025-07-23 RX ADMIN — ACETAMINOPHEN 1000 MG: 500 TABLET ORAL at 20:00

## 2025-07-23 RX ADMIN — Medication 40 MG: at 11:07

## 2025-07-23 RX ADMIN — SENNOSIDES AND DOCUSATE SODIUM 1 TABLET: 50; 8.6 TABLET ORAL at 19:58

## 2025-07-23 RX ADMIN — CHLORPROMAZINE HYDROCHLORIDE 25 MG: 25 TABLET, FILM COATED ORAL at 11:07

## 2025-07-23 RX ADMIN — DEXAMETHASONE SODIUM PHOSPHATE 40 MG: 10 INJECTION, SOLUTION INTRAMUSCULAR; INTRAVENOUS at 11:08

## 2025-07-23 RX ADMIN — ROSUVASTATIN CALCIUM 10 MG: 10 TABLET, FILM COATED ORAL at 07:53

## 2025-07-23 ASSESSMENT — ACTIVITIES OF DAILY LIVING (ADL)
ADLS_ACUITY_SCORE: 41
ADLS_ACUITY_SCORE: 45
ADLS_ACUITY_SCORE: 41
ADLS_ACUITY_SCORE: 45
ADLS_ACUITY_SCORE: 41
ADLS_ACUITY_SCORE: 45
ADLS_ACUITY_SCORE: 41
ADLS_ACUITY_SCORE: 45

## 2025-07-23 ASSESSMENT — VISUAL ACUITY
OU: BASELINE
OU: BASELINE

## 2025-07-23 NOTE — PROGRESS NOTES
Solid Tumor Oncology Consult Service  Progress Note   Date of Service: 07/23/2025  Patient: Bernard Chapman  MRN: [id number]  Admission Date: 7/15/2025  Hospital Day # 8  Cancer Diagnosis: Metastatic lung adenocarcinoma   Primary Outpatient Oncologist: MARKOS   Current Treatment Plan: MARKOS      Summary & Recommendations:   - NGS, Guardant 360, PDL-1 pending. We will follow results to guide treatment. Do not anticipate starting treatment while inpatient.    - Recommend PET. Okay if this cannot be done while inpatient.   - PT recommending acute rehab facility. Will need to coordinate and ensure that patient can receive treatment while there   - Continue daily CBC w/ plts and diff. Continue to transfuse for Hgb 7.0 or less, agree with plts transfusion parameters per primary team.     Assessment & Plan:   Bernard Chapman is a 35 year old year old male with a past medical history of HTN, DM, ELENA, PE/DVT 6/22/25 (previously on rivaroxaban) who presented 7/15/2025 with new neurological symptoms and found acute multifocal ischemic strokes, partial occlusive L MCA thrombus, L frontal SAH, and in setting of TCP c/f ITP vs catastrophic antiphospholipid syndrome (CAPS) vs malignancy s/p PLEX x7/16. CT CAP 7/16/2025 revealing LAD of hilar, mediastinal, and paraesophageal LNs and spiculated-appearing nodule of R apical lung s/p EBUS-TBNA 7/18 revealing metastatic lung adenocarcinoma.      # New metastatic lung adenocarcinoma  # Thrombocytopenia  # Anemia  Presented 7/15/2025 with new neurological symptoms and found acute multifocal ischemic strokes, partial occlusive L MCA thrombus, L frontal SAH, and in setting of TCP c/f ITP vs catastrophic antiphospholipid syndrome (CAPS) vs malignancy s/p PLEX x7/16. CT CAP 7/16/2025 revealing LAD of hilar, mediastinal, and paraesophageal LNs and spiculated-appearing nodule of R apical lung s/p EBUS-TBNA 7/18 revealing metastatic lung adenocarcinoma, + for CK7 and TTF-1.   - Follow  "PDL-1, NGS and Guardant 360 studies  - PET scan for staging - okay to be done as outpatient      # H/o DVT/PE  Presented 6/22/25 with chest pain and SOB and found to have PE and lower extremity DVTs, he was placed on apixaban though had an allergic reaction and rotated to Xarelto.   - Agree with AC (Lovenox) per primary team and hematology      Patient was seen and plan of care was discussed with attending physician Dr. Cervantes.    Thank you for the opportunity to partake in this patient's plan of care. Please do not hesitate to page with questions. We will continue to follow.     I spent >40 minutes face-to-face or coordinating care of Bernard STORMY Chapman. Over 50% of our time on the unit was spent counseling the patient and/or coordinating care.    Shelley Rosario MD   Hem/Onc, PGY4  ___________________________________________________________________    Subjective & Interval History:    No acute events noted overnight. Transferring out of the ICU to medicine. Having hiccups this morning. Parents at bedside. Questions answered.     Physical Exam:    Blood pressure (!) 163/86, pulse 100, temperature 98.7  F (37.1  C), temperature source Oral, resp. rate 19, height 1.88 m (6' 2\"), weight 127.4 kg (280 lb 13.9 oz), SpO2 97%.    General: lying in bed, no acute distress  HEENT: sclera anicteric, EOMI, MMM  CV: RRR, normal S1/S2, no m/r/g  Resp: CTAB, no wheezing/crackles, normal respiratory effort on ambient air  GI: soft, non-tender, non-distended  MSK: warm and well-perfused, normal tone  Skin: no rashes on limited exam, no jaundice  Neuro: Alert and interactive, mild R facial droop, significant R sided extremity deficits     Labs & Studies: I personally reviewed the following studies:  ROUTINE LABS (Last four results):  CMP  Recent Labs   Lab 07/23/25  0801 07/23/25  0730 07/22/25  2126 07/22/25  1633 07/22/25  1346 07/22/25  0814 07/22/25  0522 07/21/25  0919 07/21/25  0528 07/20/25  1709 07/20/25  0417   NA  --  141  " --   --   --   --  140  --  138  --  140   POTASSIUM  --  4.3  --   --  4.3  --  4.5  --  4.5  --  3.9   CHLORIDE  --  108*  --   --   --   --  110*  --  107  --  108*   CO2  --  18*  --   --   --   --  19*  --  19*  --  22   ANIONGAP  --  15  --   --   --   --  11  --  12  --  10   * 207* 181* 161*  --    < > 195*   < > 180*   < > 104*   BUN  --  32.6*  --   --   --   --  25.3*  --  20.4*  --  15.2   CR  --  0.90  --   --   --   --  0.89  --  0.78  --  0.96   GFRESTIMATED  --  >90  --   --   --   --  >90  --  >90  --  >90   SHARAN  --  9.0  --   --   --   --  8.7*  --  8.7*  --  8.4*   MAG  --  2.2  --   --   --   --  2.2  --  2.2  --  2.1   PHOS  --  4.9*  --   --   --   --  3.8  --  3.9  --  3.2   PROTTOTAL  --  6.2*  --   --   --   --  6.1*  --  6.4  --  5.8*   ALBUMIN  --  3.8  --   --   --   --  3.7  --  3.7  --  3.5   BILITOTAL  --  0.6  --   --   --   --  0.5  --  0.6  --  1.0   ALKPHOS  --  62  --   --   --   --  59  --  64  --  55   AST  --  17  --   --   --   --  23  --  19  --  25   ALT  --  30  --   --   --   --  30  --  18  --  25    < > = values in this interval not displayed.     CBC  Recent Labs   Lab 07/23/25  0730 07/22/25  0522 07/21/25  1631 07/21/25  0528 07/20/25  1718 07/20/25  0417   WBC 15.3* 16.7*  --  12.0*  --  11.9*   RBC 4.16* 3.96*  --  4.08*  --  3.69*   HGB 11.2* 10.6*  --  11.0*  --  9.9*   HCT 34.1* 31.8*  --  33.6*  --  30.3*   MCV 82 80 80 82   < > 82   MCH 26.9 26.8  --  27.0  --  26.8   MCHC 32.8 33.3  --  32.7  --  32.7   RDW 13.7 13.7  --  13.4  --  13.3   PLT 71* 87* 91* 70*   < > 64*    < > = values in this interval not displayed.     INR  Recent Labs   Lab 07/23/25  0730 07/22/25  0522 07/21/25  0528 07/20/25  0417   INR 1.43* 1.32* 1.22* 1.48*     Medications list for reference:  Current Facility-Administered Medications   Medication Dose Route Frequency Provider Last Rate Last Admin    acetaminophen (TYLENOL) tablet 1,000 mg  1,000 mg Oral or Feeding Tube Q6H  Dee Shah MD   1,000 mg at 07/23/25 0753    bisacodyl (DULCOLAX) suppository 10 mg  10 mg Rectal Daily PRN Dee Shah MD        chlorhexidine (HIBICLENS) 4 % solution   Topical Daily PRN Dee Shah MD        chlorproMAZINE (THORAZINE) tablet 25 mg  25 mg Oral or Feeding Tube Q6H PRN Yamile Gutierrez APRN CNP   25 mg at 07/23/25 1107    [Held by provider] cyclobenzaprine (FLEXERIL) tablet 10 mg  10 mg Oral TID PRN Dee Shah MD        dexAMETHasone (DECADRON) 40 mg in sodium chloride 0.9 % 59 mL intermittent infusion  40 mg Intravenous Daily Marjan Michel  mL/hr at 07/23/25 1108 40 mg at 07/23/25 1108    glucose gel 15-30 g  15-30 g Oral Q15 Min PRN Dee Shah MD        Or    dextrose 50 % injection 25-50 mL  25-50 mL Intravenous Q15 Min PRN Dee Shah MD        Or    glucagon injection 1 mg  1 mg Subcutaneous Q15 Min PRN Dee Shah MD        diphenhydrAMINE (BENADRYL) capsule 25 mg  25 mg Oral or Feeding Tube Q6H PRN Dee Shah MD        Or    diphenhydrAMINE (BENADRYL) injection 25 mg  25 mg Intravenous Q6H PRN Dee Shah MD        enoxaparin ANTICOAGULANT (LOVENOX) injection 135 mg  1 mg/kg Subcutaneous Q12H Dee Shah MD   135 mg at 07/23/25 1108    fluticasone (FLONASE) 50 MCG/ACT spray 1 spray  1 spray Both Nostrils Daily Dee Shah MD   1 spray at 07/23/25 0754    [Held by provider] gabapentin (NEURONTIN) capsule 100 mg  100 mg Oral TID PRN Dee Shah MD        hydrALAZINE (APRESOLINE) injection 10 mg  10 mg Intravenous Q30 Min PRN Dee Shah MD   10 mg at 07/23/25 0203    [Held by provider] hydroCHLOROthiazide tablet 25 mg  25 mg Oral Daily Dee Shah MD        insulin aspart (NovoLOG) injection (RAPID ACTING)  1-10 Units Subcutaneous TID AC Dee Shah MD   3 Units at 07/23/25 0937    insulin aspart (NovoLOG) injection (RAPID ACTING)  1-7 Units Subcutaneous At Bedtime Dee Shah MD        labetalol (NORMODYNE/TRANDATE)  injection 10 mg  10 mg Intravenous Q10 Min PRN Dee Shah MD        [Held by provider] losartan (COZAAR) tablet 100 mg  100 mg Oral Daily Dee Shah MD        magnesium hydroxide (MILK OF MAGNESIA) suspension 30 mL  30 mL Oral or Feeding Tube Daily PRN Dee Shah MD        melatonin tablet 3 mg  3 mg Oral At Bedtime PRN Dee Shah MD   3 mg at 07/22/25 0123    [Held by provider] metoprolol succinate ER (TOPROL XL) 24 hr tablet 25 mg  25 mg Oral Daily Dee Shah MD        naloxone (NARCAN) injection 0.2 mg  0.2 mg Intravenous Q2 Min PRN Carine Matt MD        Or    naloxone (NARCAN) injection 0.4 mg  0.4 mg Intravenous Q2 Min PRN Carine Matt MD        Or    naloxone (NARCAN) injection 0.2 mg  0.2 mg Intramuscular Q2 Min PRN Carine Matt MD        Or    naloxone (NARCAN) injection 0.4 mg  0.4 mg Intramuscular Q2 Min PRN Carine Matt MD        ondansetron (ZOFRAN ODT) ODT tab 4 mg  4 mg Oral Q6H PRN Dee Shah MD        Or    ondansetron (ZOFRAN) injection 4 mg  4 mg Intravenous Q6H PRN Dee Shah MD        [Held by provider] oxyCODONE (ROXICODONE) tablet 5 mg  5 mg Oral Q6H PRN Dee Shah MD        polyethylene glycol (MIRALAX) Packet 17 g  17 g Oral or Feeding Tube Daily Dee Shah MD   17 g at 07/23/25 0754    prochlorperazine (COMPAZINE) injection 10 mg  10 mg Intravenous Q6H PRN Dee Shah MD        Or    prochlorperazine (COMPAZINE) tablet 10 mg  10 mg Oral or Feeding Tube Q6H PRN Dee Shah MD        rosuvastatin (CRESTOR) tablet 10 mg  10 mg Oral or Feeding Tube Daily Dee Shah MD   10 mg at 07/23/25 0753    senna-docusate (SENOKOT-S/PERICOLACE) 8.6-50 MG per tablet 1-2 tablet  1-2 tablet Oral or Feeding Tube BID Dee Shah MD   1 tablet at 07/23/25 0754    simethicone (MYLICON) chewable half-tab 40 mg  40 mg Oral Q6H PRN Dee Shah MD   40 mg at 07/23/25 1107    sodium chloride (PF) 0.9% PF flush 3 mL  3  mL Intracatheter Q8H [facility] Dee Shah MD   3 mL at 07/22/25 2123    sodium chloride (PF) 0.9% PF flush 3 mL  3 mL Intracatheter q1 min prn Dee Shah MD

## 2025-07-23 NOTE — CONSULTS
"Neurocritical Care Consultation    Reason for critical care consult: Acute stroke, severe thromboyctopenia   Consulting Team: Neurocritical care  Date of Service:  07/23/2025  Date of Admission:  7/15/2025  Hospital Day: 9    History of Present Illness:  Bernard Chapman is a 35 year old male with a past medical history of hypertension, diabetes, possible ITP, sleep apnea, PE and DVT on rivaroxaban admitted on 7/15/2025 for acute ischemic stroke in left MCA territory, near occlusion of left M1, and concern for thrombotic emergency such as TTP or catastrophic antiphospholipid syndrome (in setting of severe thrombocytopenia and acute cerebral infarcts) and metastatic adenocarcinoma of the lung.    Assessment/Plan    Neuro  #Embolic Multifocal acute ischemic strokes, in L MCA territory and new L basal ganglia stroke; Increasing mass effect on L lateral ventricle by L MCA infarction  #Subtotal occlusive thrombus in left M1   #Left parietal SAH   -Etiology of stroke likely  hypercoagulability due to metastatic lung adenocarcinoma  -Neurochecks every Q4 hrs  -SBP goal < 120-160 mmHg  -on SubQ Lovenox - Defer to hematology on transitioning to PO anticoagulant  -repeat CTH shows no significant changes    #Hiccups, improved  -could be 2/2 decadron; EKG normal  -Thorazine 25mg Q6H PRN    #Analgesics & sedation  -Tylenol 650 Q4H     Clinically Significant Risk Factors          # Hyperchloremia: Highest Cl = 110 mmol/L in last 2 days, will monitor as appropriate             # Coagulation Defect: INR = 1.43 (Ref range: 0.85 - 1.15) and/or PTT = 29 Seconds (Ref range: 22 - 38 Seconds), will monitor for bleeding  # Thrombocytopenia: Lowest platelets = 71 in last 2 days, will monitor for bleeding    # Thrombocytopenia: Lowest platelets = 71 in last 2 days, will monitor for bleeding              # Obesity: Estimated body mass index is 36.06 kg/m  as calculated from the following:    Height as of this encounter: 1.88 m (6' 2\").    " Weight as of this encounter: 127.4 kg (280 lb 13.9 oz).        # Financial/Environmental Concerns: none        24 Hour Vital Signs Summary:   Temp: 98.7  F (37.1  C) Temp  Min: 97.8  F (36.6  C)  Max: 98.7  F (37.1  C)  Resp: 19 Resp  Min: 10  Max: 30  SpO2: 97 % SpO2  Min: 93 %  Max: 98 %  Pulse: 100 Pulse  Min: 52  Max: 100    No data recorded  BP: (!) 163/86 Systolic (24hrs), Av , Min:120 , Max:165   Diastolic (24hrs), Av, Min:55, Max:89       Intake/Output Summary (Last 24 hours) at 2025 1346  Last data filed at 2025 1300  Gross per 24 hour   Intake 416 ml   Output 1800 ml   Net -1384 ml       The patient was seen and discussed with the [facility] attending, Dr. Carine Matt.    Shelley Bosch MS3    Resident/Fellow Attestation   I, Nilsa Daly DO, was present with the medical/SAM[employer] student who participated in the service and in the documentation of the note.  I have verified the history and personally performed the physical exam and medical decision making.  I agree with the assessment and plan of care as documented in the note.        Nilas Daly DO  PGY2  Date of Service (when I saw the patient): 25     Allergies   Allergen Reactions    Apixaban Rash    Amlodipine Other (See Comments) and Rash       Current Medications:  Current Facility-Administered Medications   Medication Dose Route Frequency Provider Last Rate Last Admin    acetaminophen (TYLENOL) tablet 1,000 mg  1,000 mg Oral or Feeding Tube Q6H Dee Shah MD   1,000 mg at 25 0753    dexAMETHasone (DECADRON) 40 mg in sodium chloride 0.9 % 59 mL intermittent infusion  40 mg Intravenous Daily Marjan Michel  mL/hr at 25 1108 40 mg at 25 1108    enoxaparin ANTICOAGULANT (LOVENOX) injection 135 mg  1 mg/kg Subcutaneous Q12H Dee Shah MD   135 mg at 25 1108    famotidine (PEPCID) tablet 10 mg  10 mg Oral BID Dee Shah MD        fluticasone (FLONASE) 50 MCG/ACT spray 1 spray  1 spray Both  Nostrils Daily Dee Shah MD   1 spray at 07/23/25 0754    [Held by provider] hydroCHLOROthiazide tablet 25 mg  25 mg Oral Daily Dee Shah MD        insulin aspart (NovoLOG) injection (RAPID ACTING)  1-10 Units Subcutaneous TID AC Dee Shah MD   4 Units at 07/23/25 1243    insulin aspart (NovoLOG) injection (RAPID ACTING)  1-7 Units Subcutaneous At Bedtime Dee Shah MD        [Held by provider] losartan (COZAAR) tablet 100 mg  100 mg Oral Daily Dee Shah MD        [Held by provider] metoprolol succinate ER (TOPROL XL) 24 hr tablet 25 mg  25 mg Oral Daily Dee Shah MD        polyethylene glycol (MIRALAX) Packet 17 g  17 g Oral or Feeding Tube Daily Dee Shah MD   17 g at 07/23/25 0754    rosuvastatin (CRESTOR) tablet 10 mg  10 mg Oral or Feeding Tube Daily Dee Shah MD   10 mg at 07/23/25 0753    senna-docusate (SENOKOT-S/PERICOLACE) 8.6-50 MG per tablet 1-2 tablet  1-2 tablet Oral or Feeding Tube BID Dee Shah MD   1 tablet at 07/23/25 0754    sodium chloride (PF) 0.9% PF flush 3 mL  3 mL Intracatheter Q8H MOHINDER Dee Shah MD   3 mL at 07/22/25 2123       PRN Medications:  Current Facility-Administered Medications   Medication Dose Route Frequency Provider Last Rate Last Admin    bisacodyl (DULCOLAX) suppository 10 mg  10 mg Rectal Daily PRN Dee Shah MD        chlorhexidine (HIBICLENS) 4 % solution   Topical Daily PRN Dee Shah MD        chlorproMAZINE (THORAZINE) tablet 25 mg  25 mg Oral or Feeding Tube Q6H PRN Yamile Gutierrez APRN CNP   25 mg at 07/23/25 1107    [Held by provider] cyclobenzaprine (FLEXERIL) tablet 10 mg  10 mg Oral TID PRN Dee Shah MD        glucose gel 15-30 g  15-30 g Oral Q15 Min PRN Dee Shah MD        Or    dextrose 50 % injection 25-50 mL  25-50 mL Intravenous Q15 Min PRN Dee Shah MD        Or    glucagon injection 1 mg  1 mg Subcutaneous Q15 Min PRN Dee Shah MD        diphenhydrAMINE (BENADRYL)  capsule 25 mg  25 mg Oral or Feeding Tube Q6H PRN Dee Shah MD        Or    diphenhydrAMINE (BENADRYL) injection 25 mg  25 mg Intravenous Q6H PRN Dee Shah MD        [Held by provider] gabapentin (NEURONTIN) capsule 100 mg  100 mg Oral TID PRN Dee Shah MD        hydrALAZINE (APRESOLINE) injection 10 mg  10 mg Intravenous Q30 Min PRN Dee Shah MD   10 mg at 07/23/25 0203    labetalol (NORMODYNE/TRANDATE) injection 10 mg  10 mg Intravenous Q10 Min PRN Dee Shah MD        magnesium hydroxide (MILK OF MAGNESIA) suspension 30 mL  30 mL Oral or Feeding Tube Daily PRN Dee Shah MD        melatonin tablet 3 mg  3 mg Oral At Bedtime PRN Dee Shah MD   3 mg at 07/22/25 0123    naloxone (NARCAN) injection 0.2 mg  0.2 mg Intravenous Q2 Min PRN Carine Matt MD        Or    naloxone (NARCAN) injection 0.4 mg  0.4 mg Intravenous Q2 Min PRN Carine Matt MD        Or    naloxone (NARCAN) injection 0.2 mg  0.2 mg Intramuscular Q2 Min PRN Carine Matt MD        Or    naloxone (NARCAN) injection 0.4 mg  0.4 mg Intramuscular Q2 Min PRN Carine Matt MD        ondansetron (ZOFRAN ODT) ODT tab 4 mg  4 mg Oral Q6H PRN Dee Shah MD        Or    ondansetron (ZOFRAN) injection 4 mg  4 mg Intravenous Q6H PRN Dee Shah MD        [Held by provider] oxyCODONE (ROXICODONE) tablet 5 mg  5 mg Oral Q6H PRN Dee Shah MD        prochlorperazine (COMPAZINE) injection 10 mg  10 mg Intravenous Q6H PRDee Domínguez MD        Or    prochlorperazine (COMPAZINE) tablet 10 mg  10 mg Oral or Feeding Tube Q6H PRN Dee Shah MD        simethicone (MYLICON) chewable half-tab 40 mg  40 mg Oral Q6H PRN Dee Shah MD   40 mg at 07/23/25 1107    sodium chloride (PF) 0.9% PF flush 3 mL  3 mL Intracatheter q1 min prn Dee Shah MD           Infusions:  Current Facility-Administered Medications   Medication Dose Route Frequency Provider Last Rate Last Admin  "      Physical Examination:  Vitals: BP (!) 163/86 (BP Location: Right arm)   Pulse 100   Temp 98.7  F (37.1  C) (Oral)   Resp 19   Ht 1.88 m (6' 2\")   Wt 127.4 kg (280 lb 13.9 oz)   SpO2 97%   BMI 36.06 kg/m    General: Adult male patient, lying in bed, critically-ill  HEENT: Normocephalic, atraumatic, no icterus, oral cavity/oropharynx pink and moist  Cardiac: RRR via monitor  Pulm:unlabored, expansion symmetric, no retractions or use of accessory muscles   Abdomen: Soft, non-distended, bowel sounds present  Extremities: Warm, no edema, well perfused  Skin: No rash or lesion  Psych: Calm and cooperative  Neuro:  Mental status: Awake, alert, attentive, oriented to self, time, place, and circumstance. Language is fluent and coherent with intact comprehension of complex commands, naming and repetition.  Cranial nerves: right partial hemianopia, PERRL, conjugate gaze, EOMI, facial sensation intact, mild right facial droop, mild dysarthria. Head tilted to the right at rest.  Motor: Uses left hand to lift RUE against gravity, unable to activate wrist or move fingers on right. BLE antigravity without drift   Sensory: Intact to light touch x 4 extremities  Coordination: FNF and HS without ataxia or dysmetria. Rapid alternating movements intact.   Gait: MILAN, deferred.    NIHSS  Interval baseline (07/23/25 0800)   1a. Level of Consciousness 0-->Alert, keenly responsive   1b. LOC Questions 0-->Answers both questions correctly   1c. LOC Commands 0-->Performs both tasks correctly   2.   Best Gaze 0-->Normal   3.   Visual 0-->No visual loss   4.   Facial Palsy 1-->Minor paralysis (flattened nasolabial fold, asymmetry on smiling)   5a. Motor Arm, Left 0-->No drift, limb holds 90 (or 45) degrees for full 10 secs   5b. Motor Arm, Right 1-->Drift, limb holds 90 (or 45) degrees, but drifts down before full 10 secs, does not hit bed or other support   6a. Motor Leg, Left 0-->No drift, leg holds 30 degree position for full 5 " secs   6b. Motor Leg, right 0-->No drift, leg holds 30 degree position for full 5 secs   7.   Limb Ataxia 0-->Absent   8.   Sensory 1-->Mild-to-moderate sensory loss, patient feels pinprick is less sharp or is dull on the affected side, or there is a loss of superficial pain with pinprick, but patient is aware of being touched   9.   Best Language 0-->No aphasia, normal   10. Dysarthria 1-->Mild-to-moderate dysarthria, patient slurs at least some words and, at worst, can be understood with some difficulty   11. Extinction and Inattention  0-->No abnormality   Total 7 (07/23/25 0800)       Labs and Imaging:  All relevant imaging and laboratory values personally reviewed.  Recent Results (from the past 24 hours)   Glucose by meter   Result Value Ref Range    GLUCOSE BY METER POCT 161 (H) 70 - 99 mg/dL   Glucose by meter   Result Value Ref Range    GLUCOSE BY METER POCT 181 (H) 70 - 99 mg/dL   Basic metabolic panel   Result Value Ref Range    Sodium 141 135 - 145 mmol/L    Potassium 4.3 3.4 - 5.3 mmol/L    Chloride 108 (H) 98 - 107 mmol/L    Carbon Dioxide (CO2) 18 (L) 22 - 29 mmol/L    Anion Gap 15 7 - 15 mmol/L    Urea Nitrogen 32.6 (H) 6.0 - 20.0 mg/dL    Creatinine 0.90 0.67 - 1.17 mg/dL    GFR Estimate >90 >60 mL/min/1.73m2    Calcium 9.0 8.8 - 10.4 mg/dL    Glucose 207 (H) 70 - 99 mg/dL   CBC with platelets   Result Value Ref Range    WBC Count 15.3 (H) 4.0 - 11.0 10e3/uL    RBC Count 4.16 (L) 4.40 - 5.90 10e6/uL    Hemoglobin 11.2 (L) 13.3 - 17.7 g/dL    Hematocrit 34.1 (L) 40.0 - 53.0 %    MCV 82 78 - 100 fL    MCH 26.9 26.5 - 33.0 pg    MCHC 32.8 31.5 - 36.5 g/dL    RDW 13.7 10.0 - 15.0 %    Platelet Count 71 (L) 150 - 450 10e3/uL   INR   Result Value Ref Range    INR 1.43 (H) 0.85 - 1.15    PT 17.7 (H) 11.8 - 14.8 Seconds   Lactate Dehydrogenase   Result Value Ref Range    Lactate Dehydrogenase 594 (H) 0 - 250 U/L   Partial thromboplastin time   Result Value Ref Range    aPTT 29 22 - 38 Seconds   Magnesium    Result Value Ref Range    Magnesium 2.2 1.7 - 2.3 mg/dL   Phosphorus   Result Value Ref Range    Phosphorus 4.9 (H) 2.5 - 4.5 mg/dL   Fibrinogen activity   Result Value Ref Range    Fibrinogen Activity 157 (L) 170 - 510 mg/dL   Hepatic panel   Result Value Ref Range    Protein Total 6.2 (L) 6.4 - 8.3 g/dL    Albumin 3.8 3.5 - 5.2 g/dL    Bilirubin Total 0.6 <=1.2 mg/dL    Alkaline Phosphatase 62 40 - 150 U/L    AST 17 0 - 45 U/L    ALT 30 0 - 70 U/L    Bilirubin Direct 0.26 0.00 - 0.45 mg/dL   Heparin Unfractionated Anti Xa Level   Result Value Ref Range    Anti Xa Unfractionated Heparin 0.46 For Reference Range, See Comment IU/mL    Narrative    Therapeutic Range: UFH: 0.25-0.50 IU/mL for low intensity dosing,  0.30-0.70 IU/mL for high intensity dosing DVT and PE.  This test is not validated for other direct factor X inhibitors (e.g. rivaroxaban, apixaban, edoxaban, betrixaban, fondaparinux) and should not be used for monitoring of other medications.   Glucose by meter   Result Value Ref Range    GLUCOSE BY METER POCT 199 (H) 70 - 99 mg/dL   CT Head w/o Contrast    Narrative    CT HEAD W/O CONTRAST 7/23/2025 12:34 PM    History: monitoring of previous L MCA and basal stroke with L SAH   ICD-10:    Comparison: CT head 7/21/2025    Technique: Using multidetector thin collimation helical acquisition  technique, axial, coronal and sagittal CT images from the skull base  to the vertex were obtained without intravenous contrast.   (topogram) image(s) also obtained and reviewed.    Findings: Unchanged small plantar hemorrhage along the left  frontoparietal convexity. Slight increase in size of left  periventricular hypoattenuating region in the distribution of known  left MCA infarct. No additional acute loss of gray-white matter  differentiation. There is no intracranial hemorrhage, mass effect, or  midline shift. Ventricles are proportionate to the cerebral sulci. The  basal cisterns are clear.    The bony  calvaria and the bones of the skull base are normal. The  visualized portions of the paranasal sinuses and mastoid air cells are  clear.      Impression    Impression:  1. Unchanged small volume left frontoparietal subarachnoid hemorrhage.  2. Evolving left MCA infarction. No new loss of gray-white matter  differentiation.     I have personally reviewed the examination and initial interpretation  and I agree with the findings.    GALDINO SANCHEZ MD         SYSTEM ID:  [id number]   Glucose by meter   Result Value Ref Range    GLUCOSE BY METER POCT 230 (H) 70 - 99 mg/dL

## 2025-07-23 NOTE — PLAN OF CARE
Major Shift Events:  A&Ox4. Forgetful. RUE with no movement and no response to stimuli, NCC resident aware and assessed pt at bedside. RLE remains weaker than LLE. Decreased sensation on RLE/RUE, unchanged per pt. R facial droop remains. Follows commands. Pupils equal and reactive. Denies headache, pain, or other complaints. Slept between cares. Afebrile. Sinus bradycardia to sinus rhythm, HR: 50-90s. Received PRN IV hydralazine (see MAR) for SBP goal < 160. BPs obtained on R arm per orders. Tolerating RA. Regular diet. Smear BM x1. Up to commode with Ax2+gait belt. Voids with urinal with assistance. Hiccups improved this shift compared to last. No new skin concerns. PIV x2. No gtts.      Plan: Continue to follow POC and notify team of any changes.      For vital signs and complete assessments, please see documentation flowsheets. Report given to oncoming RN.

## 2025-07-23 NOTE — PLAN OF CARE
Goal Outcome Evaluation:    Major Shift Events:  A&Ox4. Up with 2 assist, unsteady. Out on R upper arm. R lower 3/5. R facial drop. Diminished sensation on R side. See previous notye about staff assisted/mechanical fall. HR 70-80s. -150s. Afebrile. RA, clear. Regular diet. BM today. Urinal. PIV x2.  Plan: Transfer to [address] [place] [address].  For vital signs and complete assessments, please see documentation flowsheets.      Plan of Care Reviewed With: patient  Overall Patient Progress: improvingOverall Patient Progress: improving  Outcome Evaluation: Transfering to floor.

## 2025-07-23 NOTE — CARE PLAN
07/23/25 1620   Fall Event   Patient Assessed By nurse   Name of Provider Notified Dee Shah & Marjan Michel   Family/Designated Caregiver Notified of Fall No   Reason Family/Designated Caregiver Not Notified Patient stated he will let his parents know   Fall Prevention Plan Updated Yes

## 2025-07-23 NOTE — PROGRESS NOTES
[facility]    Progress Note - Medicine Service, [facility] TEAM 3       Date of Admission:  7/15/2025    Assessment & Plan   Bernard Chapman is a 35 year old male with a past medical history of hypertension, diabetes, ELENA, PE/DVT (on rivaroxaban) admitted on 7/15/2025 for lethargy, N/V, and blurry vision.     Brain MRI notable for multifocal acute ischemic strokes in left MCA territory, R parietal lobe, and R frontal centrum semiovale. Also found to have thrombocytopenia (28) with concerns for TTP vs CAPS (s/p PLEX). TTP labs unremarkable (normal [id number] level and no schistocytes on smear). Rheumatology reassured against CAPS. Received multiple PLT transfusions (8 units), last one 7/19.     CT c/a/p with [id number] spiculated density in R lung apex, 9mm pulmonary nodule in R lung base, L thyroid nodule, paratracheal LAD, splenic infarct, bilateral renal infarcts, and L retroperitoneal nodule. TTE with mobile echodensities on tricuspid and aortic valve. Hypercoagulable state presumed to be most likely 2/2 malignancy, but still getting steroids (Dexamethasone 40mg 7/16, 7/20-7/23).     Started on heparin gtt 7/17, but briefly held for 7/18 endobronchial needle aspiration of lymph nodes. Stroke code called 7/19 for worsening R-sided hemiparesis and hemianopia. Repeat CTA with new focal high-grade stenosis of other part of left MCA. Heparin gtt was stopped during code stroke, but restarting (heparin gtt + levophed) led to 10/10 headache. CTH with small left frontal SAH. Brain MRI with new L basal ganglia stroke. Heparin gtt resumed, and repeat CTH stable. Switched to therapeutic Lovenox 7/22. CTH still stable.     Lymph node aspiration positive for metastatic lung adenocarcinoma (resulted 7/22).       Today:  - check serum Xa level   - if Xa level in the middle of 0.8 to 1.2, consider increasing dose to 150 mg to decrease risk of unintentional under dosing outpatient  -  PT/OT/Speech  - Hoping to discharge to ARU soon      #Metastatic lung adenocarcinoma  #Right lung nodule  #Multiple enlarged mediastinal, paraesophageal, and hilar lymph nodes  FNA with metastatic lung adenocarcinoma. Low PD-L1, but other molecular studies still pending.   11 x 11 mm right apical lung nodule as probable primary lesion.  -Oncology consulted, appreciate recs   -NGS pending   -Guardant 360 pending   -PET scan for staging as outpatient      #Hypercoagulability d/t metastatic lung adenocarcinoma  #Infarcts of brain, kidney, spleen  #Cardiac valve thrombus on tricuspid and aortic valve 7/16 on TIMO  #Hx pulmonary embolism (6/23/25)  Diffuse emboli likely stemming from tricuspid and aortic valves. On therapeutic Lovenox (135mg) and checking Xa levels. Since he failed Xarelto, will aim for high end of Xa range.   - Check serum Xa level  - SC Lovenox 135 mg bid    -if Xa level in the middle of 0.8 to 1.2, consider increasing dose to 150 mg to decrease risk of unintentional under dosing outpatient  - trend daily electrolytes and CBC      #Multifocal acute ischemic strokes, in L MCA territory and new L basal ganglia stroke; Increasing mass effect on L lateral ventricle by L MCA infarction  #Subtotal occlusive thrombus in left M1   #Left frontoparietal SAH (7/19)  #Hypertension chronic  #HLD, chronic  Etiology most likely hypercoagulability d/t malignancy. Emboli stemming from tricuspid and aortic valves, appreciated on TTE 7/16. He continues to have R facial droop, extremity weakness. No apparent aphasia, but he is mildly dysarthric. Left frontoparietal subarachnoid hemorrhage stable per CT.  > Neuro-ICU signing off   - SBP goal of 120 - 160 mmHg and DBP goal < 100    -Hold PTA antihypertensives    -Has prn labetalol and hydralazine  - cont 10 mg rosuvastatin  - q4h neuro check  > neuro-stroke consulted, appreciate recs  > OT/PT/Speech  > nutrition consult   -monitor oral intake, weights  - repeat TTE  outpatient to ensure improvement of AR and TR (and vegetations)    #Acute thrombocytopenia, in the setting of metastatic lung adenocarcinoma d/t ITP vs drug-induced vs consumptive coagulopathy  #Acute anemia  > Hematology consult   - goal hgb>7, INR<1.5, platelets>50k    - Final dexamethasone 40 mg today    # Numerous palpable purpura across the scalp and lower extremities   -s/p L leg s/p punch biopsy with dermatology: rupture folliculitis. No definite features of cutaneous small vessel vasculitis or thrombotic vasculopathy   -Chlorhexidine wash       #DM2, A1c 5.3  Elevated BG, despite HSSI. Suspect 2/2 dexamethasone, so anticipate improvement as dexamethasone is discontinued. CTM.   - POC glucose ACHS  - High intensity insulin sliding scale    #ELENA  Need to clarify home CPAP settings.    #Hiccups  #chronic back pain  Chronic back pain. On acetaminophen only (given frequent need for neuro checks). Hiccups (drug-induced (dexamethasone) vs central) have been exacerbating back pain and making it difficult to sleep. Neuro recommended thorazine (EKG wnl) with improvement while sleeping. Still hiccups when awake. Will try pepcid (in case it is GERD manifestation).   Plan:  - Thorazine 25mg q6hr prn  - Melatonin 3mg daily  - Start Pepcid  - acetaminophen 1000 mg every 6 hours  - hold cyclobenzaprine 10 mg 3x daily PRN  - hold PTA gabapentin 100 mg 3x daily  - hold PTA oxycodone 5 mg PRN every 6h     #allergies  - fluticasone 1 spray daily        Diet: Regular Diet Adult    DVT Prophylaxis: Lovenox  Avalos Catheter: Not present  Fluids: None  Lines: None     Cardiac Monitoring: None  Code Status: Full Code      Clinically Significant Risk Factors          # Hyperchloremia: Highest Cl = 110 mmol/L in last 2 days, will monitor as appropriate             # Coagulation Defect: INR = 1.43 (Ref range: 0.85 - 1.15) and/or PTT = 29 Seconds (Ref range: 22 - 38 Seconds), will monitor for bleeding  # Thrombocytopenia: Lowest  "platelets = 71 in last 2 days, will monitor for bleeding              # Obesity: Estimated body mass index is 36.06 kg/m  as calculated from the following:    Height as of this encounter: 1.88 m (6' 2\").    Weight as of this encounter: 127.4 kg (280 lb 13.9 oz).        # Financial/Environmental Concerns: none         Social Drivers of Health          Disposition Plan     Medically Ready for Discharge: Anticipated in 2-4 Days         The patient's care was discussed with the Attending Physician, Dr. Randhawa.    [facility]  Medical Student  Medicine Service, [address]  Securely message with [employer] (more info)  Text page via [facility] Paging/Directory   See signed in provider for up to date coverage information    Resident/Fellow Attestation   I, BOB SMITH MD, was present with the medical student who participated in the service and in the documentation of the note.  I have verified the history and personally performed the physical exam and medical decision making.  I agree with the assessment and plan of care as documented in the note.      BBO SMITH MD  PGY1  Date of Service (when I saw the patient): 07/23/25  ______________________________________________________________________    Interval History   Continues to have hiccups which exacerbate back pain. No new symptoms.     Physical Exam   Vital Signs: Temp: 98.5  F (36.9  C) Temp src: Oral BP: (!) 158/78 Pulse: 82   Resp: 22 SpO2: 95 % O2 Device: None (Room air)    Weight: 280 lbs 13.86 oz  Mental status: Awake, alert, attentive, oriented to self, time, place, and circumstance. Language is fluent with mild dysarthria, comprehension intact, able to follow complex commands and naming.  Cranial nerves: R homonymous hemianopia, PERRL, conjugate gaze, EOMI, facial sensation intact, right facial droop, mild dysarthria. Head tilted to the right at rest.   Motor: RUE lifts against gravity, right " shoulder shrug weaker than left, unable to activate R wrist or move fingers. 5/5 hip flexion bilaterally although right is slightly weaker than left. 3/5 R dorsiflexion, plantar flexion.5/5 L dorsiflexion/plantarflexion  Sensory: Intact to light touch x 4 extremities  Coordination: L-sided FNF without ataxia or dysmetria  Gait: deferred.  Reflexes: deferred    RESP: CTAB anteriorly, no wheezing, normal WOB on RA  CV: RRR, no extra heart sounds,   Extremities: RUE swelling      Medical Decision Making       Please see A&P for additional details of medical decision making.      Data     I have personally reviewed the following data over the past 24 hrs:    15.3 (H)  \   11.2 (L)   / 71 (L)     141 108 (H) 32.6 (H) /  230 (H)   4.3 18 (L) 0.90 \     ALT: 30 AST: 17 AP: 62 TBILI: 0.6   ALB: 3.8 TOT PROTEIN: 6.2 (L) LIPASE: N/A     INR:  1.43 (H) PTT:  29   D-dimer:  N/A Fibrinogen:  157 (L)     Ferritin:  N/A % Retic:  N/A LDH:  594 (H)       Imaging results reviewed over the past 24 hrs:   Recent Results (from the past 24 hours)   CT Head w/o Contrast    Narrative    CT HEAD W/O CONTRAST 7/23/2025 12:34 PM    History: monitoring of previous L MCA and basal stroke with L SAH   ICD-10:    Comparison: CT head 7/21/2025    Technique: Using multidetector thin collimation helical acquisition  technique, axial, coronal and sagittal CT images from the skull base  to the vertex were obtained without intravenous contrast.   (topogram) image(s) also obtained and reviewed.    Findings: Unchanged small plantar hemorrhage along the left  frontoparietal convexity. Slight increase in size of left  periventricular hypoattenuating region in the distribution of known  left MCA infarct. No additional acute loss of gray-white matter  differentiation. There is no intracranial hemorrhage, mass effect, or  midline shift. Ventricles are proportionate to the cerebral sulci. The  basal cisterns are clear.    The bony calvaria and the bones  of the skull base are normal. The  visualized portions of the paranasal sinuses and mastoid air cells are  clear.      Impression    Impression:  1. Unchanged small volume left frontoparietal subarachnoid hemorrhage.  2. Evolving left MCA infarction. No new loss of gray-white matter  differentiation.     I have personally reviewed the examination and initial interpretation  and I agree with the findings.    GALDINO SANCHEZ MD         SYSTEM ID:  [id number]

## 2025-07-23 NOTE — PROGRESS NOTES
Brief Interventional Pulmonary (IP) Note:    The cytology results from [facility] are available and already reviewed with plans in place by Med Onc.     Please re-consult the IP service if we can be of any additional help or for any other questions.      Thank you for the opportunity to participate in the care of this patient.   Cherry Guthrie MD  Interventional Pulmonology Fellow  July 23, 2025 3:21 PM  Attending: Dr. Cespedes

## 2025-07-23 NOTE — PROGRESS NOTES
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 Hematology was consulted for further management.      Given degree of thrombosis and concern for TTP, patient received PLEX x1 and was started on steroids. However, TTP work up was negative (normal [id number] and no schistocytes on smear). CT CAP obtained to assess for malignancy and found to have spiculated R lung nodule with mediastinal lymphadenopathy. EBUS-TBNA obtained 7/18 resulted on 7/22 and showed metastatic lung adenocarcinoma.       Metastatic lung adenocarcinoma: further testing, staging per Oncology team  Acute thrombocytopenia, like consumptive verses immune mediated (given pulse dexamethasone 7/20 through 7/23)  Acute cancer- provoked bilateral PE with Left popliteal thrombosis. Ddx 6/23/25  Multifocal acute ischemic strokes in L MCA, R parietal lobe, R frontal centrum semiovale. Dx 7/15/25  Diffuse deposition of thrombus on aortic and tricuspid valves  Subarachnoid hemorrhage in right frontoparietal region.       Continue transfuse to keep platelets greater than 50,000 given need for anticoagulation. Last platelet transfusion 7/19/25. Continue empiric steroids for possible immune thrombocytopenia; last dose today. Also continue LMWH BID. Would check Anti Xa level to confirm therapeutic level.        Recommendations:   - continue 1 mg/kg LMWH BID  - check LMWH Anti Xa level; must be drawn 4-6 hrs after Lovenox is given  - continue with dexamethasone 40 mg x 4 days (started 7/20, end 7/23).       Hematology will continue to follow regarding cytopenia and anticoagulation management.      Patient seen and evaluated with Dr. Rice. Please page the fellow on call with questions.     Viviana Steele MD  PGY-4 Fellow  Hematology, Oncology and Transplant  [facility]          HEMATOLOGY STAFF:  Chart, labs, and imaging reviewed.  Patient seen / examined with fellow, whose note reflects our joint evaluation, assessment, and plan.  Discussed with primary team.    When VTE diagnosed in June 2025, he was initially treated with apixaban but subsequently developed what was felt to be a drug rash.  Thus transitioned to rivaroxaban approximately 3 weeks ago.  Presented this admission with new neurologic symptoms secondary embolic strokes.  Large amount of intracardiac clot also noted.    He is clearly very hypercoagulable related to his underlying malignancy.  Although we do not know how long the intracardiac clots have been present, given that he presented with new embolic strokes approximately 1 month after starting anticoagulation, need to assume that he had further thrombotic events in spite of reported adherence to rivaroxaban.  Thus, was initially switched to heparin and now transitioned to enoxaparin.    For now, continued treatment with enoxaparin appears to be the best anticoagulant option for him.  Will check a peak anti-Xa level to be sure he is in the high therapeutic range (could even tolerate slightly supratherapeutic values) to help reduce his risk of additional breakthrough thrombotic events.    We will continue to follow, but please call with questions.    Total time on date of encounter 65 minutes      Edvin Rice MD  Professor of Medicine  Division of Hematology, Oncology, and  "Transplantation  Director, [facility]    ___________________________________________________________________    Subjective & Interval History:    No new neuro changes overnight. No platelet transfusion overnight. Denies headache, pain, and other complaints. Alert and oriented x4. Still with hiccups.       Physical Exam:    BP (!) 148/59   Pulse 70   Temp 98.6  F (37  C) (Axillary)   Resp 15   Ht 1.88 m (6' 2\")   Wt 127.4 kg (280 lb 13.9 oz)   SpO2 96%   BMI 36.06 kg/m    Gen: Sleepy, in reclining chair  CV: Normal rate, regular rhythm. No m/r/g  Pulm: CTAB, no wheezing, normal work of breathing  Abd: Soft, nt/nd, no rebound/guarding  Ext: Warm and well perfused. No lower extremity edema  Skin: No rash, cyanosis or petechial lesion    Labs & Studies: I personally reviewed the following studies:  ROUTINE LABS (Last four results):  CMP  Recent Labs   Lab 07/23/25  1236 07/23/25  0801 07/23/25  0730 07/22/25  2126 07/22/25  1633 07/22/25  1346 07/22/25  0814 07/22/25  0522 07/21/25  0919 07/21/25  0528 07/20/25  1709 07/20/25  0417   NA  --   --  141  --   --   --   --  140  --  138  --  140   POTASSIUM  --   --  4.3  --   --  4.3  --  4.5  --  4.5  --  3.9   CHLORIDE  --   --  108*  --   --   --   --  110*  --  107  --  108*   CO2  --   --  18*  --   --   --   --  19*  --  19*  --  22   ANIONGAP  --   --  15  --   --   --   --  11  --  12  --  10   * 199* 207* 181*   < >  --    < > 195*   < > 180*   < > 104*   BUN  --   --  32.6*  --   --   --   --  25.3*  --  20.4*  --  15.2   CR  --   --  0.90  --   --   --   --  0.89  --  0.78  --  0.96   GFRESTIMATED  --   --  >90  --   --   --   --  >90  --  >90  --  >90   SHARAN  --   --  9.0  --   --   --   --  8.7*  --  8.7*  --  8.4*   MAG  --   --  2.2  --   --   --   --  2.2  --  2.2  --  2.1   PHOS  --   --  4.9*  --   --   --   --  3.8  --  3.9  --  3.2   PROTTOTAL  --   --  6.2*  --   --   --   --  6.1*  --  6.4  --  5.8* "   ALBUMIN  --   --  3.8  --   --   --   --  3.7  --  3.7  --  3.5   BILITOTAL  --   --  0.6  --   --   --   --  0.5  --  0.6  --  1.0   ALKPHOS  --   --  62  --   --   --   --  59  --  64  --  55   AST  --   --  17  --   --   --   --  23  --  19  --  25   ALT  --   --  30  --   --   --   --  30  --  18  --  25    < > = values in this interval not displayed.     CBC  Recent Labs   Lab 07/23/25  0730 07/22/25  0522 07/21/25  1631 07/21/25  0528 07/20/25  1718 07/20/25  0417   WBC 15.3* 16.7*  --  12.0*  --  11.9*   RBC 4.16* 3.96*  --  4.08*  --  3.69*   HGB 11.2* 10.6*  --  11.0*  --  9.9*   HCT 34.1* 31.8*  --  33.6*  --  30.3*   MCV 82 80 80 82   < > 82   MCH 26.9 26.8  --  27.0  --  26.8   MCHC 32.8 33.3  --  32.7  --  32.7   RDW 13.7 13.7  --  13.4  --  13.3   PLT 71* 87* 91* 70*   < > 64*    < > = values in this interval not displayed.     INR  Recent Labs   Lab 07/23/25  0730 07/22/25 0522 07/21/25 0528 07/20/25  0417   INR 1.43* 1.32* 1.22* 1.48*     Results for orders placed or performed during the hospital encounter of 07/15/25   Head CT w/o contrast    Impression    IMPRESSION:  1.  No evidence of acute intracranial abnormality.  2.  Callosal dysgenesis.     MR Brain w/o & w Contrast    Impression    IMPRESSION:  1.  Numerous multifocal acute cortical and white matter left cerebral hemisphere infarcts predominantly involving the left MCA territory with a few acute infarcts in the right cerebral hemisphere, as described. No significant mass effect.  2.  Trace acute subarachnoid hemorrhage versus slow flow or thrombosis of a cortical vessel along the parasagittal left frontal lobe, the latter favored. No confluent intraparenchymal hematoma.      CTA Head Neck with Contrast    Impression    IMPRESSION:   NECK CTA:  1.  Patent neck vasculature.    HEAD CTA:   1.  Near occlusion of the left MCA-M1 segment (8 mm length) with distal opacification throughout the M2 segments.    Findings discussed with   Salty by Dr. Owens at 8:25 PM 7/15/2025.   CTV Head with Contrast    Impression    IMPRESSION:   1.  No intracranial venous thrombosis.   XR Chest Port 1 View    Impression    Impression:   Right IJ CVC with tip at the cavoatrial junction. No focal airspace  opacity.    I have personally reviewed the examination and initial interpretation  and I agree with the findings.    KOSTAS HARRELL DO         SYSTEM ID:  [id number]   CT Chest/Abdomen/Pelvis w Contrast    Impression    IMPRESSION:     1. Multiple enlarged mediastinal and hilar lymph nodes with necrotic  changes in the paraesophageal lymph nodes. Leading consideration is  neoplastic etiology, lymphoproliferative versus metastasis. Recommend  correlation with tissue sampling.    2. Spiculated-appearing nodule within the right lung apex measuring up  to 11 mm with central lucency. Adjacent peribronchovascular opacities  surrounding the subsegmental bronchi inferior to this lesion. Findings  are nonspecific, but given history could potentially represent a  region of small pulmonary infarction. Other scattered subcentimeter  groundglass nodules are probably infective/inflammatory in etiology.  Recommend comparison with any prior imaging (none available in the  system) and attention on short interval follow-up.    3. Mild splenomegaly.    4. 11 mm soft tissue density nodule in the left retroperitoneum is  indeterminate, metastasis is not excluded.    5. Small wedge-shaped hypodensity at the periphery of the spleen and  few scattered wedge-shaped areas in both kidneys likely represent  infarcts.    6. Scattered areas of filling defect in bilateral lower lobe segmental  pulmonary arteries are indeterminate for flow artifacts versus emboli  on this nondedicated exam. Consider CT PE for further evaluation.  Additionally, focal filling defect in the left external iliac vein is  indeterminate, consider dedicated Doppler for further evaluation.        I have personally  reviewed the examination and initial interpretation  and I agree with the findings.    WARNER ROBLES MD         SYSTEM ID:  [id number]   CTA  Angiogram Heart    Impression    IMPRESSION:  1.  Non-diagnostic evaluation for left atrial appendage thrombus.   2.  Please review the separate Radiology report for incidental  noncardiac findings.    FINDINGS:      1.  Non-diagnostic evaluation for left atrial appendage thrombus.   2.  The left atrial appendage has a  chicken wing morphology.  3.   Coronary images are non-diagnostic for stenosis/atherosclerosis  due to cardiac motion artifact. No coronary artery calcifications  seen.   4.   The visualized aortic root, ascending aorta, and descending  thoracic aorta are normal in caliber.    ABDOUL GARCIA MD         SYSTEM ID:  [id number]   Radiologist Consult For Cardiology    Impression    IMPRESSION:  1. Incidental pulmonary emboli bilaterally without obvious right heart  strain.  2. Bulky right hilar and subcarinal lymphadenopathy.    NATALIIA ALCAZAR MD         SYSTEM ID:  [id number]   CT Head w/o Contrast    Impression    Impression:   Noncontrast head CT: Patchy hypodensities in the left frontal,  parietal, temporal and occipital lobes, which correlate with the  diffusion restricting infarcts seen on MR brain from 7/15/2025. No  hemorrhage. No midline shift.    CTA head and neck: Similar to slightly increased occlusion of a 7 mm  segment of incompletely opacified distal M1 branch of the left middle  cerebral artery. The remaining major intracranial arterial vasculature  is patent without significant stenosis.    CT PERFUSION: There is hypoperfusion demonstrated diffusely in the  left middle cerebral artery territory.     I have personally reviewed the examination and initial interpretation  and I agree with the findings.    ANUJ YOO MD         SYSTEM ID:  [id number]   CT Chest Pulmonary Embolism w Contrast    Impression    IMPRESSION:  1. Exam is positive for  pulmonary embolism, unchanged from 7/16/2025.  Multiple segmental pulmonary emboli as above. There is bowing of the  interatrial septum and mild enlargement of the pulmonary artery,  consistent with mild right heart strain.     2. Dependent subsegmental lower lobe atelectasis with engorgement of  the pulmonary vasculature & interlobular septal thickening may  reflect a degree of superimposed pulmonary edema.     SYSTEM ID:  [id number]   CTA Head Neck with Contrast    Impression    Impression:   Noncontrast head CT: Patchy hypodensities in the left frontal,  parietal, temporal and occipital lobes, which correlate with the  diffusion restricting infarcts seen on MR brain from 7/15/2025. No  hemorrhage. No midline shift.    CTA head and neck: Similar to slightly increased occlusion of a 7 mm  segment of incompletely opacified distal M1 branch of the left middle  cerebral artery. The remaining major intracranial arterial vasculature  is patent without significant stenosis.    CT PERFUSION: There is hypoperfusion demonstrated diffusely in the  left middle cerebral artery territory.     I have personally reviewed the examination and initial interpretation  and I agree with the findings.    ANUJ YOO MD         SYSTEM ID:  [id number]   CT Head Perfusion w Contrast    Impression    Impression:   Noncontrast head CT: Patchy hypodensities in the left frontal,  parietal, temporal and occipital lobes, which correlate with the  diffusion restricting infarcts seen on MR brain from 7/15/2025. No  hemorrhage. No midline shift.    CTA head and neck: Similar to slightly increased occlusion of a 7 mm  segment of incompletely opacified distal M1 branch of the left middle  cerebral artery. The remaining major intracranial arterial vasculature  is patent without significant stenosis.    CT PERFUSION: There is hypoperfusion demonstrated diffusely in the  left middle cerebral artery territory.     I have personally reviewed the  examination and initial interpretation  and I agree with the findings.    ANUJ YOO MD         SYSTEM ID:  [id number]   CT Head w/o Contrast    Impression    IMPRESSION: Continued demonstration of left MCA territory scattered  infarctions, in a patient with hypogenesis of the corpus callosum. The  right-sided lesions seen on the DWI of the 7/15/2025 MRI are not well  visualized on the CT scan. However, one small lesion in the right  parietal lobe exhibits some increased density, especially on the  coronal view. This could represent focal cortical laminar necrosis, or  perhaps less likely, a small hemorrhage. If clinically indicated,  follow up exam might help differentiate this. Susceptibility-weighted  MR imaging would likely resolve this question. Either way, this is a  small focus.    MARIAM SALGUERO MD         SYSTEM ID:  [id number]   CT Head w/o Contrast    Impression    IMPRESSION:   HEAD CT:  1.  New small acute to subacute infarct left lentiform nucleus.  2.  Tiny acute to subacute infarct right caudate head appears new when compared to the prior MRI probably not significantly changed when compared to the prior CT.  3.  Scattered subacute infarcts left middle cerebral artery territory probably not significant changed.  4.  Few scattered small acute to subacute infarcts in the right cerebral hemisphere seen on the MRI are not readily apparent by CT.  5.  No acute intracranial hemorrhage.  6.  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    HEAD CTA:  1.  Stable subtotal occlusion mid to distal M1 segment left middle cerebral artery.  2.  New focal high-grade stenosis posterior parietal branch left middle cerebral artery near the M2/M3 junction.    NECK CTA:  1.  No hemodynamically significant stenosis in the neck vessels.  2.  No evidence for dissection.  3.  New bilateral pleural effusions and patchy infiltrates in both upper lobes and superior segments of the lower lobes.  4.  Mediastinal  and right hilar adenopathy was incompletely imaged on the prior study and concerning for metastatic disease or lymphoma.    CT PERFUSION:  1.  No evidence of core infarct by perfusion imaging.  2.  Small focal area of increased Tmax greater than 6 seconds in the left middle cerebral artery territory in the region of the left corona radiata/centrum semiovale concerning for at risk brain.  3.  If the threshold for the Tmax is decreased to greater than 4 seconds, there is a much larger of area of oligemia in the left middle cerebral artery territory as well as a small area in the anterior right frontal lobe.    4.  Findings discussed with Dr. Rodas at 5:41 AM CST on 07/19/2025.     CTA Head Neck w Contrast    Impression    IMPRESSION:   HEAD CT:  1.  New small acute to subacute infarct left lentiform nucleus.  2.  Tiny acute to subacute infarct right caudate head appears new when compared to the prior MRI probably not significantly changed when compared to the prior CT.  3.  Scattered subacute infarcts left middle cerebral artery territory probably not significant changed.  4.  Few scattered small acute to subacute infarcts in the right cerebral hemisphere seen on the MRI are not readily apparent by CT.  5.  No acute intracranial hemorrhage.  6.  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    HEAD CTA:  1.  Stable subtotal occlusion mid to distal M1 segment left middle cerebral artery.  2.  New focal high-grade stenosis posterior parietal branch left middle cerebral artery near the M2/M3 junction.    NECK CTA:  1.  No hemodynamically significant stenosis in the neck vessels.  2.  No evidence for dissection.  3.  New bilateral pleural effusions and patchy infiltrates in both upper lobes and superior segments of the lower lobes.  4.  Mediastinal and right hilar adenopathy was incompletely imaged on the prior study and concerning for metastatic disease or lymphoma.    CT PERFUSION:  1.  No evidence of  core infarct by perfusion imaging.  2.  Small focal area of increased Tmax greater than 6 seconds in the left middle cerebral artery territory in the region of the left corona radiata/centrum semiovale concerning for at risk brain.  3.  If the threshold for the Tmax is decreased to greater than 4 seconds, there is a much larger of area of oligemia in the left middle cerebral artery territory as well as a small area in the anterior right frontal lobe.    4.  Findings discussed with Dr. Rodas at 5:41 AM CST on 07/19/2025.     CT Head Perfusion w Contrast    Impression    IMPRESSION:   HEAD CT:  1.  New small acute to subacute infarct left lentiform nucleus.  2.  Tiny acute to subacute infarct right caudate head appears new when compared to the prior MRI probably not significantly changed when compared to the prior CT.  3.  Scattered subacute infarcts left middle cerebral artery territory probably not significant changed.  4.  Few scattered small acute to subacute infarcts in the right cerebral hemisphere seen on the MRI are not readily apparent by CT.  5.  No acute intracranial hemorrhage.  6.  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    HEAD CTA:  1.  Stable subtotal occlusion mid to distal M1 segment left middle cerebral artery.  2.  New focal high-grade stenosis posterior parietal branch left middle cerebral artery near the M2/M3 junction.    NECK CTA:  1.  No hemodynamically significant stenosis in the neck vessels.  2.  No evidence for dissection.  3.  New bilateral pleural effusions and patchy infiltrates in both upper lobes and superior segments of the lower lobes.  4.  Mediastinal and right hilar adenopathy was incompletely imaged on the prior study and concerning for metastatic disease or lymphoma.    CT PERFUSION:  1.  No evidence of core infarct by perfusion imaging.  2.  Small focal area of increased Tmax greater than 6 seconds in the left middle cerebral artery territory in the  region of the left corona radiata/centrum semiovale concerning for at risk brain.  3.  If the threshold for the Tmax is decreased to greater than 4 seconds, there is a much larger of area of oligemia in the left middle cerebral artery territory as well as a small area in the anterior right frontal lobe.    4.  Findings discussed with Dr. Rodas at 5:41 AM CST on 07/19/2025.     CT Head w/o Contrast   Result Value Ref Range    Radiologist flags Subarachnoid hemorrhage (Urgent)     Impression    Impression:  1. Subarachnoid hemorrhage in the sulci along the left cerebral  convexity.  2. Evolution of the patchy left middle cerebral artery territory  infarcts.  2. 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    [Urgent Result: Subarachnoid hemorrhage]    Finding was identified on 7/19/2025 7:07 AM.     Dr. Rodas was contacted by Dr. Hidalgo at 7/19/2025 7:08 AM and  verbalized understanding of the urgent finding.          I have personally reviewed the examination and initial interpretation  and I agree with the findings.    ANUJ YOO MD         SYSTEM ID:  [id number]   Echocardiogram Complete w Bubble study - For age 60 yrs or less   Result Value Ref Range    LVEF  60-65%    Transesophageal Echocardiogram   Result Value Ref Range    LVEF  60-65%        Medications list for reference:  Current Facility-Administered Medications   Medication Dose Route Frequency Provider Last Rate Last Admin    acetaminophen (TYLENOL) tablet 1,000 mg  1,000 mg Oral or Feeding Tube Q6H Dee Shah MD   1,000 mg at 07/23/25 1510    bisacodyl (DULCOLAX) suppository 10 mg  10 mg Rectal Daily PRN Dee Shah MD        chlorhexidine (HIBICLENS) 4 % solution   Topical Daily PRN Dee Shah MD        chlorproMAZINE (THORAZINE) tablet 25 mg  25 mg Oral or Feeding Tube Q6H PRN Yamile Gutierrez APRN CNP   25 mg at 07/23/25 1107    [Held by provider] cyclobenzaprine (FLEXERIL) tablet 10 mg  10 mg Oral TID PRN Dee Shah MD         dexAMETHasone (DECADRON) 40 mg in sodium chloride 0.9 % 59 mL intermittent infusion  40 mg Intravenous Daily Marjan Michel  mL/hr at 07/23/25 1108 40 mg at 07/23/25 1108    glucose gel 15-30 g  15-30 g Oral Q15 Min PRN Dee Shah MD        Or    dextrose 50 % injection 25-50 mL  25-50 mL Intravenous Q15 Min PRN Dee Shah MD        Or    glucagon injection 1 mg  1 mg Subcutaneous Q15 Min PRN Dee Shah MD        diphenhydrAMINE (BENADRYL) capsule 25 mg  25 mg Oral or Feeding Tube Q6H PRN Dee Shah MD        Or    diphenhydrAMINE (BENADRYL) injection 25 mg  25 mg Intravenous Q6H PRN Dee Shah MD        enoxaparin ANTICOAGULANT (LOVENOX) injection 135 mg  1 mg/kg Subcutaneous Q12H Dee Shah MD   135 mg at 07/23/25 1108    famotidine (PEPCID) tablet 10 mg  10 mg Oral BID Dee Shah MD   10 mg at 07/23/25 1510    fluticasone (FLONASE) 50 MCG/ACT spray 1 spray  1 spray Both Nostrils Daily Dee Shah MD   1 spray at 07/23/25 0754    [Held by provider] gabapentin (NEURONTIN) capsule 100 mg  100 mg Oral TID PRN Dee Shah MD        hydrALAZINE (APRESOLINE) injection 10 mg  10 mg Intravenous Q30 Min PRN Dee Shah MD   10 mg at 07/23/25 0203    [Held by provider] hydroCHLOROthiazide tablet 25 mg  25 mg Oral Daily Dee Shah MD        insulin aspart (NovoLOG) injection (RAPID ACTING)  1-10 Units Subcutaneous TID AC Dee Shah MD   4 Units at 07/23/25 1243    insulin aspart (NovoLOG) injection (RAPID ACTING)  1-7 Units Subcutaneous At Bedtime Dee Shah MD        labetalol (NORMODYNE/TRANDATE) injection 10 mg  10 mg Intravenous Q10 Min PRN Dee Shah MD        [Held by provider] losartan (COZAAR) tablet 100 mg  100 mg Oral Daily Dee Shah MD        magnesium hydroxide (MILK OF MAGNESIA) suspension 30 mL  30 mL Oral or Feeding Tube Daily PRN Dee Shah MD        melatonin tablet 3 mg  3 mg Oral At Bedtime PRN Dee Shah MD    3 mg at 07/22/25 0123    [Held by provider] metoprolol succinate ER (TOPROL XL) 24 hr tablet 25 mg  25 mg Oral Daily Dee Shah MD        naloxone (NARCAN) injection 0.2 mg  0.2 mg Intravenous Q2 Min PRN Carine Matt MD        Or    naloxone (NARCAN) injection 0.4 mg  0.4 mg Intravenous Q2 Min PRN Carine Matt MD        Or    naloxone (NARCAN) injection 0.2 mg  0.2 mg Intramuscular Q2 Min PRN Carine Matt MD        Or    naloxone (NARCAN) injection 0.4 mg  0.4 mg Intramuscular Q2 Min PRN Carine Matt MD        ondansetron (ZOFRAN ODT) ODT tab 4 mg  4 mg Oral Q6H PRN Dee Shah MD        Or    ondansetron (ZOFRAN) injection 4 mg  4 mg Intravenous Q6H PRN Dee Shah MD        [Held by provider] oxyCODONE (ROXICODONE) tablet 5 mg  5 mg Oral Q6H PRN Dee Shah MD        polyethylene glycol (MIRALAX) Packet 17 g  17 g Oral or Feeding Tube Daily Dee Shah MD   17 g at 07/23/25 0754    prochlorperazine (COMPAZINE) injection 10 mg  10 mg Intravenous Q6H PRN Dee Shah MD        Or    prochlorperazine (COMPAZINE) tablet 10 mg  10 mg Oral or Feeding Tube Q6H PRN Dee Shah MD        rosuvastatin (CRESTOR) tablet 10 mg  10 mg Oral or Feeding Tube Daily Dee Shah MD   10 mg at 07/23/25 0753    senna-docusate (SENOKOT-S/PERICOLACE) 8.6-50 MG per tablet 1-2 tablet  1-2 tablet Oral or Feeding Tube BID Dee Shah MD   1 tablet at 07/23/25 0754    simethicone (MYLICON) chewable half-tab 40 mg  40 mg Oral Q6H PRN Dee Shah MD   40 mg at 07/23/25 1107    sodium chloride (PF) 0.9% PF flush 3 mL  3 mL Intracatheter Q8H MOHINDER Dee Shah MD   3 mL at 07/22/25 2123    sodium chloride (PF) 0.9% PF flush 3 mL  3 mL Intracatheter q1 min prn Dee Shah MD

## 2025-07-23 NOTE — PROGRESS NOTES
Care Management Follow Up    Length of Stay (days): 8    Expected Discharge Date: 07/25/2025     Concerns to be Addressed: all concerns addressed in this encounter, no discharge needs identified, denies needs/concerns at this time     Patient plan of care discussed at interdisciplinary rounds: Yes    Anticipated Discharge Disposition: Acute Rehab  Anticipated Discharge Services: None  Anticipated Discharge DME: None    Patient/family educated on Medicare website which has current facility and service quality ratings: yes  Education Provided on the Discharge Plan: Yes  Patient/Family in Agreement with the Plan: yes    Referrals Placed by CM/SW:    [facility]  [address]  [place]  [address]  P: [phone]  F: [phone]  Private pay costs discussed: Not applicable    Discussed  Partnership in Safe Discharge Planning  document with patient/family: No     Handoff Completed: No, handoff not indicated or clinically appropriate    Additional Information:    Pt with PT/OT recommending ARU, onc note states pt may need treatment while at ARU.    Referral made to  ARU requesting they review pt.    Addendum: provider updated pt may be ready as early as tomorrow. Updated [facility] liaison with this. [facility] stated they will need to see what the onc plan is, or they would need any treatments to be held while pt is at ARU. Updated provider.    Updated pt and family at bedside notifying a referral was sent to  ARU this morning. Family in agreement and declined additional ARU referrals be sent.    Next Steps:      ARU needs to know onc plan or needs to have treatments on hold in order to accept    DAR Hatch  Float   [facility]  Searchable by name on Vocera: Janae Arevalo

## 2025-07-23 NOTE — PROGRESS NOTES
"      [facility]    Vascular Neurology Progress Note    Interval History   Bernard Chapman is a 35 year old male with a past medical history of hypertension, diabetes, possible ITP, sleep apnea, b/l PE and L DVT on rivaroxaban in June admitted on 7/15/2025 for acute ischemic stroke in left MCA territory, near occlusion of left M1, and concern for thrombotic emergency such as TTP or catastrophic antiphospholipid syndrome in setting of severe thrombocytopenia and acute cerebral infarcts.     -no new events overnight  -CTH w/ stable SAH  -hiccups improving  -biopsy was positive for metastatic adenocarcinoma of the lung    Hospital Course     Chief complaint: Altered Mental Status (Patient was at his MD \"anticoagulation clinic\" MD concerned that patient might have a brain bleed. Mother stated that \" this Sunday evening has been having altered mental status. Seeing sparklers L eye, like black gray spots\")    Bernard Chapman is a 35 year old male with a past medical history of HTN, DM, possible ITP, sleep apnea, b/l PE and L DVT on rivaroxaban in June admitted on 7/15/2025 for acute ischemic stroke in left MCA territory, near occlusion of left M1, and concern for thrombotic emergency such as TTP or catastrophic antiphospholipid syndrome in setting of severe thrombocytopenia and acute cerebral infarcts.     7/16 CT abdomen showed splenic and renal infarcts noted.  Was found to have multiple enlarged mediastinal, paraesophageal, and hilar lymph nodes. Echodensities noted on tricuspid and aortic valves on TIMO &TTE. Likely thrombus. APS and hypercoagulopathy work up d/t presentation and hx.    SAH and left basal ganglia stroke on 7/19. Worked up for reason of multiple CVAs, catastrophic APS unlikely. Vasculitis is unlikely. Autoimmune process unlikely. Underlying strokes likely d/t malignancy and SAH d/t consumptive thrombocytopenia.     7/22 Fine needle biopsy was positive " for adenocarcinoma of the lung.     7/2*** Stable SAH, transferred to 6A intermediate neurology floor.     Assessment and Plan     1. {stroke diagnosis:907051}  ***    2. ***  ***    #Embolic Multifocal acute ischemic strokes, in L MCA territory and new L basal ganglia stroke; Increasing mass effect on L lateral ventricle by L MCA infarction  #Subtotal occlusive thrombus in left M1   #Left parietal SAH     #Rt apical lung nodule and enlarged mediastinal and hilar lymph nodes   #Prior PEs unchanged on repeat CT PE   #intermittent tachypnea  # Sleep apnea     GI     Renal    #Acute severe thrombocytopenia- improving. Unclear etiology but suspecting malignancy vs ITP  #Acute anemia    #Metastatic lung adenocarcinoma  #Acute Thrombocytopenia, likely consumptive vs immune-mediated   #Acute cancer - provoked b/l PE and L DVT      DVT Prophylaxis: ***    Patient Follow-up    {neurology follow-up recommendation:731884}    {message to primary service:412402}    Medically Ready for Discharge: {TIME; MEDICALLY READY FOR DISCHARGE:93881079}    {Signature with StepsAway link:041172}    Physical Examination     Temp: 98.5  F (36.9  C) Temp src: Oral BP: 120/55 Pulse: 84   Resp: 23 SpO2: 94 % O2 Device: None (Room air) Oxygen Delivery: 2 LPM    {Choice of physical and neuro exams:443651}    Stroke Scales     {NIHSS, [employer]ley&Tub[employer], ICH:895357}    Imaging/Labs   (Bolded imaging and labs new and/or personally reviewed or re-reviewed by me today***)    MRI/Head CT ***   Intracranial Vasculature ***   Cervical Vasculature ***     Echocardiogram ***   EKG/Telemetry ***   Cardiac CTA ***     LDL  7/15/2025: 135 mg/dL   A1C  7/16/2025: 5.3 %   Troponin No lab value available in past 48 hrs     Other labs reviewed by me today:  ***    Time Spent on this Encounter   Billing: {Billing - Only Attendings and APPs to complete:669738}

## 2025-07-24 ENCOUNTER — HOSPITAL ENCOUNTER (INPATIENT)
Facility: CLINIC | Age: 35
DRG: 064 | End: 2025-07-24
Payer: COMMERCIAL

## 2025-07-24 ENCOUNTER — APPOINTMENT (OUTPATIENT)
Dept: PHYSICAL THERAPY | Facility: CLINIC | Age: 35
End: 2025-07-24
Payer: COMMERCIAL

## 2025-07-24 ENCOUNTER — APPOINTMENT (OUTPATIENT)
Dept: OCCUPATIONAL THERAPY | Facility: CLINIC | Age: 35
DRG: 064 | End: 2025-07-24
Payer: COMMERCIAL

## 2025-07-24 VITALS
RESPIRATION RATE: 18 BRPM | SYSTOLIC BLOOD PRESSURE: 174 MMHG | OXYGEN SATURATION: 98 % | WEIGHT: 280.87 LBS | BODY MASS INDEX: 36.05 KG/M2 | DIASTOLIC BLOOD PRESSURE: 62 MMHG | HEART RATE: 74 BPM | TEMPERATURE: 98.3 F | HEIGHT: 74 IN

## 2025-07-24 LAB
ALBUMIN SERPL BCG-MCNC: 3.9 G/DL (ref 3.5–5.2)
ALP SERPL-CCNC: 55 U/L (ref 40–150)
ALT SERPL W P-5'-P-CCNC: 28 U/L (ref 0–70)
ANION GAP SERPL CALCULATED.3IONS-SCNC: 12 MMOL/L (ref 7–15)
APTT PPP: 31 SECONDS (ref 22–38)
AST SERPL W P-5'-P-CCNC: 13 U/L (ref 0–45)
BILIRUB SERPL-MCNC: 0.5 MG/DL
BILIRUBIN DIRECT (ROCHE PRO & PURE): 0.23 MG/DL (ref 0–0.45)
BUN SERPL-MCNC: 37.8 MG/DL (ref 6–20)
CALCIUM SERPL-MCNC: 9 MG/DL (ref 8.8–10.4)
CHLORIDE SERPL-SCNC: 109 MMOL/L (ref 98–107)
CREAT SERPL-MCNC: 0.92 MG/DL (ref 0.67–1.17)
EGFRCR SERPLBLD CKD-EPI 2021: >90 ML/MIN/1.73M2
ERYTHROCYTE [DISTWIDTH] IN BLOOD BY AUTOMATED COUNT: 14 % (ref 10–15)
FIBRINOGEN PPP-MCNC: 110 MG/DL (ref 170–510)
GLUCOSE BLDC GLUCOMTR-MCNC: 162 MG/DL (ref 70–99)
GLUCOSE BLDC GLUCOMTR-MCNC: 173 MG/DL (ref 70–99)
GLUCOSE BLDC GLUCOMTR-MCNC: 221 MG/DL (ref 70–99)
GLUCOSE BLDC GLUCOMTR-MCNC: 256 MG/DL (ref 70–99)
GLUCOSE SERPL-MCNC: 167 MG/DL (ref 70–99)
HCO3 SERPL-SCNC: 19 MMOL/L (ref 22–29)
HCT VFR BLD AUTO: 33.3 % (ref 40–53)
HGB BLD-MCNC: 11 G/DL (ref 13.3–17.7)
INR PPP: 1.45 (ref 0.85–1.15)
LDH SERPL L TO P-CCNC: 542 U/L (ref 0–250)
LMWH PPP CHRO-ACNC: 1.02 IU/ML (ref ?–2)
MAGNESIUM SERPL-MCNC: 2.3 MG/DL (ref 1.7–2.3)
MCH RBC QN AUTO: 26.5 PG (ref 26.5–33)
MCHC RBC AUTO-ENTMCNC: 33 G/DL (ref 31.5–36.5)
MCV RBC AUTO: 80 FL (ref 78–100)
PHOSPHATE SERPL-MCNC: 4.1 MG/DL (ref 2.5–4.5)
PLATELET # BLD AUTO: 71 10E3/UL (ref 150–450)
POTASSIUM SERPL-SCNC: 4.2 MMOL/L (ref 3.4–5.3)
PROT SERPL-MCNC: 6.1 G/DL (ref 6.4–8.3)
PROTHROMBIN TIME: 17.5 SECONDS (ref 11.8–14.8)
RBC # BLD AUTO: 4.15 10E6/UL (ref 4.4–5.9)
SODIUM SERPL-SCNC: 140 MMOL/L (ref 135–145)
WBC # BLD AUTO: 16.1 10E3/UL (ref 4–11)

## 2025-07-24 PROCEDURE — 99233 SBSQ HOSP IP/OBS HIGH 50: CPT | Mod: GC | Performed by: INTERNAL MEDICINE

## 2025-07-24 PROCEDURE — 250N000013 HC RX MED GY IP 250 OP 250 PS 637

## 2025-07-24 PROCEDURE — 258N000003 HC RX IP 258 OP 636

## 2025-07-24 PROCEDURE — 83735 ASSAY OF MAGNESIUM: CPT

## 2025-07-24 PROCEDURE — 85610 PROTHROMBIN TIME: CPT

## 2025-07-24 PROCEDURE — 80048 BASIC METABOLIC PNL TOTAL CA: CPT

## 2025-07-24 PROCEDURE — 120N000003 HC R&B IMCU UMMC

## 2025-07-24 PROCEDURE — 85730 THROMBOPLASTIN TIME PARTIAL: CPT

## 2025-07-24 PROCEDURE — 84155 ASSAY OF PROTEIN SERUM: CPT

## 2025-07-24 PROCEDURE — 250N000011 HC RX IP 250 OP 636

## 2025-07-24 PROCEDURE — 999N000157 HC STATISTIC RCP TIME EA 10 MIN

## 2025-07-24 PROCEDURE — 36415 COLL VENOUS BLD VENIPUNCTURE: CPT

## 2025-07-24 PROCEDURE — 97535 SELF CARE MNGMENT TRAINING: CPT | Mod: GO

## 2025-07-24 PROCEDURE — 97530 THERAPEUTIC ACTIVITIES: CPT | Mod: GO

## 2025-07-24 PROCEDURE — 99232 SBSQ HOSP IP/OBS MODERATE 35: CPT | Mod: GC | Performed by: INTERNAL MEDICINE

## 2025-07-24 PROCEDURE — 120N000002 HC R&B MED SURG/OB UMMC

## 2025-07-24 PROCEDURE — 85520 HEPARIN ASSAY: CPT

## 2025-07-24 PROCEDURE — 84100 ASSAY OF PHOSPHORUS: CPT

## 2025-07-24 PROCEDURE — 97116 GAIT TRAINING THERAPY: CPT | Mod: GP | Performed by: PHYSICAL THERAPIST

## 2025-07-24 PROCEDURE — 85384 FIBRINOGEN ACTIVITY: CPT

## 2025-07-24 PROCEDURE — 85027 COMPLETE CBC AUTOMATED: CPT

## 2025-07-24 PROCEDURE — 250N000013 HC RX MED GY IP 250 OP 250 PS 637: Performed by: NURSE PRACTITIONER

## 2025-07-24 PROCEDURE — 83615 LACTATE (LD) (LDH) ENZYME: CPT

## 2025-07-24 PROCEDURE — 99233 SBSQ HOSP IP/OBS HIGH 50: CPT | Mod: GC | Performed by: STUDENT IN AN ORGANIZED HEALTH CARE EDUCATION/TRAINING PROGRAM

## 2025-07-24 PROCEDURE — 250N000013 HC RX MED GY IP 250 OP 250 PS 637: Performed by: STUDENT IN AN ORGANIZED HEALTH CARE EDUCATION/TRAINING PROGRAM

## 2025-07-24 PROCEDURE — 97530 THERAPEUTIC ACTIVITIES: CPT | Mod: GP | Performed by: PHYSICAL THERAPIST

## 2025-07-24 RX ORDER — BACLOFEN 5 MG/1
5 TABLET ORAL 3 TIMES DAILY
Status: DISCONTINUED | OUTPATIENT
Start: 2025-07-24 | End: 2025-07-27

## 2025-07-24 RX ADMIN — SENNOSIDES AND DOCUSATE SODIUM 1 TABLET: 50; 8.6 TABLET ORAL at 08:34

## 2025-07-24 RX ADMIN — ENOXAPARIN SODIUM 135 MG: 150 INJECTION SUBCUTANEOUS at 22:01

## 2025-07-24 RX ADMIN — ENOXAPARIN SODIUM 135 MG: 150 INJECTION SUBCUTANEOUS at 11:17

## 2025-07-24 RX ADMIN — CHLORPROMAZINE HYDROCHLORIDE 25 MG: 25 INJECTION INTRAMUSCULAR at 15:51

## 2025-07-24 RX ADMIN — FAMOTIDINE 10 MG: 10 TABLET ORAL at 19:55

## 2025-07-24 RX ADMIN — SENNOSIDES AND DOCUSATE SODIUM 1 TABLET: 50; 8.6 TABLET ORAL at 19:55

## 2025-07-24 RX ADMIN — DEXAMETHASONE SODIUM PHOSPHATE 40 MG: 10 INJECTION, SOLUTION INTRAMUSCULAR; INTRAVENOUS at 12:10

## 2025-07-24 RX ADMIN — CHLORPROMAZINE HYDROCHLORIDE 25 MG: 25 TABLET, FILM COATED ORAL at 08:36

## 2025-07-24 RX ADMIN — ROSUVASTATIN CALCIUM 10 MG: 10 TABLET, FILM COATED ORAL at 08:34

## 2025-07-24 RX ADMIN — ACETAMINOPHEN 1000 MG: 500 TABLET ORAL at 19:55

## 2025-07-24 RX ADMIN — Medication 40 MG: at 11:23

## 2025-07-24 RX ADMIN — ACETAMINOPHEN 1000 MG: 500 TABLET ORAL at 15:09

## 2025-07-24 RX ADMIN — FLUTICASONE PROPIONATE 1 SPRAY: 50 SPRAY, METERED NASAL at 08:34

## 2025-07-24 RX ADMIN — ACETAMINOPHEN 1000 MG: 500 TABLET ORAL at 08:34

## 2025-07-24 RX ADMIN — ACETAMINOPHEN 1000 MG: 500 TABLET ORAL at 01:29

## 2025-07-24 RX ADMIN — POLYETHYLENE GLYCOL 3350 17 G: 17 POWDER, FOR SOLUTION ORAL at 08:34

## 2025-07-24 RX ADMIN — BACLOFEN 5 MG: 5 TABLET ORAL at 22:52

## 2025-07-24 RX ADMIN — FAMOTIDINE 10 MG: 10 TABLET ORAL at 08:34

## 2025-07-24 ASSESSMENT — ACTIVITIES OF DAILY LIVING (ADL)
ADLS_ACUITY_SCORE: 39
ADLS_ACUITY_SCORE: 41
ADLS_ACUITY_SCORE: 39
ADLS_ACUITY_SCORE: 39
ADLS_ACUITY_SCORE: 41
ADLS_ACUITY_SCORE: 41
ADLS_ACUITY_SCORE: 39
ADLS_ACUITY_SCORE: 41
ADLS_ACUITY_SCORE: 41
ADLS_ACUITY_SCORE: 39
ADLS_ACUITY_SCORE: 41
ADLS_ACUITY_SCORE: 39
ADLS_ACUITY_SCORE: 41
ADLS_ACUITY_SCORE: 39
ADLS_ACUITY_SCORE: 41

## 2025-07-24 NOTE — PROGRESS NOTES
Solid Tumor Oncology Consult Service  Progress Note   Date of Service: 07/24/2025  Patient: Bernard Chapman  MRN: [id number]  Admission Date: 7/15/2025  Hospital Day # 9  Cancer Diagnosis: Metastatic lung adenocarcinoma   Primary Outpatient Oncologist: MARKOS  Current Treatment Plan: MARKOS     Summary & Recommendations:   - We are okay deferring cancer treatment until patient is discharged from acute rehab facility as we are waiting for NGS and Guardant 360 studies to result. However, given his complications of ischemic strokes and pulmonary emboli from his malignancy, we would not want to delay treatment more than a month.   - Outpatient PET ordered for you   - Outpatient oncology referral ordered for you   - Rest of cares per primary team     Assessment & Plan:   Bernard Chapman is a 35 year old year old male with a past medical history of HTN, DM, ELENA, PE/DVT 6/22/25 (previously on rivaroxaban) who presented 7/15/2025 with new neurological symptoms and found acute multifocal ischemic strokes, partial occlusive L MCA thrombus, L frontal SAH, and in setting of TCP c/f ITP vs catastrophic antiphospholipid syndrome (CAPS) vs malignancy s/p PLEX x7/16. CT CAP 7/16/2025 revealing LAD of hilar, mediastinal, and paraesophageal LNs and spiculated-appearing nodule of R apical lung s/p EBUS-TBNA 7/18 revealing metastatic lung adenocarcinoma.      # New metastatic lung adenocarcinoma  # Thrombocytopenia  # Anemia  CT CAP 7/16/2025 revealing a spiculated appearing nodule within R lung apex ~11 mm, multiple enlarged mediastinal, hilar and paraesophageal lymph nodes, along with 11 mm soft tissue density in the left retroperitoneum. Underwent EBUS-TBNA 7/18 revealing metastatic lung adenocarcinoma, + for CK7 and TTF-1. MRI brain without evidence of metastasis. Will need PET/CT to complete staging. PD-L1 TPS at 40% consistent with low PD-L1 expression. Patient will be discharging to acute rehab facility for 1-3 weeks.  "Discussed with patient and family that we are okay deferring cancer treatment until patient is discharged from acute rehab facility as we are waiting for NGS and Guardant 360 studies to result. However, given his complications of ischemic strokes and pulmonary emboli from his malignancy, we would not want to delay treatment more than a month.   - NGS and Guardant 360 studies pending   - Will need PET/CT as an outpatient - ordered   - Will need outpatient oncology follow up - ordered      # H/o DVT/PE  Presented 6/22/25 with chest pain and SOB and found to have PE and lower extremity DVTs, he was placed on apixaban though had an allergic reaction and rotated to Xarelto.   - Agree with AC (Lovenox) per primary team and hematology        Patient was seen and plan of care was discussed with attending physician Dr. Cervantes.    Thank you for the opportunity to partake in this patient's plan of care. Please do not hesitate to page with questions. We will continue to follow.     I spent >45 minutes face-to-face or coordinating care of Bernard Chapman. Over 50% of our time on the unit was spent counseling the patient and/or coordinating care.    Shelley Rosario MD   Hem/Onc, PGY4    ___________________________________________________________________    Subjective & Interval History:    No acute events noted overnight. Patient having hiccups this morning. Had friends visiting. Otherwise, feeling alright. Per primary team, [facility]     Physical Exam:    Blood pressure 128/59, pulse 62, temperature 97.8  F (36.6  C), temperature source Oral, resp. rate 16, height 1.88 m (6' 2\"), weight 127.4 kg (280 lb 13.9 oz), SpO2 98%.    General: sitting in chair, no acute distress  HEENT: sclera anicteric, EOMI, MMM  CV: RRR, normal S1/S2, no m/r/g  Resp: CTAB, no wheezing/crackles, normal respiratory effort on ambient air  GI: soft, non-tender, non-distended  MSK: warm and well-perfused, normal " tone  Skin: no rashes on limited exam, no jaundice  Neuro: Alert and interactive, mild R facial droop, uses L arm to lift R arm off chair    Labs & Studies: I personally reviewed the following studies:  ROUTINE LABS (Last four results):  CMP  Recent Labs   Lab 07/24/25  1210 07/24/25  0815 07/24/25  0635 07/23/25  2202 07/23/25  0801 07/23/25  0730 07/22/25  1633 07/22/25  1346 07/22/25  0814 07/22/25  0522 07/21/25  0919 07/21/25  0528   NA  --   --  140  --   --  141  --   --   --  140  --  138   POTASSIUM  --   --  4.2  --   --  4.3  --  4.3  --  4.5  --  4.5   CHLORIDE  --   --  109*  --   --  108*  --   --   --  110*  --  107   CO2  --   --  19*  --   --  18*  --   --   --  19*  --  19*   ANIONGAP  --   --  12  --   --  15  --   --   --  11  --  12   * 162* 167* 262*   < > 207*   < >  --    < > 195*   < > 180*   BUN  --   --  37.8*  --   --  32.6*  --   --   --  25.3*  --  20.4*   CR  --   --  0.92  --   --  0.90  --   --   --  0.89  --  0.78   GFRESTIMATED  --   --  >90  --   --  >90  --   --   --  >90  --  >90   SHARAN  --   --  9.0  --   --  9.0  --   --   --  8.7*  --  8.7*   MAG  --   --  2.3  --   --  2.2  --   --   --  2.2  --  2.2   PHOS  --   --  4.1  --   --  4.9*  --   --   --  3.8  --  3.9   PROTTOTAL  --   --  6.1*  --   --  6.2*  --   --   --  6.1*  --  6.4   ALBUMIN  --   --  3.9  --   --  3.8  --   --   --  3.7  --  3.7   BILITOTAL  --   --  0.5  --   --  0.6  --   --   --  0.5  --  0.6   ALKPHOS  --   --  55  --   --  62  --   --   --  59  --  64   AST  --   --  13  --   --  17  --   --   --  23  --  19   ALT  --   --  28  --   --  30  --   --   --  30  --  18    < > = values in this interval not displayed.     CBC  Recent Labs   Lab 07/24/25  0635 07/23/25  0730 07/22/25  0522 07/21/25  1631 07/21/25  0528   WBC 16.1* 15.3* 16.7*  --  12.0*   RBC 4.15* 4.16* 3.96*  --  4.08*   HGB 11.0* 11.2* 10.6*  --  11.0*   HCT 33.3* 34.1* 31.8*  --  33.6*   MCV 80 82 80 80 82   MCH 26.5 26.9 26.8  --   27.0   MCHC 33.0 32.8 33.3  --  32.7   RDW 14.0 13.7 13.7  --  13.4   PLT 71* 71* 87* 91* 70*     INR  Recent Labs   Lab 07/24/25  0635 07/23/25  0730 07/22/25  0522 07/21/25  0528   INR 1.45* 1.43* 1.32* 1.22*     Medications list for reference:  Current Facility-Administered Medications   Medication Dose Route Frequency Provider Last Rate Last Admin    acetaminophen (TYLENOL) tablet 1,000 mg  1,000 mg Oral or Feeding Tube Q6H Dee Shah MD   1,000 mg at 07/24/25 0834    bisacodyl (DULCOLAX) suppository 10 mg  10 mg Rectal Daily PRN Dee Shah MD        chlorhexidine (HIBICLENS) 4 % solution   Topical Daily PRN Dee Shah MD        chlorproMAZINE (THORAZINE) tablet 25 mg  25 mg Oral or Feeding Tube Q6H PRN Yamile Gutierrez APRN CNP   25 mg at 07/24/25 0836    [Held by provider] cyclobenzaprine (FLEXERIL) tablet 10 mg  10 mg Oral TID PRN Dee Shah MD        glucose gel 15-30 g  15-30 g Oral Q15 Min PRN Dee Shah MD        Or    dextrose 50 % injection 25-50 mL  25-50 mL Intravenous Q15 Min PRN Dee Shah MD        Or    glucagon injection 1 mg  1 mg Subcutaneous Q15 Min PRN Dee Shah MD        diphenhydrAMINE (BENADRYL) capsule 25 mg  25 mg Oral or Feeding Tube Q6H PRN Dee Shah MD        Or    diphenhydrAMINE (BENADRYL) injection 25 mg  25 mg Intravenous Q6H PRN Dee Shah MD        enoxaparin ANTICOAGULANT (LOVENOX) injection 135 mg  1 mg/kg Subcutaneous Q12H Dee Shah MD   135 mg at 07/24/25 1117    famotidine (PEPCID) tablet 10 mg  10 mg Oral BID Dee Shah MD   10 mg at 07/24/25 0834    fluticasone (FLONASE) 50 MCG/ACT spray 1 spray  1 spray Both Nostrils Daily Dee Shah MD   1 spray at 07/24/25 0834    [Held by provider] gabapentin (NEURONTIN) capsule 100 mg  100 mg Oral TID PRN Dee Shah MD        hydrALAZINE (APRESOLINE) injection 10 mg  10 mg Intravenous Q30 Min PRN Dee Shah MD   10 mg at 07/23/25 0203    [Held by provider]  hydroCHLOROthiazide tablet 25 mg  25 mg Oral Daily Dee Shah MD        insulin aspart (NovoLOG) injection (RAPID ACTING)  1-10 Units Subcutaneous TID AC Dee Shah MD   2 Units at 07/24/25 1211    insulin aspart (NovoLOG) injection (RAPID ACTING)  1-7 Units Subcutaneous At Bedtime Dee Shah MD   3 Units at 07/23/25 2203    labetalol (NORMODYNE/TRANDATE) injection 10 mg  10 mg Intravenous Q10 Min PRN Dee Shah MD        [Held by provider] losartan (COZAAR) tablet 100 mg  100 mg Oral Daily Dee Shah MD        magnesium hydroxide (MILK OF MAGNESIA) suspension 30 mL  30 mL Oral or Feeding Tube Daily PRN Dee Shah MD        melatonin tablet 3 mg  3 mg Oral At Bedtime PRN Dee Shah MD   3 mg at 07/22/25 0123    [Held by provider] metoprolol succinate ER (TOPROL XL) 24 hr tablet 25 mg  25 mg Oral Daily Dee Shah MD        naloxone (NARCAN) injection 0.2 mg  0.2 mg Intravenous Q2 Min PRN Carine Matt MD        Or    naloxone (NARCAN) injection 0.4 mg  0.4 mg Intravenous Q2 Min PRN Carine Matt MD        Or    naloxone (NARCAN) injection 0.2 mg  0.2 mg Intramuscular Q2 Min PRN Carine Matt MD        Or    naloxone (NARCAN) injection 0.4 mg  0.4 mg Intramuscular Q2 Min PRN Carine Matt MD        ondansetron (ZOFRAN ODT) ODT tab 4 mg  4 mg Oral Q6H PRN Dee Shah MD        Or    ondansetron (ZOFRAN) injection 4 mg  4 mg Intravenous Q6H PRN Dee Shah MD        [Held by provider] oxyCODONE (ROXICODONE) tablet 5 mg  5 mg Oral Q6H PRN Dee Shah MD        polyethylene glycol (MIRALAX) Packet 17 g  17 g Oral or Feeding Tube Daily Dee Shah MD   17 g at 07/24/25 0834    prochlorperazine (COMPAZINE) injection 10 mg  10 mg Intravenous Q6H PRN Dee Shah MD        Or    prochlorperazine (COMPAZINE) tablet 10 mg  10 mg Oral or Feeding Tube Q6H PRN Dee Shah MD        rosuvastatin (CRESTOR) tablet 10 mg  10 mg Oral or  Feeding Tube Daily Dee Shah MD   10 mg at 07/24/25 0834    senna-docusate (SENOKOT-S/PERICOLACE) 8.6-50 MG per tablet 1-2 tablet  1-2 tablet Oral or Feeding Tube BID Dee Shah MD   1 tablet at 07/24/25 0834    simethicone (MYLICON) chewable half-tab 40 mg  40 mg Oral Q6H PRN Dee Shah MD   40 mg at 07/24/25 1123    sodium chloride (PF) 0.9% PF flush 3 mL  3 mL Intracatheter Q8H MOHINDER Dee Shah MD   3 mL at 07/24/25 0834    sodium chloride (PF) 0.9% PF flush 3 mL  3 mL Intracatheter q1 min prn Dee Shah MD

## 2025-07-24 NOTE — PROGRESS NOTES
Hematology  Daily Progress Note   Date of Service: 07/24/2025    Patient: Bernard Chapman  MRN: [id number]  Admission Date: 7/15/2025  Hospital Day # Hospital Day: 10      Initial Reason for Consult: potential malignancy    Assessment & Plan:   Bernard Chapman is a 35 year old male with history of DVT/PE (6/22/25) treated with apixaban initially, developed a rash suspected to be drug reaction, then transitioned to rivaroxaban, who presented on 7/15 with new neurologic symptoms, found to have multifocal embolic cerebral infarcts (L MCA and L basal ganglia), frontal SAH and severe thrombocytopenia concerning for thrombotic thrombocytopenic purpura (TTP) versus catastrophic antiphospholipid syndrome. Hematology was consulted for further management.      Given degree of thrombosis and concern for TTP, patient received PLEX x1 and was started on steroids. However, TTP work up was negative (normal [id number] and no schistocytes on smear). CT CAP obtained to assess for malignancy and found to have spiculated R lung nodule with mediastinal lymphadenopathy. EBUS-TBNA obtained 7/18 resulted on 7/22 and showed metastatic lung adenocarcinoma.       Metastatic lung adenocarcinoma: further testing, staging per Oncology team  Acute thrombocytopenia, like consumptive verses immune mediated (given pulse dexamethasone 7/20 through 7/23)  Acute cancer- provoked bilateral PE with Left popliteal thrombosis. Ddx 6/23/25  Multifocal acute ischemic strokes in L MCA, R parietal lobe, R frontal centrum semiovale. Dx 7/15/25  Diffuse deposition of thrombus on aortic and tricuspid valves  Subarachnoid hemorrhage in right frontoparietal region.       Continue transfuse to keep platelets greater than 50,000 given need for anticoagulation. Last platelet transfusion 7/19/25. Continue treatment with enoxaparin appears to be the best anticoagulant option for him. *** Pending peak anti-Xa level to be sure he is in the high therapeutic range  "(could even tolerate slightly supratherapeutic values) to help reduce his risk of additional breakthrough thrombotic events.       Recommendations:   - continue 1 mg/kg LMWH BID  - check LMWH Anti Xa level; must be drawn 4-6 hrs after Lovenox is given  - continue with daily CBC with diff       Hematology will continue to follow regarding cytopenia and anticoagulation management.      Patient seen and evaluated with Dr. Rice. Please page the fellow on call with questions.     Viviana Steele MD  PGY-4 Fellow  Hematology, Oncology and Transplant  [facility]      ___________________________________________________________________    Subjective & Interval History:    No new neuro changes overnight. No platelet transfusion overnight. Hiccups improving. Denies headache, pain, and other complaints. Alert and oriented x4.     Physical Exam:    /46 (BP Location: Left arm)   Pulse 70   Temp 98.3  F (36.8  C) (Oral)   Resp 18   Ht 1.88 m (6' 2\")   Wt 127.4 kg (280 lb 13.9 oz)   SpO2 98%   BMI 36.06 kg/m    Gen: Sleepy, in reclining chair  CV: Normal rate, regular rhythm. No m/r/g  Pulm: CTAB, no wheezing, normal work of breathing  Abd: Soft, nt/nd, no rebound/guarding  Ext: Warm and well perfused. No lower extremity edema  Skin: No rash, cyanosis or petechial lesion    Labs & Studies: I personally reviewed the following studies:  ROUTINE LABS (Last four results):  CMP  Recent Labs   Lab 07/24/25  0815 07/24/25  0635 07/23/25  2202 07/23/25  1831 07/23/25  0801 07/23/25  0730 07/22/25  1633 07/22/25  1346 07/22/25  0814 07/22/25  0522 07/21/25  0919 07/21/25  0528   NA  --  140  --   --   --  141  --   --   --  140  --  138   POTASSIUM  --  4.2  --   --   --  4.3  --  4.3  --  4.5  --  4.5   CHLORIDE  --  109*  --   --   --  108*  --   --   --  110*  --  107   CO2  --  19*  --   --   --  18*  --   --   --  19*  --  19*   ANIONGAP  --  12  --   --   --  15  --   --   --  11  --  12   * 167* " 262* 191*   < > 207*   < >  --    < > 195*   < > 180*   BUN  --  37.8*  --   --   --  32.6*  --   --   --  25.3*  --  20.4*   CR  --  0.92  --   --   --  0.90  --   --   --  0.89  --  0.78   GFRESTIMATED  --  >90  --   --   --  >90  --   --   --  >90  --  >90   SHARAN  --  9.0  --   --   --  9.0  --   --   --  8.7*  --  8.7*   MAG  --  2.3  --   --   --  2.2  --   --   --  2.2  --  2.2   PHOS  --  4.1  --   --   --  4.9*  --   --   --  3.8  --  3.9   PROTTOTAL  --  6.1*  --   --   --  6.2*  --   --   --  6.1*  --  6.4   ALBUMIN  --  3.9  --   --   --  3.8  --   --   --  3.7  --  3.7   BILITOTAL  --  0.5  --   --   --  0.6  --   --   --  0.5  --  0.6   ALKPHOS  --  55  --   --   --  62  --   --   --  59  --  64   AST  --  13  --   --   --  17  --   --   --  23  --  19   ALT  --  28  --   --   --  30  --   --   --  30  --  18    < > = values in this interval not displayed.     CBC  Recent Labs   Lab 07/24/25  0635 07/23/25  0730 07/22/25  0522 07/21/25  1631 07/21/25  0528   WBC 16.1* 15.3* 16.7*  --  12.0*   RBC 4.15* 4.16* 3.96*  --  4.08*   HGB 11.0* 11.2* 10.6*  --  11.0*   HCT 33.3* 34.1* 31.8*  --  33.6*   MCV 80 82 80 80 82   MCH 26.5 26.9 26.8  --  27.0   MCHC 33.0 32.8 33.3  --  32.7   RDW 14.0 13.7 13.7  --  13.4   PLT 71* 71* 87* 91* 70*     INR  Recent Labs   Lab 07/24/25  0635 07/23/25  0730 07/22/25  0522 07/21/25  0528   INR 1.45* 1.43* 1.32* 1.22*     Results for orders placed or performed during the hospital encounter of 07/15/25   Head CT w/o contrast    Impression    IMPRESSION:  1.  No evidence of acute intracranial abnormality.  2.  Callosal dysgenesis.     MR Brain w/o & w Contrast    Impression    IMPRESSION:  1.  Numerous multifocal acute cortical and white matter left cerebral hemisphere infarcts predominantly involving the left MCA territory with a few acute infarcts in the right cerebral hemisphere, as described. No significant mass effect.  2.  Trace acute subarachnoid hemorrhage versus slow  flow or thrombosis of a cortical vessel along the parasagittal left frontal lobe, the latter favored. No confluent intraparenchymal hematoma.      CTA Head Neck with Contrast    Impression    IMPRESSION:   NECK CTA:  1.  Patent neck vasculature.    HEAD CTA:   1.  Near occlusion of the left MCA-M1 segment (8 mm length) with distal opacification throughout the M2 segments.    Findings discussed with Dr. Pond by Dr. Owens at 8:25 PM 7/15/2025.   CTV Head with Contrast    Impression    IMPRESSION:   1.  No intracranial venous thrombosis.   XR Chest Port 1 View    Impression    Impression:   Right IJ CVC with tip at the cavoatrial junction. No focal airspace  opacity.    I have personally reviewed the examination and initial interpretation  and I agree with the findings.    KOSTAS HARRELL DO         SYSTEM ID:  [id number]   CT Chest/Abdomen/Pelvis w Contrast    Impression    IMPRESSION:     1. Multiple enlarged mediastinal and hilar lymph nodes with necrotic  changes in the paraesophageal lymph nodes. Leading consideration is  neoplastic etiology, lymphoproliferative versus metastasis. Recommend  correlation with tissue sampling.    2. Spiculated-appearing nodule within the right lung apex measuring up  to 11 mm with central lucency. Adjacent peribronchovascular opacities  surrounding the subsegmental bronchi inferior to this lesion. Findings  are nonspecific, but given history could potentially represent a  region of small pulmonary infarction. Other scattered subcentimeter  groundglass nodules are probably infective/inflammatory in etiology.  Recommend comparison with any prior imaging (none available in the  system) and attention on short interval follow-up.    3. Mild splenomegaly.    4. 11 mm soft tissue density nodule in the left retroperitoneum is  indeterminate, metastasis is not excluded.    5. Small wedge-shaped hypodensity at the periphery of the spleen and  few scattered wedge-shaped areas in both kidneys  likely represent  infarcts.    6. Scattered areas of filling defect in bilateral lower lobe segmental  pulmonary arteries are indeterminate for flow artifacts versus emboli  on this nondedicated exam. Consider CT PE for further evaluation.  Additionally, focal filling defect in the left external iliac vein is  indeterminate, consider dedicated Doppler for further evaluation.        I have personally reviewed the examination and initial interpretation  and I agree with the findings.    WARNER ROBLES MD         SYSTEM ID:  [id number]   CTA  Angiogram Heart    Impression    IMPRESSION:  1.  Non-diagnostic evaluation for left atrial appendage thrombus.   2.  Please review the separate Radiology report for incidental  noncardiac findings.    FINDINGS:      1.  Non-diagnostic evaluation for left atrial appendage thrombus.   2.  The left atrial appendage has a  chicken wing morphology.  3.   Coronary images are non-diagnostic for stenosis/atherosclerosis  due to cardiac motion artifact. No coronary artery calcifications  seen.   4.   The visualized aortic root, ascending aorta, and descending  thoracic aorta are normal in caliber.    ABDOUL GARCIA MD         SYSTEM ID:  [id number]   Radiologist Consult For Cardiology    Impression    IMPRESSION:  1. Incidental pulmonary emboli bilaterally without obvious right heart  strain.  2. Bulky right hilar and subcarinal lymphadenopathy.    NATALIIA ALCAZAR MD         SYSTEM ID:  [id number]   CT Head w/o Contrast    Impression    Impression:   Noncontrast head CT: Patchy hypodensities in the left frontal,  parietal, temporal and occipital lobes, which correlate with the  diffusion restricting infarcts seen on MR brain from 7/15/2025. No  hemorrhage. No midline shift.    CTA head and neck: Similar to slightly increased occlusion of a 7 mm  segment of incompletely opacified distal M1 branch of the left middle  cerebral artery. The remaining major intracranial arterial vasculature  is  patent without significant stenosis.    CT PERFUSION: There is hypoperfusion demonstrated diffusely in the  left middle cerebral artery territory.     I have personally reviewed the examination and initial interpretation  and I agree with the findings.    ANUJ YOO MD         SYSTEM ID:  [id number]   CT Chest Pulmonary Embolism w Contrast    Impression    IMPRESSION:  1. Exam is positive for pulmonary embolism, unchanged from 7/16/2025.  Multiple segmental pulmonary emboli as above. There is bowing of the  interatrial septum and mild enlargement of the pulmonary artery,  consistent with mild right heart strain.     2. Dependent subsegmental lower lobe atelectasis with engorgement of  the pulmonary vasculature & interlobular septal thickening may  reflect a degree of superimposed pulmonary edema.     SYSTEM ID:  [id number]   CTA Head Neck with Contrast    Impression    Impression:   Noncontrast head CT: Patchy hypodensities in the left frontal,  parietal, temporal and occipital lobes, which correlate with the  diffusion restricting infarcts seen on MR brain from 7/15/2025. No  hemorrhage. No midline shift.    CTA head and neck: Similar to slightly increased occlusion of a 7 mm  segment of incompletely opacified distal M1 branch of the left middle  cerebral artery. The remaining major intracranial arterial vasculature  is patent without significant stenosis.    CT PERFUSION: There is hypoperfusion demonstrated diffusely in the  left middle cerebral artery territory.     I have personally reviewed the examination and initial interpretation  and I agree with the findings.    ANUJ YOO MD         SYSTEM ID:  [id number]   CT Head Perfusion w Contrast    Impression    Impression:   Noncontrast head CT: Patchy hypodensities in the left frontal,  parietal, temporal and occipital lobes, which correlate with the  diffusion restricting infarcts seen on MR brain from 7/15/2025. No  hemorrhage. No midline shift.    CTA head  and neck: Similar to slightly increased occlusion of a 7 mm  segment of incompletely opacified distal M1 branch of the left middle  cerebral artery. The remaining major intracranial arterial vasculature  is patent without significant stenosis.    CT PERFUSION: There is hypoperfusion demonstrated diffusely in the  left middle cerebral artery territory.     I have personally reviewed the examination and initial interpretation  and I agree with the findings.    ANUJ YOO MD         SYSTEM ID:  [id number]   CT Head w/o Contrast    Impression    IMPRESSION: Continued demonstration of left MCA territory scattered  infarctions, in a patient with hypogenesis of the corpus callosum. The  right-sided lesions seen on the DWI of the 7/15/2025 MRI are not well  visualized on the CT scan. However, one small lesion in the right  parietal lobe exhibits some increased density, especially on the  coronal view. This could represent focal cortical laminar necrosis, or  perhaps less likely, a small hemorrhage. If clinically indicated,  follow up exam might help differentiate this. Susceptibility-weighted  MR imaging would likely resolve this question. Either way, this is a  small focus.    MARIAM SALGUERO MD         SYSTEM ID:  [id number]   CT Head w/o Contrast    Impression    IMPRESSION:   HEAD CT:  1.  New small acute to subacute infarct left lentiform nucleus.  2.  Tiny acute to subacute infarct right caudate head appears new when compared to the prior MRI probably not significantly changed when compared to the prior CT.  3.  Scattered subacute infarcts left middle cerebral artery territory probably not significant changed.  4.  Few scattered small acute to subacute infarcts in the right cerebral hemisphere seen on the MRI are not readily apparent by CT.  5.  No acute intracranial hemorrhage.  6.  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    HEAD CTA:  1.  Stable subtotal occlusion mid to distal M1 segment left  middle cerebral artery.  2.  New focal high-grade stenosis posterior parietal branch left middle cerebral artery near the M2/M3 junction.    NECK CTA:  1.  No hemodynamically significant stenosis in the neck vessels.  2.  No evidence for dissection.  3.  New bilateral pleural effusions and patchy infiltrates in both upper lobes and superior segments of the lower lobes.  4.  Mediastinal and right hilar adenopathy was incompletely imaged on the prior study and concerning for metastatic disease or lymphoma.    CT PERFUSION:  1.  No evidence of core infarct by perfusion imaging.  2.  Small focal area of increased Tmax greater than 6 seconds in the left middle cerebral artery territory in the region of the left corona radiata/centrum semiovale concerning for at risk brain.  3.  If the threshold for the Tmax is decreased to greater than 4 seconds, there is a much larger of area of oligemia in the left middle cerebral artery territory as well as a small area in the anterior right frontal lobe.    4.  Findings discussed with Dr. Rodas at 5:41 AM CST on 07/19/2025.     CTA Head Neck w Contrast    Impression    IMPRESSION:   HEAD CT:  1.  New small acute to subacute infarct left lentiform nucleus.  2.  Tiny acute to subacute infarct right caudate head appears new when compared to the prior MRI probably not significantly changed when compared to the prior CT.  3.  Scattered subacute infarcts left middle cerebral artery territory probably not significant changed.  4.  Few scattered small acute to subacute infarcts in the right cerebral hemisphere seen on the MRI are not readily apparent by CT.  5.  No acute intracranial hemorrhage.  6.  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    HEAD CTA:  1.  Stable subtotal occlusion mid to distal M1 segment left middle cerebral artery.  2.  New focal high-grade stenosis posterior parietal branch left middle cerebral artery near the M2/M3 junction.    NECK CTA:  1.  No  hemodynamically significant stenosis in the neck vessels.  2.  No evidence for dissection.  3.  New bilateral pleural effusions and patchy infiltrates in both upper lobes and superior segments of the lower lobes.  4.  Mediastinal and right hilar adenopathy was incompletely imaged on the prior study and concerning for metastatic disease or lymphoma.    CT PERFUSION:  1.  No evidence of core infarct by perfusion imaging.  2.  Small focal area of increased Tmax greater than 6 seconds in the left middle cerebral artery territory in the region of the left corona radiata/centrum semiovale concerning for at risk brain.  3.  If the threshold for the Tmax is decreased to greater than 4 seconds, there is a much larger of area of oligemia in the left middle cerebral artery territory as well as a small area in the anterior right frontal lobe.    4.  Findings discussed with Dr. Rodas at 5:41 AM CST on 07/19/2025.     CT Head Perfusion w Contrast    Impression    IMPRESSION:   HEAD CT:  1.  New small acute to subacute infarct left lentiform nucleus.  2.  Tiny acute to subacute infarct right caudate head appears new when compared to the prior MRI probably not significantly changed when compared to the prior CT.  3.  Scattered subacute infarcts left middle cerebral artery territory probably not significant changed.  4.  Few scattered small acute to subacute infarcts in the right cerebral hemisphere seen on the MRI are not readily apparent by CT.  5.  No acute intracranial hemorrhage.  6.  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    HEAD CTA:  1.  Stable subtotal occlusion mid to distal M1 segment left middle cerebral artery.  2.  New focal high-grade stenosis posterior parietal branch left middle cerebral artery near the M2/M3 junction.    NECK CTA:  1.  No hemodynamically significant stenosis in the neck vessels.  2.  No evidence for dissection.  3.  New bilateral pleural effusions and patchy infiltrates in  both upper lobes and superior segments of the lower lobes.  4.  Mediastinal and right hilar adenopathy was incompletely imaged on the prior study and concerning for metastatic disease or lymphoma.    CT PERFUSION:  1.  No evidence of core infarct by perfusion imaging.  2.  Small focal area of increased Tmax greater than 6 seconds in the left middle cerebral artery territory in the region of the left corona radiata/centrum semiovale concerning for at risk brain.  3.  If the threshold for the Tmax is decreased to greater than 4 seconds, there is a much larger of area of oligemia in the left middle cerebral artery territory as well as a small area in the anterior right frontal lobe.    4.  Findings discussed with Dr. Rodas at 5:41 AM CST on 07/19/2025.     CT Head w/o Contrast   Result Value Ref Range    Radiologist flags Subarachnoid hemorrhage (Urgent)     Impression    Impression:  1. Subarachnoid hemorrhage in the sulci along the left cerebral  convexity.  2. Evolution of the patchy left middle cerebral artery territory  infarcts.  2. 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    [Urgent Result: Subarachnoid hemorrhage]    Finding was identified on 7/19/2025 7:07 AM.     Dr. Rodas was contacted by Dr. Hidalgo at 7/19/2025 7:08 AM and  verbalized understanding of the urgent finding.          I have personally reviewed the examination and initial interpretation  and I agree with the findings.    ANUJ YOO MD         SYSTEM ID:  [id number]   Echocardiogram Complete w Bubble study - For age 60 yrs or less   Result Value Ref Range    LVEF  60-65%    Transesophageal Echocardiogram   Result Value Ref Range    LVEF  60-65%        Medications list for reference:  Current Facility-Administered Medications   Medication Dose Route Frequency Provider Last Rate Last Admin    acetaminophen (TYLENOL) tablet 1,000 mg  1,000 mg Oral or Feeding Tube Q6H Dee Shah MD   1,000 mg at 07/24/25 0834    bisacodyl  (DULCOLAX) suppository 10 mg  10 mg Rectal Daily PRN Dee Shah MD        chlorhexidine (HIBICLENS) 4 % solution   Topical Daily PRN Dee Shah MD        chlorproMAZINE (THORAZINE) tablet 25 mg  25 mg Oral or Feeding Tube Q6H PRN Yamile Gutierrez APRN CNP   25 mg at 07/24/25 0836    [Held by provider] cyclobenzaprine (FLEXERIL) tablet 10 mg  10 mg Oral TID PRN Dee Shah MD        dexAMETHasone (DECADRON) 40 mg in sodium chloride 0.9 % 59 mL intermittent infusion  40 mg Intravenous Daily Marjan Michel  mL/hr at 07/23/25 1108 40 mg at 07/23/25 1108    glucose gel 15-30 g  15-30 g Oral Q15 Min PRN Dee Shah MD        Or    dextrose 50 % injection 25-50 mL  25-50 mL Intravenous Q15 Min PRN Dee Shah MD        Or    glucagon injection 1 mg  1 mg Subcutaneous Q15 Min PRN Dee Shah MD        diphenhydrAMINE (BENADRYL) capsule 25 mg  25 mg Oral or Feeding Tube Q6H PRN eDe Shah MD        Or    diphenhydrAMINE (BENADRYL) injection 25 mg  25 mg Intravenous Q6H PRN Dee Shah MD        enoxaparin ANTICOAGULANT (LOVENOX) injection 135 mg  1 mg/kg Subcutaneous Q12H Dee Shah MD   135 mg at 07/23/25 2204    famotidine (PEPCID) tablet 10 mg  10 mg Oral BID Dee Shah MD   10 mg at 07/24/25 0834    fluticasone (FLONASE) 50 MCG/ACT spray 1 spray  1 spray Both Nostrils Daily Dee Shah MD   1 spray at 07/24/25 0834    [Held by provider] gabapentin (NEURONTIN) capsule 100 mg  100 mg Oral TID PRN Dee Shah MD        hydrALAZINE (APRESOLINE) injection 10 mg  10 mg Intravenous Q30 Min PRN Dee Shah MD   10 mg at 07/23/25 0203    [Held by provider] hydroCHLOROthiazide tablet 25 mg  25 mg Oral Daily Dee Shah MD        insulin aspart (NovoLOG) injection (RAPID ACTING)  1-10 Units Subcutaneous TID AC Dee Shah MD   1 Units at 07/24/25 0819    insulin aspart (NovoLOG) injection (RAPID ACTING)  1-7 Units Subcutaneous At Bedtime Dee Shah MD   3  Units at 07/23/25 2203    labetalol (NORMODYNE/TRANDATE) injection 10 mg  10 mg Intravenous Q10 Min PRN Dee Shah MD        [Held by provider] losartan (COZAAR) tablet 100 mg  100 mg Oral Daily Dee Shah MD        magnesium hydroxide (MILK OF MAGNESIA) suspension 30 mL  30 mL Oral or Feeding Tube Daily PRN Dee Shah MD        melatonin tablet 3 mg  3 mg Oral At Bedtime PRN Dee Shah MD   3 mg at 07/22/25 0123    [Held by provider] metoprolol succinate ER (TOPROL XL) 24 hr tablet 25 mg  25 mg Oral Daily Dee Shah MD        naloxone (NARCAN) injection 0.2 mg  0.2 mg Intravenous Q2 Min PRN Carine Matt MD        Or    naloxone (NARCAN) injection 0.4 mg  0.4 mg Intravenous Q2 Min PRN Carine Matt MD        Or    naloxone (NARCAN) injection 0.2 mg  0.2 mg Intramuscular Q2 Min PRN Carine Matt MD        Or    naloxone (NARCAN) injection 0.4 mg  0.4 mg Intramuscular Q2 Min PRN Carine Matt MD        ondansetron (ZOFRAN ODT) ODT tab 4 mg  4 mg Oral Q6H PRN Dee Shah MD        Or    ondansetron (ZOFRAN) injection 4 mg  4 mg Intravenous Q6H PRN Dee Shah MD        [Held by provider] oxyCODONE (ROXICODONE) tablet 5 mg  5 mg Oral Q6H PRN Dee Shah MD        polyethylene glycol (MIRALAX) Packet 17 g  17 g Oral or Feeding Tube Daily Dee Shah MD   17 g at 07/24/25 0834    prochlorperazine (COMPAZINE) injection 10 mg  10 mg Intravenous Q6H PRN Dee Shah MD        Or    prochlorperazine (COMPAZINE) tablet 10 mg  10 mg Oral or Feeding Tube Q6H PRN Dee Shah MD        rosuvastatin (CRESTOR) tablet 10 mg  10 mg Oral or Feeding Tube Daily Dee Shah MD   10 mg at 07/24/25 0834    senna-docusate (SENOKOT-S/PERICOLACE) 8.6-50 MG per tablet 1-2 tablet  1-2 tablet Oral or Feeding Tube BID Dee Shah MD   1 tablet at 07/24/25 0834    simethicone (MYLICON) chewable half-tab 40 mg  40 mg Oral Q6H PRN Dee Shah MD   40 mg at  07/23/25 1107    sodium chloride (PF) 0.9% PF flush 3 mL  3 mL Intracatheter Q8H MOHINDER Dee Shah MD   3 mL at 07/24/25 0834    sodium chloride (PF) 0.9% PF flush 3 mL  3 mL Intracatheter q1 min prn Dee Shah MD

## 2025-07-24 NOTE — PLAN OF CARE
Status: Pt admitted on 7/15/2025 for lethargy, N/V, and blurry vision found to have multifocal acute CVA in L MCA , R parietal and R frontal centrum semiovale. Initially concern for TTP or catastrophic APS, however further work up notable for new diagnosis of metastatic lung adenocarcinoma with likely malignancy-related hypercoagulability.   Vitals: VSS on RA ex int HTN - resolved without PRNs.   Neuros: A&O x4. Denies N/T today. RUE 0/5, RLE 3/5, L side 5/5. R facial droop. Slightly slurred speech.  IV: PIV SL.   Labs/Electrolytes: BG ACHS. WBC 16.1.  Resp: LS clear. Denies SOB.   Diet: Regular. Pills whole in applesauce. Help order meals when family is not present.   GI: BS+. LBM 7/22.  : Voids spontaneously via bedside urinal.   Skin: Sutures to L shin biopsy site. Bruising to BUE and neck.   Pain: Managed with scheduled Tylenol.   Activity: Assist of two, GB. Up in chair for meals.   SCDs on? If no, why?: ordered - will place upon arrival  Social: Family and friends at bedside throughout the day, helpful and supportive of pt.   Plan: Discharge to [facility] 7/25/25 with pick-up window of [phone].  Updates this shift: Pt having severe hiccups. PRN thorazine and simethicone given, not effective. One-time IV dose of thorazine given, partially effective.       Goal Outcome Evaluation:    Plan of Care Reviewed With: patient, parent  Overall Patient Progress: improving  Outcome Evaluation: Pt up in chair for meals, working with therapies.

## 2025-07-24 NOTE — PROGRESS NOTES
Care Management Follow Up    Length of Stay (days): 9    Expected Discharge Date: 07/25/2025     Concerns to be Addressed: all concerns addressed in this encounter, no discharge needs identified, denies needs/concerns at this time     Patient plan of care discussed at interdisciplinary rounds: Yes    Anticipated Discharge Disposition: Acute Rehab  Anticipated Discharge Services: None  Anticipated Discharge DME: None    Patient/family educated on Medicare website which has current facility and service quality ratings: yes  Education Provided on the Discharge Plan: Yes  Patient/Family in Agreement with the Plan: yes    Referrals Placed by JULITO/TIRSO:  [place] [facility]  Private pay costs discussed: Not applicable    Discussed  Partnership in Safe Discharge Planning  document with patient/family: No     Handoff Completed: Yes, non-[facility] PCP: External handoff communication completed    Additional Information:  Transportation scheduled with [facility] pick-up window of 11:14 am-11:59 am 7/25/25.    Next Steps:  []Confirm prior auth receipt    Saul Eagle, MSSHANTEL, LGSW, LADC  Float   [facility] Acute Care Management  Searchable on Curry: Curry

## 2025-07-24 NOTE — CARE PLAN
07/24/25 0000   Fall Event   Patient Assessed By  --    Name of Provider Notified  --    Family/Designated Caregiver Notified of Fall  --    Reason Family/Designated Caregiver Not Notified  --    Fall Prevention Plan Updated  --

## 2025-07-24 NOTE — PROGRESS NOTES
"Refused CPCP.    /86 (BP Location: Right arm)   Pulse 94   Temp 98.3  F (36.8  C) (Axillary)   Resp 16   Ht 1.88 m (6' 2\")   Wt 127.4 kg (280 lb 13.9 oz)   SpO2 98%   BMI 36.06 kg/m      Mike Hidalgo, RT on 7/23/2025 at 9:23 PM    "

## 2025-07-24 NOTE — PROGRESS NOTES
Care Management Follow Up    Length of Stay (days): 9  Expected Discharge Date: 07/25/2025    Concerns to be Addressed: Transportation      Additional Information:    CHW delegated by TIRSO, has scheduled discharge transportation to  ARU tomorrow 7/25.     [facility] Transport  Pickup between [phone]  7/25/25    TIRSO notified, no further action taken.       Camille Coyle  Community Health Worker  6A, 6B, 6C  P: [phone]  F: [phone]

## 2025-07-24 NOTE — PROGRESS NOTES
[facility]    Progress Note - Medicine Service, MAROON TEAM 3       Date of Admission:  7/15/2025    Assessment & Plan   Bernard Chapman is a 35 year old male with a past medical history of hypertension, diabetes, ELENA, PE/DVT (on rivaroxaban) admitted on 7/15/2025 for lethargy, N/V, and blurry vision found to have multifocal acute CVA in L MCA , R parietal and R frontal centrum semiovale. Initially concern for TTP or catastrophic APS, however further work up notable for new diagnosis of metastatic lung adenocarcinoma with likely malignancy-related hypercoagulability.    #Metastatic lung adenocarcinoma  #Right lung nodule  #Multiple enlarged mediastinal, paraesophageal, and hilar lymph nodes  Malignancy work up initiated on discharge due to significant hypercoagulability. CT CAP significant for 41a65pm spiculated density in R lung apex, 9mm pulmonary nodule in R lung base, L thyroid nodule, paratracheal LAD, splenic infarct, bilateral renal infarcts, and L retroperitoneal nodule. Underwent FNA of lymph node which was positive for lung adenocarcinoma (resulted 7/22). Full staging is unclear pending outpatient PET scan, but pt at least has involvement of mediastinal LN. PDL-1 resulted with low expression (40%), awaiting other molecular studies before final treatment plan can be determined.  -Oncology consulted, appreciate recs   - NGS pending   - Guardant 360 pending   - PET scan for staging as outpatient  - Oncology to arrange outpatient follow in clinic in 2-3 weeks after pt has discharged from ARU to discuss treatment plan.    #Hypercoagulability d/t metastatic lung adenocarcinoma  #Infarcts of brain, kidney, spleen  #Cardiac valve thrombus on tricuspid and aortic valve 7/16 on TIMO  #Hx pulmonary embolism (6/23/25)  Found to have multiple CVA, likely embolic. TTE with mobile echodensities on tricuspid and aortic valve. Initial concern for TTP or catastrophic APS,  however work up ultimately significant for new diagnosis of metastatic lung cancer. Hypercoagulable state presumed to be most likely 2/2 malignancy. Started on heparin gtt 7/17, but briefly held for 7/18 endobronchial needle aspiration of lymph nodes. Stroke code called 7/19 for worsening R-sided hemiparesis and hemianopia. Repeat CTA with new focal high-grade stenosis of other part of left MCA. Heparin gtt was stopped during code stroke, but restarting (heparin gtt + levophed) led to 10/10 headache. CTH with small left frontal SAH. Brain MRI with new L basal ganglia stroke. Heparin gtt resumed, and repeat CTH stable. Switched to therapeutic Lovenox 7/22. CTH still stable 7/23.   - Hematology consulted  - Check serum Xa level today  - SC Lovenox 135 mg bid    -if Xa level in the middle of 0.8 to 1.2, consider increasing dose to 150 mg to decrease risk of unintentional under dosing outpatient  - trend daily electrolytes and CBC while inpatient    #Multifocal acute ischemic strokes, in L MCA territory and new L basal ganglia stroke; Increasing mass effect on L lateral ventricle by L MCA infarction  #Subtotal occlusive thrombus in left M1   #Left frontoparietal SAH (7/19)  #Hypertension chronic  #HLD, chronic  Etiology most likely hypercoagulability d/t malignancy. Emboli stemming from tricuspid and aortic valves, appreciated on TTE 7/16. He continues to have R facial droop, extremity weakness. No apparent aphasia, but he is mildly dysarthric. Left frontoparietal subarachnoid hemorrhage stable per CT.  > Neuro-ICU signing off   - SBP goal of 120 - 160 mmHg and DBP goal < 100    -Hold PTA antihypertensives    -Has prn labetalol and hydralazine  - cont 10 mg rosuvastatin  - q4h neuro check  > neuro-stroke consulted, appreciate recs  > OT/PT/Speech  > nutrition consult   -monitor oral intake, weights  - repeat TTE outpatient to ensure improvement of AR and TR (and vegetations)    #Acute thrombocytopenia, in the setting of  "metastatic lung adenocarcinoma d/t ITP vs drug-induced vs consumptive coagulopathy  #Acute anemia  > Hematology consult   - goal hgb>7, INR<1.5, platelets>50k    - Final dexamethasone 40 mg today (Final day was supposed to be yesterday, but ordered as \"through 7/23,\" so last dose 7/24)    # Numerous palpable purpura across the scalp and lower extremities   -s/p L leg s/p punch biopsy with dermatology: rupture folliculitis. No definite features of cutaneous small vessel vasculitis or thrombotic vasculopathy   -Chlorhexidine wash     #DM2, A1c 5.3  Elevated BG, despite HSSI. Suspect 2/2 dexamethasone, so anticipate improvement as dexamethasone is discontinued. CTM.   - POC glucose ACHS  - High intensity insulin sliding scale    #ELENA  Need to clarify home CPAP settings.    #Hiccups  #chronic back pain  Chronic back pain. On acetaminophen only (given frequent need for neuro checks). Hiccups (drug-induced (dexamethasone) vs central) have been exacerbating back pain and making it difficult to sleep. Neuro recommended thorazine (EKG wnl) with improvement while sleeping. Still hiccups when awake. Tried Pepcid (in case it is GERD manifestation) with minimal help. Anticipate improvement as dexamethasone is cleared.   Plan:  - Thorazine 25mg q6hr prn  - Melatonin 3mg daily  - Continue Pepcid  - acetaminophen 1000 mg every 6 hours  - hold cyclobenzaprine 10 mg 3x daily PRN  - hold PTA gabapentin 100 mg 3x daily  - hold PTA oxycodone 5 mg PRN every 6h     #allergies  - fluticasone 1 spray daily        Diet: Regular Diet Adult    DVT Prophylaxis: Lovenox  Avalos Catheter: Not present  Fluids: None  Lines: None     Cardiac Monitoring: None  Code Status: Full Code      Clinically Significant Risk Factors          # Hyperchloremia: Highest Cl = 109 mmol/L in last 2 days, will monitor as appropriate             # Coagulation Defect: INR = 1.45 (Ref range: 0.85 - 1.15) and/or PTT = 31 Seconds (Ref range: 22 - 38 Seconds), will monitor " "for bleeding  # Thrombocytopenia: Lowest platelets = 71 in last 2 days, will monitor for bleeding              # Obesity: Estimated body mass index is 36.06 kg/m  as calculated from the following:    Height as of this encounter: 1.88 m (6' 2\").    Weight as of this encounter: 127.4 kg (280 lb 13.9 oz).        # Financial/Environmental Concerns: none         Social Drivers of Health          Disposition Plan     Medically Ready for Discharge: Today         Seen and staffed with Dr. Edis Shah MD  ______________________________________________________________________    Interval History   Continues to have hiccups which exacerbate back pain. No new symptoms. Fell yesterday, and went to assess. Pt was up from commode with nurse and nursing assistant. Lost footing, then had gradual, assisted fall to the ground. No preceding dizziness. Did not hit head. No LOC. Fall felt to be mechanical, per witnesses (RN and nursing assistant) and pt. No headache. No new symptoms since then.     Physical Exam   Vital Signs: Temp: 97.8  F (36.6  C) Temp src: Oral BP: 128/59 Pulse: 62   Resp: 16 SpO2: 98 % O2 Device: None (Room air)    Weight: 280 lbs 13.86 oz  GENERAL: Sitting up in chair, friends and family nearby, NAD  RESP: normal WOB on RA  CV:  Extremities appear well-perfused, RUE swelling is stable, no LE edema  GI: Soft, non-distended, hiccups  NEURO: Alert, oriented, normal LUE movement, no RUE movement, stable facial droop, mild dysarthria, no dysphonia, EOMI grossly, pupils symmetric    Medical Decision Making       Please see A&P for additional details of medical decision making.      Data     I have personally reviewed the following data over the past 24 hrs:    16.1 (H)  \   11.0 (L)   / 71 (L)     140 109 (H) 37.8 (H) /  173 (H)   4.2 19 (L) 0.92 \     ALT: 28 AST: 13 AP: 55 TBILI: 0.5   ALB: 3.9 TOT PROTEIN: 6.1 (L) LIPASE: N/A     INR:  1.45 (H) PTT:  31   D-dimer:  N/A Fibrinogen:  110 (L)     Ferritin:  N/A " % Retic:  N/A LDH:  542 (H)       Imaging results reviewed over the past 24 hrs:   No results found for this or any previous visit (from the past 24 hours).

## 2025-07-24 NOTE — PLAN OF CARE
Goal Outcome Evaluation:    Arrived from: 4E  Belongings/meds: remains with pt  2 RN Skin Assessment Completed by:   Skin Findings: Michaela RN  Non-intact findings documented yes (yes/no/NA):      Status: pt presented on 7/15/2025 with new neurological symptoms and found acute multifocal ischemic strokes, partial occlusive L MCA thrombus, L frontal SAH, and in setting of TCP c/f ITP vs catastrophic antiphospholipid syndrome (CAPS) vs malignancy s/p PLEX x7/16. New metastatic lung adenocarcinoma. PMHx: HTN, DM, ELENA, PE/DVT 6/22/25 (previously on rivaroxaban)   Vitals: VSS on RA  Neuros:  A&Ox4, RUE 0/5, RLE 3/5, R facial droop, diminished sensation to R side, speech is slightly slurred.   IV: X2PV SL  Resp: on RA  Diet: Regular--takes pills whole with apple sauce  GI: LBM 7/22  : voids via urinal  Skin: generalized bruising to bilateral upper extremities, skin moles,  bruising around neck, two small sutures (biopsy site) on L leg  Pain: managed with tylenol  Activity: Ax2 upon standing, but Ax1 when in bed  Social: watching TV  Plan: continue with POC  Education completed: NO

## 2025-07-24 NOTE — INTERIM SUMMARY
[facility] Pre-Admission Screen    Referral Source:  [facility] [address] [facility] 6212-02  Admit date to referring facility: 7/15/2025    Physical Medicine and Rehab Consult Completed: No    Rehab Diagnosis:    Stroke Ischemic 01.2 (R) Body Involvement (L) Brain ***    Justification for Acute Inpatient Rehabilitation  Bernard Chapman is a 35 year old male with a past medical history of hypertension, diabetes, ELENA, PE/DVT (on rivaroxaban) admitted on 7/15/2025 for lethargy, N/V, and blurry vision found to have multifocal acute CVA in L MCA, R parietal and R frontal centrum semiovale. Initially concern for TTP or catastrophic APS, however further work up notable for new diagnosis of metastatic lung adenocarcinoma with likely malignancy-related hypercoagulability. His hospital course has been complicated by ***. The patient is now stable and ready for discharge to an [facility] level of care for intensive therapies not available in a lesser level of care including OT/PT, ongoing medical management by PMR, and rehab nursing care.    At baseline***. Currently, ***.   Patient requires an intensive inpatient rehab program to address the following acute impairments:{ARC Acute Impairments:535464}    Current Active Medical Management Needs/Risks for Clinical Complications  The patient requires the high level of rehabilitation physician supervision that accompanies the provision of intensive rehabilitation therapy.  The patient needs the services of the rehabilitation physician to assess the patient medically and functionally and to modify the course of treatment as needed to maximize the patient's capacity to benefit from the rehabilitation process.  {ARC Med Mgmt Needs/Risks Clinical Complications:481844}    Past Medical/Surgical History   Surgery in the past 100 days: No   Additional relevant past medical history: hypertension, diabetes, ELENA, PE/DVT    Level of Functioning Prior to  "Admission:    LIVING ENVIRONMENT   People in Home: alone   Current Living Arrangements: apartment   Home Accessibility: no concerns     Transportation Anticipated: agency   Living Environment Comments: ***    SELF-CARE   Usual Activity Tolerance: good   Regular Exercise: No   Equipment Currently Used at Home: none   Activity/Exercise/Self-Care Comment: IND with mobility and ADLs at baseline.    Additional Comments: ***    Level of Function: GG Scale (Section GG Functional Ability and Goals; CMS's BETH Version 3.0 Manual effective 10.1.2019):  PT Current Function Goals for Rehab   Bed Rolling {GG Scale:718115::\"6 Independent\"} {Goals for Rehab:060616}   Supine to Sit {GG Scale:118775::\"6 Independent\"} {Goals for Rehab:842871}   Sit to Stand {GG Scale:511880::\"6 Independent\"} {Goals for Rehab:264423}   Transfer {GG Scale:771593::\"6 Independent\"} {Goals for Rehab:960540}   Ambulation {GG Scale:352497::\"6 Independent\"} {Goals for Rehab:450873}   Stairs {GG Scale:372774::\"6 Independent\"} {Goals for Rehab:721743}     OT Current Function Goals for Rehab   Feeding {GG Scale:476582::\"6 Independent\"} {Goals for Rehab:016880}   Grooming {GG Scale:309274::\"6 Independent\"} {Goals for Rehab:240024}   Bathing {GG Scale:489292::\"6 Independent\"} {Goals for Rehab:665194}   Upper Body Dressing {GG Scale:518484::\"6 Independent\"} {Goals for Rehab:401234}   Lower Body Dressing {GG Scale:110483::\"6 Independent\"} {Goals for Rehab:735738}   Toileting {GG Scale:759765::\"6 Independent\"} {Goals for Rehab:078966}   Toilet Transfer {GG Scale:771039::\"6 Independent\"} {Goals for Rehab:122281}   Tub/Shower Transfer {GG Scale:381063::\"6 Independent\"} {Goals for Rehab:492711}   Cognition {ARC Impaired Not NA:044549} {ARC COGNITION GOAL:360417}     SLP Current Function Goals for Rehab   Swallow Not Impaired Not applicable   Communication Impaired Communicate basic needs effectively     Current Diet:  0-Thin and 7-Regular    Summary Statement: Bernard" is participating and making good progress with OT, SLP and PT. ***. He requires the ongoing coordinated care of OT, PT, SLP, rehab nursing, and PMR oversight to achieve maximal independence in ADL and functional mobility.     Expected Therapies and Services Required During Inpatient Rehab Admission  Intensity of Therapy: Patient requires intensive therapies not available in a lesser level of care. Patient is motivated, making gains, and can tolerate 3 hours of therapy a day.  Physical Therapy: 60 minutes per day, 6 days a week for *** days  Occupational Therapy: 60 minutes per day, 6 days a week for *** days  Speech and Language Therapy: 60 minutes per day, 6 days a week for *** days  Rehabilitation Nursing Needs: Patient requires 24 hour Rehab Nursing to manage {ARC Nursing Needs:936760}.    Precautions/restrictions/special needs:   Precautions: {ARC Precautions:520054}   Restrictions: ***   Special Needs: {ARC Special Needs:855218}    Expected Level of Improvement: ***  Expected Length of time to achieve: ***    Anticipated Discharge Needs:  Anticipated Discharge Destination: Home ***  Anticipated Discharge Support: Family member  24/7 support available : Yes  Identified caregiver(s):  parents  Anticipated Discharge Needs: Home with homecare    Identified challenges/barriers: medical complexity    Liaison signature/date/time: Cass Rea *** 7/24/2025 4:23 PM    Physician statement of review and agreement:  I have reviewed and am in agreement of the need for IRF stay to address above functional and medical needs. In addition to above statements address, Patient requires intensive active and ongoing therapeutic intervention and multiple therapies; Patient requires medical supervision; Expected to actively participate in the intensive rehab program; Sufficiently stable to actively participate; Expectation for measurable improvement in functional capacity or adaption to impairments.    MD signature/date/time:  motivated, making gains, and can tolerate 3 hours of therapy a day.  Physical Therapy: 60 minutes per day, 6 days a week for 21 days  Occupational Therapy: 60 minutes per day, 6 days a week for 21 days  Speech and Language Therapy: 60 minutes per day, 6 days a week for 21 days  Rehabilitation Nursing Needs: Patient requires 24 hour Rehab Nursing to manage vitals, medication education, carryover of new rehab techniques, care coordination, blood sugar management, diabetes education, assess neurologic status, pain management, stroke education, and provide safe environment for patient at falls risk.    Precautions/restrictions/special needs:   Precautions: fall precautions   Restrictions: n/a   Special Needs: visually impaired    Expected Level of Improvement: Anticipate that patient will achieve modified independence in bed mobility, sit<>stand transfers (from bed, chair, toilet), gait, grooming, and self-feeding. Anticipate he will require setup assist for dressing. Anticipate he will require CGA for negotiating stairs, shower transfer, and bathing.   Expected Length of time to achieve: 21 days    Anticipated Discharge Needs:  Anticipated Discharge Destination: Home parents' home  Anticipated Discharge Support: Family member  24/7 support available : Yes  Identified caregiver(s):  parents  Anticipated Discharge Needs: Home with homecare    Identified challenges/barriers: medical complexity    [facility]     Liaison signature/date/time: *** 7/***/2025 ***    Physician statement of review and agreement:  I have reviewed and am in agreement of the need for IRF stay to address above functional and medical needs. In addition to above statements address, Patient requires intensive active and ongoing therapeutic intervention and multiple therapies; Patient requires medical supervision; Expected to actively participate in the intensive rehab program; Sufficiently stable to actively participate; Expectation for  measurable improvement in functional capacity or adaption to impairments.    MD signature/date/time:    NELA Rea, OTR/L 8/21/2025 09:36AM    Physician statement of review and agreement:  I have reviewed and am in agreement of the need for IRF stay to address above functional and medical needs. In addition to above statements address, Patient requires intensive active and ongoing therapeutic intervention and multiple therapies; Patient requires medical supervision; Expected to actively participate in the intensive rehab program; Sufficiently stable to actively participate; Expectation for measurable improvement in functional capacity or adaption to impairments.    MD signature/date/time:

## 2025-07-25 ENCOUNTER — APPOINTMENT (OUTPATIENT)
Dept: PHYSICAL THERAPY | Facility: CLINIC | Age: 35
DRG: 064 | End: 2025-07-25
Payer: COMMERCIAL

## 2025-07-25 ENCOUNTER — APPOINTMENT (OUTPATIENT)
Dept: OCCUPATIONAL THERAPY | Facility: CLINIC | Age: 35
DRG: 064 | End: 2025-07-25
Payer: COMMERCIAL

## 2025-07-25 ENCOUNTER — APPOINTMENT (OUTPATIENT)
Dept: SPEECH THERAPY | Facility: CLINIC | Age: 35
End: 2025-07-25
Payer: COMMERCIAL

## 2025-07-25 LAB
ANION GAP SERPL CALCULATED.3IONS-SCNC: 10 MMOL/L (ref 7–15)
APTT PPP: 31 SECONDS (ref 22–38)
BLD PROD TYP BPU: NORMAL
BLOOD COMPONENT TYPE: NORMAL
BUN SERPL-MCNC: 35.5 MG/DL (ref 6–20)
CALCIUM SERPL-MCNC: 9.1 MG/DL (ref 8.8–10.4)
CHLORIDE SERPL-SCNC: 108 MMOL/L (ref 98–107)
CODING SYSTEM: NORMAL
CREAT SERPL-MCNC: 0.87 MG/DL (ref 0.67–1.17)
EGFRCR SERPLBLD CKD-EPI 2021: >90 ML/MIN/1.73M2
ERYTHROCYTE [DISTWIDTH] IN BLOOD BY AUTOMATED COUNT: 14.1 % (ref 10–15)
ERYTHROCYTE [DISTWIDTH] IN BLOOD BY AUTOMATED COUNT: 14.5 % (ref 10–15)
FIBRINOGEN PPP-MCNC: 126 MG/DL (ref 170–510)
FIBRINOGEN PPP-MCNC: 94 MG/DL (ref 170–510)
GLUCOSE BLDC GLUCOMTR-MCNC: 140 MG/DL (ref 70–99)
GLUCOSE BLDC GLUCOMTR-MCNC: 149 MG/DL (ref 70–99)
GLUCOSE BLDC GLUCOMTR-MCNC: 174 MG/DL (ref 70–99)
GLUCOSE BLDC GLUCOMTR-MCNC: 223 MG/DL (ref 70–99)
GLUCOSE SERPL-MCNC: 193 MG/DL (ref 70–99)
HCO3 SERPL-SCNC: 20 MMOL/L (ref 22–29)
HCT VFR BLD AUTO: 33.8 % (ref 40–53)
HCT VFR BLD AUTO: 35.1 % (ref 40–53)
HGB BLD-MCNC: 11.1 G/DL (ref 13.3–17.7)
HGB BLD-MCNC: 11.8 G/DL (ref 13.3–17.7)
INR PPP: 1.55 (ref 0.85–1.15)
INTERPRETATION SERPL IEP-IMP: NORMAL
ISSUE DATE AND TIME: NORMAL
LAB TEST RESULTS REPORTED IN RPTPERIOD: NORMAL
LDH SERPL L TO P-CCNC: 501 U/L (ref 0–250)
MAGNESIUM SERPL-MCNC: 2.3 MG/DL (ref 1.7–2.3)
MCH RBC QN AUTO: 26.6 PG (ref 26.5–33)
MCH RBC QN AUTO: 27.1 PG (ref 26.5–33)
MCHC RBC AUTO-ENTMCNC: 32.8 G/DL (ref 31.5–36.5)
MCHC RBC AUTO-ENTMCNC: 33.6 G/DL (ref 31.5–36.5)
MCV RBC AUTO: 81 FL (ref 78–100)
MCV RBC AUTO: 81 FL (ref 78–100)
PHOSPHATE SERPL-MCNC: 4 MG/DL (ref 2.5–4.5)
PLATELET # BLD AUTO: 54 10E3/UL (ref 150–450)
PLATELET # BLD AUTO: 58 10E3/UL (ref 150–450)
POTASSIUM SERPL-SCNC: 4.5 MMOL/L (ref 3.4–5.3)
PROTHROMBIN TIME: 18.4 SECONDS (ref 11.8–14.8)
RBC # BLD AUTO: 4.17 10E6/UL (ref 4.4–5.9)
RBC # BLD AUTO: 4.35 10E6/UL (ref 4.4–5.9)
SEQUENCING METHOD PNL BLD/T: NORMAL
SODIUM SERPL-SCNC: 138 MMOL/L (ref 135–145)
SPECIMEN TYPE: NORMAL
UNIT ABO/RH: NORMAL
UNIT NUMBER: NORMAL
UNIT STATUS: NORMAL
UNIT TYPE ISBT: 7300
WBC # BLD AUTO: 12.7 10E3/UL (ref 4–11)
WBC # BLD AUTO: 16.1 10E3/UL (ref 4–11)

## 2025-07-25 PROCEDURE — 250N000013 HC RX MED GY IP 250 OP 250 PS 637

## 2025-07-25 PROCEDURE — 120N000003 HC R&B IMCU UMMC

## 2025-07-25 PROCEDURE — 85384 FIBRINOGEN ACTIVITY: CPT

## 2025-07-25 PROCEDURE — 97530 THERAPEUTIC ACTIVITIES: CPT | Mod: GP | Performed by: PHYSICAL THERAPIST

## 2025-07-25 PROCEDURE — 99233 SBSQ HOSP IP/OBS HIGH 50: CPT | Mod: FS | Performed by: INTERNAL MEDICINE

## 2025-07-25 PROCEDURE — 80048 BASIC METABOLIC PNL TOTAL CA: CPT

## 2025-07-25 PROCEDURE — 36415 COLL VENOUS BLD VENIPUNCTURE: CPT

## 2025-07-25 PROCEDURE — 85610 PROTHROMBIN TIME: CPT

## 2025-07-25 PROCEDURE — 83735 ASSAY OF MAGNESIUM: CPT

## 2025-07-25 PROCEDURE — 97530 THERAPEUTIC ACTIVITIES: CPT | Mod: GO

## 2025-07-25 PROCEDURE — 97110 THERAPEUTIC EXERCISES: CPT | Mod: GO

## 2025-07-25 PROCEDURE — 250N000013 HC RX MED GY IP 250 OP 250 PS 637: Performed by: NURSE PRACTITIONER

## 2025-07-25 PROCEDURE — 97535 SELF CARE MNGMENT TRAINING: CPT | Mod: GO

## 2025-07-25 PROCEDURE — 85014 HEMATOCRIT: CPT

## 2025-07-25 PROCEDURE — 84100 ASSAY OF PHOSPHORUS: CPT

## 2025-07-25 PROCEDURE — P9012 CRYOPRECIPITATE EACH UNIT: HCPCS

## 2025-07-25 PROCEDURE — 85730 THROMBOPLASTIN TIME PARTIAL: CPT

## 2025-07-25 PROCEDURE — 99233 SBSQ HOSP IP/OBS HIGH 50: CPT | Mod: GC | Performed by: STUDENT IN AN ORGANIZED HEALTH CARE EDUCATION/TRAINING PROGRAM

## 2025-07-25 PROCEDURE — 120N000002 HC R&B MED SURG/OB UMMC

## 2025-07-25 PROCEDURE — 92507 TX SP LANG VOICE COMM INDIV: CPT | Mod: GN | Performed by: REHABILITATION PRACTITIONER

## 2025-07-25 PROCEDURE — 250N000013 HC RX MED GY IP 250 OP 250 PS 637: Performed by: STUDENT IN AN ORGANIZED HEALTH CARE EDUCATION/TRAINING PROGRAM

## 2025-07-25 PROCEDURE — 83615 LACTATE (LD) (LDH) ENZYME: CPT

## 2025-07-25 PROCEDURE — 250N000011 HC RX IP 250 OP 636

## 2025-07-25 RX ADMIN — CHLORPROMAZINE HYDROCHLORIDE 25 MG: 25 TABLET, FILM COATED ORAL at 18:32

## 2025-07-25 RX ADMIN — FAMOTIDINE 10 MG: 10 TABLET ORAL at 08:41

## 2025-07-25 RX ADMIN — BACLOFEN 5 MG: 5 TABLET ORAL at 08:41

## 2025-07-25 RX ADMIN — SENNOSIDES AND DOCUSATE SODIUM 1 TABLET: 50; 8.6 TABLET ORAL at 19:57

## 2025-07-25 RX ADMIN — BACLOFEN 5 MG: 5 TABLET ORAL at 19:57

## 2025-07-25 RX ADMIN — Medication 40 MG: at 19:57

## 2025-07-25 RX ADMIN — ROSUVASTATIN CALCIUM 10 MG: 10 TABLET, FILM COATED ORAL at 08:41

## 2025-07-25 RX ADMIN — ACETAMINOPHEN 1000 MG: 500 TABLET ORAL at 01:23

## 2025-07-25 RX ADMIN — SENNOSIDES AND DOCUSATE SODIUM 1 TABLET: 50; 8.6 TABLET ORAL at 08:41

## 2025-07-25 RX ADMIN — ENOXAPARIN SODIUM 135 MG: 150 INJECTION SUBCUTANEOUS at 18:37

## 2025-07-25 RX ADMIN — ACETAMINOPHEN 1000 MG: 500 TABLET ORAL at 08:41

## 2025-07-25 RX ADMIN — FAMOTIDINE 10 MG: 10 TABLET ORAL at 19:58

## 2025-07-25 RX ADMIN — FLUTICASONE PROPIONATE 1 SPRAY: 50 SPRAY, METERED NASAL at 09:25

## 2025-07-25 RX ADMIN — ACETAMINOPHEN 1000 MG: 500 TABLET ORAL at 19:57

## 2025-07-25 ASSESSMENT — ACTIVITIES OF DAILY LIVING (ADL)
ADLS_ACUITY_SCORE: 40
ADLS_ACUITY_SCORE: 39
ADLS_ACUITY_SCORE: 40
ADLS_ACUITY_SCORE: 39
ADLS_ACUITY_SCORE: 40
ADLS_ACUITY_SCORE: 39
ADLS_ACUITY_SCORE: 40
ADLS_ACUITY_SCORE: 39
ADLS_ACUITY_SCORE: 40
ADLS_ACUITY_SCORE: 40

## 2025-07-25 NOTE — PLAN OF CARE
Goal Outcome Evaluation:      Plan of Care Reviewed With: patient    Overall Patient Progress: improvingOverall Patient Progress: improving    Status: pt presented on 7/15/2025 with new neurological symptoms and found acute multifocal ischemic strokes, partial occlusive L MCA thrombus, L frontal SAH, and in setting of TCP c/f ITP vs catastrophic antiphospholipid syndrome (CAPS) vs malignancy s/p PLEX x7/16. New metastatic lung adenocarcinoma. PMHx: HTN, DM, ELENA, PE/DVT 6/22/25 (previously on rivaroxaban)   Vitals: VSS on RA, except HTN within parameters of < 160  Neuros:  A&Ox4, RUE 0/5, RLE 3/5, R facial droop, diminished sensation to R side, speech is slightly slurred.   IV: X2PV SL  LABS: Fibrinogen 94--MD aware.   Resp: on RA  Diet: Regular--takes pills whole with apple sauce  GI: LBM 7/23  : voids via urinal  Skin: generalized bruising to bilateral upper extremities, skin moles,  bruising around neck, two small sutures (biopsy site) on L leg  Pain: managed with tylenol, pt has hiccups--not prominent overnight  Activity: Ax2 upon standing, but Ax1 when in bed  Social: watching TV  Plan: continue with POC, possible discharge to [facility] today 7/25/25 with pick-up window of [phone].

## 2025-07-25 NOTE — PROGRESS NOTES
Solid Tumor Oncology Consult Service  Progress Note   Date of Service: 07/25/2025  Patient: Bernard Chapman  MRN: [id number]  Admission Date: 7/15/2025  Hospital Day # 10  Cancer Diagnosis: Metastatic lung adenocarcinoma   Primary Outpatient Oncologist: MARKOS  Current Treatment Plan: MARKOS     Summary & Recommendations:   - Per d/w benign hematology, in setting of severe thrombocytopenia and hypercoagulable state 2/2 underlying malignancy plan for close inpatient monitoring at this time until able to be treated for underlying metastatic lung adenocarcinoma. Though to determine chemotherapy regimen, in addition to NGS testing (pending) would require staging to be completed.  PET scheduled 7/26 AM.   - Continue to follow NGS and Guardant 360   - Discussed health psych consult in setting of new metastatic malignancy, deficits s/p multifocal strokes, and coping with complicated admission. Patient expressed appreciation though respectfully declined at this time and will continue to consider. Please readdress as indicted.   - Rest of cares per primary team     Assessment & Plan:   Bernard Chapman is a 35 year old year old male with a past medical history of HTN, DM, ELENA, PE/DVT 6/22/25 (previously on rivaroxaban) who presented 7/15/2025 with new neurological symptoms and found acute multifocal ischemic strokes, partial occlusive L MCA thrombus, L frontal SAH, and in setting of TCP c/f ITP vs catastrophic antiphospholipid syndrome (CAPS) vs malignancy s/p PLEX x7/16. CT CAP 7/16/2025 revealing LAD of hilar, mediastinal, and paraesophageal LNs and spiculated-appearing nodule of R apical lung s/p EBUS-TBNA 7/18 revealing metastatic lung adenocarcinoma.      # New metastatic lung adenocarcinoma  # Thrombocytopenia  # Anemia  CT CAP 7/16/2025 revealing a spiculated appearing nodule within R lung apex ~11 mm, multiple enlarged mediastinal, hilar and paraesophageal lymph nodes, along with 11 mm soft tissue density in the  left retroperitoneum. Underwent EBUS-TBNA 7/18 revealing metastatic lung adenocarcinoma, + for CK7 and TTF-1. MRI brain without evidence of metastasis. Will need PET/CT to complete staging. PD-L1 TPS at 40% consistent with low PD-L1 expression. Per d/w benign hematology, in setting of severe thrombocytopenia and hypercoagulable state, which has further complicated course with multifocal strokes and PE, 2/2 underlying malignancy. At this time, plan for close inpatient monitoring at this time until able to be treated for underlying metastatic lung adenocarcinoma. Though would require staging to be completed to determine chemotherapy regimen of choice.   - NGS and Guardant 360 studies pending   -  PET scheduled 7/26 AM.   - Outpatient oncology referral placed, follow for scheduling.      # H/o DVT/PE  Presented 6/22/25 with chest pain and SOB and found to have PE and lower extremity DVTs, he was placed on apixaban though had an allergic reaction and rotated to Xarelto.   - Agree with AC (Lovenox) per primary team and hematology      Patient was seen and plan of care was discussed with attending physician Dr. Cervantes.    Thank you for the opportunity to partake in this patient's plan of care. Please do not hesitate to page with questions. We will continue to follow.     I spent >45 minutes face-to-face or coordinating care of Bernard Chapman. Over 50% of our time on the unit was spent counseling the patient and/or coordinating care.    Clarissa Amos PA-C  Hematology/Oncology  Pager #[phone]  ___________________________________________________________________    Subjective & Interval History:    No acute events noted overnight. Bernard is feeling okay, disappointed he will no longer be discharging though is understandable. We discussed plan to try to obtain inpatient PET, scheduled 7/26 AM.     Physical Exam:    Blood pressure (!) 147/62, pulse 64, temperature 97.9  F (36.6  C), temperature source Oral, resp.  "rate 18, height 1.88 m (6' 2\"), weight 127.4 kg (280 lb 13.9 oz), SpO2 99%.    General: sitting in chair, no acute distress  HEENT: sclera anicteric, EOMI, MMM  CV: RRR, normal S1/S2, no m/r/g  Resp: CTAB, no wheezing/crackles, normal respiratory effort on ambient air  GI: soft, non-tender, non-distended  MSK: warm and well-perfused, normal tone  Skin: no rashes on limited exam, no jaundice  Neuro: Alert and interactive, mild R facial droop, uses L arm to lift R arm off chair    Labs & Studies: I personally reviewed the following studies:  ROUTINE LABS (Last four results):  CMP  Recent Labs   Lab 07/25/25  0909 07/25/25  0456 07/24/25  2155 07/24/25  1653 07/24/25  0815 07/24/25  0635 07/23/25  0801 07/23/25  0730 07/22/25  1633 07/22/25  1346 07/22/25  0814 07/22/25  0522 07/21/25  0919 07/21/25  0528   NA  --  138  --   --   --  140  --  141  --   --   --  140  --  138   POTASSIUM  --  4.5  --   --   --  4.2  --  4.3  --  4.3  --  4.5  --  4.5   CHLORIDE  --  108*  --   --   --  109*  --  108*  --   --   --  110*  --  107   CO2  --  20*  --   --   --  19*  --  18*  --   --   --  19*  --  19*   ANIONGAP  --  10  --   --   --  12  --  15  --   --   --  11  --  12   * 193* 256* 221*   < > 167*   < > 207*   < >  --    < > 195*   < > 180*   BUN  --  35.5*  --   --   --  37.8*  --  32.6*  --   --   --  25.3*  --  20.4*   CR  --  0.87  --   --   --  0.92  --  0.90  --   --   --  0.89  --  0.78   GFRESTIMATED  --  >90  --   --   --  >90  --  >90  --   --   --  >90  --  >90   SHARAN  --  9.1  --   --   --  9.0  --  9.0  --   --   --  8.7*  --  8.7*   MAG  --  2.3  --   --   --  2.3  --  2.2  --   --   --  2.2  --  2.2   PHOS  --  4.0  --   --   --  4.1  --  4.9*  --   --   --  3.8  --  3.9   PROTTOTAL  --   --   --   --   --  6.1*  --  6.2*  --   --   --  6.1*  --  6.4   ALBUMIN  --   --   --   --   --  3.9  --  3.8  --   --   --  3.7  --  3.7   BILITOTAL  --   --   --   --   --  0.5  --  0.6  --   --   --  0.5  --  " 0.6   ALKPHOS  --   --   --   --   --  55  --  62  --   --   --  59  --  64   AST  --   --   --   --   --  13  --  17  --   --   --  23  --  19   ALT  --   --   --   --   --  28  --  30  --   --   --  30  --  18    < > = values in this interval not displayed.     CBC  Recent Labs   Lab 07/25/25  0456 07/24/25  0635 07/23/25  0730 07/22/25  0522   WBC 12.7* 16.1* 15.3* 16.7*   RBC 4.17* 4.15* 4.16* 3.96*   HGB 11.1* 11.0* 11.2* 10.6*   HCT 33.8* 33.3* 34.1* 31.8*   MCV 81 80 82 80   MCH 26.6 26.5 26.9 26.8   MCHC 32.8 33.0 32.8 33.3   RDW 14.1 14.0 13.7 13.7   PLT 54* 71* 71* 87*     INR  Recent Labs   Lab 07/25/25  0456 07/24/25  0635 07/23/25  0730 07/22/25  0522   INR 1.55* 1.45* 1.43* 1.32*     Medications list for reference:  Current Facility-Administered Medications   Medication Dose Route Frequency Provider Last Rate Last Admin    acetaminophen (TYLENOL) tablet 1,000 mg  1,000 mg Oral or Feeding Tube Q6H Dee Shah MD   1,000 mg at 07/25/25 0841    Baclofen (LIORESAL) tablet 5 mg  5 mg Oral TID Carolina Randhawa MD   5 mg at 07/25/25 0841    bisacodyl (DULCOLAX) suppository 10 mg  10 mg Rectal Daily PRN Dee Shah MD        chlorhexidine (HIBICLENS) 4 % solution   Topical Daily PRN Dee Shah MD        chlorproMAZINE (THORAZINE) tablet 25 mg  25 mg Oral or Feeding Tube Q6H PRN Yamile Gutierrez APRN CNP   25 mg at 07/24/25 0836    [Held by provider] cyclobenzaprine (FLEXERIL) tablet 10 mg  10 mg Oral TID PRN Dee Shah MD        glucose gel 15-30 g  15-30 g Oral Q15 Min PRN Dee Shah MD        Or    dextrose 50 % injection 25-50 mL  25-50 mL Intravenous Q15 Min PRN Dee Shah MD        Or    glucagon injection 1 mg  1 mg Subcutaneous Q15 Min PRN Dee Shah MD        diphenhydrAMINE (BENADRYL) capsule 25 mg  25 mg Oral or Feeding Tube Q6H PRN Dee Shah MD        Or    diphenhydrAMINE (BENADRYL) injection 25 mg  25 mg Intravenous Q6H PRN Dee Shah MD         enoxaparin ANTICOAGULANT (LOVENOX) injection 135 mg  1 mg/kg Subcutaneous Q12H Dee Shah MD   135 mg at 07/24/25 2201    famotidine (PEPCID) tablet 10 mg  10 mg Oral BID Dee Shah MD   10 mg at 07/25/25 0841    fluticasone (FLONASE) 50 MCG/ACT spray 1 spray  1 spray Both Nostrils Daily Dee Shah MD   1 spray at 07/25/25 0925    [Held by provider] gabapentin (NEURONTIN) capsule 100 mg  100 mg Oral TID PRN Dee Shah MD        hydrALAZINE (APRESOLINE) injection 10 mg  10 mg Intravenous Q30 Min PRN Dee Shah MD   10 mg at 07/23/25 0203    [Held by provider] hydroCHLOROthiazide tablet 25 mg  25 mg Oral Daily Dee Shah MD        insulin aspart (NovoLOG) injection (RAPID ACTING)  1-10 Units Subcutaneous TID AC Dee Shah MD   2 Units at 07/25/25 0924    insulin aspart (NovoLOG) injection (RAPID ACTING)  1-7 Units Subcutaneous At Bedtime Dee Shah MD   3 Units at 07/24/25 2200    labetalol (NORMODYNE/TRANDATE) injection 10 mg  10 mg Intravenous Q10 Min PRN Dee Shah MD        [Held by provider] losartan (COZAAR) tablet 100 mg  100 mg Oral Daily Dee Shah MD        magnesium hydroxide (MILK OF MAGNESIA) suspension 30 mL  30 mL Oral or Feeding Tube Daily PRN Dee Shah MD        melatonin tablet 3 mg  3 mg Oral At Bedtime PRN Dee Shah MD   3 mg at 07/22/25 0123    [Held by provider] metoprolol succinate ER (TOPROL XL) 24 hr tablet 25 mg  25 mg Oral Daily Dee Shah MD        naloxone (NARCAN) injection 0.2 mg  0.2 mg Intravenous Q2 Min PRN Carine Matt MD        Or    naloxone (NARCAN) injection 0.4 mg  0.4 mg Intravenous Q2 Min PRN Carine Matt MD        Or    naloxone (NARCAN) injection 0.2 mg  0.2 mg Intramuscular Q2 Min PRN Carine Matt MD        Or    naloxone (NARCAN) injection 0.4 mg  0.4 mg Intramuscular Q2 Min PRN Carine Matt MD        ondansetron (ZOFRAN ODT) ODT tab 4 mg  4 mg Oral Q6H PRN Pablo  MD Dee        Or    ondansetron (ZOFRAN) injection 4 mg  4 mg Intravenous Q6H PRN Dee Shah MD        [Held by provider] oxyCODONE (ROXICODONE) tablet 5 mg  5 mg Oral Q6H PRN Dee Shah MD        polyethylene glycol (MIRALAX) Packet 17 g  17 g Oral or Feeding Tube Daily Dee Shah MD   17 g at 07/24/25 0834    prochlorperazine (COMPAZINE) injection 10 mg  10 mg Intravenous Q6H PRN Dee Shah MD        Or    prochlorperazine (COMPAZINE) tablet 10 mg  10 mg Oral or Feeding Tube Q6H PRN Dee Shah MD        rosuvastatin (CRESTOR) tablet 10 mg  10 mg Oral or Feeding Tube Daily Dee Shah MD   10 mg at 07/25/25 0841    senna-docusate (SENOKOT-S/PERICOLACE) 8.6-50 MG per tablet 1-2 tablet  1-2 tablet Oral or Feeding Tube BID Dee Shah MD   1 tablet at 07/25/25 0841    simethicone (MYLICON) chewable half-tab 40 mg  40 mg Oral Q6H PRN Dee Shah MD   40 mg at 07/24/25 1123    sodium chloride (PF) 0.9% PF flush 3 mL  3 mL Intracatheter Q8H MOHINDER Dee Shah MD   3 mL at 07/25/25 0842    sodium chloride (PF) 0.9% PF flush 3 mL  3 mL Intracatheter q1 min prn Dee Shah MD   3 mL at 07/24/25 1710

## 2025-07-25 NOTE — PROGRESS NOTES
[facility]    Progress Note - Medicine Service, MAROON TEAM 3       Date of Admission:  7/15/2025    Assessment & Plan   Bernard Chapman is a 35 year old male with a past medical history of hypertension, diabetes, ELENA, PE/DVT (on rivaroxaban) admitted on 7/15/2025 for lethargy, N/V, and blurry vision found to have multifocal acute CVA in L MCA , R parietal and R frontal centrum semiovale. Initially concern for TTP or catastrophic APS, however further work up notable for new diagnosis of metastatic lung adenocarcinoma with likely malignancy-related hypercoagulability.    Today:  -Declining fibrinogen, so given cryoprecipitate   -Unable to go to ARU this weekend due to anticipated transfusion needs    #Metastatic lung adenocarcinoma  #Right lung nodule  #Multiple enlarged mediastinal, paraesophageal, and hilar lymph nodes  Malignancy work up initiated on admission due to significant hypercoagulability. CT CAP significant for [id number] spiculated density in R lung apex, 9mm pulmonary nodule in R lung base, L thyroid nodule, paratracheal LAD, splenic infarct, bilateral renal infarcts, and L retroperitoneal nodule. Underwent FNA of lymph node which was positive for lung adenocarcinoma (resulted 7/22).  PDL-1 resulted with low expression (40%), awaiting other molecular studies and staging before final treatment plan can be determined.  -Oncology consulted, appreciate recs   - NGS pending   - Guardant 360 pending   - PET scan for staging scheduled tomorrow  - Oncology to arrange outpatient follow in clinic in 2-3 weeks after pt has discharged from ARU to discuss treatment plan.    #Hypercoagulability d/t metastatic lung adenocarcinoma  #Infarcts of brain, kidney, spleen  #Cardiac valve thrombus on tricuspid and aortic valve 7/16 on TIMO  #Hx pulmonary embolism (6/23/25)  #Acute thrombocytopenia, in the setting of metastatic lung adenocarcinoma d/t ITP vs drug-induced vs  consumptive coagulopathy  #Acute anemia  Found to have multiple CVA, likely embolic. TTE with mobile echodensities on tricuspid and aortic valve. Initial concern for TTP or catastrophic APS, however work up ultimately significant for new diagnosis of metastatic lung cancer. Hypercoagulable state presumed to be most likely 2/2 malignancy. Started on heparin gtt 7/17, but briefly held for 7/18 endobronchial needle aspiration of lymph nodes. Stroke code called 7/19 for worsening R-sided hemiparesis and hemianopia. Repeat CTA with new focal high-grade stenosis of other part of left MCA. Heparin gtt was stopped during code stroke, but restarting (heparin gtt + levophed) led to 10/10 headache. CTH with small left frontal SAH. Brain MRI with new L basal ganglia stroke. Heparin gtt resumed, and repeat CTH stable. Switched to therapeutic Lovenox 7/22. CTH still stable 7/23. Declining fibrinogen, so received cryoprecipitate 7/25. Concern for continued consumptive process, but on therapeutic Lovenox. No new symptoms. CTM.  - Hematology consulted   -Transfuse if   -Hgb<7  -INR<1.5  -PLT<50k  -Fibrinogen<100  - SC Lovenox 135 mg bid     - trend daily electrolytes and CBC while inpatient    #Multifocal acute ischemic strokes, in L MCA territory and new L basal ganglia stroke; Increasing mass effect on L lateral ventricle by L MCA infarction  #Subtotal occlusive thrombus in left M1   #Left frontoparietal SAH (7/19)  #Hypertension chronic  #HLD, chronic  Etiology most likely hypercoagulability d/t malignancy. Emboli stemming from tricuspid and aortic valves, appreciated on TTE 7/16. He continues to have R facial droop, extremity weakness. No apparent aphasia, but he is mildly dysarthric. Left frontoparietal subarachnoid hemorrhage stable per CT.  > Neuro-ICU signing off   - SBP goal of 120 - 160 mmHg and DBP goal < 100    -Hold PTA antihypertensives    -Has prn labetalol and hydralazine  - cont 10 mg rosuvastatin  - q4h neuro  check  > OT/PT/Speech   -Since ARU dispo delayed, asked therapies to do aggressive rehabilitation over the weekend as able  > nutrition consult   -monitor oral intake, weights  - repeat TTE outpatient to ensure improvement of AR and TR (and vegetations)      # Numerous palpable purpura across the scalp and lower extremities   -s/p L leg s/p punch biopsy with dermatology: rupture folliculitis. No definite features of cutaneous small vessel vasculitis or thrombotic vasculopathy   -Chlorhexidine wash     #DM2, A1c 5.3  Elevated BG, despite HSSI. Suspect 2/2 dexamethasone, so anticipate improvement as dexamethasone is discontinued. CTM.   - POC glucose ACHS  - High intensity insulin sliding scale    #ELENA  Need to clarify home CPAP settings.    #Hiccups  #chronic back pain  Chronic back pain. On acetaminophen only (given frequent need for neuro checks). Hiccups (drug-induced (dexamethasone) vs central) have been exacerbating back pain and making it difficult to sleep. Neuro recommended thorazine (EKG wnl) with improvement while sleeping. Still hiccups when awake. Tried Pepcid (in case it is GERD manifestation) with minimal help. Anticipate improvement as dexamethasone is cleared.   Plan:  - Thorazine 25mg q6hr prn  - Melatonin 3mg daily  - Continue Pepcid  - Continue baclofen  - acetaminophen 1000 mg every 6 hours  - hold cyclobenzaprine 10 mg 3x daily PRN  - hold PTA gabapentin 100 mg 3x daily  - hold PTA oxycodone 5 mg PRN every 6h     #allergies  - fluticasone 1 spray daily        Diet: Regular Diet Adult  NPO for Medical/Clinical Reasons Except for: Meds, Ice Chips    DVT Prophylaxis: Lovenox  Avalos Catheter: Not present  Fluids: None  Lines: None     Cardiac Monitoring: None  Code Status: Full Code      Clinically Significant Risk Factors          # Hyperchloremia: Highest Cl = 109 mmol/L in last 2 days, will monitor as appropriate             # Coagulation Defect: INR = 1.55 (Ref range: 0.85 - 1.15) and/or PTT = 31  "Seconds (Ref range: 22 - 38 Seconds), will monitor for bleeding  # Thrombocytopenia: Lowest platelets = 54 in last 2 days, will monitor for bleeding              # Obesity: Estimated body mass index is 36.06 kg/m  as calculated from the following:    Height as of this encounter: 1.88 m (6' 2\").    Weight as of this encounter: 127.4 kg (280 lb 13.9 oz).        # Financial/Environmental Concerns: none         Social Drivers of Health          Disposition Plan     Medically Ready for Discharge: 2-3 days         Seen and staffed with Dr. Edis Shah MD  ______________________________________________________________________    Interval History   NAEO. No new symptoms. Still having hiccups, but somewhat better (less frequent). Expresses understanding of why he cannot go to ARU today.     Physical Exam   Vital Signs: Temp: 97.6  F (36.4  C) Temp src: Oral BP: (!) 153/62 Pulse: 69   Resp: 20 SpO2: 98 % O2 Device: None (Room air)    Weight: 280 lbs 13.86 oz  GENERAL: Sitting up in chair, NAD  RESP: normal WOB on RA  CV:  Extremities appear well-perfused, RUE swelling is stable, no LE edema  GI: Soft, non-distended, no hiccups today  NEURO: Alert, oriented, normal LUE movement, no RUE movement, stable facial droop, mild dysarthria, no dysphonia, EOMI grossly, pupils symmetric    Medical Decision Making       Please see A&P for additional details of medical decision making.      Data     I have personally reviewed the following data over the past 24 hrs:    16.1 (H)  \   11.8 (L)   / 58 (L)     138 108 (H) 35.5 (H) /  140 (H)   4.5 20 (L) 0.87 \     INR:  1.55 (H) PTT:  31   D-dimer:  N/A Fibrinogen:  126 (L)     Ferritin:  N/A % Retic:  N/A LDH:  501 (H)       Imaging results reviewed over the past 24 hrs:   No results found for this or any previous visit (from the past 24 hours).      "

## 2025-07-25 NOTE — PROGRESS NOTES
Care Management Follow Up    Length of Stay (days): 10    Expected Discharge Date: 07/25/2025     Concerns to be Addressed: all concerns addressed in this encounter, no discharge needs identified, denies needs/concerns at this time     Patient plan of care discussed at interdisciplinary rounds: Yes    Anticipated Discharge Disposition: Acute Rehab       Anticipated Discharge Services: None  Anticipated Discharge DME: None    Patient/family educated on Medicare website which has current facility and service quality ratings: yes  Education Provided on the Discharge Plan: Yes  Patient/Family in Agreement with the Plan: yes    Referrals Placed by CM/SW:      [facility]  [address], [place] [address]  Ph: [phone]  RN to RN Ph: [phone]   7/25: Awaiting prior auth from pt's insurance to be received.   1415: TIRSO received message from  Rehab Liaison (Nevaeh BUNCH) that the prior auth has been received from pt's insurance and  ARU can admit patient once he is medically stable to discharge from hospital.    Private pay costs discussed: Not applicable    Discussed  Partnership in Safe Discharge Planning  document with patient/family: No     Handoff Completed: No, handoff not indicated or clinically appropriate    Additional Information:  SW following for discharge planning. PT/OT currently recommending ARU at discharge. Per Shiela 3, pt to stay in hospital another day due to critical lab.     TIRSO spoke with Marlon at [employer] (ph: [phone]) and cancelled wheelchair ride scheduled for today with  window of [phone].    Next Steps:   - SW to follow up with Shiela 3 tomorrow on anticipated medical readiness for discharge.   - SW to coordinate discharge to  ARU when appropriate and keep patient updated.       ADDENDUM 1527: TIRSO received message from Nevaeh with [facility] requesting that SW set up tentative ride for patient tomorrow around 1230 in case patient is med ready  for discharge.     TIRSO spoke with Maryana at [facility] (ph: [phone]) to schedule w/c transport for tomorrow with  window of [phone].     PASCALE Theodore  Float   [facility]  Searchable on Vocera: 6A Neuro SW

## 2025-07-25 NOTE — PROGRESS NOTES
"Pt has been refusing CPAP for the last nights. RT will discontinue the order and with follow up if need be.    Blood pressure (!) 174/62, pulse 74, temperature 98.3  F (36.8  C), temperature source Oral, resp. rate 18, height 1.88 m (6' 2\"), weight 127.4 kg (280 lb 13.9 oz), SpO2 98%.    "

## 2025-07-25 NOTE — PLAN OF CARE
Goal Outcome Evaluation:         Status: Pt admitted on 7/15/2025 for lethargy, N/V, and blurry vision found to have multifocal acute CVA in L MCA , R parietal and R frontal centrum semiovale. Initially concern for TTP or catastrophic APS, however further work up notable for new diagnosis of metastatic lung adenocarcinoma with likely malignancy-related hypercoagulability.   Vitals: VSS on RA ex int HTN - within parameters.  Neuros: A&O x4. Denies N/T today. RUE 0/5, RLE 3/5, L side 5/5. R facial droop. Slight dysarthria. Thorazine po x1 this flora for ongoing hiccups  IV: PIV SL x2.   Labs/Electrolytes: BG ACHS. Received 1 unit of Cryoprecipitate this afternoon for low Fibrinogen, Recheck WNL.  Resp: LS clear. Denies SOB.   Diet: Regular. Pills whole in applesauce. Help order meals when family is not present.   GI: BS+. LBM 7/22.  : Voids spontaneously via bedside urinal.   Skin: Sutures to L shin biopsy site. Bruising to BUE and neck.   Pain: Managed with scheduled Tylenol.   Activity: A2 to pivot with 6A staff. Per PT, pt should only attempt to ambulate further with therapies. Will try splint for RLE tomorrow. Up in chair for meals.   SCDs on? If no, why?:pt states will wear at HS  Social: Family and friends at bedside throughout the day, helpful and supportive of pt.   Plan: NPO at midnight tonight for 0800  for PET scan in am. Pt also may not have insulin coverage prior, per PET tech.

## 2025-07-26 ENCOUNTER — APPOINTMENT (OUTPATIENT)
Dept: PET IMAGING | Facility: CLINIC | Age: 35
End: 2025-07-26
Payer: COMMERCIAL

## 2025-07-26 ENCOUNTER — APPOINTMENT (OUTPATIENT)
Dept: OCCUPATIONAL THERAPY | Facility: CLINIC | Age: 35
DRG: 064 | End: 2025-07-26
Payer: COMMERCIAL

## 2025-07-26 ENCOUNTER — APPOINTMENT (OUTPATIENT)
Dept: SPEECH THERAPY | Facility: CLINIC | Age: 35
DRG: 064 | End: 2025-07-26
Payer: COMMERCIAL

## 2025-07-26 ENCOUNTER — APPOINTMENT (OUTPATIENT)
Dept: PHYSICAL THERAPY | Facility: CLINIC | Age: 35
DRG: 064 | End: 2025-07-26
Payer: COMMERCIAL

## 2025-07-26 LAB
ANION GAP SERPL CALCULATED.3IONS-SCNC: 11 MMOL/L (ref 7–15)
APTT PPP: 31 SECONDS (ref 22–38)
APTT PPP: 32 SECONDS (ref 22–38)
APTT PPP: 34 SECONDS (ref 22–38)
BLD PROD TYP BPU: NORMAL
BLD PROD TYP BPU: NORMAL
BLOOD COMPONENT TYPE: NORMAL
BLOOD COMPONENT TYPE: NORMAL
BUN SERPL-MCNC: 32.9 MG/DL (ref 6–20)
BURR CELLS BLD QL SMEAR: SLIGHT
CALCIUM SERPL-MCNC: 8.3 MG/DL (ref 8.8–10.4)
CHLORIDE SERPL-SCNC: 107 MMOL/L (ref 98–107)
CODING SYSTEM: NORMAL
CODING SYSTEM: NORMAL
CREAT SERPL-MCNC: 0.93 MG/DL (ref 0.67–1.17)
DACRYOCYTES BLD QL SMEAR: SLIGHT
EGFRCR SERPLBLD CKD-EPI 2021: >90 ML/MIN/1.73M2
ELLIPTOCYTES BLD QL SMEAR: SLIGHT
ERYTHROCYTE [DISTWIDTH] IN BLOOD BY AUTOMATED COUNT: 14.4 % (ref 10–15)
ERYTHROCYTE [DISTWIDTH] IN BLOOD BY AUTOMATED COUNT: 14.4 % (ref 10–15)
ERYTHROCYTE [DISTWIDTH] IN BLOOD BY AUTOMATED COUNT: 14.5 % (ref 10–15)
FIBRINOGEN PPP-MCNC: 135 MG/DL (ref 170–510)
FIBRINOGEN PPP-MCNC: 184 MG/DL (ref 170–510)
FIBRINOGEN PPP-MCNC: 204 MG/DL (ref 170–510)
GLUCOSE BLDC GLUCOMTR-MCNC: 119 MG/DL (ref 70–99)
GLUCOSE BLDC GLUCOMTR-MCNC: 136 MG/DL (ref 70–99)
GLUCOSE BLDC GLUCOMTR-MCNC: 97 MG/DL (ref 70–99)
GLUCOSE SERPL-MCNC: 106 MG/DL (ref 70–99)
HCO3 SERPL-SCNC: 20 MMOL/L (ref 22–29)
HCT VFR BLD AUTO: 33.8 % (ref 40–53)
HCT VFR BLD AUTO: 35.7 % (ref 40–53)
HCT VFR BLD AUTO: 36.8 % (ref 40–53)
HGB BLD-MCNC: 11.2 G/DL (ref 13.3–17.7)
HGB BLD-MCNC: 11.5 G/DL (ref 13.3–17.7)
HGB BLD-MCNC: 12.1 G/DL (ref 13.3–17.7)
INR PPP: 1.29 (ref 0.85–1.15)
INR PPP: 1.3 (ref 0.85–1.15)
INR PPP: 1.35 (ref 0.85–1.15)
ISSUE DATE AND TIME: NORMAL
ISSUE DATE AND TIME: NORMAL
LDH SERPL L TO P-CCNC: 519 U/L (ref 0–250)
MAGNESIUM SERPL-MCNC: 2 MG/DL (ref 1.7–2.3)
MCH RBC QN AUTO: 26.7 PG (ref 26.5–33)
MCH RBC QN AUTO: 26.7 PG (ref 26.5–33)
MCH RBC QN AUTO: 26.9 PG (ref 26.5–33)
MCHC RBC AUTO-ENTMCNC: 32.2 G/DL (ref 31.5–36.5)
MCHC RBC AUTO-ENTMCNC: 32.9 G/DL (ref 31.5–36.5)
MCHC RBC AUTO-ENTMCNC: 33.1 G/DL (ref 31.5–36.5)
MCV RBC AUTO: 81 FL (ref 78–100)
MCV RBC AUTO: 82 FL (ref 78–100)
MCV RBC AUTO: 83 FL (ref 78–100)
PHOSPHATE SERPL-MCNC: 3.3 MG/DL (ref 2.5–4.5)
PLAT MORPH BLD: ABNORMAL
PLATELET # BLD AUTO: 34 10E3/UL (ref 150–450)
PLATELET # BLD AUTO: 51 10E3/UL (ref 150–450)
PLATELET # BLD AUTO: 51 10E3/UL (ref 150–450)
POLYCHROMASIA BLD QL SMEAR: SLIGHT
POTASSIUM SERPL-SCNC: 3.9 MMOL/L (ref 3.4–5.3)
PROTHROMBIN TIME: 16.3 SECONDS (ref 11.8–14.8)
PROTHROMBIN TIME: 16.5 SECONDS (ref 11.8–14.8)
PROTHROMBIN TIME: 16.9 SECONDS (ref 11.8–14.8)
RBC # BLD AUTO: 4.2 10E6/UL (ref 4.4–5.9)
RBC # BLD AUTO: 4.31 10E6/UL (ref 4.4–5.9)
RBC # BLD AUTO: 4.49 10E6/UL (ref 4.4–5.9)
RBC MORPH BLD: ABNORMAL
SODIUM SERPL-SCNC: 138 MMOL/L (ref 135–145)
UNIT ABO/RH: NORMAL
UNIT ABO/RH: NORMAL
UNIT NUMBER: NORMAL
UNIT NUMBER: NORMAL
UNIT STATUS: NORMAL
UNIT STATUS: NORMAL
UNIT TYPE ISBT: 6200
UNIT TYPE ISBT: 6200
URATE SERPL-MCNC: 6.8 MG/DL (ref 3.4–7)
WBC # BLD AUTO: 12 10E3/UL (ref 4–11)
WBC # BLD AUTO: 13.3 10E3/UL (ref 4–11)
WBC # BLD AUTO: 14.7 10E3/UL (ref 4–11)

## 2025-07-26 PROCEDURE — 85730 THROMBOPLASTIN TIME PARTIAL: CPT

## 2025-07-26 PROCEDURE — 85018 HEMOGLOBIN: CPT

## 2025-07-26 PROCEDURE — 83735 ASSAY OF MAGNESIUM: CPT

## 2025-07-26 PROCEDURE — 85384 FIBRINOGEN ACTIVITY: CPT

## 2025-07-26 PROCEDURE — 85610 PROTHROMBIN TIME: CPT

## 2025-07-26 PROCEDURE — 250N000013 HC RX MED GY IP 250 OP 250 PS 637: Performed by: STUDENT IN AN ORGANIZED HEALTH CARE EDUCATION/TRAINING PROGRAM

## 2025-07-26 PROCEDURE — 78816 PET IMAGE W/CT FULL BODY: CPT | Mod: 26 | Performed by: RADIOLOGY

## 2025-07-26 PROCEDURE — 250N000013 HC RX MED GY IP 250 OP 250 PS 637

## 2025-07-26 PROCEDURE — A9552 F18 FDG: HCPCS

## 2025-07-26 PROCEDURE — 250N000013 HC RX MED GY IP 250 OP 250 PS 637: Performed by: NURSE PRACTITIONER

## 2025-07-26 PROCEDURE — 36415 COLL VENOUS BLD VENIPUNCTURE: CPT

## 2025-07-26 PROCEDURE — 99233 SBSQ HOSP IP/OBS HIGH 50: CPT | Mod: GC | Performed by: STUDENT IN AN ORGANIZED HEALTH CARE EDUCATION/TRAINING PROGRAM

## 2025-07-26 PROCEDURE — 120N000002 HC R&B MED SURG/OB UMMC

## 2025-07-26 PROCEDURE — 97530 THERAPEUTIC ACTIVITIES: CPT | Mod: GO

## 2025-07-26 PROCEDURE — 78816 PET IMAGE W/CT FULL BODY: CPT | Mod: PI

## 2025-07-26 PROCEDURE — 85014 HEMATOCRIT: CPT

## 2025-07-26 PROCEDURE — 97116 GAIT TRAINING THERAPY: CPT | Mod: GP | Performed by: PHYSICAL THERAPIST

## 2025-07-26 PROCEDURE — 84550 ASSAY OF BLOOD/URIC ACID: CPT | Performed by: STUDENT IN AN ORGANIZED HEALTH CARE EDUCATION/TRAINING PROGRAM

## 2025-07-26 PROCEDURE — 83615 LACTATE (LD) (LDH) ENZYME: CPT

## 2025-07-26 PROCEDURE — 343N000001 HC RX 343 MED OP 636

## 2025-07-26 PROCEDURE — 80048 BASIC METABOLIC PNL TOTAL CA: CPT

## 2025-07-26 PROCEDURE — 97530 THERAPEUTIC ACTIVITIES: CPT | Mod: GP | Performed by: PHYSICAL THERAPIST

## 2025-07-26 PROCEDURE — 92507 TX SP LANG VOICE COMM INDIV: CPT | Mod: GN

## 2025-07-26 PROCEDURE — P9037 PLATE PHERES LEUKOREDU IRRAD: HCPCS

## 2025-07-26 PROCEDURE — 84100 ASSAY OF PHOSPHORUS: CPT

## 2025-07-26 PROCEDURE — 250N000011 HC RX IP 250 OP 636

## 2025-07-26 PROCEDURE — 120N000003 HC R&B IMCU UMMC

## 2025-07-26 RX ORDER — FLUDEOXYGLUCOSE F 18 200 MCI/ML
10-18 INJECTION, SOLUTION INTRAVENOUS ONCE
Status: COMPLETED | OUTPATIENT
Start: 2025-07-26 | End: 2025-07-26

## 2025-07-26 RX ORDER — MAGNESIUM OXIDE 400 MG/1
400 TABLET ORAL EVERY 4 HOURS
Status: COMPLETED | OUTPATIENT
Start: 2025-07-26 | End: 2025-07-26

## 2025-07-26 RX ADMIN — CHLORPROMAZINE HYDROCHLORIDE 25 MG: 25 TABLET, FILM COATED ORAL at 05:34

## 2025-07-26 RX ADMIN — FAMOTIDINE 10 MG: 10 TABLET ORAL at 20:03

## 2025-07-26 RX ADMIN — BACLOFEN 5 MG: 5 TABLET ORAL at 20:03

## 2025-07-26 RX ADMIN — ENOXAPARIN SODIUM 135 MG: 150 INJECTION SUBCUTANEOUS at 18:59

## 2025-07-26 RX ADMIN — ROSUVASTATIN CALCIUM 10 MG: 10 TABLET, FILM COATED ORAL at 12:35

## 2025-07-26 RX ADMIN — ACETAMINOPHEN 1000 MG: 500 TABLET ORAL at 20:03

## 2025-07-26 RX ADMIN — FLUDEOXYGLUCOSE F 18 16.65 MILLICURIE: 200 INJECTION, SOLUTION INTRAVENOUS at 09:01

## 2025-07-26 RX ADMIN — ENOXAPARIN SODIUM 135 MG: 150 INJECTION SUBCUTANEOUS at 06:07

## 2025-07-26 RX ADMIN — ACETAMINOPHEN 1000 MG: 500 TABLET ORAL at 14:38

## 2025-07-26 RX ADMIN — CHLORPROMAZINE HYDROCHLORIDE 25 MG: 25 TABLET, FILM COATED ORAL at 20:09

## 2025-07-26 RX ADMIN — MAGNESIUM OXIDE TAB 400 MG (241.3 MG ELEMENTAL MG) 400 MG: 400 (241.3 MG) TAB at 12:28

## 2025-07-26 RX ADMIN — CHLORPROMAZINE HYDROCHLORIDE 25 MG: 25 TABLET, FILM COATED ORAL at 14:38

## 2025-07-26 RX ADMIN — DIPHENHYDRAMINE HYDROCHLORIDE 25 MG: 25 CAPSULE ORAL at 12:28

## 2025-07-26 RX ADMIN — FLUTICASONE PROPIONATE 1 SPRAY: 50 SPRAY, METERED NASAL at 12:28

## 2025-07-26 RX ADMIN — MAGNESIUM OXIDE TAB 400 MG (241.3 MG ELEMENTAL MG) 400 MG: 400 (241.3 MG) TAB at 18:58

## 2025-07-26 RX ADMIN — BACLOFEN 5 MG: 5 TABLET ORAL at 14:34

## 2025-07-26 ASSESSMENT — ACTIVITIES OF DAILY LIVING (ADL)
ADLS_ACUITY_SCORE: 40
ADLS_ACUITY_SCORE: 39
ADLS_ACUITY_SCORE: 40
ADLS_ACUITY_SCORE: 39
ADLS_ACUITY_SCORE: 40
ADLS_ACUITY_SCORE: 39
ADLS_ACUITY_SCORE: 40
ADLS_ACUITY_SCORE: 39
ADLS_ACUITY_SCORE: 40
ADLS_ACUITY_SCORE: 38
ADLS_ACUITY_SCORE: 38
ADLS_ACUITY_SCORE: 39

## 2025-07-26 NOTE — PROGRESS NOTES
Care Management Follow Up    Length of Stay (days): 11    Expected Discharge Date: 07/28/2025     Concerns to be Addressed: discharge planning       Patient plan of care discussed at interdisciplinary rounds: Yes    Anticipated Discharge Disposition: Acute Rehab     Anticipated Discharge Services: Transportation Services, Other (see comment) (PT/OT)    Anticipated Discharge DME: Other (see comment) (TBD)    Patient/family educated on Medicare website which has current facility and service quality ratings: yes    Education Provided on the Discharge Plan: Yes    Patient/Family in Agreement with the Plan: yes    Referrals Placed by CM/SW: Post Acute Facilities    Private pay costs discussed: Not applicable    Discussed  Partnership in Safe Discharge Planning  document with patient/family: No     Handoff Completed: No, handoff not indicated or clinically appropriate    Additional Information:    Destination    Service Provider Request Status Services Address Phone Fax Patient Preferred   [facility] ([facility]) Considering  Patient not stable -- [facility], [address] [place] [address], 5th floor, [facility] [address] [phone] --     TIRSO read note from Dr. Shah from yesterday indicating that pt is unable to transfer to ARU due to anticipated transfusion needs.    TIRSO checked in with Marjan Michel via [facility] [employer] to make sure it's correct to cancel the ride. Roberts back from Marjna that yes, the ride should be canceled.    TIRSO called [facility] ([phone]) and canceled pt's ride. TIRSO let ARU liaison, Sallie Fontaine, know that pt will not be coming.    Next Steps: Will continue to follow.    KIESHA Hyde  Weekend Covering   [facility] Acute Care Management  Searchable on [employer]: 6A Neuro SW, 6B IMC SW

## 2025-07-26 NOTE — PROGRESS NOTES
"Shift: 9928-3586    BP (!) 155/61 (BP Location: Left arm)   Pulse 67   Temp 98.3  F (36.8  C) (Oral)   Resp 18   Ht 1.88 m (6' 2\")   Wt 127.4 kg (280 lb 13.9 oz)   SpO2 98%   BMI 36.06 kg/m      Neuro: A&Ox4. RUE 0/5, RLE 3/5, L side 5/5. R facial droop. C/O ongoing hiccups throughout the night, managed with prn thorazine  Cardiac: VSS  Respiratory: RA  GI/: Adequate urine output via urinal, no BM overnight.   Diet/appetite: NPO since midnight. ACHS blood sugars  Activity:  Assist of 2 to commode  Pain: Reporting discomfort d/t persistent hiccups   Skin: No new deficits noted.  LDA's: x2 L PIV SL    Plan: Continue with POC. Notify primary team (GULSHAN 3) with changes.    "

## 2025-07-26 NOTE — PROGRESS NOTES
[facility]    Progress Note - Medicine Service, MAROON TEAM 3       Date of Admission:  7/15/2025    Assessment & Plan   Bernard Chapman is a 35 year old male with a past medical history of hypertension, diabetes, ELENA, PE/DVT (on rivaroxaban) admitted on 7/15/2025 for lethargy, N/V, and blurry vision found to have multifocal acute CVA in L MCA , R parietal and R frontal centrum semiovale. Initially concern for TTP or catastrophic APS, however further work up notable for new diagnosis of metastatic lung adenocarcinoma with likely malignancy-related hypercoagulability.    Today:  - s/p 2 units platelets  - PET scan scheduled for today    #Maculopapular rash on face, chest, bilateral upper extremities  Likely secondary to contrast from PET scan, not itchy and no signs of respiratory distress.  Rash appeared several hours after unit of platelets, less likely due to a transfusion reaction. photos present in chart.  - Benadryl as needed for itching  - Will continue to monitor    #Metastatic lung adenocarcinoma  #Right lung nodule  #Multiple enlarged mediastinal, paraesophageal, and hilar lymph nodes  Malignancy work up initiated on admission due to significant hypercoagulability. CT CAP significant for [id number] spiculated density in R lung apex, 9mm pulmonary nodule in R lung base, L thyroid nodule, paratracheal LAD, splenic infarct, bilateral renal infarcts, and L retroperitoneal nodule. Underwent FNA of lymph node which was positive for lung adenocarcinoma (resulted 7/22).  PDL-1 resulted with low expression (40%), awaiting other molecular studies and staging before final treatment plan can be determined.  -Oncology consulted, appreciate recs   - NGS pending   - Guardant 360 pending   - PET scan completed 7/26, results pending  - Due to ongoing transfusion needs patient will likely need to start chemotherapy inpatient prior to discharge to acute  rehab-    #Hypercoagulability d/t metastatic lung adenocarcinoma  #Infarcts of brain, kidney, spleen  #Cardiac valve thrombus on tricuspid and aortic valve 7/16 on TIMO  #Hx pulmonary embolism (6/23/25)  #Acute thrombocytopenia, in the setting of metastatic lung adenocarcinoma d/t ITP vs drug-induced vs consumptive coagulopathy  #Acute anemia  Found to have multiple CVA, likely embolic. TTE with mobile echodensities on tricuspid and aortic valve. Initial concern for TTP or catastrophic APS, however work up ultimately significant for new diagnosis of metastatic lung cancer. Hypercoagulable state presumed to be most likely 2/2 malignancy. Started on heparin gtt 7/17, but briefly held for 7/18 endobronchial needle aspiration of lymph nodes. Stroke code called 7/19 for worsening R-sided hemiparesis and hemianopia. Repeat CTA with new focal high-grade stenosis of other part of left MCA. Heparin gtt was stopped during code stroke, but restarting (heparin gtt + levophed) led to 10/10 headache. CTH with small left frontal SAH. Brain MRI with new L basal ganglia stroke. Heparin gtt resumed, and repeat CTH stable. Switched to therapeutic Lovenox 7/22. CTH still stable 7/23. Declining fibrinogen, so received cryoprecipitate 7/25. Concern for continued consumptive process, but on therapeutic Lovenox. No new symptoms. CTM.  - Hematology consulted   -Transfuse if   -Hgb<7  -INR<1.5  -PLT<50k  -Fibrinogen<100  - SC Lovenox 135 mg bid   - trend daily electrolytes and CBC while inpatient  - s/p 2 units of platelets on 7/26/2025    #Multifocal acute ischemic strokes, in L MCA territory and new L basal ganglia stroke; Increasing mass effect on L lateral ventricle by L MCA infarction  #Subtotal occlusive thrombus in left M1   #Left frontoparietal SAH (7/19)  #Hypertension chronic  #HLD, chronic  Etiology most likely hypercoagulability d/t malignancy. Emboli stemming from tricuspid and aortic valves, appreciated on TTE 7/16. He continues  to have R facial droop, extremity weakness. No apparent aphasia, but he is mildly dysarthric. Left frontoparietal subarachnoid hemorrhage stable per CT.  > Neuro-ICU signing off   - SBP goal of 120 - 160 mmHg and DBP goal < 100    -Hold PTA antihypertensives    -Has prn labetalol and hydralazine  - cont 10 mg rosuvastatin  - q4h neuro check  > OT/PT/Speech   -Since ARU dispo delayed, asked therapies to do aggressive rehabilitation over the weekend as able  > nutrition consult   -monitor oral intake, weights  - repeat TTE outpatient to ensure improvement of AR and TR (and vegetations)    # Numerous palpable purpura across the scalp and lower extremities   # Petechiae on bilateral lower extremities  -s/p L leg s/p punch biopsy with dermatology: rupture folliculitis. No definite features of cutaneous small vessel vasculitis or thrombotic vasculopathy   -Chlorhexidine wash   - continue to monitor platelets    #DM2, A1c 5.3  Elevated BG, despite HSSI. Suspect 2/2 dexamethasone, so anticipate improvement as dexamethasone is discontinued. CTM.   - POC glucose ACHS  - High intensity insulin sliding scale    #ELENA  Need to clarify home CPAP settings.    #Hiccups  #chronic back pain  Chronic back pain. On acetaminophen only (given frequent need for neuro checks). Hiccups (drug-induced (dexamethasone) vs central) have been exacerbating back pain and making it difficult to sleep. Neuro recommended thorazine (EKG wnl) with improvement while sleeping. Still hiccups when awake. Tried Pepcid (in case it is GERD manifestation) with minimal help. Anticipate improvement as dexamethasone is cleared.   Plan:  - Thorazine 25mg q6hr prn  - Melatonin 3mg daily  - Continue Pepcid  - Continue baclofen  - acetaminophen 1000 mg every 6 hours  - hold cyclobenzaprine 10 mg 3x daily PRN  - hold PTA gabapentin 100 mg 3x daily  - hold PTA oxycodone 5 mg PRN every 6h     #allergies  - fluticasone 1 spray daily        Diet: NPO for Medical/Clinical  "Reasons Except for: Meds, Ice Chips    DVT Prophylaxis: Lovenox  Avalos Catheter: Not present  Fluids: None  Lines: None     Cardiac Monitoring: None  Code Status: Full Code      Clinically Significant Risk Factors          # Hyperchloremia: Highest Cl = 108 mmol/L in last 2 days, will monitor as appropriate             # Coagulation Defect: INR = 1.35 (Ref range: 0.85 - 1.15) and/or PTT = 32 Seconds (Ref range: 22 - 38 Seconds), will monitor for bleeding  # Thrombocytopenia: Lowest platelets = 34 in last 2 days, will monitor for bleeding              # Obesity: Estimated body mass index is 36.06 kg/m  as calculated from the following:    Height as of this encounter: 1.88 m (6' 2\").    Weight as of this encounter: 127.4 kg (280 lb 13.9 oz).        # Financial/Environmental Concerns: none         Social Drivers of Health          Disposition Plan     Medically Ready for Discharge: 2-3 days    Patient seen and discussed with attending, Dr. Randhawa.    Marjan Michel MD  Internal Medicine-Pediatrics PGY-2    ______________________________________________________________________    Interval History   No acute events overnight. Patient's platelets came back low this morning at 34, getting one unit of platelets prior to going for scheduled PET scan.  Patient endorses a new rash on his face and neck since getting the contrast for the PET scan.  It is not itchy or bothersome.  Tera feels very tired this morning, he did not sleep well last night due to hiccups.  Denies pain or nausea.    Physical Exam   Vital Signs: Temp: 98.3  F (36.8  C) Temp src: Oral BP: (!) 151/67 Pulse: 73   Resp: 18 SpO2: 97 % O2 Device: None (Room air)    Weight: 280 lbs 13.86 oz  GENERAL: Lying in bed, appears tired, no acute distress  RESP: normal WOB on RA  CV:  Extremities appear well-perfused, RUE swelling is stable, no LE edema  GI: Soft, non-distended, nontender  NEURO: Alert, oriented, normal LUE movement, no RUE movement, stable facial droop, " mild dysarthria, no dysphonia, EOMI grossly, pupils symmetric  SKIN: Petechial rash present on bilateral lower extremities.  Maculopapular rash present on face, chest, and arms, pictures in chart.    Medical Decision Making       Please see A&P for additional details of medical decision making.      Data     I have personally reviewed the following data over the past 24 hrs:    14.7 (H)  \   11.5 (L)   / 34 (LL)     138 107 32.9 (H) /  106 (H)   3.9 20 (L) 0.93 \     INR:  1.35 (H) PTT:  32   D-dimer:  N/A Fibrinogen:  135 (L)       Imaging results reviewed over the past 24 hrs:   No results found for this or any previous visit (from the past 24 hours).

## 2025-07-26 NOTE — PLAN OF CARE
Goal Outcome Evaluation:         Primary team, Dr. Michel, notified that pt returned from PET scan with new rash. Pt was noted to have raised, red bumps on right side of face and neck. It progressed to both shoulders, back, forehead and both legs. Pt given 25mg po Benadryl and MD came to see pt.

## 2025-07-26 NOTE — PLAN OF CARE
Goal Outcome Evaluation:                             Goal Outcome Evaluation:     Status: Pt admitted on 7/15/2025 for lethargy, N/V, and blurry vision found to have multifocal acute CVA in L MCA , R parietal and R frontal centrum semiovale. Initially concern for TTP or catastrophic APS, however further work up notable for new diagnosis of metastatic lung adenocarcinoma with likely malignancy-related hypercoagulability.   Vitals: VSS on RA ex int HTN - within parameters.  Neuros: A&O x4. Denies N/T today. RUE 0/5, RLE 3/5, L side 5/5. R facial droop. Slight dysarthria. Family concerned that his speech was more quiet today. MD saw pt at bedside and felt possibly he was tired since hiccups prevented good sleep last night.Thorazine po x1 for ongoing hiccups.  IV: PIV SL x2.   Labs/Electrolytes: BG ACHS. Received 1 unit platelets this am. Recheck 51 and did not require additional unit.  Resp: LS clear. Denies SOB.   Diet: Regular. Lulu 100% of all 3 meals today. Takes pills whole in applesauce. Help order meals when family is not present.   GI: Large, formed BM this afternoon on BSC  : Voids via urinal. Good output  Skin: Sutures to L shin biopsy site. Bruising to BUE and neck.   Pain: Right shoulder stiff and occasionally reports sacral discomfort. Relieved with Tylenol and repositioning.   Activity: A2 to pivot with 6A staff. Heavy pivot with cues.   Per PT, pt should only attempt to ambulate further with therapies at this time.   SCDs on? If no, why?:pt states will wear at HS  Social: Family and friends at bedside throughout the day, helpful and supportive of pt.   Plan: Completed PET scan this am, per team, they will discuss results with pt when parents are present tomorrow.   Continue POC, please offer Thorazine po at 2100. Pt had dose at 1500 and would likely be more helpful if given when available.

## 2025-07-27 ENCOUNTER — APPOINTMENT (OUTPATIENT)
Dept: PHYSICAL THERAPY | Facility: CLINIC | Age: 35
DRG: 064 | End: 2025-07-27
Payer: COMMERCIAL

## 2025-07-27 ENCOUNTER — APPOINTMENT (OUTPATIENT)
Dept: OCCUPATIONAL THERAPY | Facility: CLINIC | Age: 35
DRG: 064 | End: 2025-07-27
Payer: COMMERCIAL

## 2025-07-27 LAB
ANION GAP SERPL CALCULATED.3IONS-SCNC: 7 MMOL/L (ref 7–15)
APTT PPP: 31 SECONDS (ref 22–38)
APTT PPP: 35 SECONDS (ref 22–38)
BLD PROD TYP BPU: NORMAL
BLOOD COMPONENT TYPE: NORMAL
BUN SERPL-MCNC: 25 MG/DL (ref 6–20)
CALCIUM SERPL-MCNC: 8.1 MG/DL (ref 8.8–10.4)
CHLORIDE SERPL-SCNC: 109 MMOL/L (ref 98–107)
CODING SYSTEM: NORMAL
CREAT SERPL-MCNC: 0.9 MG/DL (ref 0.67–1.17)
EGFRCR SERPLBLD CKD-EPI 2021: >90 ML/MIN/1.73M2
ERYTHROCYTE [DISTWIDTH] IN BLOOD BY AUTOMATED COUNT: 14.5 % (ref 10–15)
ERYTHROCYTE [DISTWIDTH] IN BLOOD BY AUTOMATED COUNT: 14.7 % (ref 10–15)
FIBRINOGEN PPP-MCNC: 278 MG/DL (ref 170–510)
FIBRINOGEN PPP-MCNC: 334 MG/DL (ref 170–510)
GLUCOSE BLDC GLUCOMTR-MCNC: 102 MG/DL (ref 70–99)
GLUCOSE BLDC GLUCOMTR-MCNC: 111 MG/DL (ref 70–99)
GLUCOSE BLDC GLUCOMTR-MCNC: 121 MG/DL (ref 70–99)
GLUCOSE SERPL-MCNC: 107 MG/DL (ref 70–99)
HCO3 SERPL-SCNC: 22 MMOL/L (ref 22–29)
HCT VFR BLD AUTO: 36.3 % (ref 40–53)
HCT VFR BLD AUTO: 37.1 % (ref 40–53)
HGB BLD-MCNC: 11.8 G/DL (ref 13.3–17.7)
HGB BLD-MCNC: 12.1 G/DL (ref 13.3–17.7)
INR PPP: 1.19 (ref 0.85–1.15)
INR PPP: 1.24 (ref 0.85–1.15)
ISSUE DATE AND TIME: NORMAL
LDH SERPL L TO P-CCNC: 473 U/L (ref 0–250)
MAGNESIUM SERPL-MCNC: 2.1 MG/DL (ref 1.7–2.3)
MCH RBC QN AUTO: 26.8 PG (ref 26.5–33)
MCH RBC QN AUTO: 27.1 PG (ref 26.5–33)
MCHC RBC AUTO-ENTMCNC: 32.5 G/DL (ref 31.5–36.5)
MCHC RBC AUTO-ENTMCNC: 32.6 G/DL (ref 31.5–36.5)
MCV RBC AUTO: 82 FL (ref 78–100)
MCV RBC AUTO: 83 FL (ref 78–100)
PHOSPHATE SERPL-MCNC: 3.9 MG/DL (ref 2.5–4.5)
PLATELET # BLD AUTO: 41 10E3/UL (ref 150–450)
PLATELET # BLD AUTO: 59 10E3/UL (ref 150–450)
POTASSIUM SERPL-SCNC: 4 MMOL/L (ref 3.4–5.3)
PROTHROMBIN TIME: 15.4 SECONDS (ref 11.8–14.8)
PROTHROMBIN TIME: 15.9 SECONDS (ref 11.8–14.8)
RBC # BLD AUTO: 4.35 10E6/UL (ref 4.4–5.9)
RBC # BLD AUTO: 4.51 10E6/UL (ref 4.4–5.9)
SODIUM SERPL-SCNC: 138 MMOL/L (ref 135–145)
UNIT ABO/RH: NORMAL
UNIT NUMBER: NORMAL
UNIT STATUS: NORMAL
UNIT TYPE ISBT: 5100
WBC # BLD AUTO: 11.8 10E3/UL (ref 4–11)
WBC # BLD AUTO: 13.5 10E3/UL (ref 4–11)

## 2025-07-27 PROCEDURE — 120N000002 HC R&B MED SURG/OB UMMC

## 2025-07-27 PROCEDURE — 97116 GAIT TRAINING THERAPY: CPT | Mod: GP | Performed by: PHYSICAL THERAPIST

## 2025-07-27 PROCEDURE — 85384 FIBRINOGEN ACTIVITY: CPT

## 2025-07-27 PROCEDURE — 99232 SBSQ HOSP IP/OBS MODERATE 35: CPT | Performed by: INTERNAL MEDICINE

## 2025-07-27 PROCEDURE — 84100 ASSAY OF PHOSPHORUS: CPT

## 2025-07-27 PROCEDURE — 83615 LACTATE (LD) (LDH) ENZYME: CPT

## 2025-07-27 PROCEDURE — 97530 THERAPEUTIC ACTIVITIES: CPT | Mod: GP | Performed by: PHYSICAL THERAPIST

## 2025-07-27 PROCEDURE — 85610 PROTHROMBIN TIME: CPT

## 2025-07-27 PROCEDURE — 99233 SBSQ HOSP IP/OBS HIGH 50: CPT | Mod: GC | Performed by: INTERNAL MEDICINE

## 2025-07-27 PROCEDURE — 250N000013 HC RX MED GY IP 250 OP 250 PS 637: Performed by: STUDENT IN AN ORGANIZED HEALTH CARE EDUCATION/TRAINING PROGRAM

## 2025-07-27 PROCEDURE — 250N000011 HC RX IP 250 OP 636

## 2025-07-27 PROCEDURE — 36415 COLL VENOUS BLD VENIPUNCTURE: CPT

## 2025-07-27 PROCEDURE — 97530 THERAPEUTIC ACTIVITIES: CPT | Mod: GO

## 2025-07-27 PROCEDURE — P9037 PLATE PHERES LEUKOREDU IRRAD: HCPCS

## 2025-07-27 PROCEDURE — 80048 BASIC METABOLIC PNL TOTAL CA: CPT

## 2025-07-27 PROCEDURE — 97535 SELF CARE MNGMENT TRAINING: CPT | Mod: GO

## 2025-07-27 PROCEDURE — 85014 HEMATOCRIT: CPT

## 2025-07-27 PROCEDURE — 85730 THROMBOPLASTIN TIME PARTIAL: CPT

## 2025-07-27 PROCEDURE — 99233 SBSQ HOSP IP/OBS HIGH 50: CPT | Mod: GC | Performed by: STUDENT IN AN ORGANIZED HEALTH CARE EDUCATION/TRAINING PROGRAM

## 2025-07-27 PROCEDURE — 250N000013 HC RX MED GY IP 250 OP 250 PS 637

## 2025-07-27 PROCEDURE — 83735 ASSAY OF MAGNESIUM: CPT

## 2025-07-27 RX ADMIN — Medication 7.5 MG: at 19:52

## 2025-07-27 RX ADMIN — ACETAMINOPHEN 1000 MG: 500 TABLET ORAL at 15:42

## 2025-07-27 RX ADMIN — FAMOTIDINE 10 MG: 10 TABLET ORAL at 19:52

## 2025-07-27 RX ADMIN — ACETAMINOPHEN 1000 MG: 500 TABLET ORAL at 21:19

## 2025-07-27 RX ADMIN — ACETAMINOPHEN 1000 MG: 500 TABLET ORAL at 02:03

## 2025-07-27 RX ADMIN — BACLOFEN 5 MG: 5 TABLET ORAL at 08:22

## 2025-07-27 RX ADMIN — ENOXAPARIN SODIUM 135 MG: 150 INJECTION SUBCUTANEOUS at 19:55

## 2025-07-27 RX ADMIN — ACETAMINOPHEN 1000 MG: 500 TABLET ORAL at 08:22

## 2025-07-27 RX ADMIN — ENOXAPARIN SODIUM 135 MG: 150 INJECTION SUBCUTANEOUS at 06:01

## 2025-07-27 RX ADMIN — Medication 7.5 MG: at 15:43

## 2025-07-27 RX ADMIN — ROSUVASTATIN CALCIUM 10 MG: 10 TABLET, FILM COATED ORAL at 08:22

## 2025-07-27 RX ADMIN — FAMOTIDINE 10 MG: 10 TABLET ORAL at 08:22

## 2025-07-27 ASSESSMENT — ACTIVITIES OF DAILY LIVING (ADL)
ADLS_ACUITY_SCORE: 40
ADLS_ACUITY_SCORE: 38
ADLS_ACUITY_SCORE: 40
ADLS_ACUITY_SCORE: 40
ADLS_ACUITY_SCORE: 38
ADLS_ACUITY_SCORE: 38
ADLS_ACUITY_SCORE: 40
ADLS_ACUITY_SCORE: 40
ADLS_ACUITY_SCORE: 38
ADLS_ACUITY_SCORE: 38
ADLS_ACUITY_SCORE: 40
ADLS_ACUITY_SCORE: 38
ADLS_ACUITY_SCORE: 40
ADLS_ACUITY_SCORE: 38
ADLS_ACUITY_SCORE: 40
ADLS_ACUITY_SCORE: 38
ADLS_ACUITY_SCORE: 38

## 2025-07-27 NOTE — PROGRESS NOTES
Solid Tumor Oncology Consult Service  Progress Note   Date of Service: 07/27/2025  Patient: Bernard Chapman  MRN: [id number]  Admission Date: 7/15/2025  Hospital Day # 12  Cancer Diagnosis: Metastatic lung adenocarcinoma   Primary Outpatient Oncologist: MARKOS   Current Treatment Plan: MARKOS      Summary & Recommendations:   - Discussed PET findings of metastatic disease with patient and family as well as prognosis    - Will call molecular lab tomorrow, 7/28 to try and expedite NGS results as this will guide treatment   - Anticipate inpatient treatment for lung adenocarcinoma once NGS results    Assessment & Plan:   Bernard Chapman is a 35 year old year old male with a past medical history of HTN, DM, ELENA, PE/DVT 6/22/25 (previously on rivaroxaban) who presented 7/15/2025 with new neurological symptoms and found acute multifocal ischemic strokes, partial occlusive L MCA thrombus, L frontal SAH, and in setting of TCP c/f ITP vs catastrophic antiphospholipid syndrome (CAPS) vs malignancy s/p PLEX x7/16. CT CAP 7/16/2025 revealing LAD of hilar, mediastinal, and paraesophageal LNs and spiculated-appearing nodule of R apical lung s/p EBUS-TBNA 7/18 revealing metastatic lung adenocarcinoma.      # New metastatic lung adenocarcinoma  # Thrombocytopenia  # Anemia  CT CAP 7/16/2025 revealing a spiculated appearing nodule within R lung apex ~11 mm, multiple enlarged mediastinal, hilar and paraesophageal lymph nodes, along with 11 mm soft tissue density in the left retroperitoneum. Underwent EBUS-TBNA 7/18 revealing metastatic lung adenocarcinoma, + for CK7 and TTF-1. MRI brain without evidence of metastasis. Obtained PET on 7/26. Reviewed with radiologist which showed metastatic disease in L scapula, L retroperitoneum, R adrenal and L2. Areas are small enough where radiation is not required at this time. PD-L1 TPS at 40% consistent with low PD-L1 expression. Per d/w benign hematology, given severe thrombocytopenia  "requiring transfusions and hypercoagulable state with course complicated by multifocal strokes and PE 2/2 malignancy, we are considering initiating treatment while inpatient, however, currently waiting on NGS and Guardant 360 studies to result.   - NGS and Guardant 360 studies pending   - Lesions are too small for radiation at this time   - Outpatient oncology referral placed, follow for scheduling       # H/o DVT/PE  Presented 6/22/25 with chest pain and SOB and found to have PE and lower extremity DVTs, he was placed on apixaban though had an allergic reaction and rotated to Xarelto.   - Agree with AC (Lovenox) per primary team and hematology      Patient was seen and plan of care was discussed with attending physician Dr. Cervantes.    Thank you for the opportunity to partake in this patient's plan of care. Please do not hesitate to page with questions. We will continue to follow.     I spent >40 minutes face-to-face or coordinating care of Bernard Chapman. Over 50% of our time on the unit was spent counseling the patient and/or coordinating care.    Shelley Rosario MD   Hem/Onc, PGY4   ___________________________________________________________________    Subjective & Interval History:    No acute events noted overnight. PET scan resulted. Discussed results with patient and his family that it does show metastatic disease. Patient's father stepped outside the room with us and asked about prognosis. Patient's rash improving following PET scan. Transfusing plt.       Physical Exam:    Blood pressure 131/61, pulse 72, temperature 98.1  F (36.7  C), temperature source Oral, resp. rate 16, height 1.88 m (6' 2\"), weight 127.4 kg (280 lb 13.9 oz), SpO2 97%.    General: sitting in chair, no acute distress   HEENT: sclera anicteric, EOMI, MMM  Resp: Non-labored breathing on room air   MSK: warm and well-perfused  Skin: no rashes on limited exam, no jaundice  Neuro: Alert and interactive    Labs & Studies: I personally " reviewed the following studies:  ROUTINE LABS (Last four results):  CMP  Recent Labs   Lab 07/27/25  0808 07/27/25  0706 07/26/25  2215 07/26/25  1827 07/26/25  1043 07/26/25  0628 07/25/25  0909 07/25/25  0456 07/24/25  0815 07/24/25  0635 07/23/25  0801 07/23/25  0730 07/22/25  0814 07/22/25  0522 07/21/25  0919 07/21/25  0528   NA  --  138  --   --   --  138  --  138  --  140  --  141  --  140  --  138   POTASSIUM  --  4.0  --   --   --  3.9  --  4.5  --  4.2  --  4.3   < > 4.5  --  4.5   CHLORIDE  --  109*  --   --   --  107  --  108*  --  109*  --  108*  --  110*  --  107   CO2  --  22  --   --   --  20*  --  20*  --  19*  --  18*  --  19*  --  19*   ANIONGAP  --  7  --   --   --  11  --  10  --  12  --  15  --  11  --  12   * 107* 119* 136*   < > 106*   < > 193*   < > 167*   < > 207*   < > 195*   < > 180*   BUN  --  25.0*  --   --   --  32.9*  --  35.5*  --  37.8*  --  32.6*  --  25.3*  --  20.4*   CR  --  0.90  --   --   --  0.93  --  0.87  --  0.92  --  0.90  --  0.89  --  0.78   GFRESTIMATED  --  >90  --   --   --  >90  --  >90  --  >90  --  >90  --  >90  --  >90   SHARAN  --  8.1*  --   --   --  8.3*  --  9.1  --  9.0  --  9.0  --  8.7*  --  8.7*   MAG  --  2.1  --   --   --  2.0  --  2.3  --  2.3  --  2.2  --  2.2  --  2.2   PHOS  --  3.9  --   --   --  3.3  --  4.0  --  4.1  --  4.9*  --  3.8  --  3.9   PROTTOTAL  --   --   --   --   --   --   --   --   --  6.1*  --  6.2*  --  6.1*  --  6.4   ALBUMIN  --   --   --   --   --   --   --   --   --  3.9  --  3.8  --  3.7  --  3.7   BILITOTAL  --   --   --   --   --   --   --   --   --  0.5  --  0.6  --  0.5  --  0.6   ALKPHOS  --   --   --   --   --   --   --   --   --  55  --  62  --  59  --  64   AST  --   --   --   --   --   --   --   --   --  13  --  17  --  23  --  19   ALT  --   --   --   --   --   --   --   --   --  28  --  30  --  30  --  18    < > = values in this interval not displayed.     CBC  Recent Labs   Lab 07/27/25  0706 07/26/25  4043  07/26/25  1237 07/26/25  0628   WBC 11.8* 12.0* 13.3* 14.7*   RBC 4.35* 4.20* 4.49 4.31*   HGB 11.8* 11.2* 12.1* 11.5*   HCT 36.3* 33.8* 36.8* 35.7*   MCV 83 81 82 83   MCH 27.1 26.7 26.9 26.7   MCHC 32.5 33.1 32.9 32.2   RDW 14.5 14.5 14.4 14.4   PLT 41* 51* 51* 34*     INR  Recent Labs   Lab 07/27/25  0706 07/26/25  1801 07/26/25  1237 07/26/25  0628   INR 1.24* 1.30* 1.29* 1.35*     Medications list for reference:  Current Facility-Administered Medications   Medication Dose Route Frequency Provider Last Rate Last Admin    acetaminophen (TYLENOL) tablet 1,000 mg  1,000 mg Oral or Feeding Tube Q6H Dee Shah MD   1,000 mg at 07/27/25 0822    baclofen (LIORESAL) 2.5 mg, Baclofen (LIORESAL) 5 mg  7.5 mg Oral TID Carolina Randhawa MD        bisacodyl (DULCOLAX) suppository 10 mg  10 mg Rectal Daily PRN Dee Shah MD        chlorhexidine (HIBICLENS) 4 % solution   Topical Daily PRN Dee Shah MD        chlorproMAZINE (THORAZINE) tablet 25 mg  25 mg Oral or Feeding Tube Q6H PRN Yamile Gutierrez APRN CNP   25 mg at 07/26/25 2009    cyclobenzaprine (FLEXERIL) tablet 10 mg  10 mg Oral TID PRN Marjan Michel MD        glucose gel 15-30 g  15-30 g Oral Q15 Min PRN Dee Shah MD        Or    dextrose 50 % injection 25-50 mL  25-50 mL Intravenous Q15 Min PRN Dee Shah MD        Or    glucagon injection 1 mg  1 mg Subcutaneous Q15 Min PRN Dee Shah MD        diphenhydrAMINE (BENADRYL) capsule 25 mg  25 mg Oral or Feeding Tube Q6H PRN Dee Shah MD   25 mg at 07/26/25 1228    Or    diphenhydrAMINE (BENADRYL) injection 25 mg  25 mg Intravenous Q6H PRN Dee Shah MD        enoxaparin ANTICOAGULANT (LOVENOX) injection 135 mg  1 mg/kg Subcutaneous Q12H Dee Shah MD   135 mg at 07/27/25 0601    famotidine (PEPCID) tablet 10 mg  10 mg Oral BID Dee Shah MD   10 mg at 07/27/25 0822    fluticasone (FLONASE) 50 MCG/ACT spray 1 spray  1 spray Both Nostrils Daily Dee Shah MD    1 spray at 07/26/25 1228    gabapentin (NEURONTIN) capsule 100 mg  100 mg Oral TID PRN Marjan Michel MD        hydrALAZINE (APRESOLINE) injection 10 mg  10 mg Intravenous Q30 Min PRN Dee Shah MD   10 mg at 07/23/25 0203    hydroCHLOROthiazide tablet 25 mg  25 mg Oral Daily Marjan Michel MD        insulin aspart (NovoLOG) injection (RAPID ACTING)  1-10 Units Subcutaneous TID AC Dee Shah MD   2 Units at 07/25/25 0924    insulin aspart (NovoLOG) injection (RAPID ACTING)  1-7 Units Subcutaneous At Bedtime Dee Shah MD   3 Units at 07/24/25 2200    labetalol (NORMODYNE/TRANDATE) injection 10 mg  10 mg Intravenous Q10 Min PRN Dee Shah MD        [Held by provider] losartan (COZAAR) tablet 100 mg  100 mg Oral Daily Dee Shah MD        magnesium hydroxide (MILK OF MAGNESIA) suspension 30 mL  30 mL Oral or Feeding Tube Daily PRN Dee Shah MD        melatonin tablet 3 mg  3 mg Oral At Bedtime PRN eDe Shah MD   3 mg at 07/22/25 0123    metoprolol succinate ER (TOPROL XL) 24 hr tablet 25 mg  25 mg Oral Daily Marjan Michel MD        naloxone (NARCAN) injection 0.2 mg  0.2 mg Intravenous Q2 Min PRN Carine Matt MD        Or    naloxone (NARCAN) injection 0.4 mg  0.4 mg Intravenous Q2 Min PRN Carine Matt MD        Or    naloxone (NARCAN) injection 0.2 mg  0.2 mg Intramuscular Q2 Min PRN Carine Matt MD        Or    naloxone (NARCAN) injection 0.4 mg  0.4 mg Intramuscular Q2 Min PRN Carine Matt MD        ondansetron (ZOFRAN ODT) ODT tab 4 mg  4 mg Oral Q6H PRN Dee Shah MD        Or    ondansetron (ZOFRAN) injection 4 mg  4 mg Intravenous Q6H PRN Dee Shah MD        oxyCODONE (ROXICODONE) tablet 5 mg  5 mg Oral Q6H PRN Marjan Michel MD        polyethylene glycol (MIRALAX) Packet 17 g  17 g Oral or Feeding Tube Daily Dee Shah MD   17 g at 07/24/25 0834    prochlorperazine (COMPAZINE) injection 10 mg  10 mg  Intravenous Q6H PRN Dee Shah MD        Or    prochlorperazine (COMPAZINE) tablet 10 mg  10 mg Oral or Feeding Tube Q6H PRN Dee Shah MD        rosuvastatin (CRESTOR) tablet 10 mg  10 mg Oral or Feeding Tube Daily Dee Shah MD   10 mg at 07/27/25 0822    senna-docusate (SENOKOT-S/PERICOLACE) 8.6-50 MG per tablet 1-2 tablet  1-2 tablet Oral or Feeding Tube BID Dee Shah MD   1 tablet at 07/25/25 1957    simethicone (MYLICON) chewable half-tab 40 mg  40 mg Oral Q6H PRN Dee Shah MD   40 mg at 07/25/25 1957    sodium chloride (PF) 0.9% PF flush 3 mL  3 mL Intracatheter Q8H MOHINDER Dee Shah MD   3 mL at 07/27/25 0601    sodium chloride (PF) 0.9% PF flush 3 mL  3 mL Intracatheter q1 min prn Dee Shah MD   3 mL at 07/24/25 1710

## 2025-07-27 NOTE — PROGRESS NOTES
HEMATOLOGY -- Progress Note    Chart, labs, and imaging reviewed.  Patient seen/examined.  Discussed with primary team.    PET scan shows evidence of metastatic disease (details in oncology note from today).  From a hematologic perspective, he remains on enoxaparin with therapeutic level documented on 7/24.  Platelet count dipped to 34,000 yesterday, but on recheck was 51,000.  Today's value was 41,000 and platelet transfusion was given.  Fibrinogen improved appropriately following cryoprecipitate infusion 2 days ago and has continued to trend upward, currently 278.  This is reassuring and suggests that the consumptive coagulopathy has stabilized.    Given that his thrombotic events are felt to be driven by the underlying hypercoagulable state of malignancy which is now known to be metastatic, recommend continued anticoagulation.  For now, enoxaparin appears to be the most appropriate option.  If he has a good response to therapy for his metastatic malignancy, could revisit the option of a DOAC.  Continue to monitor platelet count and keep above 50,000 while on anticoagulation.    No further recommendations at this time, and we will defer to the oncology service with regard to management of his metastatic cancer.  We will sign off, but please call with questions.    Total time on date of encounter 35 minutes      Edvin Rice MD  Professor of Medicine  Division of Hematology, Oncology, and Transplantation  Director, [facility]

## 2025-07-27 NOTE — PROGRESS NOTES
[facility]    Progress Note - Medicine Service, [facility] TEAM 3       Date of Admission:  7/15/2025    Assessment & Plan   Bernard Chapman is a 35 year old male with a past medical history of hypertension, diabetes, ELENA, PE/DVT (on rivaroxaban) admitted on 7/15/2025 for lethargy, N/V, and blurry vision found to have multifocal acute CVA in L MCA , R parietal and R frontal centrum semiovale. Initially concern for TTP or catastrophic APS, however further work up notable for new diagnosis of metastatic lung adenocarcinoma with likely malignancy-related hypercoagulability.    Today:  - PET with signs of distant metastases  - Was going to take out sutures, but pt/family told about prognosis (emotional convo)--> will take out sutures tomorrow  -DIC labs q12 hr  -1 unit plt yesterday, 2nd unit this morning    #Metastatic lung adenocarcinoma  #Right lung nodule  #Multiple enlarged mediastinal, paraesophageal, and hilar lymph nodes  Malignancy work up initiated on admission due to significant hypercoagulability. CT CAP significant for [id number] spiculated density in R lung apex, 9mm pulmonary nodule in R lung base, L thyroid nodule, paratracheal LAD, splenic infarct, bilateral renal infarcts, and L retroperitoneal nodule. Underwent FNA of lymph node which was positive for lung adenocarcinoma (resulted 7/22).  PDL-1 resulted with low expression (40%), awaiting other molecular studies before final treatment plan can be determined. PET 7/26 with distant metastases. Oncology looking at NGS before final recs.   -Oncology consulted, appreciate recs   - NGS pending   - Guardant 360 pending  - Due to ongoing transfusion needs, ARU not feasible. Will either need inpatient chemotherapy or home discharge with chemo.     #Maculopapular rash on face, chest, bilateral upper extremities, improved  # Numerous palpable purpura across the scalp and lower extremities   # Petechiae on bilateral lower  extremities  C/f palpable purpura, so s/p L leg s/p punch biopsy with dermatology: rupture folliculitis. No definite features of cutaneous small vessel vasculitis or thrombotic vasculopathy. New rash noted 7/26 with  flushing of face/chest after PET scan. Also scattered papules of face and upper extremities (in addition to petechiae of BLE). Not itchy and no signs of respiratory distress, but suspected  secondary to contrast from PET scan, .  Rash appeared several hours after unit of platelets, less likely due to a transfusion reaction. photos present in chart. Today, papules are consistent with his usual acne. No diffuse erythema. Some c/f SVC syndrome given distrubution of flushing and positional nature (normally HOB elevated when asleep, so PET was first time that he had prolonged flat/supine position). CTM.   Plan:  - Benadryl as needed for itching  - Chlorhexidine wash  - Will continue to monitor    #Hypercoagulability d/t metastatic lung adenocarcinoma  #Infarcts of brain, kidney, spleen  #Cardiac valve thrombus on tricuspid and aortic valve 7/16 on TIMO  #Hx pulmonary embolism (6/23/25)  #Acute thrombocytopenia, in the setting of metastatic lung adenocarcinoma d/t ITP vs drug-induced vs consumptive coagulopathy  #Acute anemia  Found to have multiple CVA, likely embolic. TTE with mobile echodensities on tricuspid and aortic valve. Initial concern for TTP or catastrophic APS, however work up ultimately significant for new diagnosis of metastatic lung cancer. Hypercoagulable state presumed to be most likely 2/2 malignancy. Started on heparin gtt 7/17, but briefly held for 7/18 endobronchial needle aspiration of lymph nodes. Stroke code called 7/19 for worsening R-sided hemiparesis and hemianopia. Repeat CTA with new focal high-grade stenosis of other part of left MCA. Heparin gtt was stopped during code stroke, but restarting (heparin gtt + levophed) led to 10/10 headache. CTH with small left frontal SAH. Brain  MRI with new L basal ganglia stroke. Heparin gtt resumed, and repeat CTH stable. Switched to therapeutic Lovenox 7/22. CTH still stable 7/23. Declining fibrinogen, so received cryoprecipitate 7/25. Concern for continued consumptive process, but on therapeutic Lovenox. PLT downtrending, so receiving transfusions (1 unit 7/26, 1 unit 7/27 so far). No new symptoms. CTM.  - Hematology consulted   -Transfuse if   -Hgb<7  -INR<1.5  -PLT<50k  -Fibrinogen<100  - SC Lovenox 135 mg bid   - trend daily electrolytes and CBC while inpatient  -q12 hr DIC labs    #Multifocal acute ischemic strokes, in L MCA territory and new L basal ganglia stroke; Increasing mass effect on L lateral ventricle by L MCA infarction  #Subtotal occlusive thrombus in left M1   #Left frontoparietal SAH (7/19)  #Hypertension chronic  #HLD, chronic  Etiology most likely hypercoagulability d/t malignancy. Emboli stemming from tricuspid and aortic valves, appreciated on TTE 7/16. He continues to have R facial droop, extremity weakness. No apparent aphasia, but he is mildly dysarthric. Left frontoparietal subarachnoid hemorrhage stable per CT. Working with therapies with notable improvement.   > Neuro-ICU signing off   - SBP goal of 120 - 160 mmHg and DBP goal < 100    -Hold PTA antihypertensives    -Has prn labetalol and hydralazine  - cont 10 mg rosuvastatin  - q4h neuro check  > OT/PT/Speech   -Since ARU dispo delayed, asked therapies to do aggressive rehabilitation over the weekend as able  > nutrition consult   -monitor oral intake, weights  - repeat TTE outpatient to ensure improvement of AR and TR (and vegetations)    #DM2, A1c 5.3  Elevated BG, despite HSSI. Suspect 2/2 dexamethasone, so anticipate improvement as dexamethasone is discontinued. CTM.   - POC glucose ACHS  - High intensity insulin sliding scale    #ELENA  Need to clarify home CPAP settings.    #Hiccups  #chronic back pain  Chronic back pain. On acetaminophen only (given frequent need for  "neuro checks). Hiccups (drug-induced (dexamethasone) vs central) have been exacerbating back pain and making it difficult to sleep. Neuro recommended thorazine (EKG wnl) with improvement while sleeping. Still hiccups when awake, but improving since stopping dexamethasone.   Plan:  - Thorazine 25mg q6hr prn  - Melatonin 3mg daily  - Continue Pepcid  - Increase baclofen  - acetaminophen 1000 mg every 6 hours  - hold cyclobenzaprine 10 mg 3x daily PRN  - hold PTA gabapentin 100 mg 3x daily  - hold PTA oxycodone 5 mg PRN every 6h     #allergies  - fluticasone 1 spray daily        Diet: Regular Diet Adult    DVT Prophylaxis: Lovenox  Avalos Catheter: Not present  Fluids: None  Lines: None     Cardiac Monitoring: None  Code Status: Full Code      Clinically Significant Risk Factors          # Hyperchloremia: Highest Cl = 109 mmol/L in last 2 days, will monitor as appropriate             # Coagulation Defect: INR = 1.24 (Ref range: 0.85 - 1.15) and/or PTT = 35 Seconds (Ref range: 22 - 38 Seconds), will monitor for bleeding  # Thrombocytopenia: Lowest platelets = 34 in last 2 days, will monitor for bleeding              # Obesity: Estimated body mass index is 36.06 kg/m  as calculated from the following:    Height as of this encounter: 1.88 m (6' 2\").    Weight as of this encounter: 127.4 kg (280 lb 13.9 oz).        # Financial/Environmental Concerns: none         Social Drivers of Health          Disposition Plan     Medically Ready for Discharge: 2-3 days    Patient seen and discussed with attending, Dr. Randhawa.    Dee Shah MD    ______________________________________________________________________    Interval History   No acute events overnight. No new symptoms. Tired, but able to sleep. Some hiccups, but improving. Rash improved (no more redness).     Physical Exam   Vital Signs: Temp: 98.1  F (36.7  C) Temp src: Oral BP: 131/61 Pulse: 72   Resp: 16 SpO2: 97 % O2 Device: None (Room air)    Weight: 280 lbs 13.86 " oz  GENERAL: Lying in bed, appears tired, no acute distress  RESP: normal WOB on RA  CV:  Extremities appear well-perfused, RUE swelling is stable, no LE edema  GI: Soft, non-distended  NEURO: Alert, oriented, normal LUE movement, no RUE movement, improving facial droop, mild dysarthria, no dysphonia, EOMI grossly, pupils symmetric  SKIN: Petechial rash present on bilateral lower extremities. Erythematous papules scattered on chest, face, arms, legs c/w acne/folliculitis.      Medical Decision Making       Please see A&P for additional details of medical decision making.      Data     I have personally reviewed the following data over the past 24 hrs:    11.8 (H)  \   11.8 (L)   / 41 (LL)     138 109 (H) 25.0 (H) /  111 (H)   4.0 22 0.90 \     INR:  1.24 (H) PTT:  35   D-dimer:  N/A Fibrinogen:  278     Ferritin:  N/A % Retic:  N/A LDH:  473 (H)       Imaging results reviewed over the past 24 hrs:   No results found for this or any previous visit (from the past 24 hours).

## 2025-07-27 NOTE — PLAN OF CARE
Goal Outcome Evaluation:     Status: Pt admitted on 7/15/2025 for lethargy, N/V, and blurry vision found to have multifocal acute CVA in L MCA , R parietal and R frontal centrum semiovale. Initially concern for TTP or catastrophic APS, however further work up notable for new diagnosis of metastatic lung adenocarcinoma with likely malignancy-related hypercoagulability.   Vitals: VSS, BP ranging from 95//72  Neuros: A&O x4. Denies N/T today. RUE 0/5, RLE 3/5, L side 5/5. R facial droop. Slight dysarthria.   IV: PIV SL x2.   Labs/Electrolytes: BG ACHS. Received 1 unit platelets this am. Recheck 59 this afternoon and did not require additional unit.  Resp: LS clear. Denies SOB.   Diet: Regular. Lulu 100% of all 3 meals today. Takes pills whole in applesauce. Help order/set up meals when family is not present.   GI: Large, soft BM this afternoon on BSC  : Voids via urinal. Good output  Skin: Sutures to L shin biopsy site removed this evening. Bruising to BUE and neck. Showered this afternoon wit PT.  Pain: Right shoulder stiff and occasionally reports sacral discomfort. Relieved with Tylenol,repo, and new Tayo cushion to sit on.   Activity: A2 to pivot with 6A staff. Heavy pivot with cues. Better today than yesterday. Went outside in wheelchair with parents for a short time today.  Per PT, pt should only attempt to ambulate further with therapies at this time.   SCDs on? If no, why?: pt states will wear at HS  Social: Parents at bedside throughout the day, helpful and supportive of pt. Tearful and expressing sadness with prognosis given by MD today. Declined offer for .  Plan: Continue with POC. New order for DND overnight  Please offer Thorazine in addition to new, higher dose of Baclofen for hiccups prn.

## 2025-07-27 NOTE — PLAN OF CARE
Goal Outcome Evaluation:      Plan of Care Reviewed With: patient    Overall Patient Progress: improvingOverall Patient Progress: improving     Status: Pt admitted on 7/15/2025 for lethargy, N/V, and blurry vision found to have multifocal acute CVA in L MCA , R parietal and R frontal centrum semiovale. Initially concern for TTP or catastrophic APS, however further work up notable for new diagnosis of metastatic lung adenocarcinoma with likely malignancy-related hypercoagulability.   Vitals: VSS on RA ex int HTN - within parameters.  Neuros: A&Ox4, RUE 0/5, RLE 3/5, L side 5/5, R facial droop, slightly slurred speech  IV: PIV SL  Labs/Electrolytes: redraw in the AM  Resp: on RA  Diet: regular--takes pills whole--ate all dinner  GI: LBM 7/26--pr denied bowel meds  : voids via urinal  Skin: L shin biopsy sutures, some bruising to BUE and neck, some rashes on face  Pain: denied--received tylenol, thorazine given for hiccups.   Activity: Ax2 with GB and pivot  SCDs on? If no, why?: denied  Plan: plan to discuss PET scan result with pt parents today, continue to monitor  Updates this shift: continue with POC

## 2025-07-28 ENCOUNTER — APPOINTMENT (OUTPATIENT)
Dept: PHYSICAL THERAPY | Facility: CLINIC | Age: 35
DRG: 064 | End: 2025-07-28
Payer: COMMERCIAL

## 2025-07-28 ENCOUNTER — APPOINTMENT (OUTPATIENT)
Dept: OCCUPATIONAL THERAPY | Facility: CLINIC | Age: 35
DRG: 064 | End: 2025-07-28
Payer: COMMERCIAL

## 2025-07-28 LAB
ANION GAP SERPL CALCULATED.3IONS-SCNC: 9 MMOL/L (ref 7–15)
APTT PPP: 34 SECONDS (ref 22–38)
BLD PROD TYP BPU: NORMAL
BLOOD COMPONENT TYPE: NORMAL
BUN SERPL-MCNC: 23.5 MG/DL (ref 6–20)
CALCIUM SERPL-MCNC: 8.4 MG/DL (ref 8.8–10.4)
CHLORIDE SERPL-SCNC: 107 MMOL/L (ref 98–107)
CODING SYSTEM: NORMAL
CREAT SERPL-MCNC: 0.81 MG/DL (ref 0.67–1.17)
EGFRCR SERPLBLD CKD-EPI 2021: >90 ML/MIN/1.73M2
ERYTHROCYTE [DISTWIDTH] IN BLOOD BY AUTOMATED COUNT: 14.5 % (ref 10–15)
ERYTHROCYTE [DISTWIDTH] IN BLOOD BY AUTOMATED COUNT: 14.7 % (ref 10–15)
FIBRINOGEN PPP-MCNC: 320 MG/DL (ref 170–510)
GLUCOSE BLDC GLUCOMTR-MCNC: 107 MG/DL (ref 70–99)
GLUCOSE BLDC GLUCOMTR-MCNC: 120 MG/DL (ref 70–99)
GLUCOSE BLDC GLUCOMTR-MCNC: 143 MG/DL (ref 70–99)
GLUCOSE SERPL-MCNC: 110 MG/DL (ref 70–99)
HCO3 SERPL-SCNC: 21 MMOL/L (ref 22–29)
HCT VFR BLD AUTO: 37.4 % (ref 40–53)
HCT VFR BLD AUTO: 38.4 % (ref 40–53)
HGB BLD-MCNC: 12.1 G/DL (ref 13.3–17.7)
HGB BLD-MCNC: 12.5 G/DL (ref 13.3–17.7)
INR PPP: 1.2 (ref 0.85–1.15)
ISSUE DATE AND TIME: NORMAL
LDH SERPL L TO P-CCNC: 503 U/L (ref 0–250)
MAGNESIUM SERPL-MCNC: 2.1 MG/DL (ref 1.7–2.3)
MCH RBC QN AUTO: 26.8 PG (ref 26.5–33)
MCH RBC QN AUTO: 26.9 PG (ref 26.5–33)
MCHC RBC AUTO-ENTMCNC: 32.4 G/DL (ref 31.5–36.5)
MCHC RBC AUTO-ENTMCNC: 32.6 G/DL (ref 31.5–36.5)
MCV RBC AUTO: 82 FL (ref 78–100)
MCV RBC AUTO: 83 FL (ref 78–100)
PHOSPHATE SERPL-MCNC: 3.1 MG/DL (ref 2.5–4.5)
PLAT MORPH BLD: ABNORMAL
PLATELET # BLD AUTO: 48 10E3/UL (ref 150–450)
PLATELET # BLD AUTO: 76 10E3/UL (ref 150–450)
POLYCHROMASIA BLD QL SMEAR: SLIGHT
POTASSIUM SERPL-SCNC: 4.3 MMOL/L (ref 3.4–5.3)
PROTHROMBIN TIME: 15.1 SECONDS (ref 11.8–14.8)
RBC # BLD AUTO: 4.49 10E6/UL (ref 4.4–5.9)
RBC # BLD AUTO: 4.67 10E6/UL (ref 4.4–5.9)
RBC MORPH BLD: ABNORMAL
SODIUM SERPL-SCNC: 137 MMOL/L (ref 135–145)
UNIT ABO/RH: NORMAL
UNIT NUMBER: NORMAL
UNIT STATUS: NORMAL
UNIT TYPE ISBT: 6200
VARIANT LYMPHS BLD QL SMEAR: PRESENT
WBC # BLD AUTO: 12.6 10E3/UL (ref 4–11)
WBC # BLD AUTO: 13.9 10E3/UL (ref 4–11)

## 2025-07-28 PROCEDURE — P9037 PLATE PHERES LEUKOREDU IRRAD: HCPCS

## 2025-07-28 PROCEDURE — 84100 ASSAY OF PHOSPHORUS: CPT

## 2025-07-28 PROCEDURE — 97112 NEUROMUSCULAR REEDUCATION: CPT | Mod: GO

## 2025-07-28 PROCEDURE — 83615 LACTATE (LD) (LDH) ENZYME: CPT

## 2025-07-28 PROCEDURE — 80048 BASIC METABOLIC PNL TOTAL CA: CPT

## 2025-07-28 PROCEDURE — 250N000013 HC RX MED GY IP 250 OP 250 PS 637: Performed by: STUDENT IN AN ORGANIZED HEALTH CARE EDUCATION/TRAINING PROGRAM

## 2025-07-28 PROCEDURE — 85014 HEMATOCRIT: CPT

## 2025-07-28 PROCEDURE — 97530 THERAPEUTIC ACTIVITIES: CPT | Mod: GP

## 2025-07-28 PROCEDURE — 85384 FIBRINOGEN ACTIVITY: CPT

## 2025-07-28 PROCEDURE — 99233 SBSQ HOSP IP/OBS HIGH 50: CPT | Mod: GC | Performed by: STUDENT IN AN ORGANIZED HEALTH CARE EDUCATION/TRAINING PROGRAM

## 2025-07-28 PROCEDURE — 250N000011 HC RX IP 250 OP 636

## 2025-07-28 PROCEDURE — 36415 COLL VENOUS BLD VENIPUNCTURE: CPT

## 2025-07-28 PROCEDURE — 99233 SBSQ HOSP IP/OBS HIGH 50: CPT | Mod: FS | Performed by: PHYSICIAN ASSISTANT

## 2025-07-28 PROCEDURE — 85730 THROMBOPLASTIN TIME PARTIAL: CPT

## 2025-07-28 PROCEDURE — 250N000013 HC RX MED GY IP 250 OP 250 PS 637

## 2025-07-28 PROCEDURE — 83735 ASSAY OF MAGNESIUM: CPT

## 2025-07-28 PROCEDURE — 97116 GAIT TRAINING THERAPY: CPT | Mod: GP

## 2025-07-28 PROCEDURE — 85610 PROTHROMBIN TIME: CPT

## 2025-07-28 PROCEDURE — 120N000002 HC R&B MED SURG/OB UMMC

## 2025-07-28 RX ADMIN — ACETAMINOPHEN 1000 MG: 500 TABLET ORAL at 04:08

## 2025-07-28 RX ADMIN — ENOXAPARIN SODIUM 135 MG: 150 INJECTION SUBCUTANEOUS at 18:47

## 2025-07-28 RX ADMIN — ACETAMINOPHEN 1000 MG: 500 TABLET ORAL at 15:57

## 2025-07-28 RX ADMIN — Medication 7.5 MG: at 07:44

## 2025-07-28 RX ADMIN — METOPROLOL SUCCINATE 25 MG: 25 TABLET, EXTENDED RELEASE ORAL at 07:45

## 2025-07-28 RX ADMIN — Medication 7.5 MG: at 13:49

## 2025-07-28 RX ADMIN — HYDROCHLOROTHIAZIDE 25 MG: 12.5 TABLET ORAL at 07:45

## 2025-07-28 RX ADMIN — ACETAMINOPHEN 1000 MG: 500 TABLET ORAL at 21:51

## 2025-07-28 RX ADMIN — FLUTICASONE PROPIONATE 1 SPRAY: 50 SPRAY, METERED NASAL at 07:44

## 2025-07-28 RX ADMIN — FAMOTIDINE 10 MG: 10 TABLET ORAL at 20:48

## 2025-07-28 RX ADMIN — ENOXAPARIN SODIUM 135 MG: 150 INJECTION SUBCUTANEOUS at 06:31

## 2025-07-28 RX ADMIN — FAMOTIDINE 10 MG: 10 TABLET ORAL at 07:45

## 2025-07-28 RX ADMIN — Medication 7.5 MG: at 20:48

## 2025-07-28 RX ADMIN — ROSUVASTATIN CALCIUM 10 MG: 10 TABLET, FILM COATED ORAL at 07:45

## 2025-07-28 RX ADMIN — SENNOSIDES AND DOCUSATE SODIUM 1 TABLET: 50; 8.6 TABLET ORAL at 20:48

## 2025-07-28 ASSESSMENT — ACTIVITIES OF DAILY LIVING (ADL)
ADLS_ACUITY_SCORE: 48
ADLS_ACUITY_SCORE: 40
ADLS_ACUITY_SCORE: 48
ADLS_ACUITY_SCORE: 48
ADLS_ACUITY_SCORE: 40
ADLS_ACUITY_SCORE: 40
ADLS_ACUITY_SCORE: 48
ADLS_ACUITY_SCORE: 40
ADLS_ACUITY_SCORE: 40
ADLS_ACUITY_SCORE: 48
ADLS_ACUITY_SCORE: 40
ADLS_ACUITY_SCORE: 48
ADLS_ACUITY_SCORE: 40
ADLS_ACUITY_SCORE: 40

## 2025-07-28 NOTE — PROGRESS NOTES
Changed: A&O*4. Right facial droop. RUE 0/5, LUE 5/5, RLE 3/5, LLE 5/5. Slow speech but appropriate. Takes pills whole in apple sauce. No hiccups this shift. Pt appears depressed/withdrawn but calm and compliant with cares. Room air. Calls for assistance with urinal, voiding well. Pain to coccyx from laying in bed, Tylenol given. Left shin biopsy site, sutures out, BECK no drainage. Rash to face/neck/arms/legs improving, no itching. Splint on R arm/hand. DND orders [id number]. Pt slept most of night no acute events.   Running: none  PRN: none    Temp:  [97.6  F (36.4  C)-98.1  F (36.7  C)] 97.6  F (36.4  C)  Pulse:  [72-97] 97  Resp:  [14-20] 18  BP: ()/(57-80) 129/70  SpO2:  [90 %-99 %] 96 %      P: Continue to monitor Pt status and report changes to treatment team.

## 2025-07-28 NOTE — PROVIDER NOTIFICATION
RN received critical lab value for platelets=48. [facility] resident was notified via page and RN requested resident text/call back verifying message received. Waiting for reply/possible intervention      @ [phone]: no text or call back. Orders to transfuse platelets placed

## 2025-07-28 NOTE — PLAN OF CARE
Speech Language Therapy Discharge Summary    Reason for therapy discharge:    All goals and outcomes met, no further needs identified.    Progress towards therapy goal(s). See goals on Care Plan in [facility] electronic health record for goal details.  Goals met    Therapy recommendation(s):    No further therapy is recommended.

## 2025-07-28 NOTE — PROGRESS NOTES
Solid Tumor Oncology Consult Service  Progress Note   Date of Service: 07/28/2025  Patient: Bernard Chapman  MRN: [id number]  Admission Date: 7/15/2025  Hospital Day # 13  Cancer Diagnosis: Metastatic lung adenocarcinoma   Primary Outpatient Oncologist: MARKOS   Current Treatment Plan: MARKOS      Summary & Recommendations:   - Discussed with molecular lab, anticipate NGS results ~Thursday/Friday. Treatment decisions pending these results.  - Continue therapeutic lovenox with plt transfusion support (to keep plt >50K). Currently requiring plt transfusions ~1-2 unit(s) daily  - Anticipate inpatient treatment for lung adenocarcinoma once NGS results    Assessment & Plan:   Bernard Chapman is a 35 year old year old male with a past medical history of HTN, DM, ELENA, PE/DVT 6/22/25 (previously on rivaroxaban) who presented 7/15/2025 with new neurological symptoms and found acute multifocal ischemic strokes, partial occlusive L MCA thrombus, L frontal SAH, and in setting of TCP c/f ITP vs catastrophic antiphospholipid syndrome (CAPS) vs malignancy s/p PLEX x7/16. CT CAP 7/16/2025 revealing LAD of hilar, mediastinal, and paraesophageal LNs and spiculated-appearing nodule of R apical lung s/p EBUS-TBNA 7/18 revealing metastatic lung adenocarcinoma. Awaiting NGS for further treatment decisions.      # New metastatic lung adenocarcinoma  # Thrombocytopenia  # Anemia  CT CAP 7/16/2025 revealing a spiculated appearing nodule within R lung apex ~11 mm, multiple enlarged mediastinal, hilar and paraesophageal lymph nodes, along with 11 mm soft tissue density in the left retroperitoneum. Underwent EBUS-TBNA 7/18 revealing metastatic lung adenocarcinoma, + for CK7 and TTF-1. MRI brain without evidence of metastasis. Obtained PET on 7/26. Reviewed with radiologist which showed metastatic disease in L scapula, L retroperitoneum, R adrenal and L2. Areas are small enough where radiation is not required at this time. PD-L1 TPS at  40% consistent with low PD-L1 expression. Per d/w benign hematology, given severe thrombocytopenia requiring transfusions and hypercoagulable state with course complicated by multifocal strokes and PE 2/2 malignancy, we are considering initiating treatment while inpatient, however, currently waiting on NGS and Guardant 360 studies to result.   - NGS and Guardant 360 studies pending   - Lesions are too small for radiation at this time   - Outpatient oncology referral placed, follow for scheduling    - Anticipate starting cancer directed therapies inpatient, given severe thrombocytopenia requiring transfusion support and therapeutic anticoagulation for hypercoagulable state of malignancy. Treatment decision TBD based upon NGS results (per discussion with molecular lab, anticipate results ~7/31-8/1)     # H/o DVT/PE  Presented 6/22/25 with chest pain and SOB and found to have PE and lower extremity DVTs, he was placed on apixaban though had an allergic reaction and rotated to Xarelto.   - Agree with AC (Lovenox) per primary team and hematology      Patient was seen and plan of care was discussed with attending physician Dr. Cervantes.    Thank you for the opportunity to partake in this patient's plan of care. Please do not hesitate to page with questions. We will continue to follow.     I spent 50 minutes face-to-face or coordinating care of Bernard Chapman. Over 50% of our time on the unit was spent counseling the patient and/or coordinating care.    Awilda Beltre PA-C  Hematology/Oncology  Pager #[phone]   ___________________________________________________________________    Subjective & Interval History:    No acute events noted overnight. Patient is doing okay today. Has been working with PT and ambulating in the halls. Sister, brother in law, and parents visiting today and supportive. Discussed NGS results anticipated towards end of the week and treatment decisions would be made from there. Denies further questions  "or concerns at this time.        Physical Exam:    Blood pressure 130/72, pulse 80, temperature 97.7  F (36.5  C), temperature source Axillary, resp. rate 18, height 1.88 m (6' 2\"), weight 127.4 kg (280 lb 13.9 oz), SpO2 94%.    Constitutional: Sitting in chair, NAD  HEENT: NCAT  Respiratory: Breathing comfortably on room air  Neuro: Alert with normal speech. Moves extremities spontaneously.  Psych: Appropriate affect     Labs & Studies: I personally reviewed the following studies:  ROUTINE LABS (Last four results):  CMP  Recent Labs   Lab 07/28/25  1217 07/28/25  0711 07/28/25  0651 07/27/25  2118 07/27/25  0808 07/27/25  0706 07/26/25  1043 07/26/25  0628 07/25/25  0909 07/25/25  0456 07/24/25  0815 07/24/25  0635 07/23/25  0801 07/23/25  0730 07/22/25  0814 07/22/25  0522   NA  --   --  137  --   --  138  --  138  --  138  --  140  --  141  --  140   POTASSIUM  --   --  4.3  --   --  4.0  --  3.9  --  4.5  --  4.2  --  4.3   < > 4.5   CHLORIDE  --   --  107  --   --  109*  --  107  --  108*  --  109*  --  108*  --  110*   CO2  --   --  21*  --   --  22  --  20*  --  20*  --  19*  --  18*  --  19*   ANIONGAP  --   --  9  --   --  7  --  11  --  10  --  12  --  15  --  11   * 107* 110* 121*   < > 107*   < > 106*   < > 193*   < > 167*   < > 207*   < > 195*   BUN  --   --  23.5*  --   --  25.0*  --  32.9*  --  35.5*  --  37.8*  --  32.6*  --  25.3*   CR  --   --  0.81  --   --  0.90  --  0.93  --  0.87  --  0.92  --  0.90  --  0.89   GFRESTIMATED  --   --  >90  --   --  >90  --  >90  --  >90  --  >90  --  >90  --  >90   SHARAN  --   --  8.4*  --   --  8.1*  --  8.3*  --  9.1  --  9.0  --  9.0  --  8.7*   MAG  --   --  2.1  --   --  2.1  --  2.0  --  2.3  --  2.3  --  2.2  --  2.2   PHOS  --   --  3.1  --   --  3.9  --  3.3  --  4.0  --  4.1  --  4.9*  --  3.8   PROTTOTAL  --   --   --   --   --   --   --   --   --   --   --  6.1*  --  6.2*  --  6.1*   ALBUMIN  --   --   --   --   --   --   --   --   --   --   --  " 3.9  --  3.8  --  3.7   BILITOTAL  --   --   --   --   --   --   --   --   --   --   --  0.5  --  0.6  --  0.5   ALKPHOS  --   --   --   --   --   --   --   --   --   --   --  55  --  62  --  59   AST  --   --   --   --   --   --   --   --   --   --   --  13  --  17  --  23   ALT  --   --   --   --   --   --   --   --   --   --   --  28  --  30  --  30    < > = values in this interval not displayed.     CBC  Recent Labs   Lab 07/28/25  0651 07/27/25  1744 07/27/25  0706 07/26/25  1801   WBC 12.6* 13.5* 11.8* 12.0*   RBC 4.49 4.51 4.35* 4.20*   HGB 12.1* 12.1* 11.8* 11.2*   HCT 37.4* 37.1* 36.3* 33.8*   MCV 83 82 83 81   MCH 26.9 26.8 27.1 26.7   MCHC 32.4 32.6 32.5 33.1   RDW 14.5 14.7 14.5 14.5   PLT 48* 59* 41* 51*     INR  Recent Labs   Lab 07/28/25 0651 07/27/25  1744 07/27/25  0706 07/26/25  1801   INR 1.20* 1.19* 1.24* 1.30*     Medications list for reference:  Current Facility-Administered Medications   Medication Dose Route Frequency Provider Last Rate Last Admin    acetaminophen (TYLENOL) tablet 1,000 mg  1,000 mg Oral or Feeding Tube Q6H Dee Shah MD   1,000 mg at 07/28/25 0408    baclofen (LIORESAL) 2.5 mg, Baclofen (LIORESAL) 5 mg  7.5 mg Oral TID Carolina Randhawa MD   7.5 mg at 07/28/25 0744    bisacodyl (DULCOLAX) suppository 10 mg  10 mg Rectal Daily PRN Dee Shah MD        chlorhexidine (HIBICLENS) 4 % solution   Topical Daily PRN Dee Shah MD        chlorproMAZINE (THORAZINE) tablet 25 mg  25 mg Oral or Feeding Tube Q6H PRN Yamile Gutierrez APRN CNP   25 mg at 07/26/25 2009    cyclobenzaprine (FLEXERIL) tablet 10 mg  10 mg Oral TID PRN Marjan Michel MD        glucose gel 15-30 g  15-30 g Oral Q15 Min PRN Dee Shah MD        Or    dextrose 50 % injection 25-50 mL  25-50 mL Intravenous Q15 Min PRN Dee Shah MD        Or    glucagon injection 1 mg  1 mg Subcutaneous Q15 Min PRN Dee Shah MD        diphenhydrAMINE (BENADRYL) capsule 25 mg  25 mg Oral or Feeding  Tube Q6H PRN Dee Shah MD   25 mg at 07/26/25 1228    Or    diphenhydrAMINE (BENADRYL) injection 25 mg  25 mg Intravenous Q6H PRN Dee Shah MD        enoxaparin ANTICOAGULANT (LOVENOX) injection 135 mg  1 mg/kg Subcutaneous Q12H Dee Shah MD   135 mg at 07/28/25 0631    famotidine (PEPCID) tablet 10 mg  10 mg Oral BID Dee Shah MD   10 mg at 07/28/25 0745    fluticasone (FLONASE) 50 MCG/ACT spray 1 spray  1 spray Both Nostrils Daily Dee Shah MD   1 spray at 07/28/25 0744    gabapentin (NEURONTIN) capsule 100 mg  100 mg Oral TID PRN Marjan Michel MD        hydrALAZINE (APRESOLINE) injection 10 mg  10 mg Intravenous Q30 Min PRN Dee Shah MD   10 mg at 07/23/25 0203    hydroCHLOROthiazide tablet 25 mg  25 mg Oral Daily Marjan Michel MD   25 mg at 07/28/25 0745    insulin aspart (NovoLOG) injection (RAPID ACTING)  1-10 Units Subcutaneous TID AC Dee Shah MD   1 Units at 07/28/25 1228    insulin aspart (NovoLOG) injection (RAPID ACTING)  1-7 Units Subcutaneous At Bedtime Dee Shah MD   3 Units at 07/24/25 2200    labetalol (NORMODYNE/TRANDATE) injection 10 mg  10 mg Intravenous Q10 Min PRN Dee Shah MD        [Held by provider] losartan (COZAAR) tablet 100 mg  100 mg Oral Daily Dee Shah MD        magnesium hydroxide (MILK OF MAGNESIA) suspension 30 mL  30 mL Oral or Feeding Tube Daily PRN Dee Shah MD        melatonin tablet 3 mg  3 mg Oral At Bedtime PRN Dee Shah MD   3 mg at 07/22/25 0123    metoprolol succinate ER (TOPROL XL) 24 hr tablet 25 mg  25 mg Oral Daily Marjan Michel MD   25 mg at 07/28/25 0745    naloxone (NARCAN) injection 0.2 mg  0.2 mg Intravenous Q2 Min PRN Carine Matt MD        Or    naloxone (NARCAN) injection 0.4 mg  0.4 mg Intravenous Q2 Min PRN Carine Matt MD        Or    naloxone (NARCAN) injection 0.2 mg  0.2 mg Intramuscular Q2 Min PRN Carine Matt MD        Or    naloxone (NARCAN)  injection 0.4 mg  0.4 mg Intramuscular Q2 Min PRN Carine Matt MD        ondansetron (ZOFRAN ODT) ODT tab 4 mg  4 mg Oral Q6H PRN Dee Shah MD        Or    ondansetron (ZOFRAN) injection 4 mg  4 mg Intravenous Q6H PRN Dee Shah MD        oxyCODONE (ROXICODONE) tablet 5 mg  5 mg Oral Q6H PRN Marjan Michel MD        polyethylene glycol (MIRALAX) Packet 17 g  17 g Oral or Feeding Tube Daily Dee Shah MD   17 g at 07/24/25 0834    prochlorperazine (COMPAZINE) injection 10 mg  10 mg Intravenous Q6H PRN Dee Shah MD        Or    prochlorperazine (COMPAZINE) tablet 10 mg  10 mg Oral or Feeding Tube Q6H PRN Dee Shah MD        rosuvastatin (CRESTOR) tablet 10 mg  10 mg Oral or Feeding Tube Daily Dee Shah MD   10 mg at 07/28/25 0745    senna-docusate (SENOKOT-S/PERICOLACE) 8.6-50 MG per tablet 1-2 tablet  1-2 tablet Oral or Feeding Tube BID Dee Shah MD   1 tablet at 07/25/25 1957    simethicone (MYLICON) chewable half-tab 40 mg  40 mg Oral Q6H PRN Dee Shah MD   40 mg at 07/25/25 1957    sodium chloride (PF) 0.9% PF flush 3 mL  3 mL Intracatheter Q8H MOHINDER Dee Shah MD   3 mL at 07/28/25 0632    sodium chloride (PF) 0.9% PF flush 3 mL  3 mL Intracatheter q1 min prn Dee Shah MD   3 mL at 07/24/25 1710

## 2025-07-28 NOTE — PLAN OF CARE
Goal Outcome Evaluation:           Overall Patient Progress: improving    Outcome Evaluation: Daily routine maintained    Status: admitted 7/15 for lethargy, n/v, and blurry vision, found multifocal acute CVA in L MCA, R parietal and R frontal centrum semiovale, initial concern for TTP syndrome or catastrophic APS, however further work up notable for new dx of metastatic lung adenocarcinoma (stage IV) w/ likely malignancy related hypercoagulability   Vitals: VSS on RA, ex int HTN but w/in parameters  Neuros: A/Ox4, deny n/t, R facial droop, slight dysarthria, L side 5/5, RUE 0/5, RLE 3/5  IV: PIV SL x1  Labs/Electrolytes: critical plt level of 48 (team notified, see Mindi Ramirez note), order to infuse 1 unit of plts placed, 1 unit of plts given, redraw CBC w/ plts @ 1800  Resp: LSC on RA  Diet: regular, good PO intake, takes pills whole 1 @ time w/ applesauce, BG ACHS, need help setting up tray  GI: LBM 7/27, pt refused bowel meds this AM  : VS via urinal, AUOP  Skin: L shin biopsy BECK, rash/petechiae all over body (denies itching)  Pain: denies, denied sched tylenol  Activity: A2 w/ GB (heavy pivot)  SCDs on? If no, why?: yes when in bed  Social: family visiting this shift, supportive, attentive to pt  Plan: continue POC, see onc and med notes  Updates this shift:  up in chair for meals, worked/walked w/ PT, strong pivot bed to chair, 1 unit of plts given d/t crit plt of 48, redraw CBC w/ plts @1800

## 2025-07-28 NOTE — PROGRESS NOTES
SPIRITUAL HEALTH SERVICES - Progress Note  [place] 6A    Referral Source: Staff Referral    Attempted to visit Bernard who was visiting with several family members who came to see him.   Briefly introduced Spiritual Health Services to Bernard and family and how to request emotional/spiritual support if needed.    Plan: Unit  will try to check in with patient/family at a later time.Spiritual Health Services remain available on request. Please place a standard consult order on Epic.    Brooke Bush,   Chaplain Resident    [facility] routine referrals *[phone]   [facility] available 24/7 for emergent requests/referrals, either by paging the on-call  or by entering an ASAP/STAT consult in Epic (this will also page the on-call ).

## 2025-07-28 NOTE — PROGRESS NOTES
[facility]    Progress Note - Medicine Service, [facility] TEAM 3       Date of Admission:  7/15/2025    Assessment & Plan   Bernard Chapman is a 35 year old male with a past medical history of hypertension, diabetes, ELENA, PE/DVT (on rivaroxaban) admitted on 7/15/2025 for lethargy, N/V, and blurry vision found to have multifocal acute CVA in L MCA , R parietal and R frontal centrum semiovale. Initially concern for TTP or catastrophic APS, however further work up notable for new diagnosis of metastatic lung adenocarcinoma with likely malignancy-related hypercoagulability.    Today:  - s/p 1 plt this morning  - Tumor genetic markers likely not until end of week (Th/F)    #Metastatic lung adenocarcinoma  #Right lung nodule  #Multiple enlarged mediastinal, paraesophageal, and hilar lymph nodes  Malignancy work up initiated on admission due to significant hypercoagulability. CT CAP significant for [id number] spiculated density in R lung apex, 9mm pulmonary nodule in R lung base, L thyroid nodule, paratracheal LAD, splenic infarct, bilateral renal infarcts, and L retroperitoneal nodule. Underwent FNA of lymph node which was positive for lung adenocarcinoma (resulted 7/22).  PDL-1 resulted with low expression (40%), awaiting other molecular studies before final treatment plan can be determined. PET 7/26 with distant metastases. Oncology looking at NGS before final recs.   -Oncology consulted, appreciate recs   - NGS pending   - Guardant 360 pending  - Due to ongoing transfusion needs, ARU not feasible. Will either need inpatient chemotherapy or home discharge with oral chemo, pending NGS results.  - 2nd opinion at [facility] (start process as NGS results return)     #Maculopapular rash on face, chest, bilateral upper extremities, improved  # Numerous palpable purpura across the scalp and lower extremities   # Petechiae on bilateral lower extremities  C/f palpable purpura, so s/p L leg s/p  punch biopsy with dermatology: rupture folliculitis. No definite features of cutaneous small vessel vasculitis or thrombotic vasculopathy. New rash noted 7/26 with  flushing of face/chest after PET scan. Also scattered papules of face and upper extremities (in addition to petechiae of BLE). Not itchy and no signs of respiratory distress, but suspected  secondary to contrast from PET scan, .  Rash appeared several hours after unit of platelets, less likely due to a transfusion reaction. photos present in chart. The following days, papules are consistent with his usual acne. No diffuse erythema. Some c/f SVC syndrome given distrubution of flushing and positional nature (normally HOB elevated when asleep, so PET was first time that he had prolonged flat/supine position). CTM.   Plan:  - Benadryl as needed for itching  - Chlorhexidine wash  - Will continue to monitor    #Hypercoagulability d/t metastatic lung adenocarcinoma  #Infarcts of brain, kidney, spleen  #Cardiac valve thrombus on tricuspid and aortic valve 7/16 on TIMO  #Hx pulmonary embolism (6/23/25)  #Acute thrombocytopenia, in the setting of metastatic lung adenocarcinoma d/t ITP vs drug-induced vs consumptive coagulopathy  #Acute anemia  Found to have multiple CVA, likely embolic. TTE with mobile echodensities on tricuspid and aortic valve. Initial concern for TTP or catastrophic APS, however work up ultimately significant for new diagnosis of metastatic lung cancer. Hypercoagulable state presumed to be most likely 2/2 malignancy. Started on heparin gtt 7/17, but briefly held for 7/18 endobronchial needle aspiration of lymph nodes. Stroke code called 7/19 for worsening R-sided hemiparesis and hemianopia. Repeat CTA with new focal high-grade stenosis of other part of left MCA. Heparin gtt was stopped during code stroke, but restarting (heparin gtt + levophed) led to 10/10 headache. CTH with small left frontal SAH. Brain MRI with new L basal ganglia stroke.  Heparin gtt resumed, and repeat CTH stable. Switched to therapeutic Lovenox 7/22. CTH still stable 7/23. Declining fibrinogen, so received cryoprecipitate 7/25. Concern for continued consumptive process, but on therapeutic Lovenox. PLT downtrending, so receiving transfusions (1 unit 7/26, 1 unit 7/27, 1 unit 7/28). No new symptoms. CTM.  - Hematology consulted   -Transfuse if   -Hgb<7  -INR<1.5  -PLT<50k  -Fibrinogen<100  - SC Lovenox 135 mg bid   -q12 hr CBC  -daily fibrinogen, INR, PTT    #Multifocal acute ischemic strokes, in L MCA territory and new L basal ganglia stroke; Increasing mass effect on L lateral ventricle by L MCA infarction  #Subtotal occlusive thrombus in left M1   #Left frontoparietal SAH (7/19)  #Hypertension chronic  #HLD, chronic  Etiology most likely hypercoagulability d/t malignancy. Emboli stemming from tricuspid and aortic valves, appreciated on TTE 7/16. He continues to have R facial droop, extremity weakness. No apparent aphasia, but he is mildly dysarthric. Left frontoparietal subarachnoid hemorrhage stable per CT. Working with therapies with notable improvement.   > Neuro-ICU signing off   - SBP goal of 120 - 160 mmHg and DBP goal < 100    -Hold PTA antihypertensives    -Has prn labetalol and hydralazine  - cont 10 mg rosuvastatin  > OT/PT/Speech   -Since ARU dispo delayed, asked therapies to do aggressive rehabilitation as able  > nutrition consult   -monitor oral intake, weights  - repeat TTE outpatient to ensure improvement of AR and TR (and vegetations)    #DM2, A1c 5.3  On HSSI with adequate control after stopping dexamethasone. CTM.   - POC glucose ACHS  - High intensity insulin sliding scale    #ELENA  Need to clarify home CPAP settings.    #Hiccups  #chronic back pain  Chronic back pain. On acetaminophen only (given frequent need for neuro checks). Hiccups resolved (suspected 2/2 dexamethasone). Will continue baclofen and prn thorazine, but may be able to decrease in coming days.  "  Plan:  - Thorazine 25mg q6hr prn  - Melatonin 3mg daily  - Continue Pepcid  - continue  baclofen  - acetaminophen 1000 mg every 6 hours  - hold cyclobenzaprine 10 mg 3x daily PRN  - hold PTA gabapentin 100 mg 3x daily  - hold PTA oxycodone 5 mg PRN every 6h     #allergies  - fluticasone 1 spray daily        Diet: Regular Diet Adult    DVT Prophylaxis: Lovenox  Avalos Catheter: Not present  Fluids: None  Lines: None     Cardiac Monitoring: None  Code Status: Full Code      Clinically Significant Risk Factors          # Hyperchloremia: Highest Cl = 109 mmol/L in last 2 days, will monitor as appropriate             # Coagulation Defect: INR = 1.20 (Ref range: 0.85 - 1.15) and/or PTT = 34 Seconds (Ref range: 22 - 38 Seconds), will monitor for bleeding  # Thrombocytopenia: Lowest platelets = 41 in last 2 days, will monitor for bleeding              # Obesity: Estimated body mass index is 36.06 kg/m  as calculated from the following:    Height as of this encounter: 1.88 m (6' 2\").    Weight as of this encounter: 127.4 kg (280 lb 13.9 oz).        # Financial/Environmental Concerns: none         Social Drivers of Health          Disposition Plan     Medically Ready for Discharge: 4-5 days    Patient seen and discussed with attending, Dr. Randhawa.  Dee Shah MD    ______________________________________________________________________    Interval History   No acute events overnight. Hiccups improved. Working hard with therapies, and walking longer distances. Still tired. No new symptoms.    Physical Exam   Vital Signs: Temp: 98.1  F (36.7  C) Temp src: Oral BP: (!) 149/69 Pulse: 68   Resp: 16 SpO2: 98 % O2 Device: None (Room air)    Weight: 280 lbs 13.86 oz  GENERAL: Lying in bed, appears tired, no acute distress  RESP: normal WOB on RA  CV:  Extremities appear well-perfused, RUE swelling is stable  GI: Soft, non-distended  NEURO: Alert, oriented, normal LUE movement, no RUE movement, improving facial droop, mild " dysarthria, no dysphonia, EOMI grossly, pupils symmetric  SKIN: Petechial rash present on bilateral lower extremities. Erythematous papules scattered on face c/w acne.      Medical Decision Making       Please see A&P for additional details of medical decision making.      Data     I have personally reviewed the following data over the past 24 hrs:    12.6 (H)  \   12.1 (L)   / 48 (LL)     137 107 23.5 (H) /  143 (H)   4.3 21 (L) 0.81 \     INR:  1.20 (H) PTT:  34   D-dimer:  N/A Fibrinogen:  320     Ferritin:  N/A % Retic:  N/A LDH:  503 (H)       Imaging results reviewed over the past 24 hrs:   No results found for this or any previous visit (from the past 24 hours).

## 2025-07-29 ENCOUNTER — APPOINTMENT (OUTPATIENT)
Dept: OCCUPATIONAL THERAPY | Facility: CLINIC | Age: 35
DRG: 064 | End: 2025-07-29
Payer: COMMERCIAL

## 2025-07-29 ENCOUNTER — APPOINTMENT (OUTPATIENT)
Dept: PHYSICAL THERAPY | Facility: CLINIC | Age: 35
DRG: 064 | End: 2025-07-29
Payer: COMMERCIAL

## 2025-07-29 LAB
ANION GAP SERPL CALCULATED.3IONS-SCNC: 11 MMOL/L (ref 7–15)
APTT PPP: 34 SECONDS (ref 22–38)
APTT PPP: 36 SECONDS (ref 22–38)
BUN SERPL-MCNC: 21.7 MG/DL (ref 6–20)
CALCIUM SERPL-MCNC: 8.8 MG/DL (ref 8.8–10.4)
CHLORIDE SERPL-SCNC: 106 MMOL/L (ref 98–107)
CREAT SERPL-MCNC: 0.88 MG/DL (ref 0.67–1.17)
EGFRCR SERPLBLD CKD-EPI 2021: >90 ML/MIN/1.73M2
ERYTHROCYTE [DISTWIDTH] IN BLOOD BY AUTOMATED COUNT: 14.6 % (ref 10–15)
ERYTHROCYTE [DISTWIDTH] IN BLOOD BY AUTOMATED COUNT: 14.8 % (ref 10–15)
FIBRINOGEN PPP-MCNC: 343 MG/DL (ref 170–510)
FIBRINOGEN PPP-MCNC: 354 MG/DL (ref 170–510)
GLUCOSE BLDC GLUCOMTR-MCNC: 108 MG/DL (ref 70–99)
GLUCOSE BLDC GLUCOMTR-MCNC: 109 MG/DL (ref 70–99)
GLUCOSE BLDC GLUCOMTR-MCNC: 121 MG/DL (ref 70–99)
GLUCOSE BLDC GLUCOMTR-MCNC: 134 MG/DL (ref 70–99)
GLUCOSE BLDC GLUCOMTR-MCNC: 141 MG/DL (ref 70–99)
GLUCOSE SERPL-MCNC: 115 MG/DL (ref 70–99)
HCO3 SERPL-SCNC: 22 MMOL/L (ref 22–29)
HCT VFR BLD AUTO: 36.3 % (ref 40–53)
HCT VFR BLD AUTO: 40.3 % (ref 40–53)
HGB BLD-MCNC: 11.9 G/DL (ref 13.3–17.7)
HGB BLD-MCNC: 13.1 G/DL (ref 13.3–17.7)
INR PPP: 1.15 (ref 0.85–1.15)
INR PPP: 1.23 (ref 0.85–1.15)
LDH SERPL L TO P-CCNC: 507 U/L (ref 0–250)
MAGNESIUM SERPL-MCNC: 2.1 MG/DL (ref 1.7–2.3)
MCH RBC QN AUTO: 26.7 PG (ref 26.5–33)
MCH RBC QN AUTO: 26.8 PG (ref 26.5–33)
MCHC RBC AUTO-ENTMCNC: 32.5 G/DL (ref 31.5–36.5)
MCHC RBC AUTO-ENTMCNC: 32.8 G/DL (ref 31.5–36.5)
MCV RBC AUTO: 82 FL (ref 78–100)
MCV RBC AUTO: 82 FL (ref 78–100)
PHOSPHATE SERPL-MCNC: 3.2 MG/DL (ref 2.5–4.5)
PLATELET # BLD AUTO: 58 10E3/UL (ref 150–450)
PLATELET # BLD AUTO: 59 10E3/UL (ref 150–450)
POTASSIUM SERPL-SCNC: 4.3 MMOL/L (ref 3.4–5.3)
PROTHROMBIN TIME: 15.1 SECONDS (ref 11.8–14.8)
PROTHROMBIN TIME: 15.5 SECONDS (ref 11.8–14.8)
RBC # BLD AUTO: 4.44 10E6/UL (ref 4.4–5.9)
RBC # BLD AUTO: 4.9 10E6/UL (ref 4.4–5.9)
SODIUM SERPL-SCNC: 139 MMOL/L (ref 135–145)
WBC # BLD AUTO: 11.4 10E3/UL (ref 4–11)
WBC # BLD AUTO: 13 10E3/UL (ref 4–11)

## 2025-07-29 PROCEDURE — 97112 NEUROMUSCULAR REEDUCATION: CPT | Mod: GO

## 2025-07-29 PROCEDURE — 99233 SBSQ HOSP IP/OBS HIGH 50: CPT | Mod: GC | Performed by: STUDENT IN AN ORGANIZED HEALTH CARE EDUCATION/TRAINING PROGRAM

## 2025-07-29 PROCEDURE — 250N000013 HC RX MED GY IP 250 OP 250 PS 637

## 2025-07-29 PROCEDURE — 85610 PROTHROMBIN TIME: CPT

## 2025-07-29 PROCEDURE — 36415 COLL VENOUS BLD VENIPUNCTURE: CPT

## 2025-07-29 PROCEDURE — 85730 THROMBOPLASTIN TIME PARTIAL: CPT

## 2025-07-29 PROCEDURE — 120N000002 HC R&B MED SURG/OB UMMC

## 2025-07-29 PROCEDURE — 97116 GAIT TRAINING THERAPY: CPT | Mod: GP | Performed by: PHYSICAL THERAPIST

## 2025-07-29 PROCEDURE — 97535 SELF CARE MNGMENT TRAINING: CPT | Mod: GO | Performed by: OCCUPATIONAL THERAPIST

## 2025-07-29 PROCEDURE — 250N000011 HC RX IP 250 OP 636

## 2025-07-29 PROCEDURE — 85384 FIBRINOGEN ACTIVITY: CPT

## 2025-07-29 PROCEDURE — 99231 SBSQ HOSP IP/OBS SF/LOW 25: CPT | Mod: FS | Performed by: PHYSICIAN ASSISTANT

## 2025-07-29 PROCEDURE — 80048 BASIC METABOLIC PNL TOTAL CA: CPT

## 2025-07-29 PROCEDURE — 83615 LACTATE (LD) (LDH) ENZYME: CPT

## 2025-07-29 PROCEDURE — 97530 THERAPEUTIC ACTIVITIES: CPT | Mod: GP | Performed by: PHYSICAL THERAPIST

## 2025-07-29 PROCEDURE — 85049 AUTOMATED PLATELET COUNT: CPT

## 2025-07-29 PROCEDURE — 83735 ASSAY OF MAGNESIUM: CPT | Performed by: STUDENT IN AN ORGANIZED HEALTH CARE EDUCATION/TRAINING PROGRAM

## 2025-07-29 PROCEDURE — 84100 ASSAY OF PHOSPHORUS: CPT

## 2025-07-29 PROCEDURE — 250N000013 HC RX MED GY IP 250 OP 250 PS 637: Performed by: STUDENT IN AN ORGANIZED HEALTH CARE EDUCATION/TRAINING PROGRAM

## 2025-07-29 PROCEDURE — 97112 NEUROMUSCULAR REEDUCATION: CPT | Mod: GO | Performed by: OCCUPATIONAL THERAPIST

## 2025-07-29 RX ADMIN — ENOXAPARIN SODIUM 135 MG: 150 INJECTION SUBCUTANEOUS at 05:28

## 2025-07-29 RX ADMIN — SENNOSIDES AND DOCUSATE SODIUM 2 TABLET: 50; 8.6 TABLET ORAL at 08:35

## 2025-07-29 RX ADMIN — ROSUVASTATIN CALCIUM 10 MG: 10 TABLET, FILM COATED ORAL at 08:37

## 2025-07-29 RX ADMIN — Medication 7.5 MG: at 19:22

## 2025-07-29 RX ADMIN — ACETAMINOPHEN 1000 MG: 500 TABLET ORAL at 05:27

## 2025-07-29 RX ADMIN — ACETAMINOPHEN 1000 MG: 500 TABLET ORAL at 11:50

## 2025-07-29 RX ADMIN — ENOXAPARIN SODIUM 135 MG: 150 INJECTION SUBCUTANEOUS at 17:53

## 2025-07-29 RX ADMIN — HYDROCHLOROTHIAZIDE 25 MG: 12.5 TABLET ORAL at 08:36

## 2025-07-29 RX ADMIN — METOPROLOL SUCCINATE 25 MG: 25 TABLET, EXTENDED RELEASE ORAL at 08:37

## 2025-07-29 RX ADMIN — FAMOTIDINE 10 MG: 10 TABLET ORAL at 08:37

## 2025-07-29 RX ADMIN — SENNOSIDES AND DOCUSATE SODIUM 1 TABLET: 50; 8.6 TABLET ORAL at 19:22

## 2025-07-29 RX ADMIN — POLYETHYLENE GLYCOL 3350 17 G: 17 POWDER, FOR SOLUTION ORAL at 08:35

## 2025-07-29 RX ADMIN — ACETAMINOPHEN 1000 MG: 500 TABLET ORAL at 22:29

## 2025-07-29 RX ADMIN — FLUTICASONE PROPIONATE 1 SPRAY: 50 SPRAY, METERED NASAL at 08:34

## 2025-07-29 RX ADMIN — FAMOTIDINE 10 MG: 10 TABLET ORAL at 19:22

## 2025-07-29 RX ADMIN — Medication 7.5 MG: at 13:19

## 2025-07-29 RX ADMIN — ACETAMINOPHEN 1000 MG: 500 TABLET ORAL at 16:20

## 2025-07-29 RX ADMIN — Medication 7.5 MG: at 08:43

## 2025-07-29 ASSESSMENT — ACTIVITIES OF DAILY LIVING (ADL)
ADLS_ACUITY_SCORE: 48
ADLS_ACUITY_SCORE: 40
ADLS_ACUITY_SCORE: 44
ADLS_ACUITY_SCORE: 48
ADLS_ACUITY_SCORE: 48
ADLS_ACUITY_SCORE: 40
ADLS_ACUITY_SCORE: 48
ADLS_ACUITY_SCORE: 40
ADLS_ACUITY_SCORE: 48
ADLS_ACUITY_SCORE: 40
ADLS_ACUITY_SCORE: 40
ADLS_ACUITY_SCORE: 44
ADLS_ACUITY_SCORE: 48
ADLS_ACUITY_SCORE: 44
ADLS_ACUITY_SCORE: 40
ADLS_ACUITY_SCORE: 48

## 2025-07-29 NOTE — PROGRESS NOTES
[facility]    Progress Note - Medicine Service, [facility] TEAM 3       Date of Admission:  7/15/2025    Assessment & Plan   Bernard Chapman is a 35 year old male with a past medical history of hypertension, diabetes, ELENA, PE/DVT (on rivaroxaban) admitted on 7/15/2025 for lethargy, N/V, and blurry vision found to have multifocal acute CVA in L MCA , R parietal and R frontal centrum semiovale. Initially concern for TTP or catastrophic APS, however further work up notable for new diagnosis of metastatic lung adenocarcinoma with likely malignancy-related hypercoagulability.    Today:  - Tumor genetic markers likely not until end of week (7/31-8/1)    #Metastatic lung adenocarcinoma  #Right lung nodule  #Multiple enlarged mediastinal, paraesophageal, and hilar lymph nodes  Malignancy work up initiated on admission due to significant hypercoagulability. CT CAP significant for [id number] spiculated density in R lung apex, 9mm pulmonary nodule in R lung base, L thyroid nodule, paratracheal LAD, splenic infarct, bilateral renal infarcts, and L retroperitoneal nodule. Underwent FNA of lymph node which was positive for lung adenocarcinoma (resulted 7/22).  PDL-1 resulted with low expression (40%), awaiting other molecular studies before final treatment plan can be determined. PET 7/26 with distant metastases. Oncology looking at NGS before final recs.   -Oncology consulted, appreciate recs   - NGS pending   - Guardant 360 pending  - Due to ongoing transfusion needs, ARU not feasible. Will either need inpatient chemotherapy or home discharge with oral chemo, pending NGS results.  - 2nd opinion at [facility] (start process as NGS results return)     #Hypercoagulability d/t metastatic lung adenocarcinoma  #Infarcts of brain, kidney, spleen  #Cardiac valve thrombus on tricuspid and aortic valve 7/16 on TIMO  #Hx pulmonary embolism (6/23/25)  #Acute thrombocytopenia, in the setting of  metastatic lung adenocarcinoma d/t ITP vs drug-induced vs consumptive coagulopathy  #Acute anemia  Found to have multiple CVA, likely embolic. TTE with mobile echodensities on tricuspid and aortic valve. Initial concern for TTP or catastrophic APS, however work up ultimately significant for new diagnosis of metastatic lung cancer. Hypercoagulable state presumed to be most likely 2/2 malignancy. Started on heparin gtt 7/17, but briefly held for 7/18 endobronchial needle aspiration of lymph nodes. Stroke code called 7/19 for worsening R-sided hemiparesis and hemianopia. Repeat CTA with new focal high-grade stenosis of other part of left MCA. Heparin gtt was stopped during code stroke, but restarting (heparin gtt + levophed) led to 10/10 headache. CTH with small left frontal SAH. Brain MRI with new L basal ganglia stroke. Heparin gtt resumed, and repeat CTH stable. Switched to therapeutic Lovenox 7/22. CTH still stable 7/23. Declining fibrinogen, so received cryoprecipitate 7/25. Concern for continued consumptive process, but on therapeutic Lovenox. PLT downtrending, so receiving transfusions (1 unit 7/26, 1 unit 7/27, 1 unit 7/28). No new symptoms. CTM.  - Hematology consulted   -Transfuse if   -Hgb<7  -INR<1.5  -PLT<50k  -Fibrinogen<100  - SC Lovenox 135 mg bid   -q12 hr CBC  -daily fibrinogen, INR, PTT      #Maculopapular rash on face, chest, bilateral upper extremities, improved  # Numerous palpable purpura across the scalp and lower extremities   # Petechiae on bilateral lower extremities  C/f palpable purpura, so s/p L leg s/p punch biopsy with dermatology: rupture folliculitis. No definite features of cutaneous small vessel vasculitis or thrombotic vasculopathy. New rash noted 7/26 with  flushing of face/chest after PET scan. Also scattered papules of face and upper extremities (in addition to petechiae of BLE). Not itchy and no signs of respiratory distress, but suspected  secondary to contrast from PET scan, .   Rash appeared several hours after unit of platelets, less likely due to a transfusion reaction. photos present in chart. The following days, papules are consistent with his usual acne. No diffuse erythema. Some c/f SVC syndrome given distrubution of flushing and positional nature (normally HOB elevated when asleep, so PET was first time that he had prolonged flat/supine position). CTM.   Plan:  - Benadryl as needed for itching  - Chlorhexidine wash  - Will continue to monitor    #Multifocal acute ischemic strokes, in L MCA territory and new L basal ganglia stroke; Increasing mass effect on L lateral ventricle by L MCA infarction  #Subtotal occlusive thrombus in left M1   #Left frontoparietal SAH (7/19)  #Hypertension chronic  #HLD, chronic  Etiology most likely hypercoagulability d/t malignancy. Emboli stemming from tricuspid and aortic valves, appreciated on TTE 7/16. He continues to have R facial droop, extremity weakness. No apparent aphasia, but he is mildly dysarthric. Left frontoparietal subarachnoid hemorrhage stable per CT. Working with therapies with notable improvement.   > Neuro-ICU signing off   - SBP goal of 120 - 160 mmHg and DBP goal < 100    -Hold PTA antihypertensives    -Has prn labetalol and hydralazine  - cont 10 mg rosuvastatin  > OT/PT/Speech   -Since ARU dispo delayed, asked therapies to do aggressive rehabilitation as able  > nutrition consult   -monitor oral intake, weights  - repeat TTE outpatient to ensure improvement of AR and TR (and vegetations)    #DM2, A1c 5.3  On HSSI with adequate control after stopping dexamethasone. CTM.   - POC glucose ACHS  - High intensity insulin sliding scale    #ELENA  Need to clarify home CPAP settings.    #Hiccups  #chronic back pain  Chronic back pain. On acetaminophen only (given frequent need for neuro checks). Hiccups resolved (suspected 2/2 dexamethasone). Will continue baclofen and prn thorazine, but may be able to decrease in coming days.   Plan:  -  "Thorazine 25mg q6hr prn  - Melatonin 3mg daily  - Continue Pepcid  - continue  baclofen  - acetaminophen 1000 mg every 6 hours  - hold cyclobenzaprine 10 mg 3x daily PRN  - hold PTA gabapentin 100 mg 3x daily  - hold PTA oxycodone 5 mg PRN every 6h     #allergies  - fluticasone 1 spray daily         Diet: Regular Diet Adult    DVT Prophylaxis: Enoxaparin (Lovenox) SQ  Avalos Catheter: Not present  Fluids: None  Lines: None     Cardiac Monitoring: None  Code Status: Full Code      Clinically Significant Risk Factors                 # Thrombocytopenia: Lowest platelets = 48 in last 2 days, will monitor for bleeding              # Obesity: Estimated body mass index is 36.06 kg/m  as calculated from the following:    Height as of this encounter: 1.88 m (6' 2\").    Weight as of this encounter: 127.4 kg (280 lb 13.9 oz).      # Financial/Environmental Concerns: none         Social Drivers of Health          Disposition Plan     Medically Ready for Discharge: Anticipated in 2-4 Days         The patient's care was discussed with the Attending Physician, Dr. Aden.    Francisco Prather   Medicine Service, [facility] TEAM 3  [facility]  Securely message with [employer] (more info)  Text page via [facility] Paging/Directory   See signed in provider for up to date coverage information  ______________________________________________________________________    Interval History   No acute events overnight. Walking with therapist this morning. Pt is very determined to see improvements. No new neurologic symptoms.     Physical Exam   Vital Signs: Temp: 97.9  F (36.6  C) Temp src: Oral BP: 133/72 Pulse: 66   Resp: 20 SpO2: 99 % O2 Device: None (Room air)    Weight: 280 lbs 13.86 oz    GENERAL: Resting comfortably in bed, no acute distress  RESP: normal WOB on RA  CV:  Extremities appear well-perfused, RUE swelling is stable  GI: Soft, non-distended  NEURO: Alert, oriented, normal LUE movement, no RUE " movement, right sided facial droop, mild dysarthria, no dysphonia, EOMI grossly, pupils symmetric  SKIN: Erythematous papules scattered on face c/w acne.     Medical Decision Making       Please see A&P for additional details of medical decision making.      Data     I have personally reviewed the following data over the past 24 hrs:    11.4 (H)  \   11.9 (L)   / 58 (L)     139 106 21.7 (H) /  108 (H)   4.3 22 0.88 \     INR:  1.15 PTT:  36   D-dimer:  N/A Fibrinogen:  343     Ferritin:  N/A % Retic:  N/A LDH:  507 (H)

## 2025-07-29 NOTE — PLAN OF CARE
"Goal Outcome Evaluation:  A/Ox4, denied pain, moved to recliner, eating well, BG monitored with insulin coverage, parents at bedside. Taking pills with apple sauce, well tolerated. S/b PT/OT, ambulated in the hallway. Voiding spont. No BM. Pos gas.      /72 (BP Location: Left arm)   Pulse 66   Temp 97.9  F (36.6  C) (Oral)   Resp 20   Ht 1.88 m (6' 2\")   Wt 108.9 kg (240 lb)   SpO2 99%   BMI 30.81 kg/m                         "

## 2025-07-29 NOTE — PROGRESS NOTES
"Care Management Follow Up    Length of Stay (days): 14    Expected Discharge Date:   8/5/2025     Concerns to be Addressed: Discharge planning     Patient plan of care discussed at interdisciplinary rounds: Yes    Anticipated Discharge Disposition:  OT, ST and Physical Therapy are recommending ARU placement.  However, as EOD platelet transfusions are anticipated in addition to anticipated treatment for lung adenocarcinoma, TCU will be pursued.     Anticipated Discharge Services: OT, ST and Physical Therapy are recommending ARU placement.  However, as EOD platelet transfusions are anticipated in addition to anticipated treatment for lung adenocarcinoma, TCU will be pursued.  Anticipated Discharge DME:   Not applicable at this time    Patient/family educated on Medicare website which has current facility and service quality ratings: Yes  Education Provided on the Discharge Plan: Yes  Patient/Family in Agreement with the Plan: Yes    Referrals Placed by CM/SW: Post Acute Care Facilities  Private pay costs discussed:   Not applicable at this time    Discussed  Partnership in Safe Discharge Planning  document with patient/family: No     Handoff Completed: No, handoff not indicated or clinically appropriate    Additional Information:  SW is following pt for discharge planning.  OT, ST and Physical Therapy are recommending ARU placement.      TIRSO reviewed 7/29/2025 progress note entry written by Mima Randolph Hem/Onc P.A. which states \"Anticipate molecular results ~Thursday/Friday. Treatment decisions pending these results.\"  \"Anticipate inpatient treatment for lung adenocarcinoma once NGS results .\"      TIRSO phoned Dr. Cristine Nguyen ([facility]), who states that in addition to the above, pt will need platelet transfusions every other day.  SW informed Dr. Nguyen that ARU's do not allow for pt's to leave for outside appt's during their stay.   For this reason, TCU will be pursued.  Per discussion, the " anticipated discharge date is not yet known.      T[facility]SO phoned Admissions for [facility] and TCU and spoke with Isabel.  SW informed Isabel that for the reasons as stated above, pt is not appropriate for ARU. SW asked Isabel to follow pt for placement at [facility] and Isabel confirmed that she will do so.    SW met with pt and updated in regards to why TCU will be pursued instead of ARU.  Pt states that he is agreeable to placement at [facility] if accepted and if they have a bed available when pt is  medically ready for discharge.   SW informed pt that the anticipated discharge date is not yet known.    SW inquired as to whether or not pt is satisfied with the care and communication that he is receiving while at [facility].  Pt states that he is satisfied with the care and communication on 6A.    Next Steps: SW will continue to follow for discharge planning.    BRUNILDA Jorgensen  Social Work, 6A  Phone:  [phone]  Pager:  [phone]  7/29/2025       DAR Muse

## 2025-07-29 NOTE — PROGRESS NOTES
Solid Tumor Oncology Consult Service  Progress Note   Date of Service: 07/29/2025  Patient: Bernard Chapman  MRN: [id number]  Admission Date: 7/15/2025  Hospital Day # 14  Cancer Diagnosis: Metastatic lung adenocarcinoma   Primary Outpatient Oncologist: MARKOS   Current Treatment Plan: MARKOS      Summary & Recommendations:   - No new oncologic recommendations today  - Anticipate molecular results ~Thursday/Friday. Treatment decisions pending these results. Will likely follow peripherally until we have received these results.   - Continue therapeutic Lovenox with plt transfusion support (to keep plt >50K). Currently requiring plt transfusions ~1-2 unit(s) daily  - Anticipate inpatient treatment for lung adenocarcinoma once NGS results    Assessment & Plan:   Bernard Chapman is a 35 year old year old male with a past medical history of HTN, DM, ELENA, PE/DVT 6/22/25 (previously on rivaroxaban) who presented 7/15/2025 with new neurological symptoms and found acute multifocal ischemic strokes, partial occlusive L MCA thrombus, L frontal SAH, and in setting of TCP c/f ITP vs catastrophic antiphospholipid syndrome (CAPS) vs malignancy s/p PLEX x7/16. CT CAP 7/16/2025 revealing LAD of hilar, mediastinal, and paraesophageal LNs and spiculated-appearing nodule of R apical lung s/p EBUS-TBNA 7/18 revealing metastatic lung adenocarcinoma. Awaiting NGS for further treatment decisions.      # New metastatic lung adenocarcinoma  # Thrombocytopenia  # Anemia  CT CAP 7/16/2025 revealing a spiculated appearing nodule within R lung apex ~11 mm, multiple enlarged mediastinal, hilar and paraesophageal lymph nodes, along with 11 mm soft tissue density in the left retroperitoneum. Underwent EBUS-TBNA 7/18 revealing metastatic lung adenocarcinoma, + for CK7 and TTF-1. MRI brain without evidence of metastasis. Obtained PET on 7/26. Reviewed with radiologist which showed metastatic disease in L scapula, L retroperitoneum, R adrenal and  L2. Areas are small enough where radiation is not required at this time. PD-L1 TPS at 40% consistent with low PD-L1 expression. Per d/w benign hematology, given severe thrombocytopenia requiring transfusions and hypercoagulable state with course complicated by multifocal strokes and PE 2/2 malignancy, we are considering initiating treatment while inpatient, however, currently waiting on NGS and Guardant 360 studies to result.   - NGS and Guardant 360 studies pending   - Lesions are too small for radiation at this time   - Outpatient oncology referral placed, follow for scheduling    - Anticipate starting cancer directed therapies inpatient, given severe thrombocytopenia requiring transfusion support and therapeutic anticoagulation for hypercoagulable state of malignancy. Treatment decision TBD based upon NGS results (per discussion with molecular lab, anticipate results ~7/31-8/1)     # H/o DVT/PE  Presented 6/22/25 with chest pain and SOB and found to have PE and lower extremity DVTs, he was placed on apixaban though had an allergic reaction and rotated to Xarelto.   - Agree with AC (Lovenox) per primary team and hematology      Patient was seen and plan of care was discussed with attending physician Dr. Cervantes.    Thank you for the opportunity to partake in this patient's plan of care. Please do not hesitate to page with questions. We will continue to follow.     I spent 25 minutes face-to-face or coordinating care of Bernard Chapman. Over 50% of our time on the unit was spent counseling the patient and/or coordinating care.    Mima Randolph PA-C  Hematology/Oncology  Pager #[phone]  ___________________________________________________________________    Subjective & Interval History:    No acute events noted overnight. Patient is doing well today. Feeling better after sister helped him shave. Continues to progress with therapies. Parents at bedside.     Physical Exam:    Blood pressure 133/72, pulse  "66, temperature 97.9  F (36.6  C), temperature source Oral, resp. rate 20, height 1.88 m (6' 2\"), weight 127.4 kg (280 lb 13.9 oz), SpO2 99%.    Constitutional: Sitting in chair, NAD  HEENT: NCAT  Respiratory: Breathing comfortably on room air  Neuro: Alert with normal speech. Moves extremities spontaneously.  Psych: Appropriate affect     Labs & Studies: I personally reviewed the following studies:  ROUTINE LABS (Last four results):  CMP  Recent Labs   Lab 07/29/25  1148 07/29/25  0803 07/29/25  0608 07/29/25  0106 07/28/25  0711 07/28/25  0651 07/27/25  0808 07/27/25  0706 07/26/25  1043 07/26/25  0628 07/24/25  0815 07/24/25  0635 07/23/25  0801 07/23/25  0730   NA  --   --  139  --   --  137  --  138  --  138   < > 140  --  141   POTASSIUM  --   --  4.3  --   --  4.3  --  4.0  --  3.9   < > 4.2  --  4.3   CHLORIDE  --   --  106  --   --  107  --  109*  --  107   < > 109*  --  108*   CO2  --   --  22  --   --  21*  --  22  --  20*   < > 19*  --  18*   ANIONGAP  --   --  11  --   --  9  --  7  --  11   < > 12  --  15   * 141* 115* 109*   < > 110*   < > 107*   < > 106*   < > 167*   < > 207*   BUN  --   --  21.7*  --   --  23.5*  --  25.0*  --  32.9*   < > 37.8*  --  32.6*   CR  --   --  0.88  --   --  0.81  --  0.90  --  0.93   < > 0.92  --  0.90   GFRESTIMATED  --   --  >90  --   --  >90  --  >90  --  >90   < > >90  --  >90   SHARAN  --   --  8.8  --   --  8.4*  --  8.1*  --  8.3*   < > 9.0  --  9.0   MAG  --   --  2.1  --   --  2.1  --  2.1  --  2.0   < > 2.3  --  2.2   PHOS  --   --  3.2  --   --  3.1  --  3.9  --  3.3   < > 4.1  --  4.9*   PROTTOTAL  --   --   --   --   --   --   --   --   --   --   --  6.1*  --  6.2*   ALBUMIN  --   --   --   --   --   --   --   --   --   --   --  3.9  --  3.8   BILITOTAL  --   --   --   --   --   --   --   --   --   --   --  0.5  --  0.6   ALKPHOS  --   --   --   --   --   --   --   --   --   --   --  55  --  62   AST  --   --   --   --   --   --   --   --   --   --   --  " 13  --  17   ALT  --   --   --   --   --   --   --   --   --   --   --  28  --  30    < > = values in this interval not displayed.     CBC  Recent Labs   Lab 07/29/25  0608 07/28/25  1807 07/28/25  0651 07/27/25  1744   WBC 11.4* 13.9* 12.6* 13.5*   RBC 4.44 4.67 4.49 4.51   HGB 11.9* 12.5* 12.1* 12.1*   HCT 36.3* 38.4* 37.4* 37.1*   MCV 82 82 83 82   MCH 26.8 26.8 26.9 26.8   MCHC 32.8 32.6 32.4 32.6   RDW 14.6 14.7 14.5 14.7   PLT 58* 76* 48* 59*     INR  Recent Labs   Lab 07/29/25  0608 07/28/25  0651 07/27/25  1744 07/27/25  0706   INR 1.15 1.20* 1.19* 1.24*     Medications list for reference:  Current Facility-Administered Medications   Medication Dose Route Frequency Provider Last Rate Last Admin    acetaminophen (TYLENOL) tablet 1,000 mg  1,000 mg Oral or Feeding Tube Q6H Dee Shah MD   1,000 mg at 07/29/25 1150    baclofen (LIORESAL) 2.5 mg, Baclofen (LIORESAL) 5 mg  7.5 mg Oral TID Carolina Randhawa MD   7.5 mg at 07/29/25 0843    bisacodyl (DULCOLAX) suppository 10 mg  10 mg Rectal Daily PRN Dee Shah MD        chlorhexidine (HIBICLENS) 4 % solution   Topical Daily PRN Dee Shah MD        chlorproMAZINE (THORAZINE) tablet 25 mg  25 mg Oral or Feeding Tube Q6H PRN Yamile Gutierrez APRN CNP   25 mg at 07/26/25 2009    cyclobenzaprine (FLEXERIL) tablet 10 mg  10 mg Oral TID PRN Marjan Michel MD        glucose gel 15-30 g  15-30 g Oral Q15 Min PRN Dee Shah MD        Or    dextrose 50 % injection 25-50 mL  25-50 mL Intravenous Q15 Min PRN Dee Shah MD        Or    glucagon injection 1 mg  1 mg Subcutaneous Q15 Min PRN Dee Shah MD        diphenhydrAMINE (BENADRYL) capsule 25 mg  25 mg Oral or Feeding Tube Q6H PRN Dee Shah MD   25 mg at 07/26/25 1228    Or    diphenhydrAMINE (BENADRYL) injection 25 mg  25 mg Intravenous Q6H PRN Dee Shah MD        enoxaparin ANTICOAGULANT (LOVENOX) injection 135 mg  1 mg/kg Subcutaneous Q12H Dee Shah MD   135 mg at  07/29/25 0528    famotidine (PEPCID) tablet 10 mg  10 mg Oral BID Dee Shah MD   10 mg at 07/29/25 0837    fluticasone (FLONASE) 50 MCG/ACT spray 1 spray  1 spray Both Nostrils Daily Dee Shah MD   1 spray at 07/29/25 0834    gabapentin (NEURONTIN) capsule 100 mg  100 mg Oral TID PRN Marjan Michel MD        hydrALAZINE (APRESOLINE) injection 10 mg  10 mg Intravenous Q30 Min PRN Dee Shah MD   10 mg at 07/23/25 0203    hydroCHLOROthiazide tablet 25 mg  25 mg Oral Daily Marjan Michel MD   25 mg at 07/29/25 0836    insulin aspart (NovoLOG) injection (RAPID ACTING)  1-10 Units Subcutaneous TID AC Dee Shah MD   1 Units at 07/29/25 0839    insulin aspart (NovoLOG) injection (RAPID ACTING)  1-7 Units Subcutaneous At Bedtime Dee Shah MD   3 Units at 07/24/25 2200    labetalol (NORMODYNE/TRANDATE) injection 10 mg  10 mg Intravenous Q10 Min PRN Dee Shah MD        [Held by provider] losartan (COZAAR) tablet 100 mg  100 mg Oral Daily Dee Shah MD        magnesium hydroxide (MILK OF MAGNESIA) suspension 30 mL  30 mL Oral or Feeding Tube Daily PRN Dee Shah MD        melatonin tablet 3 mg  3 mg Oral At Bedtime PRN Dee Shah MD   3 mg at 07/22/25 0123    metoprolol succinate ER (TOPROL XL) 24 hr tablet 25 mg  25 mg Oral Daily Marjan Michel MD   25 mg at 07/29/25 0837    naloxone (NARCAN) injection 0.2 mg  0.2 mg Intravenous Q2 Min PRN Carine Matt MD        Or    naloxone (NARCAN) injection 0.4 mg  0.4 mg Intravenous Q2 Min PRN Carine Matt MD        Or    naloxone (NARCAN) injection 0.2 mg  0.2 mg Intramuscular Q2 Min PRN Carine Matt MD        Or    naloxone (NARCAN) injection 0.4 mg  0.4 mg Intramuscular Q2 Min PRN Carine Matt MD        ondansetron (ZOFRAN ODT) ODT tab 4 mg  4 mg Oral Q6H PRN Dee Shah MD        Or    ondansetron (ZOFRAN) injection 4 mg  4 mg Intravenous Q6H PRN Dee Shah MD        oxyCODONE  (ROXICODONE) tablet 5 mg  5 mg Oral Q6H PRN Marjan Michel MD        polyethylene glycol (MIRALAX) Packet 17 g  17 g Oral or Feeding Tube Daily Dee Shah MD   17 g at 07/29/25 0835    prochlorperazine (COMPAZINE) injection 10 mg  10 mg Intravenous Q6H PRN Dee Shah MD        Or    prochlorperazine (COMPAZINE) tablet 10 mg  10 mg Oral or Feeding Tube Q6H PRN Dee Shah MD        rosuvastatin (CRESTOR) tablet 10 mg  10 mg Oral or Feeding Tube Daily Dee Shah MD   10 mg at 07/29/25 0837    senna-docusate (SENOKOT-S/PERICOLACE) 8.6-50 MG per tablet 1-2 tablet  1-2 tablet Oral or Feeding Tube BID Dee Shah MD   2 tablet at 07/29/25 0835    simethicone (MYLICON) chewable half-tab 40 mg  40 mg Oral Q6H PRN Dee Shah MD   40 mg at 07/25/25 1957    sodium chloride (PF) 0.9% PF flush 3 mL  3 mL Intracatheter Q8H MOHINDER Dee Shah MD   3 mL at 07/29/25 1154    sodium chloride (PF) 0.9% PF flush 3 mL  3 mL Intracatheter q1 min prn Dee Shah MD   3 mL at 07/24/25 1710

## 2025-07-29 NOTE — PLAN OF CARE
Goal Outcome Evaluation:      Plan of Care Reviewed With: patient    Overall Patient Progress: no changeOverall Patient Progress: no change    Outcome Evaluation: Denies pain, ambulating Ax2 pivot, worked with therapies    Status: Admitted 7/15 d/t lethargy, N/V, and blurry vision found to have multifocal acute CVA in L MCA, R parietal, and R frontal semiovale. Further workup notable for new diagnosis of metastatic lung adenocarcinoma.    Vitals: VSS ex HTN within parameters of SBP <160   Neuros: A&Ox4, strengths L side 5/5 RUE 2/5 RLE 3/5, denies N/T, R droop, intermittent dysarthria   IV: PIV SL   Labs/Electrolytes: Platelet recheck 76, BG checks ACHS   Resp: LSC on RA   Diet: Regular   GI: LBM 7/27, BS+  : Voiding spontaneously via urinal   Skin: L shin biopsy site BECK, rash/petechiae throughout  Pain: Denies   Activity: Ax2 w/GB pivot   SCDs on? If no, why?: Yes   Social: Friends visited, supportive   Plan/Updates this shift: Worked with therapies, SLP signed off, continue with POC.

## 2025-07-29 NOTE — PLAN OF CARE
Status:  Admitted 7/15 d/t lethargy, N/V, and blurry vision found to have multifocal acute CVA in L MCA, R parietal, and R frontal semiovale. Further workup notable for new diagnosis of metastatic lung adenocarcinoma.     Vitals: VSS ex HTN within parameters of SBP < 160  Neuros: A&Ox4.  DND orders for overnight [id number], neuro assessment not done, but most recent neuros were: Strengths L side 5/5, RUE 2/5, RLE 3/5,  R droop, intermittent dysarthria  IV: PIV SL  Labs/Electrolytes: BG ACHS  Resp: LSC on RA  Diet: regular  GI: LBM 7/27  : vdg spont via urinal  Skin: L shin biopsy site BECK, rash/petechiae t/o  Pain: denies  Activity: A2, w/ GB pivot  SCDs on? If no, why?: yes  Plan: continue with POC

## 2025-07-30 ENCOUNTER — APPOINTMENT (OUTPATIENT)
Dept: OCCUPATIONAL THERAPY | Facility: CLINIC | Age: 35
DRG: 064 | End: 2025-07-30
Payer: COMMERCIAL

## 2025-07-30 ENCOUNTER — APPOINTMENT (OUTPATIENT)
Dept: PHYSICAL THERAPY | Facility: CLINIC | Age: 35
DRG: 064 | End: 2025-07-30
Payer: COMMERCIAL

## 2025-07-30 LAB
BLD PROD TYP BPU: NORMAL
BLOOD COMPONENT TYPE: NORMAL
CODING SYSTEM: NORMAL
ERYTHROCYTE [DISTWIDTH] IN BLOOD BY AUTOMATED COUNT: 14.7 % (ref 10–15)
ERYTHROCYTE [DISTWIDTH] IN BLOOD BY AUTOMATED COUNT: 14.7 % (ref 10–15)
GLUCOSE BLDC GLUCOMTR-MCNC: 112 MG/DL (ref 70–99)
GLUCOSE BLDC GLUCOMTR-MCNC: 126 MG/DL (ref 70–99)
GLUCOSE BLDC GLUCOMTR-MCNC: 91 MG/DL (ref 70–99)
GLUCOSE BLDC GLUCOMTR-MCNC: 98 MG/DL (ref 70–99)
HCT VFR BLD AUTO: 38.1 % (ref 40–53)
HCT VFR BLD AUTO: 39.1 % (ref 40–53)
HGB BLD-MCNC: 12.5 G/DL (ref 13.3–17.7)
HGB BLD-MCNC: 12.6 G/DL (ref 13.3–17.7)
ISSUE DATE AND TIME: NORMAL
MAGNESIUM SERPL-MCNC: 2 MG/DL (ref 1.7–2.3)
MCH RBC QN AUTO: 26.4 PG (ref 26.5–33)
MCH RBC QN AUTO: 26.7 PG (ref 26.5–33)
MCHC RBC AUTO-ENTMCNC: 32.2 G/DL (ref 31.5–36.5)
MCHC RBC AUTO-ENTMCNC: 32.8 G/DL (ref 31.5–36.5)
MCV RBC AUTO: 81 FL (ref 78–100)
MCV RBC AUTO: 82 FL (ref 78–100)
PHOSPHATE SERPL-MCNC: 3.6 MG/DL (ref 2.5–4.5)
PLATELET # BLD AUTO: 50 10E3/UL (ref 150–450)
PLATELET # BLD AUTO: 72 10E3/UL (ref 150–450)
POTASSIUM SERPL-SCNC: 4.6 MMOL/L (ref 3.4–5.3)
RBC # BLD AUTO: 4.69 10E6/UL (ref 4.4–5.9)
RBC # BLD AUTO: 4.78 10E6/UL (ref 4.4–5.9)
UNIT ABO/RH: NORMAL
UNIT NUMBER: NORMAL
UNIT STATUS: NORMAL
UNIT TYPE ISBT: 6200
WBC # BLD AUTO: 11.4 10E3/UL (ref 4–11)
WBC # BLD AUTO: 11.9 10E3/UL (ref 4–11)

## 2025-07-30 PROCEDURE — 120N000002 HC R&B MED SURG/OB UMMC

## 2025-07-30 PROCEDURE — 85018 HEMOGLOBIN: CPT

## 2025-07-30 PROCEDURE — 97112 NEUROMUSCULAR REEDUCATION: CPT | Mod: GO

## 2025-07-30 PROCEDURE — 97530 THERAPEUTIC ACTIVITIES: CPT | Mod: GO

## 2025-07-30 PROCEDURE — 250N000013 HC RX MED GY IP 250 OP 250 PS 637

## 2025-07-30 PROCEDURE — 250N000011 HC RX IP 250 OP 636

## 2025-07-30 PROCEDURE — 36415 COLL VENOUS BLD VENIPUNCTURE: CPT

## 2025-07-30 PROCEDURE — 97116 GAIT TRAINING THERAPY: CPT | Mod: GP | Performed by: PHYSICAL THERAPIST

## 2025-07-30 PROCEDURE — 250N000013 HC RX MED GY IP 250 OP 250 PS 637: Performed by: STUDENT IN AN ORGANIZED HEALTH CARE EDUCATION/TRAINING PROGRAM

## 2025-07-30 PROCEDURE — 83735 ASSAY OF MAGNESIUM: CPT | Performed by: STUDENT IN AN ORGANIZED HEALTH CARE EDUCATION/TRAINING PROGRAM

## 2025-07-30 PROCEDURE — P9037 PLATE PHERES LEUKOREDU IRRAD: HCPCS

## 2025-07-30 PROCEDURE — 84132 ASSAY OF SERUM POTASSIUM: CPT | Performed by: STUDENT IN AN ORGANIZED HEALTH CARE EDUCATION/TRAINING PROGRAM

## 2025-07-30 PROCEDURE — 97530 THERAPEUTIC ACTIVITIES: CPT | Mod: GP | Performed by: PHYSICAL THERAPIST

## 2025-07-30 PROCEDURE — 97112 NEUROMUSCULAR REEDUCATION: CPT | Mod: GP | Performed by: PHYSICAL THERAPIST

## 2025-07-30 PROCEDURE — 84100 ASSAY OF PHOSPHORUS: CPT | Performed by: STUDENT IN AN ORGANIZED HEALTH CARE EDUCATION/TRAINING PROGRAM

## 2025-07-30 PROCEDURE — 99233 SBSQ HOSP IP/OBS HIGH 50: CPT | Mod: GC | Performed by: STUDENT IN AN ORGANIZED HEALTH CARE EDUCATION/TRAINING PROGRAM

## 2025-07-30 RX ORDER — MAGNESIUM OXIDE 400 MG/1
400 TABLET ORAL EVERY 4 HOURS
Status: COMPLETED | OUTPATIENT
Start: 2025-07-30 | End: 2025-07-30

## 2025-07-30 RX ADMIN — MAGNESIUM OXIDE TAB 400 MG (241.3 MG ELEMENTAL MG) 400 MG: 400 (241.3 MG) TAB at 08:20

## 2025-07-30 RX ADMIN — Medication 7.5 MG: at 13:25

## 2025-07-30 RX ADMIN — HYDROCHLOROTHIAZIDE 25 MG: 12.5 TABLET ORAL at 08:21

## 2025-07-30 RX ADMIN — Medication 7.5 MG: at 08:20

## 2025-07-30 RX ADMIN — ACETAMINOPHEN 1000 MG: 500 TABLET ORAL at 09:40

## 2025-07-30 RX ADMIN — ENOXAPARIN SODIUM 135 MG: 150 INJECTION SUBCUTANEOUS at 19:42

## 2025-07-30 RX ADMIN — FAMOTIDINE 10 MG: 10 TABLET ORAL at 19:42

## 2025-07-30 RX ADMIN — ROSUVASTATIN CALCIUM 10 MG: 10 TABLET, FILM COATED ORAL at 08:20

## 2025-07-30 RX ADMIN — Medication 7.5 MG: at 19:43

## 2025-07-30 RX ADMIN — FLUTICASONE PROPIONATE 1 SPRAY: 50 SPRAY, METERED NASAL at 08:20

## 2025-07-30 RX ADMIN — ENOXAPARIN SODIUM 135 MG: 150 INJECTION SUBCUTANEOUS at 06:05

## 2025-07-30 RX ADMIN — METOPROLOL SUCCINATE 25 MG: 25 TABLET, EXTENDED RELEASE ORAL at 08:20

## 2025-07-30 RX ADMIN — MAGNESIUM OXIDE TAB 400 MG (241.3 MG ELEMENTAL MG) 400 MG: 400 (241.3 MG) TAB at 13:25

## 2025-07-30 RX ADMIN — FAMOTIDINE 10 MG: 10 TABLET ORAL at 08:21

## 2025-07-30 ASSESSMENT — ACTIVITIES OF DAILY LIVING (ADL)
ADLS_ACUITY_SCORE: 40
ADLS_ACUITY_SCORE: 41
ADLS_ACUITY_SCORE: 40

## 2025-07-30 NOTE — PROGRESS NOTES
Care Management Follow Up    Length of Stay (days): 15    Expected Discharge Date: 08/05/2025     Concerns to be Addressed: Short term disability benefits     Patient plan of care discussed at interdisciplinary rounds: Yes    Anticipated Discharge Disposition: OT, ST and Physical Therapy are recommending ARU placement.  However, as EOD platelet transfusions are anticipated in addition to anticipated treatment for lung adenocarcinoma, TCU will be pursued.      Anticipated Discharge Services:  OT, ST and Physical Therapy are recommending ARU placement.  However, as EOD platelet transfusions are anticipated in addition to anticipated treatment for lung adenocarcinoma, TCU will be pursued.   Anticipated Discharge DME: Not applicable at this time    Patient/family educated on Medicare website which has current facility and service quality ratings: Yes  Education Provided on the Discharge Plan: Yes  Patient/Family in Agreement with the Plan: Yes    Referrals Placed by CM/SW: Post Acute Care Facilities  Private pay costs discussed: Not applicable    Discussed  Partnership in Safe Discharge Planning  document with patient/family: No     Handoff Completed: No, handoff not indicated or clinically appropriate    Additional Information:  TIRSO met with pt this am and inquired as to whether or not pt's employer offers short term disability benefits.  Pt is uncertain but states that his parents would assist him in checking into this matter.  SW informed pt that if his employer offers short term disability benefits he/parents will need to request the paperwork to pursue this benefit.  Per pt request, SW wrote/typed this information and he will ask his parents to follow up.    Next Steps: SW will continue to follow for discharge planning.    BRUNILDA Jorgensen  Social Work, 6A  Phone:  [phone]  Pager:  [phone]  7/30/2025       DAR Muse

## 2025-07-30 NOTE — PROGRESS NOTES
[facility]    Progress Note - Medicine Service, MARO[facility] TEAM 3       Date of Admission:  7/15/2025    Assessment & Plan   Bernard Chapman is a 35 year old male with a past medical history of hypertension, diabetes, ELENA, PE/DVT (on rivaroxaban) admitted on 7/15/2025 for lethargy, N/V, and blurry vision found to have multifocal acute CVA in L MCA , R parietal and R frontal centrum semiovale. Initially concern for TTP or catastrophic APS, however further work up notable for new diagnosis of metastatic lung adenocarcinoma with likely malignancy-related hypercoagulability.    Today:  - Tumor genetic markers likely not until end of week (7/31-8/1)  - Transfused 1U of Plts  - Can consider reducing BID labs to daily tomorrow pending stability     #Metastatic lung adenocarcinoma  #Right lung nodule  #Multiple enlarged mediastinal, paraesophageal, and hilar lymph nodes  Malignancy work up initiated on admission due to significant hypercoagulability. CT CAP significant for [id number] spiculated density in R lung apex, 9mm pulmonary nodule in R lung base, L thyroid nodule, paratracheal LAD, splenic infarct, bilateral renal infarcts, and L retroperitoneal nodule. Underwent FNA of lymph node which was positive for lung adenocarcinoma (resulted 7/22).  PDL-1 resulted with low expression (40%), awaiting other molecular studies before final treatment plan can be determined. PET 7/26 with distant metastases. Oncology looking at NGS before final recs.   -Oncology consulted, appreciate recs   - NGS pending   - Guardant 360 pending  - Due to ongoing transfusion needs, ARU not feasible. Will either need inpatient chemotherapy or home discharge with oral chemo, pending NGS results.  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 TTE with mobile echodensities on tricuspid and aortic valve. Initial concern for TTP or catastrophic APS, however work up ultimately significant for new diagnosis of metastatic lung cancer. Hypercoagulable state presumed to be most likely 2/2 malignancy. Started on heparin gtt 7/17, but briefly held for 7/18 endobronchial needle aspiration of lymph nodes. Stroke code called 7/19 for worsening R-sided hemiparesis and hemianopia. Repeat CTA with new focal high-grade stenosis of other part of left MCA. Heparin gtt was stopped during code stroke, but restarting (heparin gtt + levophed) led to 10/10 headache. CTH with small left frontal SAH. Brain MRI with new L basal ganglia stroke. Heparin gtt resumed, and repeat CTH stable. Switched to therapeutic Lovenox 7/22. CTH still stable 7/23. Declining fibrinogen, so received cryoprecipitate 7/25. Concern for continued consumptive process, but on therapeutic Lovenox. PLT downtrending, so receiving transfusions (1 unit 7/26, 1 unit 7/27, 1 unit 7/28, 1 unit 7/30). No new symptoms. CTM.  - Hematology consulted   -Transfuse if   -Hgb<7  -INR<1.5  -PLT<50k  -Fibrinogen<100  - SC Lovenox 135 mg bid   -q12 hr CBC  -daily fibrinogen, INR, PTT  -Can consider reducing BID labs to daily tomorrow pending stability     #Maculopapular rash on face, chest, bilateral upper extremities, improved  # Numerous palpable purpura across the scalp and lower extremities   # Petechiae on bilateral lower extremities  C/f palpable purpura, so s/p L leg s/p punch biopsy with dermatology: rupture folliculitis. No definite features of cutaneous small vessel vasculitis or thrombotic vasculopathy. New rash noted 7/26 with  flushing of face/chest after PET scan. Also scattered  papules of face and upper extremities (in addition to petechiae of BLE). Not itchy and no signs of respiratory distress, but suspected  secondary to contrast from PET scan, .  Rash appeared several hours after unit of platelets, less likely due to a transfusion reaction. photos present in chart. The following days, papules are consistent with his usual acne. No diffuse erythema. Some c/f SVC syndrome given distrubution of flushing and positional nature (normally HOB elevated when asleep, so PET was first time that he had prolonged flat/supine position). CTM.   Plan:  - Benadryl as needed for itching  - Chlorhexidine wash  - Will continue to monitor    #Multifocal acute ischemic strokes, in L MCA territory and new L basal ganglia stroke; Increasing mass effect on L lateral ventricle by L MCA infarction  #Subtotal occlusive thrombus in left M1   #Left frontoparietal SAH (7/19)  #Hypertension chronic  #HLD, chronic  Etiology most likely hypercoagulability d/t malignancy. Emboli stemming from tricuspid and aortic valves, appreciated on TTE 7/16. He continues to have R facial droop, extremity weakness. No apparent aphasia, but he is mildly dysarthric. Left frontoparietal subarachnoid hemorrhage stable per CT. Working with therapies with notable improvement.   > Neuro-ICU signing off   - SBP goal of 120 - 160 mmHg and DBP goal < 100    -Hold PTA antihypertensives    -Has prn labetalol and hydralazine  - cont 10 mg rosuvastatin  > OT/PT/Speech   -Since ARU dispo delayed, asked therapies to do aggressive rehabilitation as able  > nutrition consult   -monitor oral intake, weights  - repeat TTE outpatient to ensure improvement of AR and TR (and vegetations)    #DM2, A1c 5.3  On HSSI with adequate control after stopping dexamethasone. CTM.   - POC glucose ACHS  - High intensity insulin sliding scale    #ELENA  Need to clarify home CPAP settings.    #Hiccups  #chronic back pain  Chronic back pain. On acetaminophen only (given  "frequent need for neuro checks). Hiccups resolved (suspected 2/2 dexamethasone). Will continue baclofen and prn thorazine, but may be able to decrease in coming days.   Plan:  - Thorazine 25mg q6hr prn  - Melatonin 3mg daily  - Continue Pepcid  - continue  baclofen  - acetaminophen 1000 mg every 6 hours  - hold cyclobenzaprine 10 mg 3x daily PRN  - hold PTA gabapentin 100 mg 3x daily  - hold PTA oxycodone 5 mg PRN every 6h     #allergies  - fluticasone 1 spray daily         Diet: Regular Diet Adult    DVT Prophylaxis: Enoxaparin (Lovenox) SQ  Avalos Catheter: Not present  Fluids: None  Lines: None     Cardiac Monitoring: None  Code Status: Full Code      Clinically Significant Risk Factors                # Coagulation Defect: INR = 1.23 (Ref range: 0.85 - 1.15) and/or PTT = 34 Seconds (Ref range: 22 - 38 Seconds), will monitor for bleeding  # Thrombocytopenia: Lowest platelets = 50 in last 2 days, will monitor for bleeding              # Obesity: Estimated body mass index is 30.81 kg/m  as calculated from the following:    Height as of this encounter: 1.88 m (6' 2\").    Weight as of this encounter: 108.9 kg (240 lb).        # Financial/Environmental Concerns: none         Social Drivers of Health          Disposition Plan     Medically Ready for Discharge: Anticipated in 2-4 Days         The patient's care was discussed with the Attending Physician, Dr. Aden.    Cristine Nguyen MD  Medicine Service, [facility] 3  [facility]  Securely message with [employer] (more info)  Text page via [email or web address] Paging/Directory   See signed in provider for up to date coverage information  ______________________________________________________________________    Interval History   No acute events overnight. Spoke with Father present at bedside. Had requests for name of physician and for fax number to help with [employer] (Patients employer) benefits. Patient denies any pain. Answered questions " regarding care plan today.    Physical Exam   Vital Signs: Temp: 98  F (36.7  C) Temp src: Oral BP: 132/65 Pulse: 61   Resp: 16 SpO2: 98 % O2 Device: None (Room air)    Weight: 240 lbs 0 oz    GENERAL: Resting comfortably in bed, no acute distress  RESP: normal WOB on RA  CV:  Extremities appear well-perfused, RUE swelling is stable  GI: Soft, non-distended  NEURO: Alert, Follows 2 step commands,  normal LUE movement, no RUE movement, unable to wiggle right sided toes, right sided facial droop, mild dysarthria, no dysphonia, EOMI grossly, pupils symmetric  SKIN: Erythematous papules scattered on face c/w acne.     Medical Decision Making       Please see A&P for additional details of medical decision making.      Data     I have personally reviewed the following data over the past 24 hrs:    11.4 (H)  \   12.5 (L)   / 50 (L)     N/A N/A N/A /  112 (H)   4.6 N/A N/A \     INR:  1.23 (H) PTT:  34   D-dimer:  N/A Fibrinogen:  354

## 2025-07-30 NOTE — PLAN OF CARE
"Goal Outcome Evaluation:      Plan of Care Reviewed With: patient    Overall Patient Progress: no change      Shift: [id number]    Blood pressure 120/75, pulse 60, temperature 97.7  F (36.5  C), temperature source Axillary, resp. rate 18, height 1.88 m (6' 2\"), weight 108.9 kg (240 lb), SpO2 98%.      Reason for admission: Past medical history of hypertension, diabetes, ELENA, PE/DVT (on rivaroxaban) admitted on 7/15/2025 for lethargy, N/V, and blurry vision found to have multifocal acute CVA in L MCA , R parietal and R frontal centrum semiovale. Initially concern for TTP or catastrophic APS, however further work up notable for new diagnosis of metastatic lung adenocarcinoma with likely malignancy-related hypercoagulability   Team: [place] 3  Iso: n/a  Pain: Denies   Neuro: Alert and oriented x 4. R side 3/5, L side 5/5. R facial droop. Denies n/t             Cardiac: WDL        Respiratory: WNL, on RA   Labs: Awaiting morning lab draws   GI: LBM: 7/27, reports passing gas   : Voids without difficulty, uses urinal   Endo: BS ACHS   Diet: Regular diet   Activity: A1-2 w/ GB and pivot   Lines: L PIV SL   Skin Issues: L shin biopsy BECK, petechiae/rash throughout        Shift Updates: No acute events during shift. Waiting for Tumor genetic markers likely not until end of week (7/31-8/1). POC ongoing    "

## 2025-07-30 NOTE — PLAN OF CARE
Goal Outcome Evaluation:      Plan of Care Reviewed With: patient, parent    Overall Patient Progress: no changeOverall Patient Progress: no change    Outcome Evaluation: Px alert and oriented x4, able to make needs known. No c/o pain. Right sided weakness noted, needs help with ADLs. Ambulated from bed to wheelchair and vice versa, AO1-2 with gait belt. Went out of the unit with family using wheelchair. Platelet 50 this morning, transfusion was ordered and carried out. Father said hand orthosis should be used at bed time, as per OT recommendation. Continue with POC.

## 2025-07-30 NOTE — PLAN OF CARE
Status: Admitted 7/15 d/t lethargy, N/V, and blurry vision found to have multifocal acute CVA in L MCA, R parietal, and R frontal semiovale. Further workup notable for new diagnosis of metastatic lung adenocarcinoma.    Vitals: VSS ex HTN within parameters of SBP <160   Neuros: A&Ox4, strengths L side 5/5, R side 3/5, denies N/T, R droop, intermittent dysarthria   IV: PIV SL   Labs/Electrolytes: Platelets 59, BG ACHS   Resp: LSC on RA   Diet: Regular, good appetite  GI: LBM 7/27, passing gas  : Voiding spontaneously via urinal   Skin: L shin biopsy site BECK, rash/petechiae throughout  Pain: Denies   Activity: Ax1-2 w/GB pivot   SCDs on? If no, why?: Yes   Plan/Updates this shift: Worked with therapies, SLP signed off, continue with POC.

## 2025-07-31 ENCOUNTER — APPOINTMENT (OUTPATIENT)
Dept: PHYSICAL THERAPY | Facility: CLINIC | Age: 35
DRG: 064 | End: 2025-07-31
Payer: COMMERCIAL

## 2025-07-31 ENCOUNTER — APPOINTMENT (OUTPATIENT)
Dept: OCCUPATIONAL THERAPY | Facility: CLINIC | Age: 35
DRG: 064 | End: 2025-07-31
Payer: COMMERCIAL

## 2025-07-31 VITALS
WEIGHT: 240 LBS | RESPIRATION RATE: 18 BRPM | HEIGHT: 74 IN | OXYGEN SATURATION: 97 % | SYSTOLIC BLOOD PRESSURE: 124 MMHG | DIASTOLIC BLOOD PRESSURE: 50 MMHG | HEART RATE: 67 BPM | BODY MASS INDEX: 30.8 KG/M2 | TEMPERATURE: 98.2 F

## 2025-07-31 LAB
APTT PPP: 33 SECONDS (ref 22–38)
ERYTHROCYTE [DISTWIDTH] IN BLOOD BY AUTOMATED COUNT: 14.6 % (ref 10–15)
ERYTHROCYTE [DISTWIDTH] IN BLOOD BY AUTOMATED COUNT: 14.7 % (ref 10–15)
FIBRINOGEN PPP-MCNC: 315 MG/DL (ref 170–510)
GLUCOSE BLDC GLUCOMTR-MCNC: 102 MG/DL (ref 70–99)
GLUCOSE BLDC GLUCOMTR-MCNC: 104 MG/DL (ref 70–99)
GLUCOSE BLDC GLUCOMTR-MCNC: 132 MG/DL (ref 70–99)
GLUCOSE BLDC GLUCOMTR-MCNC: 133 MG/DL (ref 70–99)
HCT VFR BLD AUTO: 37.2 % (ref 40–53)
HCT VFR BLD AUTO: 38.5 % (ref 40–53)
HGB BLD-MCNC: 12.2 G/DL (ref 13.3–17.7)
HGB BLD-MCNC: 12.6 G/DL (ref 13.3–17.7)
INR PPP: 1.13 (ref 0.85–1.15)
MAGNESIUM SERPL-MCNC: 2 MG/DL (ref 1.7–2.3)
MCH RBC QN AUTO: 26.6 PG (ref 26.5–33)
MCH RBC QN AUTO: 26.6 PG (ref 26.5–33)
MCHC RBC AUTO-ENTMCNC: 32.7 G/DL (ref 31.5–36.5)
MCHC RBC AUTO-ENTMCNC: 32.8 G/DL (ref 31.5–36.5)
MCV RBC AUTO: 81 FL (ref 78–100)
MCV RBC AUTO: 81 FL (ref 78–100)
PHOSPHATE SERPL-MCNC: 3.9 MG/DL (ref 2.5–4.5)
PLATELET # BLD AUTO: 51 10E3/UL (ref 150–450)
PLATELET # BLD AUTO: 57 10E3/UL (ref 150–450)
POTASSIUM SERPL-SCNC: 4.5 MMOL/L (ref 3.4–5.3)
PROTHROMBIN TIME: 14.8 SECONDS (ref 11.8–14.8)
RBC # BLD AUTO: 4.59 10E6/UL (ref 4.4–5.9)
RBC # BLD AUTO: 4.73 10E6/UL (ref 4.4–5.9)
WBC # BLD AUTO: 11.1 10E3/UL (ref 4–11)
WBC # BLD AUTO: 11.6 10E3/UL (ref 4–11)

## 2025-07-31 PROCEDURE — 85730 THROMBOPLASTIN TIME PARTIAL: CPT

## 2025-07-31 PROCEDURE — 85014 HEMATOCRIT: CPT

## 2025-07-31 PROCEDURE — 85384 FIBRINOGEN ACTIVITY: CPT

## 2025-07-31 PROCEDURE — 250N000013 HC RX MED GY IP 250 OP 250 PS 637: Performed by: STUDENT IN AN ORGANIZED HEALTH CARE EDUCATION/TRAINING PROGRAM

## 2025-07-31 PROCEDURE — 250N000013 HC RX MED GY IP 250 OP 250 PS 637

## 2025-07-31 PROCEDURE — 97535 SELF CARE MNGMENT TRAINING: CPT | Mod: GO

## 2025-07-31 PROCEDURE — 36415 COLL VENOUS BLD VENIPUNCTURE: CPT

## 2025-07-31 PROCEDURE — 97530 THERAPEUTIC ACTIVITIES: CPT | Mod: GO

## 2025-07-31 PROCEDURE — 83735 ASSAY OF MAGNESIUM: CPT | Performed by: STUDENT IN AN ORGANIZED HEALTH CARE EDUCATION/TRAINING PROGRAM

## 2025-07-31 PROCEDURE — 99233 SBSQ HOSP IP/OBS HIGH 50: CPT | Mod: GC | Performed by: STUDENT IN AN ORGANIZED HEALTH CARE EDUCATION/TRAINING PROGRAM

## 2025-07-31 PROCEDURE — L3675 SO VEST CANVAS/WEB PRE OTS: HCPCS

## 2025-07-31 PROCEDURE — 85610 PROTHROMBIN TIME: CPT

## 2025-07-31 PROCEDURE — 84132 ASSAY OF SERUM POTASSIUM: CPT | Performed by: STUDENT IN AN ORGANIZED HEALTH CARE EDUCATION/TRAINING PROGRAM

## 2025-07-31 PROCEDURE — 250N000011 HC RX IP 250 OP 636

## 2025-07-31 PROCEDURE — 97530 THERAPEUTIC ACTIVITIES: CPT | Mod: GP | Performed by: PHYSICAL THERAPIST

## 2025-07-31 PROCEDURE — 84100 ASSAY OF PHOSPHORUS: CPT | Performed by: STUDENT IN AN ORGANIZED HEALTH CARE EDUCATION/TRAINING PROGRAM

## 2025-07-31 PROCEDURE — 97116 GAIT TRAINING THERAPY: CPT | Mod: GP | Performed by: PHYSICAL THERAPIST

## 2025-07-31 PROCEDURE — L1951 AFO SPIRAL PREFABRICATED: HCPCS

## 2025-07-31 PROCEDURE — 120N000002 HC R&B MED SURG/OB UMMC

## 2025-07-31 RX ORDER — MAGNESIUM OXIDE 400 MG/1
400 TABLET ORAL EVERY 4 HOURS
Status: COMPLETED | OUTPATIENT
Start: 2025-07-31 | End: 2025-07-31

## 2025-07-31 RX ADMIN — FLUTICASONE PROPIONATE 1 SPRAY: 50 SPRAY, METERED NASAL at 09:29

## 2025-07-31 RX ADMIN — Medication 7.5 MG: at 08:15

## 2025-07-31 RX ADMIN — FAMOTIDINE 10 MG: 10 TABLET ORAL at 08:09

## 2025-07-31 RX ADMIN — ACETAMINOPHEN 1000 MG: 500 TABLET ORAL at 02:25

## 2025-07-31 RX ADMIN — ROSUVASTATIN CALCIUM 10 MG: 10 TABLET, FILM COATED ORAL at 08:09

## 2025-07-31 RX ADMIN — FAMOTIDINE 10 MG: 10 TABLET ORAL at 20:24

## 2025-07-31 RX ADMIN — MAGNESIUM OXIDE TAB 400 MG (241.3 MG ELEMENTAL MG) 400 MG: 400 (241.3 MG) TAB at 16:16

## 2025-07-31 RX ADMIN — ACETAMINOPHEN 1000 MG: 500 TABLET ORAL at 20:24

## 2025-07-31 RX ADMIN — Medication 7.5 MG: at 20:25

## 2025-07-31 RX ADMIN — ACETAMINOPHEN 1000 MG: 500 TABLET ORAL at 16:16

## 2025-07-31 RX ADMIN — SENNOSIDES AND DOCUSATE SODIUM 2 TABLET: 50; 8.6 TABLET ORAL at 08:09

## 2025-07-31 RX ADMIN — ENOXAPARIN SODIUM 135 MG: 150 INJECTION SUBCUTANEOUS at 20:26

## 2025-07-31 RX ADMIN — ENOXAPARIN SODIUM 135 MG: 150 INJECTION SUBCUTANEOUS at 08:14

## 2025-07-31 RX ADMIN — MAGNESIUM OXIDE TAB 400 MG (241.3 MG ELEMENTAL MG) 400 MG: 400 (241.3 MG) TAB at 20:25

## 2025-07-31 RX ADMIN — Medication 7.5 MG: at 16:16

## 2025-07-31 ASSESSMENT — ACTIVITIES OF DAILY LIVING (ADL)
ADLS_ACUITY_SCORE: 44
ADLS_ACUITY_SCORE: 40
ADLS_ACUITY_SCORE: 48
ADLS_ACUITY_SCORE: 46
ADLS_ACUITY_SCORE: 44
ADLS_ACUITY_SCORE: 48
ADLS_ACUITY_SCORE: 46
ADLS_ACUITY_SCORE: 46
ADLS_ACUITY_SCORE: 44
ADLS_ACUITY_SCORE: 46
ADLS_ACUITY_SCORE: 44
ADLS_ACUITY_SCORE: 46
ADLS_ACUITY_SCORE: 46
ADLS_ACUITY_SCORE: 48
ADLS_ACUITY_SCORE: 44
ADLS_ACUITY_SCORE: 44
ADLS_ACUITY_SCORE: 46
ADLS_ACUITY_SCORE: 44
ADLS_ACUITY_SCORE: 44
ADLS_ACUITY_SCORE: 48
ADLS_ACUITY_SCORE: 46

## 2025-07-31 NOTE — PLAN OF CARE
Goal Outcome Evaluation:       Plan of Care Reviewed With: patient     Overall Patient Progress: no change     Status: Admitted 7/15 d/t lethargy, N/V, and blurry vision found to have multifocal acute CVA in L MCA, R parietal, and R frontal semiovale. Further workup notable for new diagnosis of metastatic lung adenocarcinoma.    Vitals: VSS ex HTN within parameters of SBP <160   Neuros: A&Ox4, strengths L side 5/5 RUE 2/5 RLE 3/5, denies N/T, R droop, intermittent dysarthria when tired  IV: PIV SL   Labs/Electrolytes: Platelets this am 57, BG checks ACHS. Mag being replaced.  Resp: LSC on RA   Diet: Regular- good intake. Takes pill whole in applesauce.   GI: LBM 7/30, BS+  : Voiding spontaneously via urinal   Skin: L shin biopsy site BECK, rash/petechiae throughout, but improving. Showered today with PT and RN. Once set up, pt was able to do approx 50% himself  Pain: Occasional sacral soreness when sitting in chair. Repo, Tylenol prn. Uses Geopad chair support  Activity: Ax2 with RLE splint when working with PT. Must wear RUE splint overnight.  SCDs on? If no, why?: no  Social: mom at bedside this afternoon  Plan/Updates this shift: Worked with therapies, SLP signed off, continue with POC.

## 2025-07-31 NOTE — PROVIDER NOTIFICATION
Provider Notified: Jabier Hill  Concern: DBP <65 andree, Most recent include 126/54 and recheck 122/59. Asymptomatic and no other changes   Reponse summary: aware  Time Notified: 0249   Time of Response:0342

## 2025-07-31 NOTE — PROGRESS NOTES
Orthotic Delivery Note    Patient Name: Bernard Chapman  MRN: [id number]  Date of Service: 7/29/25  Location: [facility] () 6A, Room 6212  Referring Provider: Carolina Randhawa MD  Devices Delivered:    Roman Romain WalkOn AFO - Size XL, Right    Roman Tyro Eri Neurexa Shoulder Brace - Size Large, Right    Delivery Summary:  Patient was seen in [facility], Room 6212 for delivery and fitting of two right-sided orthotic devices as ordered by Dr. Carolina Randhawa.    The right Roman Romain WalkOn AFO (Size XL) was fit to the patient s shoe and evaluated for alignment, comfort, and function. The fit was appropriate, and the patient tolerated the device well during the fitting.    The right Roman Romain Eri Neurexa Shoulder Brace (Size Large) was also fit to the patient. Proper adjustment was made to ensure shoulder support and comfort without impingement.    The patient was instructed on donning and doffing procedures, wear schedule, and care/cleaning guidelines for both devices. The fit of both orthoses appeared satisfactory at the time of delivery, and the patient voiced no concerns.  NATASHA Oh.

## 2025-07-31 NOTE — PLAN OF CARE
"/54 (BP Location: Left arm)   Pulse 59   Temp 98.2  F (36.8  C) (Oral)   Resp 18   Ht 1.88 m (6' 2\")   Wt 108.9 kg (240 lb)   SpO2 98%   BMI 30.81 kg/m      [id number]    Patient A&Ox4, on room air, assist x1-2, regular diet tolerated well with a good appetite, afebrile, VSS. Denies pain, nausea, and SOB. Voiding spontaneously, one BM this shift. Platelets finished infusing, no reactions. Right sided weakness, RUE 1/5, RLE 2/5. Spent much of the shift up in his chair. Continue POC.     "

## 2025-07-31 NOTE — PLAN OF CARE
Status: Admitted on 7/15/2025 for lethargy, N/V, and blurry vision found to have multifocal acute CVA in L MCA , R parietal and R frontal centrum semiovale. Work up notable for new diagnosis of metastatic lung adenocarcinoma with likely malignancy-related hypercoagulability   Vitals: VSS on RA ex/ MD notified for DBP <65 andree.   Neuros: DND NOC so formal neuro not completed. Most recent includes: A&Ox4. R. droop. L. side 5/5. RUE 1/5. RLE 2/5.   IV: PIV, SL   Resp: WNL on RA  Diet: Regular. Pills whole with applesauce   GI: Large BM 7/30   : Voids spontaneously without difficulty   Skin: L shin biopsy site BECK, rash/petechiae throughout   Pain: C/o body aches managed with repositioning and scheduled tylenol   Activity: A1-2/GB. Weighit shifting in bed as needed.   SCDs on? If no, why?: Yes   Plan: As EOD platelet transfusions are anticipated in addition to anticipated treatment for lung adenocarcinoma, TCU will be pursued-discharge anticipated 8/5.    Goal Outcome Evaluation:      Plan of Care Reviewed With: patient    Overall Patient Progress: no change    Outcome Evaluation: Anticipated discharge 8/5 to TCU

## 2025-07-31 NOTE — PROGRESS NOTES
Care Management Follow Up    Length of Stay (days): 16    Expected Discharge Date: 08/06/2025     Concerns to be Addressed: Discharge planning     Patient plan of care discussed at interdisciplinary rounds: Yes    Anticipated Discharge Disposition: OT, ST and Physical Therapy are recommending ARU placement.  However, as EOD platelet transfusions are anticipated in addition to anticipated treatment for lung adenocarcinoma, TCU will be pursued.      Anticipated Discharge Services:  OT, ST and Physical Therapy are recommending ARU placement.  However, as EOD platelet transfusions are anticipated in addition to anticipated treatment for lung adenocarcinoma, TCU will be pursued.   Anticipated Discharge DME:   Not applicable at this time     Patient/family educated on Medicare website which has current facility and service quality ratings: Yes  Education Provided on the Discharge Plan: Yes  Patient/Family in Agreement with the Plan: Yes    Referrals Placed by CM/SW: Post Acute Care Facilities  Private pay costs discussed:   Not applicable at this time    Discussed  Partnership in Safe Discharge Planning  document with patient/family: No     Handoff Completed: No, handoff not indicated or clinically appropriate    Additional Information:  SW received pt's Short Term Disability forms via fax.  TIRSO provided Dr. Cristine Nguyen with document to complete physicians portion of the application for completion and return by 8/1/2025.  TIRSO updated pt.      Next Steps:  SW will continue to follow for discharge planning.    BRUNILDA Jorgensen  Social Work, 6A  Phone:  [phone]  Pager:  [phone]  7/31/2025       DAR Muse

## 2025-08-01 ENCOUNTER — APPOINTMENT (OUTPATIENT)
Dept: OCCUPATIONAL THERAPY | Facility: CLINIC | Age: 35
DRG: 064 | End: 2025-08-01
Payer: COMMERCIAL

## 2025-08-01 ENCOUNTER — APPOINTMENT (OUTPATIENT)
Dept: PHYSICAL THERAPY | Facility: CLINIC | Age: 35
DRG: 064 | End: 2025-08-01
Payer: COMMERCIAL

## 2025-08-01 LAB
APTT PPP: 35 SECONDS (ref 22–38)
BLD PROD TYP BPU: NORMAL
BLOOD COMPONENT TYPE: NORMAL
CODING SYSTEM: NORMAL
ERYTHROCYTE [DISTWIDTH] IN BLOOD BY AUTOMATED COUNT: 14.6 % (ref 10–15)
FIBRINOGEN PPP-MCNC: 326 MG/DL (ref 170–510)
GLUCOSE BLDC GLUCOMTR-MCNC: 104 MG/DL (ref 70–99)
GLUCOSE BLDC GLUCOMTR-MCNC: 109 MG/DL (ref 70–99)
GLUCOSE BLDC GLUCOMTR-MCNC: 113 MG/DL (ref 70–99)
GLUCOSE BLDC GLUCOMTR-MCNC: 135 MG/DL (ref 70–99)
HCT VFR BLD AUTO: 38.7 % (ref 40–53)
HGB BLD-MCNC: 12.5 G/DL (ref 13.3–17.7)
INR PPP: 1.15 (ref 0.85–1.15)
ISSUE DATE AND TIME: NORMAL
MAGNESIUM SERPL-MCNC: 2.2 MG/DL (ref 1.7–2.3)
MCH RBC QN AUTO: 26.7 PG (ref 26.5–33)
MCHC RBC AUTO-ENTMCNC: 32.3 G/DL (ref 31.5–36.5)
MCV RBC AUTO: 83 FL (ref 78–100)
PHOSPHATE SERPL-MCNC: 3.7 MG/DL (ref 2.5–4.5)
PLAT MORPH BLD: ABNORMAL
PLATELET # BLD AUTO: 43 10E3/UL (ref 150–450)
POTASSIUM SERPL-SCNC: 4.7 MMOL/L (ref 3.4–5.3)
PROTHROMBIN TIME: 14.7 SECONDS (ref 11.8–14.8)
RBC # BLD AUTO: 4.68 10E6/UL (ref 4.4–5.9)
RBC MORPH BLD: ABNORMAL
UNIT ABO/RH: NORMAL
UNIT NUMBER: NORMAL
UNIT STATUS: NORMAL
UNIT TYPE ISBT: 6200
VARIANT LYMPHS BLD QL SMEAR: PRESENT
WBC # BLD AUTO: 11.2 10E3/UL (ref 4–11)

## 2025-08-01 PROCEDURE — 85018 HEMOGLOBIN: CPT

## 2025-08-01 PROCEDURE — P9037 PLATE PHERES LEUKOREDU IRRAD: HCPCS | Performed by: STUDENT IN AN ORGANIZED HEALTH CARE EDUCATION/TRAINING PROGRAM

## 2025-08-01 PROCEDURE — 250N000013 HC RX MED GY IP 250 OP 250 PS 637

## 2025-08-01 PROCEDURE — 250N000013 HC RX MED GY IP 250 OP 250 PS 637: Performed by: STUDENT IN AN ORGANIZED HEALTH CARE EDUCATION/TRAINING PROGRAM

## 2025-08-01 PROCEDURE — 36415 COLL VENOUS BLD VENIPUNCTURE: CPT | Performed by: STUDENT IN AN ORGANIZED HEALTH CARE EDUCATION/TRAINING PROGRAM

## 2025-08-01 PROCEDURE — 85384 FIBRINOGEN ACTIVITY: CPT

## 2025-08-01 PROCEDURE — 97530 THERAPEUTIC ACTIVITIES: CPT | Mod: GP | Performed by: PHYSICAL THERAPIST

## 2025-08-01 PROCEDURE — 97535 SELF CARE MNGMENT TRAINING: CPT | Mod: GO

## 2025-08-01 PROCEDURE — 85610 PROTHROMBIN TIME: CPT

## 2025-08-01 PROCEDURE — 84100 ASSAY OF PHOSPHORUS: CPT | Performed by: STUDENT IN AN ORGANIZED HEALTH CARE EDUCATION/TRAINING PROGRAM

## 2025-08-01 PROCEDURE — 250N000011 HC RX IP 250 OP 636

## 2025-08-01 PROCEDURE — 84132 ASSAY OF SERUM POTASSIUM: CPT | Performed by: STUDENT IN AN ORGANIZED HEALTH CARE EDUCATION/TRAINING PROGRAM

## 2025-08-01 PROCEDURE — 99233 SBSQ HOSP IP/OBS HIGH 50: CPT | Performed by: STUDENT IN AN ORGANIZED HEALTH CARE EDUCATION/TRAINING PROGRAM

## 2025-08-01 PROCEDURE — 120N000002 HC R&B MED SURG/OB UMMC

## 2025-08-01 PROCEDURE — 97530 THERAPEUTIC ACTIVITIES: CPT | Mod: GO

## 2025-08-01 PROCEDURE — 83735 ASSAY OF MAGNESIUM: CPT | Performed by: STUDENT IN AN ORGANIZED HEALTH CARE EDUCATION/TRAINING PROGRAM

## 2025-08-01 PROCEDURE — 97116 GAIT TRAINING THERAPY: CPT | Mod: GP | Performed by: PHYSICAL THERAPIST

## 2025-08-01 PROCEDURE — 85730 THROMBOPLASTIN TIME PARTIAL: CPT

## 2025-08-01 RX ORDER — BACLOFEN 5 MG/1
5 TABLET ORAL 3 TIMES DAILY
Status: DISCONTINUED | OUTPATIENT
Start: 2025-08-01 | End: 2025-08-21 | Stop reason: HOSPADM

## 2025-08-01 RX ADMIN — FAMOTIDINE 10 MG: 10 TABLET ORAL at 20:01

## 2025-08-01 RX ADMIN — ENOXAPARIN SODIUM 135 MG: 150 INJECTION SUBCUTANEOUS at 08:30

## 2025-08-01 RX ADMIN — SENNOSIDES AND DOCUSATE SODIUM 1 TABLET: 50; 8.6 TABLET ORAL at 20:01

## 2025-08-01 RX ADMIN — Medication 7.5 MG: at 08:27

## 2025-08-01 RX ADMIN — HYDROCHLOROTHIAZIDE 25 MG: 12.5 TABLET ORAL at 08:26

## 2025-08-01 RX ADMIN — ACETAMINOPHEN 1000 MG: 500 TABLET ORAL at 23:43

## 2025-08-01 RX ADMIN — ENOXAPARIN SODIUM 135 MG: 150 INJECTION SUBCUTANEOUS at 20:01

## 2025-08-01 RX ADMIN — FAMOTIDINE 10 MG: 10 TABLET ORAL at 08:25

## 2025-08-01 RX ADMIN — Medication 2.5 MG: at 15:49

## 2025-08-01 RX ADMIN — Medication 5 MG: at 15:49

## 2025-08-01 RX ADMIN — ROSUVASTATIN CALCIUM 10 MG: 10 TABLET, FILM COATED ORAL at 08:26

## 2025-08-01 RX ADMIN — FLUTICASONE PROPIONATE 1 SPRAY: 50 SPRAY, METERED NASAL at 08:43

## 2025-08-01 RX ADMIN — ACETAMINOPHEN 1000 MG: 500 TABLET ORAL at 06:33

## 2025-08-01 RX ADMIN — ACETAMINOPHEN 1000 MG: 500 TABLET ORAL at 18:58

## 2025-08-01 RX ADMIN — Medication 5 MG: at 20:01

## 2025-08-01 RX ADMIN — Medication 2.5 MG: at 20:01

## 2025-08-01 ASSESSMENT — ACTIVITIES OF DAILY LIVING (ADL)
ADLS_ACUITY_SCORE: 42
ADLS_ACUITY_SCORE: 44
ADLS_ACUITY_SCORE: 42
ADLS_ACUITY_SCORE: 44
ADLS_ACUITY_SCORE: 42

## 2025-08-01 NOTE — PROGRESS NOTES
[facility]    Progress Note - Medicine Service, MAROON TEAM 3       Date of Admission:  7/15/2025    Assessment & Plan   Bernard Chapman is a 35 year old male with a past medical history of hypertension, diabetes, ELENA, PE/DVT (on rivaroxaban) admitted on 7/15/2025 for lethargy, N/V, and blurry vision found to have multifocal acute CVA in L MCA , R parietal and R frontal centrum semiovale. Initially concern for TTP or catastrophic APS, however further work up notable for new diagnosis of metastatic lung adenocarcinoma with likely malignancy-related hypercoagulability.    Today:  - Tumor genetic markers likely by end of day today  - heme onc to see tomorrow and discuss w family and patient  - platelet transfusion today x1  - cont daily labs    #Metastatic lung adenocarcinoma  #Right lung nodule  #Multiple enlarged mediastinal, paraesophageal, and hilar lymph nodes  Malignancy work up initiated on admission due to significant hypercoagulability. CT CAP significant for [id number] spiculated density in R lung apex, 9mm pulmonary nodule in R lung base, L thyroid nodule, paratracheal LAD, splenic infarct, bilateral renal infarcts, and L retroperitoneal nodule. Underwent FNA of lymph node which was positive for lung adenocarcinoma (resulted 7/22).  PDL-1 resulted with low expression (40%), awaiting other molecular studies before final treatment plan can be determined. PET 7/26 with distant metastases. Oncology looking at NGS before final recs.   -Oncology consulted, appreciate recs   - NGS pending   - Guardant 360 pending  - Due to ongoing transfusion needs, ARU not feasible. Will either need inpatient chemotherapy or home discharge with oral chemo, pending NGS results.  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 TTE with mobile echodensities on tricuspid and aortic valve. Initial concern for TTP or catastrophic APS, however work up ultimately significant for new diagnosis of metastatic lung cancer. Hypercoagulable state presumed to be most likely 2/2 malignancy. Started on heparin gtt 7/17, but briefly held for 7/18 endobronchial needle aspiration of lymph nodes. Stroke code called 7/19 for worsening R-sided hemiparesis and hemianopia. Repeat CTA with new focal high-grade stenosis of other part of left MCA. Heparin gtt was stopped during code stroke, but restarting (heparin gtt + levophed) led to 10/10 headache. CTH with small left frontal SAH. Brain MRI with new L basal ganglia stroke. Heparin gtt resumed, and repeat CTH stable. Switched to therapeutic Lovenox 7/22. CTH still stable 7/23. Declining fibrinogen, so received cryoprecipitate 7/25. Concern for continued consumptive process, but on therapeutic Lovenox. PLT downtrending, so receiving transfusions (1 unit 7/26, 1 unit 7/27, 1 unit 7/28, 1 unit 7/30). No new symptoms. CTM.  - Hematology consulted   -Transfuse if   -Hgb<7  -INR<1.5  -PLT<50k  -Fibrinogen<100  - SC Lovenox 135 mg bid   -qdaily CBC  -daily fibrinogen, INR, PTT      #Maculopapular rash on face, chest, bilateral upper extremities, improved  # Numerous palpable purpura across the scalp and lower extremities   # Petechiae on bilateral lower extremities  C/f palpable purpura, so s/p L leg s/p punch biopsy with dermatology: rupture folliculitis. No definite features of cutaneous small vessel vasculitis or thrombotic vasculopathy. New rash noted 7/26 with  flushing of face/chest after PET scan. Also scattered papules of face and upper extremities (in addition to petechiae  of BLE). Not itchy and no signs of respiratory distress, but suspected  secondary to contrast from PET scan, .  Rash appeared several hours after unit of platelets, less likely due to a transfusion reaction. photos present in chart. The following days, papules are consistent with his usual acne. No diffuse erythema. Some c/f SVC syndrome given distrubution of flushing and positional nature (normally HOB elevated when asleep, so PET was first time that he had prolonged flat/supine position). CTM.   Plan:  - Benadryl as needed for itching  - Chlorhexidine wash  - Will continue to monitor    #Multifocal acute ischemic strokes, in L MCA territory and new L basal ganglia stroke; Increasing mass effect on L lateral ventricle by L MCA infarction  #Subtotal occlusive thrombus in left M1   #Left frontoparietal SAH (7/19)  #Hypertension chronic  #HLD, chronic  Etiology most likely hypercoagulability d/t malignancy. Emboli stemming from tricuspid and aortic valves, appreciated on TTE 7/16. He continues to have R facial droop, extremity weakness. No apparent aphasia, but he is mildly dysarthric. Left frontoparietal subarachnoid hemorrhage stable per CT. Working with therapies with notable improvement.   > Neuro-ICU signing off   - SBP goal of 120 - 160 mmHg and DBP goal < 100    -Hold PTA antihypertensives    -Has prn labetalol and hydralazine  - cont 10 mg rosuvastatin  > OT/PT/Speech   -Since ARU dispo delayed, asked therapies to do aggressive rehabilitation as able  > nutrition consult   -monitor oral intake, weights  - repeat TTE outpatient to ensure improvement of AR and TR (and vegetations)    #DM2, A1c 5.3  On HSSI with adequate control after stopping dexamethasone. CTM.   - POC glucose ACHS  - High intensity insulin sliding scale    #ELENA  Need to clarify home CPAP settings.    #Hiccups  #chronic back pain  Chronic back pain. On acetaminophen only (given frequent need for neuro checks). Hiccups resolved (suspected 2/2  "dexamethasone). Will continue baclofen and prn thorazine, but may be able to decrease in coming days.   Plan:  - Thorazine 25mg q6hr prn  - Melatonin 3mg daily  - Continue Pepcid  - continue  baclofen  - acetaminophen 1000 mg every 6 hours  - hold cyclobenzaprine 10 mg 3x daily PRN  - hold PTA gabapentin 100 mg 3x daily  - hold PTA oxycodone 5 mg PRN every 6h     #allergies  - fluticasone 1 spray daily     #HTN  - Continue PTA Metoprolol succinate 25mg q daily (ok to hold if HR< 60)  - Hold PTA Losartan, could consider restarting vs discontinuing given normotensive  - Continue PTA HCTZ          Diet: Regular Diet Adult    DVT Prophylaxis: Enoxaparin (Lovenox) SQ  Avalos Catheter: Not present  Fluids: None  Lines: None     Cardiac Monitoring: None  Code Status: Full Code      Clinically Significant Risk Factors                 # Thrombocytopenia: Lowest platelets = 43 in last 2 days, will monitor for bleeding              # Obesity: Estimated body mass index is 30.81 kg/m  as calculated from the following:    Height as of this encounter: 1.88 m (6' 2\").    Weight as of this encounter: 108.9 kg (240 lb).        # Financial/Environmental Concerns: none         Social Drivers of Health          Disposition Plan     Medically Ready for Discharge: Anticipated in 2-4 Days           Luisito Aden MD  Medicine Service, [facility] TEAM [address] [place] [address]  Securely message with [employer] (more info)  Text page via [facility] Paging/Directory   See signed in provider for up to date coverage information  ______________________________________________________________________    Interval History   No acute events overnight. Spoke with father and patient. Had questions regarding current treatment plan. Answered questions. Patient denies any new neuro changes    Physical Exam   Vital Signs: Temp: 98.2  F (36.8  C) Temp src: Oral BP: 133/61 Pulse: 62   Resp: 18 SpO2: 99 % O2 Device: None (Room air)  "   Weight: 240 lbs 0 oz    GENERAL: In chair next to bed, no acute distress  RESP: normal WOB on RA  CV:  Extremities appear well-perfused, RUE swelling is stable  GI: Soft, non-distended, non tender  NEURO: Alert, Follows 2 step commands,  normal LUE movement, no RUE movement, unable to wiggle right sided toes, right sided facial droop, mild dysarthria, no dysphonia, EOMI grossly, pupils symmetric  SKIN: Erythematous papules scattered on face c/w acne, bilateral petechiae present    Medical Decision Making       Please see A&P for additional details of medical decision making.      Data     I have personally reviewed the following data over the past 24 hrs:    11.2 (H)  \   12.5 (L)   / 43 (LL)     N/A N/A N/A /  104 (H)   4.7 N/A N/A \     INR:  1.15 PTT:  35   D-dimer:  N/A Fibrinogen:  326

## 2025-08-01 NOTE — PLAN OF CARE
Goal Outcome Evaluation:      Plan of Care Reviewed With: patient    Overall Patient Progress: improvingOverall Patient Progress: improving    Outcome Evaluation: VSS on RA, oriented x4    Status: Admitted on 7/15/2025 for lethargy, N/V, and blurry vision found to have multifocal acute CVA in L MCA , R parietal and R frontal centrum semiovale. Work up notable for new diagnosis of metastatic lung adenocarcinoma with likely malignancy-related hypercoagulability   Vitals: VSS on RA   Neuros: DND; no formal neuro completed. Most recent includes: A&O x4. R. droop. L. side 5/5. RUE 2/5. RLE 3/5 with absent dorsi  IV: Left PIV, SL   Resp: LSC on RA  Diet: Regular; takes pills whole with applesauce   GI: Large BM 7/30   : Voids spontaneously without difficulty via urinal - standing to use urinal  Skin: L shin biopsy site BECK, rash/petechiae throughout - getting better  Pain: Denies, scheduled tylenol given  Activity: A2/GB. Weighit shifting in bed as needed.   SCDs on? If no, why?: Yes   Plan: EOD platelet transfusions are anticipated in addition to anticipated treatment for lung adenocarcinoma, TCU will be pursued. Continue to monitor and follow POC

## 2025-08-01 NOTE — PROGRESS NOTES
"SPIRITUAL HEALTH SERVICES - Progress Note  [facility]    Referral Source: Staff Referral    Attempted to visit Bernard Chapman \"Tera\" and introduced spiritual health services. Tera doesn't desire a visit today.  Tera's father was also present and requested that I come by on a \"daily\" basis to check in with Tera, \"especially when family is not there.\"    Plan:  will follow patient for future care needs while he is admitted. Spiritual Health Services remain available on request. Please place a standard consult order on Epic.    Brooke Bush,   Chaplain Resident    [facility] routine referrals *[phone]   [facility] available 24/7 for emergent requests/referrals, either by paging the on-call  or by entering an ASAP/STAT consult in Epic (this will also page the on-call ).   "

## 2025-08-01 NOTE — PLAN OF CARE
"Goal Outcome Evaluation: Progressing    /64 (Cuff Size: Adult Regular)   Pulse 66   Temp 98  F (36.7  C) (Oral)   Resp 16   Ht 1.88 m (6' 2\")   Wt 108.9 kg (240 lb)   SpO2 99%   BMI 30.81 kg/m         Plan of Care Reviewed With: patient    Overall Patient Progress: improvingOverall Patient Progress: improving    Outcome Evaluation: Pt is alert and oriented x4. VSS. afebrile. Denies pain. Petichiae noted on RLE. Patients platelets were critical at 43. I bag of Platelets transfused per MD order. Pt ate 100 % of all meals. Up in the chair most of the day, is resting after lunch. No insulin coverage given. Pt denies any nausea or shortness of breath. No neuro changes noted. Pt dad at the bedside- supportive. No further concerns at this time. will continue to monitor closely.          "

## 2025-08-01 NOTE — PROGRESS NOTES
[facility]    Progress Note - Medicine Service, [facility] TEAM 3       Date of Admission:  7/15/2025    Assessment & Plan   Bernard Chapman is a 35 year old male with a past medical history of hypertension, diabetes, ELENA, PE/DVT (on rivaroxaban) admitted on 7/15/2025 for lethargy, N/V, and blurry vision found to have multifocal acute CVA in L MCA , R parietal and R frontal centrum semiovale. Initially concern for TTP or catastrophic APS, however further work up notable for new diagnosis of metastatic lung adenocarcinoma with likely malignancy-related hypercoagulability.    Today:  - Tumor genetic markers likely not until end of week (7/31-8/1)  - Daily Labs   - Placed hold parameters on metoprolol     #Metastatic lung adenocarcinoma  #Right lung nodule  #Multiple enlarged mediastinal, paraesophageal, and hilar lymph nodes  Malignancy work up initiated on admission due to significant hypercoagulability. CT CAP significant for [id number] spiculated density in R lung apex, 9mm pulmonary nodule in R lung base, L thyroid nodule, paratracheal LAD, splenic infarct, bilateral renal infarcts, and L retroperitoneal nodule. Underwent FNA of lymph node which was positive for lung adenocarcinoma (resulted 7/22).  PDL-1 resulted with low expression (40%), awaiting other molecular studies before final treatment plan can be determined. PET 7/26 with distant metastases. Oncology looking at NGS before final recs.   -Oncology consulted, appreciate recs   - NGS pending   - Guardant 360 pending  - Due to ongoing transfusion needs, ARU not feasible. Will either need inpatient chemotherapy or home discharge with oral chemo, pending NGS results.  - 2nd opinion at [place] (start process as NGS results return)     #Hypercoagulability d/t metastatic lung adenocarcinoma  #Infarcts of brain, kidney, spleen  #Cardiac valve thrombus on tricuspid and aortic valve 7/16 on TIMO  #Hx pulmonary embolism  (6/23/25)  #Acute thrombocytopenia, in the setting of metastatic lung adenocarcinoma d/t ITP vs drug-induced vs consumptive coagulopathy  #Acute anemia  Found to have multiple CVA, likely embolic. TTE with mobile echodensities on tricuspid and aortic valve. Initial concern for TTP or catastrophic APS, however work up ultimately significant for new diagnosis of metastatic lung cancer. Hypercoagulable state presumed to be most likely 2/2 malignancy. Started on heparin gtt 7/17, but briefly held for 7/18 endobronchial needle aspiration of lymph nodes. Stroke code called 7/19 for worsening R-sided hemiparesis and hemianopia. Repeat CTA with new focal high-grade stenosis of other part of left MCA. Heparin gtt was stopped during code stroke, but restarting (heparin gtt + levophed) led to 10/10 headache. CTH with small left frontal SAH. Brain MRI with new L basal ganglia stroke. Heparin gtt resumed, and repeat CTH stable. Switched to therapeutic Lovenox 7/22. CTH still stable 7/23. Declining fibrinogen, so received cryoprecipitate 7/25. Concern for continued consumptive process, but on therapeutic Lovenox. PLT downtrending, so receiving transfusions (1 unit 7/26, 1 unit 7/27, 1 unit 7/28, 1 unit 7/30). No new symptoms. CTM.  - Hematology consulted   -Transfuse if   -Hgb<7  -INR<1.5  -PLT<50k  -Fibrinogen<100  - SC Lovenox 135 mg bid   -qdaily CBC  -daily fibrinogen, INR, PTT      #Maculopapular rash on face, chest, bilateral upper extremities, improved  # Numerous palpable purpura across the scalp and lower extremities   # Petechiae on bilateral lower extremities  C/f palpable purpura, so s/p L leg s/p punch biopsy with dermatology: rupture folliculitis. No definite features of cutaneous small vessel vasculitis or thrombotic vasculopathy. New rash noted 7/26 with  flushing of face/chest after PET scan. Also scattered papules of face and upper extremities (in addition to petechiae of BLE). Not itchy and no signs of  respiratory distress, but suspected  secondary to contrast from PET scan, .  Rash appeared several hours after unit of platelets, less likely due to a transfusion reaction. photos present in chart. The following days, papules are consistent with his usual acne. No diffuse erythema. Some c/f SVC syndrome given distrubution of flushing and positional nature (normally HOB elevated when asleep, so PET was first time that he had prolonged flat/supine position). CTM.   Plan:  - Benadryl as needed for itching  - Chlorhexidine wash  - Will continue to monitor    #Multifocal acute ischemic strokes, in L MCA territory and new L basal ganglia stroke; Increasing mass effect on L lateral ventricle by L MCA infarction  #Subtotal occlusive thrombus in left M1   #Left frontoparietal SAH (7/19)  #Hypertension chronic  #HLD, chronic  Etiology most likely hypercoagulability d/t malignancy. Emboli stemming from tricuspid and aortic valves, appreciated on TTE 7/16. He continues to have R facial droop, extremity weakness. No apparent aphasia, but he is mildly dysarthric. Left frontoparietal subarachnoid hemorrhage stable per CT. Working with therapies with notable improvement.   > Neuro-ICU signing off   - SBP goal of 120 - 160 mmHg and DBP goal < 100    -Hold PTA antihypertensives    -Has prn labetalol and hydralazine  - cont 10 mg rosuvastatin  > OT/PT/Speech   -Since ARU dispo delayed, asked therapies to do aggressive rehabilitation as able  > nutrition consult   -monitor oral intake, weights  - repeat TTE outpatient to ensure improvement of AR and TR (and vegetations)    #DM2, A1c 5.3  On HSSI with adequate control after stopping dexamethasone. CTM.   - POC glucose ACHS  - High intensity insulin sliding scale    #ELENA  Need to clarify home CPAP settings.    #Hiccups  #chronic back pain  Chronic back pain. On acetaminophen only (given frequent need for neuro checks). Hiccups resolved (suspected 2/2 dexamethasone). Will continue  "baclofen and prn thorazine, but may be able to decrease in coming days.   Plan:  - Thorazine 25mg q6hr prn  - Melatonin 3mg daily  - Continue Pepcid  - continue  baclofen  - acetaminophen 1000 mg every 6 hours  - hold cyclobenzaprine 10 mg 3x daily PRN  - hold PTA gabapentin 100 mg 3x daily  - hold PTA oxycodone 5 mg PRN every 6h     #allergies  - fluticasone 1 spray daily     #HTN  - Continue PTA Metoprolol succinate 25mg q daily (ok to hold if HR< 60)  - Hold PTA Losartan, could consider restarting vs discontinuing given normotensive  - Continue PTA HCTZ          Diet: Regular Diet Adult    DVT Prophylaxis: Enoxaparin (Lovenox) SQ  Avalos Catheter: Not present  Fluids: None  Lines: None     Cardiac Monitoring: None  Code Status: Full Code      Clinically Significant Risk Factors                 # Thrombocytopenia: Lowest platelets = 50 in last 2 days, will monitor for bleeding              # Obesity: Estimated body mass index is 30.81 kg/m  as calculated from the following:    Height as of this encounter: 1.88 m (6' 2\").    Weight as of this encounter: 108.9 kg (240 lb).        # Financial/Environmental Concerns: none         Social Drivers of Health          Disposition Plan     Medically Ready for Discharge: Anticipated in 2-4 Days         The patient's care was discussed with the Attending Physician, Dr. Aden.    Cristine Nguyen MD  Medicine Service, [address]  Securely message with [employer] (more info)  Text page via [employer] Paging/Directory   See signed in provider for up to date coverage information  ______________________________________________________________________    Interval History   No acute events overnight. Spoke with mother and patient. Had questions regarding current treatment plan. Answered questions. Patient denies any new neuro changes but mother did note some tremors present in his Right hand     Physical Exam   Vital Signs: Temp: " 98.2  F (36.8  C) Temp src: Oral BP: 124/50 Pulse: 67   Resp: 18 SpO2: 97 % O2 Device: None (Room air)    Weight: 240 lbs 0 oz    GENERAL: In shower chair, no acute distress  RESP: normal WOB on RA  CV:  Extremities appear well-perfused, RUE swelling is stable  GI: Soft, non-distended, non tender  NEURO: Alert, Follows 2 step commands,  normal LUE movement, no RUE movement, unable to wiggle right sided toes, right sided facial droop, mild dysarthria, no dysphonia, EOMI grossly, pupils symmetric  SKIN: Erythematous papules scattered on face c/w acne, bilateral petechiae present    Medical Decision Making       Please see A&P for additional details of medical decision making.      Data     I have personally reviewed the following data over the past 24 hrs:    11.6 (H)  \   12.6 (L)   / 51 (L)     N/A N/A N/A /  104 (H)   4.5 N/A N/A \     INR:  1.13 PTT:  33   D-dimer:  N/A Fibrinogen:  315

## 2025-08-01 NOTE — PROGRESS NOTES
BRIEF ONCOLOGY NOTE      Still awaiting NGS to determine treatment plan. Per molecular lab, report to be signed out by end of the day today. Updated Bernard and his mother that we will see them tomorrow to discuss results.     Mima Randolph PA-C  Hematology/Oncology  Pager: [phone]

## 2025-08-02 ENCOUNTER — APPOINTMENT (OUTPATIENT)
Dept: PHYSICAL THERAPY | Facility: CLINIC | Age: 35
DRG: 064 | End: 2025-08-02
Payer: COMMERCIAL

## 2025-08-02 ENCOUNTER — APPOINTMENT (OUTPATIENT)
Dept: OCCUPATIONAL THERAPY | Facility: CLINIC | Age: 35
DRG: 064 | End: 2025-08-02
Payer: COMMERCIAL

## 2025-08-02 LAB
APTT PPP: 33 SECONDS (ref 22–38)
ERYTHROCYTE [DISTWIDTH] IN BLOOD BY AUTOMATED COUNT: 14.6 % (ref 10–15)
FIBRINOGEN PPP-MCNC: 350 MG/DL (ref 170–510)
GLUCOSE BLDC GLUCOMTR-MCNC: 111 MG/DL (ref 70–99)
GLUCOSE BLDC GLUCOMTR-MCNC: 112 MG/DL (ref 70–99)
GLUCOSE BLDC GLUCOMTR-MCNC: 177 MG/DL (ref 70–99)
GLUCOSE BLDC GLUCOMTR-MCNC: 98 MG/DL (ref 70–99)
HCT VFR BLD AUTO: 37.4 % (ref 40–53)
HGB BLD-MCNC: 12.3 G/DL (ref 13.3–17.7)
INR PPP: 1.13 (ref 0.85–1.15)
LAB GUARDANT ONC MSI-HIGH: NOT DETECTED
LAB GUARDANT ONC TMB: 7.57 MUT/MB
Lab: NORMAL
MAGNESIUM SERPL-MCNC: 2.1 MG/DL (ref 1.7–2.3)
MCH RBC QN AUTO: 26.6 PG (ref 26.5–33)
MCHC RBC AUTO-ENTMCNC: 32.9 G/DL (ref 31.5–36.5)
MCV RBC AUTO: 81 FL (ref 78–100)
PHOSPHATE SERPL-MCNC: 4.1 MG/DL (ref 2.5–4.5)
PLATELET # BLD AUTO: 51 10E3/UL (ref 150–450)
POTASSIUM SERPL-SCNC: 4.3 MMOL/L (ref 3.4–5.3)
PROTHROMBIN TIME: 14.4 SECONDS (ref 11.8–14.8)
RBC # BLD AUTO: 4.62 10E6/UL (ref 4.4–5.9)
WBC # BLD AUTO: 10.8 10E3/UL (ref 4–11)

## 2025-08-02 PROCEDURE — 97116 GAIT TRAINING THERAPY: CPT | Mod: GP | Performed by: PHYSICAL THERAPIST

## 2025-08-02 PROCEDURE — 97530 THERAPEUTIC ACTIVITIES: CPT | Mod: GP | Performed by: PHYSICAL THERAPIST

## 2025-08-02 PROCEDURE — 250N000013 HC RX MED GY IP 250 OP 250 PS 637

## 2025-08-02 PROCEDURE — 97112 NEUROMUSCULAR REEDUCATION: CPT | Mod: GO

## 2025-08-02 PROCEDURE — 97535 SELF CARE MNGMENT TRAINING: CPT | Mod: GO

## 2025-08-02 PROCEDURE — 99233 SBSQ HOSP IP/OBS HIGH 50: CPT | Mod: GC | Performed by: HOSPITALIST

## 2025-08-02 PROCEDURE — 250N000011 HC RX IP 250 OP 636

## 2025-08-02 PROCEDURE — 85384 FIBRINOGEN ACTIVITY: CPT

## 2025-08-02 PROCEDURE — 84100 ASSAY OF PHOSPHORUS: CPT | Performed by: STUDENT IN AN ORGANIZED HEALTH CARE EDUCATION/TRAINING PROGRAM

## 2025-08-02 PROCEDURE — 36415 COLL VENOUS BLD VENIPUNCTURE: CPT | Performed by: STUDENT IN AN ORGANIZED HEALTH CARE EDUCATION/TRAINING PROGRAM

## 2025-08-02 PROCEDURE — 85610 PROTHROMBIN TIME: CPT

## 2025-08-02 PROCEDURE — 99233 SBSQ HOSP IP/OBS HIGH 50: CPT | Mod: GC | Performed by: STUDENT IN AN ORGANIZED HEALTH CARE EDUCATION/TRAINING PROGRAM

## 2025-08-02 PROCEDURE — 85027 COMPLETE CBC AUTOMATED: CPT

## 2025-08-02 PROCEDURE — 250N000013 HC RX MED GY IP 250 OP 250 PS 637: Performed by: STUDENT IN AN ORGANIZED HEALTH CARE EDUCATION/TRAINING PROGRAM

## 2025-08-02 PROCEDURE — 120N000002 HC R&B MED SURG/OB UMMC

## 2025-08-02 PROCEDURE — 84132 ASSAY OF SERUM POTASSIUM: CPT | Performed by: STUDENT IN AN ORGANIZED HEALTH CARE EDUCATION/TRAINING PROGRAM

## 2025-08-02 PROCEDURE — 85730 THROMBOPLASTIN TIME PARTIAL: CPT

## 2025-08-02 PROCEDURE — 83735 ASSAY OF MAGNESIUM: CPT | Performed by: STUDENT IN AN ORGANIZED HEALTH CARE EDUCATION/TRAINING PROGRAM

## 2025-08-02 RX ADMIN — Medication 5 MG: at 13:11

## 2025-08-02 RX ADMIN — Medication 5 MG: at 20:07

## 2025-08-02 RX ADMIN — FLUTICASONE PROPIONATE 1 SPRAY: 50 SPRAY, METERED NASAL at 08:12

## 2025-08-02 RX ADMIN — FAMOTIDINE 10 MG: 10 TABLET ORAL at 08:42

## 2025-08-02 RX ADMIN — Medication 2.5 MG: at 13:11

## 2025-08-02 RX ADMIN — ROSUVASTATIN CALCIUM 10 MG: 10 TABLET, FILM COATED ORAL at 08:43

## 2025-08-02 RX ADMIN — ACETAMINOPHEN 1000 MG: 500 TABLET ORAL at 13:11

## 2025-08-02 RX ADMIN — ENOXAPARIN SODIUM 135 MG: 150 INJECTION SUBCUTANEOUS at 20:07

## 2025-08-02 RX ADMIN — HYDROCHLOROTHIAZIDE 25 MG: 12.5 TABLET ORAL at 08:42

## 2025-08-02 RX ADMIN — Medication 2.5 MG: at 20:11

## 2025-08-02 RX ADMIN — Medication 2.5 MG: at 08:43

## 2025-08-02 RX ADMIN — ACETAMINOPHEN 1000 MG: 500 TABLET ORAL at 07:07

## 2025-08-02 RX ADMIN — FAMOTIDINE 10 MG: 10 TABLET ORAL at 20:07

## 2025-08-02 RX ADMIN — Medication 5 MG: at 08:43

## 2025-08-02 RX ADMIN — ENOXAPARIN SODIUM 135 MG: 150 INJECTION SUBCUTANEOUS at 08:14

## 2025-08-02 RX ADMIN — METOPROLOL SUCCINATE 25 MG: 25 TABLET, EXTENDED RELEASE ORAL at 08:43

## 2025-08-02 ASSESSMENT — ACTIVITIES OF DAILY LIVING (ADL)
ADLS_ACUITY_SCORE: 42

## 2025-08-02 NOTE — PLAN OF CARE
Status: Admitted 7/15 d/t lethargy, N/V, and blurry vision found to have multifocal acute CVA in L MCA, R parietal, and R frontal semiovale. Further workup notable for new diagnosis of metastatic lung adenocarcinoma.    Vitals: VSS ex HTN within parameters of SBP <160   Neuros: A&Ox4, strengths L side 5/5 RUE 2/5 RLE 3/5, denies N/T, R droop, intermittent dysarthria when tired  IV: PIV SL   Labs/Electrolytes: 1 unit of platelets transfused this AM for critical of 43, recheck in AM. BG checks ACHS  Resp: LSC on RA   Diet: Regular- good intake. Takes pills whole in applesauce.   GI: LBM today   : Voiding spontaneously via urinal   Skin: L shin biopsy site BECK, rash/petechiae throughout, but improving.   Pain: Occasional sacral soreness when sitting in chair. Repo, Tylenol prn. Uses Geopad chair support  Activity: Ax2 with RLE splint when working with PT. Must wear RUE splint overnight.  Social: friends visited this afternoon  Plan/Updates this shift: waiting on tumor markers for tx plan

## 2025-08-02 NOTE — PROGRESS NOTES
VS normal range, pain is under controled no additional pain medication was administrated, had fair oral intake with family's help he could eat by himself, HS is controled no insulin administration was needed, PT and OT with walked Redig way status stable

## 2025-08-02 NOTE — PLAN OF CARE
Goal Outcome Evaluation:      Plan of Care Reviewed With: patient    Overall Patient Progress: improvingOverall Patient Progress: improving    Outcome Evaluation: Hem/oncology awaiting NGS to determine treatment plan with patient and family today        Status: Admitted on 7/15/2025 for lethargy, N/V, and blurry vision found to have multifocal acute CVA in L MCA , R parietal and R frontal centrum semiovale. Work up notable for new diagnosis of metastatic lung adenocarcinoma with likely malignancy-related hypercoagulability   Vitals: VSS on RA   Neuros: DND 2200 - 0600. Most recent neuro includes: A&O x4, mild dysarthria. R. droop. Denies N/T; L. side 5/5, RUE 2/5. RLE 3-4/5 with generalized weakness. Needs help ordering  IV: Left PIV, SL   Resp: LSC on RA  Diet: Regular; takes pills whole with applesauce   GI: Large BM 7/30   : Voids spontaneously without difficulty via urinal  Skin: L shin biopsy site BECK, rash/petechiae throughout - getting better  Pain: Denies, scheduled tylenol given  Activity: A2/GB. Weighit shifting in bed as needed. Spline on overnight  SCDs on? If no, why?: Yes, intermittently  Plan: Hem/Onc awaiting NGS result to discuss treatment plan with patient and family today. Per Medicine note. Pt will either need inpatient chemotherapy or home discharge with oral chemo, pending NGS results. Continue with POC

## 2025-08-02 NOTE — PROGRESS NOTES
Solid Tumor Oncology Consult Service  Progress Note   Date of Service: 08/02/2025  Patient: Bernard Chapman  MRN: [id number]  Admission Date: 7/15/2025  Hospital Day # 18  Cancer Diagnosis: Metastatic lung adenocarcinoma   Primary Outpatient Oncologist: MARKOS   Current Treatment Plan: MARKOS      Summary & Recommendations:   - Discussed results of NGS testing with Tera and his parents today which showed a ROS1 rearrangement - plan to start taletrectinib (oral TKI) as soon as approved - ordered today, 8/2   - Continue therapeutic Lovenox with plt transfusion support (to keep plt >50K). Hopeful that platelets will improve with treatment of underlying malignancy (although treatment does have side effect of cytopenias, is extremely effective in setting of ROS1 rearrangement and hopeful we will be able to treat through possible transient worsening of thrombocytopenia with transfusion support)   - Plan to discuss MRI brain read with neuroradiology on Monday to confirm that there is low suspicion for any CNS metastasis in setting of multiple strokes/assess whether any metastasis could be masked by MRI findings of strokes     Assessment & Plan:   Bernard Chapman is a 35 year old year old male with a past medical history of HTN, DM, ELENA, PE/DVT 6/22/25 (previously on rivaroxaban) who presented 7/15/2025 with new neurological symptoms and found acute multifocal ischemic strokes, partial occlusive L MCA thrombus, L frontal SAH, and in setting of TCP c/f ITP vs catastrophic antiphospholipid syndrome (CAPS) vs malignancy s/p PLEX x7/16. CT CAP 7/16/2025 revealing LAD of hilar, mediastinal, and paraesophageal LNs and spiculated-appearing nodule of R apical lung s/p EBUS-TBNA 7/18 revealing metastatic lung adenocarcinoma, NGS revealed ROS1 rearrangement - planning to initiate treatment with taletrectinib as soon as possible while inpatient.      # New metastatic lung adenocarcinoma, ROS1 rearrangement   #  Thrombocytopenia  # Anemia  CT CAP 7/16/2025 revealing a spiculated appearing nodule within R lung apex ~11 mm, multiple enlarged mediastinal, hilar and paraesophageal lymph nodes, along with 11 mm soft tissue density in the left retroperitoneum. Underwent EBUS-TBNA 7/18 revealing metastatic lung adenocarcinoma, + for CK7 and TTF-1. MRI brain without evidence of metastasis. Obtained PET on 7/26. Reviewed with radiologist which showed metastatic disease in L scapula, L retroperitoneum, R adrenal and L2. Areas are small enough where radiation is not required at this time. PD-L1 TPS at 40% consistent with low PD-L1 expression. Per d/w benign hematology, given severe thrombocytopenia requiring transfusions and hypercoagulable state with course complicated by multifocal strokes and PE 2/2 malignancy, plan to initiate treatment while inpatient with taletrectinib as soon as approved given ROS1 rearrangement found on NGS. Discussed with Tera and his parents 8/2 that response rate to this medication is 85-90%, with PFS measured in years on average.   - Taletrectinib ordered - to start inpatient as soon as approved  - Follow up Guardant 360   - Lesions are too small for radiation at this time   - Outpatient oncology referral placed, follow for scheduling       # H/o DVT/PE  Presented 6/22/25 with chest pain and SOB and found to have PE and lower extremity DVTs, he was placed on apixaban though had an allergic reaction and rotated to Xarelto.   - Agree with AC (Lovenox) per primary team and hematology      Patient was seen and plan of care was discussed with attending physician Dr. Mtz.    Thank you for the opportunity to partake in this patient's plan of care. Please do not hesitate to page with questions. We will continue to follow.     Rafia Rea MD  Hematology/Oncology Fellow PGY4  _____________________________________________________    Subjective & Interval History:    No acute events noted overnight. Discussed  "results of NGS testing with Tera and his parents today and plan to initiate treatment with taletrectinib as detailed above. Answered all questions at this time. Discussed we will go through details of medication when approved prior to starting.     Physical Exam:    Blood pressure 124/64, pulse 73, temperature 98.1  F (36.7  C), temperature source Oral, resp. rate 16, height 1.88 m (6' 2\"), weight 108.9 kg (240 lb), SpO2 97%.    Constitutional: Sitting in chair, NAD  HEENT: NCAT  Respiratory: Breathing comfortably on room air  Neuro: Alert, answers questions appropriately. Moves extremities spontaneously.  Psych: Appropriate affect     Labs & Studies: I personally reviewed the following studies:  Oncology fusion gene NGS, 7/22/25:  RESULTS See result below Abnormal    Fusion Event: POSITIVE      INTERPRETATION    This sample is positive for a ROS1 gene rearrangement (SDC4::ROS1)      The detected fusion event joins exon 2 of SDC4 (5' partner) with exon 32 of ROS1 (3' partner), corresponding to approximately 78% of the total reads analyzed (major isoform).  Two additional isoforms corresponding to [id number] and QXO0zkwj9::[id number] fusions, respectively, were also detected (corresponding to less than 20% of the total reads).      ROS1 gene rearrangements have been reported in diverse cancer types including non-small-cell lung cancer (NSCLC), and with a notably higher prevalence in lung adenocarcinoma. Importantly, FDA-approved tyrosine kinase inhibitors have demonstrated efficacy in treatment of ROS1-rearranged NSCLC (PMID: 64689358). However it is not clear if coexistence of more than two types of ROS1 rearrangements affects therapeutic response to TKI therapy. A recent case report (PMID: 93635789) showed that coexistence of YVF9qqch5::BAX9yvba 34 and SDC4 exon2::ROS1 exon35 fusions showed suboptimal response to crizotinib therapy when compared to that seen with the TJE7jpsd4::DLR7qmje 32 fusion alone. "      Molecular Description  The following chromosomal coordinates describe the exon::exon junction(s) of the major isoform fusion partner in the most prevalent cDNA transcript for the reported gene fusion event:     [id number],chr6:[id number]  chr20:[id number],chr6:[id number]  chr20:[id number],chr6:[id number]         This assay detects gene fusion events based on the assessment of cDNA; the reported coordinates describe specific exon-exon joins in the detected transcripts for the positive gene fusion target(s).  The reported coordinates do not specifically describe the underlying genomic (DNA) breakpoint, but do describe a reference interval in which the genomic alteration occurs.      ROUTINE LABS (Last four results):  [facility]  Recent Labs   Lab 08/02/25  1704 08/02/25  1136 08/02/25  0821 08/02/25  0705 08/01/25  2205 08/01/25  0725 08/01/25  0717 07/31/25  0756 07/31/25  0625 07/30/25  0815 07/30/25  0642 07/29/25  0803 07/29/25  0608 07/28/25  0711 07/28/25  0651 07/27/25  0808 07/27/25  0706   NA  --   --   --   --   --   --   --   --   --   --   --   --  139  --  137  --  138   POTASSIUM  --   --   --  4.3  --   --  4.7  --  4.5  --  4.6  --  4.3  --  4.3  --  4.0   CHLORIDE  --   --   --   --   --   --   --   --   --   --   --   --  106  --  107  --  109*   CO2  --   --   --   --   --   --   --   --   --   --   --   --  22  --  21*  --  22   ANIONGAP  --   --   --   --   --   --   --   --   --   --   --   --  11  --  9  --  7   * 111* 112*  --  135*   < >  --    < >  --    < >  --    < > 115*   < > 110*   < > 107*   BUN  --   --   --   --   --   --   --   --   --   --   --   --  21.7*  --  23.5*  --  25.0*   CR  --   --   --   --   --   --   --   --   --   --   --   --  0.88  --  0.81  --  0.90   GFRESTIMATED  --   --   --   --   --   --   --   --   --   --   --   --  >90  --  >90  --  >90   SHARAN  --   --   --   --   --   --   --   --   --   --   --   --  8.8  --  8.4*  --  8.1*   MAG  --   --   --  2.1  --    --  2.2  --  2.0  --  2.0  --  2.1  --  2.1  --  2.1   PHOS  --   --   --  4.1  --   --  3.7  --  3.9  --  3.6  --  3.2  --  3.1  --  3.9    < > = values in this interval not displayed.     CBC  Recent Labs   Lab 08/02/25  0705 08/01/25  0717 07/31/25  1813 07/31/25 0625   WBC 10.8 11.2* 11.6* 11.1*   RBC 4.62 4.68 4.73 4.59   HGB 12.3* 12.5* 12.6* 12.2*   HCT 37.4* 38.7* 38.5* 37.2*   MCV 81 83 81 81   MCH 26.6 26.7 26.6 26.6   MCHC 32.9 32.3 32.7 32.8   RDW 14.6 14.6 14.6 14.7   PLT 51* 43* 51* 57*     INR  Recent Labs   Lab 08/02/25  0705 08/01/25  0717 07/31/25  0625 07/29/25  1834   INR 1.13 1.15 1.13 1.23*     Medications list for reference:  Current Facility-Administered Medications   Medication Dose Route Frequency Provider Last Rate Last Admin    acetaminophen (TYLENOL) tablet 1,000 mg  1,000 mg Oral or Feeding Tube Q6H Dee Sahh MD   1,000 mg at 08/02/25 1311    Baclofen (LIORESAL) tablet 5 mg  5 mg Oral TID Luisito Aden MD   5 mg at 08/02/25 1311    And    baclofen (LIORESAL) half-tablet 2.5 mg  2.5 mg Oral TID Luisito Aden MD   2.5 mg at 08/02/25 1311    bisacodyl (DULCOLAX) suppository 10 mg  10 mg Rectal Daily PRN Dee Shah MD        chlorhexidine (HIBICLENS) 4 % solution   Topical Daily PRN Dee Shah MD        chlorproMAZINE (THORAZINE) tablet 25 mg  25 mg Oral or Feeding Tube Q6H PRN Yamile Gutierrez APRN CNP   25 mg at 07/26/25 2009    cyclobenzaprine (FLEXERIL) tablet 10 mg  10 mg Oral TID PRN Marjan Michel MD        glucose gel 15-30 g  15-30 g Oral Q15 Min PRN Dee Shah MD        Or    dextrose 50 % injection 25-50 mL  25-50 mL Intravenous Q15 Min PRN Dee Shah MD        Or    glucagon injection 1 mg  1 mg Subcutaneous Q15 Min PRN Dee Shah MD        diphenhydrAMINE (BENADRYL) capsule 25 mg  25 mg Oral or Feeding Tube Q6H PRN Dee Shah MD   25 mg at 07/26/25 1228    Or    diphenhydrAMINE (BENADRYL) injection 25 mg  25 mg Intravenous Q6H PRN  Dee Shah MD        enoxaparin ANTICOAGULANT (LOVENOX) injection 135 mg  1 mg/kg Subcutaneous Q12H Dee Shah MD   135 mg at 08/02/25 0814    famotidine (PEPCID) tablet 10 mg  10 mg Oral BID Dee Shah MD   10 mg at 08/02/25 0842    fluticasone (FLONASE) 50 MCG/ACT spray 1 spray  1 spray Both Nostrils Daily Dee Shah MD   1 spray at 08/02/25 0812    gabapentin (NEURONTIN) capsule 100 mg  100 mg Oral TID PRN Marjan Michel MD        hydrALAZINE (APRESOLINE) injection 10 mg  10 mg Intravenous Q30 Min PRN Dee Shah MD   10 mg at 07/23/25 0203    hydroCHLOROthiazide tablet 25 mg  25 mg Oral Daily Marjan Michel MD   25 mg at 08/02/25 0842    insulin aspart (NovoLOG) injection (RAPID ACTING)  1-10 Units Subcutaneous TID AC Dee Shah MD   2 Units at 08/02/25 1712    insulin aspart (NovoLOG) injection (RAPID ACTING)  1-7 Units Subcutaneous At Bedtime Dee Shah MD   3 Units at 07/24/25 2200    labetalol (NORMODYNE/TRANDATE) injection 10 mg  10 mg Intravenous Q10 Min PRN Dee Shah MD        [Held by provider] losartan (COZAAR) tablet 100 mg  100 mg Oral Daily Dee Shah MD        magnesium hydroxide (MILK OF MAGNESIA) suspension 30 mL  30 mL Oral or Feeding Tube Daily PRN Dee Shah MD        melatonin tablet 3 mg  3 mg Oral At Bedtime PRN Dee Shah MD   3 mg at 07/22/25 0123    metoprolol succinate ER (TOPROL XL) 24 hr tablet 25 mg  25 mg Oral Daily Francisco Prather DO   25 mg at 08/02/25 0843    naloxone (NARCAN) injection 0.2 mg  0.2 mg Intravenous Q2 Min PRN Carine Matt MD        Or    naloxone (NARCAN) injection 0.4 mg  0.4 mg Intravenous Q2 Min PRN Carine Matt MD        Or    naloxone (NARCAN) injection 0.2 mg  0.2 mg Intramuscular Q2 Min PRN Carine Matt MD        Or    naloxone (NARCAN) injection 0.4 mg  0.4 mg Intramuscular Q2 Min PRN Carine Matt MD        ondansetron (ZOFRAN ODT) ODT tab 4 mg  4 mg Oral Q6H  PRN Dee Shah MD        Or    ondansetron (ZOFRAN) injection 4 mg  4 mg Intravenous Q6H PRN Dee Shah MD        oxyCODONE (ROXICODONE) tablet 5 mg  5 mg Oral Q6H PRN Marjan Michel MD        polyethylene glycol (MIRALAX) Packet 17 g  17 g Oral or Feeding Tube Daily Dee Shah MD   17 g at 07/29/25 0835    prochlorperazine (COMPAZINE) injection 10 mg  10 mg Intravenous Q6H PRN Dee Shah MD        Or    prochlorperazine (COMPAZINE) tablet 10 mg  10 mg Oral or Feeding Tube Q6H PRN Dee Shah MD        rosuvastatin (CRESTOR) tablet 10 mg  10 mg Oral or Feeding Tube Daily Dee Shah MD   10 mg at 08/02/25 0843    senna-docusate (SENOKOT-S/PERICOLACE) 8.6-50 MG per tablet 1-2 tablet  1-2 tablet Oral or Feeding Tube BID Dee Shah MD   1 tablet at 08/01/25 2001    simethicone (MYLICON) chewable half-tab 40 mg  40 mg Oral Q6H PRN Dee Shah MD   40 mg at 07/25/25 1957    sodium chloride (PF) 0.9% PF flush 3 mL  3 mL Intracatheter Q8H MOHINDER Dee Shah MD   3 mL at 08/01/25 2344    sodium chloride (PF) 0.9% PF flush 3 mL  3 mL Intracatheter q1 min prn Dee Shah MD   3 mL at 07/31/25 2207

## 2025-08-03 ENCOUNTER — APPOINTMENT (OUTPATIENT)
Dept: OCCUPATIONAL THERAPY | Facility: CLINIC | Age: 35
DRG: 064 | End: 2025-08-03
Payer: COMMERCIAL

## 2025-08-03 ENCOUNTER — APPOINTMENT (OUTPATIENT)
Dept: PHYSICAL THERAPY | Facility: CLINIC | Age: 35
DRG: 064 | End: 2025-08-03
Payer: COMMERCIAL

## 2025-08-03 LAB
BLD PROD TYP BPU: NORMAL
BLOOD COMPONENT TYPE: NORMAL
CODING SYSTEM: NORMAL
ERYTHROCYTE [DISTWIDTH] IN BLOOD BY AUTOMATED COUNT: 14.6 % (ref 10–15)
GLUCOSE BLDC GLUCOMTR-MCNC: 105 MG/DL (ref 70–99)
GLUCOSE BLDC GLUCOMTR-MCNC: 116 MG/DL (ref 70–99)
GLUCOSE BLDC GLUCOMTR-MCNC: 161 MG/DL (ref 70–99)
GLUCOSE BLDC GLUCOMTR-MCNC: 98 MG/DL (ref 70–99)
HCT VFR BLD AUTO: 38.5 % (ref 40–53)
HGB BLD-MCNC: 12.7 G/DL (ref 13.3–17.7)
HOLD SPECIMEN: NORMAL
ISSUE DATE AND TIME: NORMAL
MCH RBC QN AUTO: 26.7 PG (ref 26.5–33)
MCHC RBC AUTO-ENTMCNC: 33 G/DL (ref 31.5–36.5)
MCV RBC AUTO: 81 FL (ref 78–100)
PLAT MORPH BLD: ABNORMAL
PLATELET # BLD AUTO: 45 10E3/UL (ref 150–450)
POLYCHROMASIA BLD QL SMEAR: SLIGHT
RBC # BLD AUTO: 4.76 10E6/UL (ref 4.4–5.9)
RBC MORPH BLD: ABNORMAL
UNIT ABO/RH: NORMAL
UNIT NUMBER: NORMAL
UNIT STATUS: NORMAL
UNIT TYPE ISBT: 6200
WBC # BLD AUTO: 11.4 10E3/UL (ref 4–11)

## 2025-08-03 PROCEDURE — 250N000013 HC RX MED GY IP 250 OP 250 PS 637

## 2025-08-03 PROCEDURE — 250N000011 HC RX IP 250 OP 636

## 2025-08-03 PROCEDURE — 120N000002 HC R&B MED SURG/OB UMMC

## 2025-08-03 PROCEDURE — 97535 SELF CARE MNGMENT TRAINING: CPT | Mod: GO

## 2025-08-03 PROCEDURE — 250N000013 HC RX MED GY IP 250 OP 250 PS 637: Performed by: STUDENT IN AN ORGANIZED HEALTH CARE EDUCATION/TRAINING PROGRAM

## 2025-08-03 PROCEDURE — 999N000127 HC STATISTIC PERIPHERAL IV START W US GUIDANCE

## 2025-08-03 PROCEDURE — 99233 SBSQ HOSP IP/OBS HIGH 50: CPT | Mod: GC | Performed by: STUDENT IN AN ORGANIZED HEALTH CARE EDUCATION/TRAINING PROGRAM

## 2025-08-03 PROCEDURE — 97116 GAIT TRAINING THERAPY: CPT | Mod: GP

## 2025-08-03 PROCEDURE — 97530 THERAPEUTIC ACTIVITIES: CPT | Mod: GP

## 2025-08-03 PROCEDURE — 85014 HEMATOCRIT: CPT

## 2025-08-03 PROCEDURE — 36415 COLL VENOUS BLD VENIPUNCTURE: CPT

## 2025-08-03 PROCEDURE — P9037 PLATE PHERES LEUKOREDU IRRAD: HCPCS

## 2025-08-03 PROCEDURE — 97112 NEUROMUSCULAR REEDUCATION: CPT | Mod: GO

## 2025-08-03 RX ADMIN — Medication 2.5 MG: at 14:52

## 2025-08-03 RX ADMIN — ACETAMINOPHEN 1000 MG: 500 TABLET ORAL at 08:43

## 2025-08-03 RX ADMIN — Medication 2.5 MG: at 08:07

## 2025-08-03 RX ADMIN — ACETAMINOPHEN 1000 MG: 500 TABLET ORAL at 02:43

## 2025-08-03 RX ADMIN — ROSUVASTATIN CALCIUM 10 MG: 10 TABLET, FILM COATED ORAL at 08:07

## 2025-08-03 RX ADMIN — Medication 5 MG: at 20:26

## 2025-08-03 RX ADMIN — ACETAMINOPHEN 1000 MG: 500 TABLET ORAL at 20:27

## 2025-08-03 RX ADMIN — Medication 5 MG: at 14:52

## 2025-08-03 RX ADMIN — ENOXAPARIN SODIUM 135 MG: 150 INJECTION SUBCUTANEOUS at 08:11

## 2025-08-03 RX ADMIN — HYDROCHLOROTHIAZIDE 25 MG: 12.5 TABLET ORAL at 08:07

## 2025-08-03 RX ADMIN — Medication 2.5 MG: at 20:26

## 2025-08-03 RX ADMIN — Medication 5 MG: at 08:07

## 2025-08-03 RX ADMIN — SENNOSIDES AND DOCUSATE SODIUM 1 TABLET: 50; 8.6 TABLET ORAL at 20:27

## 2025-08-03 RX ADMIN — FLUTICASONE PROPIONATE 1 SPRAY: 50 SPRAY, METERED NASAL at 08:06

## 2025-08-03 RX ADMIN — ENOXAPARIN SODIUM 135 MG: 150 INJECTION SUBCUTANEOUS at 20:26

## 2025-08-03 RX ADMIN — FAMOTIDINE 10 MG: 10 TABLET ORAL at 08:07

## 2025-08-03 RX ADMIN — ACETAMINOPHEN 1000 MG: 500 TABLET ORAL at 14:51

## 2025-08-03 RX ADMIN — FAMOTIDINE 10 MG: 10 TABLET ORAL at 20:26

## 2025-08-03 ASSESSMENT — ACTIVITIES OF DAILY LIVING (ADL)
ADLS_ACUITY_SCORE: 42
ADLS_ACUITY_SCORE: 42
ADLS_ACUITY_SCORE: 43
ADLS_ACUITY_SCORE: 42
ADLS_ACUITY_SCORE: 43
ADLS_ACUITY_SCORE: 42
ADLS_ACUITY_SCORE: 43
ADLS_ACUITY_SCORE: 42
ADLS_ACUITY_SCORE: 43
ADLS_ACUITY_SCORE: 42
ADLS_ACUITY_SCORE: 42
ADLS_ACUITY_SCORE: 38
ADLS_ACUITY_SCORE: 43
ADLS_ACUITY_SCORE: 42
ADLS_ACUITY_SCORE: 42
ADLS_ACUITY_SCORE: 43
ADLS_ACUITY_SCORE: 38
ADLS_ACUITY_SCORE: 47
ADLS_ACUITY_SCORE: 38
ADLS_ACUITY_SCORE: 38

## 2025-08-03 NOTE — PROGRESS NOTES
[facility]    Progress Note - Medicine Service, [facility] TEAM 3       Date of Admission:  7/15/2025    Assessment & Plan   Bernard Chapman is a 35 year old male with a past medical history of hypertension, diabetes, ELENA, PE/DVT (on rivaroxaban) admitted on 7/15/2025 for lethargy, N/V, and blurry vision found to have multifocal acute CVA in L MCA , R parietal and R frontal centrum semiovale. Initially concern for TTP or catastrophic APS, however further work up notable for new diagnosis of metastatic lung adenocarcinoma with likely malignancy-related hypercoagulability.    Today:  - Taletrectinib ordered through onc - to start inpatient as soon as approved   - cont daily labs  - Begin process of 2nd opinion at [facility] now genetics are back   - s/p 1U of platelets      #Metastatic lung adenocarcinoma  #Right lung nodule  #Multiple enlarged mediastinal, paraesophageal, and hilar lymph nodes  Malignancy work up initiated on admission due to significant hypercoagulability. CT CAP significant for [id number] spiculated density in R lung apex, 9mm pulmonary nodule in R lung base, L thyroid nodule, paratracheal LAD, splenic infarct, bilateral renal infarcts, and L retroperitoneal nodule. Underwent FNA of lymph node which was positive for lung adenocarcinoma (resulted 7/22).  PDL-1 resulted with low expression (40%), awaiting other molecular studies before final treatment plan can be determined. PET 7/26 with distant metastases. Oncology looking at NGS before final recs.   -Oncology consulted, appreciate recs   - NGS; positive for a ROS1 gene rearrangement (SDC4::ROS1)     - Guardant 360 pending  - Due to ongoing transfusion needs, ARU not feasible.   - Taletrectinib ordered through onc - to start inpatient as soon as approved   - 2nd opinion at [facility] (start process )     #Hypercoagulability d/t metastatic lung adenocarcinoma  #Infarcts of brain, kidney, spleen  #Cardiac valve thrombus  on tricuspid and aortic valve 7/16 on TIMO  #Hx pulmonary embolism (6/23/25)  #Acute thrombocytopenia, in the setting of metastatic lung adenocarcinoma d/t ITP vs drug-induced vs consumptive coagulopathy  #Acute anemia  Found to have multiple CVA, likely embolic. TTE with mobile echodensities on tricuspid and aortic valve. Initial concern for TTP or catastrophic APS, however work up ultimately significant for new diagnosis of metastatic lung cancer. Hypercoagulable state presumed to be most likely 2/2 malignancy. Started on heparin gtt 7/17, but briefly held for 7/18 endobronchial needle aspiration of lymph nodes. Stroke code called 7/19 for worsening R-sided hemiparesis and hemianopia. Repeat CTA with new focal high-grade stenosis of other part of left MCA. Heparin gtt was stopped during code stroke, but restarting (heparin gtt + levophed) led to 10/10 headache. CTH with small left frontal SAH. Brain MRI with new L basal ganglia stroke. Heparin gtt resumed, and repeat CTH stable. Switched to therapeutic Lovenox 7/22. CTH still stable 7/23. Declining fibrinogen, so received cryoprecipitate 7/25. Concern for continued consumptive process, but on therapeutic Lovenox. PLT downtrending, so receiving transfusions (1 unit 7/26, 1 unit 7/27, 1 unit 7/28, 1 unit 7/30,1 unit 8/1, 1 unit 8/3). No new symptoms. CTM.  - Hematology consulted   -Transfuse if   -Hgb<7  -INR<1.5  -PLT<50k  -Fibrinogen<100  - SC Lovenox 135 mg bid   -qdaily CBC  -daily fibrinogen, INR, PTT      #Maculopapular rash on face, chest, bilateral upper extremities, improved  # Numerous palpable purpura across the scalp and lower extremities   # Petechiae on bilateral lower extremities  C/f palpable purpura, so s/p L leg s/p punch biopsy with dermatology: rupture folliculitis. No definite features of cutaneous small vessel vasculitis or thrombotic vasculopathy. New rash noted 7/26 with  flushing of face/chest after PET scan. Also scattered papules of face  and upper extremities (in addition to petechiae of BLE). Not itchy and no signs of respiratory distress, but suspected  secondary to contrast from PET scan, .  Rash appeared several hours after unit of platelets, less likely due to a transfusion reaction. photos present in chart. The following days, papules are consistent with his usual acne. No diffuse erythema. Some c/f SVC syndrome given distrubution of flushing and positional nature (normally HOB elevated when asleep, so PET was first time that he had prolonged flat/supine position). CTM.   Plan:  - Benadryl as needed for itching  - Chlorhexidine wash  - Will continue to monitor    #Multifocal acute ischemic strokes, in L MCA territory and new L basal ganglia stroke; Increasing mass effect on L lateral ventricle by L MCA infarction  #Subtotal occlusive thrombus in left M1   #Left frontoparietal SAH (7/19)  #Hypertension chronic  #HLD, chronic  Etiology most likely hypercoagulability d/t malignancy. Emboli stemming from tricuspid and aortic valves, appreciated on TTE 7/16. He continues to have R facial droop, extremity weakness. No apparent aphasia, but he is mildly dysarthric. Left frontoparietal subarachnoid hemorrhage stable per CT. Working with therapies with notable improvement.   > Neuro-ICU signing off   - SBP goal of 120 - 160 mmHg and DBP goal < 100    -Hold PTA antihypertensives    -Has prn labetalol and hydralazine  - cont 10 mg rosuvastatin  > OT/PT/Speech   -Since ARU dispo delayed, asked therapies to do aggressive rehabilitation as able  > nutrition consult   -monitor oral intake, weights  - repeat TTE outpatient to ensure improvement of AR and TR (and vegetations)    #DM2, A1c 5.3  On HSSI with adequate control after stopping dexamethasone. CTM.   - POC glucose ACHS  - High intensity insulin sliding scale    #ELENA  Need to clarify home CPAP settings.    #Hiccups  #chronic back pain  Chronic back pain. On acetaminophen only (given frequent need for  "neuro checks). Hiccups resolved (suspected 2/2 dexamethasone). Will continue baclofen and prn thorazine, but may be able to decrease in coming days.   Plan:  - Thorazine 25mg q6hr prn  - Melatonin 3mg daily  - Continue Pepcid  - continue  baclofen  - acetaminophen 1000 mg every 6 hours  - hold cyclobenzaprine 10 mg 3x daily PRN  - hold PTA gabapentin 100 mg 3x daily  - hold PTA oxycodone 5 mg PRN every 6h     #allergies  - fluticasone 1 spray daily     #HTN  - Continue PTA Metoprolol succinate 25mg q daily (ok to hold if HR< 60)  - Hold PTA Losartan, could consider restarting vs discontinuing given normotensive  - Continue PTA HCTZ          Diet: Regular Diet Adult    DVT Prophylaxis: Enoxaparin (Lovenox) SQ  Avalos Catheter: Not present  Fluids: None  Lines: None     Cardiac Monitoring: None  Code Status: Full Code      Clinically Significant Risk Factors                 # Thrombocytopenia: Lowest platelets = 45 in last 2 days, will monitor for bleeding              # Obesity: Estimated body mass index is 30.81 kg/m  as calculated from the following:    Height as of this encounter: 1.88 m (6' 2\").    Weight as of this encounter: 108.9 kg (240 lb).        # Financial/Environmental Concerns: none         Social Drivers of Health          Disposition Plan     Medically Ready for Discharge: Anticipated in 2-4 Days       Cristine Nguyen MD  Medicine Service, [facility] TEAM [address] [place] [address]  Securely message with [employer] (more info)  Text page via [facility] Paging/Directory   See signed in provider for up to date coverage information  ______________________________________________________________________    Interval History   No acute overnight events. Patient states that he is doing ok and has no concerns. Family present in room had questions referral to [facility]. Discussed with family regarding genetic study after their discussion with Oncology. Addressed questions as able. Family and " patient in agreement with plan.    Physical Exam   Vital Signs: Temp: 98.4  F (36.9  C) Temp src: Oral BP: 125/59 Pulse: 65   Resp: 16 SpO2: 99 % O2 Device: None (Room air)    Weight: 240 lbs 0 oz    GENERAL: In chair next to bed, no acute distress  RESP: CTAB  CV:  Extremities appear well-perfused, RUE swelling is stable  GI: Soft, non-distended, non tender  NEURO: Alert, Follows 2 step commands,  normal LUE movement, no RUE movement, unable to wiggle right sided toes, Right quadricept improved to 4/5,  right sided facial droop less than prior , mild dysarthria, no dysphonia, EOMI grossly, pupils symmetric  SKIN: Erythematous papules scattered on face c/w acne, bilateral petechiae present    Medical Decision Making       Please see A&P for additional details of medical decision making.      Data     I have personally reviewed the following data over the past 24 hrs:    11.4 (H)  \   12.7 (L)   / 45 (LL)     N/A N/A N/A /  161 (H)   N/A N/A N/A \

## 2025-08-03 NOTE — PLAN OF CARE
Status: Admitted 7/15 d/t lethargy, N/V, and blurry vision found to have multifocal acute CVA in L MCA, R parietal, and R frontal semiovale. Further workup notable for new diagnosis of metastatic lung adenocarcinoma.    Vitals: VSS  Neuros: A&Ox4, strengths L side 5/5 RUE 3/5 RLE 3-4/5, denies N/T, R droop, intermittent dysarthria  IV: PIV SL   Labs/Electrolytes: Platelets 51 today. BG checks ACHS  Resp: LSC on RA   Diet: Regular- good intake. Takes pills whole in applesauce.   GI: LBM 8/1  : Voiding spontaneously via urinal   Skin: L shin biopsy site BECK, rash/petechiae throughout, but improving.   Pain: Occasional sacral soreness when sitting in chair. Repo, Tylenol prn. Uses Geopad chair support  Activity: Ax2 GB+cane. Must wear RUE splint overnight.  Social: family visited this afternoon  Plan/Updates this shift: follow POC

## 2025-08-03 NOTE — PLAN OF CARE
Goal Outcome Evaluation:      Plan of Care Reviewed With: patient    Overall Patient Progress: no changeOverall Patient Progress: no change     Status: Admitted 7/15 d/t lethargy, N/V, and blurry vision found to have multifocal acute CVA in L MCA, R parietal, and R frontal semiovale. Further workup notable for new diagnosis of metastatic lung adenocarcinoma.    Vitals: VSS  Neuros: A&Ox4, strengths L side 5/5 RUE 3/5 RLE 3-4/5, denies N/T, R droop, intermittent dysarthria  IV: PIV SL   Labs/Electrolytes: Platelets 51. BG checks ACHS.   Resp: LSC on RA   Diet: Regular- good intake. Takes pills whole in applesauce.   GI: LBM 8/1  : Voiding spontaneously via urinal   Skin: L shin biopsy site BECK, rash/petechiae throughout, but improving.   Pain: Occasional sacral soreness when sitting in chair. Repo, Tylenol prn. Uses Geopad chair support  Activity: Ax2 GB+cane. Wears RUE splint overnight.  Social: family visited this afternoon  Plan/Updates this shift: follow POC

## 2025-08-03 NOTE — PLAN OF CARE
Goal Outcome Evaluation:      Plan of Care Reviewed With: patient, family    Overall Patient Progress: improving    Outcome Evaluation: plt of 45 this AM, 1 unit of plts given this shift    Status: admitted on 7/15/2025 for lethargy, N/V, and blurry vision found to have multifocal acute CVA in L MCA , R parietal and R frontal centrum semiovale, initial concern for TTP or catastrophic APS, further work up notable for new dx of metastatic lung adenocarcinoma w/ likely malignancy-related hypercoagulability. PMHx of HTN, DM2, ELENA, PE/DVT (on chronic anticoags).  Vitals: VSS on RA  Neuros: A/Ox4, int dysarthria, L side 5/5, RUE 3/5, RLE 4/5, R droop  IV: PIV SL  Labs/Electrolytes: crit plt of 45 @ 0716, 1 unit of plts given, redraw in AM, BG ACHS  Resp: LSC on RA  Diet: regular, takes pills whole 1 @ time w/ applesauce  GI: LBM 8/3 (large and formed), BS+  : VS via urinal  Skin: generalized rash/petechiae (improving and denies itching). L shin biopsy site BECK   Pain: denied  Activity: A2 w/ GB + cane (RUE splint NOC)  SCDs on? If no, why?: no pt up in chair this shift  Social: family visiting this shift, attentive, supportive, participating in cares  Plan: continue POC  Updates this shift: 1 unit of plts given

## 2025-08-04 ENCOUNTER — APPOINTMENT (OUTPATIENT)
Dept: MRI IMAGING | Facility: CLINIC | Age: 35
DRG: 064 | End: 2025-08-04
Payer: COMMERCIAL

## 2025-08-04 ENCOUNTER — APPOINTMENT (OUTPATIENT)
Dept: PHYSICAL THERAPY | Facility: CLINIC | Age: 35
DRG: 064 | End: 2025-08-04
Payer: COMMERCIAL

## 2025-08-04 ENCOUNTER — TELEPHONE (OUTPATIENT)
Dept: ONCOLOGY | Facility: CLINIC | Age: 35
End: 2025-08-04
Payer: COMMERCIAL

## 2025-08-04 DIAGNOSIS — C34.90 PRIMARY ADENOCARCINOMA OF LUNG, UNSPECIFIED LATERALITY (H): Primary | ICD-10-CM

## 2025-08-04 LAB
ATRIAL RATE - MUSE: 63 BPM
BLD PROD TYP BPU: NORMAL
BLOOD COMPONENT TYPE: NORMAL
CODING SYSTEM: NORMAL
DIASTOLIC BLOOD PRESSURE - MUSE: NORMAL MMHG
ERYTHROCYTE [DISTWIDTH] IN BLOOD BY AUTOMATED COUNT: 14.5 % (ref 10–15)
GLUCOSE BLDC GLUCOMTR-MCNC: 102 MG/DL (ref 70–99)
GLUCOSE BLDC GLUCOMTR-MCNC: 104 MG/DL (ref 70–99)
GLUCOSE BLDC GLUCOMTR-MCNC: 109 MG/DL (ref 70–99)
GLUCOSE BLDC GLUCOMTR-MCNC: 164 MG/DL (ref 70–99)
HCT VFR BLD AUTO: 37.3 % (ref 40–53)
HGB BLD-MCNC: 12.2 G/DL (ref 13.3–17.7)
HOLD SPECIMEN: NORMAL
INTERPRETATION ECG - MUSE: NORMAL
ISSUE DATE AND TIME: NORMAL
MAGNESIUM SERPL-MCNC: 2 MG/DL (ref 1.7–2.3)
MCH RBC QN AUTO: 26.9 PG (ref 26.5–33)
MCHC RBC AUTO-ENTMCNC: 32.7 G/DL (ref 31.5–36.5)
MCV RBC AUTO: 82 FL (ref 78–100)
P AXIS - MUSE: 6 DEGREES
PHOSPHATE SERPL-MCNC: 4.1 MG/DL (ref 2.5–4.5)
PLATELET # BLD AUTO: 46 10E3/UL (ref 150–450)
POTASSIUM SERPL-SCNC: 4.5 MMOL/L (ref 3.4–5.3)
PR INTERVAL - MUSE: 94 MS
QRS DURATION - MUSE: 72 MS
QT - MUSE: 378 MS
QTC - MUSE: 386 MS
R AXIS - MUSE: 50 DEGREES
RBC # BLD AUTO: 4.54 10E6/UL (ref 4.4–5.9)
SYSTOLIC BLOOD PRESSURE - MUSE: NORMAL MMHG
T AXIS - MUSE: 18 DEGREES
UNIT ABO/RH: NORMAL
UNIT NUMBER: NORMAL
UNIT STATUS: NORMAL
UNIT TYPE ISBT: 6200
VENTRICULAR RATE- MUSE: 63 BPM
WBC # BLD AUTO: 13 10E3/UL (ref 4–11)

## 2025-08-04 PROCEDURE — A9585 GADOBUTROL INJECTION: HCPCS

## 2025-08-04 PROCEDURE — 250N000013 HC RX MED GY IP 250 OP 250 PS 637

## 2025-08-04 PROCEDURE — 84132 ASSAY OF SERUM POTASSIUM: CPT | Performed by: STUDENT IN AN ORGANIZED HEALTH CARE EDUCATION/TRAINING PROGRAM

## 2025-08-04 PROCEDURE — 70553 MRI BRAIN STEM W/O & W/DYE: CPT

## 2025-08-04 PROCEDURE — 120N000002 HC R&B MED SURG/OB UMMC

## 2025-08-04 PROCEDURE — 85018 HEMOGLOBIN: CPT

## 2025-08-04 PROCEDURE — 97530 THERAPEUTIC ACTIVITIES: CPT | Mod: GP

## 2025-08-04 PROCEDURE — 250N000013 HC RX MED GY IP 250 OP 250 PS 637: Performed by: STUDENT IN AN ORGANIZED HEALTH CARE EDUCATION/TRAINING PROGRAM

## 2025-08-04 PROCEDURE — 250N000011 HC RX IP 250 OP 636

## 2025-08-04 PROCEDURE — 93005 ELECTROCARDIOGRAM TRACING: CPT

## 2025-08-04 PROCEDURE — P9037 PLATE PHERES LEUKOREDU IRRAD: HCPCS

## 2025-08-04 PROCEDURE — 255N000002 HC RX 255 OP 636

## 2025-08-04 PROCEDURE — 83735 ASSAY OF MAGNESIUM: CPT | Performed by: STUDENT IN AN ORGANIZED HEALTH CARE EDUCATION/TRAINING PROGRAM

## 2025-08-04 PROCEDURE — 84100 ASSAY OF PHOSPHORUS: CPT | Performed by: STUDENT IN AN ORGANIZED HEALTH CARE EDUCATION/TRAINING PROGRAM

## 2025-08-04 PROCEDURE — 99233 SBSQ HOSP IP/OBS HIGH 50: CPT | Mod: GC | Performed by: STUDENT IN AN ORGANIZED HEALTH CARE EDUCATION/TRAINING PROGRAM

## 2025-08-04 PROCEDURE — 36415 COLL VENOUS BLD VENIPUNCTURE: CPT

## 2025-08-04 PROCEDURE — 97116 GAIT TRAINING THERAPY: CPT | Mod: GP

## 2025-08-04 PROCEDURE — 70553 MRI BRAIN STEM W/O & W/DYE: CPT | Mod: 26 | Performed by: RADIOLOGY

## 2025-08-04 RX ORDER — GADOBUTROL 604.72 MG/ML
10 INJECTION INTRAVENOUS ONCE
Status: COMPLETED | OUTPATIENT
Start: 2025-08-04 | End: 2025-08-04

## 2025-08-04 RX ORDER — MAGNESIUM OXIDE 400 MG/1
400 TABLET ORAL EVERY 4 HOURS
Status: COMPLETED | OUTPATIENT
Start: 2025-08-04 | End: 2025-08-04

## 2025-08-04 RX ADMIN — GADOBUTROL 10 ML: 604.72 INJECTION INTRAVENOUS at 17:21

## 2025-08-04 RX ADMIN — MAGNESIUM OXIDE TAB 400 MG (241.3 MG ELEMENTAL MG) 400 MG: 400 (241.3 MG) TAB at 12:46

## 2025-08-04 RX ADMIN — Medication 5 MG: at 14:58

## 2025-08-04 RX ADMIN — SENNOSIDES AND DOCUSATE SODIUM 2 TABLET: 50; 8.6 TABLET ORAL at 09:21

## 2025-08-04 RX ADMIN — HYDROCHLOROTHIAZIDE 25 MG: 12.5 TABLET ORAL at 09:21

## 2025-08-04 RX ADMIN — Medication 2.5 MG: at 09:26

## 2025-08-04 RX ADMIN — MAGNESIUM OXIDE TAB 400 MG (241.3 MG ELEMENTAL MG) 400 MG: 400 (241.3 MG) TAB at 18:16

## 2025-08-04 RX ADMIN — Medication 2.5 MG: at 14:58

## 2025-08-04 RX ADMIN — Medication 5 MG: at 21:22

## 2025-08-04 RX ADMIN — ROSUVASTATIN CALCIUM 10 MG: 10 TABLET, FILM COATED ORAL at 09:22

## 2025-08-04 RX ADMIN — Medication 5 MG: at 09:22

## 2025-08-04 RX ADMIN — ACETAMINOPHEN 1000 MG: 500 TABLET ORAL at 04:31

## 2025-08-04 RX ADMIN — ENOXAPARIN SODIUM 135 MG: 150 INJECTION SUBCUTANEOUS at 09:23

## 2025-08-04 RX ADMIN — ACETAMINOPHEN 1000 MG: 500 TABLET ORAL at 21:23

## 2025-08-04 RX ADMIN — Medication 2.5 MG: at 21:21

## 2025-08-04 RX ADMIN — METOPROLOL SUCCINATE 25 MG: 25 TABLET, EXTENDED RELEASE ORAL at 09:21

## 2025-08-04 RX ADMIN — FLUTICASONE PROPIONATE 1 SPRAY: 50 SPRAY, METERED NASAL at 09:23

## 2025-08-04 RX ADMIN — FAMOTIDINE 10 MG: 10 TABLET ORAL at 21:22

## 2025-08-04 RX ADMIN — ENOXAPARIN SODIUM 135 MG: 150 INJECTION SUBCUTANEOUS at 21:59

## 2025-08-04 RX ADMIN — FAMOTIDINE 10 MG: 10 TABLET ORAL at 09:21

## 2025-08-04 ASSESSMENT — ACTIVITIES OF DAILY LIVING (ADL)
ADLS_ACUITY_SCORE: 35
ADLS_ACUITY_SCORE: 39
ADLS_ACUITY_SCORE: 35
ADLS_ACUITY_SCORE: 39
ADLS_ACUITY_SCORE: 35
ADLS_ACUITY_SCORE: 39
ADLS_ACUITY_SCORE: 39
ADLS_ACUITY_SCORE: 35
ADLS_ACUITY_SCORE: 35
ADLS_ACUITY_SCORE: 39
ADLS_ACUITY_SCORE: 35
ADLS_ACUITY_SCORE: 39
ADLS_ACUITY_SCORE: 35

## 2025-08-04 NOTE — PROGRESS NOTES
[facility]    Progress Note - Medicine Service, [facility] TEAM 3       Date of Admission:  7/15/2025    Assessment & Plan   Bernard Chapman is a 35 year old male with a past medical history of hypertension, diabetes, ELENA, PE/DVT (on rivaroxaban) admitted on 7/15/2025 for lethargy, N/V, and blurry vision found to have multifocal acute CVA in L MCA , R parietal and R frontal centrum semiovale. Initially concern for TTP or catastrophic APS, however further work up notable for new diagnosis of metastatic lung adenocarcinoma with likely malignancy-related hypercoagulability.    Today:  - Taletrectinib ordered through onc - to start inpatient as soon as approved   - cont daily labs  - Begin process of 2nd opinion at [place] now genetics are back   - 1U platelets  - Forms to be filled by medical team   - MRI brain today      #Metastatic lung adenocarcinoma  #Right lung nodule  #Multiple enlarged mediastinal, paraesophageal, and hilar lymph nodes  Malignancy work up initiated on admission due to significant hypercoagulability. CT CAP significant for [id number] spiculated density in R lung apex, 9mm pulmonary nodule in R lung base, L thyroid nodule, paratracheal LAD, splenic infarct, bilateral renal infarcts, and L retroperitoneal nodule. Underwent FNA of lymph node which was positive for lung adenocarcinoma (resulted 7/22).  PDL-1 resulted with low expression (40%), awaiting other molecular studies before final treatment plan can be determined. PET 7/26 with distant metastases. Oncology looking at NGS before final recs.   -Oncology consulted, appreciate recs   - NGS; positive for a ROS1 gene rearrangement (SDC4::ROS1)     - [facility] pending  - Due to ongoing transfusion needs, ARU not feasible.   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 TTE with mobile echodensities on tricuspid and aortic valve. Initial concern for TTP or catastrophic APS, however work up ultimately significant for new diagnosis of metastatic lung cancer. Hypercoagulable state presumed to be most likely 2/2 malignancy. Started on heparin gtt 7/17, but briefly held for 7/18 endobronchial needle aspiration of lymph nodes. Stroke code called 7/19 for worsening R-sided hemiparesis and hemianopia. Repeat CTA with new focal high-grade stenosis of other part of left MCA. Heparin gtt was stopped during code stroke, but restarting (heparin gtt + levophed) led to 10/10 headache. CTH with small left frontal SAH. Brain MRI with new L basal ganglia stroke. Heparin gtt resumed, and repeat CTH stable. Switched to therapeutic Lovenox 7/22. CTH still stable 7/23. Declining fibrinogen, so received cryoprecipitate 7/25. Concern for continued consumptive process, but on therapeutic Lovenox. PLT downtrending, so receiving transfusions (1 unit 7/26, 1 unit 7/27, 1 unit 7/28, 1 unit 7/30,1 unit 8/1, 1 unit 8/3, 1 unit 8/4). No new symptoms. CTM.  - Hematology consulted   -Transfuse if   -Hgb<7  -INR<1.5  -PLT<50k  -Fibrinogen<100  - SC Lovenox 135 mg bid   -qdaily CBC  -daily fibrinogen, INR, PTT      # Maculopapular rash on face, chest, bilateral upper extremities, improved  # Numerous palpable purpura across the scalp and lower extremities   # Petechiae on bilateral lower extremities  C/f palpable purpura, so s/p L leg s/p punch biopsy with dermatology: rupture folliculitis. No definite  features of cutaneous small vessel vasculitis or thrombotic vasculopathy. New rash noted 7/26 with  flushing of face/chest after PET scan. Also scattered papules of face and upper extremities (in addition to petechiae of BLE). Not itchy and no signs of respiratory distress, but suspected  secondary to contrast from PET scan, .  Rash appeared several hours after unit of platelets, less likely due to a transfusion reaction. photos present in chart. The following days, papules are consistent with his usual acne. No diffuse erythema. Some c/f SVC syndrome given distrubution of flushing and positional nature (normally HOB elevated when asleep, so PET was first time that he had prolonged flat/supine position). CTM.   Plan:  - Benadryl as needed for itching  - Chlorhexidine wash  - Will continue to monitor    #Multifocal acute ischemic strokes, in L MCA territory and new L basal ganglia stroke; Increasing mass effect on L lateral ventricle by L MCA infarction  #Subtotal occlusive thrombus in left M1   #Left frontoparietal SAH (7/19)  #Hypertension chronic  #HLD, chronic  Etiology most likely hypercoagulability d/t malignancy. Emboli stemming from tricuspid and aortic valves, appreciated on TTE 7/16. He continues to have R facial droop, extremity weakness. No apparent aphasia, but he is mildly dysarthric. Left frontoparietal subarachnoid hemorrhage stable per CT. Working with therapies with notable improvement.   > Neuro-ICU signing off   - SBP goal of 120 - 160 mmHg and DBP goal < 100    -Hold PTA antihypertensives    -Has prn labetalol and hydralazine  - cont 10 mg rosuvastatin  > OT/PT/Speech   -Since ARU dispo delayed, asked therapies to do aggressive rehabilitation as able  > nutrition consult   -monitor oral intake, weights  - repeat TTE outpatient to ensure improvement of AR and TR (and vegetations)    #DM2, A1c 5.3  On HSSI with adequate control after stopping dexamethasone. CTM.   - POC glucose ACHS  - High  "intensity insulin sliding scale    #ELENA  Need to clarify home CPAP settings.    #Leukocytosis  Found to have leukocytosis, no clear cause or signs of infection, possible 2/2 stress induced.        #Hiccups, resolved   #chronic back pain  Chronic back pain. On acetaminophen only (given frequent need for neuro checks). Hiccups resolved (suspected 2/2 dexamethasone). Will continue baclofen and prn thorazine, but may be able to decrease in coming days.   Plan:  - Thorazine 25mg q6hr prn  - Melatonin 3mg daily  - Continue Pepcid  - continue  baclofen  - acetaminophen 1000 mg every 6 hours  - hold cyclobenzaprine 10 mg 3x daily PRN  - hold PTA gabapentin 100 mg 3x daily  - hold PTA oxycodone 5 mg PRN every 6h     #allergies  - fluticasone 1 spray daily     #HTN  - Continue PTA Metoprolol succinate 25mg q daily (ok to hold if HR< 60)  - Hold PTA Losartan, could consider restarting vs discontinuing given normotensive  - Continue PTA hydrochlorothiazide              Diet: Regular Diet Adult    DVT Prophylaxis: Enoxaparin (Lovenox) SQ  Avalos Catheter: Not present  Fluids: None  Lines: None     Cardiac Monitoring: None  Code Status: Full Code      Clinically Significant Risk Factors                 # Thrombocytopenia: Lowest platelets = 45 in last 2 days, will monitor for bleeding              # Obesity: Estimated body mass index is 30.71 kg/m  as calculated from the following:    Height as of this encounter: 1.88 m (6' 2\").    Weight as of this encounter: 108.5 kg (239 lb 3.2 oz).        # Financial/Environmental Concerns: none         Social Drivers of Health          Disposition Plan     Medically Ready for Discharge: Anticipated in 2-4 Days       Cristine Nguyen MD  Medicine Service, [facility] TEAM [address] [place] [address]  Securely message with [employer] (more info)  Text page via [employer] Paging/Directory   See signed in provider for up to date coverage " information  ______________________________________________________________________    Interval History   No acute overnight events. Patient doing ok. Had no acute concerns. Denies any fevers, chills, cough, sore throat, pain with urination, or burning with urination.     Physical Exam   Vital Signs: Temp: 98.2  F (36.8  C) Temp src: Oral BP: 117/58 Pulse: 70   Resp: 16 SpO2: 97 % O2 Device: None (Room air)    Weight: 239 lbs 3.19 oz    GENERAL: In chair next to bed, no acute distress  RESP: CTAB  CV:  Extremities appear well-perfused, RUE swelling is stable  GI: Soft, non-distended, non tender  NEURO: Alert, Follows 2 step commands,  normal LUE movement, no RUE movement, unable to wiggle right sided toes, Right quadricept improved to 4/5,  right sided facial droop less than prior , mild dysarthria, no dysphonia, EOMI grossly, pupils symmetric  SKIN: Erythematous papules scattered on face c/w acne, bilateral petechiae present (improving)    Medical Decision Making       Please see A&P for additional details of medical decision making.      Data     I have personally reviewed the following data over the past 24 hrs:    N/A  \   N/A   / N/A     N/A N/A N/A /  105 (H)   N/A N/A N/A \

## 2025-08-04 NOTE — PLAN OF CARE
Status: Admitted 7/15 d/t lethargy, N/V, and blurry vision found to have multifocal acute CVA in L MCA, R parietal, and R frontal semiovale. Further workup notable for new diagnosis of metastatic lung adenocarcinoma.    Vitals: VSS  Neuros: A&Ox4, strengths L side 5/5 RUE 3/5 RLE 4/5, denies N/T, R droop, intermittent dysarthria  IV: PIV SL   Labs/Electrolytes: Platelets critical at 45, 1 unit transfused in AM, recheck tomorrow. BG checks ACHS  Resp: LSC on RA   Diet: Regular- good intake. Takes pills whole in applesauce.   GI: LBM 8/1  : Voiding spontaneously via urinal   Skin: L shin biopsy site BECK, rash/petechiae throughout, but improving.   Pain: Occasional sacral soreness when sitting in chair. Repo, Tylenol prn. Uses Geopad chair support  Activity: Ax2 GB+cane. Must wear RUE splint overnight.  Social: family visited today  Plan/Updates this shift: start oral chemo inpatient when approved. Follow POC

## 2025-08-04 NOTE — PLAN OF CARE
Goal Outcome Evaluation:      Plan of Care Reviewed With: patient    Overall Patient Progress: no changeOverall Patient Progress: no change      Status: Admitted 7/15 d/t lethargy, N/V, and blurry vision found to have multifocal acute CVA in L MCA, R parietal, and R frontal semiovale. Further workup notable for new diagnosis of metastatic lung adenocarcinoma.    Vitals: VSS  Neuros: A&Ox4, strengths L side 5/5 RUE 3/5 RLE 3-4/5, denies N/T, R droop, intermittent dysarthria  IV: PIV SL   Labs/Electrolytes: Platelets 45- recheck AM. BG checks ACHS.   Resp: LSC on RA   Diet: Regular- good intake. Takes pills whole in applesauce.   GI: LBM 8/1  : Voiding spontaneously via urinal   Skin: L shin biopsy site BECK, rash/petechiae throughout, but improving.   Pain: Managed with Tylenol   Activity: Ax2 GB+cane. Wears RUE splint overnight.  Social: family visited this afternoon  Plan/Updates this shift: follow POC

## 2025-08-04 NOTE — CONSULTS
[facility] [email or web address] [facility]    Ibtrozi:  Prior auth pending.  Will update this note when determination is received.  Prescription must be filled at [facility] (in preferred pharmacy list).  Price unknown; [facility] will assist patient with financial resources if necessary.    Of note, [facility] is not contracted with this insurance plan.  Discharge medications (besides Ibtrozi) should be sent to [facility], [facility] or [facility].    Addendum 8/5:  RX for Ibtrozi was sent to [email or web address] prior to prior auth determination.  [email or web address] has called hospital team, looking for information to submit prior auth.  These calls can be ignored since I have already submitted.    Addendum 8/6:  Ibtrozi denied, insurance requires trial or contraindication to Lorbena, Xalkori, or Rozlytrek.  Dr Mackenzie and Dr Mtz working on letter of medical neccessity for Ibtrozi, which I will send to insurance once received.      Addendum 8/6:  Urgent appeal sent to to insurance company at 3:45pm.    Addendum 8/7:  Insurance stating that this appeal is next in line for review.    Paola Quispe  Pharmacy Technician/Liaison, Discharge Pharmacy   [phone] (voice or text)  [email or web address]  Pharmacy test claims are estimates and are rounded to the nearest dollar amount.  Pricing is subject to change.  Suggested alternatives aim to be cost-effective and may not be therapeutically equivalent as this consult is informational and does not constitute medical advice.  Clinical decisions should be made by qualified healthcare providers.     LESLY Quispe 8/08/25    Awilda Garner  [facility] Pharmacy Liaison (A - L)  Phone [phone]  Fax [phone]  Available on Teams & Vocera

## 2025-08-04 NOTE — PROGRESS NOTES
Care Management Follow Up    Length of Stay (days): 20    Expected Discharge Date: 08/13/2025     Concerns to be Addressed: Discharge planning     Patient plan of care discussed at interdisciplinary rounds:  Yes    Anticipated Discharge Disposition:  ST and Physical Therapy are recommending ARU placement.   OT is recommending a TCU stay.  As EOD platelet transfusions are anticipated in addition to anticipated treatment for lung adenocarcinoma, TCU will be pursued.      Anticipated Discharge Services:  ST and Physical Therapy are recommending ARU placement.   OT is recommending a TCU stay.  As EOD platelet transfusions are anticipated in addition to anticipated treatment for lung adenocarcinoma, TCU will be pursued.  Anticipated Discharge DME:   Not applicable at this time    Patient/family educated on Medicare website which has current facility and service quality ratings: Yes  Education Provided on the Discharge Plan: Yes  Patient/Family in Agreement with the Plan: Yes    Referrals Placed by CM/SW: Post Acute Facilities  Private pay costs discussed:   Not applicable at this time    Discussed  Partnership in Safe Discharge Planning  document with patient/family: No     Handoff Completed: No, handoff not indicated or clinically appropriate    Additional Information:  SW is following pt for discharge planning.  ST and Physical Therapy are recommending ARU placement.   OT is recommending a TCU stay.  As EOD platelet transfusions are anticipated in addition to anticipated treatment for lung adenocarcinoma, TCU will be pursued.     sent a [place] message to Dr. Cristine Nguyen in regards to return of completed Short Term Disability forms (forms were provided on 7/31/2025 for completion).   Dr. Nguyen came to 6A and returned forms to this SW but the completed forms were not the form that this SW provided on 7/31/2025.  TIRSO inquired about this and Dr. Nguyen states that the forms that he provided to this SW are  "forms that pt's family provided today.  The completed form include \"Medical Leave Certification for Yourself\" and \"Work Restriction\" form.     TIRSO inquired about the location of the form  \"Disability Insurance Attending Physicians Statement\" that this SW provided on 7/31/2025 and Dr. Nguyen states that he gave the form to his attending.  Dr. Nguyen states that he will work to locate the form that this SW provided on 7/31/2025.    TIRSO spoke with pt's parents (via telephone call) who state that the form that this SW received  (given to Dr. Nguyen on 7/31/2025) from \"[employer]\" was the paperwork needed for pt to seek STD benefits from pt's employer.   Pt's mother states that the forms provided directly to the doctors today, \"Medical Leave Certification for Yourself\" and \"Work Restriction\" form were the forms needed for pt's Leave of Absence from [employer].  At 3:01pm, TIRSO faxed (fax number: [phone]) pt's \"Medical Leave Certification for Yourself\" and \"Work Restriction\" form to [employer].  TIRSO provided pt with the original document and a copy of the fax cover sheet for pt's records.    TIRSO will fax pt's \"Disability Insurance Attending Physicians Statement\"  to [employer] (for pt to receive his short term disability benefits) when the completed form is returned by Dr. Nguyen.    TIRSO reviewed 8/2/2025 Hem/Onc note written by Dr. Rafia Rea.  Hem/Onc plans to start pt on Taletrectinib (which has reportedly been ordered).   Dr. Rea's progress note entry states, \"Given his persistent need for platelet transfusions to maintain platelets greater than 50 while receiving anticoagulation, he is likely to need continued inpatient monitoring while starting Taletrectinib .\"  Taletrectinib will be given orally.      [facility] and updated.        TIRSO received a message to call Mali Murrieta RN Case Manager with [facility] ([phone], ext. [phone]).  TIRSO phoned " "Mali and left a message for Mali to call.           Next Steps:     Dr. Nguyen to provide SW with completed short term disability paperwork (provided to Dr. Dr. Nguyen on 7/31/2025) at which time SW will fax the document to \"[employer]\" which is the company that will be processing the request.  SW will await a follow up call from Mali Murrieta RN Case Manager with [facility] ([phone], ext. [phone]).  SW will continue to follow for discharge planning.    BRUNILDA Jorgensen  Social Work, 6A  Phone:  [phone]  Pager:  [phone]  8/4/2025       DAR Muse     "

## 2025-08-05 ENCOUNTER — APPOINTMENT (OUTPATIENT)
Dept: PHYSICAL THERAPY | Facility: CLINIC | Age: 35
DRG: 064 | End: 2025-08-05
Payer: COMMERCIAL

## 2025-08-05 ENCOUNTER — APPOINTMENT (OUTPATIENT)
Dept: OCCUPATIONAL THERAPY | Facility: CLINIC | Age: 35
DRG: 064 | End: 2025-08-05
Payer: COMMERCIAL

## 2025-08-05 DIAGNOSIS — C34.90 PRIMARY ADENOCARCINOMA OF LUNG, UNSPECIFIED LATERALITY (H): Primary | ICD-10-CM

## 2025-08-05 LAB
BLD PROD TYP BPU: NORMAL
BLOOD COMPONENT TYPE: NORMAL
CODING SYSTEM: NORMAL
CREAT SERPL-MCNC: 0.95 MG/DL (ref 0.67–1.17)
EGFRCR SERPLBLD CKD-EPI 2021: >90 ML/MIN/1.73M2
ERYTHROCYTE [DISTWIDTH] IN BLOOD BY AUTOMATED COUNT: 14.3 % (ref 10–15)
GLUCOSE BLDC GLUCOMTR-MCNC: 103 MG/DL (ref 70–99)
GLUCOSE BLDC GLUCOMTR-MCNC: 110 MG/DL (ref 70–99)
GLUCOSE BLDC GLUCOMTR-MCNC: 116 MG/DL (ref 70–99)
GLUCOSE BLDC GLUCOMTR-MCNC: 117 MG/DL (ref 70–99)
GLUCOSE BLDC GLUCOMTR-MCNC: 97 MG/DL (ref 70–99)
HCT VFR BLD AUTO: 35.4 % (ref 40–53)
HGB BLD-MCNC: 11.7 G/DL (ref 13.3–17.7)
ISSUE DATE AND TIME: NORMAL
MAGNESIUM SERPL-MCNC: 2.1 MG/DL (ref 1.7–2.3)
MCH RBC QN AUTO: 26.8 PG (ref 26.5–33)
MCHC RBC AUTO-ENTMCNC: 33.1 G/DL (ref 31.5–36.5)
MCV RBC AUTO: 81 FL (ref 78–100)
PHOSPHATE SERPL-MCNC: 4.1 MG/DL (ref 2.5–4.5)
PLATELET # BLD AUTO: 50 10E3/UL (ref 150–450)
POTASSIUM SERPL-SCNC: 4.4 MMOL/L (ref 3.4–5.3)
RBC # BLD AUTO: 4.37 10E6/UL (ref 4.4–5.9)
UNIT ABO/RH: NORMAL
UNIT NUMBER: NORMAL
UNIT STATUS: NORMAL
UNIT TYPE ISBT: 5100
WBC # BLD AUTO: 10.8 10E3/UL (ref 4–11)

## 2025-08-05 PROCEDURE — 84132 ASSAY OF SERUM POTASSIUM: CPT | Performed by: STUDENT IN AN ORGANIZED HEALTH CARE EDUCATION/TRAINING PROGRAM

## 2025-08-05 PROCEDURE — 97535 SELF CARE MNGMENT TRAINING: CPT | Mod: GO | Performed by: OCCUPATIONAL THERAPIST

## 2025-08-05 PROCEDURE — 97530 THERAPEUTIC ACTIVITIES: CPT | Mod: GP

## 2025-08-05 PROCEDURE — 250N000013 HC RX MED GY IP 250 OP 250 PS 637: Performed by: STUDENT IN AN ORGANIZED HEALTH CARE EDUCATION/TRAINING PROGRAM

## 2025-08-05 PROCEDURE — 250N000013 HC RX MED GY IP 250 OP 250 PS 637

## 2025-08-05 PROCEDURE — 83735 ASSAY OF MAGNESIUM: CPT | Performed by: STUDENT IN AN ORGANIZED HEALTH CARE EDUCATION/TRAINING PROGRAM

## 2025-08-05 PROCEDURE — 84100 ASSAY OF PHOSPHORUS: CPT | Performed by: STUDENT IN AN ORGANIZED HEALTH CARE EDUCATION/TRAINING PROGRAM

## 2025-08-05 PROCEDURE — 97112 NEUROMUSCULAR REEDUCATION: CPT | Mod: GO | Performed by: OCCUPATIONAL THERAPIST

## 2025-08-05 PROCEDURE — 250N000009 HC RX 250: Performed by: HOSPITALIST

## 2025-08-05 PROCEDURE — 82565 ASSAY OF CREATININE: CPT | Performed by: HOSPITALIST

## 2025-08-05 PROCEDURE — 99233 SBSQ HOSP IP/OBS HIGH 50: CPT | Mod: GC | Performed by: HOSPITALIST

## 2025-08-05 PROCEDURE — 250N000011 HC RX IP 250 OP 636

## 2025-08-05 PROCEDURE — 99233 SBSQ HOSP IP/OBS HIGH 50: CPT | Mod: FS | Performed by: PHYSICIAN ASSISTANT

## 2025-08-05 PROCEDURE — 85014 HEMATOCRIT: CPT

## 2025-08-05 PROCEDURE — 120N000002 HC R&B MED SURG/OB UMMC

## 2025-08-05 PROCEDURE — P9037 PLATE PHERES LEUKOREDU IRRAD: HCPCS

## 2025-08-05 PROCEDURE — 36415 COLL VENOUS BLD VENIPUNCTURE: CPT | Performed by: STUDENT IN AN ORGANIZED HEALTH CARE EDUCATION/TRAINING PROGRAM

## 2025-08-05 PROCEDURE — 97116 GAIT TRAINING THERAPY: CPT | Mod: GP

## 2025-08-05 RX ORDER — OXYMETAZOLINE HYDROCHLORIDE 0.05 G/100ML
2 SPRAY NASAL 2 TIMES DAILY PRN
Status: DISCONTINUED | OUTPATIENT
Start: 2025-08-05 | End: 2025-08-21 | Stop reason: HOSPADM

## 2025-08-05 RX ORDER — OXYMETAZOLINE HYDROCHLORIDE 0.05 G/100ML
2 SPRAY NASAL 2 TIMES DAILY
Status: DISCONTINUED | OUTPATIENT
Start: 2025-08-05 | End: 2025-08-05

## 2025-08-05 RX ADMIN — FAMOTIDINE 10 MG: 10 TABLET ORAL at 08:09

## 2025-08-05 RX ADMIN — FAMOTIDINE 10 MG: 10 TABLET ORAL at 20:41

## 2025-08-05 RX ADMIN — ACETAMINOPHEN 1000 MG: 500 TABLET ORAL at 04:40

## 2025-08-05 RX ADMIN — ACETAMINOPHEN 1000 MG: 500 TABLET ORAL at 21:58

## 2025-08-05 RX ADMIN — Medication 5 MG: at 08:10

## 2025-08-05 RX ADMIN — FLUTICASONE PROPIONATE 1 SPRAY: 50 SPRAY, METERED NASAL at 08:15

## 2025-08-05 RX ADMIN — METOPROLOL SUCCINATE 25 MG: 25 TABLET, EXTENDED RELEASE ORAL at 08:09

## 2025-08-05 RX ADMIN — Medication 5 MG: at 20:42

## 2025-08-05 RX ADMIN — Medication 2.5 MG: at 08:15

## 2025-08-05 RX ADMIN — ENOXAPARIN SODIUM 135 MG: 150 INJECTION SUBCUTANEOUS at 21:56

## 2025-08-05 RX ADMIN — HYDROCHLOROTHIAZIDE 25 MG: 12.5 TABLET ORAL at 08:09

## 2025-08-05 RX ADMIN — ROSUVASTATIN CALCIUM 10 MG: 10 TABLET, FILM COATED ORAL at 08:09

## 2025-08-05 RX ADMIN — ENOXAPARIN SODIUM 135 MG: 150 INJECTION SUBCUTANEOUS at 12:18

## 2025-08-05 RX ADMIN — OXYMETAZOLINE HYDROCHLORIDE 2 SPRAY: 0.05 SPRAY NASAL at 15:06

## 2025-08-05 RX ADMIN — Medication 2.5 MG: at 20:42

## 2025-08-05 ASSESSMENT — ACTIVITIES OF DAILY LIVING (ADL)
ADLS_ACUITY_SCORE: 37
ADLS_ACUITY_SCORE: 35
ADLS_ACUITY_SCORE: 37
ADLS_ACUITY_SCORE: 35
ADLS_ACUITY_SCORE: 39
ADLS_ACUITY_SCORE: 39
ADLS_ACUITY_SCORE: 35
ADLS_ACUITY_SCORE: 35
ADLS_ACUITY_SCORE: 37
ADLS_ACUITY_SCORE: 35
ADLS_ACUITY_SCORE: 37
ADLS_ACUITY_SCORE: 37
ADLS_ACUITY_SCORE: 35
ADLS_ACUITY_SCORE: 37
ADLS_ACUITY_SCORE: 35
ADLS_ACUITY_SCORE: 37
ADLS_ACUITY_SCORE: 35
ADLS_ACUITY_SCORE: 35
ADLS_ACUITY_SCORE: 37
ADLS_ACUITY_SCORE: 37

## 2025-08-05 NOTE — PLAN OF CARE
Goal Outcome Evaluation:      Plan of Care Reviewed With: patient    Overall Patient Progress: no changeOverall Patient Progress: no change    Outcome Evaluation: [id number]    DND orders overnight, formal assessment not done.    Status: Admitted 7/15 d/t lethargy, N/V, and blurry vision found to have multifocal acute CVA in L MCA, R parietal, and R frontal semiovale. Further workup notable for new diagnosis of metastatic lung adenocarcinoma.    Vitals: VSS  Neuros:  DND orders overnight.  Formal neuro assessment not completed.  Most recent neuros include: A&Ox4.  Strengths L side 5/5, RUE 1/5, RLE 3/5. R droop.  Intermittent dysarthria.   IV: PIV SL  Diet: regular diet.  Takes pills whole in applesauce.    GI: LBM 8/3  : voiding spontaneously via urinal with assist  Pain: tylenol scheduled   Activity: A2/GB/cane.  RUE splint on overnight.  RLE splint on when walking.    Plan: continue with current POC

## 2025-08-05 NOTE — CONSULTS
"[facility]    Vascular Neurology Consult Note    Reason for Consult:  abnormal MRI findings    Chief Complaint: Altered Mental Status (Patient was at his MD \"anticoagulation clinic\" MD concerned that patient might have a brain bleed. Mother stated that \" this Sunday evening has been having altered mental status. Seeing sparklers L eye, like black gray spots\")       HPI  Bernard Chapman is a 35 year old male w hypertension, diabetes, EELNA, PE/DVT (on rivaroxaban) admitted on 7/15/2025 for lethargy, N/V, and blurry vision found to have multifocal acute ischemic stroke in setting of mid to distal M1 segment LMCA occlusion , R parietal and R frontal centrum semiovale. Initially there was concern for TTP or catastrophic APS, however further work up led to new diagnosis of metastatic lung adenocarcinoma with likely malignancy-related hypercoagulability. PET 7/26 with distant metastases. MRI brain completed at oncology recommendation to evaluate for any Central nervous system metastasis prior to initiation of treatment. Patient has been on therapeutic lovenox for anticoagulation.   Oncology hoping to start chemotherapy.  Recommended brain MRI to evaluate for any metastatic disease in the brain.  Repeat brain MRI did show 2 new small infarcts in the right frontal lobe, which stroke neurology was consulted for.  Fortunately, there is no signs of metastasis of the brain.    Met with patient and mother at bedside.  Overall, patient has been making progress poststroke.  He is now able to ambulate therapies.  He uses a right leg splint when walking in a right arm splint when sleeping.  We discussed the MRI findings.  Patient has been requiring transfusions to maintain platelet goal above 50 to allow for continuation of anticoagulation.  I explained how despite being therapeutic on Lovenox, it does not completely prevent him from having recurrent strokes as he is still in a " hypercoagulable state in the setting of malignancy.  The incidental small strokes that are appreciated on the most recent MRI are likely not causing the symptoms and would not change current medical course, including initiation of chemotherapy. All questions were answered.       Stroke Evaluation Summarized    MRI/Head CT MRI brain 8/5/25  IMPRESSION:   1. Two new small infarcts in the right frontal lobe since the MRI  7/19/2025.  2. Evolution of prior left cerebral hemisphere infarcts, many of which  are now enhancing in the subacute phase. No definite hemorrhagic  conversion on susceptibility imaging. No intracranial hemorrhages.     CT Head wo contrast 7/23/25  Impression:  1. Unchanged small volume left frontoparietal subarachnoid hemorrhage.  2. Evolving left MCA infarction. No new loss of gray-white matter  differentiation.      MR Brain 7/19/25  IMPRESSION:  1. Increased areas of diffusion restriction with corresponding ADC  dropout in the left MCA territory cortical and subcortical regions  including the left basal ganglia structures when compared to the prior  MRI, but likely similar to the hypodensities seen on the recent CT.  Areas of restricted diffusion with corresponding ADC dropout in the  right cerebral hemisphere appear unchanged as compared to the previous  MRI dated 07/15/2025.  2. FLAIR hyperintensity with corresponding blooming on SWI left  cerebral convexity sulci consistent with known subarachnoid  hemorrhage.        Intracranial Vasculature HEAD CTA 7/19/25:  1.  Stable subtotal occlusion mid to distal M1 segment left middle cerebral artery.  2.  New focal high-grade stenosis posterior parietal branch left middle cerebral artery near the M2/M3 junction.   Cervical Vasculature Neck CTA 7/19/25  NECK CTA:  1.  No hemodynamically significant stenosis in the neck vessels.  2.  No evidence for dissection.  3.  New bilateral pleural effusions and patchy infiltrates in both upper lobes and superior  segments of the lower lobes.  4.  Mediastinal and right hilar adenopathy was incompletely imaged on the prior study and concerning for metastatic disease or lymphoma.        Echocardiogram TIMO 7/17/25  Interpretation Summary  TIMO ordered to assess valvular disorder.     There is diffuse investment of the aortic and tricuspid valves with thrombus  that result in moderate aortic regurgitation and moderate tricuspid  regurgitation. Normal mitral and pulmonic valves.     Of note, an infectious etiology of the valvular lesions cannot be excluded on  the basis of this study alone.   EKG/Telemetry SR   Other Testing PET ONCOLOGY 7/26/25  IMPRESSION: Summary: Primary lung tumor in right apex with metastases  (mediastinal lymph nodes, left retroperitoneal nodule, bones, and  right adrenal gland).     1.  Lung - 1.4 cm spiculated nodule in right upper lobe apex with  moderate uptake presumably represents primary lung adenocarcinoma.  Increased uptake in adjacent airway with wall thickening may represent  endobronchial involvement. No additional FDG-avid or other suspicious  pulmonary nodules.  2.  Nodes:  2a. Mediastinum - Hypermetabolic mediastinal lymphadenopathy  consistent with biopsy-proven metastatic disease.  2b. Retroperitoneam - Hypermetabolic 1 cm left retroperitoneal soft  tissue nodule concerning for metastatic disease.     3. Bones - Increased FDG-uptake in inferior tip of left scapula,  superior endplate of L2 vertebral body, right fifth rib medially, T6  posteriorly concerning for metastatic disease.      4. Adrenals - uptake right>left adrenal glands.  Differential  considerations include metastatic disease versus primary adrenal  lesion. Right is likely metastatic.  5. Right pleural effusion - new moderate to large.  6. Trace pelvic ascites, slightly increased.   7. Brain - better demonstrated on MRI, known strokes.  Decreased FDG  uptake in the left caudate and putamen and right cerebellum.     LDL   "7/15/2025: 135 mg/dL   A1C  7/16/2025: 5.3 %   Troponin No lab value available in past 7 days       Impression  #incidental small right frontal infarcts   #Multifocal acute stroke   #Partially occlusive Left MCA thrombus  #New left MCA stroke on repeat MRI  #Left frontoparietal SAH, resolved  #Metastatic lung adenocarcinoma  #Thrombocytopenia  35 year old male with a past medical history of hypertension, diabetes, possible ITP, sleep apnea, PE and DVT on rivaroxaban admitted on 7/15/2025 for acute ischemic stroke in left MCA territory, near occlusion of left M1, and concern for thrombotic emergency such as TTP or catastrophic antiphospholipid syndrome (in setting of severe thrombocytopenia and acute cerebral infarcts) and metastatic adenocarcinoma of the lung. Oncology preparing to initiate chemotherapy for cancer treatment and requested brain MRI to evaluate for metastatic disease in the brain. Incidentally found to have new right frontal lobe infarcts that were not present in prior MRI. Etiology of stroke is hypercoagulable state from malignancy. Incidental new strokes should not change current oncologic treatment plans. There is no sign of metastatic intracranial disease at this time. Would continue on therapeutic lovenox for secondary stroke prevention, defer management to heme/onc.   Recommendations   No further stroke related investigations at this time.        Thank you for this consult. Discussed with Dr. Hoang, stroke fellow. Neurology stroke attending, DR. Hughes, will formally evaluate tomorrow.     Mindi James MD  Neurology PGY-3  _____________________________________________________    Clinically Significant Risk Factors                 # Thrombocytopenia: Lowest platelets = 46 in last 2 days, will monitor for bleeding              # Obesity: Estimated body mass index is 30.71 kg/m  as calculated from the following:    Height as of this encounter: 1.88 m (6' 2\").    Weight as of this encounter: " 108.5 kg (239 lb 3.2 oz).      # Financial/Environmental Concerns: none           Past Medical History    No past medical history on file.  Medications   Home Meds  Prior to Admission medications   Medication Sig Start Date End Date Taking? Authorizing Provider   aluminum chloride (DRYSOL) 20 % external solution Apply 35 mLs topically nightly as needed. 6/11/21  Yes Reported, Patient   Ascorbic Acid (VITAMIN C) 500 MG CHEW Take 1 chew tab by mouth daily.   Yes Reported, Patient   B Complex Vitamins (VITAMIN B-COMPLEX PO) Take 1 capsule by mouth daily.   Yes Reported, Patient   cyclobenzaprine (FLEXERIL) 10 MG tablet Take 10 mg by mouth 3 times daily as needed for muscle spasms. 6/10/22  Yes Reported, Patient   Dulaglutide 4.5 MG/0.5ML SOAJ Inject 4.5 mg subcutaneously once a week. 3/25/25  Yes Reported, Patient   fluticasone (FLONASE) 50 MCG/ACT nasal spray Spray 1 spray into both nostrils daily. 3/25/25  Yes Reported, Patient   gabapentin (NEURONTIN) 100 MG capsule Take 100 mg by mouth 3 times daily as needed for other (back and leg pain flare ups).   Yes Unknown, Entered By History   hydrochlorothiazide (HYDRODIURIL) 25 MG tablet Take 25 mg by mouth daily. 3/25/25  Yes Reported, Patient   hydrocortisone 2.5 % cream Place rectally 2 times daily as needed.   Yes Unknown, Entered By History   losartan (COZAAR) 100 MG tablet Take 100 mg by mouth daily. 3/25/25  Yes Reported, Patient   metoprolol succinate ER (TOPROL XL) 25 MG 24 hr tablet Take 25 mg by mouth daily. 3/25/25  Yes Reported, Patient   Multiple Vitamin (MULTIVITAMIN PO) Take 1 tablet by mouth daily.   Yes Reported, Patient   oxyCODONE (ROXICODONE) 5 MG tablet Take 5 mg by mouth every 6 hours as needed for pain.   Yes Unknown, Entered By History   Rivaroxaban ANTICOAGULANT 15 & 20 MG TBPK Starter Therapy Pack Take as directed on package. 7/3/25  Yes Reported, Patient   valACYclovir (VALTREX) 1000 mg tablet Take 500 mg by mouth daily as needed.   Yes  Reported, Patient   taletrectinib adipate (IBTROZI) 200 MG capsule Take 3 capsules (600 mg) by mouth daily. Take on an empty stomach. Avoid food 2 hours before and after taking. 8/5/25   Cece Mtz MD   taletrectinib adipate (IBTROZI) 200 MG capsule Take 3 capsules (600 mg) by mouth daily. Take on an empty stomach. Avoid food 2 hours before and after taking. 8/4/25   Cece Mtz MD       Scheduled Meds  Current Facility-Administered Medications   Medication Dose Route Frequency Provider Last Rate Last Admin    acetaminophen (TYLENOL) tablet 1,000 mg  1,000 mg Oral or Feeding Tube Q6H Dee Shah MD   1,000 mg at 08/05/25 0440    Baclofen (LIORESAL) tablet 5 mg  5 mg Oral TID Luisito Aden MD   5 mg at 08/05/25 0810    And    baclofen (LIORESAL) half-tablet 2.5 mg  2.5 mg Oral TID Luisito Aden MD   2.5 mg at 08/05/25 0815    enoxaparin ANTICOAGULANT (LOVENOX) injection 135 mg  1 mg/kg Subcutaneous Q12H Dee Shah MD   135 mg at 08/05/25 1218    famotidine (PEPCID) tablet 10 mg  10 mg Oral BID Dee Shah MD   10 mg at 08/05/25 0809    fluticasone (FLONASE) 50 MCG/ACT spray 1 spray  1 spray Both Nostrils Daily Dee Shah MD   1 spray at 08/05/25 0815    hydroCHLOROthiazide tablet 25 mg  25 mg Oral Daily Marjan Michel MD   25 mg at 08/05/25 0809    insulin aspart (NovoLOG) injection (RAPID ACTING)  1-10 Units Subcutaneous TID AC Dee Shah MD   1 Units at 08/03/25 1132    insulin aspart (NovoLOG) injection (RAPID ACTING)  1-7 Units Subcutaneous At Bedtime Dee Shah MD   3 Units at 07/24/25 2200    [Held by provider] losartan (COZAAR) tablet 100 mg  100 mg Oral Daily Dee Shah MD        metoprolol succinate ER (TOPROL XL) 24 hr tablet 25 mg  25 mg Oral Daily Francisco Prather DO   25 mg at 08/05/25 0809    polyethylene glycol (MIRALAX) Packet 17 g  17 g Oral or Feeding Tube Daily Dee Shah MD   17 g at 07/29/25 0835    rosuvastatin (CRESTOR) tablet 10  mg  10 mg Oral or Feeding Tube Daily Dee Shah MD   10 mg at 08/05/25 0809    senna-docusate (SENOKOT-S/PERICOLACE) 8.6-50 MG per tablet 1-2 tablet  1-2 tablet Oral or Feeding Tube BID Dee Shah MD   2 tablet at 08/04/25 0921    sodium chloride (PF) 0.9% PF flush 3 mL  3 mL Intracatheter Q8H MOHINDER Dee Shah MD   3 mL at 08/05/25 0816       Infusion Meds  Current Facility-Administered Medications   Medication Dose Route Frequency Provider Last Rate Last Admin       Allergies   Allergies   Allergen Reactions    Apixaban Rash    Amlodipine Other (See Comments) and Rash    Fludeoxyglucose F 18 Rash     Rash after PET scan, consider pre-treating with benadryl for future scans          PHYSICAL EXAMINATION   Temp:  [97.6  F (36.4  C)-98.3  F (36.8  C)] 98.2  F (36.8  C)  Pulse:  [59-68] 65  Resp:  [16-18] 16  BP: (119-141)/(56-69) 135/69  SpO2:  [98 %-99 %] 98 %    Neurologic  Mental Status:  alert, oriented to self, age, month/year, locastion, follows commands, speech clear and fluent for most part but with some slurring at times, naming and repetition normal  Cranial Nerves:  Difficulty counting fingers in right VF, right eye. PERRL, EOMI with normal smooth pursuit, facial sensation intact and symmetric, right facial droop present with activation, hearing not formally tested but intact to conversation, mild dysarthria, shoulder shrug strong on left,3/5 on right, tongue protrusion midline  Motor:  normal muscle tone and bulk, no abnormal movements at rest, able to move all limbs spontaneously, strength 5/5 throughout left upper and lower extremities with no pronator drift. Mild drift in right arm (has no control distally past elbow), and drift to bed of R leg   Right Left   Arm abduction 4-/5 5/5   Elbow Flex 0/5 5/5   Elbow Extension 0/5 5/5   Wrist Extension 0/5 5/5   Finger extension 0/5 5/5     0/5 5/5   Hip Flexion 3/5 5/5   Knee Flexion 3/5 5/5   Knee Extension 4-/5 5/5   Dorsiflexion  0/5 5/5        Reflexes: sustained clonus with right wrist dorsiflexion, no clonus in LE  Sensory:  light touch sensation reported intact and symmetric throughout upper and lower extremities, no extinction on double simultaneous stimulation . Appears to have visual extinction of right.  Coordination:  normal finger-to-nose on left without dysmetria   Station/Gait:  deferred    Stroke Scales    NIHSS  1a. Level of Consciousness 0-->Alert, keenly responsive   1b. LOC Questions 0-->Answers both questions correctly   1c. LOC Commands 0-->Performs both tasks correctly   2.   Best Gaze 0-->Normal   3.   Visual 1-->Partial hemianopia   4.   Facial Palsy 2-->Partial paralysis (total or near-total paralysis of lower face)   5a. Motor Arm, Left 0-->No drift, limb holds 90 (or 45) degrees for full 10 secs   5b. Motor Arm, Right 1-->Drift, limb holds 90 (or 45) degrees, but drifts down before full 10 secs, does not hit bed or other support   6a. Motor Leg, Left 0-->No drift, leg holds 30 degree position for full 5 secs   6b. Motor Leg, right 2-->Some effort against gravity, leg falls to bed by 5 secs, but has some effort against gravity   7.   Limb Ataxia 0-->Absent   8.   Sensory 0-->Normal, no sensory loss   9.   Best Language 0-->No aphasia, normal   10. Dysarthria 1-->Mild-to-moderate dysarthria, patient slurs at least some words and, at worst, can be understood with some difficulty   11. Extinction and Inattention  1-->Visual, tactile, auditory, spatial, or personal inattention or extinction to bilateral simultaneous stimulation in one of the sensory modalities (right visual extinction)   Total 8 (08/05/25 1924)       Imaging  I personally reviewed all imaging; relevant findings per HPI.    Labs Data   CBC  Recent Labs   Lab 08/05/25  0704 08/04/25  0718 08/03/25  0716   WBC 10.8 13.0* 11.4*   RBC 4.37* 4.54 4.76   HGB 11.7* 12.2* 12.7*   HCT 35.4* 37.3* 38.5*   PLT 50* 46* 45*     Basic Metabolic Panel   Recent Labs   Lab  "08/05/25  1313 08/05/25  0856 08/05/25  0704 08/05/25  0157 08/04/25  0809 08/04/25  0718 08/02/25  0821 08/02/25  0705   POTASSIUM  --   --  4.4  --   --  4.5  --  4.3   CR  --   --  0.95  --   --   --   --   --    GLC 97 116*  --  110*   < >  --    < >  --     < > = values in this interval not displayed.     Liver Panel  No results for input(s): \"PROTTOTAL\", \"ALBUMIN\", \"BILITOTAL\", \"ALKPHOS\", \"AST\", \"ALT\", \"BILIDIRECT\" in the last 168 hours.  INR    Recent Labs   Lab Test 08/02/25  0705 08/01/25  0717 07/31/25  0625   INR 1.13 1.15 1.13              "

## 2025-08-05 NOTE — PROGRESS NOTES
[facility]    Progress Note - Medicine Service, [facility] TEAM 3       Date of Admission:  7/15/2025    Assessment & Plan   Bernard Chapman is a 35 year old male with a past medical history of hypertension, diabetes, ELENA, PE/DVT (on rivaroxaban) admitted on 7/15/2025 for lethargy, N/V, and blurry vision found to have multifocal acute CVA in L MCA , R parietal and R frontal centrum semiovale. Initially concern for TTP or catastrophic APS, however further work up notable for new diagnosis of metastatic lung adenocarcinoma with likely malignancy-related hypercoagulability.    Today:  - Taletrectinib ordered through onc - to start inpatient as soon prior authorization approved  - cont daily labs  - Begin process of 2nd opinion at [facility] now genetics are back   - 1U platelets  - Neuro to follow up on MRI   -       #New metastatic lung adenocarcinoma, ROS1 rearrangement   #Right lung nodule  #Multiple enlarged mediastinal, paraesophageal, and hilar lymph nodes  Malignancy work up initiated on admission due to significant hypercoagulability. CT CAP significant for [id number] spiculated density in R lung apex, 9mm pulmonary nodule in R lung base, L thyroid nodule, paratracheal LAD, splenic infarct, bilateral renal infarcts, and L retroperitoneal nodule. Underwent FNA of lymph node which was positive for lung adenocarcinoma (resulted 7/22).  PDL-1 resulted with low expression (40%). PET 7/26 with distant metastases and MRI brain showing no signs of metastasis.  Currently pending prior authorization and patient is planned to start in patient therapy once approved..  -Oncology consulted, appreciate recs   - NGS; positive for a ROS1 gene rearrangement (SDC4::ROS1)     - Guardant 360; T p53 mutation and MITCHELL   - Due to ongoing transfusion needs, ARU not feasible.   - Taletrectinib ordered through onc - to start inpatient as soon as approved   - 2nd opinion at [facility]  process      #Hypercoagulability d/t metastatic lung adenocarcinoma  #Infarcts of brain, kidney, spleen  #Cardiac valve thrombus on tricuspid and aortic valve 7/16 on TIMO  #Hx pulmonary embolism (6/23/25)  #Acute thrombocytopenia, in the setting of metastatic lung adenocarcinoma d/t ITP vs drug-induced vs consumptive coagulopathy  #Acute anemia  #Thrombocytopenia   Found to have multiple CVA, likely embolic. TTE with mobile echodensities on tricuspid and aortic valve. Initial concern for TTP or catastrophic APS, however work up ultimately significant for new diagnosis of metastatic lung cancer. Hypercoagulable state presumed to be most likely 2/2 malignancy. Started on heparin gtt 7/17, but briefly held for 7/18 endobronchial needle aspiration of lymph nodes. Stroke code called 7/19 for worsening R-sided hemiparesis and hemianopia. Repeat CTA with new focal high-grade stenosis of other part of left MCA. Heparin gtt was stopped during code stroke, but restarting (heparin gtt + levophed) led to 10/10 headache. CTH with small left frontal SAH. Brain MRI with new L basal ganglia stroke. Heparin gtt resumed, and repeat CTH stable. Switched to therapeutic Lovenox 7/22. CTH still stable 7/23. Declining fibrinogen, so received cryoprecipitate 7/25. Concern for continued consumptive process, but on therapeutic Lovenox. PLT downtrending, so receiving transfusions (1 unit 7/26, 1 unit 7/27, 1 unit 7/28, 1 unit 7/30,1 unit 8/1, 1 unit 8/3, 1 unit 8/4, 1 unit 8/5). No new symptoms. CTM.  - Hematology consulted   -Transfuse if   -Hgb<7  -INR<1.5  -PLT<50k  -Fibrinogen<100  - SC Lovenox 135 mg bid   -qdaily CBC  -daily fibrinogen, INR, PTT      # Maculopapular rash on face, chest, bilateral upper extremities, improved  # Numerous palpable purpura across the scalp and lower extremities   # Petechiae on bilateral lower extremities  C/f palpable purpura, so s/p L leg s/p punch biopsy with dermatology: rupture folliculitis. No  definite features of cutaneous small vessel vasculitis or thrombotic vasculopathy. New rash noted 7/26 with  flushing of face/chest after PET scan. Also scattered papules of face and upper extremities (in addition to petechiae of BLE). Not itchy and no signs of respiratory distress, but suspected  secondary to contrast from PET scan, .  Rash appeared several hours after unit of platelets, less likely due to a transfusion reaction. photos present in chart. The following days, papules are consistent with his usual acne. No diffuse erythema. Some c/f SVC syndrome given distrubution of flushing and positional nature (normally HOB elevated when asleep, so PET was first time that he had prolonged flat/supine position). CTM.   Plan:  - Benadryl as needed for itching  - Chlorhexidine wash  - Will continue to monitor    #Multifocal acute ischemic strokes, in L MCA territory and new L basal ganglia stroke; Increasing mass effect on L lateral ventricle by L MCA infarction  #Subtotal occlusive thrombus in left M1   #Left frontoparietal SAH (7/19)  #Hypertension chronic  #HLD, chronic  Etiology most likely hypercoagulability d/t malignancy. Emboli stemming from tricuspid and aortic valves, appreciated on TTE 7/16. He continues to have R facial droop, extremity weakness. No apparent aphasia, but he is mildly dysarthric. Left frontoparietal subarachnoid hemorrhage stable per CT. Working with therapies with notable improvement.  Repeat brain MRI on 8/4 shows 2 new small infarcts in the right frontal lobe.  Patient shows to have continuation of improvement in physical exam findings.  Unclear significance of these findings.  Discussed with neuro to provide comments regarding this.  > Neuro-ICU signing off   - SBP goal of 120 - 160 mmHg and DBP goal < 100    -Hold PTA antihypertensives    -Has prn labetalol and hydralazine  - cont 10 mg rosuvastatin  > OT/PT/Speech   -Since ARU dispo delayed, asked therapies to do aggressive  "rehabilitation as able  > nutrition consult   -monitor oral intake, weights  - repeat TTE outpatient to ensure improvement of AR and TR (and vegetations)  - Neuro to follow-up with MRI findings    #DM2, A1c 5.3  On HSSI with adequate control after stopping dexamethasone. CTM.   - POC glucose ACHS  - High intensity insulin sliding scale    #ELENA  Need to clarify home CPAP settings.    #Leukocytosis  Found to have leukocytosis, no clear cause or signs of infection, possible 2/2 stress induced.        #Hiccups, resolved   #chronic back pain  Chronic back pain. On acetaminophen only (given frequent need for neuro checks). Hiccups resolved (suspected 2/2 dexamethasone). Will continue baclofen and prn thorazine, but may be able to decrease in coming days.   Plan:  - Thorazine 25mg q6hr prn  - Melatonin 3mg daily  - Continue Pepcid  - continue  baclofen  - acetaminophen 1000 mg every 6 hours  - hold cyclobenzaprine 10 mg 3x daily PRN  - hold PTA gabapentin 100 mg 3x daily  - hold PTA oxycodone 5 mg PRN every 6h     #allergies  - fluticasone 1 spray daily     #HTN  - Continue PTA Metoprolol succinate 25mg q daily (ok to hold if HR< 60)  - Hold PTA Losartan, could consider restarting vs discontinuing given normotensive  - Continue PTA hydrochlorothiazide      #Epistaxis   On 8/5 patient found to have epistaxis.  Physical exam shows blood coming from the right nares.  - Afrin spray as needed  - Apply pressure to the nasal passage to help with bleeding          Diet: Regular Diet Adult    DVT Prophylaxis: Enoxaparin (Lovenox) SQ  Avalos Catheter: Not present  Fluids: None  Lines: None     Cardiac Monitoring: None  Code Status: Full Code      Clinically Significant Risk Factors                 # Thrombocytopenia: Lowest platelets = 45 in last 2 days, will monitor for bleeding              # Obesity: Estimated body mass index is 30.71 kg/m  as calculated from the following:    Height as of this encounter: 1.88 m (6' 2\").    Weight " as of this encounter: 108.5 kg (239 lb 3.2 oz).        # Financial/Environmental Concerns: none         Social Drivers of Health          Disposition Plan     Medically Ready for Discharge: Anticipated in 2-4 Days     Patient discussed and seen together with attending physician Dr. Dangelo Nguyen MD  Medicine Service, [facility] TEAM 3  [facility]  Securely message with [employer] (more info)  Text page via [facility] Paging/Directory   See signed in provider for up to date coverage information  ______________________________________________________________________    Interval History   No acute overnight events. Patient doing ok. Had no acute concerns.  Discussed plan with patient.  Patient had no questions regarding plan.    Physical Exam   Vital Signs: Temp: 98  F (36.7  C) Temp src: Oral BP: 123/60 Pulse: 63   Resp: 16 SpO2: 99 % O2 Device: None (Room air)    Weight: 239 lbs 3.19 oz    GENERAL: In chair next to bed, no acute distress  RESP: CTAB  CV:  Extremities appear well-perfused, RUE swelling is stable  GI: Soft, non-distended, non tender  NEURO: Alert, Follows 2 step commands, decreased shoulder shrug and right shoulder, otherwise cranial nerves II-XII intact normal, LUE movement, no RUE movement, unable to wiggle right sided toes, Right quadricept improved to 4/5,  right sided facial droop less than prior , mild dysarthria, no dysphonia, EOMI grossly, pupils symmetric  SKIN: Erythematous papules scattered on face c/w acne, bilateral petechiae present (improving)    Medical Decision Making       Please see A&P for additional details of medical decision making.      Data     I have personally reviewed the following data over the past 24 hrs:    13.0 (H)  \   12.2 (L)   / 46 (LL)     N/A N/A N/A /  110 (H)   4.5 N/A N/A \

## 2025-08-05 NOTE — PROGRESS NOTES
Solid Tumor Oncology Consult Service  Progress Note   Date of Service: 08/05/2025  Patient: Bernard Chapman  MRN: [id number]  Admission Date: 7/15/2025  Hospital Day # 21  Cancer Diagnosis: Metastatic lung adenocarcinoma   Primary Outpatient Oncologist: MARKOS   Current Treatment Plan: Taletrectinib      Summary & Recommendations:   - Biopsy confirmed new metastatic lung adenocarcinoma with ROS1 gene rearrangement. Working to obtain Taletrectinib from approved pharmacy. Would like to start as soon as able.   - Agree with therapeutic Lovenox with plt transfusion support (to keep plt >50K). Hopeful that platelets will improve with treatment of underlying malignancy (although treatment does have side effect of cytopenias, is extremely effective in setting of ROS1 rearrangement and hopeful we will be able to treat through possible transient worsening of thrombocytopenia with transfusion support)   - Repeat Brain MRI without any evidence of metastatic. This is very reassuring that there is not brain involvement/metastasis of lung adenocarcinoma    Assessment & Plan:   Bernard Chapman is a 35 year old year old male with a past medical history of HTN, DM, ELENA, PE/DVT 6/22/25 (previously on rivaroxaban) who presented 7/15/2025 with new neurological symptoms and found acute multifocal ischemic strokes, partial occlusive L MCA thrombus, L frontal SAH, and in setting of TCP c/f ITP vs catastrophic antiphospholipid syndrome (CAPS) vs malignancy s/p PLEX x7/16. CT CAP 7/16/2025 revealing LAD of hilar, mediastinal, and paraesophageal LNs and spiculated-appearing nodule of R apical lung s/p EBUS-TBNA 7/18 revealing metastatic lung adenocarcinoma, NGS revealed ROS1 rearrangement - planning to initiate treatment with taletrectinib as soon as possible while inpatient.      # New metastatic lung adenocarcinoma, ROS1 rearrangement   # Thrombocytopenia  # Anemia  CT CAP 7/16/2025 revealing a spiculated appearing nodule within R  lung apex ~11 mm, multiple enlarged mediastinal, hilar and paraesophageal lymph nodes, along with 11 mm soft tissue density in the left retroperitoneum. Underwent EBUS-TBNA 7/18 revealing metastatic lung adenocarcinoma, + for CK7 and TTF-1. MRI brain without evidence of metastasis. Obtained PET on 7/26. Reviewed with radiologist which showed metastatic disease in L scapula, L retroperitoneum, R adrenal and L2. Areas are small enough where radiation is not required at this time. PD-L1 TPS at 40% consistent with low PD-L1 expression. Per d/w benign hematology, given severe thrombocytopenia requiring transfusions and hypercoagulable state with course complicated by multifocal strokes and PE 2/2 malignancy, plan to initiate treatment while inpatient with taletrectinib as soon as approved given ROS1 rearrangement found on NGS. Discussed with Tera and his parents 8/2 that response rate to this medication is 85-90%, with PFS measured in years on average.   - Working to obtain Taletrectinib. Script send to approved pharmacy. Plan to start as soon as able. Patient will be unable to leave the hospital any time soon given daily platelet transfusion needs.  - Follow up Guardant 360, pending  - Lesions are too small for radiation at this time   - Outpatient oncology referral placed, follow for scheduling       # H/o DVT/PE  Presented 6/22/25 with chest pain and SOB and found to have PE and lower extremity DVTs, he was placed on apixaban though had an allergic reaction and rotated to Xarelto.   - Agree with AC (Lovenox) per primary team and hematology      Patient was seen and plan of care was discussed with attending physician Dr. Mtz.    Thank you for the opportunity to partake in this patient's plan of care. Please do not hesitate to page with questions. We will continue to follow.     Helen Aden PA-C  Hematology/Oncology   _____________________________________________________    Subjective & Interval History:    No  "acute events noted overnight.  Overall Tera is feeling ok today. No new symptoms noted. Feeling very tired of being in the hospital. Discussed Brain MRI results with Tera today. Discussed plan for Taletrectinib.   Denies fever, chills, mouth sores, SOB, cough, abdominal pain, diarrhea, constipation, nausea, vomiting, dysuria, hematuria, numbness, tingling, swelling  All questions answered at bedside.     Physical Exam:    Blood pressure 118/61, pulse 65, temperature 98.2  F (36.8  C), temperature source Oral, resp. rate 16, height 1.88 m (6' 2\"), weight 108.5 kg (239 lb 3.2 oz), SpO2 98%.    Constitutional: Sitting in chair, NAD  HEENT: NCAT  Respiratory: Breathing comfortably on room air  Neuro: Alert, answers questions appropriately. Moves extremities spontaneously.  Psych: Appropriate affect     Labs & Studies: I personally reviewed the following studies:  Oncology fusion gene NGS, 7/22/25:  RESULTS See result below Abnormal    Fusion Event: POSITIVE      INTERPRETATION    This sample is positive for a ROS1 gene rearrangement (SDC4::ROS1)      The detected fusion event joins exon 2 of SDC4 (5' partner) with exon 32 of ROS1 (3' partner), corresponding to approximately 78% of the total reads analyzed (major isoform).  Two additional isoforms corresponding to [id number]::[id number] and INR6xwux1::AHO1prwn80 fusions, respectively, were also detected (corresponding to less than 20% of the total reads).      ROS1 gene rearrangements have been reported in diverse cancer types including non-small-cell lung cancer (NSCLC), and with a notably higher prevalence in lung adenocarcinoma. Importantly, FDA-approved tyrosine kinase inhibitors have demonstrated efficacy in treatment of ROS1-rearranged NSCLC (PMID: 95316863). However it is not clear if coexistence of more than two types of ROS1 rearrangements affects therapeutic response to TKI therapy. A recent case report (PMID: 45951577) showed that coexistence of " NOV5pmtp0::KQK4ylbl 34 and SDC4 exon2::ROS1 exon35 fusions showed suboptimal response to crizotinib therapy when compared to that seen with the IFZ3sjoe2::SRE2uypd 32 fusion alone.      Molecular Description  The following chromosomal coordinates describe the exon::exon junction(s) of the major isoform fusion partner in the most prevalent cDNA transcript for the reported gene fusion event:     chr20:[id number],chr6:[id number]  chr20:[id number],chr6:[id number]  chr20:[id number],chr6:[id number]         This assay detects gene fusion events based on the assessment of cDNA; the reported coordinates describe specific exon-exon joins in the detected transcripts for the positive gene fusion target(s).  The reported coordinates do not specifically describe the underlying genomic (DNA) breakpoint, but do describe a reference interval in which the genomic alteration occurs.      ROUTINE LABS (Last four results):  CMP  Recent Labs   Lab 08/05/25  1313 08/05/25  0856 08/05/25  0704 08/05/25  0157 08/04/25  2142 08/04/25  0809 08/04/25  0718 08/02/25  0821 08/02/25  0705 08/01/25  0725 08/01/25  0717   POTASSIUM  --   --  4.4  --   --   --  4.5  --  4.3  --  4.7   GLC 97 116*  --  110* 164*   < >  --    < >  --    < >  --    CR  --   --  0.95  --   --   --   --   --   --   --   --    GFRESTIMATED  --   --  >90  --   --   --   --   --   --   --   --    MAG  --   --  2.1  --   --   --  2.0  --  2.1  --  2.2   PHOS  --   --  4.1  --   --   --  4.1  --  4.1  --  3.7    < > = values in this interval not displayed.     CBC  Recent Labs   Lab 08/05/25  0704 08/04/25  0718 08/03/25  0716 08/02/25  0705   WBC 10.8 13.0* 11.4* 10.8   RBC 4.37* 4.54 4.76 4.62   HGB 11.7* 12.2* 12.7* 12.3*   HCT 35.4* 37.3* 38.5* 37.4*   MCV 81 82 81 81   MCH 26.8 26.9 26.7 26.6   MCHC 33.1 32.7 33.0 32.9   RDW 14.3 14.5 14.6 14.6   PLT 50* 46* 45* 51*     INR  Recent Labs   Lab 08/02/25  0705 08/01/25  0717 07/31/25  0625 07/29/25  1834   INR 1.13 1.15 1.13 1.23*      Medications list for reference:  Current Facility-Administered Medications   Medication Dose Route Frequency Provider Last Rate Last Admin    acetaminophen (TYLENOL) tablet 1,000 mg  1,000 mg Oral or Feeding Tube Q6H Dee Shah MD   1,000 mg at 08/05/25 0440    Baclofen (LIORESAL) tablet 5 mg  5 mg Oral TID Luisito Aden MD   5 mg at 08/05/25 0810    And    baclofen (LIORESAL) half-tablet 2.5 mg  2.5 mg Oral TID Luisito Aden MD   2.5 mg at 08/05/25 0815    bisacodyl (DULCOLAX) suppository 10 mg  10 mg Rectal Daily PRN Dee Shah MD        chlorhexidine (HIBICLENS) 4 % solution   Topical Daily PRN Dee Shah MD        chlorproMAZINE (THORAZINE) tablet 25 mg  25 mg Oral or Feeding Tube Q6H PRN Yamile Gutierrez APRN CNP   25 mg at 07/26/25 2009    cyclobenzaprine (FLEXERIL) tablet 10 mg  10 mg Oral TID PRN Marjan Michel MD        glucose gel 15-30 g  15-30 g Oral Q15 Min PRN Dee Shah MD        Or    dextrose 50 % injection 25-50 mL  25-50 mL Intravenous Q15 Min PRN Dee Shah MD        Or    glucagon injection 1 mg  1 mg Subcutaneous Q15 Min PRN Dee Shah MD        diphenhydrAMINE (BENADRYL) capsule 25 mg  25 mg Oral or Feeding Tube Q6H PRN Dee Shah MD   25 mg at 07/26/25 1228    Or    diphenhydrAMINE (BENADRYL) injection 25 mg  25 mg Intravenous Q6H PRN Dee Shah MD        enoxaparin ANTICOAGULANT (LOVENOX) injection 135 mg  1 mg/kg Subcutaneous Q12H Dee Shah MD   135 mg at 08/05/25 1218    famotidine (PEPCID) tablet 10 mg  10 mg Oral BID Dee Shah MD   10 mg at 08/05/25 0809    fluticasone (FLONASE) 50 MCG/ACT spray 1 spray  1 spray Both Nostrils Daily Dee Shah MD   1 spray at 08/05/25 0815    gabapentin (NEURONTIN) capsule 100 mg  100 mg Oral TID PRN Marjan Michel MD        hydrALAZINE (APRESOLINE) injection 10 mg  10 mg Intravenous Q30 Min PRN Dee Shah MD   10 mg at 07/23/25 0203    hydroCHLOROthiazide tablet 25 mg  25 mg  Oral Daily Marjan Michel MD   25 mg at 08/05/25 0809    insulin aspart (NovoLOG) injection (RAPID ACTING)  1-10 Units Subcutaneous TID AC Dee Shah MD   1 Units at 08/03/25 1132    insulin aspart (NovoLOG) injection (RAPID ACTING)  1-7 Units Subcutaneous At Bedtime Dee Shah MD   3 Units at 07/24/25 2200    labetalol (NORMODYNE/TRANDATE) injection 10 mg  10 mg Intravenous Q10 Min PRN Dee Shah MD        [Held by provider] losartan (COZAAR) tablet 100 mg  100 mg Oral Daily Dee Shah MD        magnesium hydroxide (MILK OF MAGNESIA) suspension 30 mL  30 mL Oral or Feeding Tube Daily PRN Dee Shah MD        melatonin tablet 3 mg  3 mg Oral At Bedtime PRN Dee Shah MD   3 mg at 07/22/25 0123    metoprolol succinate ER (TOPROL XL) 24 hr tablet 25 mg  25 mg Oral Daily Francisco Prather DO   25 mg at 08/05/25 0809    naloxone (NARCAN) injection 0.2 mg  0.2 mg Intravenous Q2 Min PRN Carine Matt MD        Or    naloxone (NARCAN) injection 0.4 mg  0.4 mg Intravenous Q2 Min PRN Carine Matt MD        Or    naloxone (NARCAN) injection 0.2 mg  0.2 mg Intramuscular Q2 Min PRN Carine Matt MD        Or    naloxone (NARCAN) injection 0.4 mg  0.4 mg Intramuscular Q2 Min PRN Carine Matt MD        ondansetron (ZOFRAN ODT) ODT tab 4 mg  4 mg Oral Q6H PRN Dee Shah MD        Or    ondansetron (ZOFRAN) injection 4 mg  4 mg Intravenous Q6H PRN Dee Shah MD        oxyCODONE (ROXICODONE) tablet 5 mg  5 mg Oral Q6H PRN Marjan Michel MD        oxymetazoline (AFRIN) 0.05 % spray 2 spray  2 spray Both Nostrils BID Bernard Pierson MD        polyethylene glycol (MIRALAX) Packet 17 g  17 g Oral or Feeding Tube Daily Dee hSah MD   17 g at 07/29/25 0835    prochlorperazine (COMPAZINE) injection 10 mg  10 mg Intravenous Q6H PRN Dee Shah MD        Or    prochlorperazine (COMPAZINE) tablet 10 mg  10 mg Oral or Feeding Tube Q6H PRN Dee Shah,  MD        rosuvastatin (CRESTOR) tablet 10 mg  10 mg Oral or Feeding Tube Daily Dee Shah MD   10 mg at 08/05/25 0809    senna-docusate (SENOKOT-S/PERICOLACE) 8.6-50 MG per tablet 1-2 tablet  1-2 tablet Oral or Feeding Tube BID Dee Shah MD   2 tablet at 08/04/25 0921    simethicone (MYLICON) chewable half-tab 40 mg  40 mg Oral Q6H PRN Dee Shah MD   40 mg at 07/25/25 1957    sodium chloride (PF) 0.9% PF flush 3 mL  3 mL Intracatheter Q8H MOHINDER Dee Shah MD   3 mL at 08/05/25 0816    sodium chloride (PF) 0.9% PF flush 3 mL  3 mL Intracatheter q1 min prn Dee Shah MD   3 mL at 07/31/25 2207

## 2025-08-05 NOTE — PROVIDER NOTIFICATION
Hospitalist Jabier Hill notified that patient has due Enoxaparin and latest platelets 46 but had 1 unit replaced this morning and recheck will be tomorrow. Asked if still can give it. MD said okay to give Levonox.

## 2025-08-05 NOTE — PROGRESS NOTES
Care Management Follow Up    Length of Stay (days): 21    Expected Discharge Date: 08/06/2025     Concerns to be Addressed: Discharge planning     Patient plan of care discussed at interdisciplinary rounds: No    Anticipated Discharge Disposition:  ST and Physical Therapy are recommending ARU placement.   [facility] notes that OT  has changed their recommendation back to ARU stay.    As EOD platelet transfusions are anticipated in addition to anticipated treatment for lung adenocarcinoma, TCU will be pursued.  [facility] has informed therapy's of this x2.        Anticipated Discharge Services:  ST and Physical Therapy are recommending ARU placement.   [facility] notes that OT  has changed their recommendation back to ARU stay.    As EOD platelet transfusions are anticipated in addition to anticipated treatment for lung adenocarcinoma, TCU will be pursued.  [facility] has informed therapy's of this x2.  Anticipated Discharge DME:   Not applicable at this time    Patient/family educated on Medicare website which has current facility and service quality ratings: Yes  Education Provided on the Discharge Plan: Yes  Patient/Family in Agreement with the Plan: Yes    Referrals Placed by CM/SW: Post Acute Care Facilities  Private pay costs discussed: Not applicable at this time    Discussed  Partnership in Safe Discharge Planning  document with patient/family: No     Handoff Completed: No, handoff not indicated or clinically appropriate    Additional Information:  [facility] is following pt for discharge planning.  ST and Physical Therapy are recommending ARU placement.   [facility] notes that OT  has changed their recommendation back to ARU stay.    As EOD platelet transfusions are anticipated in addition to anticipated treatment for lung adenocarcinoma, TCU will be pursued.  [facility] has informed therapy's of this x2.  TCU placement is being pursued at [facility].    TIRSO reviewed Dr. Nguyen's 8/4/2025 progress note entry which states that Taletrectinib has been ordered by  "Oncology and will be initiated inpt when it is approved.  Dr. Nguyen's progress note also states that they will begin the process of a 2nd opinion from [facility] as genetics are back.      [facility] Sent a 7:51am text to Dr. Nguyen reminding that SW is awaiting return of pt's completed short term disability form for faxing.    SW received a 1:05pm call from Mali Murrieta RN Case Manager with [facility] Management ([phone], ext. [phone]).  [employer] is a 3rd party  for pt's insurance [employer].  T[facility]SO phoned Mali at 2:01pm and left a voice mail for Mali to call.  T[facility]SO phoned Mali again at 2:14pm and left a message for Mali to call.           Next Steps:  Dr. Nguyen to provide [facility] with completed short term disability paperwork (provided to Dr. Dr. Nguyen on 7/31/2025) at which time [facility] will fax the document to \"[employer]\" which is the company that will be processing the request.  SW will await a follow up call from Mali Murrieta RN Case Manager with [facility] Management ([phone], [phone]. [phone]).  [facility] will continue to follow for discharge planning.     BRUNILDA Jorgensen  Social Work, 6A  Phone:  [phone]  Pager:  [phone]  8/5/2025       DAR Muse     "

## 2025-08-05 NOTE — PLAN OF CARE
Goal Outcome Evaluation:         Status: Admitted 7/15 d/t lethargy, N/V, and blurry vision found to have multifocal acute CVA in L MCA, R parietal, and R frontal semiovale. Further workup notable for new diagnosis of metastatic lung adenocarcinoma.    Vitals: VSS  Neuros: A&Ox4, strengths L side 5/5 RUE 1/5 RLE 3-4/5, denies N/T, R droop, intermittent dysarthria  IV: PIV SL   Labs/Electrolytes: Platelets critical at 46, 1 unit transfused in AM, recheck tomorrow. BG checks ACHS WNL  Resp: LSC on RA   Diet: Regular- good intake. Takes pills whole in applesauce. Please enc pt to takes pills himself  GI: LBM 8/3 (date confirmed with pt and father)  : Voiding spontaneously via urinal with assist  Skin: L shin biopsy site BECK, rash/petechiae throughout, but improving.   Pain: Occasional sacral soreness when sitting in chair. Repo, Tylenol prn. Uses Geopad chair support  Activity: Ax2 GB+cane. Must wear RUE splint overnight and RLE splint when walking  Social: family visited today  Plan/Updates this shift: Completed MRI this shift

## 2025-08-05 NOTE — PLAN OF CARE
"Goal Outcome Evaluation: 1900-2300H      Plan of Care Reviewed With: patient        ./56 (BP Location: Left arm)   Pulse 63   Temp 98  F (36.7  C) (Oral)   Resp 18   Ht 1.88 m (6' 2\")   Wt 108.5 kg (239 lb 3.2 oz)   SpO2 98%   BMI 30.71 kg/m        Patient alert and oriented. Afebrile, OVSS. Denies pain and nausea. Not in distress. With right sided weakness. Right facial droop noted. Takes pills with apple sauce. No BM this shift. 2 PIV- SL. Monitored accordingly, for further care and management.       Problem: Adult Inpatient Plan of Care  Goal: Plan of Care Review  Description: The Plan of Care Review/Shift note should be completed every shift.  The Outcome Evaluation is a brief statement about your assessment that the patient is improving, declining, or no change.  This information will be displayed automatically on your shift  note.  Outcome: Progressing  Flowsheets (Taken 8/5/2025 0254)  Plan of Care Reviewed With: patient     Problem: Adult Inpatient Plan of Care  Goal: Optimal Comfort and Wellbeing  Outcome: Progressing  Intervention: Provide Person-Centered Care  Recent Flowsheet Documentation  Taken 8/4/2025 2100 by Mackenzie Singh RN  Trust Relationship/Rapport:   care explained   choices provided   emotional support provided   empathic listening provided   reassurance provided   thoughts/feelings acknowledged   questions encouraged     Problem: Pain Acute  Goal: Optimal Pain Control and Function  Outcome: Progressing  Intervention: Optimize Psychosocial Wellbeing  Recent Flowsheet Documentation  Taken 8/4/2025 2100 by Mackenzie Singh RN  Supportive Measures:   active listening utilized   decision-making supported   goal-setting facilitated   positive reinforcement provided   relaxation techniques promoted   self-care encouraged   verbalization of feelings encouraged  Intervention: Prevent or Manage Pain  Recent Flowsheet Documentation  Taken 8/4/2025 2100 by Mackenzie Singh RN  Sensory " Stimulation Regulation:   auditory stimulation minimized   care clustered   lighting decreased   quiet environment promoted   tactile stimulation minimized   visual stimulation minimized  Sleep/Rest Enhancement:   awakenings minimized   comfort measures   consistent schedule promoted   noise level reduced   regular sleep/rest pattern promoted   relaxation techniques promoted   room darkened  Bowel Elimination Promotion:   adequate fluid intake promoted   ambulation promoted  Medication Review/Management:   medications reviewed   high-risk medications identified

## 2025-08-06 ENCOUNTER — APPOINTMENT (OUTPATIENT)
Dept: OCCUPATIONAL THERAPY | Facility: CLINIC | Age: 35
DRG: 064 | End: 2025-08-06
Payer: COMMERCIAL

## 2025-08-06 ENCOUNTER — APPOINTMENT (OUTPATIENT)
Dept: PHYSICAL THERAPY | Facility: CLINIC | Age: 35
DRG: 064 | End: 2025-08-06
Payer: COMMERCIAL

## 2025-08-06 PROBLEM — C78.01: Status: ACTIVE | Noted: 2025-08-06

## 2025-08-06 LAB
BLD PROD TYP BPU: NORMAL
BLOOD COMPONENT TYPE: NORMAL
CODING SYSTEM: NORMAL
ERYTHROCYTE [DISTWIDTH] IN BLOOD BY AUTOMATED COUNT: 14.1 % (ref 10–15)
GLUCOSE BLDC GLUCOMTR-MCNC: 102 MG/DL (ref 70–99)
GLUCOSE BLDC GLUCOMTR-MCNC: 91 MG/DL (ref 70–99)
HCT VFR BLD AUTO: 36.4 % (ref 40–53)
HGB BLD-MCNC: 11.8 G/DL (ref 13.3–17.7)
ISSUE DATE AND TIME: NORMAL
MAGNESIUM SERPL-MCNC: 1.9 MG/DL (ref 1.7–2.3)
MCH RBC QN AUTO: 26.9 PG (ref 26.5–33)
MCHC RBC AUTO-ENTMCNC: 32.4 G/DL (ref 31.5–36.5)
MCV RBC AUTO: 83 FL (ref 78–100)
MCV RBC AUTO: 83 FL (ref 78–100)
PHOSPHATE SERPL-MCNC: 4.1 MG/DL (ref 2.5–4.5)
PLATELET # BLD AUTO: 47 10E3/UL (ref 150–450)
PLATELET # BLD AUTO: 48 10E3/UL (ref 150–450)
POTASSIUM SERPL-SCNC: 4.2 MMOL/L (ref 3.4–5.3)
RBC # BLD AUTO: 4.39 10E6/UL (ref 4.4–5.9)
UNIT ABO/RH: NORMAL
UNIT NUMBER: NORMAL
UNIT STATUS: NORMAL
UNIT TYPE ISBT: 6200
WBC # BLD AUTO: 9.4 10E3/UL (ref 4–11)

## 2025-08-06 PROCEDURE — 250N000013 HC RX MED GY IP 250 OP 250 PS 637: Performed by: HOSPITALIST

## 2025-08-06 PROCEDURE — 250N000013 HC RX MED GY IP 250 OP 250 PS 637

## 2025-08-06 PROCEDURE — 97110 THERAPEUTIC EXERCISES: CPT | Mod: GP | Performed by: PHYSICAL THERAPIST

## 2025-08-06 PROCEDURE — 250N000013 HC RX MED GY IP 250 OP 250 PS 637: Performed by: STUDENT IN AN ORGANIZED HEALTH CARE EDUCATION/TRAINING PROGRAM

## 2025-08-06 PROCEDURE — 85018 HEMOGLOBIN: CPT

## 2025-08-06 PROCEDURE — P9037 PLATE PHERES LEUKOREDU IRRAD: HCPCS

## 2025-08-06 PROCEDURE — 97112 NEUROMUSCULAR REEDUCATION: CPT | Mod: GP | Performed by: PHYSICAL THERAPIST

## 2025-08-06 PROCEDURE — 97530 THERAPEUTIC ACTIVITIES: CPT | Mod: GP | Performed by: PHYSICAL THERAPIST

## 2025-08-06 PROCEDURE — 97535 SELF CARE MNGMENT TRAINING: CPT | Mod: GO

## 2025-08-06 PROCEDURE — 99233 SBSQ HOSP IP/OBS HIGH 50: CPT | Mod: GC | Performed by: HOSPITALIST

## 2025-08-06 PROCEDURE — 83735 ASSAY OF MAGNESIUM: CPT | Performed by: HOSPITALIST

## 2025-08-06 PROCEDURE — 250N000011 HC RX IP 250 OP 636

## 2025-08-06 PROCEDURE — 36415 COLL VENOUS BLD VENIPUNCTURE: CPT

## 2025-08-06 PROCEDURE — 99233 SBSQ HOSP IP/OBS HIGH 50: CPT | Mod: FS | Performed by: PHYSICIAN ASSISTANT

## 2025-08-06 PROCEDURE — 36415 COLL VENOUS BLD VENIPUNCTURE: CPT | Performed by: HOSPITALIST

## 2025-08-06 PROCEDURE — 120N000002 HC R&B MED SURG/OB UMMC

## 2025-08-06 PROCEDURE — 97116 GAIT TRAINING THERAPY: CPT | Mod: GP | Performed by: PHYSICAL THERAPIST

## 2025-08-06 PROCEDURE — 85049 AUTOMATED PLATELET COUNT: CPT

## 2025-08-06 PROCEDURE — 97112 NEUROMUSCULAR REEDUCATION: CPT | Mod: GO

## 2025-08-06 PROCEDURE — 99232 SBSQ HOSP IP/OBS MODERATE 35: CPT | Mod: GC | Performed by: STUDENT IN AN ORGANIZED HEALTH CARE EDUCATION/TRAINING PROGRAM

## 2025-08-06 PROCEDURE — 97530 THERAPEUTIC ACTIVITIES: CPT | Mod: GO

## 2025-08-06 PROCEDURE — 84132 ASSAY OF SERUM POTASSIUM: CPT | Performed by: HOSPITALIST

## 2025-08-06 PROCEDURE — 84100 ASSAY OF PHOSPHORUS: CPT | Performed by: HOSPITALIST

## 2025-08-06 RX ORDER — MAGNESIUM OXIDE 400 MG/1
400 TABLET ORAL EVERY 4 HOURS
Status: COMPLETED | OUTPATIENT
Start: 2025-08-06 | End: 2025-08-06

## 2025-08-06 RX ADMIN — Medication 5 MG: at 20:33

## 2025-08-06 RX ADMIN — ACETAMINOPHEN 1000 MG: 500 TABLET ORAL at 10:14

## 2025-08-06 RX ADMIN — Medication 2.5 MG: at 20:34

## 2025-08-06 RX ADMIN — Medication 5 MG: at 13:53

## 2025-08-06 RX ADMIN — MAGNESIUM OXIDE TAB 400 MG (241.3 MG ELEMENTAL MG) 400 MG: 400 (241.3 MG) TAB at 12:29

## 2025-08-06 RX ADMIN — Medication 2.5 MG: at 08:36

## 2025-08-06 RX ADMIN — MAGNESIUM OXIDE TAB 400 MG (241.3 MG ELEMENTAL MG) 400 MG: 400 (241.3 MG) TAB at 10:14

## 2025-08-06 RX ADMIN — ENOXAPARIN SODIUM 135 MG: 150 INJECTION SUBCUTANEOUS at 10:14

## 2025-08-06 RX ADMIN — FAMOTIDINE 10 MG: 10 TABLET ORAL at 20:33

## 2025-08-06 RX ADMIN — SENNOSIDES AND DOCUSATE SODIUM 1 TABLET: 50; 8.6 TABLET ORAL at 20:32

## 2025-08-06 RX ADMIN — FAMOTIDINE 10 MG: 10 TABLET ORAL at 08:33

## 2025-08-06 RX ADMIN — Medication 5 MG: at 08:33

## 2025-08-06 RX ADMIN — FLUTICASONE PROPIONATE 1 SPRAY: 50 SPRAY, METERED NASAL at 08:32

## 2025-08-06 RX ADMIN — ACETAMINOPHEN 1000 MG: 500 TABLET ORAL at 22:26

## 2025-08-06 RX ADMIN — ENOXAPARIN SODIUM 135 MG: 150 INJECTION SUBCUTANEOUS at 22:26

## 2025-08-06 RX ADMIN — ACETAMINOPHEN 1000 MG: 500 TABLET ORAL at 16:06

## 2025-08-06 RX ADMIN — HYDROCHLOROTHIAZIDE 25 MG: 12.5 TABLET ORAL at 08:33

## 2025-08-06 RX ADMIN — METOPROLOL SUCCINATE 25 MG: 25 TABLET, EXTENDED RELEASE ORAL at 08:33

## 2025-08-06 RX ADMIN — SENNOSIDES AND DOCUSATE SODIUM 1 TABLET: 50; 8.6 TABLET ORAL at 08:33

## 2025-08-06 RX ADMIN — ROSUVASTATIN CALCIUM 10 MG: 10 TABLET, FILM COATED ORAL at 08:33

## 2025-08-06 RX ADMIN — Medication 2.5 MG: at 13:53

## 2025-08-06 ASSESSMENT — ACTIVITIES OF DAILY LIVING (ADL)
ADLS_ACUITY_SCORE: 40
ADLS_ACUITY_SCORE: 39
ADLS_ACUITY_SCORE: 40
ADLS_ACUITY_SCORE: 39
ADLS_ACUITY_SCORE: 39
ADLS_ACUITY_SCORE: 40
ADLS_ACUITY_SCORE: 43
ADLS_ACUITY_SCORE: 42
ADLS_ACUITY_SCORE: 43
ADLS_ACUITY_SCORE: 39
ADLS_ACUITY_SCORE: 43
ADLS_ACUITY_SCORE: 42
ADLS_ACUITY_SCORE: 40
ADLS_ACUITY_SCORE: 39
ADLS_ACUITY_SCORE: 39
ADLS_ACUITY_SCORE: 43
ADLS_ACUITY_SCORE: 42
ADLS_ACUITY_SCORE: 39
ADLS_ACUITY_SCORE: 39
ADLS_ACUITY_SCORE: 40
ADLS_ACUITY_SCORE: 40

## 2025-08-06 NOTE — PLAN OF CARE
Status: Admitted 7/15 with vision changes, dizziness, confusion, and WFD. Found to have L MCA ischemic stroke and occlusion of L M1. Further work-up showed new diagnosis of metastatic lung adenocarcinoma. 8/4 MRI showed incidental 2 new R frontal infarcts.   Vitals: VSS on RA  Neuros: A&O x4. Flat. L side 5/5, RUE 1, RLE 3-4. R droop. R field cut. Decreased sensation to R side of body.   IV: PIV SL x2  Labs/Electrolytes: Platelets 47. Mag replaced- redraw in AM  Resp: WNL  Diet: Regular diet. Good PO. Needs help ordering & tray setup.  GI: LBM 8/4 per pt. Bowel meds given  : Voiding spontaneously via urinal  Skin: Petechiae throughout.   Pain: Denies  Activity: A1-2, GB, cane. Up in chair throughout day. Walked halls with therapy x2. Went outside with dad this shift. Showered this shift.  SCDs on? If no, why?: Yes  Social: Dad at bedside earlier  Plan: Taletrectinib to be started once prior auth received. Monitor platelet  levels.   Updates this shift: 1 unit of platelets given.     Goal Outcome Evaluation:      Plan of Care Reviewed With: patient    Overall Patient Progress: no change    Outcome Evaluation: 1 unit platelets given.

## 2025-08-06 NOTE — PLAN OF CARE
Major shift events    Vitals: VSS on RA  Neuro: DND overnight, last assessment at 2000 - A&O x4, int forgetful, L side 5/5, RU 1/5, RL 3/5, denies N/T, R droop, int dysarthic  Labs: Pend am draw  GI: Reg, takes pills whole in applesauce, needs tray set up. No BM overnight  : Voiding spontaneously  Skin: WDL  Access: PIV x2 SL  Pain: Denied  Activity: Ax1-2 GB and cane. RU splint overnight. RL splint when walking further distances  Social: Calm and cooperative      Plan: Continue to monitor platelet levels and monitor for changes in his neuro status    See flowsheets for full assessments and vitals      Goal Outcome Evaluation:      Plan of Care Reviewed With: patient    Overall Patient Progress: no changeOverall Patient Progress: no change    Outcome Evaluation: DND overnight, no changes in 2000 assessment

## 2025-08-06 NOTE — PROGRESS NOTES
[facility]    Progress Note - Medicine Service, [facility] TEAM 3       Date of Admission:  7/15/2025    Assessment & Plan   Bernard Chapman is a 35 year old male with a past medical history of hypertension, diabetes, ELENA, PE/DVT (on rivaroxaban) admitted on 7/15/2025 for lethargy, N/V, and blurry vision found to have multifocal acute CVA in L MCA , R parietal and R frontal centrum semiovale. Initially concern for TTP or catastrophic APS, however further work up notable for new diagnosis of metastatic lung adenocarcinoma with likely malignancy-related hypercoagulability.    Today:  - Taletrectinib ordered through onc - to start inpatient as soon prior authorization approved (will need to have address so medications can be sent )  - cont daily labs  - [place] 2nd opinion; Patient will need to call [phone] to make appointment once there is a timeline for discharging from this hospitalization  - 1U platelets      #New metastatic lung adenocarcinoma, ROS1 rearrangement   #Right lung nodule  #Multiple enlarged mediastinal, paraesophageal, and hilar lymph nodes  Malignancy work up initiated on admission due to significant hypercoagulability. CT CAP significant for [id number] spiculated density in R lung apex, 9mm pulmonary nodule in R lung base, L thyroid nodule, paratracheal LAD, splenic infarct, bilateral renal infarcts, and L retroperitoneal nodule. Underwent FNA of lymph node which was positive for lung adenocarcinoma (resulted 7/22).  PDL-1 resulted with low expression (40%). PET 7/26 with distant metastases and MRI brain showing no signs of metastasis.  Currently pending prior authorization and patient is planned to start in patient therapy once approved..  -Oncology consulted, appreciate recs   - NGS; positive for a ROS1 gene rearrangement (SDC4::ROS1)     - Guardant 360; T p53 mutation and MITCHELL   - Due to ongoing transfusion needs, ARU not feasible.   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 TTE with mobile echodensities on tricuspid and aortic valve. Initial concern for TTP or catastrophic APS, however work up ultimately significant for new diagnosis of metastatic lung cancer. Hypercoagulable state presumed to be most likely 2/2 malignancy. Started on heparin gtt 7/17, but briefly held for 7/18 endobronchial needle aspiration of lymph nodes. Stroke code called 7/19 for worsening R-sided hemiparesis and hemianopia. Repeat CTA with new focal high-grade stenosis of other part of left MCA. Heparin gtt was stopped during code stroke, but restarting (heparin gtt + levophed) led to 10/10 headache. CTH with small left frontal SAH. Brain MRI with new L basal ganglia stroke. Heparin gtt resumed, and repeat CTH stable. Switched to therapeutic Lovenox 7/22. CTH still stable 7/23. Declining fibrinogen, so received cryoprecipitate 7/25. Concern for continued consumptive process, but on therapeutic Lovenox. PLT downtrending, so receiving transfusions (1 unit 7/26, 1 unit 7/27, 1 unit 7/28, 1 unit 7/30,1 unit 8/1, 1 unit 8/3, 1 unit 8/4, 1 unit 8/5, 1 unit 8/6). No new symptoms. CTM.  - Hematology consulted   -Transfuse if   -Hgb<7  -INR<1.5  -PLT<50k  -Fibrinogen<100  - SC Lovenox 135 mg bid   -qdaily CBC  -daily fibrinogen, INR, PTT      #  Maculopapular rash on face, chest, bilateral upper extremities, improved  # Numerous palpable purpura across the scalp and lower extremities   # Petechiae on bilateral lower extremities  C/f palpable purpura, so s/p L leg s/p punch biopsy with dermatology: rupture folliculitis. No definite features of cutaneous small vessel vasculitis or thrombotic vasculopathy. New rash noted 7/26 with  flushing of face/chest after PET scan. Also scattered papules of face and upper extremities (in addition to petechiae of BLE). Not itchy and no signs of respiratory distress, but suspected  secondary to contrast from PET scan, .  Rash appeared several hours after unit of platelets, less likely due to a transfusion reaction. photos present in chart. The following days, papules are consistent with his usual acne. No diffuse erythema. Some c/f SVC syndrome given distrubution of flushing and positional nature (normally HOB elevated when asleep, so PET was first time that he had prolonged flat/supine position). CTM.   Plan:  - Benadryl as needed for itching  - Chlorhexidine wash  - Will continue to monitor    #Multifocal acute ischemic strokes, in L MCA territory and new L basal ganglia stroke; Increasing mass effect on L lateral ventricle by L MCA infarction  #Subtotal occlusive thrombus in left M1   #Left frontoparietal SAH (7/19)  #Hypertension chronic  #HLD, chronic  Etiology most likely hypercoagulability d/t malignancy. Emboli stemming from tricuspid and aortic valves, appreciated on TTE 7/16. He continues to have R facial droop, extremity weakness. No apparent aphasia, but he is mildly dysarthric. Left frontoparietal subarachnoid hemorrhage stable per CT. Working with therapies with notable improvement.  Repeat brain MRI on 8/4 shows 2 new small infarcts in the right frontal lobe.  Patient shows to have continuation of improvement in physical exam findings.  Unclear significance of these findings.  Discussed with neuro to provide  comments regarding this.  > Neuro-ICU signing off   - SBP goal of 120 - 160 mmHg and DBP goal < 100    -Hold PTA antihypertensives    -Has prn labetalol and hydralazine  - cont 10 mg rosuvastatin  > OT/PT/Speech   -Since ARU dispo delayed, asked therapies to do aggressive rehabilitation as able  > nutrition consult   -monitor oral intake, weights  - repeat TTE outpatient to ensure improvement of AR and TR (and vegetations)  - Neuro to follow-up with MRI findings    #DM2, A1c 5.3  On HSSI with adequate control after stopping dexamethasone. CTM.   - POC glucose ACHS  - High intensity insulin sliding scale    #ELENA  Need to clarify home CPAP settings.    #Leukocytosis  Found to have leukocytosis, no clear cause or signs of infection, possible 2/2 stress induced.        #Hiccups, resolved   #chronic back pain  Chronic back pain. On acetaminophen only (given frequent need for neuro checks). Hiccups resolved (suspected 2/2 dexamethasone). Will continue baclofen and prn thorazine, but may be able to decrease in coming days.   Plan:  - Thorazine 25mg q6hr prn  - Melatonin 3mg daily  - Continue Pepcid  - continue  baclofen  - acetaminophen 1000 mg every 6 hours  - hold cyclobenzaprine 10 mg 3x daily PRN  - hold PTA gabapentin 100 mg 3x daily  - hold PTA oxycodone 5 mg PRN every 6h     #allergies  - fluticasone 1 spray daily     #HTN  - Continue PTA Metoprolol succinate 25mg q daily (ok to hold if HR< 60)  - Hold PTA Losartan, could consider restarting vs discontinuing given normotensive  - Continue PTA hydrochlorothiazide      #Epistaxis   On 8/5 patient found to have epistaxis.  Physical exam shows blood coming from the right nares.  - Afrin spray as needed  - Apply pressure to the nasal passage to help with bleeding          Diet: Regular Diet Adult    DVT Prophylaxis: Enoxaparin (Lovenox) SQ  Avalos Catheter: Not present  Fluids: None  Lines: None     Cardiac Monitoring: None  Code Status: Full Code      Clinically  "Significant Risk Factors                 # Thrombocytopenia: Lowest platelets = 47 in last 2 days, will monitor for bleeding              # Obesity: Estimated body mass index is 30.71 kg/m  as calculated from the following:    Height as of this encounter: 1.88 m (6' 2\").    Weight as of this encounter: 108.5 kg (239 lb 3.2 oz).        # Financial/Environmental Concerns: none         Social Drivers of Health          Disposition Plan     Medically Ready for Discharge: Anticipated in 2-4 Days     Patient discussed and seen together with attending physician Dr. Dangelo Nguyen MD  Medicine Service, [facility] TEAM 3  [facility]  Securely message with [employer] (more info)  Text page via [facility] Paging/Directory   See signed in provider for up to date coverage information  ______________________________________________________________________    Interval History   No acute overnight events. Patient doing ok. Had no acute concerns.  Discussed plan with patient and patients father present at bedside. Requested that medication be sent to [facility].     Physical Exam   Vital Signs: Temp: 98.3  F (36.8  C) Temp src: Oral BP: 121/64 Pulse: 73   Resp: 18 SpO2: 98 % O2 Device: None (Room air)    Weight: 239 lbs 3.19 oz    GENERAL: In chair next to bed, no acute distress  RESP: CTAB  CV:  Extremities appear well-perfused, RUE swelling is stable  GI: Soft, non-distended, non tender  NEURO: Alert, Follows 2 step commands, decreased shoulder shrug and right shoulder, otherwise cranial nerves II-XII intact normal, LUE movement, no RUE movement, unable to wiggle right sided toes, Right quadricept improved to 4/5,  right sided facial droop less than prior , mild dysarthria, no dysphonia, EOMI grossly, pupils symmetric  SKIN: Erythematous papules scattered on face c/w acne, bilateral petechiae present (improving)    Medical Decision Making       Please see A&P for additional " details of medical decision making.      Data     I have personally reviewed the following data over the past 24 hrs:    9.4  \   11.8 (L)   / 47 (LL)     N/A N/A N/A /  91   4.2 N/A N/A \

## 2025-08-06 NOTE — PROGRESS NOTES
"      [facility]    Vascular Neurology Progress Note    Interval History     Patient seen during morning rounds. Father also present at bedside. Patient is a little tired this morning, is resting in recliner after working with therapies. We reviewed MRI results. Also given right hand paresis, recommended even during day time to keep hand open/fingers straight as possible to avoid contractures.     Hospital Course     Chief complaint: Altered Mental Status (Patient was at his MD \"anticoagulation clinic\" MD concerned that patient might have a brain bleed. Mother stated that \" this Sunday evening has been having altered mental status. Seeing sparklers L eye, like black gray spots\")      35 year old male w hypertension, diabetes, ELENA, PE/DVT (on rivaroxaban) admitted on 7/15/2025 for lethargy, N/V, and blurry vision found to have multifocal acute ischemic stroke in setting of mid to distal M1 segment LMCA occlusion , R parietal and R frontal centrum semiovale. Initially there was concern for TTP or catastrophic APS, however further work up led to new diagnosis of metastatic lung adenocarcinoma with likely malignancy-related hypercoagulability. PET 7/26 with distant metastases. MRI brain completed at oncology recommendation to evaluate for any Central nervous system metastasis prior to initiation of treatment. Patient has been on therapeutic lovenox for anticoagulation.   Oncology hoping to start chemotherapy.  Recommended brain MRI to evaluate for any metastatic disease in the brain.  Repeat brain MRI did show 2 new small infarcts in the right frontal lobe, which stroke neurology was consulted for.  Fortunately, there is no signs of metastasis of the brain.    Assessment and Plan     #incidental small right frontal infarcts   #Multifocal acute stroke   #Partially occlusive Left MCA thrombus  #New left MCA stroke on repeat MRI  #Left frontoparietal SAH, resolved  #Metastatic " lung adenocarcinoma  #Thrombocytopenia  35 year old male with a past medical history of hypertension, diabetes, possible ITP, sleep apnea, PE and DVT on rivaroxaban admitted on 7/15/2025 for acute ischemic stroke in left MCA territory, near occlusion of left M1, and concern for thrombotic emergency such as TTP or catastrophic antiphospholipid syndrome (in setting of severe thrombocytopenia and acute cerebral infarcts) and metastatic adenocarcinoma of the lung. Oncology preparing to initiate chemotherapy for cancer treatment and requested brain MRI to evaluate for metastatic disease in the brain. Incidentally found to have new right frontal lobe infarcts that were not present in prior MRI. Etiology of stroke is hypercoagulable state from malignancy. Incidental new strokes should not change current oncologic treatment plans. There is no sign of metastatic intracranial disease at this time. Would continue on therapeutic lovenox for secondary stroke prevention, defer management to heme/onc.     Stroke neurology will sign off at this time. Please page/message with questions.     Seen and discussed with vascular neurology, Dr. Hughes.  Mindi James MD  Neurology PGY-3    Physical Examination     Temp: 98.3  F (36.8  C) Temp src: Oral BP: 121/64 Pulse: 73   Resp: 18 SpO2: 98 % O2 Device: None (Room air)      Mental Status:  alert, oriented to self, age, month/year, location, follows commands, speech clear and fluent for most part but with some slurring at times, naming and repetition normal  Cranial Nerves:  Difficulty counting fingers in right lower VF  PERRL, EOMI with normal smooth pursuit, facial sensation intact and symmetric, right facial droop present with activation, hearing not formally tested but intact to conversation, mild dysarthria, shoulder shrug strong on left,3/5 on right, tongue protrusion midline  Motor:  normal muscle tone and bulk except at end of elbow extension some clasp knife spasticity of Right,  no abnormal movements at rest, able to move all limbs spontaneously, strength 5/5 throughout left upper and lower extremities with no pronator drift. Mild drift in right arm (has no control distally past elbow), and mild drift in right leg.     Right Left   Arm abduction 4-/5 5/5   Elbow Flex 0/5 5/5   Elbow Extension 0/5 5/5   Wrist Extension 0/5 5/5   Finger extension 0/5 5/5     0/5 5/5   Hip Flexion 3/5 5/5   Knee Flexion 3/5 5/5   Knee Extension 4-/5 5/5   Dorsiflexion  0/5 5/5         Reflexes: sustained clonus with right wrist dorsiflexion, no clonus in LE  Sensory:  light touch sensation reported intact and symmetric throughout upper and lower extremities, no extinction on double simultaneous stimulation . Appears to have visual extinction of right.  Coordination:  normal finger-to-nose on left without dysmetria   Station/Gait:  deferred    Stroke Scales       NIHSS  1a. Level of Consciousness 0-->Alert, keenly responsive   1b. LOC Questions 0-->Answers both questions correctly   1c. LOC Commands 0-->Performs both tasks correctly   2.   Best Gaze 0-->Normal   3.   Visual 1-->Partial hemianopia   4.   Facial Palsy 1-->Minor paralysis (flattened nasolabial fold, asymmetry on smiling)   5a. Motor Arm, Left 0-->No drift, limb holds 90 (or 45) degrees for full 10 secs   5b. Motor Arm, Right 1-->Drift, limb holds 90 (or 45) degrees, but drifts down before full 10 secs, does not hit bed or other support   6a. Motor Leg, Left 0-->No drift, leg holds 30 degree position for full 5 secs   6b. Motor Leg, right 1-->Drift, leg falls by the end of the 5-sec period but does not hit bed   7.   Limb Ataxia 0-->Absent   8.   Sensory 0-->Normal, no sensory loss   9.   Best Language 0-->No aphasia, normal   10. Dysarthria 1-->Mild-to-moderate dysarthria, patient slurs at least some words and, at worst, can be understood with some difficulty   11. Extinction and Inattention  1-->Visual, tactile, auditory, spatial, or personal  inattention or extinction to bilateral simultaneous stimulation in one of the sensory modalities   Total 6 (08/06/25 1218)       Imaging/Labs      MRI/Head CT MRI brain 8/5/25  IMPRESSION:   1. Two new small infarcts in the right frontal lobe since the MRI  7/19/2025.  2. Evolution of prior left cerebral hemisphere infarcts, many of which  are now enhancing in the subacute phase. No definite hemorrhagic  conversion on susceptibility imaging. No intracranial hemorrhages.      CT Head wo contrast 7/23/25  Impression:  1. Unchanged small volume left frontoparietal subarachnoid hemorrhage.  2. Evolving left MCA infarction. No new loss of gray-white matter  differentiation.      MR Brain 7/19/25  IMPRESSION:  1. Increased areas of diffusion restriction with corresponding ADC  dropout in the left MCA territory cortical and subcortical regions  including the left basal ganglia structures when compared to the prior  MRI, but likely similar to the hypodensities seen on the recent CT.  Areas of restricted diffusion with corresponding ADC dropout in the  right cerebral hemisphere appear unchanged as compared to the previous  MRI dated 07/15/2025.  2. FLAIR hyperintensity with corresponding blooming on SWI left  cerebral convexity sulci consistent with known subarachnoid  hemorrhage.         Intracranial Vasculature HEAD CTA 7/19/25:  1.  Stable subtotal occlusion mid to distal M1 segment left middle cerebral artery.  2.  New focal high-grade stenosis posterior parietal branch left middle cerebral artery near the M2/M3 junction.   Cervical Vasculature Neck CTA 7/19/25  NECK CTA:  1.  No hemodynamically significant stenosis in the neck vessels.  2.  No evidence for dissection.  3.  New bilateral pleural effusions and patchy infiltrates in both upper lobes and superior segments of the lower lobes.  4.  Mediastinal and right hilar adenopathy was incompletely imaged on the prior study and concerning for metastatic disease or  lymphoma.         Echocardiogram TIMO 7/17/25  Interpretation Summary  TIMO ordered to assess valvular disorder.     There is diffuse investment of the aortic and tricuspid valves with thrombus  that result in moderate aortic regurgitation and moderate tricuspid  regurgitation. Normal mitral and pulmonic valves.     Of note, an infectious etiology of the valvular lesions cannot be excluded on  the basis of this study alone.   EKG/Telemetry SR   Other Testing PET ONCOLOGY 7/26/25  IMPRESSION: Summary: Primary lung tumor in right apex with metastases  (mediastinal lymph nodes, left retroperitoneal nodule, bones, and  right adrenal gland).     1.  Lung - 1.4 cm spiculated nodule in right upper lobe apex with  moderate uptake presumably represents primary lung adenocarcinoma.  Increased uptake in adjacent airway with wall thickening may represent  endobronchial involvement. No additional FDG-avid or other suspicious  pulmonary nodules.  2.  Nodes:  2a. Mediastinum - Hypermetabolic mediastinal lymphadenopathy  consistent with biopsy-proven metastatic disease.  2b. Retroperitoneam - Hypermetabolic 1 cm left retroperitoneal soft  tissue nodule concerning for metastatic disease.     3. Bones - Increased FDG-uptake in inferior tip of left scapula,  superior endplate of L2 vertebral body, right fifth rib medially, T6  posteriorly concerning for metastatic disease.      4. Adrenals - uptake right>left adrenal glands.  Differential  considerations include metastatic disease versus primary adrenal  lesion. Right is likely metastatic.  5. Right pleural effusion - new moderate to large.  6. Trace pelvic ascites, slightly increased.   7. Brain - better demonstrated on MRI, known strokes.  Decreased FDG  uptake in the left caudate and putamen and right cerebellum.      LDL  7/15/2025: 135 mg/dL   A1C  7/16/2025: 5.3 %   Troponin No lab value available in past 7 days

## 2025-08-06 NOTE — PLAN OF CARE
Goal Outcome Evaluation:     Status: Admitted 7/15 d/t lethargy, N/V, and blurry vision found to have multifocal acute CVA in L MCA, R parietal, and R frontal semiovale. Further workup notable for new diagnosis of metastatic lung adenocarcinoma with mets.  Vitals: VSS  Neuros: A&Ox4, forgetful at times. Strengths L side 5/5 RUE 1/5 RLE 3-4/5, denies N/T, R droop, R mild neglect, intermittent dysarthria moreso when tired. Flat affect, but will laugh and smile with staff. Does not attempt to get OO alone, but can get up quickly at times. Bed alarm for safety.  IV: PIV SL x2  Labs/Electrolytes: Platelets 50 today and 1 unit transfused, recheck tomorrow. BG checks ACHS WNL  Resp: LSC on RA   Diet: Regular- good intake. 100% of all meals today. Needs assist to order and set up. Takes pills whole in applesauce. Please enc pt to takes pills himself  GI: LBM 8/4. Pt declined softeners today.  : Voiding via urinal with assist  Skin: L shin biopsy site BECK, rash/petechiae throughout, but improving. Declined offer to shower the last 2 days.  Pain: Occasional sacral soreness when sitting in chair. Repo prn and uses Geopad chair cushion  Activity: Ax2 GB+ quad cane. Must wear RUE splint overnight.  Wears RLE splint and right shoulder sling when walking. Sat in chair off and on throughout the day. Walked with PT and ankle weights today. Went outside in wheelchair with mother.  Social: Mother at bedside today. She was present when Neurology spoke with pt about MRI results  Plan/Updates this shift: Continue with POC. Please enc shower tomorrow.  *Had a nosebleed for the 1st time today. Lasted approx 45 minutes. Was from the left nare only. Medicine MD came to see when it did not stop with application of pressure. Afrin spray ordered and given. No further bleeding noted.

## 2025-08-06 NOTE — PROGRESS NOTES
CLINICAL NUTRITION SERVICES - REASSESSMENT NOTE     Registered Dietitian Interventions:   No changes to orders at this time. Pt politely declined oral nutrition supplements.     Future/Additional Recommendations:   Monitor weight/intake trends at next follow-up, as able.     INFORMATION OBTAINED   Assessed patient in room.    CURRENT NUTRITION ORDERS   Diet: Regular    CURRENT INTAKE/TOLERANCE   Pt notes good appetite/intake and politely declined oral nutrition supplements at this time.     Per flowsheets, intake documentation likely missing some meals/snacks, but on average 100% intake of meals documented. Per 7 day average, pt is ordering 2450 kcal and 145 g protein per [employer].    NEW FINDINGS   GI symptoms: Reviewed; Per flowsheets, last BM noted 8/4.  Skin/wounds: Reviewed; Giovany score 18 with nutrition marked as adequate per flowsheets.   Nutrition-relevant labs: Reviewed  Nutrition-relevant medications: Reviewed  - Intermittent Lasix near admission  - Hydrochlorothiazide    Weight: All weights this admission noted as bed scale, difficult to assess due to large variation   Wt Readings from Last Encounters:   08/03/25 108.5 kg (239 lb 3.2 oz)    07/15/25 113.3 kg (249 lb 12.8 oz)     MALNUTRITION   % Intake: No decreased intake noted  % Weight Loss: Unable to assess   Subcutaneous Fat Loss: None observed  Muscle Loss: None observed  Fluid Accumulation/Edema: None noted  Malnutrition Diagnosis: Patient does not meet two of the established criteria necessary for diagnosing malnutrition but is at risk for malnutrition  Malnutrition Present on Admission: No    EVALUATION OF THE PROGRESS TOWARD GOALS   Previous Goals   Patient to consume % of nutritionally adequate meal trays TID, or the equivalent with supplements/snacks.   Evaluation: Met    Previous Nutrition Diagnosis  Predicted inadequate nutrient intake (kcal/protein) related to decreased appetite but family involved and encouraging PO.    Evaluation: Resolved    CURRENT NUTRITION DIAGNOSIS   No nutrition diagnosis at this time     INTERVENTIONS   Discussed intervention/plan with patient and/or family.  Manage composition of oral intake  Medical food supplement therapy    CURRENT GOALS   Patient to consume % of nutritionally adequate meal trays TID, or the equivalent with supplements/snacks.     MONITORING/EVALUATION  Progress toward goals will be monitored and evaluated per policy.

## 2025-08-06 NOTE — PROGRESS NOTES
Care Management Follow Up    Length of Stay (days): 22    Expected Discharge Date: 08/15/2025     Concerns to be Addressed: Discharge planning     Patient plan of care discussed at interdisciplinary rounds: Yes    Anticipated Discharge Disposition:  ST and Physical Therapy are recommending ARU placement.   SW notes that OT  has changed their recommendation back to ARU stay.    As EOD platelet transfusions are anticipated in addition to anticipated treatment for lung adenocarcinoma, TCU will be pursued.               Anticipated Discharge Services:  ST and Physical Therapy are recommending ARU placement.   TIRSO notes that OT  has changed their recommendation back to ARU stay.    As EOD platelet transfusions are anticipated in addition to anticipated treatment for lung adenocarcinoma, TCU will be pursued.   Anticipated Discharge DME:   Not applicable at this time    Patient/family educated on Medicare website which has current facility and service quality ratings: Yes  Education Provided on the Discharge Plan: Yes  Patient/Family in Agreement with the Plan: Yes    Referrals Placed by CM/SW: Post Acute Care Facilities  Private pay costs discussed: Not applicable at this time    Discussed  Partnership in Safe Discharge Planning  document with patient/family: No     Handoff Completed: No, handoff not indicated or clinically appropriate    Additional Information:  SW is following pt for discharge planning.   ST and Physical Therapy are recommending ARU placement.   SW notes that OT  has changed their recommendation back to ARU stay.    As EOD platelet transfusions are anticipated in addition to anticipated treatment for lung adenocarcinoma, TCU will be pursued.   TCU placement is being pursued at [facility].    AT 9:44am, Dr. Nguyen was present on 6A. TIRSO inquired about return of pt's Short Term Disability form (provided on 7/31/2025).   Dr. Nguyen  stated that the form was completed by Dr. Aden.  Dr. Nguyen  "stated that he could not complete the form as he is a resident and the form requires Attending completion.  Dr. Nguyen stated that Dr. Aden  completed the form, provided it to a nurse and the nurse provided it to family.   Dr. Nguyen stated that pt's  Taletrectinib  is being delivered to  house in [place] and pt's family will bring it to [facility] to be administered to pt at [facility].    TIRSO phoned pt's mother this afternoon.  Pt's mother states that she did not receive the completed short term disability form and she confirmed that her  did not receive the completed short term disability form.  SW checked pt's [place] chart to determine whether or not the completed short term disability form is present in the hard chart.  It was not.  TIRSO phoned Dr. Nguyen and informed that pt's family has indicated that they have not received the short term disability application.   TIRSO explained that pt will not start receiving needed benefits until the arabella is submitted and approved.  TIRSO informed Dr. Nguyen that SW can provide a new blank form.   Dr. Nguyen states that Dr. Aden is no longer on service and he cannot complete the form as he is not an attending.   TIRSO asked Dr. Nguyen if SW should phone the current attending, Dr. Pierson and Dr. Nguyen  stated that pt could. TIRSO phoned Dr. Pierson and explained that pt needs his short term disability request form completed. Dr. Pierson ageed that SW could bring the form to him. TIRSO brought the form to Dr. Pierson with a note and verbal instruction to call this SW when the form is complete for  at which time, TIRSO will fax the form to \"[employer].\"       At 3:13pm, TIRSO phoned Mali Murrieta RN Case Manager with [facility] ([phone], ext. [phone]).  Luis Felipe is a 3rd party  for pt's insurance [employer] and left a message for Mali to call.  This TIRSO is attempting to return Mali's calls.      Next Steps: TIRSO will " continue to follow for discharge planning.    BRUNILDA Jorgensen  Social Work, 6A  Phone:  [phone]  Pager:  [phone]  8/6/2025       URIEL MuseW

## 2025-08-06 NOTE — PROGRESS NOTES
"SPIRITUAL HEALTH SERVICES Progress Note  ([place]) 6A  Referral Source/Reason for Visit: Follow-up visit per Family Request  Summary and Recommendations -    Had a follow-up visit with Tera and his father Duane. Duane has requested  to follow Tera while he is admitted.  Tera reported that he is doing \"okay,\" and he did not voice any distress or concern at this time.  Duane reported that Tera will be trying \"a new cancer treatment pill,\" that has had some really good results with cancer patients which is hopeful.    PLAN:  will follow patient while admitted per family request. Spiritual Health Services remain available on request. Please place a standard consult order on Epic.    Brooke Bush  Chaplain Resident    Spiritual Health Services is available 24/7 for emergent requests and consults, either by paging the on-call  or by entering an ASAP/STAT consult in [employer], which will also page the on-call .    Assessment    Saw pt Bernard BURROWS Adela \"Tera\" and his father, Duane    Patient/Family Understanding of Illness and Goals of Care -   Duane reported that Tera is waiting on \"a new cancer treatment pill to be that has had some really good results with cancer patients. The plan is that Tera will take the pills while in the hospital because, \"he needs platelet transfusions.\"    Distress and Loss -   Tera did not voice any concerns or distress today  Tera acknowledged experiencing boredom being hospitalized for over three weeks.    Strengths, Coping, and Resources -   Tera has robust support from his family and friends. His mother and father visit frequently and others visit him as well.  Tera has a jude community online that he enjoys and he has kept in touch with these friends while hospitalized.  Duane noted that the way Tera and family process the reality of Tera's cancer and treatment is to \"take it day by day.\" I validated their practice and we discussed how that can be " supportive mentally and emotionally and prevent the overwhelm of the worrying about the future.  To alleviate boredom, Tera likes to watch TV, [employer] and Friends episodes. He likes having visits from family and friends and he has decided that the tacos are his favorite meal choice on the menu here.    Meaning, Beliefs, and Spirituality -   We did not discuss this during our visit.

## 2025-08-06 NOTE — PROGRESS NOTES
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 Working to obtain Taletrectinib from approved pharmacy. Would like to start as soon as able. Initial prior auth denied through insurance. Letter of Medical Necessity sent. Awaiting re-review  - Agree with therapeutic Lovenox with plt transfusion support (to keep plt >50K). Hopeful that platelets will improve with treatment of underlying malignancy (although treatment does have side effect of cytopenias, is extremely effective in setting of ROS1 rearrangement and hopeful we will be able to treat through possible transient worsening of thrombocytopenia with transfusion support)   - Repeat Brain MRI without any evidence of metastatic. This is very reassuring that there is not brain involvement/metastasis of lung adenocarcinoma    Assessment & Plan:   Bernard Chapman is a 35 year old year old male with a past medical history of HTN, DM, ELENA, PE/DVT 6/22/25 (previously on rivaroxaban) who presented 7/15/2025 with new neurological symptoms and found acute multifocal ischemic strokes, partial occlusive L MCA thrombus, L frontal SAH, and in setting of TCP c/f ITP vs catastrophic antiphospholipid syndrome (CAPS) vs malignancy s/p PLEX x7/16. CT CAP 7/16/2025 revealing LAD of hilar, mediastinal, and paraesophageal LNs and spiculated-appearing nodule of R apical lung s/p EBUS-TBNA 7/18 revealing metastatic lung adenocarcinoma, NGS revealed ROS1 rearrangement - planning to initiate treatment with taletrectinib as soon as possible while inpatient.      # New metastatic lung adenocarcinoma, ROS1 rearrangement    # Thrombocytopenia  # Anemia  CT CAP 7/16/2025 revealing a spiculated appearing nodule within R lung apex ~11 mm, multiple enlarged mediastinal, hilar and paraesophageal lymph nodes, along with 11 mm soft tissue density in the left retroperitoneum. Underwent EBUS-TBNA 7/18 revealing metastatic lung adenocarcinoma, + for CK7 and TTF-1. MRI brain without evidence of metastasis. Obtained PET on 7/26. Reviewed with radiologist which showed metastatic disease in L scapula, L retroperitoneum, R adrenal and L2. Areas are small enough where radiation is not required at this time. PD-L1 TPS at 40% consistent with low PD-L1 expression. Per d/w benign hematology, given severe thrombocytopenia requiring transfusions and hypercoagulable state with course complicated by multifocal strokes and PE 2/2 malignancy, plan to initiate treatment while inpatient with taletrectinib as soon as approved given ROS1 rearrangement found on NGS. Discussed with Tera and his parents 8/2 that response rate to this medication is 85-90%, with PFS measured in years on average.   - Working to obtain Taletrectinib. Script send to approved pharmacy. Plan to start as soon as able. Patient will be unable to leave the hospital any time soon given daily platelet transfusion needs.  - Follow up [facility], pending  - Lesions are too small for radiation at this time   - Outpatient oncology referral placed, follow for scheduling       # H/o DVT/PE  Presented 6/22/25 with chest pain and SOB and found to have PE and lower extremity DVTs, he was placed on apixaban though had an allergic reaction and rotated to Xarelto.   - Agree with AC (Lovenox) per primary team and hematology      Patient was seen and plan of care was discussed with attending physician Dr. Mtz.    Thank you for the opportunity to partake in this patient's plan of care. Please do not hesitate to page with questions. We will continue to follow.     Helen Aden PA-C  Hematology/Oncology  "  _____________________________________________________    Subjective & Interval History:    No acute events noted overnight.  Overall Tera is feeling ok today. No new symptoms noted. Brighter spirits today. Discussed support group for other patients who have this lung cancer diagnosis.   Discussed plan of care and goals while here in the hospital with patient and father at bedside. All questions answered at bedside.     Physical Exam:    Blood pressure 136/59, pulse 57, temperature 97.9  F (36.6  C), temperature source Oral, resp. rate 18, height 1.88 m (6' 2\"), weight 108.5 kg (239 lb 3.2 oz), SpO2 98%.    Constitutional: Sitting in chair, NAD  HEENT: NCAT  Respiratory: Breathing comfortably on room air  Neuro: Alert, answers questions appropriately. Moves extremities spontaneously.  Psych: Appropriate affect     Labs & Studies: I personally reviewed the following studies:  Oncology fusion gene NGS, 7/22/25:  RESULTS See result below Abnormal    Fusion Event: POSITIVE      INTERPRETATION    This sample is positive for a ROS1 gene rearrangement (SDC4::ROS1)      The detected fusion event joins exon 2 of SDC4 (5' partner) with exon 32 of ROS1 (3' partner), corresponding to approximately 78% of the total reads analyzed (major isoform).  Two additional isoforms corresponding to NJU4dvgc5::VNS8ieru18 and BRJ0hvdt0::HYN7lvqv68 fusions, respectively, were also detected (corresponding to less than 20% of the total reads).      ROS1 gene rearrangements have been reported in diverse cancer types including non-small-cell lung cancer (NSCLC), and with a notably higher prevalence in lung adenocarcinoma. Importantly, FDA-approved tyrosine kinase inhibitors have demonstrated efficacy in treatment of ROS1-rearranged NSCLC (PMID: 05161103). However it is not clear if coexistence of more than two types of ROS1 rearrangements affects therapeutic response to TKI therapy. A recent case report (PMID: 12172479) showed that coexistence " of EAK9lwsp1::QON2idwl 34 and SDC4 exon2::ROS1 exon35 fusions showed suboptimal response to crizotinib therapy when compared to that seen with the MDB3qhiu6::SFF1aphj 32 fusion alone.      Molecular Description  The following chromosomal coordinates describe the exon::exon junction(s) of the major isoform fusion partner in the most prevalent cDNA transcript for the reported gene fusion event:     [id number],chr6:[id number]  chr20:[id number],chr6:[id number]  chr20:[id number],chr6:[id number]         This assay detects gene fusion events based on the assessment of cDNA; the reported coordinates describe specific exon-exon joins in the detected transcripts for the positive gene fusion target(s).  The reported coordinates do not specifically describe the underlying genomic (DNA) breakpoint, but do describe a reference interval in which the genomic alteration occurs.      ROUTINE LABS (Last four results):  CMP  Recent Labs   Lab 08/06/25  1228 08/06/25  0843 08/06/25  0725 08/05/25  2155 08/05/25  1731 08/05/25  0856 08/05/25  0704 08/04/25  0809 08/04/25  0718 08/02/25  0821 08/02/25  0705   POTASSIUM  --   --  4.2  --   --   --  4.4  --  4.5  --  4.3   GLC 91 102*  --  103* 117*   < >  --    < >  --    < >  --    CR  --   --   --   --   --   --  0.95  --   --   --   --    GFRESTIMATED  --   --   --   --   --   --  >90  --   --   --   --    MAG  --   --  1.9  --   --   --  2.1  --  2.0  --  2.1   PHOS  --   --  4.1  --   --   --  4.1  --  4.1  --  4.1    < > = values in this interval not displayed.     CBC  Recent Labs   Lab 08/06/25  1132 08/06/25  0725 08/05/25  0704 08/04/25  0718 08/03/25  0716   WBC  --  9.4 10.8 13.0* 11.4*   RBC  --  4.39* 4.37* 4.54 4.76   HGB  --  11.8* 11.7* 12.2* 12.7*   HCT  --  36.4* 35.4* 37.3* 38.5*   MCV 83 83 81 82 81   MCH  --  26.9 26.8 26.9 26.7   MCHC  --  32.4 33.1 32.7 33.0   RDW  --  14.1 14.3 14.5 14.6   PLT 47* 48* 50* 46* 45*     INR  Recent Labs   Lab 08/02/25  0705  08/01/25  0717 07/31/25  0625   INR 1.13 1.15 1.13     Medications list for reference:  Current Facility-Administered Medications   Medication Dose Route Frequency Provider Last Rate Last Admin    acetaminophen (TYLENOL) tablet 1,000 mg  1,000 mg Oral or Feeding Tube Q6H Dee Shah MD   1,000 mg at 08/06/25 1606    Baclofen (LIORESAL) tablet 5 mg  5 mg Oral TID Luisito Aden MD   5 mg at 08/06/25 1353    And    baclofen (LIORESAL) half-tablet 2.5 mg  2.5 mg Oral TID Luisito Aden MD   2.5 mg at 08/06/25 1353    bisacodyl (DULCOLAX) suppository 10 mg  10 mg Rectal Daily PRN Dee Shah MD        chlorhexidine (HIBICLENS) 4 % solution   Topical Daily PRN Dee Shah MD        chlorproMAZINE (THORAZINE) tablet 25 mg  25 mg Oral or Feeding Tube Q6H PRN Yamile Gutierrez APRN CNP   25 mg at 07/26/25 2009    cyclobenzaprine (FLEXERIL) tablet 10 mg  10 mg Oral TID PRN Marjan Michel MD        glucose gel 15-30 g  15-30 g Oral Q15 Min PRN Dee Shah MD        Or    dextrose 50 % injection 25-50 mL  25-50 mL Intravenous Q15 Min PRN Dee Shah MD        Or    glucagon injection 1 mg  1 mg Subcutaneous Q15 Min PRN Dee Shah MD        diphenhydrAMINE (BENADRYL) capsule 25 mg  25 mg Oral or Feeding Tube Q6H PRN Dee Shah MD   25 mg at 07/26/25 1228    Or    diphenhydrAMINE (BENADRYL) injection 25 mg  25 mg Intravenous Q6H PRN Dee Shah MD        enoxaparin ANTICOAGULANT (LOVENOX) injection 135 mg  1 mg/kg Subcutaneous Q12H Dee Shah MD   135 mg at 08/06/25 1014    famotidine (PEPCID) tablet 10 mg  10 mg Oral BID Dee Shah MD   10 mg at 08/06/25 0833    fluticasone (FLONASE) 50 MCG/ACT spray 1 spray  1 spray Both Nostrils Daily Dee Shah MD   1 spray at 08/06/25 0832    gabapentin (NEURONTIN) capsule 100 mg  100 mg Oral TID PRN Marjan Michel MD        hydrALAZINE (APRESOLINE) injection 10 mg  10 mg Intravenous Q30 Min PRN Dee Shah MD   10 mg at 07/23/25  0203    hydroCHLOROthiazide tablet 25 mg  25 mg Oral Daily Marjan Michel MD   25 mg at 08/06/25 0833    labetalol (NORMODYNE/TRANDATE) injection 10 mg  10 mg Intravenous Q10 Min PRN Dee Shah MD        [Held by provider] losartan (COZAAR) tablet 100 mg  100 mg Oral Daily Dee Shah MD        magnesium hydroxide (MILK OF MAGNESIA) suspension 30 mL  30 mL Oral or Feeding Tube Daily PRN Dee Shah MD        melatonin tablet 3 mg  3 mg Oral At Bedtime PRN Dee Shah MD   3 mg at 07/22/25 0123    metoprolol succinate ER (TOPROL XL) 24 hr tablet 25 mg  25 mg Oral Daily Francisco Prather DO   25 mg at 08/06/25 0833    naloxone (NARCAN) injection 0.2 mg  0.2 mg Intravenous Q2 Min PRN Carine Matt MD        Or    naloxone (NARCAN) injection 0.4 mg  0.4 mg Intravenous Q2 Min PRN Carine Matt MD        Or    naloxone (NARCAN) injection 0.2 mg  0.2 mg Intramuscular Q2 Min PRN Carine Matt MD        Or    naloxone (NARCAN) injection 0.4 mg  0.4 mg Intramuscular Q2 Min PRN Carine Matt MD        ondansetron (ZOFRAN ODT) ODT tab 4 mg  4 mg Oral Q6H PRN Dee Shah MD        Or    ondansetron (ZOFRAN) injection 4 mg  4 mg Intravenous Q6H PRN Dee Shah MD        oxyCODONE (ROXICODONE) tablet 5 mg  5 mg Oral Q6H PRN Marjan Michel MD        oxymetazoline (AFRIN) 0.05 % spray 2 spray  2 spray Both Nostrils BID PRN Jabier Hill MD        polyethylene glycol (MIRALAX) Packet 17 g  17 g Oral or Feeding Tube Daily Dee Shah MD   17 g at 07/29/25 0835    prochlorperazine (COMPAZINE) injection 10 mg  10 mg Intravenous Q6H PRN Dee Shah MD        Or    prochlorperazine (COMPAZINE) tablet 10 mg  10 mg Oral or Feeding Tube Q6H PRN Dee Shah MD        rosuvastatin (CRESTOR) tablet 10 mg  10 mg Oral or Feeding Tube Daily Dee Shah MD   10 mg at 08/06/25 0833    senna-docusate (SENOKOT-S/PERICOLACE) 8.6-50 MG per tablet 1-2 tablet  1-2 tablet Oral or  Feeding Tube BID Dee Shah MD   1 tablet at 08/06/25 0833    simethicone (MYLICON) chewable half-tab 40 mg  40 mg Oral Q6H PRN Dee Shah MD   40 mg at 07/25/25 1957    sodium chloride (PF) 0.9% PF flush 3 mL  3 mL Intracatheter Q8H MOHINDER Dee Shah MD   3 mL at 08/06/25 1608    sodium chloride (PF) 0.9% PF flush 3 mL  3 mL Intracatheter q1 min prn Dee Shah MD   3 mL at 07/31/25 2780        Patent

## 2025-08-07 ENCOUNTER — APPOINTMENT (OUTPATIENT)
Dept: PHYSICAL THERAPY | Facility: CLINIC | Age: 35
DRG: 064 | End: 2025-08-07
Payer: COMMERCIAL

## 2025-08-07 ENCOUNTER — APPOINTMENT (OUTPATIENT)
Dept: OCCUPATIONAL THERAPY | Facility: CLINIC | Age: 35
DRG: 064 | End: 2025-08-07
Payer: COMMERCIAL

## 2025-08-07 VITALS
BODY MASS INDEX: 30.7 KG/M2 | SYSTOLIC BLOOD PRESSURE: 124 MMHG | HEIGHT: 74 IN | RESPIRATION RATE: 16 BRPM | TEMPERATURE: 98 F | WEIGHT: 239.2 LBS | OXYGEN SATURATION: 99 % | HEART RATE: 67 BPM | DIASTOLIC BLOOD PRESSURE: 59 MMHG

## 2025-08-07 LAB
ERYTHROCYTE [DISTWIDTH] IN BLOOD BY AUTOMATED COUNT: 14 % (ref 10–15)
HCT VFR BLD AUTO: 36 % (ref 40–53)
HGB BLD-MCNC: 11.5 G/DL (ref 13.3–17.7)
LMWH PPP CHRO-ACNC: 1.73 IU/ML (ref ?–2)
MAGNESIUM SERPL-MCNC: 2 MG/DL (ref 1.7–2.3)
MCH RBC QN AUTO: 26.8 PG (ref 26.5–33)
MCHC RBC AUTO-ENTMCNC: 31.9 G/DL (ref 31.5–36.5)
MCV RBC AUTO: 84 FL (ref 78–100)
PHOSPHATE SERPL-MCNC: 4 MG/DL (ref 2.5–4.5)
PLATELET # BLD AUTO: 63 10E3/UL (ref 150–450)
POTASSIUM SERPL-SCNC: 4.2 MMOL/L (ref 3.4–5.3)
RBC # BLD AUTO: 4.29 10E6/UL (ref 4.4–5.9)
WBC # BLD AUTO: 8 10E3/UL (ref 4–11)

## 2025-08-07 PROCEDURE — 99233 SBSQ HOSP IP/OBS HIGH 50: CPT | Performed by: INTERNAL MEDICINE

## 2025-08-07 PROCEDURE — 120N000002 HC R&B MED SURG/OB UMMC

## 2025-08-07 PROCEDURE — 250N000011 HC RX IP 250 OP 636

## 2025-08-07 PROCEDURE — 83735 ASSAY OF MAGNESIUM: CPT | Performed by: HOSPITALIST

## 2025-08-07 PROCEDURE — 97535 SELF CARE MNGMENT TRAINING: CPT | Mod: GO

## 2025-08-07 PROCEDURE — 97112 NEUROMUSCULAR REEDUCATION: CPT | Mod: GO

## 2025-08-07 PROCEDURE — 84100 ASSAY OF PHOSPHORUS: CPT | Performed by: HOSPITALIST

## 2025-08-07 PROCEDURE — 97530 THERAPEUTIC ACTIVITIES: CPT | Mod: GP | Performed by: PHYSICAL THERAPIST

## 2025-08-07 PROCEDURE — 250N000013 HC RX MED GY IP 250 OP 250 PS 637

## 2025-08-07 PROCEDURE — 84132 ASSAY OF SERUM POTASSIUM: CPT | Performed by: HOSPITALIST

## 2025-08-07 PROCEDURE — 36415 COLL VENOUS BLD VENIPUNCTURE: CPT | Performed by: HOSPITALIST

## 2025-08-07 PROCEDURE — 97116 GAIT TRAINING THERAPY: CPT | Mod: GP | Performed by: PHYSICAL THERAPIST

## 2025-08-07 PROCEDURE — 99233 SBSQ HOSP IP/OBS HIGH 50: CPT | Mod: GC | Performed by: HOSPITALIST

## 2025-08-07 PROCEDURE — 999N000202 HC STATISTICAL VASC ACCESS NURSE TIME, 1-15 MINUTES

## 2025-08-07 PROCEDURE — 85520 HEPARIN ASSAY: CPT | Performed by: HOSPITALIST

## 2025-08-07 PROCEDURE — 97530 THERAPEUTIC ACTIVITIES: CPT | Mod: GO

## 2025-08-07 PROCEDURE — 85027 COMPLETE CBC AUTOMATED: CPT

## 2025-08-07 PROCEDURE — 250N000013 HC RX MED GY IP 250 OP 250 PS 637: Performed by: STUDENT IN AN ORGANIZED HEALTH CARE EDUCATION/TRAINING PROGRAM

## 2025-08-07 RX ORDER — ENOXAPARIN SODIUM 100 MG/ML
100 INJECTION SUBCUTANEOUS EVERY 12 HOURS
Status: DISCONTINUED | OUTPATIENT
Start: 2025-08-07 | End: 2025-08-09

## 2025-08-07 RX ADMIN — SENNOSIDES AND DOCUSATE SODIUM 2 TABLET: 50; 8.6 TABLET ORAL at 08:20

## 2025-08-07 RX ADMIN — Medication 5 MG: at 20:34

## 2025-08-07 RX ADMIN — Medication 2.5 MG: at 14:20

## 2025-08-07 RX ADMIN — Medication 2.5 MG: at 20:35

## 2025-08-07 RX ADMIN — HYDROCHLOROTHIAZIDE 25 MG: 12.5 TABLET ORAL at 08:21

## 2025-08-07 RX ADMIN — ENOXAPARIN SODIUM 100 MG: 100 INJECTION SUBCUTANEOUS at 22:30

## 2025-08-07 RX ADMIN — ACETAMINOPHEN 1000 MG: 500 TABLET ORAL at 17:08

## 2025-08-07 RX ADMIN — ENOXAPARIN SODIUM 135 MG: 150 INJECTION SUBCUTANEOUS at 10:39

## 2025-08-07 RX ADMIN — ACETAMINOPHEN 1000 MG: 500 TABLET ORAL at 06:21

## 2025-08-07 RX ADMIN — ACETAMINOPHEN 1000 MG: 500 TABLET ORAL at 10:39

## 2025-08-07 RX ADMIN — ROSUVASTATIN CALCIUM 10 MG: 10 TABLET, FILM COATED ORAL at 08:21

## 2025-08-07 RX ADMIN — FAMOTIDINE 10 MG: 10 TABLET ORAL at 20:34

## 2025-08-07 RX ADMIN — Medication 2.5 MG: at 08:21

## 2025-08-07 RX ADMIN — METOPROLOL SUCCINATE 25 MG: 25 TABLET, EXTENDED RELEASE ORAL at 08:21

## 2025-08-07 RX ADMIN — FAMOTIDINE 10 MG: 10 TABLET ORAL at 08:21

## 2025-08-07 RX ADMIN — SENNOSIDES AND DOCUSATE SODIUM 2 TABLET: 50; 8.6 TABLET ORAL at 20:34

## 2025-08-07 RX ADMIN — ACETAMINOPHEN 1000 MG: 500 TABLET ORAL at 22:25

## 2025-08-07 RX ADMIN — Medication 5 MG: at 14:20

## 2025-08-07 RX ADMIN — FLUTICASONE PROPIONATE 1 SPRAY: 50 SPRAY, METERED NASAL at 08:20

## 2025-08-07 RX ADMIN — Medication 5 MG: at 08:21

## 2025-08-07 ASSESSMENT — ACTIVITIES OF DAILY LIVING (ADL)
ADLS_ACUITY_SCORE: 38
ADLS_ACUITY_SCORE: 44
ADLS_ACUITY_SCORE: 42
ADLS_ACUITY_SCORE: 42
ADLS_ACUITY_SCORE: 38
ADLS_ACUITY_SCORE: 44
ADLS_ACUITY_SCORE: 42
ADLS_ACUITY_SCORE: 50
ADLS_ACUITY_SCORE: 38
ADLS_ACUITY_SCORE: 42
ADLS_ACUITY_SCORE: 50
ADLS_ACUITY_SCORE: 48
ADLS_ACUITY_SCORE: 42
ADLS_ACUITY_SCORE: 50
ADLS_ACUITY_SCORE: 42
ADLS_ACUITY_SCORE: 42
ADLS_ACUITY_SCORE: 44
ADLS_ACUITY_SCORE: 38
ADLS_ACUITY_SCORE: 42
ADLS_ACUITY_SCORE: 38
ADLS_ACUITY_SCORE: 38
ADLS_ACUITY_SCORE: 44
ADLS_ACUITY_SCORE: 38

## 2025-08-07 NOTE — PLAN OF CARE
Goal Outcome Evaluation:      Plan of Care Reviewed With: patient    Overall Patient Progress: improvingOverall Patient Progress: improving    Outcome Evaluation: Taletrectinib ordered through onc - to start inpatient as soon prior authorization approved      Status: Admitted 7/15 with vision changes, dizziness, confusion, and WFD. Found to have L MCA ischemic stroke and occlusion of L M1. Further work-up showed new diagnosis of metastatic lung adenocarcinoma. 8/4 MRI showed incidental 2 new R frontal infarcts.   Vitals: VSS on RA, R arm limb alert  Neuros: A&O x4. Positive mood. R droop, R field cut. Decreased sensation to R side of body. L side 5/5, RUE 2, RLE 3-4  IV: PIV SL x2  Labs/Electrolytes: redraws in the AM. Monitor platelet  levels, last value 47  Resp: on RA, denies SOB  Diet: Regular diet. Denies nausea. Needs help ordering & setting up tray. Takes pills whole with applesauce  GI: LBM 8/5 per pt. Passing gas  : Voiding spontaneously via urinal  Skin: Petechiae throughout.   Pain: Denies  Activity: A1-2/GB/cane  SCDs on? If no, why?: Yes  Plan: Taletrectinib to be started once prior auth received. Continue to monitor and follow POC

## 2025-08-07 NOTE — PROGRESS NOTES
"Care Management Follow Up    Length of Stay (days): 23    Expected Discharge Date: 08/15/2025     Concerns to be Addressed: Discharge planning     Patient plan of care discussed at interdisciplinary rounds: Yes    Anticipated Discharge Disposition:ST and Physical Therapy are recommending ARU placement.   [facility] notes that OT  has changed their recommendation back to ARU stay.    As EOD platelet transfusions are anticipated in addition to anticipated treatment for lung adenocarcinoma, TCU will be pursued.      Anticipated Discharge Services: ST and Physical Therapy are recommending ARU placement.   [facility] notes that OT  has changed their recommendation back to ARU stay.    As EOD platelet transfusions are anticipated in addition to anticipated treatment for lung adenocarcinoma, TCU will be pursued.   Anticipated Discharge DME:   Not applicable at this time    Patient/family educated on Medicare website which has current facility and service quality ratings: Yes  Education Provided on the Discharge Plan: Yes  Patient/Family in Agreement with the Plan: Yes    Referrals Placed by CM/SW: Post Acute Care Facilities  Private pay costs discussed: Not applicable at this time    Discussed  Partnership in Safe Discharge Planning  document with patient/family: No     Handoff Completed: No, handoff not indicated or clinically appropriate    Additional Information:  [facility] is following pt for discharge planning,  ST and Physical Therapy are recommending ARU placement.   [facility] notes that OT  has changed their recommendation back to ARU stay.    As EOD platelet transfusions are anticipated in addition to anticipated treatment for lung adenocarcinoma, TCU will be pursued.   TCU placement is being pursued at [facility].   The anticipated discharge date is not yet known.    [facility] received completed short term disability paperwork back from [employer] (completed by Dr. Pierson).  [facility] faxed completed STD paperwork to \"[employer] " "[employer] (fax [phone]).  [facility]  provided pt/father with the fax cover sheet, the original completed STD documents that were faxed and the original document that was received via fax from \"[employer].\"    [facility] phoned pt's mother (Violet) and informed that the above fax was sent and original documents provided to pt/father for pt's records.  SW advised purchasing a 3 ring binder to specifically keep STD paperwork in.  [facility] informed pt, pt's father and pt's mother that the inpt MD's will only complete the paperwork when pt is inpt. [facility] indicated that when pt goes to TCU, the MD at TCU can complete disability paperwork needing MD completion only while pt is at TCU. [facility] informed pt and parents that when pt returns to the home setting, disability paperwork needing MD completion will need to be brought to outpt MD (PCP, Oncologist ect).  [facility] advised pt's mother to check whether or not pt's employer offers long term disability benefits to pt when pt's short term disability benefits end. [facility] informed pt's mother that that [facility] has outpt clinic SW's available if needed when pt returns to the home setting.  Pt's mother states that pt will be completing a Financial Power of  document ([facility] provided a form and a list of mobile notary's.      [facility] phoned Mali Murrieta, RN Case Manager with [facility] ([phone], ext. [phone]). Luis Felipe is a 3rd party  for pt's insurance [employer] , and updated.    Next Steps: [facility] will continue to follow for discharge planning.    BRUNILDA Jorgensen  Social Work, 6A  Phone:  [phone]  Pager:  [phone]  8/7/2025       DAR Muse     "

## 2025-08-07 NOTE — PLAN OF CARE
Status: Admitted 7/15 with vision changes, dizziness, confusion, and WFD. Found to have L MCA ischemic stroke and occlusion of L M1. Further work-up showed new diagnosis of metastatic lung adenocarcinoma. 8/4 MRI showed incidental 2 new R frontal infarcts.   Vitals: VSS on RA  Neuros: A&O x4. Flat. L side 5/5, RUE 3, RLE 3-4. R droop. R field cut. Decreased sensation to R side of body.   IV: PIV SL x2  Labs/Electrolytes: Platelets 63  Resp: WNL  Diet: Regular diet. Good PO. Needs help ordering & tray setup.  GI: Large BM this shift. LBM 8/7  : Voiding spontaneously via urinal  Skin: Petechiae throughout.   Pain: Denies  Activity: A1-2, GB, cane. Up in chair throughout day. Walked halls with therapy x2. Went outside with dad this shift. Showered this shift.  SCDs on? If no, why?: Yes  Social: Dad at bedside earlier  Plan: Taletrectinib to be started once prior auth received. Monitor platelet  levels.     Goal Outcome Evaluation:      Plan of Care Reviewed With: patient    Overall Patient Progress: improving    Outcome Evaluation: Awaiting prior auth for chemotherapy treatment.

## 2025-08-07 NOTE — PROGRESS NOTES
[facility]    Progress Note - Medicine Service, MAROON TEAM 3       Date of Admission:  7/15/2025    Assessment & Plan   Bernard Chapman is a 35 year old male with a past medical history of hypertension, diabetes, ELENA, PE/DVT (on rivaroxaban) admitted on 7/15/2025 for lethargy, N/V, and blurry vision found to have multifocal acute CVA in L MCA , R parietal and R frontal centrum semiovale. Initially concern for TTP or catastrophic APS, however further work up notable for new diagnosis of metastatic lung adenocarcinoma with likely malignancy-related hypercoagulability.    Today:  - Taletrectinib ordered through onc - to start inpatient as soon prior authorization approved (will need to have address so medications can be sent )  - cont daily labs  - [place] 2nd opinion; Patient will need to call [phone] to make appointment once there is a timeline for discharging from this hospitalization        #New metastatic lung adenocarcinoma, ROS1 rearrangement   #Right lung nodule  #Multiple enlarged mediastinal, paraesophageal, and hilar lymph nodes  Malignancy work up initiated on admission due to significant hypercoagulability. CT CAP significant for 21p89xm spiculated density in R lung apex, 9mm pulmonary nodule in R lung base, L thyroid nodule, paratracheal LAD, splenic infarct, bilateral renal infarcts, and L retroperitoneal nodule. Underwent FNA of lymph node which was positive for lung adenocarcinoma (resulted 7/22).  PDL-1 resulted with low expression (40%). PET 7/26 with distant metastases and MRI brain showing no signs of metastasis.  Currently pending prior authorization and patient is planned to start in patient therapy once approved..  -Oncology consulted, appreciate recs   - NGS; positive for a ROS1 gene rearrangement (SDC4::ROS1)     - Guardant 360; T p53 mutation and MITCHELL   - Due to ongoing transfusion needs, ARU not feasible.   - Taletrectinib ordered through onc  - to start inpatient as soon prior authorization approved (will need to have address so medications can be sent )  - [place] 2nd opinion; Patient will need to call [phone] to make appointment once there is a timeline for discharging from this hospitalization      #Hypercoagulability d/t metastatic lung adenocarcinoma  #Infarcts of brain, kidney, spleen  #Cardiac valve thrombus on tricuspid and aortic valve 7/16 on TIMO  #Hx pulmonary embolism (6/23/25)  #Acute thrombocytopenia, in the setting of metastatic lung adenocarcinoma d/t ITP vs drug-induced vs consumptive coagulopathy  #Acute anemia  #Thrombocytopenia   Found to have multiple CVA, likely embolic. TTE with mobile echodensities on tricuspid and aortic valve. Initial concern for TTP or catastrophic APS, however work up ultimately significant for new diagnosis of metastatic lung cancer. Hypercoagulable state presumed to be most likely 2/2 malignancy. Started on heparin gtt 7/17, but briefly held for 7/18 endobronchial needle aspiration of lymph nodes. Stroke code called 7/19 for worsening R-sided hemiparesis and hemianopia. Repeat CTA with new focal high-grade stenosis of other part of left MCA. Heparin gtt was stopped during code stroke, but restarting (heparin gtt + levophed) led to 10/10 headache. CTH with small left frontal SAH. Brain MRI with new L basal ganglia stroke. Heparin gtt resumed, and repeat CTH stable. Switched to therapeutic Lovenox 7/22. CTH still stable 7/23. Declining fibrinogen, so received cryoprecipitate 7/25. Concern for continued consumptive process, but on therapeutic Lovenox. PLT downtrending, so receiving transfusions (1 unit 7/26, 1 unit 7/27, 1 unit 7/28, 1 unit 7/30,1 unit 8/1, 1 unit 8/3, 1 unit 8/4, 1 unit 8/5, 1 unit 8/6). No new symptoms. CTM.  - Hematology consulted   -Transfuse if   -Hgb<7  -INR<1.5  -PLT<50k  -Fibrinogen<100  - SC Lovenox 135 mg bid   -qdaily CBC  -daily fibrinogen, INR, PTT      # Maculopapular rash on  face, chest, bilateral upper extremities, improved  # Numerous palpable purpura across the scalp and lower extremities   # Petechiae on bilateral lower extremities  C/f palpable purpura, so s/p L leg s/p punch biopsy with dermatology: rupture folliculitis. No definite features of cutaneous small vessel vasculitis or thrombotic vasculopathy. New rash noted 7/26 with  flushing of face/chest after PET scan. Also scattered papules of face and upper extremities (in addition to petechiae of BLE). Not itchy and no signs of respiratory distress, but suspected  secondary to contrast from PET scan, .  Rash appeared several hours after unit of platelets, less likely due to a transfusion reaction. photos present in chart. The following days, papules are consistent with his usual acne. No diffuse erythema. Some c/f SVC syndrome given distrubution of flushing and positional nature (normally HOB elevated when asleep, so PET was first time that he had prolonged flat/supine position). CTM.   Plan:  - Benadryl as needed for itching  - Chlorhexidine wash  - Will continue to monitor    #Multifocal acute ischemic strokes, in L MCA territory and new L basal ganglia stroke; Increasing mass effect on L lateral ventricle by L MCA infarction  #Subtotal occlusive thrombus in left M1   #Left frontoparietal SAH (7/19)  #Hypertension chronic  #HLD, chronic  Etiology most likely hypercoagulability d/t malignancy. Emboli stemming from tricuspid and aortic valves, appreciated on TTE 7/16. He continues to have R facial droop, extremity weakness. No apparent aphasia, but he is mildly dysarthric. Left frontoparietal subarachnoid hemorrhage stable per CT. Working with therapies with notable improvement.  Repeat brain MRI on 8/4 shows 2 new small infarcts in the right frontal lobe.  Patient shows to have continuation of improvement in physical exam findings.  Unclear significance of these findings.  Discussed with neuro to provide comments regarding  this.  > Neuro-ICU signing off   - SBP goal of 120 - 160 mmHg and DBP goal < 100    -Hold PTA antihypertensives    -Has prn labetalol and hydralazine  - cont 10 mg rosuvastatin  > OT/PT/Speech   -Since ARU dispo delayed, asked therapies to do aggressive rehabilitation as able  > nutrition consult   -monitor oral intake, weights  - repeat TTE outpatient to ensure improvement of AR and TR (and vegetations)  - Neuro to follow-up with MRI findings    #DM2, A1c 5.3  On HSSI with adequate control after stopping dexamethasone. CTM.   - POC glucose ACHS  - High intensity insulin sliding scale    #ELENA  Need to clarify home CPAP settings.    #Leukocytosis  Found to have leukocytosis, no clear cause or signs of infection, possible 2/2 stress induced.        #Hiccups, resolved   #chronic back pain  Chronic back pain. On acetaminophen only (given frequent need for neuro checks). Hiccups resolved (suspected 2/2 dexamethasone). Will continue baclofen and prn thorazine, but may be able to decrease in coming days.   Plan:  - Thorazine 25mg q6hr prn  - Melatonin 3mg daily  - Continue Pepcid  - continue  baclofen  - acetaminophen 1000 mg every 6 hours  - hold cyclobenzaprine 10 mg 3x daily PRN  - hold PTA gabapentin 100 mg 3x daily  - hold PTA oxycodone 5 mg PRN every 6h     #allergies  - fluticasone 1 spray daily     #HTN  - Continue PTA Metoprolol succinate 25mg q daily (ok to hold if HR< 60)  - Hold PTA Losartan, could consider restarting vs discontinuing given normotensive  - Continue PTA hydrochlorothiazide      #Epistaxis   On 8/5 patient found to have epistaxis.  Physical exam shows blood coming from the right nares.  - Afrin spray as needed  - Apply pressure to the nasal passage to help with bleeding          Diet: Regular Diet Adult    DVT Prophylaxis: Enoxaparin (Lovenox) SQ  Avalos Catheter: Not present  Fluids: None  Lines: None     Cardiac Monitoring: None  Code Status: Full Code      Clinically Significant Risk Factors     "             # Thrombocytopenia: Lowest platelets = 47 in last 2 days, will monitor for bleeding              # Obesity: Estimated body mass index is 30.71 kg/m  as calculated from the following:    Height as of this encounter: 1.88 m (6' 2\").    Weight as of this encounter: 108.5 kg (239 lb 3.2 oz).        # Financial/Environmental Concerns: none         Social Drivers of Health          Disposition Plan     Medically Ready for Discharge: Anticipated in 2-4 Days     Patient discussed and seen together with attending physician Dr. Dangelo Nguyen MD  Medicine Service, [address]  Securely message with [employer] (more info)  Text page via [facility] Paging/Directory   See signed in provider for up to date coverage information  ______________________________________________________________________    Interval History   No acute overnight events. Patient doing ok. Had no acute concerns.  Discussed plan with patient and patients father present at bedside. Received address from family for medication once it is approved. Provided address to pharmacist liaison.     Physical Exam   Vital Signs: Temp: 98  F (36.7  C) Temp src: Oral BP: 129/66 Pulse: 67   Resp: 16 SpO2: 100 % O2 Device: None (Room air)    Weight: 239 lbs 3.19 oz    GENERAL: In chair next to bed, no acute distress  RESP: CTAB  CV:  Extremities appear well-perfused, RUE swelling is stable  GI: Soft, non-distended, non tender  NEURO: Alert, Follows 2 step commands, decreased shoulder shrug and right shoulder, otherwise cranial nerves II-XII intact normal, LUE movement, no RUE movement, unable to wiggle right sided toes, Right quadricept improved to 4/5,  right sided facial droop less than prior , mild dysarthria, no dysphonia, EOMI grossly, pupils symmetric (No change in physical exam)  SKIN: Erythematous papules scattered on face c/w acne, bilateral petechiae present (improving)    Medical Decision " Making       Please see A&P for additional details of medical decision making.      Data     I have personally reviewed the following data over the past 24 hrs:    8.0  \   11.5 (L)   / 63 (L)     N/A N/A N/A /  N/A   4.2 N/A N/A \

## 2025-08-07 NOTE — PHARMACY
Clinical Pharmacy Note- Enoxaparin Anti-Xa Evaluation for ADULT Patients    Date of Service 2025  Patient's  1990   Patient's age:  35 year old  Patient's Weight used for enoxaparin dosin.5 kg (239 lb 3.2 oz)  Patient's BMI: Body mass index is 30.71 kg/m .   Enoxaparin Indication: DVT/PE treatment  Goal LMWH anti-Xa level for ADULT patients: 0.5-1 IU/mL (1 mg/kg or 0.75 mg/kg BID dose)  Current Enoxaparin Regimen: Other 135 mg subcutaneous q12h    Creatinine for last 3 days:   Creatinine   Date Value Ref Range Status   2025 0.95 0.67 - 1.17 mg/dL Final   2025 0.88 0.67 - 1.17 mg/dL Final   2025 0.81 0.67 - 1.17 mg/dL Final      Current estimated CrCL:  Serum creatinine: 0.95 mg/dL 25 0704  Estimated creatinine clearance: 142.3 mL/min     Platelet count for last 3 days:   Platelet Count   Date Value Ref Range Status   2025 63 (L) 150 - 450 10e3/uL Final   2025 47 (LL) 150 - 450 10e3/uL Final   2025 48 (LL) 150 - 450 10e3/uL Final      Recent Enoxaparin anti-Xa Levels (past 3 days):   Recent Labs     25  1554   ALMWH 1.73   ]    ASSESSMENT OF LEVELS AND CURRENT REGIMEN:   1) Enoxaparin anti-XA level was drawn 5.25 hours post dose at steady state.    2) Current LMWH anti-Xa peak level is: Supra-therapeutic (HIGH)    3) Renal Function:  Scr changed > 50% in last 72 hours? No  Urine Output: Unable to determine    4) Platelet Counts have been assessed and are: Stable    RECOMMENDATIONS:      Recommended Enoxaparin Anti-Xa Dose Adjustments  VTE Treatment (1 mg/kg or 0.75 mg/kg SQ Q12h dosing)   4h peak Anti-Xa (IU/ml) Hold next dose? Dose change Repeat Anti-Xa levels   <0.35 No INCREASE   25% 4 hours after the dose once at steady state   0.35-0.49 No INCREASE    10-15%    0.5-1 No No change Repeat only if necessary   1.01-1.59 No DECREASE   20% 4 hours after the dose once at steady state   1.6-2 x 3 h DECREASE   30%    >2 All further doses should be  "held and anti-Xa levels   measured every 12 hours until it is <0.5 units/ml.    Decrease previous dose by 40% when restarted.   Non-Standard Treatment (1.5 mg/kg SQ Q24h) AND VTE Prophylaxis Dosing   If LMWH anti-Xa levels are outside goal range, adjust dose up/down proportionally by percentage as pharmacokinetics are linear     1) Enoxaparin Regimen/Dose Plan: Decrease dose to 100 mg subcutaneous every 12 hours  2) Recheck Enoxaparin anti-Xa levels: 4 hours after the 4th dose of the \"new\" regimen:  100 mg subcutaneous every 12 hours      The pharmacist will continue to monitor and follow enoxaparin LMWH anti-Xa levels as needed.      Please contact pharmacy if enoxaparin needs to be held for a procedure or if goal levels change.      Walter Kee, PharmD, BCPS  August 7, 2025      "

## 2025-08-07 NOTE — PROGRESS NOTES
Solid Tumor Oncology Consult Service  Progress Note   Date of Service: 08/07/2025  Patient: Bernard Chapman  MRN: [id number]  Admission Date: 7/15/2025  Hospital Day # 23  Cancer Diagnosis: Metastatic lung adenocarcinoma   Primary Outpatient Oncologist: MARKOS   Current Treatment Plan: Taletrectinib      Interim history:    I discussed drug availability with the  and with the patient.  If the LMN fails to obtain authorization we will work with the  to obtain the drug (taletrectinib).  The patient received a telephone call from the supplier today and he will give them his insurance information as needed.    Assessment & Plan:   Bernard Chapman is a 35 year old year old male with a past medical history of HTN, DM, ELENA, PE/DVT 6/22/25 (previously on rivaroxaban) who presented 7/15/2025 with new neurological symptoms and found acute multifocal ischemic strokes, partial occlusive L MCA thrombus, L frontal SAH, and in setting of TCP c/f ITP vs catastrophic antiphospholipid syndrome (CAPS) vs malignancy s/p PLEX x7/16. CT CAP 7/16/2025 revealing LAD of hilar, mediastinal, and paraesophageal LNs and spiculated-appearing nodule of R apical lung s/p EBUS-TBNA 7/18 revealing metastatic lung adenocarcinoma, NGS revealed ROS1 rearrangement - planning to initiate treatment with taletrectinib as soon as possible while inpatient.      # New metastatic lung adenocarcinoma, ROS1 rearrangement   # Thrombocytopenia  # Anemia  CT CAP 7/16/2025 revealing a spiculated appearing nodule within R lung apex ~11 mm, multiple enlarged mediastinal, hilar and paraesophageal lymph nodes, along with 11 mm soft tissue density in the left retroperitoneum. Underwent EBUS-TBNA 7/18 revealing metastatic lung adenocarcinoma, + for CK7 and TTF-1. MRI brain without evidence of metastasis. Obtained PET on 7/26. Reviewed with radiologist which showed metastatic disease in L scapula, L retroperitoneum, R adrenal and L2. Areas  "are small enough where radiation is not required at this time. PD-L1 TPS at 40% consistent with low PD-L1 expression. Per d/w benign hematology, given severe thrombocytopenia requiring transfusions and hypercoagulable state with course complicated by multifocal strokes and PE 2/2 malignancy, plan to initiate treatment while inpatient with taletrectinib as soon as approved given ROS1 rearrangement found on NGS. Discussed with Tera and his parents 8/2 that response rate to this medication is 85-90%, with PFS measured in years on average.   - Working to obtain Taletrectinib. Script send to approved pharmacy. Plan to start as soon as able. Patient will be unable to leave the hospital any time soon given daily platelet transfusion needs.  - Follow up Guardant 360, pending  - Lesions are too small for radiation at this time   - Outpatient oncology referral placed, follow for scheduling       # H/o DVT/PE  Presented 6/22/25 with chest pain and SOB and found to have PE and lower extremity DVTs, he was placed on apixaban though had an allergic reaction and rotated to Xarelto.   - Agree with AC (Lovenox) per primary team and hematology        _____________________________________________________    Subjective & Interval History:    No acute events noted overnight.  Overall Tera is feeling ok today. No new symptoms noted. Brighter spirits today. Discussed support group for other patients who have this lung cancer diagnosis.   Discussed plan of care and goals while here in the hospital with patient and father at bedside. All questions answered at bedside.     Physical Exam:    Blood pressure 119/58, pulse 71, temperature 98.2  F (36.8  C), temperature source Oral, resp. rate 16, height 1.88 m (6' 2\"), weight 108.5 kg (239 lb 3.2 oz), SpO2 97%.    Constitutional: Sitting in chair, NAD  HEENT: NCAT  Respiratory: Breathing comfortably on room air  Neuro: Alert, answers questions appropriately. Moves extremities " spontaneously.  Psych: Appropriate affect     Labs & Studies: I personally reviewed the following studies:  Oncology fusion gene NGS, 7/22/25:  RESULTS See result below Abnormal    Fusion Event: POSITIVE      INTERPRETATION    This sample is positive for a ROS1 gene rearrangement (SDC4::ROS1)      The detected fusion event joins exon 2 of SDC4 (5' partner) with exon 32 of ROS1 (3' partner), corresponding to approximately 78% of the total reads analyzed (major isoform).  Two additional isoforms corresponding to XBL2aesb8::SBX2vzip67 and CCM2hyez2::POZ1mrvw14 fusions, respectively, were also detected (corresponding to less than 20% of the total reads).      ROS1 gene rearrangements have been reported in diverse cancer types including non-small-cell lung cancer (NSCLC), and with a notably higher prevalence in lung adenocarcinoma. Importantly, FDA-approved tyrosine kinase inhibitors have demonstrated efficacy in treatment of ROS1-rearranged NSCLC (PMID: 97635411). However it is not clear if coexistence of more than two types of ROS1 rearrangements affects therapeutic response to TKI therapy. A recent case report (PMID: 75893337) showed that coexistence of WYG6gpzj6::TYI6yfxk 34 and SDC4 exon2::ROS1 exon35 fusions showed suboptimal response to crizotinib therapy when compared to that seen with the YQX4epyr1::ODZ0gman 32 fusion alone.      Molecular Description  The following chromosomal coordinates describe the exon::exon junction(s) of the major isoform fusion partner in the most prevalent cDNA transcript for the reported gene fusion event:     [id number],chr6:[id number]  chr20:[id number],chr6:[id number]  chr20:[id number],chr6:[id number]         This assay detects gene fusion events based on the assessment of cDNA; the reported coordinates describe specific exon-exon joins in the detected transcripts for the positive gene fusion target(s).  The reported coordinates do not specifically describe the underlying genomic (DNA)  breakpoint, but do describe a reference interval in which the genomic alteration occurs.      ROUTINE LABS (Last four results):  CMP  Recent Labs   Lab 08/07/25  0635 08/06/25  1228 08/06/25  0843 08/06/25  0725 08/05/25  2155 08/05/25  1731 08/05/25  0856 08/05/25  0704 08/04/25  0809 08/04/25  0718   POTASSIUM 4.2  --   --  4.2  --   --   --  4.4  --  4.5   GLC  --  91 102*  --  103* 117*   < >  --    < >  --    CR  --   --   --   --   --   --   --  0.95  --   --    GFRESTIMATED  --   --   --   --   --   --   --  >90  --   --    MAG 2.0  --   --  1.9  --   --   --  2.1  --  2.0   PHOS 4.0  --   --  4.1  --   --   --  4.1  --  4.1    < > = values in this interval not displayed.     CBC  Recent Labs   Lab 08/07/25  0635 08/06/25  1132 08/06/25  0725 08/05/25  0704 08/04/25  0718   WBC 8.0  --  9.4 10.8 13.0*   RBC 4.29*  --  4.39* 4.37* 4.54   HGB 11.5*  --  11.8* 11.7* 12.2*   HCT 36.0*  --  36.4* 35.4* 37.3*   MCV 84 83 83 81 82   MCH 26.8  --  26.9 26.8 26.9   MCHC 31.9  --  32.4 33.1 32.7   RDW 14.0  --  14.1 14.3 14.5   PLT 63* 47* 48* 50* 46*     INR  Recent Labs   Lab 08/02/25  0705 08/01/25  0717   INR 1.13 1.15     Medications list for reference:  Current Facility-Administered Medications   Medication Dose Route Frequency Provider Last Rate Last Admin    acetaminophen (TYLENOL) tablet 1,000 mg  1,000 mg Oral or Feeding Tube Q6H Dee Shah MD   1,000 mg at 08/07/25 1039    Baclofen (LIORESAL) tablet 5 mg  5 mg Oral TID Luisito Aden MD   5 mg at 08/07/25 0821    And    baclofen (LIORESAL) half-tablet 2.5 mg  2.5 mg Oral TID Luisito Aden MD   2.5 mg at 08/07/25 0821    bisacodyl (DULCOLAX) suppository 10 mg  10 mg Rectal Daily PRN Dee Shah MD        chlorhexidine (HIBICLENS) 4 % solution   Topical Daily PRN Dee Shah MD        chlorproMAZINE (THORAZINE) tablet 25 mg  25 mg Oral or Feeding Tube Q6H PRN Yamile Gutierrez APRN CNP   25 mg at 07/26/25 2009    cyclobenzaprine (FLEXERIL)  tablet 10 mg  10 mg Oral TID PRN Marjan Michel MD        glucose gel 15-30 g  15-30 g Oral Q15 Min PRN Dee Shah MD        Or    dextrose 50 % injection 25-50 mL  25-50 mL Intravenous Q15 Min PRN Dee Shah MD        Or    glucagon injection 1 mg  1 mg Subcutaneous Q15 Min PRN Dee Shah MD        diphenhydrAMINE (BENADRYL) capsule 25 mg  25 mg Oral or Feeding Tube Q6H PRN Dee Shah MD   25 mg at 07/26/25 1228    Or    diphenhydrAMINE (BENADRYL) injection 25 mg  25 mg Intravenous Q6H PRN Dee Shah MD        enoxaparin ANTICOAGULANT (LOVENOX) injection 135 mg  1 mg/kg Subcutaneous Q12H Dee Shah MD   135 mg at 08/07/25 1039    famotidine (PEPCID) tablet 10 mg  10 mg Oral BID Dee Shah MD   10 mg at 08/07/25 0821    fluticasone (FLONASE) 50 MCG/ACT spray 1 spray  1 spray Both Nostrils Daily Dee Shah MD   1 spray at 08/07/25 0820    gabapentin (NEURONTIN) capsule 100 mg  100 mg Oral TID PRN Marjan Michel MD        hydrALAZINE (APRESOLINE) injection 10 mg  10 mg Intravenous Q30 Min PRN Dee Shah MD   10 mg at 07/23/25 0203    hydroCHLOROthiazide tablet 25 mg  25 mg Oral Daily Marjan Michel MD   25 mg at 08/07/25 0821    labetalol (NORMODYNE/TRANDATE) injection 10 mg  10 mg Intravenous Q10 Min PRN Dee Shah MD        [Held by provider] losartan (COZAAR) tablet 100 mg  100 mg Oral Daily Dee Shah MD        magnesium hydroxide (MILK OF MAGNESIA) suspension 30 mL  30 mL Oral or Feeding Tube Daily PRN Dee Shah MD        melatonin tablet 3 mg  3 mg Oral At Bedtime PRN Dee Shah MD   3 mg at 07/22/25 0123    metoprolol succinate ER (TOPROL XL) 24 hr tablet 25 mg  25 mg Oral Daily Francisco Prather DO   25 mg at 08/07/25 0821    naloxone (NARCAN) injection 0.2 mg  0.2 mg Intravenous Q2 Min PRN Carine Matt MD        Or    naloxone (NARCAN) injection 0.4 mg  0.4 mg Intravenous Q2 Min PRN Carine Matt MD        Or     naloxone (NARCAN) injection 0.2 mg  0.2 mg Intramuscular Q2 Min PRN Carine Matt MD        Or    naloxone (NARCAN) injection 0.4 mg  0.4 mg Intramuscular Q2 Min PRN Carine Matt MD        ondansetron (ZOFRAN ODT) ODT tab 4 mg  4 mg Oral Q6H PRN Dee Shah MD        Or    ondansetron (ZOFRAN) injection 4 mg  4 mg Intravenous Q6H PRN Dee Shah MD        oxyCODONE (ROXICODONE) tablet 5 mg  5 mg Oral Q6H PRN Marjan Michel MD        oxymetazoline (AFRIN) 0.05 % spray 2 spray  2 spray Both Nostrils BID PRN Jabier Hill MD        polyethylene glycol (MIRALAX) Packet 17 g  17 g Oral or Feeding Tube Daily Dee Shah MD   17 g at 07/29/25 0835    prochlorperazine (COMPAZINE) injection 10 mg  10 mg Intravenous Q6H PRN Dee Shah MD        Or    prochlorperazine (COMPAZINE) tablet 10 mg  10 mg Oral or Feeding Tube Q6H PRN Dee Shah MD        rosuvastatin (CRESTOR) tablet 10 mg  10 mg Oral or Feeding Tube Daily Dee Shah MD   10 mg at 08/07/25 0821    senna-docusate (SENOKOT-S/PERICOLACE) 8.6-50 MG per tablet 1-2 tablet  1-2 tablet Oral or Feeding Tube BID Dee Shah MD   2 tablet at 08/07/25 0820    simethicone (MYLICON) chewable half-tab 40 mg  40 mg Oral Q6H PRN Dee Shah MD   40 mg at 07/25/25 1957    sodium chloride (PF) 0.9% PF flush 3 mL  3 mL Intracatheter Q8H MOHINDER Dee Shah MD   3 mL at 08/07/25 0826    sodium chloride (PF) 0.9% PF flush 3 mL  3 mL Intracatheter q1 min prn Dee Shah MD   3 mL at 07/31/25 2207

## 2025-08-08 ENCOUNTER — APPOINTMENT (OUTPATIENT)
Dept: PHYSICAL THERAPY | Facility: CLINIC | Age: 35
DRG: 064 | End: 2025-08-08
Payer: COMMERCIAL

## 2025-08-08 ENCOUNTER — APPOINTMENT (OUTPATIENT)
Dept: OCCUPATIONAL THERAPY | Facility: CLINIC | Age: 35
DRG: 064 | End: 2025-08-08
Payer: COMMERCIAL

## 2025-08-08 LAB
BLD PROD TYP BPU: NORMAL
BLOOD COMPONENT TYPE: NORMAL
CODING SYSTEM: NORMAL
CREAT SERPL-MCNC: 1.03 MG/DL (ref 0.67–1.17)
EGFRCR SERPLBLD CKD-EPI 2021: >90 ML/MIN/1.73M2
ERYTHROCYTE [DISTWIDTH] IN BLOOD BY AUTOMATED COUNT: 14 % (ref 10–15)
GLUCOSE BLDC GLUCOMTR-MCNC: 108 MG/DL (ref 70–99)
GLUCOSE BLDC GLUCOMTR-MCNC: 122 MG/DL (ref 70–99)
HCT VFR BLD AUTO: 35.1 % (ref 40–53)
HGB BLD-MCNC: 11.4 G/DL (ref 13.3–17.7)
HOLD SPECIMEN: NORMAL
ISSUE DATE AND TIME: NORMAL
MAGNESIUM SERPL-MCNC: 2 MG/DL (ref 1.7–2.3)
MCH RBC QN AUTO: 26.4 PG (ref 26.5–33)
MCHC RBC AUTO-ENTMCNC: 32.5 G/DL (ref 31.5–36.5)
MCV RBC AUTO: 81 FL (ref 78–100)
PHOSPHATE SERPL-MCNC: 4.1 MG/DL (ref 2.5–4.5)
PLATELET # BLD AUTO: 50 10E3/UL (ref 150–450)
POTASSIUM SERPL-SCNC: 4.2 MMOL/L (ref 3.4–5.3)
RBC # BLD AUTO: 4.32 10E6/UL (ref 4.4–5.9)
UNIT ABO/RH: NORMAL
UNIT NUMBER: NORMAL
UNIT STATUS: NORMAL
UNIT TYPE ISBT: 6200
WBC # BLD AUTO: 7.5 10E3/UL (ref 4–11)

## 2025-08-08 PROCEDURE — 97530 THERAPEUTIC ACTIVITIES: CPT | Mod: GP | Performed by: PHYSICAL THERAPIST

## 2025-08-08 PROCEDURE — 99233 SBSQ HOSP IP/OBS HIGH 50: CPT | Performed by: INTERNAL MEDICINE

## 2025-08-08 PROCEDURE — P9037 PLATE PHERES LEUKOREDU IRRAD: HCPCS

## 2025-08-08 PROCEDURE — 250N000013 HC RX MED GY IP 250 OP 250 PS 637

## 2025-08-08 PROCEDURE — 99233 SBSQ HOSP IP/OBS HIGH 50: CPT | Mod: GC | Performed by: HOSPITALIST

## 2025-08-08 PROCEDURE — 84100 ASSAY OF PHOSPHORUS: CPT | Performed by: HOSPITALIST

## 2025-08-08 PROCEDURE — 85014 HEMATOCRIT: CPT

## 2025-08-08 PROCEDURE — 97112 NEUROMUSCULAR REEDUCATION: CPT | Mod: GO

## 2025-08-08 PROCEDURE — 250N000011 HC RX IP 250 OP 636

## 2025-08-08 PROCEDURE — 97116 GAIT TRAINING THERAPY: CPT | Mod: GP | Performed by: PHYSICAL THERAPIST

## 2025-08-08 PROCEDURE — 250N000013 HC RX MED GY IP 250 OP 250 PS 637: Performed by: HOSPITALIST

## 2025-08-08 PROCEDURE — 84132 ASSAY OF SERUM POTASSIUM: CPT | Performed by: HOSPITALIST

## 2025-08-08 PROCEDURE — 36415 COLL VENOUS BLD VENIPUNCTURE: CPT

## 2025-08-08 PROCEDURE — 250N000013 HC RX MED GY IP 250 OP 250 PS 637: Performed by: STUDENT IN AN ORGANIZED HEALTH CARE EDUCATION/TRAINING PROGRAM

## 2025-08-08 PROCEDURE — 97535 SELF CARE MNGMENT TRAINING: CPT | Mod: GO

## 2025-08-08 PROCEDURE — 82565 ASSAY OF CREATININE: CPT | Performed by: PHYSICIAN ASSISTANT

## 2025-08-08 PROCEDURE — 83735 ASSAY OF MAGNESIUM: CPT | Performed by: HOSPITALIST

## 2025-08-08 PROCEDURE — 120N000002 HC R&B MED SURG/OB UMMC

## 2025-08-08 RX ORDER — MAGNESIUM OXIDE 400 MG/1
400 TABLET ORAL EVERY 4 HOURS
Status: COMPLETED | OUTPATIENT
Start: 2025-08-08 | End: 2025-08-08

## 2025-08-08 RX ADMIN — SENNOSIDES AND DOCUSATE SODIUM 2 TABLET: 50; 8.6 TABLET ORAL at 09:22

## 2025-08-08 RX ADMIN — MAGNESIUM OXIDE TAB 400 MG (241.3 MG ELEMENTAL MG) 400 MG: 400 (241.3 MG) TAB at 10:50

## 2025-08-08 RX ADMIN — ACETAMINOPHEN 1000 MG: 500 TABLET ORAL at 21:43

## 2025-08-08 RX ADMIN — Medication 5 MG: at 14:08

## 2025-08-08 RX ADMIN — ENOXAPARIN SODIUM 100 MG: 100 INJECTION SUBCUTANEOUS at 20:06

## 2025-08-08 RX ADMIN — MAGNESIUM OXIDE TAB 400 MG (241.3 MG ELEMENTAL MG) 400 MG: 400 (241.3 MG) TAB at 14:09

## 2025-08-08 RX ADMIN — Medication 2.5 MG: at 14:08

## 2025-08-08 RX ADMIN — FLUTICASONE PROPIONATE 1 SPRAY: 50 SPRAY, METERED NASAL at 09:24

## 2025-08-08 RX ADMIN — ROSUVASTATIN CALCIUM 10 MG: 10 TABLET, FILM COATED ORAL at 09:22

## 2025-08-08 RX ADMIN — FAMOTIDINE 10 MG: 10 TABLET ORAL at 09:21

## 2025-08-08 RX ADMIN — ACETAMINOPHEN 1000 MG: 500 TABLET ORAL at 10:50

## 2025-08-08 RX ADMIN — SENNOSIDES AND DOCUSATE SODIUM 1 TABLET: 50; 8.6 TABLET ORAL at 20:06

## 2025-08-08 RX ADMIN — METOPROLOL SUCCINATE 25 MG: 25 TABLET, EXTENDED RELEASE ORAL at 09:23

## 2025-08-08 RX ADMIN — Medication 5 MG: at 20:06

## 2025-08-08 RX ADMIN — Medication 2.5 MG: at 20:06

## 2025-08-08 RX ADMIN — Medication 2.5 MG: at 09:23

## 2025-08-08 RX ADMIN — HYDROCHLOROTHIAZIDE 25 MG: 12.5 TABLET ORAL at 09:22

## 2025-08-08 RX ADMIN — ENOXAPARIN SODIUM 100 MG: 100 INJECTION SUBCUTANEOUS at 10:50

## 2025-08-08 RX ADMIN — FAMOTIDINE 10 MG: 10 TABLET ORAL at 20:06

## 2025-08-08 RX ADMIN — ACETAMINOPHEN 1000 MG: 500 TABLET ORAL at 06:10

## 2025-08-08 RX ADMIN — Medication 5 MG: at 09:23

## 2025-08-08 ASSESSMENT — ACTIVITIES OF DAILY LIVING (ADL)
ADLS_ACUITY_SCORE: 38
ADLS_ACUITY_SCORE: 42
ADLS_ACUITY_SCORE: 38

## 2025-08-08 NOTE — PLAN OF CARE
Status: Admitted 7/15 with vision changes, dizziness, confusion, and WFD. Found to have L MCA ischemic stroke and occlusion of L M1. Further work-up showed new diagnosis of metastatic lung adenocarcinoma. 8/4 MRI showed incidental 2 new R frontal infarcts.   Vitals: VSS on RA, R arm limb alert  Neuros: A&O x4. Positive mood. R droop, R field cut. Decreased sensation to R side of body. L side 5/5, RUE 3, RLE 4, generalized weakness.   IV: Left PIV SL  Labs/Electrolytes: redraws in the AM. Monitor platelets  levels, last value 63  Resp: on RA, denies SOB  Diet: Regular diet. Denies nausea. Needs help ordering & setting up tray. Takes pills whole with applesauce  GI: LBM 8/7. Passing gas  : Voiding spontaneously in bathroom  Skin: Petechiae throughout.   Pain: Denies  Activity: A1-2/GB/cane  SCDs on? If no, why?: Yes  Plan: Taletrectinib to be started once prior auth received. Continue to monitor and follow POC      Goal Outcome Evaluation:      Plan of Care Reviewed With: patient    Overall Patient Progress: improvingOverall Patient Progress: improving    Outcome Evaluation: Taletrectinib ordered through onc - to start inpatient as soon prior authorization approved

## 2025-08-08 NOTE — PROGRESS NOTES
Solid Tumor Oncology Consult Service  Progress Note   Date of Service: 08/08/2025  Patient: Bernard Chapman  MRN: [id number]  Admission Date: 7/15/2025  Hospital Day # 24  Cancer Diagnosis: Metastatic lung adenocarcinoma   Primary Outpatient Oncologist: MARKOS   Current Treatment Plan: Taletrectinib      Interim history:    I have filled out the paperwork for 30 days of free drug (taletrectinib) from [employer].  It will be shipped to his mother's house (hospital cannot take delivery).  The patient and his mother are aware of this plan. They should receive within 24 hours. If he receives this drug they may bring it in from home and we will start it ASAP at a dose of 600 mg daily on an empty stomach (3 x 200 mg capsules).    The insurance appeal for taletrectinib was denied.  It will now be sent to a third party for adjucation.  In the meantime, I have started an application for a long term supply from the company at no cost.  We will need his insurance card information for this additional application.  The patient is aware of this.    Today I also discussed with the pharmacy starting other available drugs such as repotrectinib, entrectinib, or crizotinib in the meantime or instead of taletrectinib.  Any of these medications are notably less effective and I am told the pre-authorization process would need to restart for the patient to receive these.    Assessment & Plan:   Bernard Chapman is a 35 year old year old male with a past medical history of HTN, DM, ELENA, PE/DVT 6/22/25 (previously on rivaroxaban) who presented 7/15/2025 with new neurological symptoms and found acute multifocal ischemic strokes, partial occlusive L MCA thrombus, L frontal SAH, and in setting of TCP c/f ITP vs catastrophic antiphospholipid syndrome (CAPS) vs malignancy s/p PLEX x7/16. CT CAP 7/16/2025 revealing LAD of hilar, mediastinal, and paraesophageal LNs and spiculated-appearing nodule of R apical lung s/p EBUS-TBNA 7/18  revealing metastatic lung adenocarcinoma, NGS revealed ROS1 rearrangement - planning to initiate treatment with taletrectinib as soon as possible while inpatient.      # New metastatic lung adenocarcinoma, ROS1 rearrangement   # Thrombocytopenia  # Anemia  CT CAP 7/16/2025 revealing a spiculated appearing nodule within R lung apex ~11 mm, multiple enlarged mediastinal, hilar and paraesophageal lymph nodes, along with 11 mm soft tissue density in the left retroperitoneum. Underwent EBUS-TBNA 7/18 revealing metastatic lung adenocarcinoma, + for CK7 and TTF-1. MRI brain without evidence of metastasis. Obtained PET on 7/26. Reviewed with radiologist which showed metastatic disease in L scapula, L retroperitoneum, R adrenal and L2. Areas are small enough where radiation is not required at this time. PD-L1 TPS at 40% consistent with low PD-L1 expression. Per d/w benign hematology, given severe thrombocytopenia requiring transfusions and hypercoagulable state with course complicated by multifocal strokes and PE 2/2 malignancy, plan to initiate treatment while inpatient with taletrectinib as soon as approved given ROS1 rearrangement found on NGS. Discussed with Tera and his parents 8/2 that response rate to this medication is 85-90%, with PFS measured in years on average.   - Working to obtain Taletrectinib. Script send to approved pharmacy. Plan to start as soon as able. Patient will be unable to leave the hospital any time soon given daily platelet transfusion needs.  - Follow up Guardant 360, pending  - Lesions are too small for radiation at this time   - Outpatient oncology referral placed, follow for scheduling       # H/o DVT/PE  Presented 6/22/25 with chest pain and SOB and found to have PE and lower extremity DVTs, he was placed on apixaban though had an allergic reaction and rotated to Xarelto.   - Agree with AC (Lovenox) per primary team and hematology   "      _____________________________________________________    Subjective & Interval History:    No acute events noted overnight.  Overall Tera is feeling ok today. No new symptoms noted. Brighter spirits today. Discussed support group for other patients who have this lung cancer diagnosis.   Discussed plan of care and goals while here in the hospital with patient and father at bedside. All questions answered at bedside.     Physical Exam:    Blood pressure 139/58, pulse 68, temperature 97.9  F (36.6  C), temperature source Oral, resp. rate 18, height 1.88 m (6' 2\"), weight 108.5 kg (239 lb 3.2 oz), SpO2 98%.    Constitutional: Sitting in chair, NAD  HEENT: NCAT  Respiratory: Breathing comfortably on room air  Neuro: Alert, answers questions appropriately. Moves extremities spontaneously.  Psych: Appropriate affect     Labs & Studies: I personally reviewed the following studies:  Oncology fusion gene NGS, 7/22/25:  RESULTS See result below Abnormal    Fusion Event: POSITIVE      INTERPRETATION    This sample is positive for a ROS1 gene rearrangement (SDC4::ROS1)      The detected fusion event joins exon 2 of SDC4 (5' partner) with exon 32 of ROS1 (3' partner), corresponding to approximately 78% of the total reads analyzed (major isoform).  Two additional isoforms corresponding to VJL1cazy8::XDH9ndzi24 and ILO6rjwv8::ANI7ngio94 fusions, respectively, were also detected (corresponding to less than 20% of the total reads).      ROS1 gene rearrangements have been reported in diverse cancer types including non-small-cell lung cancer (NSCLC), and with a notably higher prevalence in lung adenocarcinoma. Importantly, FDA-approved tyrosine kinase inhibitors have demonstrated efficacy in treatment of ROS1-rearranged NSCLC (PMID: 90098920). However it is not clear if coexistence of more than two types of ROS1 rearrangements affects therapeutic response to TKI therapy. A recent case report (PMID: 67581615) showed that " coexistence of UAV2uwsu7::LVT3dqlm 34 and SDC4 exon2::ROS1 exon35 fusions showed suboptimal response to crizotinib therapy when compared to that seen with the XSK5rdsy4::CWH4qsqa 32 fusion alone.      Molecular Description  The following chromosomal coordinates describe the exon::exon junction(s) of the major isoform fusion partner in the most prevalent cDNA transcript for the reported gene fusion event:     chr20:[id number],chr6:[id number]  chr20:[id number],chr6:[id number]  chr20:[id number],chr6:[id number]         This assay detects gene fusion events based on the assessment of cDNA; the reported coordinates describe specific exon-exon joins in the detected transcripts for the positive gene fusion target(s).  The reported coordinates do not specifically describe the underlying genomic (DNA) breakpoint, but do describe a reference interval in which the genomic alteration occurs.      ROUTINE LABS (Last four results):  CMP  Recent Labs   Lab 08/08/25  0824 08/08/25  0631 08/07/25  0635 08/06/25  1228 08/06/25  0843 08/06/25  0725 08/05/25  2155 08/05/25  0856 08/05/25  0704   POTASSIUM  --  4.2 4.2  --   --  4.2  --   --  4.4   *  --   --  91 102*  --  103*   < >  --    CR  --   --   --   --   --   --   --   --  0.95   GFRESTIMATED  --   --   --   --   --   --   --   --  >90   MAG  --  2.0 2.0  --   --  1.9  --   --  2.1   PHOS  --  4.1 4.0  --   --  4.1  --   --  4.1    < > = values in this interval not displayed.     CBC  Recent Labs   Lab 08/08/25  0631 08/07/25  0635 08/06/25  1132 08/06/25  0725 08/05/25  0704   WBC 7.5 8.0  --  9.4 10.8   RBC 4.32* 4.29*  --  4.39* 4.37*   HGB 11.4* 11.5*  --  11.8* 11.7*   HCT 35.1* 36.0*  --  36.4* 35.4*   MCV 81 84 83 83 81   MCH 26.4* 26.8  --  26.9 26.8   MCHC 32.5 31.9  --  32.4 33.1   RDW 14.0 14.0  --  14.1 14.3   PLT 50* 63* 47* 48* 50*     INR  Recent Labs   Lab 08/02/25  0705   INR 1.13     Medications list for reference:  Current Facility-Administered Medications    Medication Dose Route Frequency Provider Last Rate Last Admin    acetaminophen (TYLENOL) tablet 1,000 mg  1,000 mg Oral or Feeding Tube Q6H Dee Shah MD   1,000 mg at 08/08/25 1050    Baclofen (LIORESAL) tablet 5 mg  5 mg Oral TID Luisito Aden MD   5 mg at 08/08/25 1408    And    baclofen (LIORESAL) half-tablet 2.5 mg  2.5 mg Oral TID Luisito Aden MD   2.5 mg at 08/08/25 1408    bisacodyl (DULCOLAX) suppository 10 mg  10 mg Rectal Daily PRN Dee Shah MD        chlorhexidine (HIBICLENS) 4 % solution   Topical Daily PRN Dee Shah MD        chlorproMAZINE (THORAZINE) tablet 25 mg  25 mg Oral or Feeding Tube Q6H PRN Yamile Gutierrez APRN CNP   25 mg at 07/26/25 2009    cyclobenzaprine (FLEXERIL) tablet 10 mg  10 mg Oral TID PRN Marjan Michel MD        glucose gel 15-30 g  15-30 g Oral Q15 Min PRN Dee Shah MD        Or    dextrose 50 % injection 25-50 mL  25-50 mL Intravenous Q15 Min PRN Dee Shah MD        Or    glucagon injection 1 mg  1 mg Subcutaneous Q15 Min PRN Dee Shah MD        diphenhydrAMINE (BENADRYL) capsule 25 mg  25 mg Oral or Feeding Tube Q6H PRN Dee Shah MD   25 mg at 07/26/25 1228    Or    diphenhydrAMINE (BENADRYL) injection 25 mg  25 mg Intravenous Q6H PRN Dee Shah MD        enoxaparin ANTICOAGULANT (LOVENOX) injection 100 mg  100 mg Subcutaneous Q12H Cristine Nguyen MD   100 mg at 08/08/25 1050    famotidine (PEPCID) tablet 10 mg  10 mg Oral BID Dee Shah MD   10 mg at 08/08/25 0921    fluticasone (FLONASE) 50 MCG/ACT spray 1 spray  1 spray Both Nostrils Daily Dee Shah MD   1 spray at 08/08/25 0924    gabapentin (NEURONTIN) capsule 100 mg  100 mg Oral TID PRN Marjan Michel MD        hydrALAZINE (APRESOLINE) injection 10 mg  10 mg Intravenous Q30 Min PRN Dee Shah MD   10 mg at 07/23/25 0203    hydroCHLOROthiazide tablet 25 mg  25 mg Oral Daily Marjan Michel MD   25 mg at 08/08/25 0922    labetalol  (NORMODYNE/TRANDATE) injection 10 mg  10 mg Intravenous Q10 Min PRN Dee Shah MD        [Held by provider] losartan (COZAAR) tablet 100 mg  100 mg Oral Daily Dee Shah MD        magnesium hydroxide (MILK OF MAGNESIA) suspension 30 mL  30 mL Oral or Feeding Tube Daily PRN Dee Shah MD        melatonin tablet 3 mg  3 mg Oral At Bedtime PRN Dee Shah MD   3 mg at 07/22/25 0123    metoprolol succinate ER (TOPROL XL) 24 hr tablet 25 mg  25 mg Oral Daily [place], DO   25 mg at 08/08/25 0923    naloxone (NARCAN) injection 0.2 mg  0.2 mg Intravenous Q2 Min PRN Carine Matt MD        Or    naloxone (NARCAN) injection 0.4 mg  0.4 mg Intravenous Q2 Min PRN Carine Matt MD        Or    naloxone (NARCAN) injection 0.2 mg  0.2 mg Intramuscular Q2 Min PRN Carine Matt MD        Or    naloxone (NARCAN) injection 0.4 mg  0.4 mg Intramuscular Q2 Min PRN Carine Matt MD        ondansetron (ZOFRAN ODT) ODT tab 4 mg  4 mg Oral Q6H PRN Dee Shah MD        Or    ondansetron (ZOFRAN) injection 4 mg  4 mg Intravenous Q6H PRN Dee Shah MD        oxyCODONE (ROXICODONE) tablet 5 mg  5 mg Oral Q6H PRN Marjan Michel MD        oxymetazoline (AFRIN) 0.05 % spray 2 spray  2 spray Both Nostrils BID PRN Jabier Hill MD        polyethylene glycol (MIRALAX) Packet 17 g  17 g Oral or Feeding Tube Daily Dee Shah MD   17 g at 07/29/25 0835    prochlorperazine (COMPAZINE) injection 10 mg  10 mg Intravenous Q6H PRN Dee Shah MD        Or    prochlorperazine (COMPAZINE) tablet 10 mg  10 mg Oral or Feeding Tube Q6H PRN Dee Shah MD        rosuvastatin (CRESTOR) tablet 10 mg  10 mg Oral or Feeding Tube Daily Dee Shah MD   10 mg at 08/08/25 0922    senna-docusate (SENOKOT-S/PERICOLACE) 8.6-50 MG per tablet 1-2 tablet  1-2 tablet Oral or Feeding Tube BID Dee Shah MD   2 tablet at 08/08/25 0922    simethicone (MYLICON) chewable half-tab 40 mg  40  mg Oral Q6H PRDee Kessler MD   40 mg at 07/25/25 1957    sodium chloride (PF) 0.9% PF flush 3 mL  3 mL Intracatheter Q8H [facility] Dee Shah MD   3 mL at 08/08/25 0925    sodium chloride (PF) 0.9% PF flush 3 mL  3 mL Intracatheter q1 min prDee Kessler MD   3 mL at 07/31/25 2207

## 2025-08-08 NOTE — CONSULTS
Received PIV order. Pt has one functional PIV in left AC, flushes well. Spoke with 6A RN Jessica, she said there's no due IV meds for tonight. Advised to hold 2nd PIV at this time as pt's left forearm is swollen. Pt has right arm limb alert.   Left AC PIV dressing changed (dried blood and was peeling off).

## 2025-08-08 NOTE — PROGRESS NOTES
[facility]    Progress Note - Medicine Service, [facility] TEAM 3       Date of Admission:  7/15/2025    Assessment & Plan   Bernard Chapman is a 35 year old male with a past medical history of hypertension, diabetes, ELENA, PE/DVT (on rivaroxaban) admitted on 7/15/2025 for lethargy, N/V, and blurry vision found to have multifocal acute CVA in L MCA , R parietal and R frontal centrum semiovale. Initially concern for TTP or catastrophic APS, however further work up notable for new diagnosis of metastatic lung adenocarcinoma with likely malignancy-related hypercoagulability.    Today:  - Taletrectinib through onc;  30 days of free drug (taletrectinib) from [employer] to be shipped to mother's house; will start it ASAP at a dose of 600 mg daily on an empty stomach (3 x 200 mg capsules).   - cont daily labs  - SC Lovenox 135 mg bid---> 100 mg    - 1U of platelets       #New metastatic lung adenocarcinoma, ROS1 rearrangement   #Right lung nodule  #Multiple enlarged mediastinal, paraesophageal, and hilar lymph nodes  Malignancy work up initiated on admission due to significant hypercoagulability. CT CAP significant for [id number] spiculated density in R lung apex, 9mm pulmonary nodule in R lung base, L thyroid nodule, paratracheal LAD, splenic infarct, bilateral renal infarcts, and L retroperitoneal nodule. Underwent FNA of lymph node which was positive for lung adenocarcinoma (resulted 7/22).  PDL-1 resulted with low expression (40%). PET 7/26 with distant metastases and MRI brain showing no signs of metastasis.  Currently pending prior authorization and patient is planned to start in patient therapy once approved..  -Oncology consulted, appreciate recs   - NGS; positive for a ROS1 gene rearrangement (SDC4::ROS1)     - Guardant 360; T p53 mutation and MITCHELL   - Due to ongoing transfusion needs, ARU not feasible.   - Taletrectinib ordered through onc - to start inpatient as soon prior  authorization approved (will need to have address so medications can be sent )  - [place] 2nd opinion; Patient will need to call [phone] to make appointment once there is a timeline for discharging from this hospitalization      #Hypercoagulability d/t metastatic lung adenocarcinoma  #Infarcts of brain, kidney, spleen  #Cardiac valve thrombus on tricuspid and aortic valve 7/16 on TIMO  #Hx pulmonary embolism (6/23/25)  #Acute thrombocytopenia, in the setting of metastatic lung adenocarcinoma d/t ITP vs drug-induced vs consumptive coagulopathy  #Acute anemia  #Thrombocytopenia   Found to have multiple CVA, likely embolic. TTE with mobile echodensities on tricuspid and aortic valve. Initial concern for TTP or catastrophic APS, however work up ultimately significant for new diagnosis of metastatic lung cancer. Hypercoagulable state presumed to be most likely 2/2 malignancy. Started on heparin gtt 7/17, but briefly held for 7/18 endobronchial needle aspiration of lymph nodes. Stroke code called 7/19 for worsening R-sided hemiparesis and hemianopia. Repeat CTA with new focal high-grade stenosis of other part of left MCA. Heparin gtt was stopped during code stroke, but restarting (heparin gtt + levophed) led to 10/10 headache. CTH with small left frontal SAH. Brain MRI with new L basal ganglia stroke. Heparin gtt resumed, and repeat CTH stable. Switched to therapeutic Lovenox 7/22. CTH still stable 7/23. Declining fibrinogen, so received cryoprecipitate 7/25. Concern for continued consumptive process, but on therapeutic Lovenox. PLT downtrending, so receiving transfusions (1 unit 7/26, 1 unit 7/27, 1 unit 7/28, 1 unit 7/30,1 unit 8/1, 1 unit 8/3, 1 unit 8/4, 1 unit 8/5, 1 unit 8/6, 1 unit 8/8). No new symptoms. CTM.  - Hematology consulted   -Transfuse if   -Hgb<7  -INR<1.5  -PLT<50k  -Fibrinogen<100  - SC Lovenox 135 mg bid---> 100 mg    -qdaily CBC  -daily fibrinogen, INR, PTT      # Maculopapular rash on face, chest,  bilateral upper extremities, improved  # Numerous palpable purpura across the scalp and lower extremities   # Petechiae on bilateral lower extremities  C/f palpable purpura, so s/p L leg s/p punch biopsy with dermatology: rupture folliculitis. No definite features of cutaneous small vessel vasculitis or thrombotic vasculopathy. New rash noted 7/26 with  flushing of face/chest after PET scan. Also scattered papules of face and upper extremities (in addition to petechiae of BLE). Not itchy and no signs of respiratory distress, but suspected  secondary to contrast from PET scan, .  Rash appeared several hours after unit of platelets, less likely due to a transfusion reaction. photos present in chart. The following days, papules are consistent with his usual acne. No diffuse erythema. Some c/f SVC syndrome given distrubution of flushing and positional nature (normally HOB elevated when asleep, so PET was first time that he had prolonged flat/supine position). CTM.   Plan:  - Benadryl as needed for itching  - Chlorhexidine wash  - Will continue to monitor    #Multifocal acute ischemic strokes, in L MCA territory and new L basal ganglia stroke; Increasing mass effect on L lateral ventricle by L MCA infarction  #Subtotal occlusive thrombus in left M1   #Left frontoparietal SAH (7/19)  #Hypertension chronic  #HLD, chronic  Etiology most likely hypercoagulability d/t malignancy. Emboli stemming from tricuspid and aortic valves, appreciated on TTE 7/16. He continues to have R facial droop, extremity weakness. No apparent aphasia, but he is mildly dysarthric. Left frontoparietal subarachnoid hemorrhage stable per CT. Working with therapies with notable improvement.  Repeat brain MRI on 8/4 shows 2 new small infarcts in the right frontal lobe.  Patient shows to have continuation of improvement in physical exam findings.  Unclear significance of these findings.  Discussed with neuro to provide comments regarding this.  >  Neuro-ICU signing off   - SBP goal of 120 - 160 mmHg and DBP goal < 100    -Hold PTA antihypertensives    -Has prn labetalol and hydralazine  - cont 10 mg rosuvastatin  > OT/PT/Speech   -Since ARU dispo delayed, asked therapies to do aggressive rehabilitation as able  > nutrition consult   -monitor oral intake, weights  - repeat TTE outpatient to ensure improvement of AR and TR (and vegetations)  - Neuro to follow-up with MRI findings    #DM2, A1c 5.3  On HSSI with adequate control after stopping dexamethasone. CTM.   - POC glucose ACHS  - High intensity insulin sliding scale    #ELENA  Need to clarify home CPAP settings.    #Leukocytosis  Found to have leukocytosis, no clear cause or signs of infection, possible 2/2 stress induced.        #Hiccups, resolved   #chronic back pain  Chronic back pain. On acetaminophen only (given frequent need for neuro checks). Hiccups resolved (suspected 2/2 dexamethasone). Will continue baclofen and prn thorazine, but may be able to decrease in coming days.   Plan:  - Thorazine 25mg q6hr prn  - Melatonin 3mg daily  - Continue Pepcid  - continue  baclofen  - acetaminophen 1000 mg every 6 hours  - hold cyclobenzaprine 10 mg 3x daily PRN  - hold PTA gabapentin 100 mg 3x daily  - hold PTA oxycodone 5 mg PRN every 6h     #allergies  - fluticasone 1 spray daily     #HTN  - Continue PTA Metoprolol succinate 25mg q daily (ok to hold if HR< 60)  - Hold PTA Losartan, could consider restarting vs discontinuing given normotensive  - Continue PTA hydrochlorothiazide      #Epistaxis   On 8/5 patient found to have epistaxis.  Physical exam shows blood coming from the right nares.  - Afrin spray as needed  - Apply pressure to the nasal passage to help with bleeding          Diet: Regular Diet Adult    DVT Prophylaxis: Enoxaparin (Lovenox) SQ  A[place]os Catheter: Not present  Fluids: None  Lines: None     Cardiac Monitoring: None  Code Status: Full Code      Clinically Significant Risk Factors              "    # Thrombocytopenia: Lowest platelets = 50 in last 2 days, will monitor for bleeding              # Obesity: Estimated body mass index is 30.71 kg/m  as calculated from the following:    Height as of this encounter: 1.88 m (6' 2\").    Weight as of this encounter: 108.5 kg (239 lb 3.2 oz).        # Financial/Environmental Concerns: none         Social Drivers of Health          Disposition Plan     Medically Ready for Discharge: Anticipated in 2-4 Days     Patient discussed and seen together with attending physician Dr. Dangelo Nguyen MD  Medicine Service, Community [facility] 3  [facility]  Securely message with [employer] (more info)  Text page via [facility] Paging/Directory   See signed in provider for up to date coverage information  ______________________________________________________________________    Interval History   No acute overnight events. Patient doing ok went outside with mother today. Had no acute concerns.  Discussed plan with patient and patients mother present at bedside.    Physical Exam   Vital Signs: Temp: 97.9  F (36.6  C) Temp src: Oral BP: 127/65 Pulse: 68   Resp: 18 SpO2: 98 % O2 Device: None (Room air)    Weight: 239 lbs 3.19 oz    GENERAL: In chair next to bed, no acute distress  RESP: CTAB  CV:  Extremities appear well-perfused, RUE swelling is stable  GI: Soft, non-distended, non tender  NEURO: Alert, Follows 2 step commands, decreased shoulder shrug and right shoulder, otherwise cranial nerves II-XII intact normal, LUE movement, no RUE movement, unable to wiggle right sided toes, Right quadricept improved to 4/5,  right sided facial droop less than prior , mild dysarthria, no dysphonia, EOMI grossly, pupils symmetric (No change in physical exam)  SKIN: Erythematous papules scattered on face c/w acne, bilateral petechiae present (improving)    Medical Decision Making       Please see A&P for additional details of medical decision making.  "     Data     I have personally reviewed the following data over the past 24 hrs:    7.5  \   11.4 (L)   / 50 (L)     N/A N/A N/A /  122 (H)   4.2 N/A 1.03 \

## 2025-08-08 NOTE — PROGRESS NOTES
"SPIRITUAL HEALTH SERVICES Progess Note  ([place]) 6A  Referral Source/Reason for Visit: Follow-up Visit per Family Request  Summary and Recommendations -    I visited with Tera and his mother Violet, offering emotional support.  Tera said he was feeling okay today but acknowledged that he has big feelings inside about all that he is going through.  Tera would like me to check in with him next Monday.    PLAN: Unit  will try to visit Tera on Monday 8/11. Spiritual Health Services remain available on request. Please place a standard consult order on Epic.    Brooke Bush  Chaplain Resident      Spiritual Health Services is available 24/7 for emergent requests and consults, either by paging the on-call  or by entering an ASAP/STAT consult in [email or web address], which will also page the on-call .    Assessment    Saw pt Bernard STORMY Chapman \"Tera\" his mother Violet    Patient/Family Understanding of Illness and Goals of Care -   Tera's mother Violet said they are waiting to receive the new medication so Tera can start treating his cancer. She expects Tera to remain admitted \"until his platelets are stable.\"    Distress and Loss -   I asked Tera how he is feeling emotionally about his illness and hospitalization. He responded, \"I'm okay.\"   Violet noted that Tera is a \"man of few words.\" I asked Tera if it is hard to put into words what he is feeling and he nodded and started to cry. Violet encouraged him that it is good to \"let the feelings out.\"  I asked Tera if he has big feelings inside about all that he is going through and he responded, \"yes.\" I validated his feelings and offered compassion and empathy.   Tera misses being home in his apartment. I asked what he misses most and he said, \"just being able to do what I want.\"    Strengths, Coping, and Resources -   Tera has the support of his two parents as well as other family and friends.  He enjoys watching tv and jude online.  Tera enjoys " having the therapy dog visit him.  I spoke with Tera about a deep breathing practice that can help when he feels overwhelmed emotionally.    Meaning, Beliefs, and Spirituality -   Tera does not have a Yarsani or spiritual practice.

## 2025-08-08 NOTE — SIGNIFICANT EVENT
RN completed assessment. Patient noted to have 0/5 strength on RUE/RLE during 1600 assessment. RN notified 6A charge RN. Charge completed bedside assessment. Patient continued to have 0/5 strengths on right side. Stroke code called. Team came to assess patient. Patient moving right side back to his baseline when provider completed assessment. Stroke code canceled.

## 2025-08-08 NOTE — PLAN OF CARE
Goal Outcome Evaluation:      Plan of Care Reviewed With: patient    Overall Patient Progress: no changeOverall Patient Progress: no change    Temp: 97.9  F (36.6  C) Temp src: Oral BP: 139/58 Pulse: 68   Resp: 18 SpO2: 98 % O2 Device: None (Room air)      Neuro: A&Ox4. R droop, R field cut. Decreased sensation to R side of body. L side 5/5, RUE 3, RLE 4, generalized weakness.   Cardiac: SR. VSS.   Respiratory: RA.  GI/: Voids spont to bathroom. No BM   Diet/appetite: Tolerating diet. Eating well.  Activity:  Up with 2 assist, cane and GB  Pain: Denies   Skin: Petechiae throughout   LDA's: PIV SL    Platelet count 50. Gave 1 bag of platelet transfusion. Magnesium replaced per protocol. K+ and Phos WNL    Plan: Continue with POC. Notify primary team with changes.

## 2025-08-08 NOTE — PLAN OF CARE
Status: Admitted 7/15 with vision changes, dizziness, confusion, and WFD. Found to have L MCA ischemic stroke and occlusion of L M1. Further work-up showed new diagnosis of metastatic lung adenocarcinoma. 8/4 MRI showed incidental 2 new R frontal infarcts.   Vitals: VSS on RA, R arm limb alert  Neuros: A&O x4. Flat. R droop, R field cut. Decreased sensation to R side of body. L side 5/5, RUE 3, RLE 4, generalized weakness.   IV: Left PIV SL  Labs/Electrolytes: redraws in the AM. Monitor platelets.   Resp: on RA, denies SOB  Diet: Regular diet. Denies nausea. Needs help ordering & setting up tray. Takes pills whole with applesauce  GI: LBM 8/8. Passing gas  : Voiding spontaneously in bathroom  Skin: Petechiae throughout.   Pain: Denies  Activity: A1-2/GB/cane  SCDs on? If no, why?: Yes  Plan: Taletrectinib to be started once prior auth received. Continue to monitor and follow PO

## 2025-08-09 ENCOUNTER — APPOINTMENT (OUTPATIENT)
Dept: PHYSICAL THERAPY | Facility: CLINIC | Age: 35
DRG: 064 | End: 2025-08-09
Payer: COMMERCIAL

## 2025-08-09 ENCOUNTER — APPOINTMENT (OUTPATIENT)
Dept: OCCUPATIONAL THERAPY | Facility: CLINIC | Age: 35
DRG: 064 | End: 2025-08-09
Payer: COMMERCIAL

## 2025-08-09 LAB
ERYTHROCYTE [DISTWIDTH] IN BLOOD BY AUTOMATED COUNT: 13.8 % (ref 10–15)
HCT VFR BLD AUTO: 36.1 % (ref 40–53)
HGB BLD-MCNC: 11.7 G/DL (ref 13.3–17.7)
LMWH PPP CHRO-ACNC: 1.05 IU/ML (ref ?–2)
MAGNESIUM SERPL-MCNC: 2.1 MG/DL (ref 1.7–2.3)
MCH RBC QN AUTO: 26.4 PG (ref 26.5–33)
MCHC RBC AUTO-ENTMCNC: 32.4 G/DL (ref 31.5–36.5)
MCV RBC AUTO: 82 FL (ref 78–100)
PHOSPHATE SERPL-MCNC: 4.1 MG/DL (ref 2.5–4.5)
PLATELET # BLD AUTO: 53 10E3/UL (ref 150–450)
POTASSIUM SERPL-SCNC: 4.2 MMOL/L (ref 3.4–5.3)
RBC # BLD AUTO: 4.43 10E6/UL (ref 4.4–5.9)
WBC # BLD AUTO: 6.8 10E3/UL (ref 4–11)

## 2025-08-09 PROCEDURE — 84132 ASSAY OF SERUM POTASSIUM: CPT | Performed by: HOSPITALIST

## 2025-08-09 PROCEDURE — 250N000013 HC RX MED GY IP 250 OP 250 PS 637

## 2025-08-09 PROCEDURE — 250N000011 HC RX IP 250 OP 636

## 2025-08-09 PROCEDURE — 85014 HEMATOCRIT: CPT

## 2025-08-09 PROCEDURE — 97116 GAIT TRAINING THERAPY: CPT | Mod: GP

## 2025-08-09 PROCEDURE — 99233 SBSQ HOSP IP/OBS HIGH 50: CPT | Mod: GC | Performed by: HOSPITALIST

## 2025-08-09 PROCEDURE — 120N000002 HC R&B MED SURG/OB UMMC

## 2025-08-09 PROCEDURE — 97530 THERAPEUTIC ACTIVITIES: CPT | Mod: GP

## 2025-08-09 PROCEDURE — 84100 ASSAY OF PHOSPHORUS: CPT | Performed by: HOSPITALIST

## 2025-08-09 PROCEDURE — 97112 NEUROMUSCULAR REEDUCATION: CPT | Mod: GO

## 2025-08-09 PROCEDURE — 83735 ASSAY OF MAGNESIUM: CPT | Performed by: HOSPITALIST

## 2025-08-09 PROCEDURE — 250N000013 HC RX MED GY IP 250 OP 250 PS 637: Performed by: STUDENT IN AN ORGANIZED HEALTH CARE EDUCATION/TRAINING PROGRAM

## 2025-08-09 PROCEDURE — 99233 SBSQ HOSP IP/OBS HIGH 50: CPT | Performed by: INTERNAL MEDICINE

## 2025-08-09 PROCEDURE — 36415 COLL VENOUS BLD VENIPUNCTURE: CPT | Performed by: HOSPITALIST

## 2025-08-09 PROCEDURE — 97110 THERAPEUTIC EXERCISES: CPT | Mod: GO

## 2025-08-09 PROCEDURE — 85520 HEPARIN ASSAY: CPT | Performed by: HOSPITALIST

## 2025-08-09 RX ORDER — ONDANSETRON 4 MG/1
4 TABLET, FILM COATED ORAL EVERY 6 HOURS PRN
Status: DISCONTINUED | OUTPATIENT
Start: 2025-08-09 | End: 2025-08-13

## 2025-08-09 RX ORDER — ENOXAPARIN SODIUM 100 MG/ML
80 INJECTION SUBCUTANEOUS EVERY 12 HOURS
Status: DISCONTINUED | OUTPATIENT
Start: 2025-08-09 | End: 2025-08-14

## 2025-08-09 RX ORDER — ONDANSETRON 2 MG/ML
4 INJECTION INTRAMUSCULAR; INTRAVENOUS DAILY
Status: DISCONTINUED | OUTPATIENT
Start: 2025-08-13 | End: 2025-08-13

## 2025-08-09 RX ORDER — ONDANSETRON 4 MG/1
8 TABLET, ORALLY DISINTEGRATING ORAL DAILY
Status: DISCONTINUED | OUTPATIENT
Start: 2025-08-13 | End: 2025-08-13

## 2025-08-09 RX ADMIN — ACETAMINOPHEN 1000 MG: 500 TABLET ORAL at 16:45

## 2025-08-09 RX ADMIN — SENNOSIDES AND DOCUSATE SODIUM 1 TABLET: 50; 8.6 TABLET ORAL at 19:50

## 2025-08-09 RX ADMIN — Medication 2.5 MG: at 19:50

## 2025-08-09 RX ADMIN — Medication 5 MG: at 08:44

## 2025-08-09 RX ADMIN — ROSUVASTATIN CALCIUM 10 MG: 10 TABLET, FILM COATED ORAL at 07:48

## 2025-08-09 RX ADMIN — Medication 5 MG: at 19:51

## 2025-08-09 RX ADMIN — METOPROLOL SUCCINATE 25 MG: 25 TABLET, EXTENDED RELEASE ORAL at 07:48

## 2025-08-09 RX ADMIN — HYDROCHLOROTHIAZIDE 25 MG: 12.5 TABLET ORAL at 07:48

## 2025-08-09 RX ADMIN — Medication 5 MG: at 14:43

## 2025-08-09 RX ADMIN — FLUTICASONE PROPIONATE 1 SPRAY: 50 SPRAY, METERED NASAL at 07:49

## 2025-08-09 RX ADMIN — Medication 2.5 MG: at 08:44

## 2025-08-09 RX ADMIN — Medication 2.5 MG: at 14:43

## 2025-08-09 RX ADMIN — ACETAMINOPHEN 1000 MG: 500 TABLET ORAL at 21:35

## 2025-08-09 RX ADMIN — FAMOTIDINE 10 MG: 10 TABLET ORAL at 19:50

## 2025-08-09 RX ADMIN — ACETAMINOPHEN 1000 MG: 500 TABLET ORAL at 04:21

## 2025-08-09 RX ADMIN — ENOXAPARIN SODIUM 80 MG: 100 INJECTION SUBCUTANEOUS at 21:34

## 2025-08-09 RX ADMIN — SENNOSIDES AND DOCUSATE SODIUM 1 TABLET: 50; 8.6 TABLET ORAL at 07:48

## 2025-08-09 RX ADMIN — FAMOTIDINE 10 MG: 10 TABLET ORAL at 07:48

## 2025-08-09 RX ADMIN — ENOXAPARIN SODIUM 100 MG: 100 INJECTION SUBCUTANEOUS at 10:08

## 2025-08-09 ASSESSMENT — ACTIVITIES OF DAILY LIVING (ADL)
ADLS_ACUITY_SCORE: 41
ADLS_ACUITY_SCORE: 40
ADLS_ACUITY_SCORE: 38
ADLS_ACUITY_SCORE: 40
ADLS_ACUITY_SCORE: 38
ADLS_ACUITY_SCORE: 41
ADLS_ACUITY_SCORE: 40
ADLS_ACUITY_SCORE: 38
ADLS_ACUITY_SCORE: 38
ADLS_ACUITY_SCORE: 41
ADLS_ACUITY_SCORE: 40
ADLS_ACUITY_SCORE: 40
ADLS_ACUITY_SCORE: 38
ADLS_ACUITY_SCORE: 41
ADLS_ACUITY_SCORE: 40
ADLS_ACUITY_SCORE: 40
ADLS_ACUITY_SCORE: 38
ADLS_ACUITY_SCORE: 40
ADLS_ACUITY_SCORE: 38
ADLS_ACUITY_SCORE: 38

## 2025-08-09 NOTE — PROGRESS NOTES
Solid Tumor Oncology Consult Service  Progress Note   Date of Service: 08/09/2025  Patient: Bernard Chapman  MRN: [id number]  Admission Date: 7/15/2025  Hospital Day # 25  Cancer Diagnosis: Metastatic lung adenocarcinoma   Primary Outpatient Oncologist: MARKOS   Current Treatment Plan: Taletrectinib      Interim history:    I have filled out the paperwork for 30 days of free drug (taletrectinib) from [facility].  It will be shipped to his mother's house (hospital cannot take delivery).  The patient and his mother are aware of this plan. They should receive within 24 hours. If he receives this drug they may bring it in from home and we will start it ASAP at a dose of 600 mg daily on an empty stomach (3 x 200 mg capsules).    We await the arrival of the 30 day free supply of taletectinib to the patient's mother's residence.  If it becomes available, he should start the medicine today.  The recommendation is to take Zofran 8 mg oral pill before the taletrectinib, although this is not mandatory.  Zofran order is in place.    If he takes the drug today, I would recommend continuing therapy tomorrow morning before breakfast and then continuing a qAM dosing schedule.    Assessment & Plan:   Bernard Chapman is a 35 year old year old male with a past medical history of HTN, DM, ELENA, PE/DVT 6/22/25 (previously on rivaroxaban) who presented 7/15/2025 with new neurological symptoms and found acute multifocal ischemic strokes, partial occlusive L MCA thrombus, L frontal SAH, and in setting of TCP c/f ITP vs catastrophic antiphospholipid syndrome (CAPS) vs malignancy s/p PLEX x7/16. CT CAP 7/16/2025 revealing LAD of hilar, mediastinal, and paraesophageal LNs and spiculated-appearing nodule of R apical lung s/p EBUS-TBNA 7/18 revealing metastatic lung adenocarcinoma, NGS revealed ROS1 rearrangement - planning to initiate treatment with taletrectinib as soon as possible while inpatient.      # New metastatic lung  adenocarcinoma, ROS1 rearrangement   # Thrombocytopenia  # Anemia  CT CAP 7/16/2025 revealing a spiculated appearing nodule within R lung apex ~11 mm, multiple enlarged mediastinal, hilar and paraesophageal lymph nodes, along with 11 mm soft tissue density in the left retroperitoneum. Underwent EBUS-TBNA 7/18 revealing metastatic lung adenocarcinoma, + for CK7 and TTF-1. MRI brain without evidence of metastasis. Obtained PET on 7/26. Reviewed with radiologist which showed metastatic disease in L scapula, L retroperitoneum, R adrenal and L2. Areas are small enough where radiation is not required at this time. PD-L1 TPS at 40% consistent with low PD-L1 expression. Per d/w benign hematology, given severe thrombocytopenia requiring transfusions and hypercoagulable state with course complicated by multifocal strokes and PE 2/2 malignancy, plan to initiate treatment while inpatient with taletrectinib as soon as approved given ROS1 rearrangement found on NGS. Discussed with Tera and his parents 8/2 that response rate to this medication is 85-90%, with PFS measured in years on average.   - Working to obtain Taletrectinib. Script send to approved pharmacy. Plan to start as soon as able. Patient will be unable to leave the hospital any time soon given daily platelet transfusion needs.  - Follow up [facility], pending  - Lesions are too small for radiation at this time   - Outpatient oncology referral placed, follow for scheduling       # H/o DVT/PE  Presented 6/22/25 with chest pain and SOB and found to have PE and lower extremity DVTs, he was placed on apixaban though had an allergic reaction and rotated to Xarelto.   - Agree with AC (Lovenox) per primary team and hematology        _____________________________________________________    Subjective & Interval History:    No acute events noted overnight.  Overall Tera is feeling ok today. No new symptoms noted. Brighter spirits today. Discussed support group for other  "patients who have this lung cancer diagnosis.   Discussed plan of care and goals while here in the hospital with patient and father at bedside. All questions answered at bedside.     Physical Exam:    Blood pressure 124/57, pulse 60, temperature 97.8  F (36.6  C), temperature source Oral, resp. rate 16, height 1.88 m (6' 2\"), weight 108.5 kg (239 lb 3.2 oz), SpO2 99%.    Constitutional: Sitting in chair, NAD  HEENT: NCAT  Respiratory: Breathing comfortably on room air  Neuro: Alert, answers questions appropriately. Moves extremities spontaneously.  Psych: Appropriate affect     Labs & Studies: I personally reviewed the following studies:  Oncology fusion gene NGS, 7/22/25:  RESULTS See result below Abnormal    Fusion Event: POSITIVE      INTERPRETATION    This sample is positive for a ROS1 gene rearrangement (SDC4::ROS1)      The detected fusion event joins exon 2 of SDC4 (5' partner) with exon 32 of ROS1 (3' partner), corresponding to approximately 78% of the total reads analyzed (major isoform).  Two additional isoforms corresponding to [id number] and YEV1lvka8::[id number] fusions, respectively, were also detected (corresponding to less than 20% of the total reads).      ROS1 gene rearrangements have been reported in diverse cancer types including non-small-cell lung cancer (NSCLC), and with a notably higher prevalence in lung adenocarcinoma. Importantly, FDA-approved tyrosine kinase inhibitors have demonstrated efficacy in treatment of ROS1-rearranged NSCLC (PMID: 12721685). However it is not clear if coexistence of more than two types of ROS1 rearrangements affects therapeutic response to TKI therapy. A recent case report (PMID: 48193978) showed that coexistence of DOU3fzgj6::NRH1duef 34 and SDC4 exon2::ROS1 exon35 fusions showed suboptimal response to crizotinib therapy when compared to that seen with the NCY3iayo5::QQC6rgvn 32 fusion alone.      Molecular Description  The following chromosomal " coordinates describe the exon::exon junction(s) of the major isoform fusion partner in the most prevalent cDNA transcript for the reported gene fusion event:     [id number],chr6:[id number]  chr20:[id number],chr6:[id number]  chr20:[id number],chr6:[id number]         This assay detects gene fusion events based on the assessment of cDNA; the reported coordinates describe specific exon-exon joins in the detected transcripts for the positive gene fusion target(s).  The reported coordinates do not specifically describe the underlying genomic (DNA) breakpoint, but do describe a reference interval in which the genomic alteration occurs.      ROUTINE LABS (Last four results):  CMP  Recent Labs   Lab 08/09/25  0713 08/08/25  1552 08/08/25  0824 08/08/25  0631 08/07/25  0635 08/06/25  1228 08/06/25  0843 08/06/25  0725 08/05/25  0856 08/05/25  0704   POTASSIUM 4.2  --   --  4.2 4.2  --   --  4.2  --  4.4   GLC  --  122* 108*  --   --  91 102*  --    < >  --    CR  --   --   --  1.03  --   --   --   --   --  0.95   GFRESTIMATED  --   --   --  >90  --   --   --   --   --  >90   MAG 2.1  --   --  2.0 2.0  --   --  1.9  --  2.1   PHOS 4.1  --   --  4.1 4.0  --   --  4.1  --  4.1    < > = values in this interval not displayed.     CBC  Recent Labs   Lab 08/09/25  0713 08/08/25  0631 08/07/25  0635 08/06/25  1132 08/06/25  0725   WBC 6.8 7.5 8.0  --  9.4   RBC 4.43 4.32* 4.29*  --  4.39*   HGB 11.7* 11.4* 11.5*  --  11.8*   HCT 36.1* 35.1* 36.0*  --  36.4*   MCV 82 81 84 83 83   MCH 26.4* 26.4* 26.8  --  26.9   [facility] 32.4 32.5 31.9  --  32.4   RDW 13.8 14.0 14.0  --  14.1   PLT 53* 50* 63* 47* 48*     INR  No lab results found in last 7 days.    Medications list for reference:  Current Facility-Administered Medications   Medication Dose Route Frequency Provider Last Rate Last Admin    acetaminophen (TYLENOL) tablet 1,000 mg  1,000 mg Oral or Feeding Tube Q6H Dee Shah MD   1,000 mg at 08/09/25 0421    Baclofen (LIORESAL) tablet 5  mg  5 mg Oral TID Luisito Aden MD   5 mg at 08/09/25 0844    And    baclofen (LIORESAL) half-tablet 2.5 mg  2.5 mg Oral TID Luisito Aden MD   2.5 mg at 08/09/25 0844    bisacodyl (DULCOLAX) suppository 10 mg  10 mg Rectal Daily PRN Dee Shah MD        chlorhexidine (HIBICLENS) 4 % solution   Topical Daily PRN Dee Shah MD        chlorproMAZINE (THORAZINE) tablet 25 mg  25 mg Oral or Feeding Tube Q6H PRN Yamile Gutierrez APRN CNP   25 mg at 07/26/25 2009    cyclobenzaprine (FLEXERIL) tablet 10 mg  10 mg Oral TID PRN Marjan Michel MD        glucose gel 15-30 g  15-30 g Oral Q15 Min PRN Dee Shah MD        Or    dextrose 50 % injection 25-50 mL  25-50 mL Intravenous Q15 Min PRN Dee Shah MD        Or    glucagon injection 1 mg  1 mg Subcutaneous Q15 Min PRN Dee Shah MD        diphenhydrAMINE (BENADRYL) capsule 25 mg  25 mg Oral or Feeding Tube Q6H PRN Dee Shah MD   25 mg at 07/26/25 1228    Or    diphenhydrAMINE (BENADRYL) injection 25 mg  25 mg Intravenous Q6H PRN Dee Shah MD        enoxaparin ANTICOAGULANT (LOVENOX) injection 100 mg  100 mg Subcutaneous Q12H Cristine Nguyen MD   100 mg at 08/09/25 1008    famotidine (PEPCID) tablet 10 mg  10 mg Oral BID Dee Shah MD   10 mg at 08/09/25 0748    fluticasone (FLONASE) 50 MCG/ACT spray 1 spray  1 spray Both Nostrils Daily Dee Shah MD   1 spray at 08/09/25 0749    gabapentin (NEURONTIN) capsule 100 mg  100 mg Oral TID PRN Marjan Michel MD        hydrALAZINE (APRESOLINE) injection 10 mg  10 mg Intravenous Q30 Min PRN Dee Shah MD   10 mg at 07/23/25 0203    hydroCHLOROthiazide tablet 25 mg  25 mg Oral Daily Marjan Michel MD   25 mg at 08/09/25 0748    labetalol (NORMODYNE/TRANDATE) injection 10 mg  10 mg Intravenous Q10 Min PRN Dee Shah MD        [Held by provider] losartan (COZAAR) tablet 100 mg  100 mg Oral Daily Dee Shah MD        magnesium hydroxide (MILK OF MAGNESIA)  suspension 30 mL  30 mL Oral or Feeding Tube Daily PRN Dee Shah MD        melatonin tablet 3 mg  3 mg Oral At Bedtime PRN Dee Shah MD   3 mg at 07/22/25 0123    metoprolol succinate ER (TOPROL XL) 24 hr tablet 25 mg  25 mg Oral Daily Francisco Prather DO   25 mg at 08/09/25 0748    naloxone (NARCAN) injection 0.2 mg  0.2 mg Intravenous Q2 Min PRN Carine Matt MD        Or    naloxone (NARCAN) injection 0.4 mg  0.4 mg Intravenous Q2 Min PRN Carine Matt MD        Or    naloxone (NARCAN) injection 0.2 mg  0.2 mg Intramuscular Q2 Min PRN Carine Matt MD        Or    naloxone (NARCAN) injection 0.4 mg  0.4 mg Intramuscular Q2 Min PRN Carine Matt MD        ondansetron (ZOFRAN ODT) ODT tab 4 mg  4 mg Oral Q6H PRN Dee Shah MD        Or    ondansetron (ZOFRAN) injection 4 mg  4 mg Intravenous Q6H PRN Dee Shah MD        oxyCODONE (ROXICODONE) tablet 5 mg  5 mg Oral Q6H PRN Marjan Michel MD        oxymetazoline (AFRIN) 0.05 % spray 2 spray  2 spray Both Nostrils BID PRN Jabier Hill MD        polyethylene glycol (MIRALAX) Packet 17 g  17 g Oral or Feeding Tube Daily Dee Shah MD   17 g at 07/29/25 0835    prochlorperazine (COMPAZINE) injection 10 mg  10 mg Intravenous Q6H PRN Dee Shah MD        Or    prochlorperazine (COMPAZINE) tablet 10 mg  10 mg Oral or Feeding Tube Q6H PRN Dee Shah MD        rosuvastatin (CRESTOR) tablet 10 mg  10 mg Oral or Feeding Tube Daily Dee Shah MD   10 mg at 08/09/25 0748    senna-docusate (SENOKOT-S/PERICOLACE) 8.6-50 MG per tablet 1-2 tablet  1-2 tablet Oral or Feeding Tube BID Dee Shah MD   1 tablet at 08/09/25 0748    simethicone (MYLICON) chewable half-tab 40 mg  40 mg Oral Q6H PRN Dee Shah MD   40 mg at 07/25/25 1957    sodium chloride (PF) 0.9% PF flush 3 mL  3 mL Intracatheter Q8H MOHINDER Dee Shah MD   3 mL at 08/09/25 0754    sodium chloride (PF) 0.9% PF flush 3 mL  3 mL  Intracatheter q1 min Dee Gallardo MD   3 mL at 07/31/25 7168

## 2025-08-09 NOTE — PLAN OF CARE
Status: Admitted 7/15 with vision changes, dizziness, confusion, and difficulty finding words.Further work-up showed new diagnosis of metastatic lung adenocarcinoma. 8/4 MRI showed incidental 2 new R frontal infarcts.   VS: Stable on RA, afebrile  Neuro: A&O x4. Flat. R droop, R field cut. Decreased sensation to R side of body. L side 5/5, RUE 3, RLE 4, generalized weakness.  Pain/Nausea: Denies pain & nausea  Diet: Regular. Needs help ordering & setting up tray. Takes pills whole with applesauce   IV Access: LPIV  GI/: Voids spontaneously, LBM 08/08  Skin: Petechiae all over  Mobility: Assist of 1 GB, cane. Walked ruiz with staff.   Plan: When taletrectinib/ibtrozi medicine arrives from family please page oncology team for order for medication and notify pharmacy

## 2025-08-09 NOTE — PHARMACY-ANTICOAGULATION SERVICE
Clinical Pharmacy Note- Enoxaparin Anti-Xa Evaluation for ADULT Patients    Date of Service 2025  Patient's  1990   Patient's age:  35 year old  Patient's Weight used for enoxaparin dosin.6 kg (228 lb 6.4 oz)  Patient's BMI: Body mass index is 29.32 kg/m .   Enoxaparin Indication: DVT/PE treatment  Goal LMWH anti-Xa level for ADULT patients: 0.5-1 IU/mL (1 mg/kg or 0.75 mg/kg BID dose)  Current Enoxaparin Regimen: 1 mg/kg subcutaneous Q 12 hours     Creatinine for last 3 days:   Creatinine   Date Value Ref Range Status   2025 1.03 0.67 - 1.17 mg/dL Final   2025 0.95 0.67 - 1.17 mg/dL Final   2025 0.88 0.67 - 1.17 mg/dL Final      Current estimated CrCL:  Serum creatinine: 1.03 mg/dL 25 0631  Estimated creatinine clearance: 128.6 mL/min     Platelet count for last 3 days:   Platelet Count   Date Value Ref Range Status   2025 53 (L) 150 - 450 10e3/uL Final   2025 50 (L) 150 - 450 10e3/uL Final   2025 63 (L) 150 - 450 10e3/uL Final      Recent Enoxaparin anti-Xa Levels (past 3 days):   Recent Labs     25  1412   ALMWH 1.05   ]    ASSESSMENT OF LEVELS AND CURRENT REGIMEN:   1) Enoxaparin anti-XA level was drawn 4 hours post 8/9 dose at steady state.    2) Current LMWH anti-Xa peak level is: Supra-therapeutic (HIGH)    3) Renal Function:  Scr changed > 50% in last 72 hours? No  Urine Output: Unable to determine    4) Platelet Counts have been assessed and are: Stable    RECOMMENDATIONS:      Recommended Enoxaparin Anti-Xa Dose Adjustments  VTE Treatment (1 mg/kg or 0.75 mg/kg SQ Q12h dosing)   4h peak Anti-Xa (IU/ml) Hold next dose? Dose change Repeat Anti-Xa levels   <0.35 No INCREASE   25% 4 hours after the dose once at steady state   0.35-0.49 No INCREASE    10-15%    0.5-1 No No change Repeat only if necessary   1.01-1.59 No DECREASE   20% 4 hours after the dose once at steady state   1.6-2 x 3 h DECREASE   30%    >2 All further doses should be  "held and anti-Xa levels   measured every 12 hours until it is <0.5 units/ml.    Decrease previous dose by 40% when restarted.   Non-Standard Treatment (1.5 mg/kg SQ Q24h) AND VTE Prophylaxis Dosing   If LMWH anti-Xa levels are outside goal range, adjust dose up/down proportionally by percentage as pharmacokinetics are linear     1) Enoxaparin Regimen/Dose Plan: Decrease dose to 80mg q12h  2) Recheck Enoxaparin anti-Xa levels: 4 hours after the 4th dose of the \"new\" regimen:  8/11      The pharmacist will continue to monitor and follow enoxaparin LMWH anti-Xa levels as needed.      Please contact pharmacy if enoxaparin needs to be held for a procedure or if goal levels change.    Maria Morgan [facility] [place], Pharm.D    "

## 2025-08-09 NOTE — PROGRESS NOTES
[facility]    Progress Note - Medicine Service, [facility] TEAM 3       Date of Admission:  7/15/2025    Assessment & Plan   Bernard Chapman is a 35 year old male with a past medical history of hypertension, diabetes, ELENA, PE/DVT (on rivaroxaban) admitted on 7/15/2025 for lethargy, N/V, and blurry vision found to have multifocal acute CVA in L MCA , R parietal and R frontal centrum semiovale. Initially concern for TTP or catastrophic APS, however further work up notable for new diagnosis of metastatic lung adenocarcinoma with likely malignancy-related hypercoagulability.    Today:  - Taletrectinib through onc;  30 days of free drug (taletrectinib) from [employer] to be shipped to mother's house; will start it ASAP at a dose of 600 mg daily on an empty stomach (3 x 200 mg capsules).   - cont daily CBC      #New metastatic lung adenocarcinoma, ROS1 rearrangement   #Right lung nodule  #Multiple enlarged mediastinal, paraesophageal, and hilar lymph nodes  Malignancy work up initiated on admission due to significant hypercoagulability. CT CAP significant for [id number] spiculated density in R lung apex, 9mm pulmonary nodule in R lung base, L thyroid nodule, paratracheal LAD, splenic infarct, bilateral renal infarcts, and L retroperitoneal nodule. Underwent FNA of lymph node which was positive for lung adenocarcinoma (resulted 7/22).  PDL-1 resulted with low expression (40%). PET 7/26 with distant metastases and MRI brain showing no signs of metastasis.  Currently pending prior authorization and patient is planned to start in patient therapy once approved..  -Oncology consulted, appreciate recs   - NGS; positive for a ROS1 gene rearrangement (SDC4::ROS1)     - Guardant 360; T p53 mutation and MITCHELL   - Due to ongoing transfusion needs, ARU not feasible.   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 TTE with mobile echodensities on tricuspid and aortic valve. Initial concern for TTP or catastrophic APS, however work up ultimately significant for new diagnosis of metastatic lung cancer. Hypercoagulable state presumed to be most likely 2/2 malignancy. Started on heparin gtt 7/17, but briefly held for 7/18 endobronchial needle aspiration of lymph nodes. Stroke code called 7/19 for worsening R-sided hemiparesis and hemianopia. Repeat CTA with new focal high-grade stenosis of other part of left MCA. Heparin gtt was stopped during code stroke, but restarting (heparin gtt + levophed) led to 10/10 headache. CTH with small left frontal SAH. Brain MRI with new L basal ganglia stroke. Heparin gtt resumed, and repeat CTH stable. Switched to therapeutic Lovenox 7/22. CTH still stable 7/23. Declining fibrinogen, so received cryoprecipitate 7/25. Concern for continued consumptive process, but on therapeutic Lovenox. PLT downtrending, so receiving transfusions (1 unit 7/26, 1 unit 7/27, 1 unit 7/28, 1 unit 7/30,1 unit 8/1, 1 unit 8/3, 1 unit 8/4, 1 unit 8/5, 1 unit 8/6, 1 unit 8/8). No new symptoms. CTM.  - Hematology consulted   -Transfuse if   -Hgb<7  -PLT<50k  - SC Lovenox 135 mg bid---> 100 mg    -qdaily CBC      # Maculopapular rash on face, chest, bilateral upper extremities, improved  # Numerous palpable purpura across the scalp and lower extremities   #  Petechiae on bilateral lower extremities  C/f palpable purpura, so s/p L leg s/p punch biopsy with dermatology: rupture folliculitis. No definite features of cutaneous small vessel vasculitis or thrombotic vasculopathy. New rash noted 7/26 with  flushing of face/chest after PET scan. Also scattered papules of face and upper extremities (in addition to petechiae of BLE). Not itchy and no signs of respiratory distress, but suspected  secondary to contrast from PET scan, .  Rash appeared several hours after unit of platelets, less likely due to a transfusion reaction. photos present in chart. The following days, papules are consistent with his usual acne. No diffuse erythema. Some c/f SVC syndrome given distrubution of flushing and positional nature (normally HOB elevated when asleep, so PET was first time that he had prolonged flat/supine position). CTM.   Plan:  - Benadryl as needed for itching  - Chlorhexidine wash  - Will continue to monitor    #Multifocal acute ischemic strokes, in L MCA territory and new L basal ganglia stroke; Increasing mass effect on L lateral ventricle by L MCA infarction  #Subtotal occlusive thrombus in left M1   #Left frontoparietal SAH (7/19)  #Hypertension chronic  #HLD, chronic  Etiology most likely hypercoagulability d/t malignancy. Emboli stemming from tricuspid and aortic valves, appreciated on TTE 7/16. He continues to have R facial droop, extremity weakness. No apparent aphasia, but he is mildly dysarthric. Left frontoparietal subarachnoid hemorrhage stable per CT. Working with therapies with notable improvement.  Repeat brain MRI on 8/4 shows 2 new small infarcts in the right frontal lobe.  Patient shows to have continuation of improvement in physical exam findings.  Unclear significance of these findings.  Discussed with neuro to provide comments regarding this.  > Neuro-ICU signing off   - SBP goal of 120 - 160 mmHg and DBP goal < 100    -Hold PTA antihypertensives    -Has prn  labetalol and hydralazine  - cont 10 mg rosuvastatin  > OT/PT/Speech   -Since ARU dispo delayed, asked therapies to do aggressive rehabilitation as able  > nutrition consult   -monitor oral intake, weights  - repeat TTE outpatient to ensure improvement of AR and TR (and vegetations)  - Neuro to follow-up with MRI findings    #DM2, A1c 5.3  On HSSI with adequate control after stopping dexamethasone. CTM.   - POC glucose ACHS  - High intensity insulin sliding scale    #ELENA  Need to clarify home CPAP settings.    #Leukocytosis  Found to have leukocytosis, no clear cause or signs of infection, possible 2/2 stress induced.        #Hiccups, resolved   #chronic back pain  Chronic back pain. On acetaminophen only (given frequent need for neuro checks). Hiccups resolved (suspected 2/2 dexamethasone). Will continue baclofen and prn thorazine, but may be able to decrease in coming days.   Plan:  - Thorazine 25mg q6hr prn  - Melatonin 3mg daily  - Continue Pepcid  - continue  baclofen  - acetaminophen 1000 mg every 6 hours  - hold cyclobenzaprine 10 mg 3x daily PRN  - hold PTA gabapentin 100 mg 3x daily  - hold PTA oxycodone 5 mg PRN every 6h     #allergies  - fluticasone 1 spray daily     #HTN  - Continue PTA Metoprolol succinate 25mg q daily (ok to hold if HR< 60)  - Hold PTA Losartan, could consider restarting vs discontinuing given normotensive  - Continue PTA hydrochlorothiazide      #Epistaxis   On 8/5 patient found to have epistaxis.  Physical exam shows blood coming from the right nares.  - Afrin spray as needed  - Apply pressure to the nasal passage to help with bleeding          Diet: Regular Diet Adult    DVT Prophylaxis: Enoxaparin (Lovenox) SQ  Avalos Catheter: Not present  Fluids: None  Lines: None     Cardiac Monitoring: None  Code Status: Full Code      Clinically Significant Risk Factors                 # Thrombocytopenia: Lowest platelets = 50 in last 2 days, will monitor for bleeding              # Obesity:  "Estimated body mass index is 30.71 kg/m  as calculated from the following:    Height as of this encounter: 1.88 m (6' 2\").    Weight as of this encounter: 108.5 kg (239 lb 3.2 oz).        # Financial/Environmental Concerns: none         Social Drivers of Health          Disposition Plan     Medically Ready for Discharge: Anticipated in 2-4 Days     Patient discussed and seen together with attending physician Dr. Dangelo Nguyen MD  Medicine Service, [facility] TEAM 3  [facility]  Securely message with [employer] (more info)  Text page via [facility] Paging/Directory   See signed in provider for up to date coverage information  ______________________________________________________________________    Interval History   No acute overnight events. Patient doing ok. States that he took a shower. Discussed plan with patient and father at bedside. Patient and father expressed gratitude to the oncology team for working hard to provide them the medications.     Physical Exam   Vital Signs: Temp: 97.8  F (36.6  C) Temp src: Oral BP: 124/57 Pulse: 60   Resp: 16 SpO2: 99 % O2 Device: None (Room air)    Weight: 239 lbs 3.19 oz    GENERAL: In chair next to bed, no acute distress  RESP: CTAB  CV:  Extremities appear well-perfused, RUE swelling is stable  GI: Soft, non-distended, non tender  NEURO: Alert, Follows 2 step commands, decreased shoulder shrug and right shoulder, otherwise cranial nerves II-XII intact normal, LUE movement, no RUE movement, unable to wiggle right sided toes, Right quadricept improved to 4/5,  right sided facial droop less than prior , mild dysarthria, no dysphonia, EOMI grossly, pupils symmetric (No change in physical exam)  SKIN: Erythematous papules scattered on face c/w acne, bilateral petechiae present (improving)    Medical Decision Making       Please see A&P for additional details of medical decision making.      Data     I have personally reviewed the " following data over the past 24 hrs:    6.8  \   11.7 (L)   / 53 (L)     N/A N/A N/A /  122 (H)   4.2 N/A N/A \

## 2025-08-09 NOTE — PLAN OF CARE
Assumed care 0700 to 1500.  Goal Outcome Evaluation:    Plan of Care Reviewed With: patient  Overall Patient Progress: no changeOverall Patient Progress: no change     No acute events during shift. Pt VSS and mentation and neuros unchanged from prervious shifts. Denies pain, refused scheduled tylenol. L side 5/5, RUE 3, RLE 4, generalized weakness. R sided weakness managed with support boots when walking. Walked with PT X2 with de león and gait assist. Reg diet with good appetite. K ,mag, phos stable, recheck in AM. Platelets 53, no transfusion needed today. Pt discharge pending when Taletectinib available, pt family said med will likely arrived tomorrow.

## 2025-08-09 NOTE — PLAN OF CARE
Goal Outcome Evaluation:     Plan of Care Reviewed With: patient    Overall Patient Progress: no change         Outcome Evaluation:     Status: Admitted 7/15 with vision changes, dizziness, confusion, and difficulty finding words.Further work-up showed new diagnosis of metastatic lung adenocarcinoma. 8/4 MRI showed incidental 2 new R frontal infarcts.   VS: Stable on RA, afebrile  Neuro: A&O x4. Flat. R droop, R field cut. Decreased sensation to R side of body. L side 5/5, RUE 3, RLE 4, generalized weakness.  Pain/Nausea: Denies pain & nausea  Diet: Regular. Needs help ordering & setting up tray. Takes pills whole with applesauce   IV Access: LPIV  GI/: Voids spontaneously, LBM 08/08  Skin: Petechiae all over  Mobility: Assist of 1 GB, cane  Plan: Continue POC

## 2025-08-10 ENCOUNTER — APPOINTMENT (OUTPATIENT)
Dept: OCCUPATIONAL THERAPY | Facility: CLINIC | Age: 35
DRG: 064 | End: 2025-08-10
Payer: COMMERCIAL

## 2025-08-10 ENCOUNTER — APPOINTMENT (OUTPATIENT)
Dept: PHYSICAL THERAPY | Facility: CLINIC | Age: 35
DRG: 064 | End: 2025-08-10
Payer: COMMERCIAL

## 2025-08-10 LAB
ALBUMIN SERPL BCG-MCNC: 4 G/DL (ref 3.5–5.2)
ALP SERPL-CCNC: 69 U/L (ref 40–150)
ALT SERPL W P-5'-P-CCNC: 28 U/L (ref 0–70)
ANION GAP SERPL CALCULATED.3IONS-SCNC: 15 MMOL/L (ref 7–15)
AST SERPL W P-5'-P-CCNC: 25 U/L (ref 0–45)
BILIRUB SERPL-MCNC: 0.6 MG/DL
BLD PROD TYP BPU: NORMAL
BLOOD COMPONENT TYPE: NORMAL
BUN SERPL-MCNC: 28.4 MG/DL (ref 6–20)
CALCIUM SERPL-MCNC: 9.7 MG/DL (ref 8.8–10.4)
CHLORIDE SERPL-SCNC: 104 MMOL/L (ref 98–107)
CODING SYSTEM: NORMAL
CREAT SERPL-MCNC: 1.06 MG/DL (ref 0.67–1.17)
EGFRCR SERPLBLD CKD-EPI 2021: >90 ML/MIN/1.73M2
ELLIPTOCYTES BLD QL SMEAR: SLIGHT
ERYTHROCYTE [DISTWIDTH] IN BLOOD BY AUTOMATED COUNT: 13.8 % (ref 10–15)
GLUCOSE SERPL-MCNC: 97 MG/DL (ref 70–99)
HCO3 SERPL-SCNC: 22 MMOL/L (ref 22–29)
HCT VFR BLD AUTO: 35.1 % (ref 40–53)
HGB BLD-MCNC: 11.6 G/DL (ref 13.3–17.7)
ISSUE DATE AND TIME: NORMAL
MAGNESIUM SERPL-MCNC: 2 MG/DL (ref 1.7–2.3)
MCH RBC QN AUTO: 26.4 PG (ref 26.5–33)
MCHC RBC AUTO-ENTMCNC: 33 G/DL (ref 31.5–36.5)
MCV RBC AUTO: 80 FL (ref 78–100)
PHOSPHATE SERPL-MCNC: 4.6 MG/DL (ref 2.5–4.5)
PLAT MORPH BLD: ABNORMAL
PLATELET # BLD AUTO: 44 10E3/UL (ref 150–450)
POTASSIUM SERPL-SCNC: 4.1 MMOL/L (ref 3.4–5.3)
POTASSIUM SERPL-SCNC: 4.2 MMOL/L (ref 3.4–5.3)
PROT SERPL-MCNC: 6.9 G/DL (ref 6.4–8.3)
RBC # BLD AUTO: 4.39 10E6/UL (ref 4.4–5.9)
RBC MORPH BLD: ABNORMAL
SODIUM SERPL-SCNC: 141 MMOL/L (ref 135–145)
UNIT ABO/RH: NORMAL
UNIT NUMBER: NORMAL
UNIT STATUS: NORMAL
UNIT TYPE ISBT: 6200
WBC # BLD AUTO: 7 10E3/UL (ref 4–11)

## 2025-08-10 PROCEDURE — 120N000002 HC R&B MED SURG/OB UMMC

## 2025-08-10 PROCEDURE — 999N000128 HC STATISTIC PERIPHERAL IV START W/O US GUIDANCE

## 2025-08-10 PROCEDURE — 250N000013 HC RX MED GY IP 250 OP 250 PS 637

## 2025-08-10 PROCEDURE — 97110 THERAPEUTIC EXERCISES: CPT | Mod: GP

## 2025-08-10 PROCEDURE — 85027 COMPLETE CBC AUTOMATED: CPT

## 2025-08-10 PROCEDURE — 84132 ASSAY OF SERUM POTASSIUM: CPT | Performed by: INTERNAL MEDICINE

## 2025-08-10 PROCEDURE — 250N000013 HC RX MED GY IP 250 OP 250 PS 637: Performed by: STUDENT IN AN ORGANIZED HEALTH CARE EDUCATION/TRAINING PROGRAM

## 2025-08-10 PROCEDURE — 83735 ASSAY OF MAGNESIUM: CPT | Performed by: INTERNAL MEDICINE

## 2025-08-10 PROCEDURE — 84100 ASSAY OF PHOSPHORUS: CPT | Performed by: INTERNAL MEDICINE

## 2025-08-10 PROCEDURE — 97535 SELF CARE MNGMENT TRAINING: CPT | Mod: GO

## 2025-08-10 PROCEDURE — 250N000013 HC RX MED GY IP 250 OP 250 PS 637: Performed by: HOSPITALIST

## 2025-08-10 PROCEDURE — P9037 PLATE PHERES LEUKOREDU IRRAD: HCPCS

## 2025-08-10 PROCEDURE — 36415 COLL VENOUS BLD VENIPUNCTURE: CPT | Performed by: INTERNAL MEDICINE

## 2025-08-10 PROCEDURE — 250N000011 HC RX IP 250 OP 636

## 2025-08-10 PROCEDURE — 82247 BILIRUBIN TOTAL: CPT | Performed by: HOSPITALIST

## 2025-08-10 PROCEDURE — 99233 SBSQ HOSP IP/OBS HIGH 50: CPT | Performed by: HOSPITALIST

## 2025-08-10 RX ORDER — MAGNESIUM OXIDE 400 MG/1
400 TABLET ORAL EVERY 4 HOURS
Status: COMPLETED | OUTPATIENT
Start: 2025-08-10 | End: 2025-08-10

## 2025-08-10 RX ADMIN — SENNOSIDES AND DOCUSATE SODIUM 2 TABLET: 50; 8.6 TABLET ORAL at 08:52

## 2025-08-10 RX ADMIN — Medication 5 MG: at 08:47

## 2025-08-10 RX ADMIN — Medication 5 MG: at 19:35

## 2025-08-10 RX ADMIN — MAGNESIUM OXIDE TAB 400 MG (241.3 MG ELEMENTAL MG) 400 MG: 400 (241.3 MG) TAB at 08:45

## 2025-08-10 RX ADMIN — ROSUVASTATIN CALCIUM 10 MG: 10 TABLET, FILM COATED ORAL at 08:45

## 2025-08-10 RX ADMIN — FAMOTIDINE 10 MG: 10 TABLET ORAL at 08:45

## 2025-08-10 RX ADMIN — METOPROLOL SUCCINATE 25 MG: 25 TABLET, EXTENDED RELEASE ORAL at 08:45

## 2025-08-10 RX ADMIN — Medication 2.5 MG: at 19:35

## 2025-08-10 RX ADMIN — ENOXAPARIN SODIUM 80 MG: 100 INJECTION SUBCUTANEOUS at 21:08

## 2025-08-10 RX ADMIN — Medication 5 MG: at 13:20

## 2025-08-10 RX ADMIN — HYDROCHLOROTHIAZIDE 25 MG: 12.5 TABLET ORAL at 08:45

## 2025-08-10 RX ADMIN — FLUTICASONE PROPIONATE 1 SPRAY: 50 SPRAY, METERED NASAL at 08:52

## 2025-08-10 RX ADMIN — MAGNESIUM OXIDE TAB 400 MG (241.3 MG ELEMENTAL MG) 400 MG: 400 (241.3 MG) TAB at 11:34

## 2025-08-10 RX ADMIN — ACETAMINOPHEN 1000 MG: 500 TABLET ORAL at 16:00

## 2025-08-10 RX ADMIN — Medication 2.5 MG: at 08:45

## 2025-08-10 RX ADMIN — ACETAMINOPHEN 1000 MG: 500 TABLET ORAL at 09:57

## 2025-08-10 RX ADMIN — ENOXAPARIN SODIUM 80 MG: 100 INJECTION SUBCUTANEOUS at 08:43

## 2025-08-10 RX ADMIN — Medication 2.5 MG: at 13:20

## 2025-08-10 RX ADMIN — OXYMETAZOLINE HYDROCHLORIDE 2 SPRAY: 0.05 SPRAY NASAL at 08:49

## 2025-08-10 RX ADMIN — ACETAMINOPHEN 1000 MG: 500 TABLET ORAL at 04:15

## 2025-08-10 RX ADMIN — OXYMETAZOLINE HYDROCHLORIDE 2 SPRAY: 0.05 SPRAY NASAL at 08:43

## 2025-08-10 RX ADMIN — FAMOTIDINE 10 MG: 10 TABLET ORAL at 19:35

## 2025-08-10 ASSESSMENT — ACTIVITIES OF DAILY LIVING (ADL)
ADLS_ACUITY_SCORE: 38
ADLS_ACUITY_SCORE: 40
ADLS_ACUITY_SCORE: 38
ADLS_ACUITY_SCORE: 40
ADLS_ACUITY_SCORE: 38
ADLS_ACUITY_SCORE: 40
ADLS_ACUITY_SCORE: 38
ADLS_ACUITY_SCORE: 40
ADLS_ACUITY_SCORE: 38
ADLS_ACUITY_SCORE: 40
ADLS_ACUITY_SCORE: 38
ADLS_ACUITY_SCORE: 40

## 2025-08-10 NOTE — CONSULTS
"Consult received for Vascular Access Team.  See LDA for details. For additional needs place \"Consult for Inpatient Vascular Access Care\"  [id number] order in EPIC.  "

## 2025-08-10 NOTE — PLAN OF CARE
"Goal Outcome Evaluation:       A/Ox4, Flat affect. Slight Rt facial droop.Strengths 5/5 on RUE and RLE, and 1/5 on LUE and 4/5 on LLE. S/b PT ambulated in the hallway e cane.  Lab reported critical result of platelets to MD, TODD, platelets unit ordered, completed well tolerated, no rxn. VSS, IV re-sited due to leakage. ate well, no BM, LBM 08/08. s/b rehab. Denied pain.   Father at bedside, stated that  taletrectinib/ibtrozi medicine should be available on Monday, once available, we need to page the Oncology team to order the medication and we need to notify the pharmacy   /60   Pulse 61   Temp 98  F (36.7  C) (Oral)   Resp 18   Ht 1.88 m (6' 2\")   Wt 103.6 kg (228 lb 6.4 oz)   SpO2 96%   BMI 29.32 kg/m                        "

## 2025-08-10 NOTE — PLAN OF CARE
Goal Outcome Evaluation:      Plan of Care Reviewed With: patient    Overall Patient Progress: no changeOverall Patient Progress: no change    Outcome Evaluation: Patient DND overnight    Status: Admitted 7/15 with vision changes, dizziness, confusion, and word finding difficulty. Further work-up showed new diagnosis of metastatic lung adenocarcinoma. 8/4 MRI showed incidental 2 new R frontal infarct   Vitals: VSS on RA  Neuros: AOX4. Flat affect. R droop. R field cut. Strengths L side 5/5; RUE 1/5- weak grasp; RLE 4/5- absent dorsi/plantar. Generalized weakness. Denies decreased sensation this shift  IV: L PIV SL  Resp: No SOB reported. C  Diet: Regular  GI: LBM 8/8. Passing gas  : VDSP   Skin: Scattered petechiae. Abdominal bruise from lovenox injections  Pain: Mild pain to buttocks. Tylenol given and heat packs applied  Activity: A1/Cane  Plan: When taletrectinib/ibtrozi medicine arrives from family please page oncology team for order for medication and notify pharmacy   Updates this shift: Pt DND from [phone]

## 2025-08-10 NOTE — PROGRESS NOTES
[facility]    Progress Note - Medicine Service, [facility] TEAM 3       Date of Admission:  7/15/2025    Assessment & Plan   Bernard Chapman is a 35 year old male with a past medical history of hypertension, diabetes, ELENA, PE/DVT (on rivaroxaban) admitted on 7/15/2025 for lethargy, N/V, and blurry vision found to have multifocal acute CVA in L MCA , R parietal and R frontal centrum semiovale. Initially concern for TTP or catastrophic APS, however further work up notable for new diagnosis of metastatic lung adenocarcinoma with likely malignancy-related hypercoagulability.    Today's updates:  - Awaiting delivery and arrival of Taletrectinib per Oncology, plan is to obtain  30 days of free drug (taletrectinib) from [employer] to be shipped to mother's house; will start it ASAP at a dose of 600 mg daily on an empty stomach (3 x 200 mg capsules).  When med arrives confirm with pharmacy for dosing/order per Medicine or Oncology so patient can start is ASAP.  - cont daily CBC  - 8/10: PLT 44, will give 1 unit platelet today    #New metastatic lung adenocarcinoma, ROS1 rearrangement   #Right lung nodule  #Multiple enlarged mediastinal, paraesophageal, and hilar lymph nodes  Malignancy work up initiated on admission due to significant hypercoagulability. CT CAP significant for [id number] spiculated density in R lung apex, 9mm pulmonary nodule in R lung base, L thyroid nodule, paratracheal LAD, splenic infarct, bilateral renal infarcts, and L retroperitoneal nodule. Underwent FNA of lymph node which was positive for lung adenocarcinoma (resulted 7/22).  PDL-1 resulted with low expression (40%). PET 7/26 with distant metastases and MRI brain showing no signs of metastasis.  Currently pending prior authorization and patient is planned to start in patient therapy once approved..  -Oncology consulted, appreciate recs   - NGS; positive for a ROS1 gene rearrangement (SDC4::ROS1)     -  Guardant 360; T p53 mutation and MITCHELL   - Due to ongoing transfusion needs, ARU not feasible.   - Taletrectinib ordered through onc - to start inpatient as soon prior authorization approved (will need to have address so medications can be sent )  - [place] [facility] 2nd opinion; Patient will need to call [phone] to make appointment once there is a timeline for discharging from this hospitalization      #Hypercoagulability d/t metastatic lung adenocarcinoma  #Infarcts of brain, kidney, spleen  #Cardiac valve thrombus on tricuspid and aortic valve 7/16 on TIMO  #Hx pulmonary embolism (6/23/25)  #Acute thrombocytopenia, in the setting of metastatic lung adenocarcinoma d/t ITP vs drug-induced vs consumptive coagulopathy  #Acute anemia  #Thrombocytopenia   Found to have multiple CVA, likely embolic. TTE with mobile echodensities on tricuspid and aortic valve. Initial concern for TTP or catastrophic APS, however work up ultimately significant for new diagnosis of metastatic lung cancer. Hypercoagulable state presumed to be most likely 2/2 malignancy. Started on heparin gtt 7/17, but briefly held for 7/18 endobronchial needle aspiration of lymph nodes. Stroke code called 7/19 for worsening R-sided hemiparesis and hemianopia. Repeat CTA with new focal high-grade stenosis of other part of left MCA. Heparin gtt was stopped during code stroke, but restarting (heparin gtt + levophed) led to 10/10 headache. CTH with small left frontal SAH. Brain MRI with new L basal ganglia stroke. Heparin gtt resumed, and repeat CTH stable. Switched to therapeutic Lovenox 7/22. CTH still stable 7/23. Declining fibrinogen, so received cryoprecipitate 7/25. Concern for continued consumptive process, but on therapeutic Lovenox. PLT downtrending, so receiving transfusions (1 unit 7/26, 1 unit 7/27, 1 unit 7/28, 1 unit 7/30,1 unit 8/1, 1 unit 8/3, 1 unit 8/4, 1 unit 8/5, 1 unit 8/6, 1 unit 8/8). No new symptoms. CTM.  - Hematology consulted   -Transfuse if    -Hgb<7  -PLT<50k-- CONDITIONAL ORDERS IN PLACE  - SC Lovenox 135 mg bid---> 100 mg    - qdaily CBC      # Maculopapular rash on face, chest, bilateral upper extremities, improved  # Numerous palpable purpura across the scalp and lower extremities   # Petechiae on bilateral lower extremities  C/f palpable purpura, so s/p L leg s/p punch biopsy with dermatology: rupture folliculitis. No definite features of cutaneous small vessel vasculitis or thrombotic vasculopathy. New rash noted 7/26 with  flushing of face/chest after PET scan. Also scattered papules of face and upper extremities (in addition to petechiae of BLE). Not itchy and no signs of respiratory distress, but suspected  secondary to contrast from PET scan, .  Rash appeared several hours after unit of platelets, less likely due to a transfusion reaction. photos present in chart. The following days, papules are consistent with his usual acne. No diffuse erythema. Some c/f SVC syndrome given distrubution of flushing and positional nature (normally HOB elevated when asleep, so PET was first time that he had prolonged flat/supine position). CTM.   Plan:  - Benadryl as needed for itching  - Chlorhexidine wash  - Will continue to monitor    #Multifocal acute ischemic strokes, in L MCA territory and new L basal ganglia stroke; Increasing mass effect on L lateral ventricle by L MCA infarction  #Subtotal occlusive thrombus in left M1   #Left frontoparietal SAH (7/19)  #Hypertension chronic  #HLD, chronic  Etiology most likely hypercoagulability d/t malignancy. Emboli stemming from tricuspid and aortic valves, appreciated on TTE 7/16. He continues to have R facial droop, extremity weakness. No apparent aphasia, but he is mildly dysarthric. Left frontoparietal subarachnoid hemorrhage stable per CT. Working with therapies with notable improvement.  Repeat brain MRI on 8/4 shows 2 new small infarcts in the right frontal lobe.  Patient shows to have continuation of  improvement in physical exam findings.  Unclear significance of these findings.  Discussed with neuro to provide comments regarding this.  > Neuro-ICU signing off   - SBP goal of 120 - 160 mmHg and DBP goal < 100    -Hold PTA antihypertensives    -Has prn labetalol and hydralazine  - cont 10 mg rosuvastatin  > OT/PT/Speech   -Since ARU dispo delayed, asked therapies to do aggressive rehabilitation as able  > nutrition consult   -monitor oral intake, weights  - repeat TTE outpatient to ensure improvement of AR and TR (and vegetations)  - Neuro to follow-up with MRI findings    #DM2, A1c 5.3  On HSSI with adequate control after stopping dexamethasone. CTM.   - POC glucose ACHS  - High intensity insulin sliding scale    #ELENA  Need to clarify home CPAP settings.    #Leukocytosis  Found to have leukocytosis, no clear cause or signs of infection, possible 2/2 stress induced.        #Hiccups, resolved   #chronic back pain  Chronic back pain. On acetaminophen only (given frequent need for neuro checks). Hiccups resolved (suspected 2/2 dexamethasone). Will continue baclofen and prn thorazine, but may be able to decrease in coming days.   Plan:  - Thorazine 25mg q6hr prn  - Melatonin 3mg daily  - Continue Pepcid  - continue  baclofen  - acetaminophen 1000 mg every 6 hours  - hold cyclobenzaprine 10 mg 3x daily PRN  - hold PTA gabapentin 100 mg 3x daily  - hold PTA oxycodone 5 mg PRN every 6h     #allergies  - fluticasone 1 spray daily     #HTN  - Continue PTA Metoprolol succinate 25mg q daily (ok to hold if HR< 60)  - Hold PTA Losartan, could consider restarting vs discontinuing given normotensive  - Continue PTA hydrochlorothiazide    #Epistaxis, currently resolved  On 8/5 patient found to have epistaxis.  Physical exam shows blood coming from the right nares.  - Afrin spray as needed  - Apply pressure to the nasal passage to help with bleeding          Diet: Regular Diet Adult    DVT Prophylaxis: Enoxaparin (Lovenox)  "SQ  Avalos Catheter: Not present  Fluids: None  Lines: None     Cardiac Monitoring: None  Code Status: Full Code      Clinically Significant Risk Factors                 # Thrombocytopenia: Lowest platelets = 44 in last 2 days, will monitor for bleeding              # Overweight: Estimated body mass index is 29.32 kg/m  as calculated from the following:    Height as of this encounter: 1.88 m (6' 2\").    Weight as of this encounter: 103.6 kg (228 lb 6.4 oz).        # Financial/Environmental Concerns: none         Social Drivers of Health          Disposition Plan     Medically Ready for Discharge: Anticipated in 2-4 Days       Bernard Pierson MD  Medicine Service, [facility] TEAM 3  [facility]  Securely message with [employer] (more info)  Text page via [employer] Paging/Directory   See signed in provider for up to date coverage information  ______________________________________________________________________    Interval History   No acute overnight events. Father at bedside, encouraged by patient's progress for mobility and activity and following pt/ot instructions. Still awaiting PO Chemo to arrive at home. No there new concerns this AM. Aware of PLT trend and 1 unit PLT to be given this AM.  RN noted possible need for new peripheral IV (left forearm currently).     Physical Exam   Vital Signs: Temp: 98  F (36.7  C) Temp src: Oral BP: 125/57 Pulse: 61   Resp: 18 SpO2: 96 % O2 Device: None (Room air)    Weight: 228 lbs 6.4 oz    GENERAL: In chair next to bed, no acute distress, in good spirits, baseline mentation.  RESP: CTAB  CV:  Extremities appear well-perfused, RUE swelling is stable  GI: Soft, non-distended, non tender  NEURO: Alert, Follows 2 step commands, decreased shoulder shrug and right shoulder, otherwise cranial nerves II-XII intact normal, LUE movement, no RUE movement, unable to wiggle right sided toes, Right quadricept improved to 4/5,  right sided facial droop less " than prior , mild dysarthria, no dysphonia, EOMI grossly, pupils symmetric (No change in physical exam)  SKIN: Erythematous papules scattered on face c/w acne, bilateral petechiae present (improving)    Medical Decision Making       Please see A&P for additional details of medical decision making.      Data     I have personally reviewed the following data over the past 24 hrs:    7.0  \   11.6 (L)   / 44 (LL)     141 104 28.4 (H) /  97   4.1; 4.2 22 1.06 \     ALT: 28 AST: 25 AP: 69 TBILI: 0.6   ALB: 4.0 TOT PROTEIN: 6.9 LIPASE: N/A

## 2025-08-11 ENCOUNTER — APPOINTMENT (OUTPATIENT)
Dept: CT IMAGING | Facility: CLINIC | Age: 35
DRG: 064 | End: 2025-08-11
Payer: COMMERCIAL

## 2025-08-11 ENCOUNTER — APPOINTMENT (OUTPATIENT)
Dept: OCCUPATIONAL THERAPY | Facility: CLINIC | Age: 35
DRG: 064 | End: 2025-08-11
Payer: COMMERCIAL

## 2025-08-11 ENCOUNTER — APPOINTMENT (OUTPATIENT)
Dept: PHYSICAL THERAPY | Facility: CLINIC | Age: 35
DRG: 064 | End: 2025-08-11
Payer: COMMERCIAL

## 2025-08-11 DIAGNOSIS — C78.01: Primary | ICD-10-CM

## 2025-08-11 LAB
ERYTHROCYTE [DISTWIDTH] IN BLOOD BY AUTOMATED COUNT: 13.9 % (ref 10–15)
HCT VFR BLD AUTO: 35.3 % (ref 40–53)
HGB BLD-MCNC: 11.4 G/DL (ref 13.3–17.7)
MAGNESIUM SERPL-MCNC: 2.1 MG/DL (ref 1.7–2.3)
MCH RBC QN AUTO: 26.8 PG (ref 26.5–33)
MCHC RBC AUTO-ENTMCNC: 32.3 G/DL (ref 31.5–36.5)
MCV RBC AUTO: 83 FL (ref 78–100)
PHOSPHATE SERPL-MCNC: 4.3 MG/DL (ref 2.5–4.5)
PLATELET # BLD AUTO: 53 10E3/UL (ref 150–450)
POTASSIUM SERPL-SCNC: 4.2 MMOL/L (ref 3.4–5.3)
RBC # BLD AUTO: 4.26 10E6/UL (ref 4.4–5.9)
WBC # BLD AUTO: 7.8 10E3/UL (ref 4–11)

## 2025-08-11 PROCEDURE — 85027 COMPLETE CBC AUTOMATED: CPT

## 2025-08-11 PROCEDURE — 84100 ASSAY OF PHOSPHORUS: CPT | Performed by: HOSPITALIST

## 2025-08-11 PROCEDURE — 250N000013 HC RX MED GY IP 250 OP 250 PS 637

## 2025-08-11 PROCEDURE — 99233 SBSQ HOSP IP/OBS HIGH 50: CPT | Performed by: INTERNAL MEDICINE

## 2025-08-11 PROCEDURE — 97530 THERAPEUTIC ACTIVITIES: CPT | Mod: GP

## 2025-08-11 PROCEDURE — 120N000002 HC R&B MED SURG/OB UMMC

## 2025-08-11 PROCEDURE — 84132 ASSAY OF SERUM POTASSIUM: CPT | Performed by: HOSPITALIST

## 2025-08-11 PROCEDURE — 36415 COLL VENOUS BLD VENIPUNCTURE: CPT | Performed by: HOSPITALIST

## 2025-08-11 PROCEDURE — 250N000013 HC RX MED GY IP 250 OP 250 PS 637: Performed by: STUDENT IN AN ORGANIZED HEALTH CARE EDUCATION/TRAINING PROGRAM

## 2025-08-11 PROCEDURE — 97530 THERAPEUTIC ACTIVITIES: CPT | Mod: GO

## 2025-08-11 PROCEDURE — 250N000011 HC RX IP 250 OP 636

## 2025-08-11 PROCEDURE — 83735 ASSAY OF MAGNESIUM: CPT | Performed by: HOSPITALIST

## 2025-08-11 PROCEDURE — 70450 CT HEAD/BRAIN W/O DYE: CPT | Mod: 26 | Performed by: RADIOLOGY

## 2025-08-11 PROCEDURE — 97116 GAIT TRAINING THERAPY: CPT | Mod: GP

## 2025-08-11 PROCEDURE — 99232 SBSQ HOSP IP/OBS MODERATE 35: CPT | Performed by: HOSPITALIST

## 2025-08-11 PROCEDURE — 70450 CT HEAD/BRAIN W/O DYE: CPT

## 2025-08-11 RX ADMIN — Medication 5 MG: at 19:39

## 2025-08-11 RX ADMIN — Medication 5 MG: at 14:18

## 2025-08-11 RX ADMIN — Medication 5 MG: at 07:52

## 2025-08-11 RX ADMIN — ENOXAPARIN SODIUM 80 MG: 100 INJECTION SUBCUTANEOUS at 19:39

## 2025-08-11 RX ADMIN — ACETAMINOPHEN 1000 MG: 500 TABLET ORAL at 23:58

## 2025-08-11 RX ADMIN — Medication 2.5 MG: at 14:20

## 2025-08-11 RX ADMIN — ACETAMINOPHEN 1000 MG: 500 TABLET ORAL at 00:03

## 2025-08-11 RX ADMIN — METOPROLOL SUCCINATE 25 MG: 25 TABLET, EXTENDED RELEASE ORAL at 07:52

## 2025-08-11 RX ADMIN — ROSUVASTATIN CALCIUM 10 MG: 10 TABLET, FILM COATED ORAL at 07:52

## 2025-08-11 RX ADMIN — ACETAMINOPHEN 1000 MG: 500 TABLET ORAL at 17:43

## 2025-08-11 RX ADMIN — FLUTICASONE PROPIONATE 1 SPRAY: 50 SPRAY, METERED NASAL at 07:53

## 2025-08-11 RX ADMIN — ACETAMINOPHEN 1000 MG: 500 TABLET ORAL at 06:06

## 2025-08-11 RX ADMIN — FAMOTIDINE 10 MG: 10 TABLET ORAL at 07:52

## 2025-08-11 RX ADMIN — HYDROCHLOROTHIAZIDE 25 MG: 12.5 TABLET ORAL at 07:52

## 2025-08-11 RX ADMIN — ACETAMINOPHEN 1000 MG: 500 TABLET ORAL at 11:41

## 2025-08-11 RX ADMIN — Medication 2.5 MG: at 07:59

## 2025-08-11 RX ADMIN — FAMOTIDINE 10 MG: 10 TABLET ORAL at 19:39

## 2025-08-11 RX ADMIN — ENOXAPARIN SODIUM 80 MG: 100 INJECTION SUBCUTANEOUS at 08:00

## 2025-08-11 RX ADMIN — SENNOSIDES AND DOCUSATE SODIUM 1 TABLET: 50; 8.6 TABLET ORAL at 19:39

## 2025-08-11 RX ADMIN — Medication 2.5 MG: at 19:38

## 2025-08-11 ASSESSMENT — ACTIVITIES OF DAILY LIVING (ADL)
ADLS_ACUITY_SCORE: 38
ADLS_ACUITY_SCORE: 39
ADLS_ACUITY_SCORE: 39
ADLS_ACUITY_SCORE: 38
ADLS_ACUITY_SCORE: 39
ADLS_ACUITY_SCORE: 38
ADLS_ACUITY_SCORE: 39
ADLS_ACUITY_SCORE: 38
ADLS_ACUITY_SCORE: 39

## 2025-08-11 NOTE — PLAN OF CARE
Goal Outcome Evaluation:    Time: 8702-6254  VS: VSS  Activity:  Up with x1 assist with cane  Pain: Denies pain and SOB   Neuro: A&O x4. Flat affect. R facial droop.Strengths 5/5 on RUE and RLE, and 1/5 on LUE and 4/5 on LLE   Cardiac: WDL  Respiratory: RA    GI/: No bm, voiding via bathroom   Diet/Nausea: Denies pain and SOB  Lines: PIV, SL   Incisions/Drains/Skin:  None   Family were at bedside, took pt for a walk in a w/c. Continue w/poc

## 2025-08-11 NOTE — PLAN OF CARE
Goal Outcome Evaluation:    Plan of Care Reviewed With: patient, parent    Status: Admitted 7/25 for lethargy, N/V, blurry vision, found to have acute CVA in L MCA, R parietal and R frontal centrum semiovale. Further workup notable for new diagnosis of metastatic lung adenocarcinoma with likely malignancy-related hypercoagulability.   Vitals: VSS RA ex HTN within parameters.  Neuros: Alert, oriented to self only - MD notified and CT ordered for acute change in orientation. Flat affect, withdrawn. R facial droop. R side 5/5, 1/5 LUE, 4/5 LLE.  IV: PIV SL  Labs/Electrolytes: Last platelet count 53, redraw AM.  Resp: WNL RA  Diet: Regular, needs assistance ordering. Takes larger pills with applesauce.  GI: LBM 8/11 per pt.  : VDSP BR  Skin: Bruising t/o.  Pain: Denies  Activity: A2/GB/cane.  Social: Mother Violet at bedside, helpful and supportive with cares.   Plan: Continue w POC.  Updates this shift: Head CT completed this shift with no new acute pathology.

## 2025-08-11 NOTE — PROGRESS NOTES
[facility]    Progress Note - Medicine Service, MAROON TEAM 3       Date of Admission:  7/15/2025    Assessment & Plan   Bernard Chapman is a 35 year old male with a past medical history of hypertension, diabetes, ELENA, PE/DVT (on rivaroxaban) admitted on 7/15/2025 for lethargy, N/V, and blurry vision found to have multifocal acute CVA in L MCA , R parietal and R frontal centrum semiovale. Initially concern for TTP or catastrophic APS, however further work up notable for new diagnosis of metastatic lung adenocarcinoma with likely malignancy-related hypercoagulability.    Today's updates:  - today, still awaiting delivery and arrival of Taletrectinib per Oncology, plan is to obtain  30 days of free drug (taletrectinib) from [facility] to be shipped to mother's house; will start it ASAP at a dose of 600 mg daily on an empty stomach (3 x 200 mg capsules).  When med arrives confirm with pharmacy for dosing/order per Medicine or Oncology so patient can start is ASAP.  - cont daily CBC, no plt transfusion needed this AM    #New metastatic lung adenocarcinoma, ROS1 rearrangement   #Right lung nodule  #Multiple enlarged mediastinal, paraesophageal, and hilar lymph nodes  Malignancy work up initiated on admission due to significant hypercoagulability. CT CAP significant for [id number] spiculated density in R lung apex, 9mm pulmonary nodule in R lung base, L thyroid nodule, paratracheal LAD, splenic infarct, bilateral renal infarcts, and L retroperitoneal nodule. Underwent FNA of lymph node which was positive for lung adenocarcinoma (resulted 7/22).  PDL-1 resulted with low expression (40%). PET 7/26 with distant metastases and MRI brain showing no signs of metastasis.  Currently pending prior authorization and patient is planned to start in patient therapy once approved..  -Oncology consulted, appreciate recs   - NGS; positive for a ROS1 gene rearrangement (SDC4::ROS1)     -  Guardant 360; T p53 mutation and MITCHELL   - Due to ongoing transfusion needs, ARU not feasible.   - Taletrectinib ordered through onc - to start inpatient as soon prior authorization approved (will need to have address so medications can be sent )  - [facility] 2nd opinion; Patient will need to call [phone] to make appointment once there is a timeline for discharging from this hospitalization      #Hypercoagulability d/t metastatic lung adenocarcinoma  #Infarcts of brain, kidney, spleen  #Cardiac valve thrombus on tricuspid and aortic valve 7/16 on TIMO  #Hx pulmonary embolism (6/23/25)  #Acute thrombocytopenia, in the setting of metastatic lung adenocarcinoma d/t ITP vs drug-induced vs consumptive coagulopathy  #Acute anemia  #Thrombocytopenia   Found to have multiple CVA, likely embolic. TTE with mobile echodensities on tricuspid and aortic valve. Initial concern for TTP or catastrophic APS, however work up ultimately significant for new diagnosis of metastatic lung cancer. Hypercoagulable state presumed to be most likely 2/2 malignancy. Started on heparin gtt 7/17, but briefly held for 7/18 endobronchial needle aspiration of lymph nodes. Stroke code called 7/19 for worsening R-sided hemiparesis and hemianopia. Repeat CTA with new focal high-grade stenosis of other part of left MCA. Heparin gtt was stopped during code stroke, but restarting (heparin gtt + levophed) led to 10/10 headache. CTH with small left frontal SAH. Brain MRI with new L basal ganglia stroke. Heparin gtt resumed, and repeat CTH stable. Switched to therapeutic Lovenox 7/22. CTH still stable 7/23. Declining fibrinogen, so received cryoprecipitate 7/25. Concern for continued consumptive process, but on therapeutic Lovenox. PLT downtrending, so receiving transfusions (1 unit 7/26, 1 unit 7/27, 1 unit 7/28, 1 unit 7/30,1 unit 8/1, 1 unit 8/3, 1 unit 8/4, 1 unit 8/5, 1 unit 8/6, 1 unit 8/8). No new symptoms. CTM.  - Hematology consulted   -Transfuse if    -Hgb<7  -PLT<50k-- CONDITIONAL ORDERS IN PLACE  - SC Lovenox 135 mg bid---> 100 mg    - qdaily CBC      # Maculopapular rash on face, chest, bilateral upper extremities, improved  # Numerous palpable purpura across the scalp and lower extremities   # Petechiae on bilateral lower extremities  C/f palpable purpura, so s/p L leg s/p punch biopsy with dermatology: rupture folliculitis. No definite features of cutaneous small vessel vasculitis or thrombotic vasculopathy. New rash noted 7/26 with  flushing of face/chest after PET scan. Also scattered papules of face and upper extremities (in addition to petechiae of BLE). Not itchy and no signs of respiratory distress, but suspected  secondary to contrast from PET scan, .  Rash appeared several hours after unit of platelets, less likely due to a transfusion reaction. photos present in chart. The following days, papules are consistent with his usual acne. No diffuse erythema. Some c/f SVC syndrome given distrubution of flushing and positional nature (normally HOB elevated when asleep, so PET was first time that he had prolonged flat/supine position). CTM.   Plan:  - Benadryl as needed for itching  - Chlorhexidine wash  - Will continue to monitor    #Multifocal acute ischemic strokes, in L MCA territory and new L basal ganglia stroke; Increasing mass effect on L lateral ventricle by L MCA infarction  #Subtotal occlusive thrombus in left M1   #Left frontoparietal SAH (7/19)  #Hypertension chronic  #HLD, chronic  Etiology most likely hypercoagulability d/t malignancy. Emboli stemming from tricuspid and aortic valves, appreciated on TTE 7/16. He continues to have R facial droop, extremity weakness. No apparent aphasia, but he is mildly dysarthric. Left frontoparietal subarachnoid hemorrhage stable per CT. Working with therapies with notable improvement.  Repeat brain MRI on 8/4 shows 2 new small infarcts in the right frontal lobe.  Patient shows to have continuation of  improvement in physical exam findings.  Unclear significance of these findings.  Discussed with neuro to provide comments regarding this.  > Neuro-ICU signing off   - SBP goal of 120 - 160 mmHg and DBP goal < 100    -Hold PTA antihypertensives    -Has prn labetalol and hydralazine  - cont 10 mg rosuvastatin  > OT/PT/Speech   -Since ARU dispo delayed, asked therapies to do aggressive rehabilitation as able  > nutrition consult   -monitor oral intake, weights  - repeat TTE outpatient to ensure improvement of AR and TR (and vegetations)  - Neuro to follow-up with MRI findings    #DM2, A1c 5.3  On HSSI with adequate control after stopping dexamethasone. CTM.   - POC glucose ACHS  - High intensity insulin sliding scale    #ELENA  Need to clarify home CPAP settings.    #Leukocytosis  Found to have leukocytosis, no clear cause or signs of infection, possible 2/2 stress induced.        #Hiccups, resolved   #chronic back pain  Chronic back pain. On acetaminophen only (given frequent need for neuro checks). Hiccups resolved (suspected 2/2 dexamethasone). Will continue baclofen and prn thorazine, but may be able to decrease in coming days.   Plan:  - Thorazine 25mg q6hr prn  - Melatonin 3mg daily  - Continue Pepcid  - continue  baclofen  - acetaminophen 1000 mg every 6 hours  - hold cyclobenzaprine 10 mg 3x daily PRN  - hold PTA gabapentin 100 mg 3x daily  - hold PTA oxycodone 5 mg PRN every 6h     #allergies  - fluticasone 1 spray daily     #HTN  - Continue PTA Metoprolol succinate 25mg q daily (ok to hold if HR< 60)  - Hold PTA Losartan, could consider restarting vs discontinuing given normotensive  - Continue PTA hydrochlorothiazide    #Epistaxis, currently resolved  On 8/5 patient found to have epistaxis.  Physical exam shows blood coming from the right nares.  - Afrin spray as needed  - Apply pressure to the nasal passage to help with bleeding          Diet: Regular Diet Adult    DVT Prophylaxis: Enoxaparin (Lovenox)  "SQ  Avalos Catheter: Not present  Fluids: None  Lines: None     Cardiac Monitoring: None  Code Status: Full Code      Clinically Significant Risk Factors                 # Thrombocytopenia: Lowest platelets = 44 in last 2 days, will monitor for bleeding              # Overweight: Estimated body mass index is 29.32 kg/m  as calculated from the following:    Height as of this encounter: 1.88 m (6' 2\").    Weight as of this encounter: 103.6 kg (228 lb 6.4 oz).        # Financial/Environmental Concerns: none         Social Drivers of Health          Disposition Plan     Medically Ready for Discharge: Anticipated in 2-4 Days       Bernard Pierson MD  Medicine Service, MARO[facility] TEAM 3  [facility]  Securely message with [employer] (more info)  Text page via [facility] Paging/Directory   See signed in provider for up to date coverage information  ______________________________________________________________________    Interval History   No acute overnight events. Father as expected, NOT currently at bedside,    Pt is laughing, giggling, no complaints, aware of revisit planned with resident in the PM.    Physical Exam   Vital Signs: Temp: 98.2  F (36.8  C) Temp src: Oral BP: 121/57 Pulse: 64   Resp: 18 SpO2: 100 % O2 Device: None (Room air)    Weight: 228 lbs 6.4 oz    GENERAL: In chair next to bed, no acute distress, in good spirits-giggling  RESP: CTAB  CV:  Extremities appear well-perfused, RUE swelling is stable  GI: Soft, non-distended, non tender  NEURO: Alert, Follows 2 step commands, decreased shoulder shrug and right shoulder, otherwise cranial nerves II-XII intact normal, LUE movement, no RUE movement, unable to wiggle right sided toes, Right quadricept improved to 4/5,  right sided facial droop less than prior , mild dysarthria, no dysphonia, EOMI grossly, pupils symmetric (No change in physical exam)  SKIN: Erythematous papules scattered on face c/w acne, bilateral petechiae " present (improving)    Medical Decision Making       Please see A&P for additional details of medical decision making.      Data     I have personally reviewed the following data over the past 24 hrs:    7.8  \   11.4 (L)   / 53 (L)     N/A N/A N/A /  N/A   4.2 N/A N/A \

## 2025-08-11 NOTE — PROGRESS NOTES
"SPIRITUAL HEALTH SERVICES - Progress Note  [place] [facility]    Referral Source: Follow-up Visit    Met with Tera to offer emotional support. Tera was able to reply to yes and no questions and only responded to one or two open-ended questions.  Tera acknowledged that he had a good weekend with visits from friends.  We talked briefly about his feelings related to being in the hospital and dealing with cancer. Tera said he was \"okay.\" I encouraged him that its okay to feel whatever he is feeling and that I was glad that today he is feeling okay.  I asked Tera if he would like a visit from the therapy dog today and he nodded yes. I arranged with Volunteer services to bring the therapy dog to Tera's room this afternoon.    Plan:  is following patient while they are admitted. Spiritual Health Services remain available on request. Please place a standard consult order on Epic.    Brooke Bush,   Chaplain Resident    [facility] routine referrals *[phone]   [facility] available 24/7 for emergent requests/referrals, either by paging the on-call  or by entering an ASAP/STAT consult in Epic (this will also page the on-call ).   "

## 2025-08-11 NOTE — PLAN OF CARE
Goal Outcome Evaluation:      Plan of Care Reviewed With: patient    Overall Patient Progress: improvingOverall Patient Progress: improving    Outcome Evaluation: Not acute issues during the shift. Ambulated to bathroom with 2 assist. He is tolerating food and liquid. Denies pain and ableto use the call light appropriately.

## 2025-08-11 NOTE — PLAN OF CARE
NURSING SHIFT SUMMARY    Goal Outcome Evaluation:    Plan of Care Reviewed With: patient    Overall Patient Progress: no change    Outcome Evaluation: No acute changes overnight. Pt DND overnight.

## 2025-08-12 ENCOUNTER — APPOINTMENT (OUTPATIENT)
Dept: PHYSICAL THERAPY | Facility: CLINIC | Age: 35
DRG: 064 | End: 2025-08-12
Payer: COMMERCIAL

## 2025-08-12 LAB
ANION GAP SERPL CALCULATED.3IONS-SCNC: 13 MMOL/L (ref 7–15)
BASOPHILS # BLD AUTO: 0 10E3/UL (ref 0–0.2)
BASOPHILS NFR BLD AUTO: 1 %
BLD PROD TYP BPU: NORMAL
BLOOD COMPONENT TYPE: NORMAL
BUN SERPL-MCNC: 22.7 MG/DL (ref 6–20)
CALCIUM SERPL-MCNC: 9.7 MG/DL (ref 8.8–10.4)
CHLORIDE SERPL-SCNC: 104 MMOL/L (ref 98–107)
CODING SYSTEM: NORMAL
CREAT SERPL-MCNC: 1.1 MG/DL (ref 0.67–1.17)
EGFRCR SERPLBLD CKD-EPI 2021: 90 ML/MIN/1.73M2
EOSINOPHIL # BLD AUTO: 0.2 10E3/UL (ref 0–0.7)
EOSINOPHIL NFR BLD AUTO: 3 %
ERYTHROCYTE [DISTWIDTH] IN BLOOD BY AUTOMATED COUNT: 13.8 % (ref 10–15)
GLUCOSE SERPL-MCNC: 99 MG/DL (ref 70–99)
HCO3 SERPL-SCNC: 23 MMOL/L (ref 22–29)
HCT VFR BLD AUTO: 36.2 % (ref 40–53)
HGB BLD-MCNC: 11.8 G/DL (ref 13.3–17.7)
IMM GRANULOCYTES # BLD: 0 10E3/UL
IMM GRANULOCYTES NFR BLD: 1 %
ISSUE DATE AND TIME: NORMAL
LYMPHOCYTES # BLD AUTO: 0.9 10E3/UL (ref 0.8–5.3)
LYMPHOCYTES NFR BLD AUTO: 11 %
MAGNESIUM SERPL-MCNC: 2.1 MG/DL (ref 1.7–2.3)
MCH RBC QN AUTO: 26.6 PG (ref 26.5–33)
MCHC RBC AUTO-ENTMCNC: 32.6 G/DL (ref 31.5–36.5)
MCV RBC AUTO: 82 FL (ref 78–100)
MONOCYTES # BLD AUTO: 0.7 10E3/UL (ref 0–1.3)
MONOCYTES NFR BLD AUTO: 9 %
NEUTROPHILS # BLD AUTO: 6.5 10E3/UL (ref 1.6–8.3)
NEUTROPHILS NFR BLD AUTO: 77 %
NRBC # BLD AUTO: 0 10E3/UL
NRBC BLD AUTO-RTO: 0 /100
PHOSPHATE SERPL-MCNC: 4.4 MG/DL (ref 2.5–4.5)
PLAT MORPH BLD: NORMAL
PLATELET # BLD AUTO: 46 10E3/UL (ref 150–450)
POTASSIUM SERPL-SCNC: 4 MMOL/L (ref 3.4–5.3)
RBC # BLD AUTO: 4.43 10E6/UL (ref 4.4–5.9)
RBC MORPH BLD: NORMAL
SODIUM SERPL-SCNC: 140 MMOL/L (ref 135–145)
UNIT ABO/RH: NORMAL
UNIT NUMBER: NORMAL
UNIT STATUS: NORMAL
UNIT TYPE ISBT: 6200
WBC # BLD AUTO: 8.4 10E3/UL (ref 4–11)

## 2025-08-12 PROCEDURE — 120N000002 HC R&B MED SURG/OB UMMC

## 2025-08-12 PROCEDURE — 999N000248 HC STATISTIC IV INSERT WITH US BY RN

## 2025-08-12 PROCEDURE — 250N000013 HC RX MED GY IP 250 OP 250 PS 637

## 2025-08-12 PROCEDURE — 84443 ASSAY THYROID STIM HORMONE: CPT

## 2025-08-12 PROCEDURE — 250N000011 HC RX IP 250 OP 636

## 2025-08-12 PROCEDURE — 36415 COLL VENOUS BLD VENIPUNCTURE: CPT

## 2025-08-12 PROCEDURE — 99233 SBSQ HOSP IP/OBS HIGH 50: CPT | Mod: GC | Performed by: INTERNAL MEDICINE

## 2025-08-12 PROCEDURE — 84100 ASSAY OF PHOSPHORUS: CPT | Performed by: HOSPITALIST

## 2025-08-12 PROCEDURE — 83735 ASSAY OF MAGNESIUM: CPT | Performed by: HOSPITALIST

## 2025-08-12 PROCEDURE — 80061 LIPID PANEL: CPT | Performed by: PHYSICIAN ASSISTANT

## 2025-08-12 PROCEDURE — 85025 COMPLETE CBC W/AUTO DIFF WBC: CPT

## 2025-08-12 PROCEDURE — 250N000013 HC RX MED GY IP 250 OP 250 PS 637: Performed by: STUDENT IN AN ORGANIZED HEALTH CARE EDUCATION/TRAINING PROGRAM

## 2025-08-12 PROCEDURE — 82550 ASSAY OF CK (CPK): CPT | Performed by: PHYSICIAN ASSISTANT

## 2025-08-12 PROCEDURE — 80048 BASIC METABOLIC PNL TOTAL CA: CPT

## 2025-08-12 PROCEDURE — 97530 THERAPEUTIC ACTIVITIES: CPT | Mod: GP | Performed by: PHYSICAL THERAPIST

## 2025-08-12 PROCEDURE — 258N000003 HC RX IP 258 OP 636

## 2025-08-12 PROCEDURE — 84550 ASSAY OF BLOOD/URIC ACID: CPT | Performed by: PHYSICIAN ASSISTANT

## 2025-08-12 PROCEDURE — P9037 PLATE PHERES LEUKOREDU IRRAD: HCPCS | Performed by: HOSPITALIST

## 2025-08-12 RX ORDER — ONDANSETRON 2 MG/ML
4 INJECTION INTRAMUSCULAR; INTRAVENOUS EVERY 6 HOURS PRN
Status: DISCONTINUED | OUTPATIENT
Start: 2025-08-12 | End: 2025-08-13

## 2025-08-12 RX ORDER — QUETIAPINE FUMARATE 50 MG/1
50 TABLET, FILM COATED ORAL DAILY PRN
Status: DISCONTINUED | OUTPATIENT
Start: 2025-08-12 | End: 2025-08-14

## 2025-08-12 RX ORDER — OLANZAPINE 10 MG/2ML
5 INJECTION, POWDER, FOR SOLUTION INTRAMUSCULAR DAILY PRN
Status: DISCONTINUED | OUTPATIENT
Start: 2025-08-12 | End: 2025-08-21 | Stop reason: HOSPADM

## 2025-08-12 RX ORDER — LORAZEPAM 2 MG/ML
2 INJECTION INTRAMUSCULAR
Status: DISCONTINUED | OUTPATIENT
Start: 2025-08-12 | End: 2025-08-13

## 2025-08-12 RX ADMIN — ACETAMINOPHEN 1000 MG: 500 TABLET ORAL at 17:21

## 2025-08-12 RX ADMIN — HYDROCHLOROTHIAZIDE 25 MG: 12.5 TABLET ORAL at 08:19

## 2025-08-12 RX ADMIN — Medication 5 MG: at 13:12

## 2025-08-12 RX ADMIN — ROSUVASTATIN CALCIUM 10 MG: 10 TABLET, FILM COATED ORAL at 08:21

## 2025-08-12 RX ADMIN — LEVETIRACETAM 4500 MG: 100 INJECTION, SOLUTION INTRAVENOUS at 19:02

## 2025-08-12 RX ADMIN — Medication 5 MG: at 08:22

## 2025-08-12 RX ADMIN — Medication 2.5 MG: at 13:12

## 2025-08-12 RX ADMIN — Medication 2.5 MG: at 08:21

## 2025-08-12 RX ADMIN — ENOXAPARIN SODIUM 80 MG: 100 INJECTION SUBCUTANEOUS at 21:11

## 2025-08-12 RX ADMIN — FLUTICASONE PROPIONATE 1 SPRAY: 50 SPRAY, METERED NASAL at 08:16

## 2025-08-12 RX ADMIN — PROCHLORPERAZINE EDISYLATE 10 MG: 5 INJECTION INTRAMUSCULAR; INTRAVENOUS at 18:09

## 2025-08-12 RX ADMIN — ENOXAPARIN SODIUM 80 MG: 100 INJECTION SUBCUTANEOUS at 08:17

## 2025-08-12 RX ADMIN — ONDANSETRON 4 MG: 2 INJECTION INTRAMUSCULAR; INTRAVENOUS at 19:02

## 2025-08-12 RX ADMIN — PROCHLORPERAZINE EDISYLATE 10 MG: 5 INJECTION INTRAMUSCULAR; INTRAVENOUS at 13:38

## 2025-08-12 RX ADMIN — ACETAMINOPHEN 1000 MG: 500 TABLET ORAL at 11:46

## 2025-08-12 RX ADMIN — SENNOSIDES AND DOCUSATE SODIUM 2 TABLET: 50; 8.6 TABLET ORAL at 08:19

## 2025-08-12 ASSESSMENT — ACTIVITIES OF DAILY LIVING (ADL)
ADLS_ACUITY_SCORE: 38
ADLS_ACUITY_SCORE: 39
ADLS_ACUITY_SCORE: 38
ADLS_ACUITY_SCORE: 38
ADLS_ACUITY_SCORE: 39
ADLS_ACUITY_SCORE: 38
ADLS_ACUITY_SCORE: 39
ADLS_ACUITY_SCORE: 38
ADLS_ACUITY_SCORE: 38
ADLS_ACUITY_SCORE: 39
ADLS_ACUITY_SCORE: 39
ADLS_ACUITY_SCORE: 38
ADLS_ACUITY_SCORE: 39
ADLS_ACUITY_SCORE: 38
ADLS_ACUITY_SCORE: 39
ADLS_ACUITY_SCORE: 38

## 2025-08-12 NOTE — INTERIM SUMMARY
Neurology Update:    I was informed by the primary team that patient had a brief seizure-like activity lasting for 10 seconds with generalized tremulous movements and staring followed by emesis.  He has a history of metastatic lung adenocarcinoma, not started on chemotherapy yet and recent MRI 8/4/2025 did not show any evidence of intracranial metastasis.  CT head obtained yesterday showed no new acute findings or edema that would explain seizure.  However there was concerns about personality changes, minimally interactive, questionable aphasia and possible low mood.  Unclear if he had a true seizure versus nonepileptic spell in the setting of acute stressor w/ prolonged hospitalization and malignancy.  Recommend loading with 4.5 g Keppra and start video-EEG monitoring.  We will reassess tomorrow and determine indication for further expanded workup after obtaining MRI brain and MRA head and neck as recommended earlier.      Recommendations:  -Load with Keppra 4.5 g  - Please order Video-EEG monitoring  - Recommend Ativan 4 mg if seizure persists > 3 minutes  - Recommend toxic metabolic workup per primary  - Will hold off on any maintenance medication for now.  - Neurology will continue to follow and reassess tomorrow    TC Swann  Neurology PGY 3

## 2025-08-12 NOTE — PROGRESS NOTES
[facility]    Oncology Progress Note    Date of Service (when I saw the patient): 08/11/2025    Assessment & Plan   Bernard Chapman is a 35 year old male who was admitted on 7/15/2025.     Recommendations:   -Please discontinue thorazine and famotidine - these are relative contraindications with taletrectinib - Thorazine due to prolongation of QTc, and famotidine (along with other H2 antagonists and PPI) severely reduce effective concentrations of the drug.   - Per [facility], mom will receive 30 day supply of free drug on Wed.   - Discussed with [id number] pharmacy and they have tentatively approved the prescription for taletrectinib, but still awaiting decision from [facility].   - In the meantime, outpt and inpt oncology pharmacist working on [facility] access program for the medication - thus one way or the other, we are likely to get it for the patient.  The 30-day supply gives us time to secure access.   - From my standpoint, ok to start treatment as soon as we have it.   - Continue to monitor platelets.  If his platelets can remain above 50K long enough can potentially go to rehab.  I think ARU might be complicated due to transfusion needs and sometimes the need for taletrectinib can complicate those plans.   -Discussed above with pt and his Mom.       Bernardo Mackenzie, DO    Interval History   Feeling a bit discouraged today. Not really talking too much.  He is not having pain or new acute symptoms, just frustrated.  Mom and Dad were not here this morning either which Mom thinks might be making him grumpy.  No new focal deficits.     -Data reviewed today: I reviewed all new labs and imaging results over the last 24 hours. I personally reviewed no images or EKG's today.    Physical Exam   Temp: 98  F (36.7  C) Temp src: Oral BP: (!) 142/64 Pulse: 60   Resp: 16 SpO2: 98 % O2 Device: None (Room air)    Vitals:    07/29/25 1300 08/03/25 1451 08/09/25 1711   Weight: 108.9 kg  (240 lb) 108.5 kg (239 lb 3.2 oz) 103.6 kg (228 lb 6.4 oz)     Vital Signs with Ranges  Temp:  [98  F (36.7  C)-98.2  F (36.8  C)] 98  F (36.7  C)  Pulse:  [60-66] 60  Resp:  [16-18] 16  BP: (121-142)/(51-64) 142/64  SpO2:  [97 %-100 %] 98 %  I/O last 3 completed shifts:  In: 1390 [P.O.:1390]  Out: -     Gen: Alert and oriented x 3, nad  HEENT: oral mucosa moist, no thrush, sclera anicteric,   HT: reg rate and rhythm  Lymph: no cervical, supraclavicular, or axillary LAD  Lungs: Clear to auscultation b/l  Abd: soft, nt, nd, +bs x 4  Ext: no clubbing, cyanosis, or edema  Neuro: CN 2-12 grossly intact, RUE paralysis.  Moving other extremities.     Medications   Current Facility-Administered Medications   Medication Dose Route Frequency Provider Last Rate Last Admin     Current Facility-Administered Medications   Medication Dose Route Frequency Provider Last Rate Last Admin    acetaminophen (TYLENOL) tablet 1,000 mg  1,000 mg Oral or Feeding Tube Q6H Dee Shah MD   1,000 mg at 08/11/25 1743    Baclofen (LIORESAL) tablet 5 mg  5 mg Oral TID Luisito Aden MD   5 mg at 08/11/25 1939    And    baclofen (LIORESAL) half-tablet 2.5 mg  2.5 mg Oral TID Luisito Aden MD   2.5 mg at 08/11/25 1938    enoxaparin ANTICOAGULANT (LOVENOX) injection 80 mg  80 mg Subcutaneous Q12H Cristine Nguyen MD   80 mg at 08/11/25 1939    famotidine (PEPCID) tablet 10 mg  10 mg Oral BID Dee Shah MD   10 mg at 08/11/25 1939    fluticasone (FLONASE) 50 MCG/ACT spray 1 spray  1 spray Both Nostrils Daily Dee Shah MD   1 spray at 08/11/25 0753    hydroCHLOROthiazide tablet 25 mg  25 mg Oral Daily Marjan Michel MD   25 mg at 08/11/25 0752    [Held by provider] losartan (COZAAR) tablet 100 mg  100 mg Oral Daily Dee Shah MD        metoprolol succinate ER (TOPROL XL) 24 hr tablet 25 mg  25 mg Oral Daily Francisco Prather DO   25 mg at 08/11/25 0752    ondansetron (ZOFRAN ODT) ODT tab 8 mg  8 mg Oral Once Jake  Murphy Butler MD        Or    ondansetron (ZOFRAN) injection 4 mg  4 mg Intravenous Once Jake, Murphy Butler MD        polyethylene glycol (MIRALAX) Packet 17 g  17 g Oral or Feeding Tube Daily Dee Shah MD   17 g at 07/29/25 0835    rosuvastatin (CRESTOR) tablet 10 mg  10 mg Oral or Feeding Tube Daily Dee Shah MD   10 mg at 08/11/25 0752    senna-docusate (SENOKOT-S/PERICOLACE) 8.6-50 MG per tablet 1-2 tablet  1-2 tablet Oral or Feeding Tube BID Dee Shah MD   1 tablet at 08/11/25 1939    sodium chloride (PF) 0.9% PF flush 3 mL  3 mL Intracatheter Q8H MOHINDER Dee Shah MD   3 mL at 08/11/25 1612       Data   Recent Labs   Lab 08/11/25  0557 08/10/25  0646 08/09/25  0713 08/08/25  1552 08/08/25  0824 08/08/25  0631 08/05/25  0856 08/05/25  0704   WBC 7.8 7.0 6.8  --   --  7.5   < > 10.8   HGB 11.4* 11.6* 11.7*  --   --  11.4*   < > 11.7*   MCV 83 80 82  --   --  81   < > 81   PLT 53* 44* 53*  --   --  50*   < > 50*   NA  --  141  --   --   --   --   --   --    POTASSIUM 4.2 4.2  4.1 4.2  --   --  4.2   < > 4.4   CHLORIDE  --  104  --   --   --   --   --   --    CO2  --  22  --   --   --   --   --   --    BUN  --  28.4*  --   --   --   --   --   --    CR  --  1.06  --   --   --  1.03  --  0.95   ANIONGAP  --  15  --   --   --   --   --   --    SHARAN  --  9.7  --   --   --   --   --   --    GLC  --  97  --  122* 108*  --    < >  --    ALBUMIN  --  4.0  --   --   --   --   --   --    PROTTOTAL  --  6.9  --   --   --   --   --   --    BILITOTAL  --  0.6  --   --   --   --   --   --    ALKPHOS  --  69  --   --   --   --   --   --    ALT  --  28  --   --   --   --   --   --    AST  --  25  --   --   --   --   --   --     < > = values in this interval not displayed.       Recent Results (from the past 24 hours)   CT Head w/o Contrast    Narrative    EXAM: CT HEAD W/O CONTRAST  8/11/2025 5:56 PM     HISTORY:  acute change in orientation       COMPARISON:  Brain MRI 8/4/2025, head CT  7/23/2025.    TECHNIQUE: Using multidetector thin collimation helical acquisition  technique, axial, coronal and sagittal CT images from the skull base  to the vertex were obtained without intravenous contrast.   (topogram) image(s) also obtained and reviewed.    FINDINGS:  Area of hypoattenuation in the left dorsal lentiform nucleus  consistent with evolving infarct as seen on MRI. No acute intracranial  hemorrhage, mass effect, or midline shift. No acute loss of gray-white  matter differentiation in the cerebral hemispheres. Ventricles are  proportionate to the cerebral sulci. Clear basal cisterns. Dysgenesis  of the carpus callosum.    The bony calvaria and the bones of the skull base are normal. The  visualized portions of the paranasal sinuses and mastoid air cells are  clear. Grossly normal orbits.       Impression    IMPRESSION:   1. No new acute intracranial pathology.   2. Evolving infarct of the left dorsal lentiform nucleus.    I have personally reviewed the examination and initial interpretation  and I agree with the findings.    JS BARBOSA MD         SYSTEM ID:  [id number]

## 2025-08-12 NOTE — PROGRESS NOTES
[facility]    Progress Note - Medicine Service, MAROON TEAM 3       Date of Admission:  7/15/2025    Assessment & Plan   Bernard Chapman is a 35 year old male with a past medical history of hypertension, diabetes, ELENA, PE/DVT (on rivaroxaban) admitted on 7/15/2025 for lethargy, N/V, and blurry vision found to have multifocal acute CVA in L MCA , R parietal and R frontal centrum semiovale. Initially concern for TTP or catastrophic APS, however further work up notable for new diagnosis of metastatic lung adenocarcinoma with likely malignancy-related hypercoagulability.     Today's updates:  - today, still awaiting delivery and arrival of Taletrectinib per Oncology, plan is to obtain  30 days of free drug (taletrectinib) from [facility] to be shipped to mother's house; will start it ASAP at a dose of 600 mg daily on an empty stomach (3 x 200 mg capsules).  When med arrives confirm with pharmacy for dosing/order per Medicine or Oncology so patient can start is ASAP.  - cont daily CBC, required plt transfusion this AM  - CT head without acute change, but decreased interaction today/aphasic. Neuro consulted    #New metastatic lung adenocarcinoma, ROS1 rearrangement   #Right lung nodule  #Multiple enlarged mediastinal, paraesophageal, and hilar lymph nodes  Malignancy work up initiated on admission due to significant hypercoagulability. CT CAP significant for [id number] spiculated density in R lung apex, 9mm pulmonary nodule in R lung base, L thyroid nodule, paratracheal LAD, splenic infarct, bilateral renal infarcts, and L retroperitoneal nodule. Underwent FNA of lymph node which was positive for lung adenocarcinoma (resulted 7/22).  PDL-1 resulted with low expression (40%). PET 7/26 with distant metastases and MRI brain showing no signs of metastasis.  Currently pending prior authorization, getting free 30d suppky from [facility] and patient is planned to start in  patient therapy once it arrives.  -Oncology consulted, appreciate recs              - NGS; positive for a ROS1 gene rearrangement (SDC4::ROS1)                - [facility]; T p53 mutation and MITCHELL   - Due to ongoing transfusion needs, ARU not feasible at this time.   - Taletrectinib ordered through onc - Currently pending prior authorization, getting free 30d suppky from [facility] and patient is planned to start in patient therapy once it arrives.  - [place] 2nd opinion; Patient will need to call [phone] to make appointment once there is a timeline for discharging from this hospitalization     #Hypercoagulability d/t metastatic lung adenocarcinoma  #Infarcts of brain, kidney, spleen  #Cardiac valve thrombus on tricuspid and aortic valve 7/16 on TIMO  #Hx pulmonary embolism (6/23/25)  #Acute thrombocytopenia, in the setting of metastatic lung adenocarcinoma d/t ITP vs drug-induced vs consumptive coagulopathy  #Acute anemia  Found to have multiple CVA, likely embolic. TTE with mobile echodensities on tricuspid and aortic valve. Initial concern for TTP or catastrophic APS, however work up ultimately significant for new diagnosis of metastatic lung cancer. Hypercoagulable state presumed to be most likely 2/2 malignancy. Started on heparin gtt 7/17, but briefly held for 7/18 endobronchial needle aspiration of lymph nodes. Stroke code called 7/19 for worsening R-sided hemiparesis and hemianopia. Repeat CTA with new focal high-grade stenosis of other part of left MCA. Heparin gtt was stopped during code stroke, but restarting (heparin gtt + levophed) led to 10/10 headache. CTH with small left frontal SAH. Brain MRI with new L basal ganglia stroke. Heparin gtt resumed, and repeat CTH stable. Switched to therapeutic Lovenox 7/22. Declining fibrinogen, so received cryoprecipitate 7/25. Concern for continued consumptive process, but on therapeutic Lovenox. PLT downtrending, so receiving transfusions intermittently.  -  "Hematology consulted              -Transfuse if   -Hgb<7  -PLT<50k-- CONDITIONAL ORDERS IN PLACE  - SC Lovenox 135 mg bid---> 100 mg    - qdaily CBC     # Aphasia   Starting around 8/10, patient has had some personality changes, including becoming less participatory/interactive with therapies and with family.  On 8/12 he is not answering questions, but does smirk and appear like he understands what is going on.  His dad reports that when he was doing personal cares, patient verbally said \"no.\" CT head from 8/11 without any acute findings.  Difficult to assess voluntariness of the decreased responsiveness.  Potential component of delirium at play.  Given the change in personality, will consult Neurology to see if further imaging is indicated.  -Neurology consult  - Delirium precautions    # Maculopapular rash on face, chest, bilateral upper extremities, improved  # Numerous palpable purpura across the scalp and lower extremities   # Petechiae on bilateral lower extremities, improving  C/f palpable purpura, so s/p L leg s/p punch biopsy with dermatology: rupture folliculitis. No definite features of cutaneous small vessel vasculitis or thrombotic vasculopathy. New rash noted 7/26 with  flushing of face/chest after PET scan. Also scattered papules of face and upper extremities (in addition to petechiae of BLE). Not itchy and no signs of respiratory distress, but suspected  secondary to contrast from PET scan, .  Rash appeared several hours after unit of platelets, less likely due to a transfusion reaction. photos present in chart. The following days, papules are consistent with his usual acne. No diffuse erythema. Some c/f SVC syndrome given distrubution of flushing and positional nature (normally HOB elevated when asleep, so PET was first time that he had prolonged flat/supine position). CTM.   Plan:  - Benadryl as needed for itching  - Chlorhexidine wash  - Will continue to monitor     #Multifocal acute ischemic " strokes, in L MCA territory and new L basal ganglia stroke; Increasing mass effect on L lateral ventricle by L MCA infarction  #Subtotal occlusive thrombus in left M1   #Left frontoparietal SAH (7/19)  #Hypertension chronic  #HLD, chronic  Etiology most likely hypercoagulability d/t malignancy. Emboli stemming from tricuspid and aortic valves, appreciated on TTE 7/16. He continues to have R facial droop, extremity weakness. No apparent aphasia, but he is mildly dysarthric. Left frontoparietal subarachnoid hemorrhage stable per CT. Working with therapies with notable improvement.  Repeat brain MRI on 8/4 shows 2 new small infarcts in the right frontal lobe.  Patient shows to have continuation of improvement in physical exam findings.  Unclear significance of these findings.  Discussed with neuro to provide comments regarding this.  > Neuro-ICU signing off              - SBP goal of 120 - 160 mmHg and DBP goal < 100                          -Has prn labetalol and hydralazine  - cont 10 mg rosuvastatin  > OT/PT/Speech              -Since ARU dispo delayed, asked therapies to do aggressive rehabilitation as able  > nutrition consult              -monitor oral intake, weights  - repeat TTE outpatient to ensure improvement of AR and TR (and vegetations)  - Neuro to follow-up with MRI findings     #DM2, A1c 5.3  On HSSI with adequate control after stopping dexamethasone. CTM.   - POC glucose ACHS  - High intensity insulin sliding scale     #ELENA  Need to clarify home CPAP settings.     #Leukocytosis, resolved  Found to have leukocytosis, no clear cause or signs of infection, possible 2/2 stress induced.      #Hiccups, resolved   #chronic back pain  Chronic back pain. On acetaminophen only (given frequent need for neuro checks). Hiccups resolved (suspected 2/2 dexamethasone).   Plan:  - Thorazine 25mg q6hr discontinued due to contraindication with chemotherapy  - Melatonin 3mg daily  - Pepcid discontinued due to  "contraindication with chemotherapy  - continue  baclofen  - acetaminophen 1000 mg every 6 hours  - hold cyclobenzaprine 10 mg 3x daily PRN  - hold PTA gabapentin 100 mg 3x daily  - hold PTA oxycodone 5 mg PRN every 6h      #allergies  - fluticasone 1 spray daily      #HTN  - Continue PTA Metoprolol succinate 25mg q daily (ok to hold if HR< 60)  - Hold PTA Losartan, could consider restarting vs discontinuing given normotensive  - Continue PTA hydrochlorothiazide     #Epistaxis, currently resolved  On 8/5 patient found to have epistaxis.  Physical exam shows blood coming from the right nares.  - Afrin spray as needed  - Apply pressure to the nasal passage to help with bleeding        Diet: Regular Diet Adult    DVT Prophylaxis: Enoxaparin (Lovenox) SQ  Avalos Catheter: Not present  Lines: None     Cardiac Monitoring: None  Code Status: Full Code      Clinically Significant Risk Factors                 # Thrombocytopenia: Lowest platelets = 46 in last 2 days, will monitor for bleeding              # Overweight: Estimated body mass index is 29.32 kg/m  as calculated from the following:    Height as of this encounter: 1.88 m (6' 2\").    Weight as of this encounter: 103.6 kg (228 lb 6.4 oz).      # Financial/Environmental Concerns: none         Social Drivers of Health          Disposition Plan     Medically Ready for Discharge: Anticipated in 5+ Days     The patient's care was discussed with the Attending Physician, Dr. Wilson.    OBEY LU MD  Medicine Service, [facility] TEAM 3  [facility]  Securely message with "[employer]" (more info)  Text page via [employer] Paging/Directory   See signed in provider for up to date coverage information  ______________________________________________________________________    Interval History   Overnight patient was only oriented to self, CT head ordered due to acute change in orientation.  CT head without acute findings.  This morning he was less " interactive with family and did not want to participate with physical therapy.  He is not responding to questions verbally, but smokes when his dad is talking to him.    Physical Exam   Vital Signs: Temp: 98.5  F (36.9  C) Temp src: Oral BP: 119/49 Pulse: 60   Resp: 16 SpO2: 97 % O2 Device: None (Room air)    Weight: 228 lbs 6.4 oz    Constitutional: awake, alert, no apparent distress  Respiratory: No increased work of breathing, good air exchange, clear to auscultation bilaterally, no crackles or wheezing  Cardiovascular: Regular rate and rhythm, normal S1 and S2, no S3 or S4  GI: Soft, non-distended, non-tender, no masses palpated  Musculoskeletal: No lower extremity swelling bilaterally  Skin: Petechiae on bilateral lower extremities  Neurologic: Awake, alert, does not verbally respond to questions.  Is looking around the room, tracking, when asked what his favorite movie is, he does not respond but smiled.    Medical Decision Making           Data     I have personally reviewed the following data over the past 24 hrs:    8.4  \   11.8 (L)   / 46 (LL)     140 104 22.7 (H) /  99   4.0 23 1.10 \       Imaging results reviewed over the past 24 hrs:   Recent Results (from the past 24 hours)   CT Head w/o Contrast    Narrative    EXAM: CT HEAD W/O CONTRAST  8/11/2025 5:56 PM     HISTORY:  acute change in orientation       COMPARISON:  Brain MRI 8/4/2025, head CT 7/23/2025.    TECHNIQUE: Using multidetector thin collimation helical acquisition  technique, axial, coronal and sagittal CT images from the skull base  to the vertex were obtained without intravenous contrast.   (topogram) image(s) also obtained and reviewed.    FINDINGS:  Area of hypoattenuation in the left dorsal lentiform nucleus  consistent with evolving infarct as seen on MRI. No acute intracranial  hemorrhage, mass effect, or midline shift. No acute loss of gray-white  matter differentiation in the cerebral hemispheres. Ventricles  are  proportionate to the cerebral sulci. Clear basal cisterns. Dysgenesis  of the carpus callosum.    The bony calvaria and the bones of the skull base are normal. The  visualized portions of the paranasal sinuses and mastoid air cells are  clear. Grossly normal orbits.       Impression    IMPRESSION:   1. No new acute intracranial pathology.   2. Evolving infarct of the left dorsal lentiform nucleus.    I have personally reviewed the examination and initial interpretation  and I agree with the findings.    JS BARBOSA MD         SYSTEM ID:  [id number]

## 2025-08-12 NOTE — INTERIM SUMMARY
"Short Neurology Interval Note    Bernard Chapman is a 35 year old male w hypertension, diabetes, ELENA, PE/DVT (on rivaroxaban) admitted on 7/15/2025 for lethargy, N/V, and blurry vision found to have multifocal acute ischemic stroke in setting of mid to distal M1 segment LMCA occlusion , R parietal and R frontal centrum semiovale. Initially there was concern for TTP or catastrophic APS, however further work up led to new diagnosis of metastatic lung adenocarcinoma with likely malignancy-related hypercoagulability. PET 7/26 with distant metastases. MRI brain completed at oncology recommendation to evaluate for any Central nervous system metastasis prior to initiation of treatment (findings as above).    Today, stroke neurology consulted for new aphasia, change in personality over the last 2 days.  Patient seen at bedside by myself and my senior, Dr. Palmer. The patient refused to participate in encounter and exam.  He did acknowledge us with shaking his head no and a few words. - \"No\" and \"I don't want to do anything\".     Per our assessment, this doesn't seem like aphasia since he was semi-appropriately able to respond to our questions. It could be contributed to mood changes from prolonged hospitalization. Per the primary team, the CT head performed yesterday did not show any acute changes. At this time, we will recommend MRI & MRA studies in order to ensure the absence of acute vascular event given his risk factors. He is on therapeutic anticoagulation with Lovenox. His assessment is not consistent with LVO - ok to defer CTA. We will see again in the morning to further assess.    This patient discussed with Stroke Staff, Dr. Hdez.    Susannah Shi MD  PGY-1 Neurology  "

## 2025-08-12 NOTE — PLAN OF CARE
Goal Outcome Evaluation:    Plan of Care Reviewed With: patient    Overall Patient Progress: no change    Status: Admitted 7/15, found to have acute ischemic stroke in LMCA/R parietal/frontal centrum semiovale, further workup found new diagnosis of metastatic lung adenocarcinoma. Hx HTN, diabetes, ELENA, PE/DVT. On vEEG monitoring, 1:1 sitter for line pulling/agitation.   Vitals: VSS RA  Neuros: Alert, withdrawn, minimally verbal this shift, at times agitated, disinhibited and not easily redirectable. DTA due to pt refusing to answer most questions or follow commands. Slight R facial droop. Strengths 5/5 on RUE and RLE, and 1/5 on LUE and 4/5 on LLE.   IV: PIV SL  Labs/Electrolytes: Reviewed  Resp: WDL RA  Diet: Regular, poor intake. Emesis x2 while eating, IV Compazine and Zofran given.  GI: LBM 8/12  : VDSP BR  Skin: Petechiae throughout  Pain: Complained of generalized stomach pain, MD aware and came to bedside to assess patient.  Activity: Ax2/gb/cane  Plan: Continue w POC. Awaiting taletrectibin medication possibly tomorrow from family, Oncology team and pharmacy should be paged once medication arrives.  Updates this shift: EEG monitoring initiated due to staring spell/tremors in combination with emesis. MRI of brain and neck canceled due to emesis.

## 2025-08-12 NOTE — PLAN OF CARE
"Goal Outcome Evaluation:  Alert, but unable to assess orientation. Pt made in-appropriate laughters at around 08:30, MD Francisco Prather,  informed immediately, no further action ordered.  but no more such behavior noted after 1 hour, but pt continued through out the day not speaking, he would nod for \"yes or no\". Visited by Oncology and Medical team, neuro team was consulted.   No neuro changes noted; Father at bedside. Platelets infusion initiated, well tolerated. Pt felt nauseated, compazine PRN given wth good result. Had a Large soft BM.   /49   Pulse 60   Temp 98.5  F (36.9  C) (Oral)   Resp 16   Ht 1.88 m (6' 2\")   Wt 103.6 kg (228 lb 6.4 oz)   SpO2 97%   BMI 29.32 kg/m                             "

## 2025-08-12 NOTE — PROGRESS NOTES
[facility]    Oncology Progress Note    Date of Service (when I saw the patient): 08/12/2025    Assessment & Plan   Bernard Chapman is a 35 year old male who was admitted on 7/15/2025.     Recommendations:    - Remains a bit withdrawn, but was talking to Dr. Hou (onc fellow) earlier today.  Feel that he is more frustrated rather than anything going on from a neurologic standpoint.    Per [employer], mom will receive 30 day supply of free drug on Wed.   - Discussed with [facility] and they have tentatively approved the prescription for taletrectinib, but still awaiting decision from [facility].   - In the meantime, outpt and inpt oncology pharmacist working on [facility] access program for the medication - thus one way or the other, we are likely to get it for the patient.  The 30-day supply gives us time to secure access.   - From my standpoint, ok to start treatment as soon as we have it.   - Continue to monitor platelets.  If his platelets can remain above 50K long enough can potentially go to rehab.  I think ARU might be complicated due to transfusion needs and sometimes the need for taletrectinib can complicate those plans.         Bernardo Mackenzie, DO    Interval History   Not really talking to me, but was shaking his head appropriately to questions. Did talk to fellow earlier today. No pain.     -Data reviewed today: I reviewed all new labs and imaging results over the last 24 hours. I personally reviewed no images or EKG's today.    Physical Exam   Temp: 98  F (36.7  C) Temp src: Oral BP: 130/55 Pulse: 58   Resp: 16 SpO2: 99 % O2 Device: None (Room air)    Vitals:    07/29/25 1300 08/03/25 1451 08/09/25 1711   Weight: 108.9 kg (240 lb) 108.5 kg (239 lb 3.2 oz) 103.6 kg (228 lb 6.4 oz)     Vital Signs with Ranges  Temp:  [97.5  F (36.4  C)-98.5  F (36.9  C)] 98  F (36.7  C)  Pulse:  [57-62] 58  Resp:  [16] 16  BP: (119-140)/(49-64) 130/55  SpO2:  [97 %-99 %] 99  %  I/O last 3 completed shifts:  In: 481.75 [P.O.:200; I.V.:3]  Out: -     Gen: Alert and oriented x 3, nad  HEENT: oral mucosa moist, no thrush, sclera anicteric,   HT: reg rate and rhythm  Lymph: no cervical, supraclavicular, or axillary LAD  Lungs: Clear to auscultation b/l  Abd: soft, nt, nd, +bs x 4  Ext: no clubbing, cyanosis, or edema  Neuro: CN 2-12 grossly intact, RUE paralysis.  Moving other extremities.     Medications   Current Facility-Administered Medications   Medication Dose Route Frequency Provider Last Rate Last Admin     Current Facility-Administered Medications   Medication Dose Route Frequency Provider Last Rate Last Admin    acetaminophen (TYLENOL) tablet 1,000 mg  1,000 mg Oral or Feeding Tube Q6H Dee Shah MD   1,000 mg at 08/12/25 1721    Baclofen (LIORESAL) tablet 5 mg  5 mg Oral TID Luisito Aden MD   5 mg at 08/12/25 1312    And    baclofen (LIORESAL) half-tablet 2.5 mg  2.5 mg Oral TID Luisito Aden MD   2.5 mg at 08/12/25 1312    enoxaparin ANTICOAGULANT (LOVENOX) injection 80 mg  80 mg Subcutaneous Q12H Cristine Nguyen MD   80 mg at 08/12/25 0817    fluticasone (FLONASE) 50 MCG/ACT spray 1 spray  1 spray Both Nostrils Daily Dee Shah MD   1 spray at 08/12/25 0816    hydroCHLOROthiazide tablet 25 mg  25 mg Oral Daily Marjan Michel MD   25 mg at 08/12/25 0819    [Held by provider] losartan (COZAAR) tablet 100 mg  100 mg Oral Daily eDe Shah MD        metoprolol succinate ER (TOPROL XL) 24 hr tablet 25 mg  25 mg Oral Daily Francisco Prather DO   25 mg at 08/11/25 0752    ondansetron (ZOFRAN ODT) ODT tab 8 mg  8 mg Oral Once Murphy Joseph MD        Or    ondansetron (ZOFRAN) injection 4 mg  4 mg Intravenous Once Murphy Joseph MD        polyethylene glycol (MIRALAX) Packet 17 g  17 g Oral or Feeding Tube Daily Dee Shah MD   17 g at 07/29/25 0835    rosuvastatin (CRESTOR) tablet 10 mg  10 mg Oral or Feeding Tube Daily Dee Shah,  MD   10 mg at 08/12/25 0821    senna-docusate (SENOKOT-S/PERICOLACE) 8.6-50 MG per tablet 1-2 tablet  1-2 tablet Oral or Feeding Tube BID Dee Shah MD   2 tablet at 08/12/25 0819    sodium chloride (PF) 0.9% PF flush 3 mL  3 mL Intracatheter Q8H [facility] Dee Shah MD   3 mL at 08/12/25 1528       Data   Recent Labs   Lab 08/12/25  0614 08/11/25  0557 08/10/25  0646 08/09/25  0713 08/08/25  1552 08/08/25  0824 08/08/25  0631   WBC 8.4 7.8 7.0   < >  --   --  7.5   HGB 11.8* 11.4* 11.6*   < >  --   --  11.4*   MCV 82 83 80   < >  --   --  81   PLT 46* 53* 44*   < >  --   --  50*     --  141  --   --   --   --    POTASSIUM 4.0 4.2 4.2  4.1   < >  --   --  4.2   CHLORIDE 104  --  104  --   --   --   --    CO2 23  --  22  --   --   --   --    BUN 22.7*  --  28.4*  --   --   --   --    CR 1.10  --  1.06  --   --   --  1.03   ANIONGAP 13  --  15  --   --   --   --    SHARAN 9.7  --  9.7  --   --   --   --    GLC 99  --  97  --  122*   < >  --    ALBUMIN  --   --  4.0  --   --   --   --    PROTTOTAL  --   --  6.9  --   --   --   --    BILITOTAL  --   --  0.6  --   --   --   --    ALKPHOS  --   --  69  --   --   --   --    ALT  --   --  28  --   --   --   --    AST  --   --  25  --   --   --   --     < > = values in this interval not displayed.       Recent Results (from the past 24 hours)   CT Head w/o Contrast    Narrative    EXAM: CT HEAD W/O CONTRAST  8/11/2025 5:56 PM     HISTORY:  acute change in orientation       COMPARISON:  Brain MRI 8/4/2025, head CT 7/23/2025.    TECHNIQUE: Using multidetector thin collimation helical acquisition  technique, axial, coronal and sagittal CT images from the skull base  to the vertex were obtained without intravenous contrast.   (topogram) image(s) also obtained and reviewed.    FINDINGS:  Area of hypoattenuation in the left dorsal lentiform nucleus  consistent with evolving infarct as seen on MRI. No acute intracranial  hemorrhage, mass effect, or midline shift. No  acute loss of gray-white  matter differentiation in the cerebral hemispheres. Ventricles are  proportionate to the cerebral sulci. Clear basal cisterns. Dysgenesis  of the carpus callosum.    The bony calvaria and the bones of the skull base are normal. The  visualized portions of the paranasal sinuses and mastoid air cells are  clear. Grossly normal orbits.       Impression    IMPRESSION:   1. No new acute intracranial pathology.   2. Evolving infarct of the left dorsal lentiform nucleus.    I have personally reviewed the examination and initial interpretation  and I agree with the findings.    JS BARBOSA MD         SYSTEM ID:  [id number]

## 2025-08-12 NOTE — PLAN OF CARE
[id number]    A&Ox3, disoriented to time, pt withdrawn; sometimes would ignore questions or turn over if he doesn't want to talk anymore. Pt did not use BR during this shift; offered and denied. Denied 0600 dose of Tylenol. Denies pain, N/V, SOB and dizziness. Ax2 with gait and cane. 5/5 strength LUE and LLE, 1/5 RUE and 3/5 RLE. Mild rt facial droop unchanged. Scattered petechiae unchanged, pt denies itchiness. LPIV saline locked. Trial medication has not started; possibly start later this week. POC ongoing, team to be notified of any changes.     Goal Outcome Evaluation:  Problem: Adult Inpatient Plan of Care  Goal: Optimal Comfort and Wellbeing  Outcome: Not Progressing  Intervention: Monitor Pain and Promote Comfort  Recent Flowsheet Documentation  Taken 8/12/2025 0000 by Digna Key RN  Pain Management Interventions:   medication (see MAR)   care clustered   pillow support provided   relaxation techniques promoted   repositioned  Intervention: Provide Person-Centered Care  Recent Flowsheet Documentation  Taken 8/12/2025 0000 by Digna Key RN  Trust Relationship/Rapport:   care explained   choices provided   empathic listening provided   thoughts/feelings acknowledged     Problem: Fall Injury Risk  Goal: Absence of Fall and Fall-Related Injury  Outcome: Progressing  Intervention: Identify and Manage Contributors  Recent Flowsheet Documentation  Taken 8/12/2025 0000 by Digna Key RN  Self-Care Promotion: independence encouraged  Medication Review/Management: medications reviewed  Intervention: Promote Injury-Free Environment  Recent Flowsheet Documentation  Taken 8/12/2025 0000 by Digna Key RN  Safety Promotion/Fall Prevention: (pt resting in bed, denied further assistance)   safety round/check completed   activity supervised   clutter free environment maintained   lighting adjusted   mobility aid in reach   nonskid shoes/slippers when out of bed

## 2025-08-13 ENCOUNTER — APPOINTMENT (OUTPATIENT)
Dept: MRI IMAGING | Facility: CLINIC | Age: 35
DRG: 064 | End: 2025-08-13
Payer: COMMERCIAL

## 2025-08-13 ENCOUNTER — APPOINTMENT (OUTPATIENT)
Dept: OCCUPATIONAL THERAPY | Facility: CLINIC | Age: 35
DRG: 064 | End: 2025-08-13
Payer: COMMERCIAL

## 2025-08-13 LAB
ANION GAP SERPL CALCULATED.3IONS-SCNC: 14 MMOL/L (ref 7–15)
ATRIAL RATE - MUSE: 75 BPM
BUN SERPL-MCNC: 24.3 MG/DL (ref 6–20)
CALCIUM SERPL-MCNC: 9.6 MG/DL (ref 8.8–10.4)
CHLORIDE SERPL-SCNC: 101 MMOL/L (ref 98–107)
CHOLEST SERPL-MCNC: 143 MG/DL
CK SERPL-CCNC: 24 U/L (ref 39–308)
CREAT SERPL-MCNC: 1.32 MG/DL (ref 0.67–1.17)
DIASTOLIC BLOOD PRESSURE - MUSE: NORMAL MMHG
EGFRCR SERPLBLD CKD-EPI 2021: 72 ML/MIN/1.73M2
ERYTHROCYTE [DISTWIDTH] IN BLOOD BY AUTOMATED COUNT: 14 % (ref 10–15)
GLUCOSE SERPL-MCNC: 109 MG/DL (ref 70–99)
HCO3 SERPL-SCNC: 25 MMOL/L (ref 22–29)
HCT VFR BLD AUTO: 34.9 % (ref 40–53)
HDLC SERPL-MCNC: 29 MG/DL
HGB BLD-MCNC: 11.7 G/DL (ref 13.3–17.7)
HOLD SPECIMEN: NORMAL
HOLD SPECIMEN: NORMAL
INTERPRETATION ECG - MUSE: NORMAL
LDLC SERPL CALC-MCNC: 77 MG/DL
MAGNESIUM SERPL-MCNC: 2.2 MG/DL (ref 1.7–2.3)
MCH RBC QN AUTO: 26.7 PG (ref 26.5–33)
MCHC RBC AUTO-ENTMCNC: 33.5 G/DL (ref 31.5–36.5)
MCV RBC AUTO: 79.5 FL (ref 78–100)
NONHDLC SERPL-MCNC: 114 MG/DL
P AXIS - MUSE: 78 DEGREES
PHOSPHATE SERPL-MCNC: 4.2 MG/DL (ref 2.5–4.5)
PLATELET # BLD AUTO: 41 10E3/UL (ref 150–450)
POTASSIUM SERPL-SCNC: 3.8 MMOL/L (ref 3.4–5.3)
PR INTERVAL - MUSE: 144 MS
QRS DURATION - MUSE: 78 MS
QT - MUSE: 372 MS
QTC - MUSE: 415 MS
R AXIS - MUSE: 84 DEGREES
RADIOLOGIST FLAGS: ABNORMAL
RBC # BLD AUTO: 4.39 10E6/UL (ref 4.4–5.9)
SODIUM SERPL-SCNC: 140 MMOL/L (ref 135–145)
SYSTOLIC BLOOD PRESSURE - MUSE: NORMAL MMHG
T AXIS - MUSE: -8 DEGREES
TRIGL SERPL-MCNC: 186 MG/DL
TSH SERPL DL<=0.005 MIU/L-ACNC: 1.81 UIU/ML (ref 0.3–4.2)
URATE SERPL-MCNC: 7.1 MG/DL (ref 3.4–7)
VENTRICULAR RATE- MUSE: 75 BPM
WBC # BLD AUTO: 10.63 10E3/UL (ref 4–11)

## 2025-08-13 PROCEDURE — 120N000003 HC R&B IMCU UMMC

## 2025-08-13 PROCEDURE — 250N000013 HC RX MED GY IP 250 OP 250 PS 637

## 2025-08-13 PROCEDURE — 70547 MR ANGIOGRAPHY NECK W/O DYE: CPT

## 2025-08-13 PROCEDURE — 70547 MR ANGIOGRAPHY NECK W/O DYE: CPT | Mod: 26 | Performed by: RADIOLOGY

## 2025-08-13 PROCEDURE — 250N000011 HC RX IP 250 OP 636

## 2025-08-13 PROCEDURE — 83735 ASSAY OF MAGNESIUM: CPT | Performed by: HOSPITALIST

## 2025-08-13 PROCEDURE — 250N000013 HC RX MED GY IP 250 OP 250 PS 637: Performed by: STUDENT IN AN ORGANIZED HEALTH CARE EDUCATION/TRAINING PROGRAM

## 2025-08-13 PROCEDURE — 258N000003 HC RX IP 258 OP 636

## 2025-08-13 PROCEDURE — 70544 MR ANGIOGRAPHY HEAD W/O DYE: CPT

## 2025-08-13 PROCEDURE — 70551 MRI BRAIN STEM W/O DYE: CPT

## 2025-08-13 PROCEDURE — 97530 THERAPEUTIC ACTIVITIES: CPT | Mod: GO

## 2025-08-13 PROCEDURE — 84100 ASSAY OF PHOSPHORUS: CPT | Performed by: HOSPITALIST

## 2025-08-13 PROCEDURE — 99233 SBSQ HOSP IP/OBS HIGH 50: CPT | Mod: GC | Performed by: STUDENT IN AN ORGANIZED HEALTH CARE EDUCATION/TRAINING PROGRAM

## 2025-08-13 PROCEDURE — 85027 COMPLETE CBC AUTOMATED: CPT

## 2025-08-13 PROCEDURE — 250N000011 HC RX IP 250 OP 636: Performed by: INTERNAL MEDICINE

## 2025-08-13 PROCEDURE — 120N000002 HC R&B MED SURG/OB UMMC

## 2025-08-13 PROCEDURE — 36415 COLL VENOUS BLD VENIPUNCTURE: CPT

## 2025-08-13 PROCEDURE — 70551 MRI BRAIN STEM W/O DYE: CPT | Mod: 26 | Performed by: RADIOLOGY

## 2025-08-13 PROCEDURE — 93005 ELECTROCARDIOGRAM TRACING: CPT

## 2025-08-13 PROCEDURE — 99233 SBSQ HOSP IP/OBS HIGH 50: CPT | Mod: GC | Performed by: INTERNAL MEDICINE

## 2025-08-13 PROCEDURE — 80048 BASIC METABOLIC PNL TOTAL CA: CPT

## 2025-08-13 PROCEDURE — 97535 SELF CARE MNGMENT TRAINING: CPT | Mod: GO

## 2025-08-13 RX ORDER — PROCHLORPERAZINE MALEATE 10 MG
10 TABLET ORAL EVERY 6 HOURS PRN
Status: DISCONTINUED | OUTPATIENT
Start: 2025-08-13 | End: 2025-08-13

## 2025-08-13 RX ORDER — HEPARIN SODIUM 10000 [USP'U]/100ML
0-5000 INJECTION, SOLUTION INTRAVENOUS CONTINUOUS
Status: DISCONTINUED | OUTPATIENT
Start: 2025-08-14 | End: 2025-08-14

## 2025-08-13 RX ORDER — DIPHENHYDRAMINE HYDROCHLORIDE 50 MG/ML
25 INJECTION, SOLUTION INTRAMUSCULAR; INTRAVENOUS
Status: COMPLETED | OUTPATIENT
Start: 2025-08-13 | End: 2025-08-13

## 2025-08-13 RX ORDER — OLANZAPINE 10 MG/2ML
5 INJECTION, POWDER, FOR SOLUTION INTRAMUSCULAR
Status: COMPLETED | OUTPATIENT
Start: 2025-08-13 | End: 2025-08-14

## 2025-08-13 RX ORDER — HALOPERIDOL 5 MG/ML
1 INJECTION INTRAMUSCULAR ONCE
Status: COMPLETED | OUTPATIENT
Start: 2025-08-13 | End: 2025-08-13

## 2025-08-13 RX ORDER — LORAZEPAM 1 MG/1
1 TABLET ORAL EVERY 4 HOURS PRN
Status: DISCONTINUED | OUTPATIENT
Start: 2025-08-13 | End: 2025-08-13

## 2025-08-13 RX ORDER — LORAZEPAM 1 MG/1
1 TABLET ORAL EVERY 4 HOURS PRN
Status: DISCONTINUED | OUTPATIENT
Start: 2025-08-13 | End: 2025-08-14

## 2025-08-13 RX ORDER — DIPHENHYDRAMINE HCL 50 MG
50 CAPSULE ORAL
Status: COMPLETED | OUTPATIENT
Start: 2025-08-13 | End: 2025-08-13

## 2025-08-13 RX ORDER — OLANZAPINE 10 MG/1
10 TABLET, ORALLY DISINTEGRATING ORAL DAILY PRN
Status: DISCONTINUED | OUTPATIENT
Start: 2025-08-13 | End: 2025-08-21 | Stop reason: HOSPADM

## 2025-08-13 RX ORDER — LORAZEPAM 2 MG/ML
4 INJECTION INTRAMUSCULAR
Status: DISCONTINUED | OUTPATIENT
Start: 2025-08-13 | End: 2025-08-21 | Stop reason: HOSPADM

## 2025-08-13 RX ORDER — PROCHLORPERAZINE MALEATE 10 MG
10 TABLET ORAL DAILY
Status: DISCONTINUED | OUTPATIENT
Start: 2025-08-13 | End: 2025-08-21 | Stop reason: HOSPADM

## 2025-08-13 RX ORDER — POTASSIUM CHLORIDE 7.45 MG/ML
10 INJECTION INTRAVENOUS
Status: COMPLETED | OUTPATIENT
Start: 2025-08-14 | End: 2025-08-14

## 2025-08-13 RX ORDER — HALOPERIDOL 5 MG/ML
2 INJECTION INTRAMUSCULAR
Status: DISCONTINUED | OUTPATIENT
Start: 2025-08-13 | End: 2025-08-14

## 2025-08-13 RX ORDER — HALOPERIDOL 5 MG/ML
2 INJECTION INTRAMUSCULAR ONCE
Status: COMPLETED | OUTPATIENT
Start: 2025-08-13 | End: 2025-08-13

## 2025-08-13 RX ORDER — LORAZEPAM 0.5 MG/1
0.5 TABLET ORAL EVERY 4 HOURS PRN
Status: DISCONTINUED | OUTPATIENT
Start: 2025-08-13 | End: 2025-08-21 | Stop reason: HOSPADM

## 2025-08-13 RX ADMIN — SENNOSIDES AND DOCUSATE SODIUM 2 TABLET: 50; 8.6 TABLET ORAL at 20:42

## 2025-08-13 RX ADMIN — LEVETIRACETAM 750 MG: 100 INJECTION, SOLUTION INTRAVENOUS at 20:26

## 2025-08-13 RX ADMIN — Medication 5 MG: at 15:23

## 2025-08-13 RX ADMIN — Medication 2.5 MG: at 15:25

## 2025-08-13 RX ADMIN — METOPROLOL SUCCINATE 25 MG: 25 TABLET, EXTENDED RELEASE ORAL at 08:48

## 2025-08-13 RX ADMIN — OLANZAPINE 5 MG: 10 INJECTION, POWDER, FOR SOLUTION INTRAMUSCULAR at 04:19

## 2025-08-13 RX ADMIN — Medication 2.5 MG: at 08:52

## 2025-08-13 RX ADMIN — Medication 5 MG: at 20:41

## 2025-08-13 RX ADMIN — ACETAMINOPHEN 1000 MG: 500 TABLET ORAL at 12:32

## 2025-08-13 RX ADMIN — ONDANSETRON 8 MG: 4 TABLET, ORALLY DISINTEGRATING ORAL at 11:33

## 2025-08-13 RX ADMIN — ENOXAPARIN SODIUM 80 MG: 100 INJECTION SUBCUTANEOUS at 08:52

## 2025-08-13 RX ADMIN — ROSUVASTATIN CALCIUM 10 MG: 10 TABLET, FILM COATED ORAL at 08:47

## 2025-08-13 RX ADMIN — POLYETHYLENE GLYCOL 3350 17 G: 17 POWDER, FOR SOLUTION ORAL at 08:47

## 2025-08-13 RX ADMIN — Medication 5 MG: at 08:47

## 2025-08-13 RX ADMIN — OLANZAPINE 5 MG: 10 INJECTION, POWDER, FOR SOLUTION INTRAMUSCULAR at 21:44

## 2025-08-13 RX ADMIN — QUETIAPINE FUMARATE 50 MG: 50 TABLET ORAL at 20:42

## 2025-08-13 RX ADMIN — Medication 2.5 MG: at 20:44

## 2025-08-13 RX ADMIN — HYDROCHLOROTHIAZIDE 25 MG: 12.5 TABLET ORAL at 08:47

## 2025-08-13 RX ADMIN — HALOPERIDOL LACTATE 1 MG: 5 INJECTION, SOLUTION INTRAMUSCULAR at 22:17

## 2025-08-13 RX ADMIN — ENOXAPARIN SODIUM 80 MG: 100 INJECTION SUBCUTANEOUS at 20:43

## 2025-08-13 RX ADMIN — DIPHENHYDRAMINE HYDROCHLORIDE 50 MG: 50 CAPSULE ORAL at 16:00

## 2025-08-13 RX ADMIN — LORAZEPAM 1 MG: 1 TABLET ORAL at 16:31

## 2025-08-13 RX ADMIN — HALOPERIDOL LACTATE 2 MG: 5 INJECTION, SOLUTION INTRAMUSCULAR at 22:03

## 2025-08-13 ASSESSMENT — ACTIVITIES OF DAILY LIVING (ADL)
ADLS_ACUITY_SCORE: 54
ADLS_ACUITY_SCORE: 54
ADLS_ACUITY_SCORE: 38
ADLS_ACUITY_SCORE: 54
ADLS_ACUITY_SCORE: 54
ADLS_ACUITY_SCORE: 38
ADLS_ACUITY_SCORE: 54
ADLS_ACUITY_SCORE: 38
ADLS_ACUITY_SCORE: 39
ADLS_ACUITY_SCORE: 54
ADLS_ACUITY_SCORE: 38
ADLS_ACUITY_SCORE: 38
ADLS_ACUITY_SCORE: 54
ADLS_ACUITY_SCORE: 54
ADLS_ACUITY_SCORE: 38
ADLS_ACUITY_SCORE: 38
ADLS_ACUITY_SCORE: 54

## 2025-08-13 NOTE — PLAN OF CARE
Status: Admitted 7/15, found to have acute ischemic stroke in LMCA/R parietal/frontal centrum semiovale, further workup found new diagnosis of metastatic lung adenocarcinoma. Hx HTN, diabetes, ELENA, PE/DVT. Placed on vEEG monitoring, 1:1 sitter for line pulling/agitation.   Vitals: Refused VS overnight  Neuros: DND overnight, so no formal neuro assessment completed this shift. Most recent neuro includes: Alert, withdrawn, minimally verbal this shift, at times agitated, disinhibited and not easily redirectable. DTA due to pt refusing to answer most questions or follow commands. Slight R facial droop. Strengths 5/5 on RUE and RLE, and 1/5 on LUE and 4/5 on LLE.   IV: PIV SL  Resp: Denies SOB  Diet: Regular  GI: LBM 8/12  : Voiding spontaneously to bedside urinal  Skin: Petechiae t/o. EEG leads removed by pt  Pain: Refused tylenol  Activity: Ax2, GB, cane  SCDs on? If no, why?: No - restless and impulsive  Plan: Family to bring taletrectinib medication from home, pharmacy and oncology to be notified upon arrival. Call lab for blood draw if pt is calmer later in the AM  Updates this shift: Pt intermittently agitated, grabbing at/punching/threatening to kill staff. Difficult to redirect. Pulled off all EEG leads. Attempted to give IM zyprexa, only able to give 0.15 mL d/t pt pulling away. Will likely needs restraints if team wants to continue EEG monitoring.     Goal Outcome Evaluation:      Plan of Care Reviewed With: patient    Overall Patient Progress: no change    Outcome Evaluation: Intermittently agitated overnight. DND 10P-6A

## 2025-08-13 NOTE — PLAN OF CARE
"/67 (BP Location: Left arm, Cuff Size: Adult Regular)   Pulse 69   Temp 97.9  F (36.6  C) (Oral)   Resp 24   Ht 1.88 m (6' 2\")   Wt 103.6 kg (228 lb 6.4 oz)   SpO2 99%   BMI 29.32 kg/m        Patient was pre medicated with benadryl and ativan prior to MRI. Sat still until 15 minutes left of imaging then became abusive to staff so came back up to 6A. Sitter remains at bedside. L PIV SL. Neuros are difficult to assess due to patient's behaviors and not following commands.   "

## 2025-08-13 NOTE — PROGRESS NOTES
Cardiology New Consult Note     Reason for Consult: Pt with new dx metastatic lung adenocarcinoma and multifocal ischemic stroke. TTE with thrombi. Was on hep gtt-->therapeutic anticoagulation with Lovenox. Assistance with any further management.     History of Present Illness:    Bernard Chapman is a 35 year old male with PMH of HTN, NIDDM2, HLD, ELENA, PE/DVT on Xarelto 06/2025 was admitted 07/15/2025 with multifocal acute CVAs and was found with new diagnosis metastatic (mediastinal lymph nodes, left retroperitoneal nodule, bones, and right adrenal gland) lung adenocarcinoma. There was concern for TTP, workup negative. There was concern for CAPS, B2GP and cardiolipin neg, LAC +. A TIMO revealed tricuspid and aortic valve massess concerning for thrombus. His course was complicated by SAH noted on 07/19 for which heparin drip was briefly held. Due to thrombocytopenia felt to malignancy associated consumptive by heme he has required multiple platelet transfusions. Despite therapeutic anticoagulation, he was noted with new frontal lobe infarcts 8/4.  He had a seizure-like episode 8/12 and was started on prophylaxis. Cardiology is consulted for recommendations for AC I/s/o cardiac thrombus.        No past medical history on file.  Past Surgical History:   Procedure Laterality Date    BRONCHOSCOPY RIDID OR FLEXIBLE W/ENDOBRONCHIAL ULTRASOUND GUIDED 1 OR 2 NODE STATIONS N/A 7/18/2025    Procedure: BRONCHOSCOPY, WITH BIOPSY OF LYMPH NODE STATION WITH ENDOBRONCHIAL ULTRASOUND GUIDANCE;  Surgeon: Binh Bains DO;  Location:  OR     Social History     Occupational History    Not on file   Tobacco Use    Smoking status: Never    Smokeless tobacco: Never   Substance and Sexual Activity    Alcohol use: No    Drug use: No    Sexual activity: Yes     Partners: Female      Family History   Problem Relation Age of Onset    Respiratory Mother         asthma    Hypertension Maternal Grandmother     Arthritis Maternal  Grandmother     Diabetes Paternal Grandmother     Heart Disease Paternal Grandfather         pacemaker    Colon Cancer No family hx of     Prostate Cancer No family hx of     Coronary Artery Disease No family hx of        Current Outpatient Medications   Medication Instructions    aluminum chloride (DRYSOL) 20 % external solution 35 mLs, Topical, AT BEDTIME PRN    Ascorbic Acid (VITAMIN C) 500 MG CHEW 1 chew tab, Oral, DAILY    B Complex Vitamins (VITAMIN B-COMPLEX PO) 1 capsule, DAILY    cyclobenzaprine (FLEXERIL) 10 mg, 3 TIMES DAILY PRN    Dulaglutide 4.5 mg, Weekly    fluticasone (FLONASE) 50 MCG/ACT nasal spray 1 spray, DAILY    gabapentin (NEURONTIN) 100 mg, 3 TIMES DAILY PRN    hydrochlorothiazide (HYDRODIURIL) 25 mg, DAILY    hydrocortisone 2.5 % cream 2 TIMES DAILY PRN    losartan (COZAAR) 100 mg, DAILY    metoprolol succinate ER (TOPROL XL) 25 mg, Oral, DAILY    Multiple Vitamin (MULTIVITAMIN PO) 1 tablet, DAILY    oxyCODONE (ROXICODONE) 5 mg, EVERY 6 HOURS PRN    Rivaroxaban ANTICOAGULANT 15 & 20 MG TBPK Starter Therapy Pack Take as directed on package.    taletrectinib adipate (IBTROZI) 600 mg, Oral, DAILY, Take on an empty stomach. Avoid food 2 hours before and after taking.    taletrectinib adipate (IBTROZI) 600 mg, Oral, DAILY, Take on an empty stomach. Avoid food 2 hours before and after taking.    valACYclovir (VALTREX) 500 mg, DAILY PRN     PTA meds reviewed. Dulaglutide. Hydrochlorothiazide 25, losartan 100. Metop 25. Xarelto.     Current meds reviewed. Therapeutic enoxaparin. Hydrochlorothiazide 25. Losartan 100 is held. Metop 25. Rosuva 10.     Vitals:  Patient Vitals for the past 24 hrs:   BP Temp Temp src Pulse Resp SpO2   08/13/25 0742 133/64 97.5  F (36.4  C) Oral 74 24 95 %   08/12/25 1543 130/55 98  F (36.7  C) Oral 58 16 99 %   08/12/25 1030 119/49 98.5  F (36.9  C) Oral 60 16 97 %       Prior encounter weights:  Wt Readings from Last 5 Encounters:   08/09/25 103.6 kg (228 lb 6.4 oz)    07/15/25 113.3 kg (249 lb 12.8 oz)   02/22/17 120.2 kg (265 lb)   01/06/17 122.9 kg (271 lb)   04/11/16 117.9 kg (260 lb)       Weight trend here:  Vitals:    07/15/25 1137 07/16/25 0200 07/16/25 2100 07/18/25 0000   Weight: 112.9 kg (249 lb) 113.9 kg (251 lb 1.7 oz) 117 kg (257 lb 15 oz) 128.5 kg (283 lb 4.7 oz)    07/21/25 0600 07/22/25 0200 07/29/25 1300 08/03/25 1451   Weight: 127.8 kg (281 lb 12 oz) 127.4 kg (280 lb 13.9 oz) 108.9 kg (240 lb) 108.5 kg (239 lb 3.2 oz)    08/09/25 1711   Weight: 103.6 kg (228 lb 6.4 oz)     Exam:  General: male in NAD holding mother's hand and staring blankly  CARDIAC: holosystolic blowing murmur and diastolic murmur, RRR  RESP:  CTAB  GI: NTND  EXTREMITIES: warm, no LE edema, no stigmata on hands    Labs:   Reviewed in [facility].  Last Comprehensive Metabolic Panel:  Lab Results   Component Value Date     08/12/2025    POTASSIUM 4.0 08/12/2025    CHLORIDE 104 08/12/2025    CO2 23 08/12/2025    ANIONGAP 13 08/12/2025    GLC 99 08/12/2025    BUN 22.7 (H) 08/12/2025    CR 1.10 08/12/2025    GFRESTIMATED 90 08/12/2025    SHARAN 9.7 08/12/2025     PLT 46, Hg 11.8, WBC OK.  He has never fevers and bcx have remained negative.    Cardiac/imaging:  Reviewed in Epic.  EKG sinus with short NE.  Repeat pending.    CXR 7/20:  Impression:  Mild streaky bilateral perihilar and basilar opacities likely  represent edema/atelectasis. No focal consolidations.    NCHCT 07/23:  Impression:  1. Unchanged small volume left frontoparietal subarachnoid hemorrhage.  2. Evolving left MCA infarction. No new loss of gray-white matter  differentiation.     NCHCT 07/19:  IMPRESSION:   HEAD CT:  1.  New small acute to subacute infarct left lentiform nucleus.  2.  Tiny acute to subacute infarct right caudate head appears new when compared to the prior MRI probably not significantly changed when compared to the prior CT.  3.  Scattered subacute infarcts left middle cerebral artery territory probably not  significant changed.  4.  Few scattered small acute to subacute infarcts in the right cerebral hemisphere seen on the MRI are not readily apparent by CT.  5.  No acute intracranial hemorrhage.  6.  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     HEAD CTA:  1.  Stable subtotal occlusion mid to distal M1 segment left middle cerebral artery.  2.  New focal high-grade stenosis posterior parietal branch left middle cerebral artery near the M2/M3 junction.     NECK CTA:  1.  No hemodynamically significant stenosis in the neck vessels.  2.  No evidence for dissection.  3.  New bilateral pleural effusions and patchy infiltrates in both upper lobes and superior segments of the lower lobes.  4.  Mediastinal and right hilar adenopathy was incompletely imaged on the prior study and concerning for metastatic disease or lymphoma.     CT PERFUSION:  1.  No evidence of core infarct by perfusion imaging.  2.  Small focal area of increased Tmax greater than 6 seconds in the left middle cerebral artery territory in the region of the left corona radiata/centrum semiovale concerning for at risk brain.  3.  If the threshold for the Tmax is decreased to greater than 4 seconds, there is a much larger of area of oligemia in the left middle cerebral      PET CT 07/2025:  IMPRESSION: Summary: Primary lung tumor in right apex with metastases  (mediastinal lymph nodes, left retroperitoneal nodule, bones, and  right adrenal gland).    TIMO 07/17/0225:  Interpretation Summary  TIMO ordered to assess valvular disorder.     There is diffuse investment of the aortic and tricuspid valves with thrombus  that result in moderate aortic regurgitation and moderate tricuspid  regurgitation. Normal mitral and pulmonic valves.     Of note, an infectious etiology of the valvular lesions cannot be excluded on  the basis of this study alone.    TTE 07/16:  Left Ventricle  Global and regional left ventricular function is normal with an EF of  60-65%.  Left ventricular wall thickness is normal. Left ventricular size is normal.  Left ventricular diastolic function is normal. No regional wall motion  abnormalities are seen.     Right Ventricle  Right ventricular function, chamber size, wall motion, and thickness are  normal.     Atria  Both atria appear normal. The atrial septum is intact as assessed by agitated  saline bubble study .     Mitral Valve  The mitral valve is normal. Trace mitral insufficiency is present.     Aortic Valve  Mobile echodensities noted on tricuspid valve and aortic valve. 2 masses on TV  measuring 1.5x1cm and 0.6x0.6cms. Aortic mass measures 0.8x0.8cm. Mi;ld TR.  Mild to moderate AI.cannot exclude vegetation.     Pulmonic Valve  The pulmonic valve is normal.     Vessels  The thoracic aorta is normal. The pulmonary artery and bifurcation cannot be  assessed. The inferior vena cava is normal.     Pericardium  No pericardial effusion is present.     Compared to Previous Study  There is no prior study for direct comparison.     ____________________________  Assessment:   Bernard Chapman is a 35 year old male with PMH of HTN, NIDDM2, HLD, ELENA, bilateral PE/BLE DVT on Xarelto 06/2025 was admitted 07/15/2025 with multifocal acute CVAs and was found with new diagnosis metastatic (mediastinal lymph nodes, left retroperitoneal nodule, bones, and right adrenal gland) lung adenocarcinoma. His family does not report that he was having infectious symptoms. A TIMO revealed tricuspid and aortic valve massess concerning for thrombus. His course was complicated by SAH noted on 07/19 for which heparin drip was briefly held. Due to thrombocytopenia he has required multiple platelet transfusions. Despite therapeutic anticoagulation, he was noted with new frontal lobe infarcts 8/4.  He had a seizure-like episode 8/12 and was started on prophylaxis. Cardiology is consulted for recommendations for AC I/s/o cardiac thrombus.    He was started on Eliquis  for extensive BLE DVT and bilateral PE originally but switched to Xarelto felt due to a drug rash. Unclear adherence but his mother reports that he was taking his meds. Despite this he was found with multiple clots on the aortic and tricuspid valve without severe valvular dysfunction causing symptoms. There was initial concern for APLS; B2 GP and anticardiolipin were negative and lupus anticoagulant was positive. Hematology has evaluated the patient and their notes do not mention any diagnosis of APLS so unclear if he fully met criteria. With his underlying malignancy main concern is for Libman-Sacks AKA nonbacterial thrombotic endocarditis. This is classically seen on the aortic valve but can be seen on many valves.  He did not not report any infectious symptoms but he did have a white count when he came in; indolent endocarditis would be another possibility. It is concerning that he had another stroke while on therapeutic anticoagulation. Doubt he would be an angio vac candidate because of his diffuse investment of his valves with lack of discrete mass. Unclear whether he would be a surgical candidate with his metastatic cancer and very poor mental status.    Recommendations:   - continue therapeutic AC, agree with 1mg/kg enoxaparin @ 100mg q12h for 100 kg wt  - could consider ID engagement for consideration of indolent culture negative endocarditis, although this is less likely  - would clarify with heme regarding possible APLS  - doubt he would be a DOAC candidate as he appears to have reacted to Eliquis and experienced clots despite Xarelto; in any case DOACs are not recommended for LSE    We will sign off. Plan of care was discussed with attending physician Dr. Fowler. Final recommendations pending attending attestation. Please do not hesitate to contact our team with any additional questions or concerns. Thank you.    Electronically signed:  Jose Mares MD  Cardiovascular Disease PGY-4

## 2025-08-13 NOTE — PROGRESS NOTES
Resident/Fellow Attestation   I, OBEY LU MD, was present with the medical/SAMSON student who participated in the service and in the documentation of the note.  I have verified the history and personally performed the physical exam and medical decision making.  I agree with the assessment and plan of care as documented in the note.      OBEY LU MD  PGY3  Date of Service (when I saw the patient): 08/13/25    [facility]    Progress Note - Medicine Service, MAROON TEAM 3       Date of Admission:  7/15/2025    Assessment & Plan   Bernard Chapman is a 35 year old male with a past medical history of hypertension, diabetes, ELENA, PE/DVT (on rivaroxaban) admitted on 7/15/2025 for lethargy, N/V, and blurry vision found to have multifocal acute CVA in L MCA , R parietal and R frontal centrum semiovale. Initially concern for TTP or catastrophic APS, however further work up notable for new diagnosis of metastatic lung adenocarcinoma with likely malignancy-related hypercoagulability.     Updates 8/13/2025:  - Acute change in personality with increasing agitation and inappropriate affect  - Neurology consulted-- MRI/MRA head/neck at 5:00 pm, started keppra 750 BID (+load), vEEG negative for seizures  - Started Taletrectinib today through onc;  30 days of free drug (taletrectinib) from [facility] to be shipped to mother's house; dose of 600 mg daily on an empty stomach   - Cardiology consult (thrombi seen on TTE) and signed off     #New metastatic lung adenocarcinoma, ROS1 rearrangement   #Right lung nodule  #Multiple enlarged mediastinal, paraesophageal, and hilar lymph nodes  Malignancy work up initiated on admission due to significant hypercoagulability. CT CAP significant for [id number] spiculated density in R lung apex, 9mm pulmonary nodule in R lung base, L thyroid nodule, paratracheal LAD, splenic infarct, bilateral renal infarcts, and L retroperitoneal nodule.  Underwent FNA of lymph node which was positive for lung adenocarcinoma (resulted 7/22).  PDL-1 resulted with low expression (40%). PET 7/26 with distant metastases and MRI brain showing no signs of metastasis. Patient started chemotherapy 8/13.   -Oncology consulted, appreciate recs              - NGS; positive for a ROS1 gene rearrangement (SDC4::ROS1)                - Guardant 360; T p53 mutation and MITCHELL   - Taletrectinib 600 mg daily on empty stomach  Anti-nausea:   - compazine 10 mg scheduled prior to chemotherapy  - ativan 0.5 mg prn (avoiding additional zofran and compazine to decrease risk of prolonged QT given patient taking keppra and taletrectinib)  - [place] 2nd opinion; Patient will need to call [phone] to make appointment once there is a timeline for discharging from this hospitalization  - Due to ongoing transfusion needs, ARU not feasible at this time.     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 TTE with mobile echodensities on tricuspid and aortic valve. Initial concern for TTP or catastrophic APS, however work up ultimately significant for new diagnosis of metastatic lung cancer. Hypercoagulable state presumed to be most likely 2/2 malignancy. Started on heparin gtt 7/17, but briefly held for 7/18 endobronchial needle aspiration of lymph nodes. Stroke code called 7/19 for worsening R-sided hemiparesis and hemianopia. Repeat CTA with new focal high-grade stenosis of other part of left MCA. Heparin gtt was stopped during code stroke, but restarting (heparin gtt + levophed) led to 10/10 headache. CTH with small left frontal SAH. Brain  MRI with new L basal ganglia stroke. Heparin gtt resumed, and repeat CTH stable. Switched to therapeutic Lovenox 7/22. Declining fibrinogen, so received cryoprecipitate 7/25. Concern for continued consumptive process, but on therapeutic Lovenox. PLT downtrending, so receiving transfusions intermittently. Cardiology consulted given cardiac valve thrombus and concern for stroke, agreed with therapeutic AC on lovenox, not likely surgical candidate, and recommended consideration for ID workup of indolent culture negative endocarditis. Prior workup for APLS with Rheumatology was unrevealing for autoimmune cause of hypercoagulability, less suggestive of Libman Sacks endocarditis.  - Hematology consulted, appreciate recs              -Transfuse if   -Hgb<7  -PLT<50k-- CONDITIONAL ORDERS IN PLACE  - SC Lovenox --> decreased to 80 mg BID per pharmacy given supratherapeutic anti-Xa level (see note 8/9)  - recheck anti-Xa level   - Cardiology consulted, signed off   - continue therapeutic AC  - could consider ID engagement for consideration of indolent culture negative endocarditis, although this is less likely  - would clarify with heme regarding possible APLS and concerned for Libman Sacks endocarditis  - doubt he would be a DOAC candidate as he appears to have reacted to Eliquis and experienced clots despite Xarelto; in any case DOACs are not recommended for LSE  - qdaily CBC     #Acute change in personality with inappropriate affect and agitation c/f seizure activity vs new frontal lobe stroke  Starting around 8/10, patient has had some personality changes, including becoming less participatory/interactive with therapies and with family.  CT head from 8/11 without any acute findings. At 6:10 pm on 8/12, concern for seizure like activity with 10 seconds of general tremulousness and subsequent emesis. Given Keppra load with subsequent vEEG without evidence of seizures. MRI/MRA head ordered and daily keppra started 8/13.  Overall, concern for metastasis to brain vs new ischemic stroke. Difficult to assess voluntariness of the decreased responsiveness. Potential component of delirium at play versus depression.  -Neurology consult, appreciate recs   - MRI/MRA head ordered, final recs pending results   - Keppra 750 mg BID   - ativan 4mg IV if evidence of seizures >  3 minutes   - toxic metabolic workup per primary  Mood:  - Delirium precautions  - ativan 1 mg Q4 prn   - zyprexa 5 mg IM once daily prn  - zyprexa 10 mg tablet once daily prn  - quetiapine 50 mg prn  - consider psychiatry consult if neuro workup unrevealing     #Multifocal acute ischemic strokes, in L MCA territory and new L basal ganglia stroke; Increasing mass effect on L lateral ventricle by L MCA infarction  #Subtotal occlusive thrombus in left M1   #Left frontoparietal SAH (7/19)  #Hypertension chronic  #HLD, chronic  Etiology most likely hypercoagulability d/t malignancy. Emboli stemming from tricuspid and aortic valves, appreciated on TTE 7/16. He continues to have R facial droop, extremity weakness. Left frontoparietal subarachnoid hemorrhage stable per CT.  Repeat brain MRI on 8/4 shows 2 new small infarcts in the right frontal lobe. Neuro evaluation as above due to personality changes.  - Neuro consulted as above  -Cont 10 mg rosuvastatin  -OT/PT/Speech              -Since ARU dispo delayed, asked therapies to do aggressive rehabilitation as able  - nutrition consult              -monitor oral intake, weights  - Cardiology consulted as above.  - Repeat TTE outpatient to ensure improvement of AR and TR (and vegetations)      # Maculopapular rash on face, chest, bilateral upper extremities, improved  # Numerous palpable purpura across the scalp and lower extremities   # Petechiae on bilateral lower extremities, improving  C/f palpable purpura, so s/p L leg s/p punch biopsy with dermatology: rupture folliculitis. New rash noted 7/26 with flushing of face/chest after  PET scan, suspected  secondary to contrast from PET scan.   Plan:  - Benadryl as needed for itching  - Chlorhexidine wash  - Will continue to monitor    #DM2, A1c 5.3  On HSSI with adequate control after stopping dexamethasone. CTM.   - POC glucose ACHS  - High intensity insulin sliding scale     #ELENA  Currently not on CPAP, consider resuming if mental status improves     #Leukocytosis, resolved  Found to have leukocytosis, no clear cause or signs of infection, possible 2/2 stress induced.      #Hiccups, resolved   #chronic back pain  Chronic back pain. On acetaminophen only (given frequent need for neuro checks). Hiccups resolved (suspected 2/2 dexamethasone).   Plan:  - Thorazine 25mg q6hr discontinued due to contraindication with chemotherapy  - Melatonin 3mg daily  - Pepcid discontinued due to contraindication with chemotherapy  - continue  baclofen  - acetaminophen 1000 mg every 6 hours  - cyclobenzaprine 10 mg 3x daily PRN  - PTA gabapentin 100 mg 3x daily  - PTA oxycodone 5 mg PRN every 6h      #allergies  - fluticasone 1 spray daily      #HTN  - Continue PTA Metoprolol succinate 25mg q daily (ok to hold if HR< 60)  - Hold PTA Losartan, could consider restarting vs discontinuing given normotensive  - Continue PTA hydrochlorothiazide     #Epistaxis, currently resolved  On 8/5 patient found to have epistaxis. At the time, physical exam shows blood coming from the right nares.  - Afrin spray as needed  - Apply pressure to the nasal passage to help with bleeding     Diet: Regular Diet Adult    DVT Prophylaxis: On therapeutic lovenox  Avalos Catheter: Not present  Fluids: none  Lines: None     Cardiac Monitoring: None  Code Status: Full Code      Clinically Significant Risk Factors                 # Thrombocytopenia: Lowest platelets = 46 in last 2 days, will monitor for bleeding              # Overweight: Estimated body mass index is 29.32 kg/m  as calculated from the following:    Height as of this encounter:  "1.88 m (6' 2\").    Weight as of this encounter: 103.6 kg (228 lb 6.4 oz).      # Financial/Environmental Concerns: none         Social Drivers of Health          Disposition Plan     Medically Ready for Discharge: Anticipated in 5+ Days         The patient's care was discussed with the Attending Physician, Dr. Wilson, Patient, and Patient's Family.    Selin Grimaldo  Medical Student  Medicine Service, [facility] 3  [facility]  Securely message with [employer] in [employer] (more info)  Text page via [facility] Paging/Directory   See signed in provider for up to date coverage information  ______________________________________________________________________    Interval History   Overnight, nursing reported patient was increasingly disoriented and withdrawn. Became agitated and refusing all cares. Reported generalized abdominal pain.   At 6:10 pm there was concern for brief seizure like activity and generalized tremulous movements, staring, and emesis. Unclear if true seizure vs nonepileptic spell.   At 5:30 am, RN attempted to give IM zyprexa and patient reportedly received 0.15 ml.    Physical Exam   Vital Signs: Temp: 97.9  F (36.6  C) Temp src: Oral BP: 125/67 Pulse: 69   Resp: 24 SpO2: 99 % O2 Device: None (Room air)    Weight: 228 lbs 6.4 oz    General Appearance: Appears withdrawn, lying in bed with minimal engagement with care team  Respiratory: CTAB, breathing comfortably on room air  Cardiovascular: regular rate and rhythm  GI: soft, nontender to palpation  Skin: scattered petechiae on bilateral lower extremities  Neuro: Awake, alert, minimal verbal responses to questions, makes eye contact intermittently  Psych: inappropriate affect smiling and laughing, engaging with parents, smiles and laughs in response to flashlight. Mood \"tired\"    Medical Decision Making       Please see A&P for additional details of medical decision making.      Data     I have personally reviewed the following " data over the past 24 hrs:    TSH: N/A T4: N/A A1C: N/A       Imaging results reviewed over the past 24 hrs:   No results found for this or any previous visit (from the past 24 hours).

## 2025-08-13 NOTE — PROVIDER NOTIFICATION
Provider Notified: Lex  Concern: Giving IM zyprexa d/t ripping off EEG leads and punching staff. Was only able to give 0.15 mL d/t pt pulling away. Pt slightly calmer. Will likely call code 21 if we have to try again with the zyprexa  Reponse summary: MD aware, continue to monitor  Time Notified: 0420  Time of Response: 0430

## 2025-08-13 NOTE — PROGRESS NOTES
Brief cross cover note  ~19:45 paged by RN regarding patient becoming more agitated and refusing all cares.  In addition informed that patient was having abdominal pain.  Assessed patient at bedside.  Patient repeatedly shaking his head saying no to all questions.  Asked if patient has pain in his abdomen, patient shakes head yes.  Asked patient to specify location of pain patient able to point to 1 location, but when repeated to specify pain location patient points to different locations along abdomen with repeated nodding yes to pain localization.    Exam:  General: Drowsy, arousable  Abdomen:+ Bowel sounds, soft, nontender on deep and light palpation, nondistended, skin of abdomen shows scattered ecchymosis    A&P:    #Agitation  Patient with new agitation intermittently refusing cares at this time.  Discussed case with neurology resident on-call who recommended refraining from using additional benzodiazepines to help better elucidate concern for seizures at this time.  Discussed potential options with IM Zyprexa and oral Seroquel as options to help with agitation.  At this time do not believe patient has capacity to refuse medications though he is able to make a choice, patient unable to explain rationale, and consistency is questionable regarding his choices.  Discussed with patient's family regarding concerns of capacity and possibility of restraints patient's family is in agreement with current concerns.  - Oral Seroquel 50 mg as needed first-line for agitation  - As needed IM Zyprexa 5 mg second line for agitation  - Consider restraints should patient continue to be agitated despite 1:1  - CTM      # Abdominal pain  Patient with vague abdominal pain.  Physical exam reassuring with soft, additional nontender, nondistended abdomen present throughout all abdominal fields.  Per discussion with bedside RN patient has been having normal bowel movements.  Difficult to discern given patient's unclear mental status.   At this time given presentation low concern for obstruction or other concerning etiologies.  Reasonable to continue to monitor at this time  -Continue to reassess as needed      Discussed plan with bedside RN    Cristine Nguyen MD  Internal Medicine, PGY-3

## 2025-08-13 NOTE — PROGRESS NOTES
Patient has been educated on potential risks of choosing to leave the unit and that the responsibility for patient well-being will belong to the patient. Pt has been informed that admission to hospital is due to need for medical treatment. Education given to the patient on some of the potential risks included but are not limited to:      - lack of access to nursing intervention      - possible missed appointments with MD, therapies, tests      - possible missed medications, antibiotics, management of IV's    Patient Response:shook his head yes but did not verbalize answer, pt also lethargic and needing continuous stimulation to maintain eye contact.     Patient notified staff prior to leaving unit: N/A pt not safe to leave, and hasn't demonstrated wanting to leave this morning.  Coban wrap placed over IV prior to pt leaving unit: yes, IV wrapped with koban.

## 2025-08-13 NOTE — PROGRESS NOTES
Solid Tumor Oncology Consult Service  Progress Note   Date of Service: 08/13/2025  Patient: Bernard Chapman  MRN: [id number]  Admission Date: 7/15/2025  Hospital Day # 29  Cancer Diagnosis: Metastatic lung adenocarcinoma   Primary Outpatient Oncologist: MARKOS   Current Treatment Plan: Taletrectinib      Interim history:    Patient reports doing well today. Parents were at bedside. He is not very interested in talking, but has better communication compared to yesterday, answered all my questions. Dad brought the meds to the hospital this morning.     Recommendations today:  - Will start Taletrectinib on 8/13, 600 mg daily with empty stomach, avoid food 2h before and after taking the med  - Labs and imaging: check EKG once before the treatment, CBC with Diff, CMP, Mag, Uric acid, CK weekly, lipid panel once  - Zofran and Immodium prn  - contact HemOnc team if ANC<1000, plts <75,000 or Hg <8      Assessment & Plan:   Bernard Chapman is a 35 year old year old male with a past medical history of HTN, DM, ELENA, PE/DVT 6/22/25 (previously on rivaroxaban) who presented 7/15/2025 with new neurological symptoms and found acute multifocal ischemic strokes, partial occlusive L MCA thrombus, L frontal SAH, and in setting of TCP c/f ITP vs catastrophic antiphospholipid syndrome (CAPS) vs malignancy s/p PLEX x7/16. CT CAP 7/16/2025 revealing LAD of hilar, mediastinal, and paraesophageal LNs and spiculated-appearing nodule of R apical lung s/p EBUS-TBNA 7/18 revealing metastatic lung adenocarcinoma, NGS revealed ROS1 rearrangement - planning to initiate treatment with taletrectinib as soon as possible while inpatient.      # New metastatic lung adenocarcinoma, ROS1 rearrangement   # Thrombocytopenia  # Anemia  CT CAP 7/16/2025 revealing a spiculated appearing nodule within R lung apex ~11 mm, multiple enlarged mediastinal, hilar and paraesophageal lymph nodes, along with 11 mm soft tissue density in the left  "retroperitoneum. Underwent EBUS-TBNA 7/18 revealing metastatic lung adenocarcinoma, + for CK7 and TTF-1. MRI brain without evidence of metastasis. Obtained PET on 7/26. Reviewed with radiologist which showed metastatic disease in L scapula, L retroperitoneum, R adrenal and L2. Areas are small enough where radiation is not required at this time. PD-L1 TPS at 40% consistent with low PD-L1 expression. Per d/w benign hematology, given severe thrombocytopenia requiring transfusions and hypercoagulable state with course complicated by multifocal strokes and PE 2/2 malignancy, plan to initiate treatment while inpatient with taletrectinib as soon as approved given ROS1 rearrangement found on NGS. Discussed with Tera and his parents 8/2 that response rate to this medication is 85-90%, with PFS measured in years on average.   - Will start Taletrectinib on 8/13, 600 mg daily with empty stomach, avoid food 2h before and after taking the med  - Labs and imaging: check EKG once before the treatment, CBC with Diff, CMP, Mag, Uric acid, CK weekly, lipid panel once  - Zofran and Immodium prn  - contact HemOnc team if ANC<1000, plts <75,000 or Hg <8  - Follow up Guardant 360, pending  - Lesions are too small for radiation at this time   - Outpatient oncology referral placed, follow for scheduling       # H/o DVT/PE  Presented 6/22/25 with chest pain and SOB and found to have PE and lower extremity DVTs, he was placed on apixaban though had an allergic reaction and rotated to Xarelto.   - Agree with AC (Lovenox) per primary team and hematology      _____________________________________________________      Physical Exam:    Blood pressure 133/64, pulse 74, temperature 97.5  F (36.4  C), temperature source Oral, resp. rate 24, height 1.88 m (6' 2\"), weight 103.6 kg (228 lb 6.4 oz), SpO2 95%.    Constitutional: Sitting in chair, NAD  HEENT: NCAT  Respiratory: Breathing comfortably on room air  Neuro: Alert, answers questions " appropriately. Moves extremities spontaneously.  Psych: Appropriate affect     Labs & Studies: I personally reviewed the following studies:  Oncology fusion gene NGS, 7/22/25:  RESULTS See result below Abnormal    Fusion Event: POSITIVE      INTERPRETATION    This sample is positive for a ROS1 gene rearrangement (SDC4::ROS1)      The detected fusion event joins exon 2 of SDC4 (5' partner) with exon 32 of ROS1 (3' partner), corresponding to approximately 78% of the total reads analyzed (major isoform).  Two additional isoforms corresponding to [id number]::ROR7sgki98 and YRX4abic6::NXL9uekz05 fusions, respectively, were also detected (corresponding to less than 20% of the total reads).      ROS1 gene rearrangements have been reported in diverse cancer types including non-small-cell lung cancer (NSCLC), and with a notably higher prevalence in lung adenocarcinoma. Importantly, FDA-approved tyrosine kinase inhibitors have demonstrated efficacy in treatment of ROS1-rearranged NSCLC (PMID: 23411709). However it is not clear if coexistence of more than two types of ROS1 rearrangements affects therapeutic response to TKI therapy. A recent case report (PMID: 65079723) showed that coexistence of VDC6ypzu5::SDX7soam 34 and SDC4 exon2::ROS1 exon35 fusions showed suboptimal response to crizotinib therapy when compared to that seen with the BIO7qqvv1::FJY2ugpm 32 fusion alone.      Molecular Description  The following chromosomal coordinates describe the exon::exon junction(s) of the major isoform fusion partner in the most prevalent cDNA transcript for the reported gene fusion event:     chr20:[id number],chr6:[id number]  chr20:[id number],chr6:[id number]  chr20:[id number],chr6:[id number]         This assay detects gene fusion events based on the assessment of cDNA; the reported coordinates describe specific exon-exon joins in the detected transcripts for the positive gene fusion target(s).  The reported coordinates do not specifically  describe the underlying genomic (DNA) breakpoint, but do describe a reference interval in which the genomic alteration occurs.      ROUTINE LABS (Last four results):  CMP  Recent Labs   Lab 08/12/25 0614 08/11/25  0557 08/10/25  0646 08/09/25  0713 08/08/25  1552 08/08/25  0824 08/08/25  0631     --  141  --   --   --   --    POTASSIUM 4.0 4.2 4.2  4.1 4.2  --   --  4.2   CHLORIDE 104  --  104  --   --   --   --    CO2 23  --  22  --   --   --   --    ANIONGAP 13  --  15  --   --   --   --    GLC 99  --  97  --  122* 108*  --    BUN 22.7*  --  28.4*  --   --   --   --    CR 1.10  --  1.06  --   --   --  1.03   GFRESTIMATED 90  --  >90  --   --   --  >90   SHARAN 9.7  --  9.7  --   --   --   --    MAG 2.1 2.1 2.0 2.1  --   --  2.0   PHOS 4.4 4.3 4.6* 4.1  --   --  4.1   PROTTOTAL  --   --  6.9  --   --   --   --    ALBUMIN  --   --  4.0  --   --   --   --    BILITOTAL  --   --  0.6  --   --   --   --    ALKPHOS  --   --  69  --   --   --   --    AST  --   --  25  --   --   --   --    ALT  --   --  28  --   --   --   --      CBC  Recent Labs   Lab 08/12/25 0614 08/11/25  0557 08/10/25  0646 08/09/25  0713   WBC 8.4 7.8 7.0 6.8   RBC 4.43 4.26* 4.39* 4.43   HGB 11.8* 11.4* 11.6* 11.7*   HCT 36.2* 35.3* 35.1* 36.1*   MCV 82 83 80 82   MCH 26.6 26.8 26.4* 26.4*   MCHC 32.6 32.3 33.0 32.4   RDW 13.8 13.9 13.8 13.8   PLT 46* 53* 44* 53*     INR  No lab results found in last 7 days.    Medications list for reference:  Meds reviewed.

## 2025-08-13 NOTE — PLAN OF CARE
Pt admitted 7/15 for lethargy, N/V, blurry vision, imaging revealing acute CVA in L MCA, R parietal and frontal centrum 2/2 new metastatic lung adenocarcinoma, vss, neuros include: difficult to assess d/t pt's change in behaviors, see flow sheet. PIV SL, regular diet w/assistance ordering and tray setup, voids spont, LBM 8/12, AO2 w/GB and cane. Pt continues to require 1:1 attendant d/t impulsivity and non-compliance to safety precautions, MRI checklist sent down. Labs continue to be refused, pt received his first dose of oral chemotherapy, medication labeled in med-bins. Pt's family here and very supportive and helpful.       RN paged out to primary team that pt had a hx of reaction to contrast during MRI, no response on if pre-meds will be given to help decrease reaction. Pt needs to be given anti-anxiety medications prior to MRI. Per MRI tentative for 1700 today and they will call 30mins prior to give time for RN to give meds.

## 2025-08-13 NOTE — CONSULTS
"Consult received for Vascular Access Team.  See LDA for details. For additional needs place \"Consult for Inpatient Vascular Access Care\"  [id number] order in EPIC.  "

## 2025-08-13 NOTE — PROGRESS NOTES
"      [facility]    Vascular Neurology Progress Note    Interval History     Patient overnight was increasingly agitated, aggressive, refusing cares. Also reporting abdominal pain.     Patient seen lying in bed this morning. The patient's mother and father were both present. When awoken, patient was able to answer most questions by shaking or nodding his head. Said a few words - mild dysarthria is present. Patient seemed to have an improved mood with parents present. Patient was able to generally follow commands and move left upper and bilateral lower extremities spontaneously (noted weakness in right upper extremity).     Hospital Course     Chief complaint: Altered Mental Status (Patient was at his MD \"anticoagulation clinic\" MD concerned that patient might have a brain bleed. Mother stated that \" this Sunday evening has been having altered mental status. 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     Initially there was concern for TTP or catastrophic APS, however further work up led to new diagnosis of metastatic lung adenocarcinoma with likely malignancy-related hypercoagulability. PET 7/26 with distant metastases (thyroid nodule, paratracheal LAD, retroperitoneal nodules). MRI brain completed at oncology recommendation to evaluate for any central nervous system metastasis prior to initiation of treatment - no brain metastasis was noted. Medication indicated is Taletrectinib (Oncology arranging the medication). Emboli stemming from tricuspid and aortic valves, appreciated on TTE 7/16  - repeat TTE outpatient to ensure improvement of AR and TR (and vegetations). " "    Stroke Neurology consulted again last week 8/4 MRI - 2 small new infarcts noted in right frontal lobe - they were considered incidental (patient didn't have new symptoms). Patient also noted to have right sided M2/M3 stenosis, thought to be secondary to continuation of M1 occlusion or vasospasm to left frontal SAH.    8/12 - stroke neurology consulted due to onset of new aphasia and change in personality for past 2 days. Patient seen at bedside by myself and my senior, Dr. Palmer. The patient refused to participate in encounter and exam.  He did acknowledge us with shaking his head no and a few words. - \"No\" and \"I don't want to do anything\". Did not seem like aphasia. semi-appropriately able to respond to our questions. It could be contributed to mood changes from prolonged hospitalization.     CT head performed 8/11 did not show any acute changes. At this time, we will recommend MRI & MRA studies in order to ensure the absence of acute vascular event given his risk factors. He is on therapeutic anticoagulation with Lovenox. Later in the evening of 8/12, patient had brief seizure-like activity lasting for 10 seconds with generalized tremulous movements and staring followed by emesis.  CT head obtained yesterday showed no new acute findings or edema that would explain seizure.  However there was concerns about personality changes, minimally interactive, questionable aphasia and possible low mood.  Unclear if he had a true seizure versus nonepileptic spell in the setting of acute stressor w/ prolonged hospitalization and malignancy.    Assessment and Plan     1. 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 Also found to have M2/M3 stenosis and right frontal SAH.    Initially there was concern for TTP or catastrophic APS, however further work up led to new diagnosis of metastatic lung adenocarcinoma with likely malignancy-related hypercoagulability. PET 7/26 with distant metastases (thyroid nodule, paratracheal LAD, retroperitoneal nodules). MRI brain completed at oncology recommendation to evaluate for any Central nervous system metastasis prior to initiation of treatment - no brain metastasis was noted. Medication indicated is Taletrectinib (Oncology arranging the medication). Emboli stemming from tricuspid and aortic valves, appreciated on TTE 7/16  - cardioembolic cause cannot be ruled out - repeat TTE outpatient to ensure improvement of AR and TR (and vegetations).    Patient is having new altered mental status presentation since 8/11. Patient is able to respond vocally with brief words. Most responses are with nodding, shaking, and hand gestures. Having multiple cortical lesions may account for recent changes noted, making Brain MRI and MRA useful to change and/or new lesions. Clinical presentation may be suggestive of albulia.    On 8/12, patient's generalized tremulous activity lasting 10 seconds and followed by emesis may represent seizure like activity - CT head (8/11) did not show edema or acute findings suggestive of seizure. Patient was loaded with Keppra 4.5 mg and placed on video-EEG monitoring. After 5-6 hours, no seizures were noted on EEG monitoring.     Recommendations:  - Keppra 750mg BID -  final recommendation from pharmacy regarding interaction with Taletrectinib (cancer medication)  - Recommend Ativan 4 mg if seizure persists > 3 minutes  - Recommend toxic metabolic workup per primary  - Final recommendations per findings from Brain MRI MRA    We will continue to follow    DVT Prophylaxis: Lovenox 80mg    Patient Follow-up    - final recommendation  pending work-up    We will continue to follow.     Medically Ready for Discharge: Anticipated in 5+ Days    The patient was discussed with Stroke Staff, Dr. Hdez.    Susannah Shi MD  Neurology Resident    I, Alisha Kumar, am serving as a scribe at 12:05 on 8/13/2025 to document services personally performed by Susannah Shi based on my observations and the provider's statements to me.     Physical Examination     Temp: 97.5  F (36.4  C) Temp src: Oral BP: 133/64 Pulse: 74   Resp: 24 SpO2: 95 % O2 Device: None (Room air)      Neurologic  Mental Status:    Alert, unable to assess orientation, follows commands, speech quiet but still clear, naming and repetition not tested  Cranial Nerves:    Unable to assess  Motor:    Able to move all limbs spontaneously. RUE 2/5, LUE 5/5. RLL 4/5, LLL 4/5. Did not assess pronator drift. No abnornal movements. Normal muscle tone and bulk  Reflexes:   Did not asess  Sensory:  light touch sensation intact and symmetric throughout upper and lower extremities  Coordination:    Unable to assess  Station/Gait:  deferred    Stroke Scales   National Institutes of Health Stroke Scale  Exam Interval: Baseline   Score    Level of consciousness: (0)   Alert, keenly responsive    LOC questions: (0)   Answers both questions correctly    LOC commands: (0)   Performs both tasks correctly    Best gaze: (0)   Normal    Visual: (0)   No visual loss    Facial palsy: (0)   Normal symmetrical movements    Motor arm (left): (0)   No drift    Motor arm (right): (2)   Some effort against gravity    Motor leg (left): (2)   Some effort against gravity    Motor leg (right): (2)   Some effort against gravity    Limb ataxia: (0)   Absent    Sensory: (0)   Normal- no sensory loss    Best language: (1)   Mild to moderate aphasia    Dysarthria: (1)   Mild to moderate dysarthria    Extinction and inattention: (0)   No abnormality        Total Score:  8 (8/13/2025)          Imaging/Labs   (Bolded imaging and labs new  and/or personally reviewed or re-reviewed by me today 8/13/2025)    MRI/Head CT 8/4/2025  MRI Brain  IMPRESSION:   1. Two new small infarcts in the right frontal lobe since the MRI  7/19/2025.  2. Evolution of prior left cerebral hemisphere infarcts, many of which  are now enhancing in the subacute phase. No definite hemorrhagic  conversion on susceptibility imaging. No intracranial hemorrhages.    8/11   CTH  IMPRESSION:   1. No new acute intracranial pathology.   2. Evolving infarct of the left dorsal lentiform nucleus.   Intracranial Vasculature 7/19  HEAD CTA:  1.  Stable subtotal occlusion mid to distal M1 segment left middle cerebral artery.  2.  New focal high-grade stenosis posterior parietal branch left middle cerebral artery near the M2/M3 junction.   Cervical Vasculature 7/19  NECK CTA:  1.  No hemodynamically significant stenosis in the neck vessels.  2.  No evidence for dissection.  3.  New bilateral pleural effusions and patchy infiltrates in both upper lobes and superior segments of the lower lobes.  4.  Mediastinal and right hilar adenopathy was incompletely imaged on the prior study and concerning for metastatic disease or lymphoma.     Echocardiogram 7/17  Interpretation Summary  TIMO ordered to assess valvular disorder.     There is diffuse investment of the aortic and tricuspid valves with thrombus  that result in moderate aortic regurgitation and moderate tricuspid  regurgitation. Normal mitral and pulmonic valves.     Of note, an infectious etiology of the valvular lesions cannot be excluded on  the basis of this study alone.   EKG/Telemetry 8/4  Sinus rhythm with short WA   Cardiac CTA  7/16  1.  Non-diagnostic evaluation for left atrial appendage thrombus.   2.  Please review the separate Radiology report for incidental  noncardiac findings.     FINDINGS:   1.  Non-diagnostic evaluation for left atrial appendage thrombus.   2.  The left atrial appendage has a  chicken wing morphology.  3.    Coronary images are non-diagnostic for stenosis/atherosclerosis  due to cardiac motion artifact. No coronary artery calcifications  seen.   4.   The visualized aortic root, ascending aorta, and descending  thoracic aorta are normal in caliber.     LDL  7/15/2025: 135 mg/dL   A1C  7/16/2025: 5.3 %   Troponin No lab value available in past 7 days     Other labs reviewed by me today:   Latest Reference Range & Units 08/12/25 06:14   WBC 4.0 - 11.0 10e3/uL 8.4   Hemoglobin 13.3 - 17.7 g/dL 11.8 (L)   Hematocrit 40.0 - 53.0 % 36.2 (L)   Platelet Count 150 - 450 10e3/uL 46 (LL)   RBC Count 4.40 - 5.90 10e6/uL 4.43   MCV 78 - 100 fL 82   MCH 26.5 - 33.0 pg 26.6   MCHC 31.5 - 36.5 g/dL 32.6   RDW 10.0 - 15.0 % 13.8   (LL): Data is critically low  (L): Data is abnormally low

## 2025-08-14 ENCOUNTER — APPOINTMENT (OUTPATIENT)
Dept: OCCUPATIONAL THERAPY | Facility: CLINIC | Age: 35
DRG: 064 | End: 2025-08-14
Payer: COMMERCIAL

## 2025-08-14 ENCOUNTER — APPOINTMENT (OUTPATIENT)
Dept: PHYSICAL THERAPY | Facility: CLINIC | Age: 35
DRG: 064 | End: 2025-08-14
Payer: COMMERCIAL

## 2025-08-14 ENCOUNTER — APPOINTMENT (OUTPATIENT)
Dept: CARDIOLOGY | Facility: CLINIC | Age: 35
DRG: 064 | End: 2025-08-14
Payer: COMMERCIAL

## 2025-08-14 ENCOUNTER — APPOINTMENT (OUTPATIENT)
Dept: GENERAL RADIOLOGY | Facility: CLINIC | Age: 35
DRG: 064 | End: 2025-08-14
Payer: COMMERCIAL

## 2025-08-14 ENCOUNTER — NURSE TRIAGE (OUTPATIENT)
Dept: ONCOLOGY | Facility: CLINIC | Age: 35
End: 2025-08-14
Payer: COMMERCIAL

## 2025-08-14 VITALS
HEART RATE: 84 BPM | OXYGEN SATURATION: 97 % | RESPIRATION RATE: 20 BRPM | DIASTOLIC BLOOD PRESSURE: 67 MMHG | WEIGHT: 228.4 LBS | SYSTOLIC BLOOD PRESSURE: 140 MMHG | HEIGHT: 74 IN | TEMPERATURE: 100.2 F | BODY MASS INDEX: 29.31 KG/M2

## 2025-08-14 LAB
ANION GAP SERPL CALCULATED.3IONS-SCNC: 13 MMOL/L (ref 7–15)
BLD PROD TYP BPU: NORMAL
BLD PROD TYP BPU: NORMAL
BLOOD COMPONENT TYPE: NORMAL
BLOOD COMPONENT TYPE: NORMAL
BUN SERPL-MCNC: 21.1 MG/DL (ref 6–20)
CALCIUM SERPL-MCNC: 9.6 MG/DL (ref 8.8–10.4)
CHLORIDE SERPL-SCNC: 103 MMOL/L (ref 98–107)
CODING SYSTEM: NORMAL
CODING SYSTEM: NORMAL
CREAT SERPL-MCNC: 1.24 MG/DL (ref 0.67–1.17)
EGFRCR SERPLBLD CKD-EPI 2021: 78 ML/MIN/1.73M2
ERYTHROCYTE [DISTWIDTH] IN BLOOD BY AUTOMATED COUNT: 13.9 % (ref 10–15)
ERYTHROCYTE [DISTWIDTH] IN BLOOD BY AUTOMATED COUNT: 13.9 % (ref 10–15)
ERYTHROCYTE [DISTWIDTH] IN BLOOD BY AUTOMATED COUNT: 14.1 % (ref 10–15)
GLUCOSE SERPL-MCNC: 108 MG/DL (ref 70–99)
HCO3 SERPL-SCNC: 24 MMOL/L (ref 22–29)
HCT VFR BLD AUTO: 33.8 % (ref 40–53)
HCT VFR BLD AUTO: 35.9 % (ref 40–53)
HCT VFR BLD AUTO: 36.8 % (ref 40–53)
HGB BLD-MCNC: 10.9 G/DL (ref 13.3–17.7)
HGB BLD-MCNC: 11.5 G/DL (ref 13.3–17.7)
HGB BLD-MCNC: 11.7 G/DL (ref 13.3–17.7)
ISSUE DATE AND TIME: NORMAL
ISSUE DATE AND TIME: NORMAL
LVEF ECHO: NORMAL
MAGNESIUM SERPL-MCNC: 2 MG/DL (ref 1.7–2.3)
MCH RBC QN AUTO: 26 PG (ref 26.5–33)
MCH RBC QN AUTO: 26.5 PG (ref 26.5–33)
MCH RBC QN AUTO: 26.7 PG (ref 26.5–33)
MCHC RBC AUTO-ENTMCNC: 31.8 G/DL (ref 31.5–36.5)
MCHC RBC AUTO-ENTMCNC: 32 G/DL (ref 31.5–36.5)
MCHC RBC AUTO-ENTMCNC: 32.2 G/DL (ref 31.5–36.5)
MCV RBC AUTO: 81.2 FL (ref 78–100)
MCV RBC AUTO: 82.6 FL (ref 78–100)
MCV RBC AUTO: 83.3 FL (ref 78–100)
PHOSPHATE SERPL-MCNC: 3.8 MG/DL (ref 2.5–4.5)
PLATELET # BLD AUTO: 48 10E3/UL (ref 150–450)
PLATELET # BLD AUTO: 63 10E3/UL (ref 150–450)
PLATELET # BLD AUTO: 75 10E3/UL (ref 150–450)
POTASSIUM SERPL-SCNC: 4.4 MMOL/L (ref 3.4–5.3)
RBC # BLD AUTO: 4.09 10E6/UL (ref 4.4–5.9)
RBC # BLD AUTO: 4.42 10E6/UL (ref 4.4–5.9)
RBC # BLD AUTO: 4.42 10E6/UL (ref 4.4–5.9)
SODIUM SERPL-SCNC: 140 MMOL/L (ref 135–145)
UFH PPP CHRO-ACNC: 0.15 IU/ML (ref ?–1.1)
UFH PPP CHRO-ACNC: 0.43 IU/ML (ref ?–1.1)
UNIT ABO/RH: NORMAL
UNIT ABO/RH: NORMAL
UNIT NUMBER: NORMAL
UNIT NUMBER: NORMAL
UNIT STATUS: NORMAL
UNIT STATUS: NORMAL
UNIT TYPE ISBT: 6200
UNIT TYPE ISBT: 6200
WBC # BLD AUTO: 10.73 10E3/UL (ref 4–11)
WBC # BLD AUTO: 11.08 10E3/UL (ref 4–11)
WBC # BLD AUTO: 9.59 10E3/UL (ref 4–11)

## 2025-08-14 PROCEDURE — 999N000128 HC STATISTIC PERIPHERAL IV START W/O US GUIDANCE

## 2025-08-14 PROCEDURE — 36415 COLL VENOUS BLD VENIPUNCTURE: CPT | Performed by: INTERNAL MEDICINE

## 2025-08-14 PROCEDURE — 99232 SBSQ HOSP IP/OBS MODERATE 35: CPT | Mod: GC | Performed by: STUDENT IN AN ORGANIZED HEALTH CARE EDUCATION/TRAINING PROGRAM

## 2025-08-14 PROCEDURE — 85018 HEMOGLOBIN: CPT

## 2025-08-14 PROCEDURE — 36415 COLL VENOUS BLD VENIPUNCTURE: CPT

## 2025-08-14 PROCEDURE — 999N000208 ECHOCARDIOGRAM COMPLETE

## 2025-08-14 PROCEDURE — 97110 THERAPEUTIC EXERCISES: CPT | Mod: GP | Performed by: PHYSICAL THERAPIST

## 2025-08-14 PROCEDURE — 99223 1ST HOSP IP/OBS HIGH 75: CPT | Mod: GC | Performed by: STUDENT IN AN ORGANIZED HEALTH CARE EDUCATION/TRAINING PROGRAM

## 2025-08-14 PROCEDURE — 272N000451 HC KIT SHRLOCK 5FR POWER PICC DOUBLE LUMEN

## 2025-08-14 PROCEDURE — 999N000065 XR CHEST PORT 1 VIEW

## 2025-08-14 PROCEDURE — 120N000002 HC R&B MED SURG/OB UMMC

## 2025-08-14 PROCEDURE — 250N000009 HC RX 250

## 2025-08-14 PROCEDURE — 71045 X-RAY EXAM CHEST 1 VIEW: CPT | Mod: 26 | Performed by: RADIOLOGY

## 2025-08-14 PROCEDURE — 97110 THERAPEUTIC EXERCISES: CPT | Mod: GO

## 2025-08-14 PROCEDURE — 258N000003 HC RX IP 258 OP 636

## 2025-08-14 PROCEDURE — 999N000127 HC STATISTIC PERIPHERAL IV START W US GUIDANCE

## 2025-08-14 PROCEDURE — 85613 RUSSELL VIPER VENOM DILUTED: CPT | Performed by: INTERNAL MEDICINE

## 2025-08-14 PROCEDURE — 250N000011 HC RX IP 250 OP 636: Performed by: INTERNAL MEDICINE

## 2025-08-14 PROCEDURE — 255N000002 HC RX 255 OP 636: Performed by: INTERNAL MEDICINE

## 2025-08-14 PROCEDURE — 85390 FIBRINOLYSINS SCREEN I&R: CPT | Mod: 26 | Performed by: PATHOLOGY

## 2025-08-14 PROCEDURE — 250N000013 HC RX MED GY IP 250 OP 250 PS 637: Performed by: STUDENT IN AN ORGANIZED HEALTH CARE EDUCATION/TRAINING PROGRAM

## 2025-08-14 PROCEDURE — 84100 ASSAY OF PHOSPHORUS: CPT | Performed by: INTERNAL MEDICINE

## 2025-08-14 PROCEDURE — 250N000011 HC RX IP 250 OP 636

## 2025-08-14 PROCEDURE — 80048 BASIC METABOLIC PNL TOTAL CA: CPT

## 2025-08-14 PROCEDURE — 250N000013 HC RX MED GY IP 250 OP 250 PS 637

## 2025-08-14 PROCEDURE — 99222 1ST HOSP IP/OBS MODERATE 55: CPT | Performed by: PSYCHIATRY & NEUROLOGY

## 2025-08-14 PROCEDURE — P9037 PLATE PHERES LEUKOREDU IRRAD: HCPCS | Performed by: HOSPITALIST

## 2025-08-14 PROCEDURE — 120N000003 HC R&B IMCU UMMC

## 2025-08-14 PROCEDURE — 85014 HEMATOCRIT: CPT

## 2025-08-14 PROCEDURE — 93306 TTE W/DOPPLER COMPLETE: CPT | Mod: 26 | Performed by: INTERNAL MEDICINE

## 2025-08-14 PROCEDURE — 99233 SBSQ HOSP IP/OBS HIGH 50: CPT | Mod: GC | Performed by: INTERNAL MEDICINE

## 2025-08-14 PROCEDURE — 97530 THERAPEUTIC ACTIVITIES: CPT | Mod: GP | Performed by: PHYSICAL THERAPIST

## 2025-08-14 PROCEDURE — 85520 HEPARIN ASSAY: CPT | Performed by: INTERNAL MEDICINE

## 2025-08-14 PROCEDURE — 36569 INSJ PICC 5 YR+ W/O IMAGING: CPT

## 2025-08-14 PROCEDURE — 97530 THERAPEUTIC ACTIVITIES: CPT | Mod: GO

## 2025-08-14 PROCEDURE — 83735 ASSAY OF MAGNESIUM: CPT | Performed by: INTERNAL MEDICINE

## 2025-08-14 RX ORDER — HEPARIN SODIUM 10000 [USP'U]/100ML
0-5000 INJECTION, SOLUTION INTRAVENOUS CONTINUOUS
Status: ACTIVE | OUTPATIENT
Start: 2025-08-14 | End: 2025-08-14

## 2025-08-14 RX ORDER — QUETIAPINE FUMARATE 50 MG/1
50 TABLET, FILM COATED ORAL
Status: DISCONTINUED | OUTPATIENT
Start: 2025-08-14 | End: 2025-08-21 | Stop reason: HOSPADM

## 2025-08-14 RX ORDER — ENOXAPARIN SODIUM 100 MG/ML
1 INJECTION SUBCUTANEOUS EVERY 12 HOURS
Status: DISCONTINUED | OUTPATIENT
Start: 2025-08-14 | End: 2025-08-14

## 2025-08-14 RX ORDER — HEPARIN SODIUM 10000 [USP'U]/100ML
0-5000 INJECTION, SOLUTION INTRAVENOUS CONTINUOUS
Status: DISCONTINUED | OUTPATIENT
Start: 2025-08-14 | End: 2025-08-14

## 2025-08-14 RX ORDER — RISPERIDONE 1 MG/1
1 TABLET ORAL
Status: DISCONTINUED | OUTPATIENT
Start: 2025-08-14 | End: 2025-08-21 | Stop reason: HOSPADM

## 2025-08-14 RX ORDER — MAGNESIUM SULFATE HEPTAHYDRATE 40 MG/ML
2 INJECTION, SOLUTION INTRAVENOUS ONCE
Status: COMPLETED | OUTPATIENT
Start: 2025-08-14 | End: 2025-08-14

## 2025-08-14 RX ORDER — HEPARIN SODIUM,PORCINE 10 UNIT/ML
5-15 VIAL (ML) INTRAVENOUS EVERY 24 HOURS
Status: DISCONTINUED | OUTPATIENT
Start: 2025-08-14 | End: 2025-08-21 | Stop reason: HOSPADM

## 2025-08-14 RX ORDER — ENOXAPARIN SODIUM 100 MG/ML
1 INJECTION SUBCUTANEOUS EVERY 12 HOURS
Status: DISCONTINUED | OUTPATIENT
Start: 2025-08-14 | End: 2025-08-21 | Stop reason: HOSPADM

## 2025-08-14 RX ORDER — ALLOPURINOL 100 MG/1
100 TABLET ORAL DAILY
Status: DISCONTINUED | OUTPATIENT
Start: 2025-08-14 | End: 2025-08-21 | Stop reason: HOSPADM

## 2025-08-14 RX ORDER — RISPERIDONE 0.5 MG/1
0.5 TABLET ORAL 2 TIMES DAILY
Status: DISCONTINUED | OUTPATIENT
Start: 2025-08-14 | End: 2025-08-21 | Stop reason: HOSPADM

## 2025-08-14 RX ORDER — HALOPERIDOL 5 MG/ML
2-5 INJECTION INTRAMUSCULAR EVERY 4 HOURS PRN
Status: DISCONTINUED | OUTPATIENT
Start: 2025-08-14 | End: 2025-08-21 | Stop reason: HOSPADM

## 2025-08-14 RX ORDER — HEPARIN SODIUM,PORCINE 10 UNIT/ML
5-15 VIAL (ML) INTRAVENOUS
Status: DISCONTINUED | OUTPATIENT
Start: 2025-08-14 | End: 2025-08-21 | Stop reason: HOSPADM

## 2025-08-14 RX ORDER — LIDOCAINE 40 MG/G
CREAM TOPICAL
Status: ACTIVE | OUTPATIENT
Start: 2025-08-14 | End: 2025-08-17

## 2025-08-14 RX ADMIN — POLYETHYLENE GLYCOL 3350 17 G: 17 POWDER, FOR SOLUTION ORAL at 10:28

## 2025-08-14 RX ADMIN — Medication 5 MG: at 19:47

## 2025-08-14 RX ADMIN — SENNOSIDES AND DOCUSATE SODIUM 1 TABLET: 50; 8.6 TABLET ORAL at 10:27

## 2025-08-14 RX ADMIN — HEPARIN SODIUM 1200 UNITS/HR: 10000 INJECTION, SOLUTION INTRAVENOUS at 07:56

## 2025-08-14 RX ADMIN — POTASSIUM CHLORIDE 10 MEQ: 7.46 INJECTION, SOLUTION INTRAVENOUS at 03:53

## 2025-08-14 RX ADMIN — RISPERIDONE 0.5 MG: 0.5 TABLET ORAL at 19:46

## 2025-08-14 RX ADMIN — PROCHLORPERAZINE EDISYLATE 10 MG: 5 INJECTION INTRAMUSCULAR; INTRAVENOUS at 07:49

## 2025-08-14 RX ADMIN — LIDOCAINE HYDROCHLORIDE ANHYDROUS 3 ML: 10 INJECTION, SOLUTION INFILTRATION at 18:58

## 2025-08-14 RX ADMIN — POTASSIUM CHLORIDE 10 MEQ: 7.46 INJECTION, SOLUTION INTRAVENOUS at 02:30

## 2025-08-14 RX ADMIN — MAGNESIUM SULFATE HEPTAHYDRATE 2 G: 2 INJECTION, SOLUTION INTRAVENOUS at 16:31

## 2025-08-14 RX ADMIN — HYDROCHLOROTHIAZIDE 25 MG: 12.5 TABLET ORAL at 10:24

## 2025-08-14 RX ADMIN — ACETAMINOPHEN 1000 MG: 500 TABLET ORAL at 16:27

## 2025-08-14 RX ADMIN — Medication 5 MG: at 10:25

## 2025-08-14 RX ADMIN — LEVETIRACETAM 750 MG: 100 INJECTION, SOLUTION INTRAVENOUS at 06:52

## 2025-08-14 RX ADMIN — METOPROLOL SUCCINATE 25 MG: 25 TABLET, EXTENDED RELEASE ORAL at 10:25

## 2025-08-14 RX ADMIN — ENOXAPARIN SODIUM 100 MG: 100 INJECTION SUBCUTANEOUS at 19:46

## 2025-08-14 RX ADMIN — ROSUVASTATIN CALCIUM 10 MG: 10 TABLET, FILM COATED ORAL at 10:24

## 2025-08-14 RX ADMIN — HUMAN ALBUMIN MICROSPHERES AND PERFLUTREN 5 ML (DILUTED): 10; .22 INJECTION, SOLUTION INTRAVENOUS at 15:56

## 2025-08-14 RX ADMIN — HEPARIN SODIUM 1200 UNITS/HR: 10000 INJECTION, SOLUTION INTRAVENOUS at 07:53

## 2025-08-14 RX ADMIN — SENNOSIDES AND DOCUSATE SODIUM 2 TABLET: 50; 8.6 TABLET ORAL at 19:47

## 2025-08-14 RX ADMIN — Medication 2.5 MG: at 19:48

## 2025-08-14 RX ADMIN — HEPARIN SODIUM 1200 UNITS/HR: 10000 INJECTION, SOLUTION INTRAVENOUS at 15:00

## 2025-08-14 RX ADMIN — LEVETIRACETAM 750 MG: 100 INJECTION, SOLUTION INTRAVENOUS at 19:46

## 2025-08-14 RX ADMIN — OLANZAPINE 5 MG: 10 INJECTION, POWDER, FOR SOLUTION INTRAMUSCULAR at 05:01

## 2025-08-14 RX ADMIN — ACETAMINOPHEN 1000 MG: 500 TABLET ORAL at 10:27

## 2025-08-14 RX ADMIN — ALLOPURINOL 100 MG: 100 TABLET ORAL at 16:27

## 2025-08-14 ASSESSMENT — ACTIVITIES OF DAILY LIVING (ADL)
ADLS_ACUITY_SCORE: 54
ADLS_ACUITY_SCORE: 61
ADLS_ACUITY_SCORE: 61
ADLS_ACUITY_SCORE: 54
ADLS_ACUITY_SCORE: 54
ADLS_ACUITY_SCORE: 61
ADLS_ACUITY_SCORE: 54
ADLS_ACUITY_SCORE: 63
ADLS_ACUITY_SCORE: 61
ADLS_ACUITY_SCORE: 61
ADLS_ACUITY_SCORE: 64
ADLS_ACUITY_SCORE: 61
ADLS_ACUITY_SCORE: 63
ADLS_ACUITY_SCORE: 61
ADLS_ACUITY_SCORE: 61
ADLS_ACUITY_SCORE: 54
ADLS_ACUITY_SCORE: 54
ADLS_ACUITY_SCORE: 63
ADLS_ACUITY_SCORE: 61
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
## 2025-08-14 NOTE — PROGRESS NOTES
Brief cross cover note  ~2055: paged by radiology regarding new acute infarct in his right posterior temporal lobe. Called overnight heme coverage as well as classical heme.  While going down to see patient in the setting of new stroke to see for new exam finding changes.  Patient began becoming increasingly aggressive wanting to get up off of the bed.  Patient began ambulating towards nurses station with cane, and fell towards computer monitor.  Writer was able to hold onto patient and patient did not have a head strike.  Nursing staff able to bring chair to have patient sit on with restraints placed.  At~2150: Code 21 was called on the patient due to aggression towards staff and patient received IM Zyprexa as well as Haldol    Exam: No acute changes to neuro exam  General Appearance: Patient is agitated, getting up out of bed walking around  MSK: Right arm and left leg weakness. 5/5 strength in right arm, able   Neuro: Awake and alert. Minimal verbal responses to questions.     A&P:   #Aggression  Patient is trying to walk around and was unable to be redirected into the bed. Staff were trying to keep him from falling, although patient was punching and at them and trying to hit staff with his cane. Patient walked into nursing station and fell forward into the computer until he was restrained.   - PRN IM zyprexa was given  - Haldol 2 mg total was given, (unfortunately was only able to receive 1/2 of Haldol 2 mL dose, placed order for additional 1 mg dose for total 2 mg)  - Placed in 4 point restraints for patient's safety  - Called to inform family of changes        #Multifocal acute ischemic strokes, in L MCA territory and new L basal ganglia stroke; Increasing mass effect on L lateral ventricle by L MCA infarction   # New acute infarct of the right posterior temporal lobe  Patient with new MRI finding present of acute infarct in the right posterior temporal lobe.  Discussed with both neurology and hematology given  new concerning findings.  Per discussion with neurology patient high risk for clotting and would like to continue with anticoagulation, but given risks of hemorrhagic conversion would want to closer control and after discussion with hematology/oncology reasonable to start a heparin drip.  Unfortunately patient received Lovenox at 8 PM and so would be unable to start until 8 AM for heparin drip.  Per discussion with on-call heme/Onc given the new stroke there is concern for underdosing of patient's Lovenox given the decreased from 100 to 80.  Discussed with hem/onc, concern regarding initiation of new chemotherapeutic with patient in the setting of his strokes.  Per discussion with overnight heme/onc lower concern for this medication to be contributing to his clinical picture.  Discussed with pharmacist at this time medication is new recently approved in 2025, and no literature to suggest of strokes, but is possible given the newness of medication.  - Neurology consulted and aware at this time are recommending switch to heparin drip  - Hematology consulted; okay to start with heparin drip  - Discussed MRI results with family             Addendum:  - required 2mg IV haldol again     Addendum#2:  #Thrombocytopenia  -CBC showed plt of 41, per review did no receive transfusion today discussed with bedside rn to transfuse plts after discussing with neuro and heme/onc    Discussed plan with bedside RN    Cristine Nguyen MD  Internal Medicine, PGY-3

## 2025-08-14 NOTE — PROGRESS NOTES
Resident/Fellow Attestation   I, OBEY LU MD, was present with the medical/SAMSON student who participated in the service and in the documentation of the note.  I have verified the history and personally performed the physical exam and medical decision making.  I agree with the assessment and plan of care as documented in the note.      OBEY LU MD  PGY3  Date of Service (when I saw the patient): 08/14/25    [facility]    Progress Note - Medicine Service, MAROON TEAM 3       Date of Admission:  7/15/2025    Assessment & Plan   Bernard Chapman is a 35 year old male with a past medical history of hypertension, diabetes, ELENA, PE/DVT (on rivaroxaban) admitted on 7/15/2025 for lethargy, N/V, and blurry vision found to have multifocal acute CVA in L MCA , R parietal and R frontal centrum semiovale. Work up notable for new diagnosis of metastatic lung adenocarcinoma with likely malignancy-related hypercoagulability and acute change in personality beginning 8/10 with new findings of R posterior temporal lobe infarct.     Updates 8/14/2025:  - Acute change in personality with increasing agitation and inappropriate affect  - Neurology consulted-- MRI/MRA head/neck 8/13 shows new acute infarct in R. Posterior temporal lobe  - Heme consulted -- started on allopurinol  - Lovenox 100 mg BID for AC  - Psychiatry consult placed:   - Risperdal 0.5 mg BID, additional 1 mg risperdal prn for agitation/sleep, 50 mg seroquel prn, 2-5 mg Haldol IV Q4 prn  - EKG tomorrow am to monitor QTc  - TTE echo ordered to evaluate valve vegetations (pt did not tolerate exam)  - continue Taletrectinib 600 mg daily  - recheck CBC, replace platelets as needed   - PICC line ordered  - Avoiding additional QT prolonging medications (Pt on keppra and chemotherapy)    #New metastatic lung adenocarcinoma, ROS1 rearrangement with right lung nodule and multiple enlarged mediastinal, paraesophageal, and  hilar lymph nodes  Malignancy work up initiated on admission due to significant hypercoagulability. CT CAP significant for [id number] spiculated density in R lung apex, 9mm pulmonary nodule in R lung base, L thyroid nodule, paratracheal LAD, splenic infarct, bilateral renal infarcts, and L retroperitoneal nodule. Underwent FNA of lymph node which was positive for lung adenocarcinoma (resulted 7/22).  PDL-1 resulted with low expression (40%). PET 7/26 with distant metastases and MRI brain showing no signs of metastasis. Patient started chemotherapy 8/13.   Oncology consulted, appreciate recs              - NGS; positive for a ROS1 gene rearrangement (SDC4::ROS1)                - Guardant 360; T p53 mutation and MITCHELL   - Taletrectinib 600 mg daily on empty stomach  - allopurinol 100 mg daily   Anti-nausea:              - compazine 10 mg scheduled prior to chemotherapy  - ativan 0.5 mg prn (avoiding additional zofran and compazine to decrease risk of prolonged QT given patient taking keppra and taletrectinib)  - [place] 2nd opinion; Patient will need to call [phone] to make appointment once there is a timeline for discharging from this hospitalization  - Due to ongoing transfusion needs, ARU not feasible at this time.      #Hypercoagulability d/t metastatic lung adenocarcinoma with infarcts of brain, kidney, spleen. New brain infarct 8/13.  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 TTE with mobile echodensities on tricuspid and aortic valve. Initial concern for TTP or catastrophic APS, however work up ultimately significant for new diagnosis of metastatic lung cancer. Hypercoagulable state presumed to be most likely 2/2 malignancy. Started on heparin gtt 7/17, but briefly held  for 7/18 endobronchial needle aspiration of lymph nodes. Stroke code called 7/19 for worsening R-sided hemiparesis and hemianopia. Repeat CTA with new focal high-grade stenosis of other part of left MCA. Heparin gtt was stopped during code stroke, but restarting (heparin gtt + levophed) led to 10/10 headache. CTH with small left frontal SAH. Brain MRI with new L basal ganglia stroke. Heparin gtt resumed, and repeat CTH stable. Switched to therapeutic Lovenox 7/22. Declining fibrinogen, so received cryoprecipitate 7/25. Concern for continued consumptive process, but on therapeutic Lovenox. PLT downtrending, so receiving transfusions intermittently. Cardiology consulted given cardiac valve thrombus and concern for stroke, agreed with therapeutic AC on lovenox, not likely surgical candidate, and recommended consideration for ID workup of indolent culture negative endocarditis. Repeat echo ordered to evaluate prior tricuspid and aortic valve vegetations and consideration of cardiac source of emboli. Lovenox was decreased to 80 mg BID per pharmacy on 8/9 when Anti-Xa level supratherapeutic. Patient was transitioned to heparin gtt on 8/13 given evidence of new infarct. Will transition back to lovenox 100 mg BID per neuro and heme recs.  - Neurology consulted as below  - Hematology consulted, appreciate recs              -Transfuse if   -Hgb<7  -PLT<50k-- CONDITIONAL ORDERS IN PLACE  - Lovenox 100 mg BID   - Cardiology consulted, signed off              - continue therapeutic AC  - could consider ID engagement for consideration of indolent culture negative endocarditis, although this is less likely  - qdaily CBC  - TTE ordered today     #New acute infarct in Right Posterior Temporal Lobe    #Multifocal acute ischemic strokes, in L MCA territory and new L basal ganglia stroke; Increasing mass effect on L lateral ventricle by L MCA infarction  #Subtotal occlusive thrombus in left M1   #Left frontoparietal SAH  (7/19)  Etiology most likely hypercoagulability d/t malignancy. Emboli stemming from tricuspid and aortic valves, appreciated on TTE 7/16. He continues to have R facial droop, extremity weakness. Left frontoparietal subarachnoid hemorrhage stable per CT.  Repeat brain MRI on 8/4 shows 2 new small infarcts in the right frontal lobe. Neuro evaluation as below due to personality changes. Repeat MRI on 8/14 with new infarct of posterior right temporal lobe and scattered punctate areas of left temporal lobe. Low concern for hemorrhagic conversion per Neuro. Heme/Onc recommending change to therapeutic lovenox 1 mg/kg q12h, only check Xa if signs of bleeding.   - Neuro consulted, appreciate recs              - Keppra 750 mg BID              - ativan 4mg IV if evidence of seizures >  3 minutes  -OT/PT/Speech              -Since ARU dispo delayed, asked therapies to do aggressive rehabilitation as able  - nutrition consult              -monitor oral intake, weights  - Cardiology consulted as above.  - Anticoagulation as above  - Repeat TTE outpatient to ensure improvement of AR and TR (and vegetations)    #Acute change in personality with inappropriate affect and agitation suspect 2/2 new posterior R. Temporal infarct  Starting around 8/10, patient has had significant personality changes, including becoming less participatory/interactive with therapies and with family. CT head from 8/11 without any acute findings. At 6:10 pm on 8/12, concern for seizure like activity with 10 seconds of general tremulousness and subsequent emesis. Given Keppra load with subsequent vEEG without evidence of seizures. Daily keppra started 8/13. MRI/MRA head 8/13 showing new right posterior temporal lobe infarct. Increased aggression and agitation overnight on 8/14 requiring Code 21 and use of physical 4 point restraints. Difficult to assess voluntariness of the decreased responsiveness. Potential component of delirium at play versus depression  vs neurological damage secondary to new infarct. Psychiatric consult placed to optimize medication management and for consideration whether psychiatric cause is contributing to mood changes.  Psychiatry consult, appreciate recs   - 0.5 mg risperidone BID    - 1 mg risperidone prn for agitation    - 50 mg seroquel prn for agitation and sleep   - 2-5 mg Haldol IV Q4 prn for agitation    - EKG tomorrow am to monitor QTc  - Delirium precautions    # Maculopapular rash on face, chest, bilateral upper extremities, improved  # Numerous palpable purpura across the scalp and lower extremities   # Petechiae on bilateral lower extremities, improving  C/f palpable purpura, so s/p L leg s/p punch biopsy with dermatology: rupture folliculitis. New rash noted 7/26 with flushing of face/chest after PET scan, suspected  secondary to contrast from PET scan.   Plan:  - Benadryl as needed for itching  - Chlorhexidine wash  - Will continue to monitor     #DM2, A1c 5.3  On HSSI with adequate control after stopping dexamethasone. CTM.   - POC glucose ACHS  - High intensity insulin sliding scale     #HLD, chronic  -Cont 10 mg rosuvastatin     #HTN  - Continue PTA Metoprolol succinate 25mg q daily (ok to hold if HR< 60)  - Hold PTA Losartan, could consider restarting vs discontinuing given normotensive  - Continue PTA hydrochlorothiazide     #ELENA  Currently not on CPAP, consider resuming if mental status improves     #Leukocytosis, resolved  Found to have leukocytosis, no clear cause or signs of infection, possible 2/2 stress induced.      #Hiccups, resolved   #chronic back pain  Chronic back pain. On acetaminophen only (given frequent need for neuro checks). Hiccups resolved (suspected 2/2 dexamethasone).   Plan:  - Thorazine 25mg q6hr discontinued due to contraindication with chemotherapy  - Melatonin 3mg daily  - Pepcid discontinued due to contraindication with chemotherapy  - continue  baclofen  - acetaminophen 1000 mg every 6 hours  -  "cyclobenzaprine 10 mg 3x daily PRN  - PTA gabapentin 100 mg 3x daily  - PTA oxycodone 5 mg PRN every 6h      #allergies  - fluticasone 1 spray daily      #Epistaxis, currently resolved  On 8/5 patient found to have epistaxis. At the time, physical exam shows blood coming from the right nares.  - Afrin spray as needed  - Apply pressure to the nasal passage to help with bleeding         Diet: Regular Diet Adult    DVT Prophylaxis: heparin gtt--> therapeutic lovenox  Avalos Catheter: Not present  Fluids: none  Lines: None     Cardiac Monitoring: None  Code Status: Full Code      Clinically Significant Risk Factors                 # Thrombocytopenia: Lowest platelets = 41 in last 2 days, will monitor for bleeding  # Acute Kidney Injury, unspecified: based on a >150% or 0.3 mg/dL increase in last creatinine compared to past 90 day average, will monitor renal function             # Overweight: Estimated body mass index is 29.32 kg/m  as calculated from the following:    Height as of this encounter: 1.88 m (6' 2\").    Weight as of this encounter: 103.6 kg (228 lb 6.4 oz).      # Financial/Environmental Concerns: none         Social Drivers of Health          Disposition Plan     Medically Ready for Discharge: Anticipated in 5+ Days         The patient's care was discussed with the Attending Physician, Dr. Wilson.    Selin Grimaldo  Medical Student  Medicine Service, [address]  Securely message with [email or web address] (more info)  Text page via [facility] Paging/Directory   See signed in provider for up to date coverage information  ______________________________________________________________________    Interval History   Patient was seen lying comfortably in bed sleeping with 1:1 sitter in place. 1:1 said patient has been intermittently waking up (did not sleep last night) and pulling at cords etc. 1:1 said patient has not had good oral intake today.    Overnight, per nurse and " night team report, patient was increasingly agitated and at times aggressive to staff. He was given 15 IM zyprexa, 3 mg IM Haldol, and 50 mg seroquel. He was placed in 4 point restraints and a Code 21 was called. Night team called and updated family regarding changes in patient's behavior and new acute infarct of right posterior temporal lobe.    Physical Exam   Vital Signs: Temp: 98.1  F (36.7  C) Temp src: Axillary BP: 115/60 Pulse: 68   Resp: 20 SpO2: 96 % O2 Device: None (Room air)    Weight: 228 lbs 6.4 oz    General Appearance: Appears well, lying in bed sleeping, no acute distress  Respiratory: Breathing comfortably on room air     Medical Decision Making       Please see A&P for additional details of medical decision making.      Data     I have personally reviewed the following data over the past 24 hrs:    10.63  \   11.7 (L)   / 41 (LL)     140 101 24.3 (H) /  109 (H)   3.8 25 1.32 (H) \     TSH: N/A T4: N/A A1C: N/A       Imaging results reviewed over the past 24 hrs:   Recent Results (from the past 24 hours)   MR Brain w/o Contrast   Result Value    Radiologist flags New acute infarct in the right posterior temporal (Urgent)    Narrative    EXAM: MR BRAIN W/O CONTRAST, MRA NECK (CAROTIDS) W/O CONTRAST, MRA  BRAIN ([place] [facility]) W/O CONTRAST  8/13/2025 7:01 PM     HISTORY:  Hx of cerebral metastasis, stroke. Now with new onset  aphasia       COMPARISON:  Head CT 8/11/2025, brain MRI 8/4/2025.    TECHNIQUE:  BRAIN MRI: Sagittal and axial T1-weighted, axial diffusion,  multiplanar T2 FLAIR with fat saturation images were obtained without  intravenous contrast. Following intravenous gadolinium-based contrast  administration, axial T2-weighted, axial susceptibility, and  T1-weighted images (in multiple planes) were obtained.    MRA: Using a 3D time-of-flight image acquisition technique, MRA of the  major arteries at the base of the brain was obtained without  intravenous contrast. Three-dimensional  reconstructions of the head  MRA were created, which were reviewed by the radiologist.    NECK MRA:  Limited non contrast 2DTOF images were obtained of the  mid-cervical region. Following intravenous gadolinium-based contrast  administration, a contrast enhanced MRA of the neck/cervical vessels  was performed.  Three-dimensional reconstructions of the neck and head MRA were  created, which were reviewed by the radiologist.    According to MRI technologist notes, patient became physically and  verbally abusive during scan. Exam terminated early.    FINDINGS:  BRAIN MRI: New area of restricted diffusion in the posterior right  temporal lobe with corresponding FLAIR hyperintense signal. There is  ongoing mildly reduced diffusion with T2 hyperintense signal in the  left basal ganglia and left periventricular white matter, and  scattered punctate foci of restricted diffusion in the left temporal  lobe. Dysgenesis of the corpus callosum.    There is no mass effect, midline shift, or definite intracranial  hemorrhage. Susceptibility weighted images could not be obtained due  to patient's inability to complete the examination. The ventricles are  proportionate to the cerebral sulci. Major intracranial vascular  structures are within normal limits.    No suspicious abnormality of the skull marrow signal. Clear paranasal  sinuses. Mastoid air cells are clear. No focal abnormality of the  pituitary gland, sella, skull base and upper cervical spinal  structures on sagittal images. The orbits are normal.    MRA COW: Motion artifact greatly degrades the image quality. Left  middle cerebral artery is not opacified. The right middle cerebral  artery, and posterior cerebral arteries are patent. No stenosis or  occlusion of the arteries in the posterior fossa. No arterial  aneurysm.    NECK MRA: Images degraded by motion artifact. Visible portions of the  major cervical arteries are 8.      Impression    IMPRESSION:  Exam was  terminated early due to patient behavior, and a significant  portion of the exam was not performed.    BRAIN MRI: New area of restricted diffusion and T2 hyperintensity in  the posterior right temporal lobe concerning for acute infarct.  Additional scattered punctate areas of restricted diffusion seen in  the left temporal lobe, some of which were present on previous exam.  Unchanged reduced diffusion and T2 hyperintensity in the left basal  ganglia and periventricular white matter.    BRAIN MRA: Complete lack of opacification of the left middle cerebral  artery which could be secondary to motion artifact or could represent  occlusion. The flow void on T2 weighted imaging in the brain MRI is  feathery in this region, and there may not be complete occlusion.. No  other evidence for intracranial arterial aneurysm or stenosis.    NECK MRA: Visible portions of the major cervical arteries are patent  but images are degraded by motion artifact..      [Urgent Result: New acute infarct in the right posterior temporal  lobe; lack of signal in the left MCA    Finding was identified on 8/13/2025 8:29 PM.     Yamile Сергейporfirio was contacted by Dr. Luis Delacruz at 8/13/2025 8:53  PM and verbalized understanding of the urgent finding.     I have personally reviewed the examination and initial interpretation  and I agree with the findings.    ANUJ YOO MD         SYSTEM ID:  [id number]   MRA Brain ([place] [facility] [place]) wo Contrast   Result Value    Radiologist flags New acute infarct in the right posterior temporal (Urgent)    Narrative    EXAM: MR BRAIN W/O CONTRAST, MRA NECK (CAROTIDS) W/O CONTRAST, MRA  BRAIN ([place] [facility]) W/O CONTRAST  8/13/2025 7:01 PM     HISTORY:  Hx of cerebral metastasis, stroke. Now with new onset  aphasia       COMPARISON:  Head CT 8/11/2025, brain MRI 8/4/2025.    TECHNIQUE:  BRAIN MRI: Sagittal and axial T1-weighted, axial diffusion,  multiplanar T2 FLAIR with fat saturation images were obtained  without  intravenous contrast. Following intravenous gadolinium-based contrast  administration, axial T2-weighted, axial susceptibility, and  T1-weighted images (in multiple planes) were obtained.    MRA: Using a 3D time-of-flight image acquisition technique, MRA of the  major arteries at the base of the brain was obtained without  intravenous contrast. Three-dimensional reconstructions of the head  MRA were created, which were reviewed by the radiologist.    NECK MRA:  Limited non contrast 2DTOF images were obtained of the  mid-cervical region. Following intravenous gadolinium-based contrast  administration, a contrast enhanced MRA of the neck/cervical vessels  was performed.  Three-dimensional reconstructions of the neck and head MRA were  created, which were reviewed by the radiologist.    According to MRI technologist notes, patient became physically and  verbally abusive during scan. Exam terminated early.    FINDINGS:  BRAIN MRI: New area of restricted diffusion in the posterior right  temporal lobe with corresponding FLAIR hyperintense signal. There is  ongoing mildly reduced diffusion with T2 hyperintense signal in the  left basal ganglia and left periventricular white matter, and  scattered punctate foci of restricted diffusion in the left temporal  lobe. Dysgenesis of the corpus callosum.    There is no mass effect, midline shift, or definite intracranial  hemorrhage. Susceptibility weighted images could not be obtained due  to patient's inability to complete the examination. The ventricles are  proportionate to the cerebral sulci. Major intracranial vascular  structures are within normal limits.    No suspicious abnormality of the skull marrow signal. Clear paranasal  sinuses. Mastoid air cells are clear. No focal abnormality of the  pituitary gland, sella, skull base and upper cervical spinal  structures on sagittal images. The orbits are normal.    MRA COW: Motion artifact greatly degrades the image  quality. Left  middle cerebral artery is not opacified. The right middle cerebral  artery, and posterior cerebral arteries are patent. No stenosis or  occlusion of the arteries in the posterior fossa. No arterial  aneurysm.    NECK MRA: Images degraded by motion artifact. Visible portions of the  major cervical arteries are 8.      Impression    IMPRESSION:  Exam was terminated early due to patient behavior, and a significant  portion of the exam was not performed.    BRAIN MRI: New area of restricted diffusion and T2 hyperintensity in  the posterior right temporal lobe concerning for acute infarct.  Additional scattered punctate areas of restricted diffusion seen in  the left temporal lobe, some of which were present on previous exam.  Unchanged reduced diffusion and T2 hyperintensity in the left basal  ganglia and periventricular white matter.    BRAIN MRA: Complete lack of opacification of the left middle cerebral  artery which could be secondary to motion artifact or could represent  occlusion. The flow void on T2 weighted imaging in the brain MRI is  feathery in this region, and there may not be complete occlusion.. No  other evidence for intracranial arterial aneurysm or stenosis.    NECK MRA: Visible portions of the major cervical arteries are patent  but images are degraded by motion artifact..      [Urgent Result: New acute infarct in the right posterior temporal  lobe; lack of signal in the left MCA    Finding was identified on 8/13/2025 8:29 PM.     Yamile Сергейporfirio was contacted by Dr. Luis Delacruz at 8/13/2025 8:53  PM and verbalized understanding of the urgent finding.     I have personally reviewed the examination and initial interpretation  and I agree with the findings.    ANUJ YOO MD         SYSTEM ID:  [id number]   MRA Angiogram Neck w/o Contrast   Result Value    Radiologist flags New acute infarct in the right posterior temporal (Urgent)    Narrative    EXAM: MR BRAIN W/O CONTRAST, MRA NECK  (CAROTIDS) W/O CONTRAST, MRA  BRAIN ([place] [facility] [place]) W/O CONTRAST  8/13/2025 7:01 PM     HISTORY:  Hx of cerebral metastasis, stroke. Now with new onset  aphasia       COMPARISON:  Head CT 8/11/2025, brain MRI 8/4/2025.    TECHNIQUE:  BRAIN MRI: Sagittal and axial T1-weighted, axial diffusion,  multiplanar T2 FLAIR with fat saturation images were obtained without  intravenous contrast. Following intravenous gadolinium-based contrast  administration, axial T2-weighted, axial susceptibility, and  T1-weighted images (in multiple planes) were obtained.    MRA: Using a 3D time-of-flight image acquisition technique, MRA of the  major arteries at the base of the brain was obtained without  intravenous contrast. Three-dimensional reconstructions of the head  MRA were created, which were reviewed by the radiologist.    NECK MRA:  Limited non contrast 2DTOF images were obtained of the  mid-cervical region. Following intravenous gadolinium-based contrast  administration, a contrast enhanced MRA of the neck/cervical vessels  was performed.  Three-dimensional reconstructions of the neck and head MRA were  created, which were reviewed by the radiologist.    According to MRI technologist notes, patient became physically and  verbally abusive during scan. Exam terminated early.    FINDINGS:  BRAIN MRI: New area of restricted diffusion in the posterior right  temporal lobe with corresponding FLAIR hyperintense signal. There is  ongoing mildly reduced diffusion with T2 hyperintense signal in the  left basal ganglia and left periventricular white matter, and  scattered punctate foci of restricted diffusion in the left temporal  lobe. Dysgenesis of the corpus callosum.    There is no mass effect, midline shift, or definite intracranial  hemorrhage. Susceptibility weighted images could not be obtained due  to patient's inability to complete the examination. The ventricles are  proportionate to the cerebral sulci. Major intracranial  vascular  structures are within normal limits.    No suspicious abnormality of the skull marrow signal. Clear paranasal  sinuses. Mastoid air cells are clear. No focal abnormality of the  pituitary gland, sella, skull base and upper cervical spinal  structures on sagittal images. The orbits are normal.    MRA COW: Motion artifact greatly degrades the image quality. Left  middle cerebral artery is not opacified. The right middle cerebral  artery, and posterior cerebral arteries are patent. No stenosis or  occlusion of the arteries in the posterior fossa. No arterial  aneurysm.    NECK MRA: Images degraded by motion artifact. Visible portions of the  major cervical arteries are 8.      Impression    IMPRESSION:  Exam was terminated early due to patient behavior, and a significant  portion of the exam was not performed.    BRAIN MRI: New area of restricted diffusion and T2 hyperintensity in  the posterior right temporal lobe concerning for acute infarct.  Additional scattered punctate areas of restricted diffusion seen in  the left temporal lobe, some of which were present on previous exam.  Unchanged reduced diffusion and T2 hyperintensity in the left basal  ganglia and periventricular white matter.    BRAIN MRA: Complete lack of opacification of the left middle cerebral  artery which could be secondary to motion artifact or could represent  occlusion. The flow void on T2 weighted imaging in the brain MRI is  feathery in this region, and there may not be complete occlusion.. No  other evidence for intracranial arterial aneurysm or stenosis.    NECK MRA: Visible portions of the major cervical arteries are patent  but images are degraded by motion artifact..      [Urgent Result: New acute infarct in the right posterior temporal  lobe; lack of signal in the left MCA    Finding was identified on 8/13/2025 8:29 PM.     Yamile Burnett was contacted by Dr. Luis Delacruz at 8/13/2025 8:53  PM and verbalized understanding of  the urgent finding.     I have personally reviewed the examination and initial interpretation  and I agree with the findings.    ANUJ YOO MD         SYSTEM ID:  [id number]

## 2025-08-14 NOTE — CONSULTS
"        Initial Psychiatric Consult   Consult date: August 14, 2025         Reason for Consult, requesting source:    Depression, AMS,, agitation   Requesting source: Ruba Wilson    Labs and imaging reviewed. Discussed with Shiela Jalloh, OT and nursing.     Total time spent in chart review, patient interview and coordination of care; 70 min           HPI:   Per medicine note 8/13:   \"Bernard Chapman is a 35 year old male with a past medical history of hypertension, diabetes, ELENA, PE/DVT (on rivaroxaban) admitted on 7/15/2025 for lethargy, N/V, and blurry vision found to have multifocal acute CVA in L MCA , R parietal and R frontal centrum semiovale. Initially concern for TTP or catastrophic APS, however further work up notable for new diagnosis of metastatic lung adenocarcinoma with likely malignancy-related hypercoagulability.   Updates 8/13/2025:  - Acute change in personality with increasing agitation and inappropriate affect  - Neurology consulted-- MRI/MRA head/neck at 5:00 pm, started keppra 750 BID (+load), vEEG negative for seizures  - Started Taletrectinib today through onc;  30 days of free drug (taletrectinib) from [facility] to be shipped to mother's house; dose of 600 mg daily on an empty stomach   - Cardiology consult (thrombi seen on TTE) and signed off\"    Per nursing note this morning:   \"Shift Events: Pt initially compliant with vitals, assessment, taking oral meds, receiving IV infusion, ambulating to bathroom. Then become increasingly agitated, verbally abusive/threatening, and attempting to remove IV with mouth. Pt physically aggressive resulting in code 21 called around 2140. IM zyprexa and IV haldol given during code. Four-point soft restraints ultimately needed non-compliance to safety and continued for pulling lines/equipment. Ankle restraints discontinued at 0230, and wrist restraints continued. Pt restless, agitated, verbally abusive/threatening, pulling at restraints, yelling out " "most of night. One-time PRN zyprexa IM effective to calm pt and rest. IV infiltration of NS IVF on left anterior forearm. IV access team able to place x1 new IV, but unable to place a second due to limited vein access. IV access team recommended PICC as next option available for upcoming infusion needs. (Heparin gtt, keppra infusions, frequent plt transfusions, electrlyte replacements, IV meds as not currently compliant with PO meds.)\"    He received 5 mg IM Zyprexa twice last night and on dose overnight. Zyprexa 10 mg ODT per day ordered today. Keppra was started yesterday.  His mother was in the room today when I came by. She thinks that he has not been doing well over the past 3-4 days, apparently related to new CVAs. She thought that he was doing quite well until then, was able to discuss things, but had some word fin finding difficulty.  He was not interviewed today since he was sleeping (after being up much of the night last night).         Past Psychiatric History:   He was involved in some psychotherapy through work to process a recent relationship breakup. No history of psych meds         Substance Use and History:          Tobacco Use    Smoking status: Never    Smokeless tobacco: Never   Substance Use Topics    Alcohol use: No           Past Medical History:   PAST MEDICAL HISTORY: No past medical history on file.    PAST SURGICAL HISTORY:   Past Surgical History:   Procedure Laterality Date    BRONCHOSCOPY RIDID OR FLEXIBLE W/ENDOBRONCHIAL ULTRASOUND GUIDED 1 OR 2 NODE STATIONS N/A 7/18/2025    Procedure: BRONCHOSCOPY, WITH BIOPSY OF LYMPH NODE STATION WITH ENDOBRONCHIAL ULTRASOUND GUIDANCE;  Surgeon: Binh Bains DO;  Location:  OR             Family History:   FAMILY HISTORY:   Family History   Problem Relation Age of Onset    Respiratory Mother         asthma    Hypertension Maternal Grandmother     Arthritis Maternal Grandmother     Diabetes Paternal Grandmother     Heart Disease Paternal " Grandfather         pacemaker    Colon Cancer No family hx of     Prostate Cancer No family hx of     Coronary Artery Disease No family hx of            Social History:   He grew up in an intact family, has one 1/2 sib from a parent's previous relationships. Minimal contact with her until he became ill; she has been visiting him from [place]. He lives alone, is a manager at a [employer].          Physical ROS:   The 10 point Review of Systems is negative other than noted in the HPI or here.           Medications:     Current Facility-Administered Medications   Medication Dose Route Frequency Provider Last Rate Last Admin    acetaminophen (TYLENOL) tablet 1,000 mg  1,000 mg Oral or Feeding Tube Q6H Dee Shah MD   1,000 mg at 08/14/25 1027    Baclofen (LIORESAL) tablet 5 mg  5 mg Oral TID Luisito Aden MD   5 mg at 08/14/25 1025    And    baclofen (LIORESAL) half-tablet 2.5 mg  2.5 mg Oral TID Luisito Aden MD   2.5 mg at 08/13/25 2044    enoxaparin ANTICOAGULANT (LOVENOX) injection 100 mg  1 mg/kg Subcutaneous Q12H Char Castro MD        fluticasone (FLONASE) 50 MCG/ACT spray 1 spray  1 spray Both Nostrils Daily Dee Shah MD   1 spray at 08/12/25 0816    hydroCHLOROthiazide tablet 25 mg  25 mg Oral Daily Marjan Michel MD   25 mg at 08/14/25 1024    levETIRAcetam (KEPPRA) 750 mg in sodium chloride 0.9 % 117.5 mL intermittent infusion  750 mg Intravenous Q12H Char Castro  mL/hr at 08/14/25 0652 750 mg at 08/14/25 0652    [Held by provider] losartan (COZAAR) tablet 100 mg  100 mg Oral Daily Dee Shah MD        magnesium sulfate 2 g in 50 mL sterile water intermittent infusion  2 g Intravenous Once Ruba Wilson MD        metoprolol succinate ER (TOPROL XL) 24 hr tablet 25 mg  25 mg Oral Daily Francisco Prather DO   25 mg at 08/14/25 1025    polyethylene glycol (MIRALAX) Packet 17 g  17 g Oral or Feeding Tube Daily Dee Shah MD   17 g at 08/14/25 1024     prochlorperazine (COMPAZINE) injection 10 mg  10 mg Intravenous Daily Char Castro MD   10 mg at 08/14/25 0749    Or    prochlorperazine (COMPAZINE) tablet 10 mg  10 mg Oral Daily Char Castro MD        rosuvastatin (CRESTOR) tablet 10 mg  10 mg Oral or Feeding Tube Daily Dee Shah MD   10 mg at 08/14/25 1024    senna-docusate (SENOKOT-S/PERICOLACE) 8.6-50 MG per tablet 1-2 tablet  1-2 tablet Oral or Feeding Tube BID Dee Shah MD   1 tablet at 08/14/25 1027    sodium chloride (PF) 0.9% PF flush 3 mL  3 mL Intracatheter Q8H MOHINDER Dee Shah MD   3 mL at 08/14/25 0056    taletrectinib adipate (IBTROZI) capsule 600 mg  600 mg Oral Daily Helen Aden PA-C   600 mg at 08/14/25 1022              Allergies:     Allergies   Allergen Reactions    Apixaban Rash    Amlodipine Other (See Comments) and Rash    Fludeoxyglucose F 18 Rash     Rash after PET scan, consider pre-treating with benadryl for future scans          Labs:     Recent Results (from the past 48 hours)   EKG 12-lead, tracing only    Collection Time: 08/13/25 10:17 AM   Result Value Ref Range    Systolic Blood Pressure  mmHg    Diastolic Blood Pressure  mmHg    Ventricular Rate 75 BPM    Atrial Rate 75 BPM    WI Interval 144 ms    QRS Duration 78 ms     ms    QTc 415 ms    P Axis 78 degrees    R AXIS 84 degrees    T Axis -8 degrees    Interpretation ECG       Sinus rhythm with sinus arrhythmia  Abnormal QRS-T angle, consider primary T wave abnormality  Abnormal ECG  When compared with ECG of 04-Aug-2025 10:09,  WI interval has increased  Confirmed by MD CALIXTO, JS ([id number]) on 8/13/2025 4:47:48 PM     MR Brain w/o Contrast    Collection Time: 08/13/25  7:01 PM   Result Value Ref Range    Radiologist flags New acute infarct in the right posterior temporal (Urgent)    MRA Brain ([place]) wo Contrast    Collection Time: 08/13/25  7:02 PM   Result Value Ref Range    Radiologist flags New acute infarct in the  right posterior temporal (Urgent)    MRA Angiogram Neck w/o Contrast    Collection Time: 08/13/25  7:02 PM   Result Value Ref Range    Radiologist flags New acute infarct in the right posterior temporal (Urgent)    Basic Metabolic Panel (Limited Occurrences)    Collection Time: 08/13/25 10:29 PM   Result Value Ref Range    Sodium 140 135 - 145 mmol/L    Potassium 3.8 3.4 - 5.3 mmol/L    Chloride 101 98 - 107 mmol/L    Carbon Dioxide (CO2) 25 22 - 29 mmol/L    Anion Gap 14 7 - 15 mmol/L    Urea Nitrogen 24.3 (H) 6.0 - 20.0 mg/dL    Creatinine 1.32 (H) 0.67 - 1.17 mg/dL    GFR Estimate 72 >60 mL/min/1.73m2    Calcium 9.6 8.8 - 10.4 mg/dL    Glucose 109 (H) 70 - 99 mg/dL   Magnesium    Collection Time: 08/13/25 10:29 PM   Result Value Ref Range    Magnesium 2.2 1.7 - 2.3 mg/dL   Phosphorus (Limited Occurrences)    Collection Time: 08/13/25 10:29 PM   Result Value Ref Range    Phosphorus 4.2 2.5 - 4.5 mg/dL   CBC with platelets    Collection Time: 08/13/25 10:29 PM   Result Value Ref Range    WBC Count 10.63 4.00 - 11.00 10e3/uL    RBC Count 4.39 (L) 4.40 - 5.90 10e6/uL    Hemoglobin 11.7 (L) 13.3 - 17.7 g/dL    Hematocrit 34.9 (L) 40.0 - 53.0 %    MCV 79.5 78.0 - 100.0 fL    MCH 26.7 26.5 - 33.0 pg    MCHC 33.5 31.5 - 36.5 g/dL    RDW 14.0 10.0 - 15.0 %    Platelet Count 41 (LL) 150 - 450 10e3/uL   Extra Blue Top Tube    Collection Time: 08/13/25 10:29 PM   Result Value Ref Range    Hold Specimen JIC    Extra Red Top Tube    Collection Time: 08/13/25 10:29 PM   Result Value Ref Range    Hold Specimen JIC    CONDITIONAL Prepare pheresed platelets (unit)    Collection Time: 08/14/25 12:29 AM   Result Value Ref Range    Blood Component Type Platelets     Product Code V0915D79     Unit Status Transfused     Unit Number [id number]     CODING SYSTEM LFUQ432     ISSUE DATE AND TIME 8/14/2025 12:38:00 AM CDT     UNIT ABO/RH A+     UNIT TYPE ISBT 6200    Basic Metabolic Panel (Limited Occurrences)    Collection Time:  "08/14/25  7:14 AM   Result Value Ref Range    Sodium 140 135 - 145 mmol/L    Potassium 4.4 3.4 - 5.3 mmol/L    Chloride 103 98 - 107 mmol/L    Carbon Dioxide (CO2) 24 22 - 29 mmol/L    Anion Gap 13 7 - 15 mmol/L    Urea Nitrogen 21.1 (H) 6.0 - 20.0 mg/dL    Creatinine 1.24 (H) 0.67 - 1.17 mg/dL    GFR Estimate 78 >60 mL/min/1.73m2    Calcium 9.6 8.8 - 10.4 mg/dL    Glucose 108 (H) 70 - 99 mg/dL   Magnesium    Collection Time: 08/14/25  7:14 AM   Result Value Ref Range    Magnesium 2.0 1.7 - 2.3 mg/dL   Phosphorus (Limited Occurrences)    Collection Time: 08/14/25  7:14 AM   Result Value Ref Range    Phosphorus 3.8 2.5 - 4.5 mg/dL   CBC with Platelets (Limited Occurrences)    Collection Time: 08/14/25 10:41 AM   Result Value Ref Range    WBC Count 9.59 4.00 - 11.00 10e3/uL    RBC Count 4.42 4.40 - 5.90 10e6/uL    Hemoglobin 11.7 (L) 13.3 - 17.7 g/dL    Hematocrit 36.8 (L) 40.0 - 53.0 %    MCV 83.3 78.0 - 100.0 fL    MCH 26.5 26.5 - 33.0 pg    MCHC 31.8 31.5 - 36.5 g/dL    RDW 13.9 10.0 - 15.0 %    Platelet Count 48 (LL) 150 - 450 10e3/uL   CONDITIONAL Prepare pheresed platelets (unit)    Collection Time: 08/14/25 12:02 PM   Result Value Ref Range    Blood Component Type Platelets     Product Code W5441D25     Unit Status Transfused     Unit Number [id number]     CODING SYSTEM [id number]     ISSUE DATE AND TIME 8/14/2025 12:43:00 PM CDT     UNIT ABO/RH A+     UNIT TYPE ISBT 6200           Physical and Psychiatric Examination:     /59   Pulse 58   Temp 97.9  F (36.6  C) (Axillary)   Resp 18   Ht 1.88 m (6' 2\")   Wt 103.6 kg (228 lb 6.4 oz)   SpO2 96%   BMI 29.32 kg/m    Weight is 228 lbs 6.4 oz  Body mass index is 29.32 kg/m .    Physical Exam:  I have reviewed the physical exam as documented by by the medical team and agree with findings and assessment and have no additional findings to add at this time.         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   Possible delirium           Assessment:   Due to anticholinergic burden and slow onset of action I have been getting away from the use of Zyprexa. Instead have been using Risperdal due to lack of anticholinergic effect and quick onset of action (T max is about one hour rather than 4-6 hours with PO Zyprexa).    There is a chance that Keppra could add to agitation.            Summary of Recommendations:   I would try scheduled Risperdal 0.5 mg BID, with 1 mg at about 9 pm PRN agitation, sleep. I would use this rather than Zyprexa. Can use 50 mg of Seroquel with PM Risperdal if not sleeping.  Haldol 2-5 mg IV q 4 hours for agitation. Follow QTc.     Contact me as needed.  Gabriel Skelton M.D.   Consult Liaison Psychiatry   [facility]   Contact on [facility]  If I am not available, then [facility] CL line ([phone]) should know who   Is covering our consult service.                 \"This dictation was performed with voice recognition software and may contain errors,  omissions and inadvertent word substitution.\"           "

## 2025-08-14 NOTE — PROGRESS NOTES
Brief Hematology/Oncology Progress Note    I received a call about this patient, a 34 y/o male who initially presented on 7/15/25 with multifocal acute CVA with TTE showing mobile echodensities on tricuspid and aortic valves. Further workup notable for new metastatic lung adenocarcinoma. Hypercoagulability was thought to be secondary to malignancy. Prior workup for APLS was negative.    The patient was started on a heparin gtt on 7/17 with subsequent imaging on 7/19 was concerning for another acute stroke. Anticoagulation was switched to therapeutic  Lovenox on 7/22. The patient has required intermittent platelet transfusions while on Lovenox. Notably patient developed a rash on Eliquis.     Cardiology was consulted given cardiac valve thrombi and concern for embolic nature of stroke. They also agreed with therapeutic lovenox. On 8/9, due to supratherapeutic anti-Xa level, lovenox dose was decreased from 1mg/kg q12h to ~0.8mg/kg q12h.     Due to changes in mental status and new aphasia, repeat brain imaging (MRI and MRA) was obtained this evening, which showed likely new infarct in the right posterior temporal lobe (left MCA).     The chronology of this new infarct raises the concern that the decreased dose of lovenox provided inadequate anticoagulation. We would recommend increasing his dose back to 1mg/kg q12h. However, neurology has some concern for hemorrhagic conversion of his new infarct, though is unable to definitively make this determination.  As such, they would like to continue anticoagulation, but would recommend a heparin gtt as this can be discontinued easily if concern for hemorrhagic conversion rises.     Recommendations:  - stop lovenox and transition patient back to heparin gtt  - classical hematology will see patient formally tomorrow (no need for a new consult as they were previously following)    Patient discussed with Dr. Eldridge.

## 2025-08-14 NOTE — CONSULTS
"Consult received for Vascular Access Team.  See LDA for details. For additional needs place \"Consult for Inpatient Vascular Access Care\"  [id number] order in EPIC.  "

## 2025-08-14 NOTE — PROGRESS NOTES
Solid Tumor Oncology Consult Service  Progress Note   Date of Service: 08/14/2025  Patient: Bernard Chapman  MRN: [id number]  Admission Date: 7/15/2025  Hospital Day # 30  Cancer Diagnosis: Metastatic lung adenocarcinoma   Primary Outpatient Oncologist: MARKOS   Current Treatment Plan: Taletrectinib      Interim history:    Patient was sleeping when I saw him in the room. Mom was at bedside. Reported patient was agitated last night. He wanted to go home. Mom didn't report side effects patient has experienced so far.     Recommendations today:  - Taletrectinib started on 8/13, 600 mg daily with empty stomach, avoid food 2h before and after taking the med  - EKG before the treatment no prolong QT  - CBC, CMP, Mg, Uric acid daily in the hospital  - EKG, CBC with Diff, CMP, Mag, Uric acid, CK weekly for 4 weeks  - Zofran and Immodium prn  - contact HemOnc team if ANC<1000, plts <75,000 or Hg <8      Assessment & Plan:   Bernard Chapman is a 35 year old year old male with a past medical history of HTN, DM, ELENA, PE/DVT 6/22/25 (previously on rivaroxaban) who presented 7/15/2025 with new neurological symptoms and found acute multifocal ischemic strokes, partial occlusive L MCA thrombus, L frontal SAH, and in setting of TCP c/f ITP vs catastrophic antiphospholipid syndrome (CAPS) vs malignancy s/p PLEX x7/16. CT CAP 7/16/2025 revealing LAD of hilar, mediastinal, and paraesophageal LNs and spiculated-appearing nodule of R apical lung s/p EBUS-TBNA 7/18 revealing metastatic lung adenocarcinoma, NGS revealed ROS1 rearrangement - planning to initiate treatment with taletrectinib as soon as possible while inpatient.      # New metastatic lung adenocarcinoma, ROS1 rearrangement   # Thrombocytopenia  # Anemia  CT CAP 7/16/2025 revealing a spiculated appearing nodule within R lung apex ~11 mm, multiple enlarged mediastinal, hilar and paraesophageal lymph nodes, along with 11 mm soft tissue density in the left  "retroperitoneum. Underwent EBUS-TBNA 7/18 revealing metastatic lung adenocarcinoma, + for CK7 and TTF-1. MRI brain without evidence of metastasis. Obtained PET on 7/26. Reviewed with radiologist which showed metastatic disease in L scapula, L retroperitoneum, R adrenal and L2. Areas are small enough where radiation is not required at this time. PD-L1 TPS at 40% consistent with low PD-L1 expression. Per d/w benign hematology, given severe thrombocytopenia requiring transfusions and hypercoagulable state with course complicated by multifocal strokes and PE 2/2 malignancy, plan to initiate treatment while inpatient with taletrectinib as soon as approved given ROS1 rearrangement found on NGS. Discussed with Tera and his parents 8/2 that response rate to this medication is 85-90%, with PFS measured in years on average.   - start Taletrectinib on 8/13, 600 mg daily with empty stomach, avoid food 2h before and after taking the med  - EKG, CBC with Diff, CMP, Mag, Uric acid, CK weekly for 4 weeks  - Zofran and Immodium prn  - contact HemOnc team if ANC<1000, plts <75,000 or Hg <8  - Follow up [facility], pending  - Lesions are too small for radiation at this time   - Outpatient oncology referral placed, follow for scheduling       # H/o DVT/PE  Presented 6/22/25 with chest pain and SOB and found to have PE and lower extremity DVTs, he was placed on apixaban though had an allergic reaction and rotated to Xarelto.   - Agree with AC (Lovenox) per primary team and hematology      _____________________________________________________      Physical Exam:    Blood pressure 134/62, pulse 65, temperature 98.3  F (36.8  C), temperature source Oral, resp. rate 20, height 1.88 m (6' 2\"), weight 103.6 kg (228 lb 6.4 oz), SpO2 100%.    Constitutional: Sitting in chair, NAD  HEENT: NCAT  Respiratory: Breathing comfortably on room air  Neuro: Alert, answers questions appropriately. Moves extremities spontaneously.  Psych: Appropriate " affect     Labs & Studies: I personally reviewed the following studies:  Oncology fusion gene NGS, 7/22/25:  RESULTS See result below Abnormal    Fusion Event: POSITIVE      INTERPRETATION    This sample is positive for a ROS1 gene rearrangement (SDC4::ROS1)      The detected fusion event joins exon 2 of SDC4 (5' partner) with exon 32 of ROS1 (3' partner), corresponding to approximately 78% of the total reads analyzed (major isoform).  Two additional isoforms corresponding to [id number] and SJD2frzr9::[id number] fusions, respectively, were also detected (corresponding to less than 20% of the total reads).      ROS1 gene rearrangements have been reported in diverse cancer types including non-small-cell lung cancer (NSCLC), and with a notably higher prevalence in lung adenocarcinoma. Importantly, FDA-approved tyrosine kinase inhibitors have demonstrated efficacy in treatment of ROS1-rearranged NSCLC (PMID: 90696971). However it is not clear if coexistence of more than two types of ROS1 rearrangements affects therapeutic response to TKI therapy. A recent case report (PMID: 83480982) showed that coexistence of QBQ8zaii5::QRS6dpld 34 and SDC4 exon2::ROS1 exon35 fusions showed suboptimal response to crizotinib therapy when compared to that seen with the IMH0vjkf1::ILP6focj 32 fusion alone.      Molecular Description  The following chromosomal coordinates describe the exon::exon junction(s) of the major isoform fusion partner in the most prevalent cDNA transcript for the reported gene fusion event:     chr20:[id number],chr6:[id number]  chr20:[id number],chr6:[id number]  chr20:[id number],chr6:[id number]         This assay detects gene fusion events based on the assessment of cDNA; the reported coordinates describe specific exon-exon joins in the detected transcripts for the positive gene fusion target(s).  The reported coordinates do not specifically describe the underlying genomic (DNA) breakpoint, but do describe a  reference interval in which the genomic alteration occurs.      ROUTINE LABS (Last four results):  CMP  Recent Labs   Lab 08/14/25  0714 08/13/25  2229 08/12/25  0614 08/11/25  0557 08/10/25  0646    140 140  --  141   POTASSIUM 4.4 3.8 4.0 4.2 4.2  4.1   CHLORIDE 103 101 104  --  104   CO2 24 25 23  --  22   ANIONGAP 13 14 13  --  15   * 109* 99  --  97   BUN 21.1* 24.3* 22.7*  --  28.4*   CR 1.24* 1.32* 1.10  --  1.06   GFRESTIMATED 78 72 90  --  >90   SHARAN 9.6 9.6 9.7  --  9.7   MAG 2.0 2.2 2.1 2.1 2.0   PHOS 3.8 4.2 4.4 4.3 4.6*   PROTTOTAL  --   --   --   --  6.9   ALBUMIN  --   --   --   --  4.0   BILITOTAL  --   --   --   --  0.6   ALKPHOS  --   --   --   --  69   AST  --   --   --   --  25   ALT  --   --   --   --  28     CBC  Recent Labs   Lab 08/14/25  1536 08/14/25  1041 08/13/25  2229 08/12/25  0614   WBC 10.73 9.59 10.63 8.4   RBC 4.42 4.42 4.39* 4.43   HGB 11.5* 11.7* 11.7* 11.8*   HCT 35.9* 36.8* 34.9* 36.2*   MCV 81.2 83.3 79.5 82   MCH 26.0* 26.5 26.7 26.6   MCHC 32.0 31.8 33.5 32.6   RDW 13.9 13.9 14.0 13.8   PLT 75* 48* 41* 46*     INR  No lab results found in last 7 days.    Medications list for reference:  Meds reviewed.

## 2025-08-14 NOTE — PROGRESS NOTES
"  Hematology Progress Note   Date of Service: 08/14/2025    Patient: Bernard Chapman  MRN: [id number]  Admission Date: 7/15/2025  Hospital Day # 30    Initial Reason for Consult: History of DVT, PE, multifocal CVA, thrombocytopenia    Subjective & Interval History:    Concerns for aggressive behavior towards staff, received IM Zyprexa and Haldol.  CT head showed new acute infarct in the right posterior temporal lobe.  He had received his p.m. dose of Lovenox, this was started on heparin drip this morning for transition.  Of note, he received his first dose of Taletrectinib yesterday morning.    Physical Exam:    BP (!) 140/73 (BP Location: Right arm)   Pulse 82   Temp 98.8  F (37.1  C) (Oral)   Resp 18   Ht 1.88 m (6' 2\")   Wt 103.6 kg (228 lb 6.4 oz)   SpO2 96%   BMI 29.32 kg/m    Patient is resting in the bed with arms covering the face, appears to be comfortably sleeping.  Did not open eyes to my voice.    Labs & Studies: Hemoglobin at baseline of 11.7, platelet count overall stable at 48.  Normal WBC count.  Creatinine 1.24, eGFR 78, transaminases and total protein on 8/10 within normal limits.      Assessment & Plan:   Bernard Chapman is a 35 year old male with a past medical history of pulm embolism and deep venous thrombosis on rivaroxaban (6/22/2025), hypertension, diabetes, suspected immune thrombocytopenia, sleep apnea.  He was admitted on 7/15/2025 for acute ischemic stroke of bilateral cerebral hemispheres (scattered infarcts in left MCA territory and some in right cerebral hemisphere).  He had acute severe thrombocytopenia concerning for thrombotic thrombocytopenic purpura/ITP versus catastrophic antiphospholipid syndrome/CAPS.  He had emergent therapeutic plasma exchange on the day of presentation.  Eventually [id number] level came back and was normal and there were no morphological evidence of hemolysis on peripheral smear thus TTP was felt less likely.  APS workup returned negative as well.  " Eventually his workup showed metastatic lung adenocarcinoma.  He was started on heparin drip on 7/17, subsequent imaging on 7/19 was concerning for another acute stroke, thus anticoagulation was switched to therapeutic Lovenox on 7/22.  He required intermittent platelet transfusions.  He had previously developed a rash on Eliquis hence was on Xarelto prior to this admission.    On 8/9, supratherapeutic antiXa level (1.05) was seen thus Lovenox was decreased from 1 mg/kg every 12 hours to 0.8 mg/kg every 12 hours (80 mg twice a day).    Due to changes in mental status and new aphasia, repeat brain imaging (MRI and MRA) was obtained on 8/13, which showed likely new infarct in the right posterior temporal lobe.  This suggests that the decreased dose of lovenox provided inadequate anticoagulation. Thus we would recommend increasing his dose back to 1mg/kg q12h. Initially there was concern for hemorrhagic conversion of his new infarct thus, he was switched to heparin drip.  Per primary team's discussion with neurology, today there is lower concern for hemorrhagic conversion and neurology support switching back to Lovenox.  Recommend 1 mg/kg twice a day dose as he may need higher anticoagulant effect to be most efficacious.  Would not check antigen a level unless there are concerns for active bleeding or hemorrhagic conversion of stroke. Of note patient started his targeted cancer therapy yesterday, per medicine team's discussion with pharmacy, given that the drug is relatively new, association with stroke is not well established. Defer ongoing management of metastatic malignancy to oncology team.    Recommendations:   - Switch to Lovenox 1 mg/kg q12h if okayed by neurology  - Would check antiXa only if there are concerns for bleeding otherwise monitor clinically for new strokes/bleeding  - Per our prior consult, would continue with Lovenox and revisit option of DOAC outpatient. He was previously scheduled to see   Cogan at [facility] on the day of presentation, please request a follow up closer to discharge     Discussed with Dr. Eldridge    We will follow. Please call with questions.    Yue Garza MD MS  Hematology fellow, PGY6

## 2025-08-14 NOTE — PROGRESS NOTES
"Care Management Follow Up    Length of Stay (days): 30    Expected Discharge Date: 08/19/2025     Concerns to be Addressed: discharge planning     Patient plan of care discussed at interdisciplinary rounds: {YES / NO:026635::\"Yes\"}    Anticipated Discharge Disposition: Acute Rehab              Anticipated Discharge Services: Transportation Services, Other (see comment) (PT/OT)  Anticipated Discharge DME: Other (see comment) (TBD)    Patient/family educated on Medicare website which has current facility and service quality ratings: yes  Education Provided on the Discharge Plan: Yes  Patient/Family in Agreement with the Plan: yes    Referrals Placed by CM/SW: Post Acute Facilities  Private pay costs discussed: {IP CM Discharge Costs Discussed:343897}    Discussed  Partnership in Safe Discharge Planning  document with patient/family: {YES/NO:60}     Handoff Completed: {IP CM Handoff Referral list:909546}    Additional Information:  ***    Next Steps: ***    DAR Muse     " being pursued at [facility].    Pt had a one to one sitter placed on 8/12/2025 for line pulling/agitation.    Pt had upper extremity restraints placed on 8/14/2025.  Per rounds pt had a MRI which showed add'l infarcts. Per rounds report, pt is now on a heparin drip. Pt was started on oral Taletrectinib  on 8/13/2025.    TIRSO received a call from  Mali Murrieta RN Case Manager with [facility] ([phone], ext. [phone]). [facility] a 3rd party  for pt's insurance [email or web address] .  SW provided an update in regards to pt's current status.         Next Steps: SW will continue to follow pt for discharge planning.  Pt is not medically ready for discharge.    BRUNILDA Jorgensen  Social Work, 6A  Phone:  [phone]  Pager:  [phone]  8/15/2025       DAR Muse

## 2025-08-14 NOTE — CONSULTS
"Consult received for Vascular Access Team.  See LDA for details. For additional needs place \"Consult for Inpatient Vascular Access Care\"  [id number] order in EPIC.  "

## 2025-08-14 NOTE — PLAN OF CARE
Shift Events: Pt initially compliant with vitals, assessment, taking oral meds, receiving IV infusion, ambulating to bathroom. Then become increasingly agitated, verbally abusive/threatening, and attempting to remove IV with mouth. Pt physically aggressive resulting in code 21 called around 2140. IM zyprexa and IV haldol given during code. Four-point soft restraints ultimately needed non-compliance to safety and continued for pulling lines/equipment. Ankle restraints discontinued at 0230, and wrist restraints continued. Pt restless, agitated, verbally abusive/threatening, pulling at restraints, yelling out most of night. One-time PRN zyprexa IM effective to calm pt and rest. IV infiltration of NS IVF on left anterior forearm. IV access team able to place x1 new IV, but unable to place a second due to limited vein access. IV access team recommended PICC as next option available for upcoming infusion needs. (Heparin gtt, keppra infusions, frequent plt transfusions, electrlyte replacements, IV meds as not currently compliant with PO meds.)        Plan: Continue plan of care and notify team of changes or concerns.    Shift: 5170-2355.    Goal Outcome Evaluation:      Plan of Care Reviewed With: patient    Overall Patient Progress: decliningOverall Patient Progress: declining    Outcome Evaluation: Pt physically aggressive resulting in code 21 called around 2140. Four-point soft restraints ultimately needed. Ankle restraints discontinued at 0230, and wrist restraints continued. Pt restless, agitated, verbally abusive/threatening, pulling at restraints, yelling out most of night. One-time PRN zyprexa IM effective to calm pt and rest. IV infiltration of NS IVF on left anterior forearm. IV access team able to place x1 new IV, but could not place a second due to limited vein access. IV access team recommended PICC as next option available for upcoming infusion needs.

## 2025-08-15 ENCOUNTER — APPOINTMENT (OUTPATIENT)
Dept: PHYSICAL THERAPY | Facility: CLINIC | Age: 35
DRG: 064 | End: 2025-08-15
Payer: COMMERCIAL

## 2025-08-15 ENCOUNTER — APPOINTMENT (OUTPATIENT)
Dept: OCCUPATIONAL THERAPY | Facility: CLINIC | Age: 35
DRG: 064 | End: 2025-08-15
Payer: COMMERCIAL

## 2025-08-15 LAB
ALBUMIN SERPL BCG-MCNC: 4.1 G/DL (ref 3.5–5.2)
ALP SERPL-CCNC: 69 U/L (ref 40–150)
ALT SERPL W P-5'-P-CCNC: 22 U/L (ref 0–70)
ANION GAP SERPL CALCULATED.3IONS-SCNC: 12 MMOL/L (ref 7–15)
ANION GAP SERPL CALCULATED.3IONS-SCNC: 13 MMOL/L (ref 7–15)
AST SERPL W P-5'-P-CCNC: 27 U/L (ref 0–45)
ATRIAL RATE - MUSE: 72 BPM
BILIRUB SERPL-MCNC: 0.7 MG/DL
BUN SERPL-MCNC: 25.3 MG/DL (ref 6–20)
BUN SERPL-MCNC: 25.3 MG/DL (ref 6–20)
CALCIUM SERPL-MCNC: 9.3 MG/DL (ref 8.8–10.4)
CALCIUM SERPL-MCNC: 9.3 MG/DL (ref 8.8–10.4)
CHLORIDE SERPL-SCNC: 103 MMOL/L (ref 98–107)
CHLORIDE SERPL-SCNC: 104 MMOL/L (ref 98–107)
CREAT SERPL-MCNC: 1.26 MG/DL (ref 0.67–1.17)
CREAT SERPL-MCNC: 1.29 MG/DL (ref 0.67–1.17)
DIASTOLIC BLOOD PRESSURE - MUSE: NORMAL MMHG
EGFRCR SERPLBLD CKD-EPI 2021: 74 ML/MIN/1.73M2
EGFRCR SERPLBLD CKD-EPI 2021: 76 ML/MIN/1.73M2
ERYTHROCYTE [DISTWIDTH] IN BLOOD BY AUTOMATED COUNT: 14 % (ref 10–15)
ERYTHROCYTE [DISTWIDTH] IN BLOOD BY AUTOMATED COUNT: 14 % (ref 10–15)
GLUCOSE BLDC GLUCOMTR-MCNC: 162 MG/DL (ref 70–99)
GLUCOSE SERPL-MCNC: 111 MG/DL (ref 70–99)
GLUCOSE SERPL-MCNC: 128 MG/DL (ref 70–99)
HCO3 SERPL-SCNC: 25 MMOL/L (ref 22–29)
HCO3 SERPL-SCNC: 25 MMOL/L (ref 22–29)
HCT VFR BLD AUTO: 34 % (ref 40–53)
HCT VFR BLD AUTO: 34.7 % (ref 40–53)
HGB BLD-MCNC: 10.9 G/DL (ref 13.3–17.7)
HGB BLD-MCNC: 11 G/DL (ref 13.3–17.7)
INTERPRETATION ECG - MUSE: NORMAL
MAGNESIUM SERPL-MCNC: 2.2 MG/DL (ref 1.7–2.3)
MCH RBC QN AUTO: 26 PG (ref 26.5–33)
MCH RBC QN AUTO: 26.2 PG (ref 26.5–33)
MCHC RBC AUTO-ENTMCNC: 31.7 G/DL (ref 31.5–36.5)
MCHC RBC AUTO-ENTMCNC: 32.1 G/DL (ref 31.5–36.5)
MCV RBC AUTO: 81.7 FL (ref 78–100)
MCV RBC AUTO: 82 FL (ref 78–100)
P AXIS - MUSE: 30 DEGREES
PLATELET # BLD AUTO: 61 10E3/UL (ref 150–450)
PLATELET # BLD AUTO: 69 10E3/UL (ref 150–450)
POTASSIUM SERPL-SCNC: 3.8 MMOL/L (ref 3.4–5.3)
POTASSIUM SERPL-SCNC: 4.2 MMOL/L (ref 3.4–5.3)
PR INTERVAL - MUSE: 124 MS
PROT SERPL-MCNC: 7 G/DL (ref 6.4–8.3)
QRS DURATION - MUSE: 78 MS
QT - MUSE: 364 MS
QTC - MUSE: 398 MS
R AXIS - MUSE: 39 DEGREES
RBC # BLD AUTO: 4.16 10E6/UL (ref 4.4–5.9)
RBC # BLD AUTO: 4.23 10E6/UL (ref 4.4–5.9)
SODIUM SERPL-SCNC: 141 MMOL/L (ref 135–145)
SODIUM SERPL-SCNC: 141 MMOL/L (ref 135–145)
SYSTOLIC BLOOD PRESSURE - MUSE: NORMAL MMHG
T AXIS - MUSE: -13 DEGREES
URATE SERPL-MCNC: 6.8 MG/DL (ref 3.4–7)
VENTRICULAR RATE- MUSE: 72 BPM
WBC # BLD AUTO: 10.56 10E3/UL (ref 4–11)
WBC # BLD AUTO: 10.64 10E3/UL (ref 4–11)

## 2025-08-15 PROCEDURE — 85027 COMPLETE CBC AUTOMATED: CPT

## 2025-08-15 PROCEDURE — 250N000013 HC RX MED GY IP 250 OP 250 PS 637

## 2025-08-15 PROCEDURE — 250N000013 HC RX MED GY IP 250 OP 250 PS 637: Performed by: STUDENT IN AN ORGANIZED HEALTH CARE EDUCATION/TRAINING PROGRAM

## 2025-08-15 PROCEDURE — 120N000003 HC R&B IMCU UMMC

## 2025-08-15 PROCEDURE — 99232 SBSQ HOSP IP/OBS MODERATE 35: CPT | Mod: GC | Performed by: STUDENT IN AN ORGANIZED HEALTH CARE EDUCATION/TRAINING PROGRAM

## 2025-08-15 PROCEDURE — 258N000003 HC RX IP 258 OP 636

## 2025-08-15 PROCEDURE — 84550 ASSAY OF BLOOD/URIC ACID: CPT

## 2025-08-15 PROCEDURE — 250N000011 HC RX IP 250 OP 636

## 2025-08-15 PROCEDURE — 80053 COMPREHEN METABOLIC PANEL: CPT

## 2025-08-15 PROCEDURE — 97530 THERAPEUTIC ACTIVITIES: CPT | Mod: GO

## 2025-08-15 PROCEDURE — 93005 ELECTROCARDIOGRAM TRACING: CPT

## 2025-08-15 PROCEDURE — 82310 ASSAY OF CALCIUM: CPT

## 2025-08-15 PROCEDURE — 99233 SBSQ HOSP IP/OBS HIGH 50: CPT | Mod: GC | Performed by: INTERNAL MEDICINE

## 2025-08-15 PROCEDURE — 97116 GAIT TRAINING THERAPY: CPT | Mod: GP | Performed by: PHYSICAL THERAPIST

## 2025-08-15 PROCEDURE — 99233 SBSQ HOSP IP/OBS HIGH 50: CPT | Performed by: INTERNAL MEDICINE

## 2025-08-15 PROCEDURE — 120N000002 HC R&B MED SURG/OB UMMC

## 2025-08-15 PROCEDURE — 83735 ASSAY OF MAGNESIUM: CPT

## 2025-08-15 PROCEDURE — 97530 THERAPEUTIC ACTIVITIES: CPT | Mod: GP | Performed by: PHYSICAL THERAPIST

## 2025-08-15 RX ORDER — LOPERAMIDE HYDROCHLORIDE 2 MG/1
2 CAPSULE ORAL 3 TIMES DAILY PRN
Status: DISCONTINUED | OUTPATIENT
Start: 2025-08-15 | End: 2025-08-21 | Stop reason: HOSPADM

## 2025-08-15 RX ADMIN — Medication 5 ML: at 05:52

## 2025-08-15 RX ADMIN — RISPERIDONE 0.5 MG: 0.5 TABLET ORAL at 19:48

## 2025-08-15 RX ADMIN — POLYETHYLENE GLYCOL 3350 17 G: 17 POWDER, FOR SOLUTION ORAL at 08:33

## 2025-08-15 RX ADMIN — METOPROLOL SUCCINATE 25 MG: 25 TABLET, EXTENDED RELEASE ORAL at 08:17

## 2025-08-15 RX ADMIN — Medication 5 MG: at 19:50

## 2025-08-15 RX ADMIN — ACETAMINOPHEN 1000 MG: 500 TABLET ORAL at 16:04

## 2025-08-15 RX ADMIN — Medication 2.5 MG: at 14:17

## 2025-08-15 RX ADMIN — Medication 5 ML: at 14:10

## 2025-08-15 RX ADMIN — Medication 5 ML: at 18:41

## 2025-08-15 RX ADMIN — ALLOPURINOL 100 MG: 100 TABLET ORAL at 08:20

## 2025-08-15 RX ADMIN — Medication 2.5 MG: at 08:20

## 2025-08-15 RX ADMIN — LEVETIRACETAM 750 MG: 100 INJECTION, SOLUTION INTRAVENOUS at 19:45

## 2025-08-15 RX ADMIN — ENOXAPARIN SODIUM 100 MG: 100 INJECTION SUBCUTANEOUS at 19:49

## 2025-08-15 RX ADMIN — LEVETIRACETAM 750 MG: 100 INJECTION, SOLUTION INTRAVENOUS at 08:26

## 2025-08-15 RX ADMIN — FLUTICASONE PROPIONATE 1 SPRAY: 50 SPRAY, METERED NASAL at 08:33

## 2025-08-15 RX ADMIN — PROCHLORPERAZINE MALEATE 10 MG: 10 TABLET ORAL at 09:37

## 2025-08-15 RX ADMIN — Medication 5 MG: at 14:17

## 2025-08-15 RX ADMIN — RISPERIDONE 0.5 MG: 0.5 TABLET ORAL at 08:20

## 2025-08-15 RX ADMIN — SENNOSIDES AND DOCUSATE SODIUM 1 TABLET: 50; 8.6 TABLET ORAL at 19:50

## 2025-08-15 RX ADMIN — ENOXAPARIN SODIUM 100 MG: 100 INJECTION SUBCUTANEOUS at 08:31

## 2025-08-15 RX ADMIN — SENNOSIDES AND DOCUSATE SODIUM 2 TABLET: 50; 8.6 TABLET ORAL at 08:19

## 2025-08-15 RX ADMIN — HYDROCHLOROTHIAZIDE 25 MG: 12.5 TABLET ORAL at 08:18

## 2025-08-15 RX ADMIN — Medication 2.5 MG: at 19:50

## 2025-08-15 RX ADMIN — Medication 5 MG: at 08:19

## 2025-08-15 RX ADMIN — ROSUVASTATIN CALCIUM 10 MG: 10 TABLET, FILM COATED ORAL at 08:17

## 2025-08-15 ASSESSMENT — ACTIVITIES OF DAILY LIVING (ADL)
ADLS_ACUITY_SCORE: 56
ADLS_ACUITY_SCORE: 61
ADLS_ACUITY_SCORE: 61
ADLS_ACUITY_SCORE: 60
ADLS_ACUITY_SCORE: 61
ADLS_ACUITY_SCORE: 61
ADLS_ACUITY_SCORE: 60
ADLS_ACUITY_SCORE: 58
ADLS_ACUITY_SCORE: 60
ADLS_ACUITY_SCORE: 60
ADLS_ACUITY_SCORE: 56
ADLS_ACUITY_SCORE: 61
ADLS_ACUITY_SCORE: 56
ADLS_ACUITY_SCORE: 62
ADLS_ACUITY_SCORE: 60
ADLS_ACUITY_SCORE: 61
ADLS_ACUITY_SCORE: 60
ADLS_ACUITY_SCORE: 56
ADLS_ACUITY_SCORE: 56
ADLS_ACUITY_SCORE: 60
ADLS_ACUITY_SCORE: 60
ADLS_ACUITY_SCORE: 56
ADLS_ACUITY_SCORE: 61

## 2025-08-15 NOTE — PROGRESS NOTES
Care Management Follow Up    Length of Stay (days): 31    Expected Discharge Date: 08/19/2025     Concerns to be Addressed: discharge planning     Patient plan of care discussed at interdisciplinary rounds: Yes    Anticipated Discharge Disposition: At present,  ST and Physical Therapy are recommending ARU placement.and OT is recommending TCU.   Pt receives platelet transfusions  every other day.  Pt will require platelet transfusions upon discharge from [facility].  Patients cannot leave ARU settings for outpt appt's and for this reason, TCU will be pursued.     Anticipated Discharge Services:   At present,  ST and Physical Therapy are recommending ARU placement.and OT is recommending TCU.  Pt receives platelet transfusions  every other day.  Pt will require platelet transfusions upon discharge from [facility].  Patients cannot leave ARU settings for outpt appt's and for this reason, TCU will be pursued.  Anticipated Discharge DME:   Not applicable at this time    Patient/family educated on Medicare website which has current facility and service quality ratings: Yes  Education Provided on the Discharge Plan: Yes  Patient/Family in Agreement with the Plan: Yes    Referrals Placed by CM/SW: Post Acute Care Facilities  Private pay costs discussed:   Not applicable at this time    Discussed  Partnership in Safe Discharge Planning  document with patient/family: No     Handoff Completed: No, handoff not indicated or clinically appropriate    Additional Information:   SW is following pt for discharge planning.  At present,  ST and Physical Therapy are recommending ARU placement.and OT is recommending TCU.  Pt receives platelet transfusions  every other day.  Pt will require platelet transfusions upon discharge from [facility].  Patients cannot leave ARU settings for outpt appt's and for this reason, TCU will be pursued.  Placement is being pursued at [facility].  Pt had a one to one sitter placed on 8/12/2025. The sitter was discontinued  on 8/15/2025 at 7:30am.  Pt had upper extremity restraints placed on 8/14/2025.   The upper extremity restraints were successfully discontinued on 8/14/2025 at 9:45am.   Per chart review, pt's heparin drip appears to have been started and discontinued on 8/14/2025.    Pt is receiving oral chemo (Taletrectinib )    SW spoke with Dr. Wilson ([place] [facility]) about barriers to discharge. Dr. Wilson anticipates that pt will remain hospitalized through the weekend.  Pt's mental status and participation is being monitored, Oncology is reportedly determining whether or not platelet transfusions could be every 3rd day rather than every other day.      Next Steps: SW will continue to follow pt for discharge planning. Pt is not medically ready for discharge.     DAR Muse

## 2025-08-15 NOTE — PROGRESS NOTES
"SPIRITUAL HEALTH SERVICES Progress Note  ([place]) 6A  Referral Source/Reason for Visit: Family Request for Ongoing Support  Summary and Recommendations -    Met with Tera's father, Duane to offer emotional support. Tera was sleeping.  Tera has experienced more strokes which have affected his behavior and Duane added that \"he sometimes says things that don't make sense.\"  Duane said that he and Violet (Tera's mother) are doing okay and that they are hopeful about the efficacy of the chemo medicine that Tera started taking a few days ago.  Duane requested that I visit Tera again next week.    PLAN:  will visit [facility] again next week per Family request. Spiritual Health Services remain available on request. Please place a standard consult order on Epic.    Brooke Bush  Chaplain Resident    Spiritual Health Services is available 24/7 for emergent requests and consults, either by paging the on-call  or by entering an ASAP/STAT consult in [employer], which will also page the on-call .    Assessment    Saw pt Bernard BURR[place] \"Tera\" and his father, Duane.    Patient/Family Understanding of Illness and Goals of Care -   Duane reported that \"Tera had some more strokes this week,\" and that his \"behavior shifted\" and \"he needed a sitter.\" Today has been better and Tera no longer needs a sitter. He is currently taking \"chemo pills that are specifically targeted for the cancer that Tera has.\"    Distress and Loss -   Tera has experienced more strokes which have affected his behavior and Duane added that \"he sometimes says things that don't make sense.\"  Tera had a few days of limited sleep. Duane was glad that he is sleeping this afternoon and hopefully \"making up for the sleep he lost.\"  Duane said he and Violet are doing okay in the midst of all of this and that he \"takes one day at a time.\"    Strengths, Coping, and Resources -   Tera has the support of his family especially his mom and dad. " "  Duane is hopeful about the chemo medicine that Tera started taking a few days ago. Duane said that the research shows the medication to be \"85% effective in treating Tera's particular form of cancer\" and that \"improvements in health can start as early as 8-9 days.\"  Duane also shared that Tera was able to walk today and do some more activity like filling up his water cup and pivoting to the side without losing his balance. Duane noted that, \"his OT was impressed by that.\"    "

## 2025-08-15 NOTE — PLAN OF CARE
Status: Admitted 7/15 for N/V and lethargy, found to have infact in L MCA, R parietal, R frontal centrum in the setting of new metastatic lung adenocarcinoma. Stay c/b acute personality change, new infarct in R posterior temporal lobe found 8/13, thromobocytopenia, and delirium (code 21 8/13 flora), syncope (rapid code called 8/15 afternoon). Restraints discontinued 8/14 9h45 sitter discontinued 8/15 07h00 Hx of HTN, DM, ELENA, PE/ DVT.   Precaution: Chemo- when dealing with bodily fluids.   Vitals: BP Hypertensive  within params. HR intermittently whitley, declined chest pain. On Ra. Max temp 99.1.   Neuros: Aox3, did not know year even with choices. PERRLA, tracks. DTA if having field cut. Speech garbled, STR, small word answers, illogical at times. Mild to mod aphasia. R droop.L side 4-5/5. RUE 1/5, intermittently spastic, cannot grasp. LLE 3-4/5, can do weak plantar but no dorsiflex. NIHSS 9. Mood flat, withdrawn, smiling with family in room. No PRNs needed on shift.   IV: DL PICC blood returned and hep locked. 1 PIV SL.   Labs/Electrolytes: No electrolyte replacement. Hgb 11.0.   Resp: On Ra. LSC. No SOB observed.   Diet: Reg diet, feeds self. Cannot eat 2 hours before and after scheduled chemo meds. Takes pills whole with applesauce.   GI: LBM on shift 8/15, incontinent before syncope event.   : Voids spont to BR or with urinal.   Skin: L arm infiltration marked- no change. Bruising bilat abdo.   Pain: Denies pain and declined scheduled Tylenol.   Activity: A1-2 GB cane. Walked halls with therapy and sat up in chair. Wears R arm splint when sleeping.   SCDs on? If no, why?: On  Social: Father and Sister visited.   Adls: linen changed, dress/undressed, attempted CHG shower but pt had syncope event- will try wipes this evening.   Plan: CHG wipes this flora. Platelet infusion if under 50. Psych following.    Education completed: Stroke education done on shift with sister and dad.     Updates this shift:   1) changed  chemo medication timing so pt can eat better.     2)Around [id number] writer helped pt go to BR to have a CHG shower as we got to the BR, pt started having incontinent stool, knees got weak and writer caught pt and called for help. Team lowered pt to ground, unsure if stroke or rapid was called-. Team arrived and pt brought back into bed, labs ordered, CCM started. No change in neuros after event. Mother Violet updated by writer.

## 2025-08-15 NOTE — PLAN OF CARE
Status: Admitted 7/15 for N/V and lethargy, found to have infact in L MCA, R parietal, R frontal centrum in the setting of new metastatic lung adenocarcinoma. Stay c/b acute personality change, new infarct in R posterior temporal lobe found 8/13, thromobocytopenia, and delirium (code 21 8/13 flora) . Hx of HTN, DM, ELENA, PE/ DVT.   Vitals: VSS on RA; HTN within parameters (-160) DND 10P-6A  Neuros: Oriented x3. Slow to respond, flat. R side weak, L strong. Following most commands. Denies N/T.   IV: DL PICC. PIV x2 SL  Labs/Electrolytes: PLTs 69  Resp: On Ra. LSC.   Diet: Regular diet. Takes pills whole with applesauce.   GI: LBM 8/10, senna given  : Voids spont  Skin: Unchanged  Pain: Denies pain  Activity: Up 2 GB cane.   SCDs on? If no, why?: On  Social: Sister at bedside overnight  Plan: Continue wit POC

## 2025-08-15 NOTE — PLAN OF CARE
"[id number]    Status: Admitted 7/15 for N/V and lethargy, found to have infact in L MCA, R parietal, R frontal centrum in the setting of new metastatic lung adenocarcinoma. Stay c/b acute personality change, new infarct in R posterior temporal lobe found 8/13, thromobocytopenia, and delirium (code 21 8/13 flora) . Hx of HTN, DM, ELENA, PE/ DVT.   Vitals: Intermittently below params for BP, currently stable. HR intermittently whitley, declined chest pain. On Ra. Afebrile.   Neuros: DTA- this mornning lethargic giving one word answers, more alert t/o shift. Currently drowsy but arouses to voice or touch. Aox3, did not know year even with choices. PERRLA. DTA- if having field cut. Speech garbled, STR, and one word answers. Mild to mod aphasia. R droop. L side 2-5/5. RUE 1/5, intermittently spastic. LLE 3/5, can do weak plantar but no dorsiflex. Pt cannot articulate what side or what it feels like but he said \"one side feels different\". NIHSS 9-12. Mood flat, withdrawn, intermittently aggressive but redirectable, no PRNS needed.   IV: DL PICC placed at end of shift- Needs xray and order saying okay to use PICC.   Labs/Electrolytes: Mg replaced on shift- redraw in am. Urea nit elevated- started allopurinol. Plat this am 48- 1 unit platelets given on shift- redraw 75. Hep restarted this am and stopped this pm at 18h30 to bridge back to Lovenox. Most recent Xa- 0.43.   Resp: On Ra. LSC. No SOB observed.   Diet: Reg diet. Better intake end of shift. Feed self. Takes pills whole with applesauce.   GI: Per charting LBM 8/10- gave scheduled meds. Bowel sounds audible. No nausea.   : Voids spont to BR or with urinal.   Skin: L arm infiltration marked- no change. Bruising bilat abdo.   Pain: Denies pain, but writer continued to give scheduled Tylenol.   Activity: A2 Gb Cane. Sat up in chair. Went for ride in wheelchair   SCDs on? If no, why?: On  Social: Mother visited. Friends came this evening.   Plan: PICC xray and orders when " finished. More aggressive bowel regimen in am. DND 10p-6a.   Updates this shift: Psych consulted- added scheduled risperidone. Echo done on shift. PICC inserted.   Education completed: Stroke education needed.

## 2025-08-15 NOTE — PROGRESS NOTES
"[facility]    Vascular Neurology Progress Note    Interval History     No acute events overnight. Patient was seen sitting up this morning, with sister present and then later his father present. Patient was able to respond to our questions and commands, and able to move all extremities except his right arm. No significant changes noted on exam from previous examinations.    Hospital Course     Chief complaint: Altered Mental Status (Patient was at his MD \"anticoagulation clinic\" MD concerned that patient might have a brain bleed. Mother stated that \" this Sunday evening has been having altered mental status. 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     Initially there was concern for TTP or catastrophic APS, however further work up led to new diagnosis of metastatic lung adenocarcinoma with likely malignancy-related hypercoagulability. PET 7/26 with distant metastases (thyroid nodule, paratracheal LAD, retroperitoneal nodules). MRI brain completed at oncology recommendation to evaluate for any central nervous system metastasis prior to initiation of treatment - no brain metastasis was noted. Medication indicated is Taletrectinib (Oncology arranging the medication). Emboli stemming from tricuspid and aortic valves, appreciated on TTE 7/16  - repeat TTE outpatient to ensure improvement of AR and TR (and vegetations).     Stroke Neurology consulted again last week 8/4 MRI - 2 small new infarcts noted in right frontal lobe - they were considered incidental (patient didn't have new symptoms). Patient also noted to have right sided M2/M3 stenosis, thought to be secondary " "to continuation of M1 occlusion or vasospasm to left frontal SAH.    On 8/9, due to supratherapeutic anti-Xa level, lovenox dose was decreased from 1mg/kg q12h to ~0.8mg/kg q12h.     8/12 - stroke neurology consulted due to onset of new aphasia and change in personality for past 2 days. Patient seen at bedside by myself and my senior, Dr. Palmer. The patient refused to participate in encounter and exam.  He did acknowledge us with shaking his head no and a few words. - \"No\" and \"I don't want to do anything\". Did not seem like aphasia. semi-appropriately able to respond to our questions. It could be contributed to mood changes from prolonged hospitalization.     CT head performed 8/11 did not show any acute changes. At this time, we will recommend MRI & MRA studies in order to ensure the absence of acute vascular event given his risk factors. He is on therapeutic anticoagulation with Lovenox. Later in the evening of 8/12, patient had brief seizure-like activity lasting for 10 seconds with generalized tremulous movements and staring followed by emesis.  CT head obtained yesterday showed no new acute findings or edema that would explain seizure.  However there was concerns about personality changes, minimally interactive, questionable aphasia and possible low mood.  Unclear if he had a true seizure versus nonepileptic spell in the setting of acute stressor w/ prolonged hospitalization and malignancy.    8/13 - Brain MRI showed a new infarct in the posterior right temporal lobe. Lovenox was stopped and patient was started on heparin drip. With plt counts >50, primary team started patient back on Lovenox 1 mg/kg bid.         Assessment and Plan     1. 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 Also found to have M2/M3 stenosis and right frontal SAH.    Initially there was concern for TTP or catastrophic APS, however further work up led to new diagnosis of metastatic lung adenocarcinoma with likely malignancy-related hypercoagulability. PET 7/26 with distant metastases (thyroid nodule, paratracheal LAD, retroperitoneal nodules). MRI brain completed at oncology recommendation to evaluate for any Central nervous system metastasis prior to initiation of treatment - no brain metastasis was noted. Medication indicated is Taletrectinib (Oncology arranging the medication). Emboli stemming from tricuspid and aortic valves, appreciated on TTE 7/16  - cardioembolic cause cannot be ruled out - repeat TTE outpatient to ensure improvement of AR and TR (and vegetations).    Patient is having new altered mental status presentation since 8/11. Patient is able to respond vocally with brief words. Most responses are with nodding, shaking, and hand gestures. Having multiple cortical lesions may account for recent changes noted, making Brain MRI and MRA useful to change and/or new lesions. Clinical presentation may be suggestive of albulia.    On 8/12, patient's generalized tremulous activity lasting 10 seconds and followed by emesis may represent seizure like activity - CT head (8/11) did not show edema or acute findings suggestive of seizure. Patient was loaded with Keppra 4.5 mg and placed on video-EEG monitoring. After 5-6 hours, no seizures were noted on EEG monitoring.     8/13 - Brain MRI showed a new infarct in the posterior right temporal lobe. Lovenox was stopped and patient was started on heparin drip. Concern for hemorrhagic transformation is low. New cancer drug was started yesterday and the drug is new, so association with stroke is not well established.      Recommendations:  - Continue  Keppra 750mg BID   - Recommend Ativan 4 mg if seizure persists > 3 minutes  - Agree with Hematology's recommendation of starting therapeutic Lovenox 1 mg/kg twice a day    We will sign off on this patient    DVT Prophylaxis: Lovenox 80mg    Patient Follow-up    - final recommendation pending work-up    Medically Ready for Discharge: Anticipated in 5+ Days    The patient was discussed with Stroke Staff, Dr. Rios.    I, Alisha Kumar, am serving as a scribe at 13:05 on 8/15/2025 to document services personally performed by Bandar Palmer based on my observations and the provider's statements to me.     TC Swann  Neurology PGY 3    Physical Examination     Temp: 99.1  F (37.3  C) Temp src: Oral BP: (!) 158/62 (before scheduled meds) Pulse: 75   Resp: 20 SpO2: 96 % O2 Device: None (Room air)        Neurologic   Patient was agitated, unable to perform exam.   Mental Status:    Patient was awake and alert, following most commands. Did not tell us his name or age, but nodded when asked if he was in the hospital. Acle to name fw objects such as 'badge' and 'fingers' but not others such as 'paper' and 'glove'  Cranial Nerves: visual fields full, EOMI, mild right facial droop, no uvular deviation, mild dysarthria  Motor:  right upper extremity unable to raise, LUE  Unable to assess. Patient refused to move RUE. Spontaneous movement of LUE, RLE, LLE. Without drift  Reflexes: did not assess  Sensory: Intact to light touch in all extremities   Coordination:    Did not assess  Station/Gait:  deferred    Stroke Scales   [employer] Stroke Scale    [employer] Stroke Scale     Score    Level of consciousness: (0)   Alert, keenly responsive    LOC questions: (1)   Answers one question correctly    LOC commands: (0)   Performs both tasks correctly    Best gaze: (0)   Normal    Visual: (0)   No visual loss    Facial palsy: (1)   Minor paralysis (flat nasolabial fold, smile asymmetry)     Motor arm (left): (0)   No drift    Motor arm (right): (3)   No effort against gravity    Motor leg (left): (0)   No drift    Motor leg (right): (0)   No drift    Limb ataxia: (0)   Absent    Sensory: (0)   Normal- no sensory loss    Best language: (1)   Mild to moderate aphasia    Dysarthria: (1)   Mild to moderate dysarthria    Extinction and inattention: (1)   Visual, tacile, auditory, spatial, person inattention        Total Score:  8 (8/15/2025)           Imaging/Labs   (Bolded imaging and labs new and/or personally reviewed or re-reviewed by me today 8/13/2025)    MRI/Head CT 8/13  MRI Brain  IMPRESSION:  Exam was terminated early due to patient behavior, and a significant  portion of the exam was not performed.  BRAIN MRI: New area of restricted diffusion and T2 hyperintensity in  the posterior right temporal lobe concerning for acute infarct.  Additional scattered punctate areas of restricted diffusion seen in  the left temporal lobe, some of which were present on previous exam.  Unchanged reduced diffusion and T2 hyperintensity in the left basal  ganglia and periventricular white matter.  BRAIN MRA: Complete lack of opacification of the left middle cerebral  artery which could be secondary to motion artifact or could represent  occlusion. The flow void on T2 weighted imaging in the brain MRI is  feathery in this region, and there may not be complete occlusion.. No  other evidence for intracranial arterial aneurysm or stenosis.  NECK MRA: Visible portions of the major cervical arteries are patent  but images are degraded by motion artifact..    8/4/2025  MRI Brain  IMPRESSION:   1. Two new small infarcts in the right frontal lobe since the MRI  7/19/2025.  2. Evolution of prior left cerebral hemisphere infarcts, many of which  are now enhancing in the subacute phase. No definite hemorrhagic  conversion on susceptibility imaging. No intracranial hemorrhages.    8/11   CTH  IMPRESSION:   1. No new acute  intracranial pathology.   2. Evolving infarct of the left dorsal lentiform nucleus.   Intracranial Vasculature 7/19  HEAD CTA:  1.  Stable subtotal occlusion mid to distal M1 segment left middle cerebral artery.  2.  New focal high-grade stenosis posterior parietal branch left middle cerebral artery near the M2/M3 junction.   Cervical Vasculature 7/19  NECK CTA:  1.  No hemodynamically significant stenosis in the neck vessels.  2.  No evidence for dissection.  3.  New bilateral pleural effusions and patchy infiltrates in both upper lobes and superior segments of the lower lobes.  4.  Mediastinal and right hilar adenopathy was incompletely imaged on the prior study and concerning for metastatic disease or lymphoma.     Echocardiogram 7/17  Interpretation Summary  TIMO ordered to assess valvular disorder.     There is diffuse investment of the aortic and tricuspid valves with thrombus  that result in moderate aortic regurgitation and moderate tricuspid  regurgitation. Normal mitral and pulmonic valves.     Of note, an infectious etiology of the valvular lesions cannot be excluded on  the basis of this study alone.   EKG/Telemetry 8/4  Sinus rhythm with short ID   Cardiac CTA  7/16  1.  Non-diagnostic evaluation for left atrial appendage thrombus.   2.  Please review the separate Radiology report for incidental  noncardiac findings.     FINDINGS:   1.  Non-diagnostic evaluation for left atrial appendage thrombus.   2.  The left atrial appendage has a  chicken wing morphology.  3.   Coronary images are non-diagnostic for stenosis/atherosclerosis  due to cardiac motion artifact. No coronary artery calcifications  seen.   4.   The visualized aortic root, ascending aorta, and descending  thoracic aorta are normal in caliber.     LDL  8/12/2025: 77 mg/dL   A1C  7/16/2025: 5.3 %   Troponin No lab value available in past 7 days     Other labs reviewed by me today:   Latest Reference Range & Units 08/15/25 05:48   WBC 4.00 -  11.00 10e3/uL 10.56   Hemoglobin 13.3 - 17.7 g/dL 11.0 (L)   Hematocrit 40.0 - 53.0 % 34.7 (L)   Platelet Count 150 - 450 10e3/uL 69 (L)   RBC Count 4.40 - 5.90 10e6/uL 4.23 (L)   MCV 78.0 - 100.0 fL 82.0   MCH 26.5 - 33.0 pg 26.0 (L)   MCHC 31.5 - 36.5 g/dL 31.7   RDW 10.0 - 15.0 % 14.0   (L): Data is abnormally low

## 2025-08-15 NOTE — PROGRESS NOTES
Resident/Fellow Attestation   I, Tara Ramon MD, was present with the medical/SAMSON student who participated in the service and in the documentation of the note.  I have verified the history and personally performed the physical exam and medical decision making.  I agree with the assessment and plan of care as documented in the note.      Tara Ramon MD  PGY2  Date of Service (when I saw the patient): 08/15/25    [facility]    Progress Note - Medicine Service, [facility] TEAM 3       Date of Admission:  7/15/2025    Assessment & Plan   Bernard Chapman is a 35 year old male with a past medical history of hypertension, diabetes, ELENA, PE/DVT (on rivaroxaban) admitted on 7/15/2025 for lethargy, N/V, and blurry vision found to have multifocal acute CVA in L MCA , R parietal and R frontal centrum semiovale. Work up revealed metastatic lung adenocarcinoma with likely malignancy-related hypercoagulability and acute change in personality beginning 8/10, now improving on risperidone, with new findings of R posterior temporal lobe infarct..     Updates 8/15/2025:  - Rapid response called for syncopal episode during BM, likely vasovagal, pt placed on tele  - Acute change in personality with increasing agitation and aggression, now improving on Risperdal  - Lovenox 100 mg BID for AC  - Psychiatry consulted:   - Risperdal 0.5 mg BID, additional 1 mg risperdal prn for agitation/sleep, 50 mg seroquel prn, 2-5 mg Haldol IV Q4 prn  - EKG with normal QT (398)  - TTE echo completed with stable vegetations on aortic and tricuspid valves  - Lupus anticoagulant ordered (pending) per Heme  - continue Taletrectinib 600 mg daily (changed to 6 am)  - Avoiding additional QT prolonging medications (Pt on keppra and chemotherapy)  - Barriers to Discharge: pending stability on new chemotherapy per oncology, stable mental status, and participation in cares. Plan to discharge to [facility] Monday  at earliest.     #New metastatic lung adenocarcinoma, ROS1 rearrangement with right lung nodule and multiple enlarged mediastinal, paraesophageal, and hilar lymph nodes  Malignancy work up initiated on admission due to significant hypercoagulability. CT CAP significant for [id number] spiculated density in R lung apex, 9mm pulmonary nodule in R lung base, L thyroid nodule, paratracheal LAD, splenic infarct, bilateral renal infarcts, and L retroperitoneal nodule. Underwent FNA of lymph node which was positive for lung adenocarcinoma (resulted 7/22).  PDL-1 resulted with low expression (40%). PET 7/26 with distant metastases and MRI brain showing no signs of metastasis. Patient started chemotherapy 8/13. Okay to take chemotherapy with apple juice or other liquids per pharmacy, avoid fatty foods which may impact absorption.  Oncology consulted, appreciate recs              - NGS; positive for a ROS1 gene rearrangement (SDC4::ROS1)                - Guardant 360; T p53 mutation and MITCHELL   - Taletrectinib 600 mg daily on empty stomach avoid food for two hours before and after (moved to 6am)  - allopurinol 100 mg daily   - CBC, CMP, Mg, Uric acid daily in the hospital  - EKG, CBC with Diff, CMP, Mag, Uric acid, CK weekly for 4 weeks  - imodium prn  Anti-nausea:              - compazine 10 mg scheduled prior to chemotherapy  - ativan 0.5 mg prn (avoiding additional zofran and compazine to decrease risk of prolonged QT given patient taking keppra and taletrectinib)  Anti-diarrhea   - imodium prn  - Novelty 2nd opinion; Patient will need to call [phone] to make appointment once there is a timeline for discharging from this hospitalization  - Due to ongoing transfusion needs, ARU not feasible at this time.      #Hypercoagulability d/t metastatic lung adenocarcinoma with infarcts of brain, kidney, spleen. New brain infarct 8/13.  #Cardiac valve thrombus on tricuspid and aortic valve 7/16 on TIMO   #Hx pulmonary embolism  (6/23/25)  #Acute thrombocytopenia, in the setting of metastatic lung adenocarcinoma d/t ITP vs drug-induced vs consumptive coagulopathy  #Acute anemia  Found to have multiple CVA, likely embolic. TTE with mobile echodensities on tricuspid and aortic valve. Initial concern for TTP or catastrophic APS, however work up ultimately significant for new diagnosis of metastatic lung cancer. Hypercoagulable state presumed to be most likely 2/2 malignancy. Started on heparin gtt 7/17, but briefly held for 7/18 endobronchial needle aspiration of lymph nodes. Stroke code called 7/19 for worsening R-sided hemiparesis and hemianopia. Repeat CTA with new focal high-grade stenosis of other part of left MCA. Heparin gtt was stopped during code stroke, but restarting (heparin gtt + levophed) led to 10/10 headache. CTH with small left frontal SAH. Brain MRI with new L basal ganglia stroke. Heparin gtt resumed, and repeat CTH stable. Switched to therapeutic Lovenox 7/22. Declining fibrinogen, so received cryoprecipitate 7/25. Concern for continued consumptive process, but on therapeutic Lovenox. PLT downtrending, so receiving transfusions intermittently. Cardiology consulted given cardiac valve thrombus and concern for stroke, agreed with therapeutic AC on lovenox, not likely surgical candidate, and recommended consideration for ID workup of indolent culture negative endocarditis. Repeat echo ordered to evaluate prior tricuspid and aortic valve vegetations and consideration of cardiac source of emboli. Lovenox was decreased to 80 mg BID per pharmacy on 8/9 when Anti-Xa level supratherapeutic. Patient was transitioned to heparin gtt on 8/13 given evidence of new infarct. Will transition back to lovenox 100 mg BID per neuro and heme recs.  - Neurology consulted as below  - Hematology consulted, appreciate recs              -Transfuse if   -Hgb<7  -PLT<50k-- CONDITIONAL ORDERS IN PLACE  - Lovenox 100 mg BID  - Can consider transition to  DOAC pending discharge   - lupus anticoagulant ordered   - Cardiology consulted, signed off              - continue therapeutic AC  - could consider ID engagement for consideration of indolent culture negative endocarditis, although this is less likely  - qdaily CBC      #New acute infarct in Right Posterior Temporal Lobe    #Multifocal acute ischemic strokes, in L MCA territory and new L basal ganglia stroke; Increasing mass effect on L lateral ventricle by L MCA infarction  #Subtotal occlusive thrombus in left M1   #Left frontoparietal SAH (7/19)  Etiology most likely hypercoagulability d/t malignancy. Emboli stemming from tricuspid and aortic valves, appreciated on TTE 7/16. He continues to have R facial droop, extremity weakness. Left frontoparietal subarachnoid hemorrhage stable per CT.  Repeat brain MRI on 8/4 shows 2 new small infarcts in the right frontal lobe. Neuro evaluation as below due to personality changes. Repeat MRI on 8/14 with new infarct of posterior right temporal lobe and scattered punctate areas of left temporal lobe. Low concern for hemorrhagic conversion per Neuro. Heme/Onc recommending change to therapeutic lovenox 1 mg/kg q12h, only check Xa if signs of bleeding.   - Neuro consulted, appreciate recs              - Keppra 750 mg BID              - ativan 4mg IV if evidence of seizures >  3 minutes  -OT/PT/Speech              -therapies doing aggressive rehabilitation as able  - nutrition consult              -monitor oral intake, weights  - Cardiology consulted as above.  - Anticoagulation as above  - Repeat TTE outpatient to ensure improvement of AR and TR (and vegetations)     #Acute change in personality with inappropriate affect and agitation suspect 2/2 new posterior R. Temporal infarct  Starting around 8/10, patient has had significant personality changes, including becoming less participatory/interactive with therapies and with family. CT head from 8/11 without any acute findings. At  "6:10 pm on 8/12, concern for seizure like activity with 10 seconds of general tremulousness and subsequent emesis. Given Keppra load with subsequent vEEG without evidence of seizures. Daily keppra started 8/13. MRI/MRA head 8/13 showing new right posterior temporal lobe infarct. Increased aggression and agitation overnight on 8/14 requiring Code 21 and use of physical 4 point restraints. Difficult to assess voluntariness of the decreased responsiveness. Potential component of delirium at play versus depression vs neurological damage secondary to new infarct. Psychiatric consult placed to optimize medication management and for consideration whether psychiatric cause is contributing to mood changes. Per nursing, patient reporting \"I'm scared.\" Considered adding antidepressant, however, per psych, risperidone can help with feeling fearful.  Psychiatry consult, appreciate recs              - 0.5 mg risperidone BID               - 1 mg risperidone prn for agitation               - 50 mg seroquel prn for agitation and sleep              - 2-5 mg Haldol IV Q4 prn for agitation               - EKG NSR with Qtc 398 today  - Delirium precautions     # Maculopapular rash on face, chest, bilateral upper extremities, improved  # Numerous palpable purpura across the scalp and lower extremities   # Petechiae on bilateral lower extremities, improving  C/f palpable purpura, so s/p L leg s/p punch biopsy with dermatology: rupture folliculitis. New rash noted 7/26 with flushing of face/chest after PET scan, suspected  secondary to contrast from PET scan.   Plan:  - Benadryl as needed for itching  - Chlorhexidine wash  - Will continue to monitor     #DM2, A1c 5.3  On HSSI with adequate control after stopping dexamethasone. CTM.   - POC glucose ACHS  - High intensity insulin sliding scale      #HLD, chronic  -Cont 10 mg rosuvastatin     #HTN  - Continue PTA Metoprolol succinate 25mg q daily (ok to hold if HR< 60)  - Hold PTA Losartan, " could consider restarting vs discontinuing given normotensive  - Continue PTA hydrochlorothiazide     #ELENA  Currently not on CPAP, consider resuming if mental status improves     #Leukocytosis, resolved  Found to have leukocytosis, no clear cause or signs of infection, possible 2/2 stress induced.      #Hiccups, resolved   #chronic back pain  Chronic back pain. On acetaminophen only (given frequent need for neuro checks). Hiccups resolved (suspected 2/2 dexamethasone).   Plan:  - Thorazine 25mg q6hr discontinued due to contraindication with chemotherapy  - Melatonin 3mg daily  - Pepcid discontinued due to contraindication with chemotherapy  - continue  baclofen  - acetaminophen 1000 mg every 6 hours  - cyclobenzaprine 10 mg 3x daily PRN  - PTA gabapentin 100 mg 3x daily  - PTA oxycodone 5 mg PRN every 6h      #allergies  - fluticasone 1 spray daily      #Epistaxis, currently resolved  On 8/5 patient found to have epistaxis. At the time, physical exam shows blood coming from the right nares.  - Afrin spray as needed  - Apply pressure to the nasal passage to help with bleeding      Diet: Regular Diet Adult    DVT Prophylaxis: Enoxaparin (Lovenox) SQ  Avalos Catheter: Not present  Fluids: None  Lines: PRESENT      PICC 08/14/25 Double Lumen Left Brachial vein medial Access-Site Assessment: WDL      Cardiac Monitoring: None  Code Status: Full Code      Clinically Significant Risk Factors                 # Thrombocytopenia: Lowest platelets = 41 in last 2 days, will monitor for bleeding                  # Financial/Environmental Concerns: none         Social Drivers of Health          Disposition Plan     Medically Ready for Discharge: Anticipated in 5+ Days         The patient's care was discussed with the Attending Physician, Dr. Wilson and Patient.    Selin Grimaldo  Medical Student  Medicine Service, [facility], [place] TEAM [address] [place] [address]  Securely message with [employer] (more info)  Text  "page via [facility] Paging/Directory   See signed in provider for up to date coverage information  ______________________________________________________________________    Interval History   No acute adverse events overnight. Per nursing reports, Alert and oriented x3 (not to time). His mood appeared flat and withdrawn. Became aggressive at times but was redirectable and no additional prns were needed for agitation or mood.    Today, patient was seen sitting up in chair with sister and father at bedside.   Patient was cooperative with cares and reports good sleep. Patient interactive with sister and father at bedside. Denies fevers, chills, headaches, abdominal pain, changes in urine or stool. Family plans to keep walking with patient and go outside to the garden for fresh air today.     Physical Exam   Vital Signs: Temp: 99.1  F (37.3  C) Temp src: Oral BP: (!) 158/62 Pulse: 75   Resp: 20 SpO2: 96 % O2 Device: None (Room air)    Weight: 228 lbs 6.4 oz    General Appearance: Appears well, sitting up in chair in no acute distress  Respiratory: CTAB  Cardiovascular: regular rate and rhythm, did not appreciate murmur  GI: nondistended  Skin: warm, dry, intact, scattered petechiae on legs  Neuro: Mental status: alert, oriented to self only. Answers questions appropriately at times with 1-2 word answers. Right lower facial droop stable. 4/5 strength in LUE, RLE, LLE. Unable to move RUE.    Psych:  Mood is \"fine,\" cooperative with cares and interactive with family. Frequent laughter and humor regarding middle finger gesture.     Medical Decision Making       Please see A&P for additional details of medical decision making.      Data     I have personally reviewed the following data over the past 24 hrs:    10.56  \   11.0 (L)   / 69 (L)     141 104 25.3 (H) /  111 (H)   4.2 25 1.29 (H) \     ALT: 22 AST: 27 AP: 69 TBILI: 0.7   ALB: 4.1 TOT PROTEIN: 7.0 LIPASE: N/A       Imaging results reviewed over the past 24 hrs:   Recent " Results (from the past 24 hours)   Echocardiogram Complete   Result Value    LVEF  60-65%    [facility]    [id number]  [id number]  [id number]  [id number]^ALY^OBEY^PANFILO     [facility],[place]  Echocardiography Laboratory  [address] [place] [id number]     Name: RONI TAYLOR  MRN: [id number]  : 1990  Study Date: 2025 03:43 PM  Age: 35 yrs  Gender: Male  Patient Location: [facility]  Reason For Study: Cardiac Thrombus  Ordering Physician: OBEY LU  Performed By: Navya Mtz     BSA: 2.3 m2  Height: 74 in  Weight: 228 lb  BP: 140/73 mmHg  ______________________________________________________________________________  Procedure  Echocardiogram with two-dimensional, color and spectral Doppler.  ______________________________________________________________________________  Interpretation Summary  Left ventricular size, wall motion and function are normal. The ejection  fraction is 60-65%.     The right ventricle is normal size.  Global right ventricular function is normal.     There is thickening of the aortic valve leaflets.  There is mild stenosis of the aortic valve. Moderate aortic insufficiency is  present.     Moderate tricuspid insufficiency is present. There is a 1.4cm X 0.6cm  echodensity attached the tricuspid valve on the atrial side of the leaflets.     IVC diameter and respiratory changes fall into an intermediate range  suggesting an RA pressure of 8 mmHg.     Trivial pericardial effusion is present.     Compared to prior imaging the aortic and tricuspid valve echo densities remain  along wih the valvular regurgitation. There is mild stenosis of the aortic  valve at this time.  ______________________________________________________________________________  Left Ventricle  Left ventricular size, wall motion and function are normal. The ejection  fraction is 60-65%. There is no thrombus seen in the left ventricle.     Right Ventricle  The  right ventricle is normal size. Global right ventricular function is  normal.     Atria  The atria cannot be assessed.     Mitral Valve  The mitral valve is normal. Trace mitral insufficiency is present.     Aortic Valve  There is thickening of the aortic valve leaflets. Moderate aortic  insufficiency is present. The mean AoV pressure gradient is 11.0 mmHg. The  peak aortic velocity is 2.3 m/sec. There is mild stenosis of the aortic valve.     Tricuspid Valve  There is a 1.4cm X 0.6cm echodensity attached the tricuspid valve on the  atrial side of the leaflets. The right ventricular systolic pressure is  approximated at 22.1 mmHg plus the right atrial pressure. Pulmonary artery  systolic pressure is normal. Moderate tricuspid insufficiency is present.     Pulmonic Valve  The pulmonic valve is normal.     Vessels  IVC diameter and respiratory changes fall into an intermediate range  suggesting an RA pressure of 8 mmHg.     Pericardium  Trivial pericardial effusion is present.     Compared to Previous Study  Compared to prior imaging the aortic and tricuspid valve echo densities remain  along wih the valvular regurgitation. There is mild stenosis of the aortic  valve at this time.  ______________________________________________________________________________  Doppler Measurements & Calculations  Ao V2 max: 229.0 cm/sec  Ao max P.0 mmHg  Ao V2 mean: 149.0 cm/sec  Ao mean P.0 mmHg  Ao V2 VTI: 45.4 cm  AI P1/2t: 368.9 msec  LV V1 max PG: 10.0 mmHg  LV V1 max: 158.0 cm/sec  LV V1 VTI: 33.0 cm  TR max tomer: 235.0 cm/sec  TR max P.1 mmHg  AV Tomer Ratio (DI): 0.69     ______________________________________________________________________________  Report approved by: Saul Ahn MD on 2025 04:50 PM         XR Chest Port 1 View    Narrative    Exam: XR CHEST PORT 1 VIEW, 2025 7:10 PM    Comparison: Chest radiograph from 2025    History: PICC placement    Findings:  Single frontal semiupright  view of the chest was obtained. Left PICC  is present, with tip projecting at the atriocaval junction. Patient is  rotated rightward. Midline trachea. Stable cardiac silhouette. Clear  costophrenic angles. No pneumothorax. Left greater than right patchy  interstitial opacities..       Impression    Impression:   1. Left approach PICC tip projects at the atriocaval junction. No  pneumothorax.  2. Interstitial predominant opacities are favored to represent  atelectasis/edema. Apparent asymmetry is likely due to patient  positioning.    I have personally reviewed the examination and initial interpretation  and I agree with the findings.    SERGEY ACEVEDO DO         SYSTEM ID:  [id number]

## 2025-08-15 NOTE — CONSULTS
"PICC placed in Left upper extremity for access. Patient tolerated well and denies pain. Tip location verified at the cavoatrial junction (CAJ) per chest x-ray. PICC is ready to use. To contact the Vascular Access team enter a \"consult for inpatient vascular access\" [id number] order in EPIC.    "

## 2025-08-15 NOTE — INTERIM SUMMARY
Brief Note:    Stroke code was activated at approximately 14:30 for reported weakness. Upon arrival, patient was found lying in the bathroom; per nursing staff, he was assisted to the floor and did not fall or sustain head trauma.    Neurological examination was unchanged from the morning assessment; persistent inability to lift the RUE, other extremities antigravity, alert, and able to follow some commands.    Given transient unresponsiveness and difficulty palpating a pulse, rapid response was deemed more appropriate. Stroke code was de-escalated. We will re-evaluate the patient later today for any acute neurological changes. If subsequent assessment remains stable, we will not order further CT/MRI imaging and we will plan to proceed with the sign off. We are available for any further concerns regarding the patient.    I, Alisha Kumar, am serving as a scribe at 15:01 on 8/15/2025 to document services personally performed by Jessica Rios based on my observations and the provider's statements to me.     TC Swann  Neurology PGY3   Message  Received: Today       Sami Slaughter MD Blake, Katherine J RN       Caller: Unspecified (Today,  2:24 PM)                     Yes, 2 mg QHS was the plan.

## 2025-08-15 NOTE — PROCEDURES
[facility]    Double Lumen PICC Placement    Date/Time: 8/14/2025 7:18 PM    Performed by: Nasra Manuel RN  Authorized by: Char Castro MD  Indications: vascular access      UNIVERSAL PROTOCOL   Site Marked: Yes  Prior Images Obtained and Reviewed:  Yes  Required items: Required blood products, implants, devices and special equipment available    Patient identity confirmed:  Verbally with patient, arm band, provided demographic data and hospital-assigned identification number  NA - No sedation, light sedation, or local anesthesia  Confirmation Checklist:  Patient's identity using two indicators, relevant allergies, procedure was appropriate and matched the consent or emergent situation and correct equipment/implants were available  Time out: Immediately prior to the procedure a time out was called    Universal Protocol: the Joint Commission Universal Protocol was followed    Preparation: Patient was prepped and draped in usual sterile fashion       ANESTHESIA    Anesthesia:  See MAR for details  Local Anesthetic:  Lidocaine 1% without epinephrine  Anesthetic Total (mL):  3      SEDATION    Patient Sedated: No        Preparation: skin prepped with ChloraPrep  Skin prep agent: skin prep agent completely dried prior to procedure  Sterile barriers: maximum sterile barriers were used: cap, mask, sterile gown, sterile gloves, and large sterile sheet  Hand hygiene: hand hygiene performed prior to central venous catheter insertion  Type of line used: PICC  Catheter type: double lumen  Lumen type: non-valved and power PICC  Lumen Identification: Purple and Red  Catheter size: 5 Fr  Brand: Bard  Lot number: [id number]  Placement method: venipuncture, MST, ultrasound and tip navigation system  Number of attempts: 1  Difficulty threading catheter: no  Successful placement: yes  Orientation: left  Catheter to Vein (%): 29  Location: brachial vein (medial) (0.59 cm)  Tip  Location: SVC/RA Junction  Site rationale: Right arm weakness  Arm circumference: adults 10 cm  Extremity circumference: 33  Visible catheter length: 1  Total catheter length: 47  Dressing and securement: alcohol impregnated caps, chlorhexidine disc applied, blood removed, statlock, site cleansed, transparent dressing, gloves changed prior to final dressing and sterile dressing applied  Post procedure assessment: blood return through all ports, free fluid flow and placement verified by x-ray  PROCEDURE Describe Procedure: Pending chest x- ray result.   PICC okay to use. PICC is at cavoatrial junction per chest x-ray.  Disposal: sharps and needle count correct at the end of procedure, needles and guidewire disposed in sharps container  Patient Tolerance:  Patient tolerated the procedure well with no immediate complications

## 2025-08-15 NOTE — PROVIDER NOTIFICATION
CANCELLED Stroke Code/RRT.  Pt. with syncopal episode (approx. 6 seconds) during bowel movement in BR.  Gently lowered to floor with no head or body trauma.  Glucose 111 with vss/RA/easy respirations. Pt. at neuro baseline per Stroke Code Neurologist assessment.  Transferred to bed via Moshe lift assist x 5.  Repeat neuro assessment remains at baseline.  Continue to monitor neuro assessments closely, alerting MD to changes.

## 2025-08-15 NOTE — PROVIDER NOTIFICATION
08/14/25 1928   PICC 08/14/25 Double Lumen Left Brachial vein medial Access   Placement Date/Time: 08/14/25 (c) 1918   Lumens (Required): Double Lumen  Lumen Identification: Purple;Red  Catheter Brand: Bard  Catheter Size: 5 fr  Lot #: [id number]  Full barrier precautions done: Yes, hand hygiene, sterile gown, sterile gloves, mas...   Site Assessment WDL   External Cath Length (cm) 47 cm   Extremity Circumference (cm) 33 cm   Dressing Chlorhexidine disk;Transparent;Securement device  (Statlock)   Dressing Status clean;dry;intact   Dressing Intervention   (New dressing)   Dressing Change Due (S)  08/21/25   Line Necessity Yes, meets criteria   Purple - Status blood return noted;saline locked   Purple - Cap Change Due 08/18/25   Purple - Intervention Flushed   Red - Status blood return noted;saline locked   Red - Cap Change Due 08/18/25   Red - Intervention Flushed   Phlebitis Scale 0-->no symptoms   Infiltration? no   PICC Comment Awaiting chest x-ray result

## 2025-08-15 NOTE — PROGRESS NOTES
Hematology Consult Progress Note    I reviewed the patient's history and pertinent medical records on 8/15/2025.  I met with the patient and discussed my impression and recommendations.      EVENTS/SUBJECTIVE  He is more alert today and was up in a chair spending time with significant other.  He has not had any noted bleeding episodes after the increase in his enoxaparin dose.  Platelets responded well to transfusion.    DATA  Review of pertinent results and my independent interpretation as follows:  --Platelet count yesterday was in the 40s and after transfusion was 75 at peak and now slowly declining as expected, 69 this morning  --Hemoglobin stable at 11.0 this morning  --Normal WBC     IMPRESSION  35 year old gentleman with recently diagnosed metastatic lung cancer with course complicated by pre-cancer diagnosis DVT/pulmonary embolism followed by santa-cancer diagnosis of stroke, both presumed to be due to hypercoagulability of malignancy.  He has been on enoxaparin therapy and developed evidence of new stroke several days after reduction in enoxaparin dose for slightly supratherapeutic level, and we have now increased his dose back to 1mg/kg twice daily with platelet transfusion as needed to allow ongoing full-dose anticoagulation.     RECOMMENDATIONS  #Recurrent Thrombotic Events, both venous and arterial  --Likely due to hypercoagulability of malignancy  --Continue enoxaparin 1mg/kg twice daily   --Plan to continue enoxaparin on an indefinite basis at this time  --Could revisit idea of transition to DOAC as an outpatient (no rush for this)  #Thrombocytopenia  --Possibly due to marrow suppression in the setting of metastatic disease (or true marrow involvement/infiltration) and/or consumption in the setting of thromboses and malignancy  --Can continue to transfuse intermittently as needed to maintain platelet counts approximately >50,000 in setting of ongoing anticoagulation therapy.     We will continue to  follow.    Eda Eldridge MD  Date of Service: 8/15/2025    Care of this patient today included: seeing and evaluating the patient in person, review of his chart including labs (with my independent interpretation of results) and notes from primary team and other providers, intensive monitoring of drug therapy (enoxaparin) for toxicity, decision regarding prescription drug management, discussion of management with his primary team, and documentation in the electronic medical record.

## 2025-08-16 ENCOUNTER — APPOINTMENT (OUTPATIENT)
Dept: OCCUPATIONAL THERAPY | Facility: CLINIC | Age: 35
DRG: 064 | End: 2025-08-16
Payer: COMMERCIAL

## 2025-08-16 ENCOUNTER — APPOINTMENT (OUTPATIENT)
Dept: PHYSICAL THERAPY | Facility: CLINIC | Age: 35
DRG: 064 | End: 2025-08-16
Payer: COMMERCIAL

## 2025-08-16 LAB
ALBUMIN SERPL BCG-MCNC: 4 G/DL (ref 3.5–5.2)
ALP SERPL-CCNC: 67 U/L (ref 40–150)
ALT SERPL W P-5'-P-CCNC: 19 U/L (ref 0–70)
ANION GAP SERPL CALCULATED.3IONS-SCNC: 11 MMOL/L (ref 7–15)
AST SERPL W P-5'-P-CCNC: 26 U/L (ref 0–45)
BILIRUB SERPL-MCNC: 0.6 MG/DL
BUN SERPL-MCNC: 24.1 MG/DL (ref 6–20)
CALCIUM SERPL-MCNC: 9.3 MG/DL (ref 8.8–10.4)
CHLORIDE SERPL-SCNC: 104 MMOL/L (ref 98–107)
CREAT SERPL-MCNC: 1.14 MG/DL (ref 0.67–1.17)
EGFRCR SERPLBLD CKD-EPI 2021: 86 ML/MIN/1.73M2
ERYTHROCYTE [DISTWIDTH] IN BLOOD BY AUTOMATED COUNT: 13.7 % (ref 10–15)
GLUCOSE SERPL-MCNC: 116 MG/DL (ref 70–99)
HCO3 SERPL-SCNC: 25 MMOL/L (ref 22–29)
HCT VFR BLD AUTO: 33.5 % (ref 40–53)
HGB BLD-MCNC: 10.7 G/DL (ref 13.3–17.7)
MAGNESIUM SERPL-MCNC: 2 MG/DL (ref 1.7–2.3)
MCH RBC QN AUTO: 26 PG (ref 26.5–33)
MCHC RBC AUTO-ENTMCNC: 31.9 G/DL (ref 31.5–36.5)
MCV RBC AUTO: 81.3 FL (ref 78–100)
PLATELET # BLD AUTO: 56 10E3/UL (ref 150–450)
POTASSIUM SERPL-SCNC: 4 MMOL/L (ref 3.4–5.3)
PROT SERPL-MCNC: 6.9 G/DL (ref 6.4–8.3)
RBC # BLD AUTO: 4.12 10E6/UL (ref 4.4–5.9)
SODIUM SERPL-SCNC: 140 MMOL/L (ref 135–145)
URATE SERPL-MCNC: 6.2 MG/DL (ref 3.4–7)
WBC # BLD AUTO: 9.44 10E3/UL (ref 4–11)

## 2025-08-16 PROCEDURE — 80053 COMPREHEN METABOLIC PANEL: CPT

## 2025-08-16 PROCEDURE — 250N000013 HC RX MED GY IP 250 OP 250 PS 637: Performed by: STUDENT IN AN ORGANIZED HEALTH CARE EDUCATION/TRAINING PROGRAM

## 2025-08-16 PROCEDURE — 250N000011 HC RX IP 250 OP 636

## 2025-08-16 PROCEDURE — 97530 THERAPEUTIC ACTIVITIES: CPT | Mod: GP

## 2025-08-16 PROCEDURE — 120N000002 HC R&B MED SURG/OB UMMC

## 2025-08-16 PROCEDURE — 250N000013 HC RX MED GY IP 250 OP 250 PS 637

## 2025-08-16 PROCEDURE — 97116 GAIT TRAINING THERAPY: CPT | Mod: GP

## 2025-08-16 PROCEDURE — 84550 ASSAY OF BLOOD/URIC ACID: CPT

## 2025-08-16 PROCEDURE — 258N000003 HC RX IP 258 OP 636

## 2025-08-16 PROCEDURE — 97112 NEUROMUSCULAR REEDUCATION: CPT | Mod: GO

## 2025-08-16 PROCEDURE — 97535 SELF CARE MNGMENT TRAINING: CPT | Mod: GO

## 2025-08-16 PROCEDURE — 97112 NEUROMUSCULAR REEDUCATION: CPT | Mod: GP

## 2025-08-16 PROCEDURE — 85027 COMPLETE CBC AUTOMATED: CPT

## 2025-08-16 PROCEDURE — 99233 SBSQ HOSP IP/OBS HIGH 50: CPT | Performed by: INTERNAL MEDICINE

## 2025-08-16 PROCEDURE — 99232 SBSQ HOSP IP/OBS MODERATE 35: CPT | Mod: GC | Performed by: STUDENT IN AN ORGANIZED HEALTH CARE EDUCATION/TRAINING PROGRAM

## 2025-08-16 PROCEDURE — 83735 ASSAY OF MAGNESIUM: CPT

## 2025-08-16 PROCEDURE — 99233 SBSQ HOSP IP/OBS HIGH 50: CPT | Mod: GC | Performed by: INTERNAL MEDICINE

## 2025-08-16 RX ADMIN — LEVETIRACETAM 750 MG: 100 INJECTION, SOLUTION INTRAVENOUS at 08:26

## 2025-08-16 RX ADMIN — ENOXAPARIN SODIUM 100 MG: 100 INJECTION SUBCUTANEOUS at 09:11

## 2025-08-16 RX ADMIN — Medication 5 ML: at 22:30

## 2025-08-16 RX ADMIN — PROCHLORPERAZINE MALEATE 10 MG: 10 TABLET ORAL at 09:06

## 2025-08-16 RX ADMIN — ACETAMINOPHEN 1000 MG: 500 TABLET ORAL at 09:06

## 2025-08-16 RX ADMIN — ALLOPURINOL 100 MG: 100 TABLET ORAL at 09:07

## 2025-08-16 RX ADMIN — Medication 5 MG: at 14:24

## 2025-08-16 RX ADMIN — ENOXAPARIN SODIUM 100 MG: 100 INJECTION SUBCUTANEOUS at 20:18

## 2025-08-16 RX ADMIN — FLUTICASONE PROPIONATE 1 SPRAY: 50 SPRAY, METERED NASAL at 09:04

## 2025-08-16 RX ADMIN — Medication 5 ML: at 05:52

## 2025-08-16 RX ADMIN — LEVETIRACETAM 750 MG: 100 INJECTION, SOLUTION INTRAVENOUS at 20:18

## 2025-08-16 RX ADMIN — RISPERIDONE 0.5 MG: 0.5 TABLET ORAL at 20:17

## 2025-08-16 RX ADMIN — Medication 2.5 MG: at 14:24

## 2025-08-16 RX ADMIN — Medication 2.5 MG: at 20:38

## 2025-08-16 RX ADMIN — ACETAMINOPHEN 1000 MG: 500 TABLET ORAL at 16:24

## 2025-08-16 RX ADMIN — Medication 2.5 MG: at 09:05

## 2025-08-16 RX ADMIN — RISPERIDONE 0.5 MG: 0.5 TABLET ORAL at 09:05

## 2025-08-16 RX ADMIN — Medication 5 MG: at 20:17

## 2025-08-16 RX ADMIN — METOPROLOL SUCCINATE 25 MG: 25 TABLET, EXTENDED RELEASE ORAL at 09:07

## 2025-08-16 RX ADMIN — ROSUVASTATIN CALCIUM 10 MG: 10 TABLET, FILM COATED ORAL at 09:07

## 2025-08-16 RX ADMIN — ACETAMINOPHEN 1000 MG: 500 TABLET ORAL at 22:31

## 2025-08-16 RX ADMIN — Medication 5 MG: at 09:06

## 2025-08-16 ASSESSMENT — ACTIVITIES OF DAILY LIVING (ADL)
ADLS_ACUITY_SCORE: 56
ADLS_ACUITY_SCORE: 54
ADLS_ACUITY_SCORE: 56
ADLS_ACUITY_SCORE: 54
ADLS_ACUITY_SCORE: 56
ADLS_ACUITY_SCORE: 54
ADLS_ACUITY_SCORE: 56

## 2025-08-16 NOTE — PROGRESS NOTES
Hematology Consult Progress Note     I reviewed the patient's history and pertinent medical records on 8/16/2025.  I met with the patient and discussed my impression and recommendations.       EVENTS/SUBJECTIVE  He was lying in bed and did not have a conversation but did have eyes open and nodded slightly in response to questions.     DATA  Review of pertinent results and my independent interpretation as follows:  --Platelet count continues to decline slightly after transfusion, 56 this morning  --Hemoglobin stable at 10.7 this morning  --eGFR of 86 supports ongoing enoxaparin therapy     IMPRESSION  35 year old gentleman with recently diagnosed metastatic lung cancer with course complicated by pre-cancer diagnosis DVT/pulmonary embolism followed by santa-cancer diagnosis of stroke, both presumed to be due to hypercoagulability of malignancy.  He developed evidence of new stroke several days after reduction in enoxaparin dose (for slightly supratherapeutic level), and we have now increased his dose back to 1mg/kg twice daily with platelet transfusion as needed to allow ongoing full-dose anticoagulation.     RECOMMENDATIONS  #Recurrent Thrombotic Events, both venous and arterial  --Likely due to hypercoagulability of malignancy  --Continue enoxaparin 1mg/kg twice daily   --Plan to continue enoxaparin on an indefinite basis at this time  --Could revisit idea of transition to DOAC as an outpatient (no rush for this)  #Thrombocytopenia  --Possibly due to marrow suppression in the setting of metastatic disease (or true marrow involvement/infiltration) and/or consumption in the setting of thromboses and malignancy  --Can continue to transfuse intermittently as needed to maintain platelet counts approximately >50,000 in setting of ongoing anticoagulation therapy.     We will sign off at this time.  Please call with questions.     Eda Eldridge MD  Date of Service: 8/16/2025     Care of this patient today included: seeing  and evaluating the patient in person, review of his chart including labs (with my independent interpretation of results) and notes from primary team and other providers, intensive monitoring of drug therapy (enoxaparin) for toxicity, decision regarding prescription drug management, discussion of management with his primary team, and documentation in the electronic medical record.

## 2025-08-16 NOTE — PROGRESS NOTES
"Status: Admitted 7/15 for N/V and lethargy, found to have infact in L MCA, R parietal, R frontal centrum in the setting of new metastatic lung adenocarcinoma. Stay c/b acute personality change, new infarct in R posterior temporal lobe found 8/13, thromobocytopenia, and delirium (code 21 8/13 flora), syncope (rapid code called 8/15 afternoon). Restraints discontinued 8/14 9h45 sitter discontinued 8/15 07h00   Hx of HTN, DM, ELENA, PE/ DVT.   Vitals: /70 (BP Location: Right arm)   Pulse 84   Temp 97.7  F (36.5  C) (Oral)   Resp 18   Ht 1.88 m (6' 2\")   Wt 101.9 kg (224 lb 11.2 oz)   SpO2 98%   BMI 28.85 kg/m    SBP goal 120-160    Neuros: A&Ox3-4 with choices. Occasionally refuses to answer orientation questions. Garbled speech. STR. Moderate aphasia. R droop. LUE 4/5, RUE 1/5, LLE 3-4/5, and RLE 3/5. Unable to do plantar or dorsiflex on R foot.   Cardiac: monitoring discontinued.  IV: DL PICC- both HL. PIV SL  Labs/Electrolytes: see below  Platelets 56 (infuse below 50)  Resp: Cont pulse ox. RA. Denies SOB  Diet: regular. Pt takes meds whole in applesauce.   GI: LBM 08/15/2025   : voiding spont to bathroom  Skin: bruising to abd, scattered bruising to BUE. L arm dressing covered with pressure dressing.  Pain: denies  Activity: x2 GB and Cane  SCDs on? If no, why?: yes  Social: family at bedside and supportive  Plan:   Plan to continue enoxaparin on an indefinite basis at this time   Follow up in the stroke clinic in 2-3 months (order placed)   Platelets 56, transfuse for less than 50   continue Taletrectinib 600 mg daily (unable to each 2 hours before and 2 hours after)       Latest Reference Range & Units 08/16/25 05:50   Sodium 135 - 145 mmol/L 140   Potassium 3.4 - 5.3 mmol/L 4.0   Chloride 98 - 107 mmol/L 104   Carbon Dioxide (CO2) 22 - 29 mmol/L 25   Urea Nitrogen 6.0 - 20.0 mg/dL 24.1 (H)   Creatinine 0.67 - 1.17 mg/dL 1.14   GFR Estimate >60 mL/min/1.73m2 86   Calcium 8.8 - 10.4 mg/dL 9.3   Anion " Gap 7 - 15 mmol/L 11   Magnesium 1.7 - 2.3 mg/dL 2.0   Albumin 3.5 - 5.2 g/dL 4.0   Protein Total 6.4 - 8.3 g/dL 6.9   Alkaline Phosphatase 40 - 150 U/L 67   ALT 0 - 70 U/L 19   AST 0 - 45 U/L 26   Bilirubin Total <=1.2 mg/dL 0.6   Glucose 70 - 99 mg/dL 116 (H)   Uric Acid 3.4 - 7.0 mg/dL 6.2   WBC 4.00 - 11.00 10e3/uL 9.44   Hemoglobin 13.3 - 17.7 g/dL 10.7 (L)   Hematocrit 40.0 - 53.0 % 33.5 (L)   Platelet Count 150 - 450 10e3/uL 56 (L)   RBC Count 4.40 - 5.90 10e6/uL 4.12 (L)   MCV 78.0 - 100.0 fL 81.3   MCH 26.5 - 33.0 pg 26.0 (L)   MCHC 31.5 - 36.5 g/dL 31.9   RDW 10.0 - 15.0 % 13.7   (H): Data is abnormally high  (L): Data is abnormally low

## 2025-08-16 NOTE — PLAN OF CARE
Goal Outcome Evaluation:      Plan of Care Reviewed With: patient    Overall Patient Progress: no changeOverall Patient Progress: no change     Status: Admitted 7/15 for N/V and lethargy, found to have infact in L MCA, R parietal, R frontal centrum in the setting of new metastatic lung adenocarcinoma. Stay c/b acute personality change, new infarct in R posterior temporal lobe found 8/13, thromobocytopenia, and delirium (code 21 8/13 flora), syncope (rapid code called 8/15 afternoon). Restraints discontinued 8/14 9h45 sitter discontinued 8/15 07h00 Hx of HTN, DM, ELENA, PE/ DVT.   Vitals: VSS - RA. CCM. Cont pulse ox.   Neuros: A&Ox3-4 with choices. Occasionally refuses to answer orientation questions. Garbled speech. STR. Moderate aphasia. R droop. LUE 4/5, RUE 1/5, LLE 3-4/5, and RLE 3/5. Unable to do plantar or dorsiflex on R foot. No outburst of aggressive behavior overnight.   IV: DL PICC - both HL. PIV SL.  Labs/Electrolytes: Platelets 61 - PLT trends    Resp: WNL - LSC. Denies SOB.   Diet: Regular - good appetite. Pt takes meds whole in applesauce.  GI: LBM 08/15/2025 - passing gas.   : Voiding spon in BR.   Skin: Generalized bruising abdomin and UE  Pain: Denies.   Activity: Assist x2. GB. Cane.  Plan/Updates this shift: Platelet infusion if under 50. Psych following. Pt can't eat 2hr before or after after chemo medication.

## 2025-08-16 NOTE — PROGRESS NOTES
"      [facility]    Vascular Neurology Progress Note    Interval History     Patient had a syncope yesterday for which stroke code was called which was later deescalated due to unchanged neuro assessment, details of which are  documented in an interval note yesterday. This morning he was alert, minimally responsive but followed simple commands and exam was assuring.     Hospital Course     Chief complaint: Altered Mental Status (Patient was at his MD \"anticoagulation clinic\" MD concerned that patient might have a brain bleed. Mother stated that \" this Sunday evening has been having altered mental status. 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     Initially there was concern for TTP or catastrophic APS, however further work up led to new diagnosis of metastatic lung adenocarcinoma with likely malignancy-related hypercoagulability. PET 7/26 with distant metastases (thyroid nodule, paratracheal LAD, retroperitoneal nodules). MRI brain completed at oncology recommendation to evaluate for any central nervous system metastasis prior to initiation of treatment - no brain metastasis was noted. Medication indicated is Taletrectinib (Oncology arranging the medication). Emboli stemming from tricuspid and aortic valves, appreciated on TTE 7/16  - repeat TTE outpatient to ensure improvement of AR and TR (and vegetations).     Stroke Neurology consulted again last week 8/4 MRI - 2 small new infarcts noted in right frontal lobe - they were considered incidental (patient didn't have new symptoms). Patient also noted to have right sided M2/M3 stenosis, thought to be secondary to " "continuation of M1 occlusion or vasospasm to left frontal SAH.    On 8/9, due to supratherapeutic anti-Xa level, lovenox dose was decreased from 1mg/kg q12h to ~0.8mg/kg q12h.     8/12 - stroke neurology consulted due to onset of new aphasia and change in personality for past 2 days. Patient seen at bedside by myself and my senior, Dr. Palmer. The patient refused to participate in encounter and exam.  He did acknowledge us with shaking his head no and a few words. - \"No\" and \"I don't want to do anything\". Did not seem like aphasia. semi-appropriately able to respond to our questions. It could be contributed to mood changes from prolonged hospitalization.     CT head performed 8/11 did not show any acute changes. At this time, we will recommend MRI & MRA studies in order to ensure the absence of acute vascular event given his risk factors. He is on therapeutic anticoagulation with Lovenox. Later in the evening of 8/12, patient had brief seizure-like activity lasting for 10 seconds with generalized tremulous movements and staring followed by emesis.  CT head obtained yesterday showed no new acute findings or edema that would explain seizure.  However there was concerns about personality changes, minimally interactive, questionable aphasia and possible low mood.  Unclear if he had a true seizure versus nonepileptic spell in the setting of acute stressor w/ prolonged hospitalization and malignancy.    8/13 - Brain MRI showed a new infarct in the posterior right temporal lobe. Lovenox was stopped and patient was started on heparin drip. With plt counts >50, primary team started patient back on Lovenox 1 mg/kg bid.         Assessment and Plan     1. Multifocal acute ischemic stroke due to malignancy-related hypercoagulability vs cardioembolic    Bernard Chapman is a 35 year old male with PMH of HTN, T2DM, ELENA, PE/DVT (on rivaroxaban) admitted on 7/15/2025 for lethargy, N/V, and blurry vision found to have multifocal acute " ischemic stroke in setting of mid to distal M1 segment LMCA occlusion, R parietal and R frontal centrum semiovale. Also found to have M2/M3 stenosis and right frontal SAH.    Initially there was concern for TTP or catastrophic APS, however further work up led to new diagnosis of metastatic lung adenocarcinoma with likely malignancy-related hypercoagulability. PET 7/26 with distant metastases (thyroid nodule, paratracheal LAD, retroperitoneal nodules). MRI brain completed at oncology recommendation to evaluate for any Central nervous system metastasis prior to initiation of treatment - no brain metastasis was noted. Medication indicated is Taletrectinib (Oncology arranging the medication). Emboli stemming from tricuspid and aortic valves, appreciated on TTE 7/16  - cardioembolic cause cannot be ruled out - repeat TTE outpatient to ensure improvement of AR and TR (and vegetations).    Patient is having new altered mental status presentation since 8/11. Patient is able to respond vocally with brief words. Most responses are with nodding, shaking, and hand gestures. Having multiple cortical lesions may account for recent changes noted, making Brain MRI and MRA useful to change and/or new lesions. Clinical presentation may be suggestive of albulia. On 8/12, patient's generalized tremulous activity lasting 10 seconds and followed by emesis may represent seizure like activity - CT head (8/11) did not show edema or acute findings suggestive of seizure. Patient was loaded with Keppra 4.5 mg and placed on video-EEG monitoring. After 5-6 hours, no seizures were noted on EEG monitoring. Given the cortical strokes, he was started on keppra 750 mg maintenance.     8/13 - Brain MRI showed a new infarct in the posterior right temporal lobe. Etiology due to hypercoagulability of malignancy and subtherapeutic anticoagulation with Lovenox 0.8mg BID which was decreased due to decreasing platelet counts. Discussed with Hem onc and he  was started on Lovenox 1mg/kg for secondary prevention. Recommend PT/OT evaluation and follow up with stroke clinic in 8 weeks. Stroke Neurology will sign off        Recommendations:  - Continue Lovenox 1mg/kg BID   - Continue Keppra 750mg BID   - Continue Rosuvastatin 10 mg   - Follow up in the stroke clinic in 2-3 months (placed order for you)    We will sign off on this patient    DVT Prophylaxis: Lovenox 80mg    The patient was discussed with Stroke Staff, Dr. Rios.    TC Swann  Neurology PGY 3    Physical Examination     Temp: 98  F (36.7  C) Temp src: Oral BP: 130/70 Pulse: 71   Resp: 18 SpO2: 98 % O2 Device: None (Room air)        Neurologic   Patient was agitated, unable to perform exam.   Mental Status:    Patient was awake and alert, following most commands. Did not tell us his name or age, but nodded when asked if he was in the hospital. Able to name few objects such as 'badge' and 'fingers'   Cranial Nerves: visual fields full, EOMI, mild right lower facial droop upon activation  Motor:  RUE weakness can briefly lift antigravity hits the bed <10 sec, LUE without drift, RLE drifts to bed <5sec, LLE without drift.   Reflexes: Deferred   Sensory: Intact to light touch in all extremities   Coordination:  Did not assess  Station/Gait:  deferred    Stroke Scales   [employer] Stroke Scale    [employer] Stroke Scale     Score    Level of consciousness: (0)   Alert, keenly responsive    LOC questions: (1)   Answers one question correctly    LOC commands: (0)   Performs both tasks correctly    Best gaze: (0)   Normal    Visual: (0)   No visual loss    Facial palsy: (1)   Minor paralysis (flat nasolabial fold, smile asymmetry)    Motor arm (left): (0)   No drift    Motor arm (right): (3)   No effort against gravity    Motor leg (left): (0)   No drift    Motor leg (right): (0)   No drift    Limb ataxia: (0)   Absent    Sensory: (0)   Normal- no sensory loss    Best  language: (1)   Mild to moderate aphasia    Dysarthria: (1)   Mild to moderate dysarthria    Extinction and inattention: (1)   Visual, tacile, auditory, spatial, person inattention        Total Score:  8 (8/15/2025)           Imaging/Labs   (Bolded imaging and labs new and/or personally reviewed or re-reviewed by me today 8/13/2025)    MRI/Head CT 8/13  MRI Brain  IMPRESSION:  Exam was terminated early due to patient behavior, and a significant  portion of the exam was not performed.  BRAIN MRI: New area of restricted diffusion and T2 hyperintensity in  the posterior right temporal lobe concerning for acute infarct.  Additional scattered punctate areas of restricted diffusion seen in  the left temporal lobe, some of which were present on previous exam.  Unchanged reduced diffusion and T2 hyperintensity in the left basal  ganglia and periventricular white matter.  BRAIN MRA: Complete lack of opacification of the left middle cerebral  artery which could be secondary to motion artifact or could represent  occlusion. The flow void on T2 weighted imaging in the brain MRI is  feathery in this region, and there may not be complete occlusion.. No  other evidence for intracranial arterial aneurysm or stenosis.  NECK MRA: Visible portions of the major cervical arteries are patent  but images are degraded by motion artifact..    8/4/2025  MRI Brain  IMPRESSION:   1. Two new small infarcts in the right frontal lobe since the MRI  7/19/2025.  2. Evolution of prior left cerebral hemisphere infarcts, many of which  are now enhancing in the subacute phase. No definite hemorrhagic  conversion on susceptibility imaging. No intracranial hemorrhages.    8/11   CTH  IMPRESSION:   1. No new acute intracranial pathology.   2. Evolving infarct of the left dorsal lentiform nucleus.   Intracranial Vasculature 7/19  HEAD CTA:  1.  Stable subtotal occlusion mid to distal M1 segment left middle cerebral artery.  2.  New focal high-grade  stenosis posterior parietal branch left middle cerebral artery near the M2/M3 junction.   Cervical Vasculature 7/19  NECK CTA:  1.  No hemodynamically significant stenosis in the neck vessels.  2.  No evidence for dissection.  3.  New bilateral pleural effusions and patchy infiltrates in both upper lobes and superior segments of the lower lobes.  4.  Mediastinal and right hilar adenopathy was incompletely imaged on the prior study and concerning for metastatic disease or lymphoma.     Echocardiogram 7/17  Interpretation Summary  TIMO ordered to assess valvular disorder.     There is diffuse investment of the aortic and tricuspid valves with thrombus  that result in moderate aortic regurgitation and moderate tricuspid  regurgitation. Normal mitral and pulmonic valves.     Of note, an infectious etiology of the valvular lesions cannot be excluded on  the basis of this study alone.   EKG/Telemetry 8/4  Sinus rhythm with short WA   Cardiac CTA  7/16  1.  Non-diagnostic evaluation for left atrial appendage thrombus.   2.  Please review the separate Radiology report for incidental  noncardiac findings.     FINDINGS:   1.  Non-diagnostic evaluation for left atrial appendage thrombus.   2.  The left atrial appendage has a  chicken wing morphology.  3.   Coronary images are non-diagnostic for stenosis/atherosclerosis  due to cardiac motion artifact. No coronary artery calcifications  seen.   4.   The visualized aortic root, ascending aorta, and descending  thoracic aorta are normal in caliber.     LDL  8/12/2025: 77 mg/dL   A1C  7/16/2025: 5.3 %   Troponin No lab value available in past 7 days     Other labs reviewed by me today:   Latest Reference Range & Units 08/15/25 05:48   WBC 4.00 - 11.00 10e3/uL 10.56   Hemoglobin 13.3 - 17.7 g/dL 11.0 (L)   Hematocrit 40.0 - 53.0 % 34.7 (L)   Platelet Count 150 - 450 10e3/uL 69 (L)   RBC Count 4.40 - 5.90 10e6/uL 4.23 (L)   MCV 78.0 - 100.0 fL 82.0   MCH 26.5 - 33.0 pg 26.0 (L)    MCHC 31.5 - 36.5 g/dL 31.7   RDW 10.0 - 15.0 % 14.0   (L): Data is abnormally low

## 2025-08-16 NOTE — PROGRESS NOTES
[facility]    Progress Note - Medicine Service, [facility] TEAM 3       Date of Admission:  7/15/2025    Assessment & Plan   Bernard Chapman is a 35 year old male with a past medical history of hypertension, diabetes, ELENA, PE/DVT (on rivaroxaban) admitted on 7/15/2025 for lethargy, N/V, and blurry vision found to have multifocal acute CVA in L MCA , R parietal and R frontal centrum semiovale. Work up revealed metastatic lung adenocarcinoma with likely malignancy-related hypercoagulability and acute change in personality beginning 8/10, now improving on risperidone, with new findings of R posterior temporal lobe infarct..     Updates 8/15/2025:  - No acute changes today  - Lovenox 100 mg BID for AC  - Platelets 56, transfuse for less than 50  - continue Taletrectinib 600 mg daily (move to 6 am)  - [  ] Follow up in the stroke clinic in 2-3 months (order placed)       #New metastatic lung adenocarcinoma, ROS1 rearrangement with right lung nodule and multiple enlarged mediastinal, paraesophageal, and hilar lymph nodes  Malignancy work up initiated on admission due to significant hypercoagulability. CT CAP significant for [id number] spiculated density in R lung apex, 9mm pulmonary nodule in R lung base, L thyroid nodule, paratracheal LAD, splenic infarct, bilateral renal infarcts, and L retroperitoneal nodule. Underwent FNA of lymph node which was positive for lung adenocarcinoma (resulted 7/22).  PDL-1 resulted with low expression (40%). PET 7/26 with distant metastases and MRI brain showing no signs of metastasis. Patient started chemotherapy 8/13. Okay to take chemotherapy with apple juice or other liquids per pharmacy, avoid fatty foods which may impact absorption.  Oncology consulted, appreciate recs              - NGS; positive for a ROS1 gene rearrangement (SDC4::ROS1)                - Guardant 360; T p53 mutation and MITCHELL   - Taletrectinib 600 mg daily on empty stomach  avoid food for two hours before and after (moved to 6am)  - allopurinol 100 mg daily   - CBC, CMP, Mg, Uric acid daily in the hospital  - EKG, CBC with Diff, CMP, Mag, Uric acid, CK weekly for 4 weeks  - imodium prn  Anti-nausea:              - compazine 10 mg scheduled prior to chemotherapy  - ativan 0.5 mg prn (avoiding additional zofran and compazine to decrease risk of prolonged QT given patient taking keppra and taletrectinib)  Anti-diarrhea              - imodium prn  - [place] 2nd opinion; Patient will need to call [phone] to make appointment once there is a timeline for discharging from this hospitalization  - Due to ongoing transfusion needs, ARU not feasible at this time.      #Hypercoagulability d/t metastatic lung adenocarcinoma with infarcts of brain, kidney, spleen. New brain infarct 8/13.  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 TTE with mobile echodensities on tricuspid and aortic valve. Initial concern for TTP or catastrophic APS, however work up ultimately significant for new diagnosis of metastatic lung cancer. Hypercoagulable state presumed to be most likely 2/2 malignancy. Started on heparin gtt 7/17, but briefly held for 7/18 endobronchial needle aspiration of lymph nodes. Stroke code called 7/19 for worsening R-sided hemiparesis and hemianopia. Repeat CTA with new focal high-grade stenosis of other part of left MCA. Heparin gtt was stopped during code stroke, but restarting (heparin gtt + levophed) led to 10/10 headache. CTH with small left frontal SAH. Brain MRI with new L basal ganglia stroke. Heparin gtt resumed, and repeat CTH stable. Switched to therapeutic Lovenox 7/22. Declining fibrinogen, so received cryoprecipitate 7/25. Concern for  continued consumptive process, but on therapeutic Lovenox. PLT downtrending, so receiving transfusions intermittently. Cardiology consulted given cardiac valve thrombus and concern for stroke, agreed with therapeutic AC on lovenox, not likely surgical candidate, and recommended consideration for ID workup of indolent culture negative endocarditis. Repeat echo ordered to evaluate prior tricuspid and aortic valve vegetations and consideration of cardiac source of emboli. Lovenox was decreased to 80 mg BID per pharmacy on 8/9 when Anti-Xa level supratherapeutic. Patient was transitioned to heparin gtt on 8/13 given evidence of new infarct. Will transition back to lovenox 100 mg BID per neuro and heme recs.  - Neurology consulted as below  - Hematology consulted, appreciate recs              -Transfuse if   -Hgb<7  -PLT<50k-- CONDITIONAL ORDERS IN PLACE  - Lovenox 100 mg BID  - Can consider transition to DOAC pending discharge   - lupus anticoagulant ordered   - Cardiology consulted, signed off              - continue therapeutic AC  - could consider ID engagement for consideration of indolent culture negative endocarditis, although this is less likely  - qdaily CBC        #New acute infarct in Right Posterior Temporal Lobe    #Multifocal acute ischemic strokes, in L MCA territory and new L basal ganglia stroke; Increasing mass effect on L lateral ventricle by L MCA infarction  #Subtotal occlusive thrombus in left M1   #Left frontoparietal SAH (7/19)  Etiology most likely hypercoagulability d/t malignancy. Emboli stemming from tricuspid and aortic valves, appreciated on TTE 7/16. He continues to have R facial droop, extremity weakness. Left frontoparietal subarachnoid hemorrhage stable per CT.  Repeat brain MRI on 8/4 shows 2 new small infarcts in the right frontal lobe. Neuro evaluation as below due to personality changes. Repeat MRI on 8/14 with new infarct of posterior right temporal lobe and scattered punctate areas  "of left temporal lobe. Low concern for hemorrhagic conversion per Neuro. Heme/Onc recommending change to therapeutic lovenox 1 mg/kg q12h, only check Xa if signs of bleeding.   - Neuro consulted, appreciate recs              - Keppra 750 mg BID              - ativan 4mg IV if evidence of seizures >  3 minutes  -OT/PT/Speech              -therapies doing aggressive rehabilitation as able  - nutrition consult              -monitor oral intake, weights  - Cardiology consulted as above.  - Anticoagulation as above  - Repeat TTE outpatient to ensure improvement of AR and TR (and vegetations)  - [  ] Follow up in the stroke clinic in 2-3 months (order placed)      #Acute change in personality with inappropriate affect and agitation suspect 2/2 new posterior R. Temporal infarct  Starting around 8/10, patient has had significant personality changes, including becoming less participatory/interactive with therapies and with family. CT head from 8/11 without any acute findings. At 6:10 pm on 8/12, concern for seizure like activity with 10 seconds of general tremulousness and subsequent emesis. Given Keppra load with subsequent vEEG without evidence of seizures. Daily keppra started 8/13. MRI/MRA head 8/13 showing new right posterior temporal lobe infarct. Increased aggression and agitation overnight on 8/14 requiring Code 21 and use of physical 4 point restraints. Difficult to assess voluntariness of the decreased responsiveness. Potential component of delirium at play versus depression vs neurological damage secondary to new infarct. Psychiatric consult placed to optimize medication management and for consideration whether psychiatric cause is contributing to mood changes. Per nursing, patient reporting \"I'm scared.\" Considered adding antidepressant, however, per psych, risperidone can help with feeling fearful.  Psychiatry consult, appreciate recs              - 0.5 mg risperidone BID               - 1 mg risperidone prn for " agitation               - 50 mg seroquel prn for agitation and sleep              - 2-5 mg Haldol IV Q4 prn for agitation               - EKG NSR with Qtc 398 today  - Delirium precautions     # Maculopapular rash on face, chest, bilateral upper extremities, improved  # Numerous palpable purpura across the scalp and lower extremities   # Petechiae on bilateral lower extremities, improving  C/f palpable purpura, so s/p L leg s/p punch biopsy with dermatology: rupture folliculitis. New rash noted 7/26 with flushing of face/chest after PET scan, suspected  secondary to contrast from PET scan.   Plan:  - Benadryl as needed for itching  - Chlorhexidine wash  - Will continue to monitor     #DM2, A1c 5.3  On HSSI with adequate control after stopping dexamethasone. CTM.   - POC glucose ACHS  - High intensity insulin sliding scale      #HLD, chronic  -Cont 10 mg rosuvastatin     #HTN  - Continue PTA Metoprolol succinate 25mg q daily (ok to hold if HR< 60)  - Hold PTA Losartan, could consider restarting vs discontinuing given normotensive  - Continue PTA hydrochlorothiazide     #ELENA  Currently not on CPAP, consider resuming if mental status improves     #Leukocytosis, resolved  Found to have leukocytosis, no clear cause or signs of infection, possible 2/2 stress induced.      #Hiccups, resolved   #chronic back pain  Chronic back pain. On acetaminophen only (given frequent need for neuro checks). Hiccups resolved (suspected 2/2 dexamethasone).   Plan:  - Thorazine 25mg q6hr discontinued due to contraindication with chemotherapy  - Melatonin 3mg daily  - Pepcid discontinued due to contraindication with chemotherapy  - continue  baclofen  - acetaminophen 1000 mg every 6 hours  - cyclobenzaprine 10 mg 3x daily PRN  - PTA gabapentin 100 mg 3x daily  - PTA oxycodone 5 mg PRN every 6h      #allergies  - fluticasone 1 spray daily      #Epistaxis, currently resolved  On 8/5 patient found to have epistaxis. At the time, physical exam  "shows blood coming from the right nares.  - Afrin spray as needed  - Apply pressure to the nasal passage to help with bleeding     Diet: Regular Diet Adult    DVT Prophylaxis: Enoxaparin (Lovenox) SQ  Avalos Catheter: Not present  Fluids: None  Lines: PRESENT      PICC 08/14/25 Double Lumen Left Brachial vein medial Access-Site Assessment: WDL      Cardiac Monitoring: ACTIVE order. Indication: Syncope- low cardiac risk (24 hours)  Code Status: Full Code      Clinically Significant Risk Factors                 # Thrombocytopenia: Lowest platelets = 56 in last 2 days, will monitor for bleeding                  # Financial/Environmental Concerns: none         Social Drivers of Health          Disposition Plan     Medically Ready for Discharge: Anticipated in 2-4 Days    The patient's care was discussed with the Attending Physician, Dr. Wilson.    Tara Ramon MD  Medicine Service, [facility] TEAM [address] [place] [address]  Securely message with [employer] (more info)  Text page via "[email or web address]" Paging/Directory   See signed in provider for up to date coverage information  ______________________________________________________________________    Interval History   No acute adverse events overnight. Pt in better mood today. Family updated at bedside.      Physical Exam   Vital Signs: Temp: 97.7  F (36.5  C) Temp src: Oral BP: 117/70 Pulse: 84   Resp: 18 SpO2: 98 % O2 Device: None (Room air)    Weight: 224 lbs 11.2 oz  General Appearance:  Appears well, sitting up in chair in no acute distress  Respiratory: CTAB  Cardiovascular: regular rate and rhythm, did not appreciate murmur  GI: nondistended  Skin: warm, dry, intact, scattered petechiae on legs  Neuro: Mental status: alert, oriented to self only. Answers questions appropriately at times with 1-2 word answers. Right lower facial droop stable. 4/5 strength in LUE, RLE, LLE. Unable to move RUE.    Psych:  Mood is \"fine,\" cooperative with cares and " interactive with family.     Medical Decision Making       Please see A&P for additional details of medical decision making.      Data     I have personally reviewed the following data over the past 24 hrs:    9.44  \   10.7 (L)   / 56 (L)     140 104 24.1 (H) /  116 (H)   4.0 25 1.14 \     ALT: 19 AST: 26 AP: 67 TBILI: 0.6   ALB: 4.0 TOT PROTEIN: 6.9 LIPASE: N/A       Imaging results reviewed over the past 24 hrs:   No results found for this or any previous visit (from the past 24 hours).

## 2025-08-16 NOTE — PLAN OF CARE
Goal Outcome Evaluation:      Plan of Care Reviewed With: patient    Overall Patient Progress: improvingOverall Patient Progress: improving    Outcome Evaluation: Pt in better mood today.    Status: Admitted 7/15 for N/V and lethargy, found to have infact in L MCA, R parietal, R frontal centrum in the setting of new metastatic lung adenocarcinoma. Stay c/b acute personality change, new infarct in R posterior temporal lobe found 8/13, thromobocytopenia, and delirium (code 21 8/13 flora), syncope (rapid code called 8/15 afternoon). Restraints discontinued 8/14 9h45 sitter discontinued 8/15 07h00 Hx of HTN, DM, ELENA, PE/ DVT.   Vitals: VSS - RA. CCM. Cont pulse ox.   Neuros: A&Ox3-4 with choices. Garbled speech. STR. Mild to moderate aphasia. R droop. LUE 4/5, RUE 1/5, LLE 3-4/5, and RLE 3/5. Unable to do plantar or dorsiflex on R foot. Pt in better mood on shift - smiled and laughed a few times.    IV: DL PICC - both HL. PIV SL.  Labs/Electrolytes: Platelets 61 - MD notified.    Resp: WNL - LSC. Denies SOB.   Diet: Regular - good appetite. Pt takes meds whole in applesauce.  GI: LBM 08/15/2025 - passing gas.   : Voiding spon in BR.   Skin: L arm infiltration marked - no changed. Abdominal bruising.   Pain: Denies.   Activity: Assist x2. GB. Cane.  Plan/Updates this shift: Platelet infusion if under 50. Psych following. Pt can't eat 2hr before or after after chemo medication.

## 2025-08-17 ENCOUNTER — APPOINTMENT (OUTPATIENT)
Dept: OCCUPATIONAL THERAPY | Facility: CLINIC | Age: 35
DRG: 064 | End: 2025-08-17
Payer: COMMERCIAL

## 2025-08-17 ENCOUNTER — APPOINTMENT (OUTPATIENT)
Dept: PHYSICAL THERAPY | Facility: CLINIC | Age: 35
DRG: 064 | End: 2025-08-17
Payer: COMMERCIAL

## 2025-08-17 LAB
ALBUMIN SERPL BCG-MCNC: 3.9 G/DL (ref 3.5–5.2)
ALP SERPL-CCNC: 66 U/L (ref 40–150)
ALT SERPL W P-5'-P-CCNC: 26 U/L (ref 0–70)
ANION GAP SERPL CALCULATED.3IONS-SCNC: 11 MMOL/L (ref 7–15)
AST SERPL W P-5'-P-CCNC: 30 U/L (ref 0–45)
BILIRUB SERPL-MCNC: 0.5 MG/DL
BUN SERPL-MCNC: 26 MG/DL (ref 6–20)
CALCIUM SERPL-MCNC: 9 MG/DL (ref 8.8–10.4)
CHLORIDE SERPL-SCNC: 104 MMOL/L (ref 98–107)
CREAT SERPL-MCNC: 1.11 MG/DL (ref 0.67–1.17)
EGFRCR SERPLBLD CKD-EPI 2021: 89 ML/MIN/1.73M2
ERYTHROCYTE [DISTWIDTH] IN BLOOD BY AUTOMATED COUNT: 13.6 % (ref 10–15)
GLUCOSE SERPL-MCNC: 122 MG/DL (ref 70–99)
HCO3 SERPL-SCNC: 26 MMOL/L (ref 22–29)
HCT VFR BLD AUTO: 32.4 % (ref 40–53)
HGB BLD-MCNC: 10.4 G/DL (ref 13.3–17.7)
MAGNESIUM SERPL-MCNC: 2 MG/DL (ref 1.7–2.3)
MCH RBC QN AUTO: 26.1 PG (ref 26.5–33)
MCHC RBC AUTO-ENTMCNC: 32.1 G/DL (ref 31.5–36.5)
MCV RBC AUTO: 81.2 FL (ref 78–100)
PLATELET # BLD AUTO: 67 10E3/UL (ref 150–450)
POTASSIUM SERPL-SCNC: 3.9 MMOL/L (ref 3.4–5.3)
PROT SERPL-MCNC: 6.6 G/DL (ref 6.4–8.3)
RBC # BLD AUTO: 3.99 10E6/UL (ref 4.4–5.9)
SODIUM SERPL-SCNC: 141 MMOL/L (ref 135–145)
URATE SERPL-MCNC: 6 MG/DL (ref 3.4–7)
WBC # BLD AUTO: 8.28 10E3/UL (ref 4–11)

## 2025-08-17 PROCEDURE — 85018 HEMOGLOBIN: CPT

## 2025-08-17 PROCEDURE — 250N000013 HC RX MED GY IP 250 OP 250 PS 637

## 2025-08-17 PROCEDURE — 258N000003 HC RX IP 258 OP 636

## 2025-08-17 PROCEDURE — 83735 ASSAY OF MAGNESIUM: CPT

## 2025-08-17 PROCEDURE — 97112 NEUROMUSCULAR REEDUCATION: CPT | Mod: GO

## 2025-08-17 PROCEDURE — 97535 SELF CARE MNGMENT TRAINING: CPT | Mod: GO

## 2025-08-17 PROCEDURE — 99233 SBSQ HOSP IP/OBS HIGH 50: CPT | Mod: GC | Performed by: STUDENT IN AN ORGANIZED HEALTH CARE EDUCATION/TRAINING PROGRAM

## 2025-08-17 PROCEDURE — 99233 SBSQ HOSP IP/OBS HIGH 50: CPT | Performed by: INTERNAL MEDICINE

## 2025-08-17 PROCEDURE — 84155 ASSAY OF PROTEIN SERUM: CPT

## 2025-08-17 PROCEDURE — 120N000002 HC R&B MED SURG/OB UMMC

## 2025-08-17 PROCEDURE — 250N000011 HC RX IP 250 OP 636

## 2025-08-17 PROCEDURE — 97530 THERAPEUTIC ACTIVITIES: CPT | Mod: GP | Performed by: PHYSICAL THERAPIST

## 2025-08-17 PROCEDURE — 97116 GAIT TRAINING THERAPY: CPT | Mod: GP | Performed by: PHYSICAL THERAPIST

## 2025-08-17 PROCEDURE — 84550 ASSAY OF BLOOD/URIC ACID: CPT

## 2025-08-17 PROCEDURE — 250N000013 HC RX MED GY IP 250 OP 250 PS 637: Performed by: STUDENT IN AN ORGANIZED HEALTH CARE EDUCATION/TRAINING PROGRAM

## 2025-08-17 RX ADMIN — Medication 5 ML: at 21:33

## 2025-08-17 RX ADMIN — RISPERIDONE 0.5 MG: 0.5 TABLET ORAL at 19:45

## 2025-08-17 RX ADMIN — ENOXAPARIN SODIUM 100 MG: 100 INJECTION SUBCUTANEOUS at 21:34

## 2025-08-17 RX ADMIN — ACETAMINOPHEN 1000 MG: 500 TABLET ORAL at 12:27

## 2025-08-17 RX ADMIN — Medication 2.5 MG: at 19:45

## 2025-08-17 RX ADMIN — ENOXAPARIN SODIUM 100 MG: 100 INJECTION SUBCUTANEOUS at 09:20

## 2025-08-17 RX ADMIN — ALLOPURINOL 100 MG: 100 TABLET ORAL at 09:18

## 2025-08-17 RX ADMIN — Medication 2.5 MG: at 09:15

## 2025-08-17 RX ADMIN — Medication 5 MG: at 19:44

## 2025-08-17 RX ADMIN — ROSUVASTATIN CALCIUM 10 MG: 10 TABLET, FILM COATED ORAL at 09:17

## 2025-08-17 RX ADMIN — METOPROLOL SUCCINATE 25 MG: 25 TABLET, EXTENDED RELEASE ORAL at 09:17

## 2025-08-17 RX ADMIN — FLUTICASONE PROPIONATE 1 SPRAY: 50 SPRAY, METERED NASAL at 09:14

## 2025-08-17 RX ADMIN — Medication 5 MG: at 09:16

## 2025-08-17 RX ADMIN — ACETAMINOPHEN 1000 MG: 500 TABLET ORAL at 17:42

## 2025-08-17 RX ADMIN — PROCHLORPERAZINE MALEATE 10 MG: 10 TABLET ORAL at 09:17

## 2025-08-17 RX ADMIN — LEVETIRACETAM 750 MG: 100 INJECTION, SOLUTION INTRAVENOUS at 19:44

## 2025-08-17 RX ADMIN — LEVETIRACETAM 750 MG: 100 INJECTION, SOLUTION INTRAVENOUS at 07:21

## 2025-08-17 RX ADMIN — Medication 5 ML: at 05:55

## 2025-08-17 RX ADMIN — RISPERIDONE 0.5 MG: 0.5 TABLET ORAL at 09:20

## 2025-08-17 RX ADMIN — ACETAMINOPHEN 1000 MG: 500 TABLET ORAL at 06:00

## 2025-08-17 ASSESSMENT — ACTIVITIES OF DAILY LIVING (ADL)
ADLS_ACUITY_SCORE: 51
ADLS_ACUITY_SCORE: 56
ADLS_ACUITY_SCORE: 54
ADLS_ACUITY_SCORE: 51
ADLS_ACUITY_SCORE: 55
ADLS_ACUITY_SCORE: 54
ADLS_ACUITY_SCORE: 54
ADLS_ACUITY_SCORE: 53
ADLS_ACUITY_SCORE: 54
ADLS_ACUITY_SCORE: 54
ADLS_ACUITY_SCORE: 51
ADLS_ACUITY_SCORE: 54
ADLS_ACUITY_SCORE: 55
ADLS_ACUITY_SCORE: 51
ADLS_ACUITY_SCORE: 54
ADLS_ACUITY_SCORE: 55

## 2025-08-17 NOTE — PLAN OF CARE
Goal Outcome Evaluation:      Plan of Care Reviewed With: patient    Overall Patient Progress: no changeOverall Patient Progress: no change     Status: Admitted 7/15 for N/V and lethargy, found to have infact in L MCA, R parietal, R frontal centrum in the setting of new metastatic lung adenocarcinoma. Stay c/b acute personality change, new infarct in R posterior temporal lobe found 8/13, thromobocytopenia, and delirium (code 21 8/13 flora), syncope (rapid code called 8/15 afternoon). Restraints discontinued 8/14 9h45 sitter discontinued 8/15 07h00 Hx of HTN, DM, ELENA, PE/ DVT.   Vitals: VSS - RA. Cont SAT monitor on  Neuros: A&Ox3-4 with choices. Occasionally refuses to answer orientation questions. Garbled speech. STR. Moderate aphasia. R droop. LUE 4/5, RUE 1-3/5, LLE 3-4/5, and RLE 3/5. Unable to do plantar or dorsiflex on R foot. No outburst of aggressive behavior overnight.   IV: DL PICC - both HL. PIV SL.  Labs/Electrolytes: Platelet 56, trends   Resp: WNL - LSC. Denies SOB.   Diet: Regular - good appetite. Pt takes meds whole in applesauce.  GI: LBM 08/15/2025 - passing gas.   : Voiding spon in BR.   Skin: Generalized bruising abdomin and UE  Pain: Denies.   Activity: Assist x2. GB. Cane.  Plan/Updates this shift: Platelet infusion if under 50. Psych following. Pt can't eat 2hr before or after after chemo medication.

## 2025-08-17 NOTE — PROGRESS NOTES
[facility]    Medicine Progress Note - Medicine Service, [facility] TEAM 3    Date of Admission:  7/15/2025    Assessment & Plan   Bernard Chapman is a 35 year old male with a past medical history of hypertension, diabetes, ELENA, PE/DVT (on rivaroxaban) admitted on 7/15/2025 for lethargy, N/V, and blurry vision found to have multifocal acute CVA in L MCA , R parietal and R frontal centrum semiovale. Work up revealed metastatic lung adenocarcinoma with likely malignancy-related hypercoagulability and acute change in personality beginning 8/10, now improving on risperidone, with new findings of R posterior temporal lobe infarct..     Updates 8/17/2025:  - No acute changes today  - Continue to trend platelets daily, transfuse if <50      #New metastatic lung adenocarcinoma, ROS1 rearrangement with right lung nodule and multiple enlarged mediastinal, paraesophageal, and hilar lymph nodes  Malignancy work up initiated on admission due to significant hypercoagulability. CT CAP significant for [id number] spiculated density in R lung apex, 9mm pulmonary nodule in R lung base, L thyroid nodule, paratracheal LAD, splenic infarct, bilateral renal infarcts, and L retroperitoneal nodule. Underwent FNA of lymph node which was positive for lung adenocarcinoma (resulted 7/22).  PDL-1 resulted with low expression (40%). PET 7/26 with distant metastases and MRI brain showing no signs of metastasis. Patient started chemotherapy 8/13. Okay to take chemotherapy with apple juice or other liquids per pharmacy, avoid fatty foods which may impact absorption.  Oncology consulted, appreciate recs              - NGS; positive for a ROS1 gene rearrangement (SDC4::ROS1)                - Guardant 360; T p53 mutation and MITCHELL   - Taletrectinib 600 mg daily on empty stomach avoid food for two hours before and after (moved to 6am)  - allopurinol 100 mg daily   - CBC, CMP, Mg, Uric acid daily in the hospital  -  EKG, CBC with Diff, CMP, Mag, Uric acid, CK weekly for 4 weeks  - imodium prn  Anti-nausea:              - compazine 10 mg scheduled prior to chemotherapy  - ativan 0.5 mg prn (avoiding additional zofran and compazine to decrease risk of prolonged QT given patient taking keppra and taletrectinib)  Anti-diarrhea              - imodium prn  - [place] 2nd opinion; Patient will need to call [phone] to make appointment once there is a timeline for discharging from this hospitalization  - Due to ongoing transfusion needs, ARU not feasible at this time.      #Hypercoagulability d/t metastatic lung adenocarcinoma with infarcts of brain, kidney, spleen. New brain infarct 8/13.  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 TTE with mobile echodensities on tricuspid and aortic valve. Initial concern for TTP or catastrophic APS, however work up ultimately significant for new diagnosis of metastatic lung cancer. Hypercoagulable state presumed to be most likely 2/2 malignancy. Started on heparin gtt 7/17, but briefly held for 7/18 endobronchial needle aspiration of lymph nodes. Stroke code called 7/19 for worsening R-sided hemiparesis and hemianopia. Repeat CTA with new focal high-grade stenosis of other part of left MCA. Heparin gtt was stopped during code stroke, but restarting (heparin gtt + levophed) led to 10/10 headache. CTH with small left frontal SAH. Brain MRI with new L basal ganglia stroke. Heparin gtt resumed, and repeat CTH stable. Switched to therapeutic Lovenox 7/22. Declining fibrinogen, so received cryoprecipitate 7/25. Concern for continued consumptive process, but on therapeutic Lovenox. PLT downtrending, so receiving transfusions intermittently. Cardiology consulted  given cardiac valve thrombus and concern for stroke, agreed with therapeutic AC on lovenox, not likely surgical candidate, and recommended consideration for ID workup of indolent culture negative endocarditis. Repeat echo ordered to evaluate prior tricuspid and aortic valve vegetations and consideration of cardiac source of emboli. Lovenox was decreased to 80 mg BID per pharmacy on 8/9 when Anti-Xa level supratherapeutic. Patient was transitioned to heparin gtt on 8/13 given evidence of new infarct. Will transition back to lovenox 100 mg BID per neuro and heme recs.  - Neurology consulted as below  - Hematology consulted, appreciate recs              -Transfuse if   -Hgb<7  -PLT<50k-- CONDITIONAL ORDERS IN PLACE  - Lovenox 100 mg BID  - Can consider transition to DOAC pending discharge   - lupus anticoagulant ordered   - Cardiology consulted, signed off              - continue therapeutic AC  - could consider ID engagement for consideration of indolent culture negative endocarditis, although this is less likely  - qdaily CBC        #New acute infarct in Right Posterior Temporal Lobe    #Multifocal acute ischemic strokes, in L MCA territory and new L basal ganglia stroke; Increasing mass effect on L lateral ventricle by L MCA infarction  #Subtotal occlusive thrombus in left M1   #Left frontoparietal SAH (7/19)  Etiology most likely hypercoagulability d/t malignancy. Emboli stemming from tricuspid and aortic valves, appreciated on TTE 7/16. He continues to have R facial droop, extremity weakness. Left frontoparietal subarachnoid hemorrhage stable per CT.  Repeat brain MRI on 8/4 shows 2 new small infarcts in the right frontal lobe. Neuro evaluation as below due to personality changes. Repeat MRI on 8/14 with new infarct of posterior right temporal lobe and scattered punctate areas of left temporal lobe. Low concern for hemorrhagic conversion per Neuro. Heme/Onc recommending change to therapeutic lovenox 1 mg/kg q12h,  "only check Xa if signs of bleeding.   - Neuro consulted, appreciate recs              - Keppra 750 mg BID              - ativan 4mg IV if evidence of seizures >  3 minutes  -OT/PT/Speech              -therapies doing aggressive rehabilitation as able  - nutrition consult              -monitor oral intake, weights  - Cardiology consulted as above.  - Anticoagulation as above  - Repeat TTE outpatient to ensure improvement of AR and TR (and vegetations)  - [  ] Follow up in the stroke clinic in 2-3 months (order placed)      #Acute change in personality with inappropriate affect and agitation suspect 2/2 new posterior R. Temporal infarct  Starting around 8/10, patient has had significant personality changes, including becoming less participatory/interactive with therapies and with family. CT head from 8/11 without any acute findings. At 6:10 pm on 8/12, concern for seizure like activity with 10 seconds of general tremulousness and subsequent emesis. Given Keppra load with subsequent vEEG without evidence of seizures. Daily keppra started 8/13. MRI/MRA head 8/13 showing new right posterior temporal lobe infarct. Increased aggression and agitation overnight on 8/14 requiring Code 21 and use of physical 4 point restraints. Difficult to assess voluntariness of the decreased responsiveness. Potential component of delirium at play versus depression vs neurological damage secondary to new infarct. Psychiatric consult placed to optimize medication management and for consideration whether psychiatric cause is contributing to mood changes. Per nursing, patient reporting \"I'm scared.\" Considered adding antidepressant, however, per psych, risperidone can help with feeling fearful.  Psychiatry consult, appreciate recs              - 0.5 mg risperidone BID               - 1 mg risperidone prn for agitation               - 50 mg seroquel prn for agitation and sleep              - 2-5 mg Haldol IV Q4 prn for agitation               - " EKG NSR with Qtc 398 today  - Delirium precautions     # Maculopapular rash on face, chest, bilateral upper extremities, improved  # Numerous palpable purpura across the scalp and lower extremities   # Petechiae on bilateral lower extremities, improving  C/f palpable purpura, so s/p L leg s/p punch biopsy with dermatology: rupture folliculitis. New rash noted 7/26 with flushing of face/chest after PET scan, suspected  secondary to contrast from PET scan.   Plan:  - Benadryl as needed for itching  - Chlorhexidine wash  - Will continue to monitor     #DM2, A1c 5.3  On HSSI with adequate control after stopping dexamethasone. CTM.   - POC glucose ACHS  - High intensity insulin sliding scale      #HLD, chronic  -Cont 10 mg rosuvastatin     #HTN  - Continue PTA Metoprolol succinate 25mg q daily (ok to hold if HR< 60)  - Hold PTA Losartan, could consider restarting vs discontinuing given normotensive  - Continue PTA hydrochlorothiazide     #ELENA  Currently not on CPAP, consider resuming if mental status improves     #Leukocytosis, resolved  Found to have leukocytosis, no clear cause or signs of infection, possible 2/2 stress induced.      #Hiccups, resolved   #chronic back pain  Chronic back pain. On acetaminophen only (given frequent need for neuro checks). Hiccups resolved (suspected 2/2 dexamethasone).   Plan:  - Thorazine 25mg q6hr discontinued due to contraindication with chemotherapy  - Melatonin 3mg daily  - Pepcid discontinued due to contraindication with chemotherapy  - continue  baclofen  - acetaminophen 1000 mg every 6 hours  - cyclobenzaprine 10 mg 3x daily PRN  - PTA gabapentin 100 mg 3x daily  - PTA oxycodone 5 mg PRN every 6h      #allergies  - fluticasone 1 spray daily      #Epistaxis, currently resolved  On 8/5 patient found to have epistaxis. At the time, physical exam shows blood coming from the right nares.  - Afrin spray as needed  - Apply pressure to the nasal passage to help with bleeding     Diet:  Regular Diet Adult    DVT Prophylaxis: Enoxaparin (Lovenox) SQ  Avalos Catheter: Not present  Fluids: None  Lines: PRESENT      PICC 08/14/25 Double Lumen Left Brachial vein medial Access-Site Assessment: WDL      Cardiac Monitoring: ACTIVE order. Indication: Syncope- low cardiac risk (24 hours)  Code Status: Full Code            Diet: Regular Diet Adult    DVT Prophylaxis: Lovenox (see above)  Avalos Catheter: Not present  Lines: PRESENT      PICC 08/14/25 Double Lumen Left Brachial vein medial Access-Site Assessment: WDL      Cardiac Monitoring: None  Code Status: Full Code      Clinically Significant Risk Factors                 # Thrombocytopenia: Lowest platelets = 56 in last 2 days, will monitor for bleeding                  # Financial/Environmental Concerns: none         Social Drivers of Health            Disposition Plan     Medically Ready for Discharge: Anticipated in 2-4 Days             Ruba Wilson MD  Medicine Service, [facility] TEAM 3  [facility]  Securely message with [employer] (more info)  Text page via [employer] Paging/Directory   See signed in provider for up to date coverage information  ______________________________________________________________________    Interval History   Nursing notes reviewed, no acute events overnight. Slept well, no pain.    Physical Exam   Vital Signs: Temp: 97.8  F (36.6  C) Temp src: Oral BP: 113/57 Pulse: 83   Resp: 18 SpO2: 96 % O2 Device: None (Room air)    Weight: 224 lbs 11.2 oz    General Appearance: Lying in bed in NAD  Respiratory: CTAB, breathing comfortably on room air  Cardiovascular: RRR, no m/r/g  GI: NABS, soft, NT, ND  Skin: No rashes over exposed skin  Other: Alert, nodding yes/no to questions     Medical Decision Making             Data

## 2025-08-17 NOTE — PROGRESS NOTES
Solid Tumor Oncology Consult Service  Progress Note   Date of Service: 08/17/2025  Patient: Bernard Chapman  MRN: [id number]  Admission Date: 7/15/2025  Hospital Day # 33  Cancer Diagnosis: Metastatic lung adenocarcinoma   Primary Outpatient Oncologist: MARKOS   Current Treatment Plan: Taletrectinib      Interim history:    Patient was seen in the morning. Parents were at bedside. Patient looked happier than before. Denies any specific complains. Mom also mentions he has been overall doing better for the past two day.     Recommendations today:  - Taletrectinib started on 8/13, 600 mg daily with empty stomach, avoid food 2h before and after taking the med  - EKG before the treatment no prolong QT  - CBC, CMP, Mg, Uric acid daily in the hospital  - EKG, CBC with Diff, CMP, Mag, Uric acid, CK weekly for 4 weeks  - Zofran and Immodium prn  - Anticoagulation per classic heme      Assessment & Plan:   Bernard Chapman is a 35 year old year old male with a past medical history of HTN, DM, ELENA, PE/DVT 6/22/25 (previously on rivaroxaban) who presented 7/15/2025 with new neurological symptoms and found acute multifocal ischemic strokes, partial occlusive L MCA thrombus, L frontal SAH, and in setting of TCP c/f ITP vs catastrophic antiphospholipid syndrome (CAPS) vs malignancy s/p PLEX x7/16. CT CAP 7/16/2025 revealing LAD of hilar, mediastinal, and paraesophageal LNs and spiculated-appearing nodule of R apical lung s/p EBUS-TBNA 7/18 revealing metastatic lung adenocarcinoma, NGS revealed ROS1 rearrangement - planning to initiate treatment with taletrectinib as soon as possible while inpatient.      # New metastatic lung adenocarcinoma, ROS1 rearrangement   # Thrombocytopenia  # Anemia  CT CAP 7/16/2025 revealing a spiculated appearing nodule within R lung apex ~11 mm, multiple enlarged mediastinal, hilar and paraesophageal lymph nodes, along with 11 mm soft tissue density in the left retroperitoneum. Underwent  "EBUS-TBNA 7/18 revealing metastatic lung adenocarcinoma, + for CK7 and TTF-1. MRI brain without evidence of metastasis. Obtained PET on 7/26. Reviewed with radiologist which showed metastatic disease in L scapula, L retroperitoneum, R adrenal and L2. Areas are small enough where radiation is not required at this time. PD-L1 TPS at 40% consistent with low PD-L1 expression. Per d/w benign hematology, given severe thrombocytopenia requiring transfusions and hypercoagulable state with course complicated by multifocal strokes and PE 2/2 malignancy, plan to initiate treatment while inpatient with taletrectinib as soon as approved given ROS1 rearrangement found on NGS. Discussed with Tera and his parents 8/2 that response rate to this medication is 85-90%, with PFS measured in years on average.   - start Taletrectinib on 8/13, 600 mg daily with empty stomach, avoid food 2h before and after taking the med  - EKG, CBC with Diff, CMP, Mag, Uric acid, CK weekly for 4 weeks  - Zofran and Immodium prn  - Follow up Guardant 360, pending  - Lesions are too small for radiation at this time   - Outpatient oncology referral placed, follow for scheduling       # H/o DVT/PE  Presented 6/22/25 with chest pain and SOB and found to have PE and lower extremity DVTs, he was placed on apixaban though had an allergic reaction and rotated to Xarelto.   - Agree with AC (Lovenox) per primary team and hematology      _____________________________________________________      Physical Exam:    Blood pressure 113/57, pulse 83, temperature 97.8  F (36.6  C), temperature source Oral, resp. rate 18, height 1.88 m (6' 2\"), weight 101.9 kg (224 lb 11.2 oz), SpO2 96%.    Constitutional: Sitting in chair, NAD  HEENT: NCAT  Respiratory: Breathing comfortably on room air  Neuro: Alert, answers questions appropriately. Moves extremities spontaneously.  Psych: Appropriate affect     Labs & Studies: I personally reviewed the following studies:  Oncology fusion " gene NGS, 7/22/25:  RESULTS See result below Abnormal    Fusion Event: POSITIVE      INTERPRETATION    This sample is positive for a ROS1 gene rearrangement (SDC4::ROS1)      The detected fusion event joins exon 2 of SDC4 (5' partner) with exon 32 of ROS1 (3' partner), corresponding to approximately 78% of the total reads analyzed (major isoform).  Two additional isoforms corresponding to NKR1jzqs3::PYR3qcsk32 and SVJ2wybw9::CZN4bjxd08 fusions, respectively, were also detected (corresponding to less than 20% of the total reads).      ROS1 gene rearrangements have been reported in diverse cancer types including non-small-cell lung cancer (NSCLC), and with a notably higher prevalence in lung adenocarcinoma. Importantly, FDA-approved tyrosine kinase inhibitors have demonstrated efficacy in treatment of ROS1-rearranged NSCLC (PMID: 58211273). However it is not clear if coexistence of more than two types of ROS1 rearrangements affects therapeutic response to TKI therapy. A recent case report (PMID: 07788741) showed that coexistence of CFN2arpu4::FSM7ldrb 34 and SDC4 exon2::ROS1 exon35 fusions showed suboptimal response to crizotinib therapy when compared to that seen with the UTQ8jsmw2::AUZ3thnw 32 fusion alone.      Molecular Description  The following chromosomal coordinates describe the exon::exon junction(s) of the major isoform fusion partner in the most prevalent cDNA transcript for the reported gene fusion event:     [id number],chr6:[id number]  chr20:[id number],chr6:[id number]  chr20:[id number],chr6:[id number]         This assay detects gene fusion events based on the assessment of cDNA; the reported coordinates describe specific exon-exon joins in the detected transcripts for the positive gene fusion target(s).  The reported coordinates do not specifically describe the underlying genomic (DNA) breakpoint, but do describe a reference interval in which the genomic alteration occurs.      ROUTINE LABS (Last four  results):  CMP  Recent Labs   Lab 08/17/25  0553 08/16/25  0550 08/15/25  1525 08/15/25  1431 08/15/25  0548 08/14/25  0714 08/13/25  2229 08/12/25  0614 08/11/25  0557 08/11/25  0557    140 141  --  141 140 140 140   < >  --    POTASSIUM 3.9 4.0 3.8  --  4.2 4.4 3.8 4.0  --  4.2   CHLORIDE 104 104 103  --  104 103 101 104   < >  --    CO2 26 25 25  --  25 24 25 23   < >  --    ANIONGAP 11 11 13  --  12 13 14 13   < >  --    * 116* 128* 162* 111* 108* 109* 99   < >  --    BUN 26.0* 24.1* 25.3*  --  25.3* 21.1* 24.3* 22.7*   < >  --    CR 1.11 1.14 1.26*  --  1.29* 1.24* 1.32* 1.10   < >  --    GFRESTIMATED 89 86 76  --  74 78 72 90   < >  --    SHARAN 9.0 9.3 9.3  --  9.3 9.6 9.6 9.7   < >  --    MAG 2.0 2.0  --   --  2.2 2.0 2.2 2.1  --  2.1   PHOS  --   --   --   --   --  3.8 4.2 4.4  --  4.3   PROTTOTAL 6.6 6.9  --   --  7.0  --   --   --   --   --    ALBUMIN 3.9 4.0  --   --  4.1  --   --   --   --   --    BILITOTAL 0.5 0.6  --   --  0.7  --   --   --   --   --    ALKPHOS 66 67  --   --  69  --   --   --   --   --    AST 30 26  --   --  27  --   --   --   --   --    ALT 26 19  --   --  22  --   --   --   --   --     < > = values in this interval not displayed.     CBC  Recent Labs   Lab 08/17/25  0553 08/16/25  0550 08/15/25  1525 08/15/25  0548   WBC 8.28 9.44 10.64 10.56   RBC 3.99* 4.12* 4.16* 4.23*   HGB 10.4* 10.7* 10.9* 11.0*   HCT 32.4* 33.5* 34.0* 34.7*   MCV 81.2 81.3 81.7 82.0   MCH 26.1* 26.0* 26.2* 26.0*   MCHC 32.1 31.9 32.1 31.7   RDW 13.6 13.7 14.0 14.0   PLT 67* 56* 61* 69*     INR  No lab results found in last 7 days.    Medications list for reference:  Meds reviewed.

## 2025-08-17 NOTE — PROGRESS NOTES
"Status: Admitted 7/15 for N/V and lethargy, found to have infact in L MCA, R parietal, R frontal centrum in the setting of new metastatic lung adenocarcinoma. Stay c/b acute personality change, new infarct in R posterior temporal lobe found 8/13, thromobocytopenia, and delirium (code 21 8/13 flora), syncope (rapid code called 8/15 afternoon). Restraints discontinued 8/14 9h45 sitter discontinued 8/15 07h00   Hx of HTN, DM, ELENA, PE/ DVT.   Vitals: /48 (BP Location: Right arm)   Pulse 75   Temp 98.3  F (36.8  C) (Oral)   Resp 18   Ht 1.88 m (6' 2\")   Wt 101.9 kg (224 lb 11.2 oz)   SpO2 97%   BMI 28.85 kg/m    SBP goal 120-160. VSS.     Neuros: A&Ox3-4 with choices. Occasionally refuses to answer orientation questions. Garbled speech. STR. Moderate aphasia. R droop. LUE 4/5, RUE 1/5, LLE 3-4/5, and RLE 3/5. Unable to do plantar or dorsiflex on R foot. No aggressive behavior this shift.   IV: DL PICC- both HL.  Labs/Electrolytes: see below  Platelets 67 (infuse below 50)  Resp: Cont pulse ox. Satting above 92%.  RA. Denies SOB  Home CPAP brought in today. At bedside.   Diet: regular. Pt takes meds whole in applesauce.   GI: LBM 08/17/2025. Refused Bowel meds  : voiding spont to bathroom  Skin: bruising to abd, scattered bruising to BUE.   Showered today, CHG completed.  Pain: denies. Scheduled tylenol given.  Refused scheduled Baclofen @ 1400  Activity: x1-2 GB and Cane  SCDs on? If no, why?: yes  Social: family at bedside and supportive  Plan:  No acute changes today.  Plan to continue enoxaparin on an indefinite basis at this time   Follow up in the stroke clinic in 2-3 months (order placed)   Platelets 67, transfuse for less than 50   continue Taletrectinib 600 mg daily (unable to each 2 hours before and 2 hours after)       Latest Reference Range & Units 08/16/25 05:50   Sodium 135 - 145 mmol/L 140   Potassium 3.4 - 5.3 mmol/L 4.0   Chloride 98 - 107 mmol/L 104   Carbon Dioxide (CO2) 22 - 29 mmol/L 25 "   Urea Nitrogen 6.0 - 20.0 mg/dL 24.1 (H)   Creatinine 0.67 - 1.17 mg/dL 1.14   GFR Estimate >60 mL/min/1.73m2 86   Calcium 8.8 - 10.4 mg/dL 9.3   Anion Gap 7 - 15 mmol/L 11   Magnesium 1.7 - 2.3 mg/dL 2.0   Albumin 3.5 - 5.2 g/dL 4.0   Protein Total 6.4 - 8.3 g/dL 6.9   Alkaline Phosphatase 40 - 150 U/L 67   ALT 0 - 70 U/L 19   AST 0 - 45 U/L 26   Bilirubin Total <=1.2 mg/dL 0.6   Glucose 70 - 99 mg/dL 116 (H)   Uric Acid 3.4 - 7.0 mg/dL 6.2   WBC 4.00 - 11.00 10e3/uL 9.44   Hemoglobin 13.3 - 17.7 g/dL 10.7 (L)   Hematocrit 40.0 - 53.0 % 33.5 (L)   Platelet Count 150 - 450 10e3/uL 56 (L)   RBC Count 4.40 - 5.90 10e6/uL 4.12 (L)   MCV 78.0 - 100.0 fL 81.3   MCH 26.5 - 33.0 pg 26.0 (L)   MCHC 31.5 - 36.5 g/dL 31.9   RDW 10.0 - 15.0 % 13.7   (H): Data is abnormally high  (L): Data is abnormally low

## 2025-08-18 ENCOUNTER — APPOINTMENT (OUTPATIENT)
Dept: PHYSICAL THERAPY | Facility: CLINIC | Age: 35
DRG: 064 | End: 2025-08-18
Payer: COMMERCIAL

## 2025-08-18 ENCOUNTER — APPOINTMENT (OUTPATIENT)
Dept: OCCUPATIONAL THERAPY | Facility: CLINIC | Age: 35
DRG: 064 | End: 2025-08-18
Payer: COMMERCIAL

## 2025-08-18 LAB
ALBUMIN SERPL BCG-MCNC: 3.8 G/DL (ref 3.5–5.2)
ALP SERPL-CCNC: 64 U/L (ref 40–150)
ALT SERPL W P-5'-P-CCNC: 23 U/L (ref 0–70)
ANION GAP SERPL CALCULATED.3IONS-SCNC: 10 MMOL/L (ref 7–15)
APTT.LUPUS SENSITIVE IN PLATELET POOR PLASMA BY COAGULATION ASSAY --POST HEPARIN NEUTRALIZATION: 46 SECONDS (ref 31–45)
AST SERPL W P-5'-P-CCNC: 21 U/L (ref 0–45)
BILIRUB SERPL-MCNC: 0.4 MG/DL
BUN SERPL-MCNC: 20.9 MG/DL (ref 6–20)
CALCIUM SERPL-MCNC: 9.1 MG/DL (ref 8.8–10.4)
CHLORIDE SERPL-SCNC: 106 MMOL/L (ref 98–107)
CREAT SERPL-MCNC: 1.06 MG/DL (ref 0.67–1.17)
DELTA APTT HEX PPP: 12.7 SECONDS
EGFRCR SERPLBLD CKD-EPI 2021: >90 ML/MIN/1.73M2
ERYTHROCYTE [DISTWIDTH] IN BLOOD BY AUTOMATED COUNT: 13.5 % (ref 10–15)
GLUCOSE SERPL-MCNC: 102 MG/DL (ref 70–99)
HCO3 SERPL-SCNC: 24 MMOL/L (ref 22–29)
HCT VFR BLD AUTO: 31.9 % (ref 40–53)
HGB BLD-MCNC: 10.2 G/DL (ref 13.3–17.7)
INR PPP: 1.41 (ref 0.85–1.15)
LABORATORY COMMENT REPORT: ABNORMAL
LABORATORY COMMENT REPORT: ABNORMAL
MAGNESIUM SERPL-MCNC: 2 MG/DL (ref 1.7–2.3)
MCH RBC QN AUTO: 26.1 PG (ref 26.5–33)
MCHC RBC AUTO-ENTMCNC: 32 G/DL (ref 31.5–36.5)
MCV RBC AUTO: 81.6 FL (ref 78–100)
MIXING APTT: 58 SECONDS (ref 31–45)
MIXING DRVVT/NORMAL: 1.01 %
NORMALIZED CONFIRM DRVVT STACLOT: 1.31 %
PLATELET # BLD AUTO: 95 10E3/UL (ref 150–450)
POTASSIUM SERPL-SCNC: 4.2 MMOL/L (ref 3.4–5.3)
PROT SERPL-MCNC: 6.6 G/DL (ref 6.4–8.3)
PROTHROMBIN TIME: 17.4 SECONDS (ref 11.8–14.8)
RBC # BLD AUTO: 3.91 10E6/UL (ref 4.4–5.9)
SCREEN APTT/NORMAL: 1.28
SCREEN APTT/NORMAL: 2.5
SCREEN APTT: 90 SECONDS (ref 31–45)
SCREEN DRVVT/NORMAL: 1.69 %
SODIUM SERPL-SCNC: 140 MMOL/L (ref 135–145)
THROMBIN TIME: >60 SECONDS (ref 13–19)
TT P HPASE PPP: 23.1 SECONDS (ref 15.7–21.7)
URATE SERPL-MCNC: 5.5 MG/DL (ref 3.4–7)
WBC # BLD AUTO: 7.93 10E3/UL (ref 4–11)

## 2025-08-18 PROCEDURE — 250N000013 HC RX MED GY IP 250 OP 250 PS 637: Performed by: STUDENT IN AN ORGANIZED HEALTH CARE EDUCATION/TRAINING PROGRAM

## 2025-08-18 PROCEDURE — 120N000002 HC R&B MED SURG/OB UMMC

## 2025-08-18 PROCEDURE — 99233 SBSQ HOSP IP/OBS HIGH 50: CPT | Mod: GC | Performed by: INTERNAL MEDICINE

## 2025-08-18 PROCEDURE — 97530 THERAPEUTIC ACTIVITIES: CPT | Mod: GP

## 2025-08-18 PROCEDURE — 97112 NEUROMUSCULAR REEDUCATION: CPT | Mod: GO | Performed by: OCCUPATIONAL THERAPIST

## 2025-08-18 PROCEDURE — 97530 THERAPEUTIC ACTIVITIES: CPT | Mod: GO | Performed by: OCCUPATIONAL THERAPIST

## 2025-08-18 PROCEDURE — 84550 ASSAY OF BLOOD/URIC ACID: CPT | Performed by: INTERNAL MEDICINE

## 2025-08-18 PROCEDURE — 250N000011 HC RX IP 250 OP 636

## 2025-08-18 PROCEDURE — 250N000013 HC RX MED GY IP 250 OP 250 PS 637

## 2025-08-18 PROCEDURE — 83735 ASSAY OF MAGNESIUM: CPT | Performed by: INTERNAL MEDICINE

## 2025-08-18 PROCEDURE — 80053 COMPREHEN METABOLIC PANEL: CPT | Performed by: INTERNAL MEDICINE

## 2025-08-18 PROCEDURE — 97116 GAIT TRAINING THERAPY: CPT | Mod: GP

## 2025-08-18 PROCEDURE — 85027 COMPLETE CBC AUTOMATED: CPT | Performed by: INTERNAL MEDICINE

## 2025-08-18 PROCEDURE — 97110 THERAPEUTIC EXERCISES: CPT | Mod: GP

## 2025-08-18 PROCEDURE — 258N000003 HC RX IP 258 OP 636

## 2025-08-18 RX ORDER — LEVETIRACETAM 750 MG/1
750 TABLET ORAL 2 TIMES DAILY
Status: DISCONTINUED | OUTPATIENT
Start: 2025-08-18 | End: 2025-08-21 | Stop reason: HOSPADM

## 2025-08-18 RX ADMIN — Medication 5 MG: at 08:55

## 2025-08-18 RX ADMIN — ALLOPURINOL 100 MG: 100 TABLET ORAL at 08:56

## 2025-08-18 RX ADMIN — LEVETIRACETAM 750 MG: 750 TABLET, FILM COATED ORAL at 20:25

## 2025-08-18 RX ADMIN — RISPERIDONE 0.5 MG: 0.5 TABLET ORAL at 08:55

## 2025-08-18 RX ADMIN — ROSUVASTATIN CALCIUM 10 MG: 10 TABLET, FILM COATED ORAL at 08:55

## 2025-08-18 RX ADMIN — ENOXAPARIN SODIUM 100 MG: 100 INJECTION SUBCUTANEOUS at 10:56

## 2025-08-18 RX ADMIN — Medication 2.5 MG: at 08:55

## 2025-08-18 RX ADMIN — ACETAMINOPHEN 1000 MG: 500 TABLET ORAL at 20:25

## 2025-08-18 RX ADMIN — ENOXAPARIN SODIUM 100 MG: 100 INJECTION SUBCUTANEOUS at 21:38

## 2025-08-18 RX ADMIN — ACETAMINOPHEN 1000 MG: 500 TABLET ORAL at 08:55

## 2025-08-18 RX ADMIN — SENNOSIDES AND DOCUSATE SODIUM 1 TABLET: 50; 8.6 TABLET ORAL at 20:25

## 2025-08-18 RX ADMIN — Medication 5 MG: at 14:14

## 2025-08-18 RX ADMIN — Medication 2.5 MG: at 14:13

## 2025-08-18 RX ADMIN — LEVETIRACETAM 750 MG: 100 INJECTION, SOLUTION INTRAVENOUS at 08:56

## 2025-08-18 RX ADMIN — ACETAMINOPHEN 1000 MG: 500 TABLET ORAL at 14:13

## 2025-08-18 RX ADMIN — Medication 5 ML: at 10:01

## 2025-08-18 RX ADMIN — Medication 5 ML: at 06:02

## 2025-08-18 RX ADMIN — METOPROLOL SUCCINATE 25 MG: 25 TABLET, EXTENDED RELEASE ORAL at 08:56

## 2025-08-18 RX ADMIN — Medication 5 ML: at 21:38

## 2025-08-18 RX ADMIN — RISPERIDONE 0.5 MG: 0.5 TABLET ORAL at 20:25

## 2025-08-18 RX ADMIN — Medication 2.5 MG: at 20:33

## 2025-08-18 RX ADMIN — FLUTICASONE PROPIONATE 1 SPRAY: 50 SPRAY, METERED NASAL at 08:54

## 2025-08-18 RX ADMIN — Medication 5 MG: at 20:25

## 2025-08-18 RX ADMIN — SENNOSIDES AND DOCUSATE SODIUM 2 TABLET: 50; 8.6 TABLET ORAL at 08:55

## 2025-08-18 ASSESSMENT — ACTIVITIES OF DAILY LIVING (ADL)
ADLS_ACUITY_SCORE: 54
ADLS_ACUITY_SCORE: 51
ADLS_ACUITY_SCORE: 54
ADLS_ACUITY_SCORE: 44
ADLS_ACUITY_SCORE: 54
ADLS_ACUITY_SCORE: 51
ADLS_ACUITY_SCORE: 54
ADLS_ACUITY_SCORE: 53
ADLS_ACUITY_SCORE: 54
ADLS_ACUITY_SCORE: 44
ADLS_ACUITY_SCORE: 44
ADLS_ACUITY_SCORE: 54
ADLS_ACUITY_SCORE: 54
ADLS_ACUITY_SCORE: 44
ADLS_ACUITY_SCORE: 53
ADLS_ACUITY_SCORE: 44
ADLS_ACUITY_SCORE: 54
ADLS_ACUITY_SCORE: 51
ADLS_ACUITY_SCORE: 53
ADLS_ACUITY_SCORE: 54
ADLS_ACUITY_SCORE: 54
ADLS_ACUITY_SCORE: 53
ADLS_ACUITY_SCORE: 44

## 2025-08-18 NOTE — PROGRESS NOTES
Care Management Follow Up    Length of Stay (days): 34    Expected Discharge Date: 08/20/2025     Concerns to be Addressed: discharge planning     Patient plan of care discussed at interdisciplinary rounds: Yes    Anticipated Discharge Disposition: ARU               Anticipated Discharge Services: ARU therapy services  Anticipated Discharge DME: n/a    Patient/family educated on Medicare website which has current facility and service quality ratings: yes  Education Provided on the Discharge Plan: Yes  Patient/Family in Agreement with the Plan: yes    Referrals Placed by CM/SW:     Per chart review, [facility] is following. Per Isabel in rehab admissions, they would want to know OP oncology follow up schedule to determine level of care (TCU vs ARU). They also would want pt to switch to oral Keppra as pt is currently IV Keppra.    [facility]  [address] [place] [id number]  5th floor of the building   Phone: [phone]  Nurse to Nurse: [phone]           Private pay costs discussed: Not applicable    Discussed  Partnership in Safe Discharge Planning  document with patient/family: No     Handoff Completed: No, handoff not indicated or clinically appropriate    Additional Information:  The [facility] provider confirmed that they can switch the pt to oral keppra. SW awaiting update on OP oncology follow up questions.    Next Steps: n/a    ___________________    JUJU Jauregui, KIESHA  6C   [facility]  Phone: [phone]  Available on [employer]: 6C Cards [facility] [employer]

## 2025-08-18 NOTE — PROGRESS NOTES
Resident/Fellow Attestation   I, OBEY LU MD, was present with the medical/SAMSON student who participated in the service and in the documentation of the note.  I have verified the history and personally performed the physical exam and medical decision making.  I agree with the assessment and plan of care as documented in the note.      OBEY LU MD  PGY3  Date of Service (when I saw the patient): 08/18/25    [facility]    Progress Note - Medicine Service, MAROON TEAM 3       Date of Admission:  7/15/2025    Assessment & Plan   Bernard Chapman is a 35 year old male with a past medical history of hypertension, diabetes, ELENA, PE/DVT (on rivaroxaban) admitted on 7/15/2025 for lethargy, N/V, and blurry vision found to have multifocal acute CVA in L MCA , R parietal and R frontal centrum semiovale. Work up revealed metastatic lung adenocarcinoma with likely malignancy-related hypercoagulability and acute change in personality beginning 8/10 with new findings of R posterior temporal lobe infarct, now improving on risperidone.     Updates 8/18/2025:  - No acute changes today  - SLP consult placed   - Continue to trend platelets daily, transfuse if <50  Discharge planning:   - Social work: [facility] vs [facility]U   - Anticoagulation: follow up outpatient with oncology to discuss transition from lovenox to DOAC. Plan to discharge on lovenox per Heme     #New metastatic lung adenocarcinoma, ROS1 rearrangement with right lung nodule and multiple enlarged mediastinal, paraesophageal, and hilar lymph nodes  Malignancy work up initiated on admission due to significant hypercoagulability. CT CAP significant for [id number] spiculated density in R lung apex, 9mm pulmonary nodule in R lung base, L thyroid nodule, paratracheal LAD, splenic infarct, bilateral renal infarcts, and L retroperitoneal nodule. Underwent FNA of lymph node which was positive for lung adenocarcinoma (resulted  7/22).  PDL-1 resulted with low expression (40%). PET 7/26 with distant metastases and MRI brain showing no signs of metastasis. Patient started chemotherapy 8/13.   Oncology consulted, appreciate recs              - NGS; positive for a ROS1 gene rearrangement (SDC4::ROS1)                - Guardant 360; T p53 mutation and MITCHELL   - Taletrectinib 600 mg daily on empty stomach avoid food for two hours before and after (moved to 6am)  - allopurinol 100 mg daily   - CBC, CMP, Mg, Uric acid daily in the hospital  - EKG, CBC with Diff, CMP, Mag, Uric acid, CK weekly for 4 weeks  - imodium prn  Anti-nausea:              - compazine 10 mg scheduled prior to chemotherapy  - ativan 0.5 mg prn (avoiding additional zofran and compazine to decrease risk of prolonged QT given patient taking keppra and taletrectinib)  Anti-diarrhea              - imodium prn  - [facility] 2nd opinion; Patient will need to call [phone] to make appointment once there is a timeline for discharging from this hospitalization  - Dispo planning: social work investigating [facility] vs ARU      #Hypercoagulability d/t metastatic lung adenocarcinoma with infarcts of brain, kidney, spleen. New brain infarct 8/13.  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 TTE with mobile echodensities on tricuspid and aortic valve. Initial concern for TTP or catastrophic APS, however work up ultimately significant for new diagnosis of metastatic lung cancer. Hypercoagulable state presumed to be most likely 2/2 malignancy. Started on heparin gtt 7/17, but briefly held for 7/18 endobronchial needle aspiration of lymph nodes. Stroke code called 7/19 for worsening R-sided hemiparesis and hemianopia. Repeat CTA with new focal high-grade  stenosis of other part of left MCA. Heparin gtt was stopped during code stroke, but restarting (heparin gtt + levophed) led to 10/10 headache. CTH with small left frontal SAH. Brain MRI with new L basal ganglia stroke. Heparin gtt resumed, and repeat CTH stable. Switched to therapeutic Lovenox 7/22. Declining fibrinogen, so received cryoprecipitate 7/25. Concern for continued consumptive process, but on therapeutic Lovenox. PLT downtrending, so receiving transfusions intermittently. Cardiology consulted given cardiac valve thrombus and concern for stroke, agreed with therapeutic AC on lovenox, not likely surgical candidate, and recommended consideration for ID workup of indolent culture negative endocarditis. Lovenox was decreased to 80 mg BID per pharmacy on 8/9 when Anti-Xa level supratherapeutic. Patient was transitioned to heparin gtt on 8/13 given evidence of new infarct on 8/14. Transitioned back to lovenox 100 mg BID per neuro and heme recs. Platelets have been stable on 8/17 and 8/18, working with social work for discharge planning.  - Neurology consulted as below  - Hematology consulted, appreciate recs              -Transfuse if   -Hgb<7  -PLT<50k-- CONDITIONAL ORDERS IN PLACE  - Lovenox 100 mg BID  - Consider transition to DOAC once outpatient with oncology. Per Heme, plan to discharge to TCU/ARU on therapeutic lovenox  - lupus anticoagulant ordered   - Cardiology consulted, signed off              - continue therapeutic AC  - could consider ID engagement for consideration of indolent culture negative endocarditis, although this is less likely  - qdaily CBC     #New acute infarct in Right Posterior Temporal Lobe    #Multifocal acute ischemic strokes, in L MCA territory and new L basal ganglia stroke; Increasing mass effect on L lateral ventricle by L MCA infarction  #Subtotal occlusive thrombus in left M1   #Left frontoparietal SAH (7/19)  Etiology most likely hypercoagulability d/t malignancy. Emboli  stemming from tricuspid and aortic valves, appreciated on TTE 7/16. He continues to have R facial droop, R extremity weakness. Left frontoparietal subarachnoid hemorrhage stable per CT.  Repeat brain MRI on 8/4 shows 2 new small infarcts in the right frontal lobe. Neuro evaluation as below due to personality changes. Repeat MRI on 8/14 with new infarct of posterior right temporal lobe and scattered punctate areas of left temporal lobe. Low concern for hemorrhagic conversion per Neuro. Heme/Onc recommending change to therapeutic lovenox 1 mg/kg q12h, only check Xa if signs of bleeding.   - Neuro consulted, signed off              - Keppra 750 mg BID              - ativan 4mg IV if evidence of seizures >  3 minutes  -OT/PT/Speech              -therapies doing aggressive rehabilitation as able   - SLP consulted for evaluation 8/18  - nutrition consult              -monitor oral intake, weights  - Anticoagulation as above  - [  ] Repeat TTE outpatient to ensure improvement of AR and TR (and vegetations)  - [  ] Follow up in the stroke clinic in 2-3 months (order placed)      #Acute change in personality with inappropriate affect and agitation suspect 2/2 new posterior R. Temporal infarct  Starting around 8/10, patient has had significant personality changes, including becoming less participatory/interactive with therapies and with family. CT head from 8/11 without any acute findings. At 6:10 pm on 8/12, concern for seizure like activity with 10 seconds of general tremulousness and subsequent emesis. Given Keppra load with subsequent vEEG without evidence of seizures. Daily keppra started 8/13. MRI/MRA head 8/13 showing new right posterior temporal lobe infarct. Increased aggression and agitation overnight on 8/14 requiring Code 21 and use of physical 4 point restraints. Difficult to assess voluntariness of the decreased responsiveness. Potential component of delirium at play versus depression vs neurological damage  secondary to new infarct. Currently, patient's mood/affect is improving and has not required psychiatric prns since 8/13.  Psychiatry consult, appreciate recs              - 0.5 mg risperidone BID               - 1 mg risperidone prn for agitation               - 50 mg seroquel prn for agitation and sleep              - 2-5 mg Haldol IV Q4 prn for agitation               - EKG NSR with Qtc 398 today  - Delirium precautions     # Maculopapular rash on face, chest, bilateral upper extremities, improved  # Numerous palpable purpura across the scalp and lower extremities   # Petechiae on bilateral lower extremities, improving  C/f palpable purpura, so s/p L leg s/p punch biopsy with dermatology: rupture folliculitis. New rash noted 7/26 with flushing of face/chest after PET scan, suspected  secondary to contrast from PET scan.   Plan:  - Benadryl as needed for itching  - Chlorhexidine wash  - Will continue to monitor     #DM2, A1c 5.3  On HSSI with adequate control after stopping dexamethasone. CTM.   - POC glucose ACHS  - High intensity insulin sliding scale      #HLD, chronic  -Cont 10 mg rosuvastatin     #HTN  - Continue PTA Metoprolol succinate 25mg q daily (ok to hold if HR< 60)  - Hold PTA Losartan, could consider restarting vs discontinuing given normotensive  - Continue PTA hydrochlorothiazide     #ELENA  Currently not on CPAP, consider resuming if mental status improves     #Leukocytosis, resolved  Found to have leukocytosis, no clear cause or signs of infection, possible 2/2 stress induced.      #Hiccups, resolved   #chronic back pain  Chronic back pain. Hiccups resolved (suspected 2/2 dexamethasone).   Plan:  - Thorazine 25mg q6hr discontinued due to contraindication with chemotherapy  - Melatonin 3mg daily  - Pepcid discontinued due to contraindication with chemotherapy  - continue baclofen  - acetaminophen 1000 mg every 6 hours  - cyclobenzaprine 10 mg 3x daily PRN  - PTA gabapentin 100 mg 3x daily  - PTA  "oxycodone 5 mg PRN every 6h      #allergies  - fluticasone 1 spray daily      #Epistaxis, currently resolved  On 8/5 patient found to have epistaxis. At the time, physical exam with blood coming from the right nares.  - Afrin spray as needed  - Apply pressure to the nasal passage to help with bleeding     Diet: Regular Diet Adult    DVT Prophylaxis: Enoxaparin (Lovenox) SQ  Avalos Catheter: Not present  Fluids: None  Lines: PRESENT             Cardiac Monitoring: None  Code Status: Full Code      Clinically Significant Risk Factors                 # Thrombocytopenia: Lowest platelets = 67 in last 2 days, will monitor for bleeding                  # Financial/Environmental Concerns: none         Social Drivers of Health          Disposition Plan     Medically Ready for Discharge: Anticipated in 2-4 Days         The patient's care was discussed with the Attending Physician, Dr. Wilson, Patient, Patient's family.    Selin Grimaldo  Medical Student  Medicine Service, [address]  Securely message with [employer] (more info)  Text page via [employer] Paging/Directory   See signed in provider for up to date coverage information  ______________________________________________________________________    Interval History   No adverse events overnight. Patient was seen lying in bed. Denies pain, headache, fever, chills, diarrhea, and abdominal pain. Reports mood as \"fine\" and \"I'm okay\" but minimally engaged in conversation with writer. Patient laughed when asked orientation questions. Seen later with father at bedside and was more interactive with staff and family member.     Discussed plan to work with social work for discharge planning ([facility] vs ARU) given that platelets have stabilized over the past two days. SLP consult placed per request from patient's father to continue working on his speech and word finding.    Physical Exam   Vital Signs: Temp: 98.1  F (36.7  C) " "Temp src: Axillary BP: 102/51 Pulse: 64   Resp: 16 SpO2: 98 % O2 Device: None (Room air)    Weight: 224 lbs 11.2 oz    General Appearance: Appears well, sitting up in chair with father at bedside  Respiratory: CTAB, breathing comfortably on room air  Cardiovascular: regular rate and rhythm  GI: soft, nontender, nondistended  Skin: scattered petechiae on lower extremities  Other: Mood \"I'm okay,\" responds to questions with one or two works answers or head nodding, alert and oriented to self (did not participate when asked other orientation questions). Dysarthria and right side facial droop consistent with baseline neuro exam.     Medical Decision Making       Please see A&P for additional details of medical decision making.      Data     I have personally reviewed the following data over the past 24 hrs:    7.93  \   10.2 (L)   / 95 (L)     140 106 20.9 (H) /  102 (H)   4.2 24 1.06 \     ALT: 23 AST: 21 AP: 64 TBILI: 0.4   ALB: 3.8 TOT PROTEIN: 6.6 LIPASE: N/A       Imaging results reviewed over the past 24 hrs:   No results found for this or any previous visit (from the past 24 hours).  "

## 2025-08-18 NOTE — PLAN OF CARE
"Status: Admitted 7/15 for N/V and lethargy, found to have infact in L MCA, R parietal, R frontal centrum in the setting of new metastatic lung adenocarcinoma. Stay c/b acute personality change, new infarct in R posterior temporal lobe found 8/13, thromobocytopenia, and delirium (code 21 8/13 flora), syncope (rapid code called 8/15 afternoon). Restraints discontinued 8/14 9h45 sitter discontinued 8/15 07h00   Vitals: VSS   Neuros: A&Ox4 w/ choices. STR or no response at times. Garbled speech and intermittent illogical speech, aphasia. R droop. LUE 4/5, RUE 3/5, LLE 4/5, RLE 3-4/5. Absent dorsi/plantar RLE. Appropriate behavior  IV: L DL PICC HL  Labs/Electrolytes: platelets 95 today  Resp: WNL. Home CPAP at bedside  Diet: regular, good intake  GI: LBM 8/17  : voiding spont to BR  Skin: scattered bruising, \"compound wound\" to LUE forearm covered with primapore. CHG wipes completed  Pain: denies  Activity: A1-2 GB + cane. Worked w/ therapies  SCDs on? If no, why?: when in bed  Social: father at bedside today  Plan: SLP consulted, keep trending platelets. Discharge TCU vs ARU  "

## 2025-08-18 NOTE — PLAN OF CARE
Goal Outcome Evaluation:      Plan of Care Reviewed With: patient    Overall Patient Progress: no changeOverall Patient Progress: no change       Status: Admitted 7/15 for N/V and lethargy, found to have infact in L MCA, R parietal, R frontal centrum in the setting of new metastatic lung adenocarcinoma. Stay c/b acute personality change, new infarct in R posterior temporal lobe found 8/13, thromobocytopenia, and delirium (code 21 8/13 flora), syncope (rapid code called 8/15 afternoon). Restraints discontinued 8/14 9h45 sitter discontinued 8/15 07h00 Hx of HTN, DM, ELENA, PE/ DVT.   Vitals: VSS - RA. Cont SAT monitor on  Neuros: A&Ox3-4 with choices. Occasionally refuses to answer orientation questions. Garbled speech. STR. Moderate aphasia. R droop. Withdrawn. LUE 4/5, RUE 1-3/5, LLE 3-4/5, and RLE 3/5. Unable to do plantar or dorsiflex on R foot.   IV: DL PICC - both HL. PIV SL.  Labs/Electrolytes: Platelet 67, trends   Resp: WNL - LSC. Denies SOB.   Diet: Regular - good appetite. Pt takes meds whole in applesauce.  GI: LBM 08/17/2025  : Voiding spon in BR.   Skin: Generalized bruising abdomin and UE  Pain: Denies.   Activity: Assist x2. GB. Cane.  Plan/Updates this shift: Platelet infusion if under 50. Pt can't eat 2hr before or after after chemo medication.

## 2025-08-19 ENCOUNTER — APPOINTMENT (OUTPATIENT)
Dept: OCCUPATIONAL THERAPY | Facility: CLINIC | Age: 35
DRG: 064 | End: 2025-08-19
Payer: COMMERCIAL

## 2025-08-19 ENCOUNTER — APPOINTMENT (OUTPATIENT)
Dept: SPEECH THERAPY | Facility: CLINIC | Age: 35
DRG: 064 | End: 2025-08-19
Payer: COMMERCIAL

## 2025-08-19 ENCOUNTER — APPOINTMENT (OUTPATIENT)
Dept: PHYSICAL THERAPY | Facility: CLINIC | Age: 35
DRG: 064 | End: 2025-08-19
Payer: COMMERCIAL

## 2025-08-19 LAB
ALBUMIN SERPL BCG-MCNC: 3.8 G/DL (ref 3.5–5.2)
ALP SERPL-CCNC: 64 U/L (ref 40–150)
ALT SERPL W P-5'-P-CCNC: 26 U/L (ref 0–70)
ANION GAP SERPL CALCULATED.3IONS-SCNC: 11 MMOL/L (ref 7–15)
AST SERPL W P-5'-P-CCNC: 23 U/L (ref 0–45)
BILIRUB SERPL-MCNC: 0.3 MG/DL
BUN SERPL-MCNC: 22.2 MG/DL (ref 6–20)
CALCIUM SERPL-MCNC: 9 MG/DL (ref 8.8–10.4)
CHLORIDE SERPL-SCNC: 107 MMOL/L (ref 98–107)
CREAT SERPL-MCNC: 1.03 MG/DL (ref 0.67–1.17)
EGFRCR SERPLBLD CKD-EPI 2021: >90 ML/MIN/1.73M2
ERYTHROCYTE [DISTWIDTH] IN BLOOD BY AUTOMATED COUNT: 13.5 % (ref 10–15)
GLUCOSE SERPL-MCNC: 115 MG/DL (ref 70–99)
HCO3 SERPL-SCNC: 24 MMOL/L (ref 22–29)
HCT VFR BLD AUTO: 31.5 % (ref 40–53)
HGB BLD-MCNC: 10 G/DL (ref 13.3–17.7)
MAGNESIUM SERPL-MCNC: 2.2 MG/DL (ref 1.7–2.3)
MCH RBC QN AUTO: 26.2 PG (ref 26.5–33)
MCHC RBC AUTO-ENTMCNC: 31.7 G/DL (ref 31.5–36.5)
MCV RBC AUTO: 82.7 FL (ref 78–100)
PLATELET # BLD AUTO: 98 10E3/UL (ref 150–450)
POTASSIUM SERPL-SCNC: 4.2 MMOL/L (ref 3.4–5.3)
PROT SERPL-MCNC: 6.6 G/DL (ref 6.4–8.3)
RBC # BLD AUTO: 3.81 10E6/UL (ref 4.4–5.9)
SODIUM SERPL-SCNC: 142 MMOL/L (ref 135–145)
URATE SERPL-MCNC: 4.9 MG/DL (ref 3.4–7)
WBC # BLD AUTO: 7.56 10E3/UL (ref 4–11)

## 2025-08-19 PROCEDURE — 99232 SBSQ HOSP IP/OBS MODERATE 35: CPT | Mod: GC | Performed by: STUDENT IN AN ORGANIZED HEALTH CARE EDUCATION/TRAINING PROGRAM

## 2025-08-19 PROCEDURE — 97116 GAIT TRAINING THERAPY: CPT | Mod: GP

## 2025-08-19 PROCEDURE — 250N000013 HC RX MED GY IP 250 OP 250 PS 637

## 2025-08-19 PROCEDURE — 83735 ASSAY OF MAGNESIUM: CPT | Performed by: INTERNAL MEDICINE

## 2025-08-19 PROCEDURE — 84155 ASSAY OF PROTEIN SERUM: CPT | Performed by: INTERNAL MEDICINE

## 2025-08-19 PROCEDURE — 250N000013 HC RX MED GY IP 250 OP 250 PS 637: Performed by: STUDENT IN AN ORGANIZED HEALTH CARE EDUCATION/TRAINING PROGRAM

## 2025-08-19 PROCEDURE — 97112 NEUROMUSCULAR REEDUCATION: CPT | Mod: GO

## 2025-08-19 PROCEDURE — 97112 NEUROMUSCULAR REEDUCATION: CPT | Mod: GP

## 2025-08-19 PROCEDURE — 85027 COMPLETE CBC AUTOMATED: CPT | Performed by: INTERNAL MEDICINE

## 2025-08-19 PROCEDURE — 97530 THERAPEUTIC ACTIVITIES: CPT | Mod: GP

## 2025-08-19 PROCEDURE — 92523 SPEECH SOUND LANG COMPREHEN: CPT | Mod: GN | Performed by: SPEECH-LANGUAGE PATHOLOGIST

## 2025-08-19 PROCEDURE — 84550 ASSAY OF BLOOD/URIC ACID: CPT | Performed by: INTERNAL MEDICINE

## 2025-08-19 PROCEDURE — 120N000002 HC R&B MED SURG/OB UMMC

## 2025-08-19 PROCEDURE — 97530 THERAPEUTIC ACTIVITIES: CPT | Mod: GO

## 2025-08-19 PROCEDURE — 250N000011 HC RX IP 250 OP 636

## 2025-08-19 RX ADMIN — POLYETHYLENE GLYCOL 3350 17 G: 17 POWDER, FOR SOLUTION ORAL at 08:38

## 2025-08-19 RX ADMIN — Medication 2.5 MG: at 08:38

## 2025-08-19 RX ADMIN — ENOXAPARIN SODIUM 100 MG: 100 INJECTION SUBCUTANEOUS at 21:21

## 2025-08-19 RX ADMIN — RISPERIDONE 0.5 MG: 0.5 TABLET ORAL at 08:39

## 2025-08-19 RX ADMIN — LEVETIRACETAM 750 MG: 750 TABLET, FILM COATED ORAL at 19:57

## 2025-08-19 RX ADMIN — ENOXAPARIN SODIUM 100 MG: 100 INJECTION SUBCUTANEOUS at 10:14

## 2025-08-19 RX ADMIN — LEVETIRACETAM 750 MG: 750 TABLET, FILM COATED ORAL at 08:39

## 2025-08-19 RX ADMIN — SENNOSIDES AND DOCUSATE SODIUM 2 TABLET: 50; 8.6 TABLET ORAL at 19:57

## 2025-08-19 RX ADMIN — PROCHLORPERAZINE MALEATE 10 MG: 10 TABLET ORAL at 06:13

## 2025-08-19 RX ADMIN — Medication 5 MG: at 14:48

## 2025-08-19 RX ADMIN — ACETAMINOPHEN 1000 MG: 500 TABLET ORAL at 14:49

## 2025-08-19 RX ADMIN — Medication 5 MG: at 08:38

## 2025-08-19 RX ADMIN — METOPROLOL SUCCINATE 25 MG: 25 TABLET, EXTENDED RELEASE ORAL at 08:38

## 2025-08-19 RX ADMIN — ALLOPURINOL 100 MG: 100 TABLET ORAL at 08:38

## 2025-08-19 RX ADMIN — Medication 5 MG: at 19:57

## 2025-08-19 RX ADMIN — ACETAMINOPHEN 1000 MG: 500 TABLET ORAL at 17:27

## 2025-08-19 RX ADMIN — ACETAMINOPHEN 1000 MG: 500 TABLET ORAL at 06:34

## 2025-08-19 RX ADMIN — CYCLOBENZAPRINE 10 MG: 10 TABLET, FILM COATED ORAL at 10:14

## 2025-08-19 RX ADMIN — ROSUVASTATIN CALCIUM 10 MG: 10 TABLET, FILM COATED ORAL at 08:39

## 2025-08-19 RX ADMIN — Medication 2.5 MG: at 14:49

## 2025-08-19 RX ADMIN — SENNOSIDES AND DOCUSATE SODIUM 2 TABLET: 50; 8.6 TABLET ORAL at 08:38

## 2025-08-19 RX ADMIN — FLUTICASONE PROPIONATE 1 SPRAY: 50 SPRAY, METERED NASAL at 08:38

## 2025-08-19 RX ADMIN — Medication 2.5 MG: at 19:57

## 2025-08-19 RX ADMIN — RISPERIDONE 0.5 MG: 0.5 TABLET ORAL at 19:57

## 2025-08-19 ASSESSMENT — ACTIVITIES OF DAILY LIVING (ADL)
ADLS_ACUITY_SCORE: 46
ADLS_ACUITY_SCORE: 46
ADLS_ACUITY_SCORE: 44
ADLS_ACUITY_SCORE: 45
ADLS_ACUITY_SCORE: 46
ADLS_ACUITY_SCORE: 45
ADLS_ACUITY_SCORE: 44
ADLS_ACUITY_SCORE: 44
ADLS_ACUITY_SCORE: 46
ADLS_ACUITY_SCORE: 46
ADLS_ACUITY_SCORE: 45
ADLS_ACUITY_SCORE: 44
ADLS_ACUITY_SCORE: 45
ADLS_ACUITY_SCORE: 46
ADLS_ACUITY_SCORE: 45
ADLS_ACUITY_SCORE: 45
ADLS_ACUITY_SCORE: 44
ADLS_ACUITY_SCORE: 44
ADLS_ACUITY_SCORE: 45
ADLS_ACUITY_SCORE: 46

## 2025-08-19 NOTE — PROGRESS NOTES
Care Management Follow Up    Length of Stay (days): 35    Expected Discharge Date: 08/20/2025     Concerns to be Addressed: discharge planning     Patient plan of care discussed at interdisciplinary rounds: Yes    Anticipated Discharge Disposition: Acute Rehab     [facility]  [address], 5th [address]  [facility] [address]   P: [phone]  Adm P: [phone]  F: [phone]     Anticipated Discharge Services: Transportation Services, Other (see comment) (PT/OT)  Anticipated Discharge DME: None    Patient/family educated on Medicare website which has current facility and service quality ratings: yes  Education Provided on the Discharge Plan: Yes  Patient/Family in Agreement with the Plan: yes    Referrals Placed by CM/SW: Post Acute Facilities  Private pay costs discussed: Not applicable    Discussed  Partnership in Safe Discharge Planning  document with patient/family: No     Handoff Completed: No, handoff not indicated or clinically appropriate    Additional Information:  Writer spoke with  ARU admissions who fully reviewed this pt today for ARU placement. Writer confirmed that the primary team is okay with the pt's platelet count and he can discharge to ARU.  ARU wanted to know what the pt's home support would be after ARU placement. Writer called pt's mother and confirmed that pt will be discharging to his parents house in [facility] after discharge from ARU. Pt and his parents all live in [facility].     Next Steps: SW to follow up with  ARU on referral status.       BRUNILDA Haskins  Float   [facility] Acute Care Management  Searchable on Vocera: 6A Neuro TIRSO

## 2025-08-19 NOTE — PLAN OF CARE
"Status: Admitted 7/15 for N/V and lethargy, found to have infact in L MCA, R parietal, R frontal centrum in the setting of new metastatic lung adenocarcinoma. Stay c/b acute personality change, new infarct in R posterior temporal lobe found 8/13, thromobocytopenia, and delirium (code 21 8/13 flora), syncope (rapid code called 8/15 afternoon). Restraints discontinued 8/14 9h45 sitter discontinued 8/15 07h00   Vitals: VSS on home CPAP overnight.   Neuros: Ox4 with choices. Garbled and illogical speech, aphasia. R droop. LUE 4/5, RUE 3/5, LLE 4/5, RLE 3-4/5. Absent dorsi/plantar RLE.   IV: LDL PICC HL  Labs/Electrolytes: Plts 98 today - improving.  Resp: LSC  Diet: Regular  GI: LBM 8/17  : 470ml bladder scan, voided 350, . Holds urine for a long time but able to empty adequately  Skin: scattered bruising, \"compound wound\" to LUE forearm covered with primapore.   Pain: c/o L arm pain today, relieved w/ flexeril.   Activity: A1 GB + cane. Up in chair multiple times, showered, working w/ therapy 2x/day  SCDs on? If no, why?: Yes  Plan: SLP consulted, keep trending platelets. Discharge to ARU pending prior auth.  Updates this shift: this afternoon pt giggly, not fully answering orientation questions, illogical, aphasic speech. Able to follow commands, PERRLA. Provider assessed at bedside, no new orders.  "

## 2025-08-19 NOTE — PLAN OF CARE
Goal Outcome Evaluation:      Plan of Care Reviewed With: patient    Overall Patient Progress: improvingOverall Patient Progress: improving           Status: Admitted 7/15 for N/V and lethargy, found to have infact in L MCA, R parietal, R frontal centrum in the setting of new metastatic lung adenocarcinoma. Stay c/b acute personality change, new infarct in R posterior temporal lobe found 8/13, thromobocytopenia, and delirium (code 21 8/13 flora), syncope (rapid code called 8/15 afternoon). Restraints discontinued 8/14 9h45 sitter discontinued 8/15 07h00 Hx of HTN, DM, ELENA, PE/ DVT.   Vitals: VSS - RA. CPAP overnight.   Neuros: A&Ox3-4 with choices. Garbled speech.  Aphasia. R droop. LUE 4/5, RUE 3/5, LLE 4/5, and RLE 3-4/5. Unable to do plantar or dorsiflex on R foot. Pt in better mood on shift. Giggled when asked questions.     IV: DL PICC - both HL.  Labs/Electrolytes: Platelets 98 - Improving.  Resp: WNL - LSC. Denies SOB.   Diet: Regular - good appetite. Pt takes meds whole in applesauce.  GI: LBM 08/17/2025 - passing gas.   : Voiding spon in BR.   Skin: L forearm compound wound - covered w/ primapore. Abdominal bruising.   Pain: Denies.   Activity: Assist x1. GB. Cane.  Plan/Updates this shift: Pt giggly, not fully answering self orientation questions - MD notified.

## 2025-08-19 NOTE — PROGRESS NOTES
Solid Tumor Oncology Consult Service  Progress Note   Date of Service: 08/19/2025  Patient: Bernard Chapman  MRN: [id number]  Admission Date: 7/15/2025  Hospital Day # 35  Cancer Diagnosis: Metastatic lung adenocarcinoma   Primary Outpatient Oncologist: MARKOS   Current Treatment Plan: Taletrectinib      Interim history:    Patient was seen in the morning. Mom at bedside. Patient was also up to chairs.   He denied any specific complains, looks happy. Mom mentions patient is doing better. He now communicates with good mood.   Per primary team, patient is discharging soon.     Recommendations today:  - Taletrectinib started on 8/13, 600 mg daily with empty stomach, avoid food 2h before and after taking the med  - EKG before the treatment no prolong QT  - EKG, CBC with Diff, CMP, Mag, Uric acid, CK weekly for 4 weeks  - Zofran and Immodium prn  - Anticoagulation per classic heme  - Will schedule outpatient follow up and repeat scan      Assessment & Plan:   Bernard Chapman is a 35 year old year old male with a past medical history of HTN, DM, ELENA, PE/DVT 6/22/25 (previously on rivaroxaban) who presented 7/15/2025 with new neurological symptoms and found acute multifocal ischemic strokes, partial occlusive L MCA thrombus, L frontal SAH, and in setting of TCP c/f ITP vs catastrophic antiphospholipid syndrome (CAPS) vs malignancy s/p PLEX x7/16. CT CAP 7/16/2025 revealing LAD of hilar, mediastinal, and paraesophageal LNs and spiculated-appearing nodule of R apical lung s/p EBUS-TBNA 7/18 revealing metastatic lung adenocarcinoma, NGS revealed ROS1 rearrangement - planning to initiate treatment with taletrectinib as soon as possible while inpatient.      # New metastatic lung adenocarcinoma, ROS1 rearrangement   # Thrombocytopenia  # Anemia  CT CAP 7/16/2025 revealing a spiculated appearing nodule within R lung apex ~11 mm, multiple enlarged mediastinal, hilar and paraesophageal lymph nodes, along with 11 mm soft  "tissue density in the left retroperitoneum. Underwent EBUS-TBNA 7/18 revealing metastatic lung adenocarcinoma, + for CK7 and TTF-1. MRI brain without evidence of metastasis. Obtained PET on 7/26. Reviewed with radiologist which showed metastatic disease in L scapula, L retroperitoneum, R adrenal and L2. Areas are small enough where radiation is not required at this time. PD-L1 TPS at 40% consistent with low PD-L1 expression. Per d/w benign hematology, given severe thrombocytopenia requiring transfusions and hypercoagulable state with course complicated by multifocal strokes and PE 2/2 malignancy, plan to initiate treatment while inpatient with taletrectinib as soon as approved given ROS1 rearrangement found on NGS. Discussed with Tera and his parents 8/2 that response rate to this medication is 85-90%, with PFS measured in years on average.   - start Taletrectinib on 8/13, 600 mg daily with empty stomach, avoid food 2h before and after taking the med  - EKG, CBC with Diff, CMP, Mag, Uric acid, CK weekly for 4 weeks  - Zofran and Immodium prn  - Follow up Guardant 360, pending  - Lesions are too small for radiation at this time   - Outpatient oncology referral placed, follow for scheduling       # H/o DVT/PE  Presented 6/22/25 with chest pain and SOB and found to have PE and lower extremity DVTs, he was placed on apixaban though had an allergic reaction and rotated to Xarelto.   - Agree with AC (Lovenox) per primary team and hematology      _____________________________________________________      Physical Exam:    Blood pressure 128/58, pulse 79, temperature 97.7  F (36.5  C), temperature source Oral, resp. rate 16, height 1.88 m (6' 2\"), weight 101.9 kg (224 lb 11.2 oz), SpO2 98%.    Constitutional: Sitting in chair, NAD  HEENT: NCAT  Respiratory: Breathing comfortably on room air  Neuro: Alert, answers questions appropriately. Moves extremities spontaneously.  Psych: Appropriate affect     Labs & Studies: I " personally reviewed the following studies:  Oncology fusion gene NGS, 7/22/25:  RESULTS See result below Abnormal    Fusion Event: POSITIVE      INTERPRETATION    This sample is positive for a ROS1 gene rearrangement (SDC4::ROS1)      The detected fusion event joins exon 2 of SDC4 (5' partner) with exon 32 of ROS1 (3' partner), corresponding to approximately 78% of the total reads analyzed (major isoform).  Two additional isoforms corresponding to RFD4alsn1::[id number] and ZIJ1uqfi8::MTN0lmeq17 fusions, respectively, were also detected (corresponding to less than 20% of the total reads).      ROS1 gene rearrangements have been reported in diverse cancer types including non-small-cell lung cancer (NSCLC), and with a notably higher prevalence in lung adenocarcinoma. Importantly, FDA-approved tyrosine kinase inhibitors have demonstrated efficacy in treatment of ROS1-rearranged NSCLC (PMID: 02996892). However it is not clear if coexistence of more than two types of ROS1 rearrangements affects therapeutic response to TKI therapy. A recent case report (PMID: 29475181) showed that coexistence of BDF9btac2::LUN1txqr 34 and SDC4 exon2::ROS1 exon35 fusions showed suboptimal response to crizotinib therapy when compared to that seen with the XPS0ljvz3::XFD8fwzf 32 fusion alone.      Molecular Description  The following chromosomal coordinates describe the exon::exon junction(s) of the major isoform fusion partner in the most prevalent cDNA transcript for the reported gene fusion event:     chr20:[id number],chr6:[id number]  chr20:[id number],chr6:[id number]  chr20:[id number],chr6:[id number]         This assay detects gene fusion events based on the assessment of cDNA; the reported coordinates describe specific exon-exon joins in the detected transcripts for the positive gene fusion target(s).  The reported coordinates do not specifically describe the underlying genomic (DNA) breakpoint, but do describe a reference interval in which the  genomic alteration occurs.      ROUTINE LABS (Last four results):  CMP  Recent Labs   Lab 08/19/25  0623 08/18/25  0600 08/17/25  0553 08/16/25  0550 08/15/25  0548 08/14/25  0714 08/13/25  2229    140 141 140   < > 140 140   POTASSIUM 4.2 4.2 3.9 4.0   < > 4.4 3.8   CHLORIDE 107 106 104 104   < > 103 101   CO2 24 24 26 25   < > 24 25   ANIONGAP 11 10 11 11   < > 13 14   * 102* 122* 116*   < > 108* 109*   BUN 22.2* 20.9* 26.0* 24.1*   < > 21.1* 24.3*   CR 1.03 1.06 1.11 1.14   < > 1.24* 1.32*   GFRESTIMATED >90 >90 89 86   < > 78 72   SHARAN 9.0 9.1 9.0 9.3   < > 9.6 9.6   MAG 2.2 2.0 2.0 2.0   < > 2.0 2.2   PHOS  --   --   --   --   --  3.8 4.2   PROTTOTAL 6.6 6.6 6.6 6.9   < >  --   --    ALBUMIN 3.8 3.8 3.9 4.0   < >  --   --    BILITOTAL 0.3 0.4 0.5 0.6   < >  --   --    ALKPHOS 64 64 66 67   < >  --   --    AST 23 21 30 26   < >  --   --    ALT 26 23 26 19   < >  --   --     < > = values in this interval not displayed.     CBC  Recent Labs   Lab 08/19/25  0623 08/18/25  0600 08/17/25  0553 08/16/25  0550   WBC 7.56 7.93 8.28 9.44   RBC 3.81* 3.91* 3.99* 4.12*   HGB 10.0* 10.2* 10.4* 10.7*   HCT 31.5* 31.9* 32.4* 33.5*   MCV 82.7 81.6 81.2 81.3   MCH 26.2* 26.1* 26.1* 26.0*   MCHC 31.7 32.0 32.1 31.9   RDW 13.5 13.5 13.6 13.7   PLT 98* 95* 67* 56*     INR  Recent Labs   Lab 08/14/25  1702   INR 1.41*       Medications list for reference:  Meds reviewed.

## 2025-08-19 NOTE — PROGRESS NOTES
Resident/Fellow Attestation   I, OBEY LU MD, was present with the medical/SAMSON student who participated in the service and in the documentation of the note.  I have verified the history and personally performed the physical exam and medical decision making.  I agree with the assessment and plan of care as documented in the note.      OBEY LU MD  PGY3  Date of Service (when I saw the patient): 08/19/25    [facility]    Progress Note - Medicine Service, MAROON TEAM 3       Date of Admission:  7/15/2025    Assessment & Plan   Bernard Chapman is a 35 year old male with a past medical history of hypertension, diabetes, ELENA, PE/DVT (on rivaroxaban) admitted on 7/15/2025 for lethargy, N/V, and blurry vision found to have multifocal acute CVA in L MCA , R parietal and R frontal centrum semiovale. Work up revealed metastatic lung adenocarcinoma with likely malignancy-related hypercoagulability and acute change in personality beginning 8/10 with new findings of R posterior temporal lobe infarct, now improving on risperidone.     Updates 8/19/2025:  - Medically ready for discharge  - CBC tomorrow am  Discharge planning:              - Social work: Prior auth pending for ARU          #New metastatic lung adenocarcinoma, ROS1 rearrangement with right lung nodule and multiple enlarged mediastinal, paraesophageal, and hilar lymph nodes  Malignancy work up initiated on admission due to significant hypercoagulability. CT CAP significant for 36h99ay spiculated density in R lung apex, 9mm pulmonary nodule in R lung base, L thyroid nodule, paratracheal LAD, splenic infarct, bilateral renal infarcts, and L retroperitoneal nodule. Underwent FNA of lymph node which was positive for lung adenocarcinoma (resulted 7/22).  PDL-1 resulted with low expression (40%). PET 7/26 with distant metastases and MRI brain showing no signs of metastasis. Patient started chemotherapy 8/13.  Medically ready for discharge with prior auth pending for [place] [facility].  Oncology consulted, appreciate recs              - NGS; positive for a ROS1 gene rearrangement (SDC4::ROS1)                - Guardant 360; T p53 mutation and MITCHELL   - Taletrectinib 600 mg daily on empty stomach, avoid food for two hours before and after (moved to 6am)  - allopurinol 100 mg daily    - recheck CBC tomorrow am   - EKG, CBC with Diff, CMP, Mag, Uric acid, CK weekly for 4 weeks  - Follow up with Dr. Mackenzie around September 10  Anti-nausea:              - compazine 10 mg scheduled prior to chemotherapy  - ativan 0.5 mg prn (avoiding additional zofran and compazine to decrease risk of prolonged QT given patient taking keppra and taletrectinib)  Anti-diarrhea              - imodium prn  - [facility] 2nd opinion; Patient will need to call [phone] to make appointment once there is a timeline for discharging from this hospitalization  - Dispo planning: prior auth pending for ARU     #Hypercoagulability d/t metastatic lung adenocarcinoma with infarcts of brain, kidney, spleen. New brain infarct 8/13.  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 TTE with mobile echodensities on tricuspid and aortic valve. Initial concern for TTP or catastrophic APS, however work up ultimately significant for new diagnosis of metastatic lung cancer. Hypercoagulable state presumed to be most likely 2/2 malignancy. Started on heparin gtt 7/17, but briefly held for 7/18 endobronchial needle aspiration of lymph nodes. Stroke code called 7/19 for worsening R-sided hemiparesis and hemianopia. Repeat CTA with new focal high-grade stenosis of other part of left MCA. Heparin gtt was stopped during code stroke, but restarting  (heparin gtt + levophed) led to 10/10 headache. CTH with small left frontal SAH. Brain MRI with new L basal ganglia stroke. Heparin gtt resumed, and repeat CTH stable. Switched to therapeutic Lovenox 7/22. Declining fibrinogen, so received cryoprecipitate 7/25. Concern for continued consumptive process, but on therapeutic Lovenox. PLT downtrending, so receiving transfusions intermittently. 8/14 lupus anticoagulant profile indeterminant. Cardiology consulted given cardiac valve thrombus and concern for stroke, agreed with therapeutic AC on lovenox, not likely surgical candidate, and recommended consideration for ID workup of indolent culture negative endocarditis. Lovenox was decreased to 80 mg BID per pharmacy on 8/9 when Anti-Xa level supratherapeutic. Patient was transitioned to heparin gtt on 8/13 given evidence of new infarct on 8/14. Transitioned back to lovenox 100 mg BID per neuro and heme recs. Platelets have now stabilized as of 8/17, working with social work for discharge planning.  - Neurology consulted as below  - Hematology consulted, appreciate recs              -Transfuse if   -Hgb<7  -PLT<50k-- CONDITIONAL ORDERS IN PLACE  - Lovenox 100 mg BID  - Consider transition to DOAC once outpatient with oncology. Per Heme, plan to discharge to TCU/ARU on therapeutic lovenox  - Follow up lupus anticoagulant outpatient  - CBC tomorrow am  - per Onc, can transition to labs q1-2 weeks at ARU     #New acute infarct in Right Posterior Temporal Lobe    #Multifocal acute ischemic strokes, in L MCA territory and new L basal ganglia stroke; Increasing mass effect on L lateral ventricle by L MCA infarction  #Subtotal occlusive thrombus in left M1   #Left frontoparietal SAH (7/19)  Etiology most likely hypercoagulability d/t malignancy. Emboli stemming from tricuspid and aortic valves, appreciated on TTE 7/16. He continues to have R facial droop, R extremity weakness. Left frontoparietal subarachnoid hemorrhage stable  per CT.  Repeat brain MRI on 8/4 shows 2 new small infarcts in the right frontal lobe. Neuro evaluation as below due to personality changes. Repeat MRI on 8/14 with new infarct of posterior right temporal lobe and scattered punctate areas of left temporal lobe. Low concern for hemorrhagic conversion per Neuro. Heme/Onc recommending change to therapeutic lovenox 1 mg/kg q12h, only check Xa if signs of bleeding.   - Neuro consulted, signed off              - Keppra 750 mg BID              - ativan 4mg IV if evidence of seizures >  3 minutes  -OT/PT/Speech              -therapies doing aggressive rehabilitation as able              - SLP consulted for evaluation 8/18  - nutrition consult              -monitor oral intake, weights  - Anticoagulation as above  - [  ] Repeat TTE outpatient in 2 months to ensure improvement of AR and TR (and vegetations)  - [  ] Follow up in the stroke clinic in 2-3 months (order placed)      #Acute change in personality with inappropriate affect and agitation suspect 2/2 new posterior R. Temporal infarct  Starting around 8/10, patient has had significant personality changes, including becoming less participatory/interactive with therapies and with family. CT head from 8/11 without any acute findings. At 6:10 pm on 8/12, concern for seizure like activity with 10 seconds of general tremulousness and subsequent emesis. Given Keppra load with subsequent vEEG without evidence of seizures. Daily keppra started 8/13. MRI/MRA head 8/13 showing new right posterior temporal lobe infarct. Increased aggression and agitation overnight on 8/14 requiring Code 21 and use of physical 4 point restraints. Patient with inappropriate laughter and humor. Difficult to assess voluntariness of the decreased responsiveness. Potential component of delirium at play versus depression vs neurological damage secondary to new infarct. Patient's mood/affect is improving, now cooperative with cares, and has not required  psychiatric prns since 8/13.  Psychiatry consult, appreciate recs              - 0.5 mg risperidone BID               - 1 mg risperidone prn for agitation               - 50 mg seroquel prn for agitation and sleep              - 2-5 mg Haldol IV Q4 prn for agitation               - EKG NSR with Qtc 398 today  - Delirium precautions     # Maculopapular rash on face, chest, bilateral upper extremities, improved  # Numerous palpable purpura across the scalp and lower extremities   # Petechiae on bilateral lower extremities, improving  C/f palpable purpura, so s/p L leg s/p punch biopsy with dermatology: rupture folliculitis. New rash noted 7/26 with flushing of face/chest after PET scan, suspected  secondary to contrast from PET scan.   Plan:  - Benadryl as needed for itching  - Chlorhexidine wash  - Will continue to monitor     #DM2, A1c 5.3  On HSSI with adequate control after stopping dexamethasone. CTM.   - POC glucose ACHS  - High intensity insulin sliding scale      #HLD, chronic  -Cont 10 mg rosuvastatin     #HTN  - Continue PTA Metoprolol succinate 25mg q daily (ok to hold if HR< 60)  - Hold PTA Losartan, could consider restarting vs discontinuing given normotensive  - Continue PTA hydrochlorothiazide     #ELENA  Currently not on CPAP, consider resuming if mental status improves     #Leukocytosis, resolved  Found to have leukocytosis, no clear cause or signs of infection, possible 2/2 stress induced.      #Hiccups, resolved   #chronic back pain  Chronic back pain. Hiccups resolved (suspected 2/2 dexamethasone).   Plan:  - Thorazine 25mg q6hr discontinued due to contraindication with chemotherapy  - Melatonin 3mg daily  - Pepcid discontinued due to contraindication with chemotherapy  - continue baclofen  - acetaminophen 1000 mg every 6 hours  - cyclobenzaprine 10 mg 3x daily PRN  - PTA gabapentin 100 mg 3x daily  - PTA oxycodone 5 mg PRN every 6h      #allergies  - fluticasone 1 spray daily      #Epistaxis,  currently resolved  On 8/5 patient found to have epistaxis. At the time, physical exam with blood coming from the right nares.  - Afrin spray as needed  - Apply pressure to the nasal passage to help with bleeding      Diet: Regular Diet Adult    DVT Prophylaxis: Enoxaparin (Lovenox) SQ  Avalos Catheter: Not present  Fluids: None  Lines: PRESENT      PICC 08/14/25 Double Lumen Left Brachial vein medial Access-Site Assessment: WDL      Cardiac Monitoring: None  Code Status: Full Code      Clinically Significant Risk Factors                 # Thrombocytopenia: Lowest platelets = 95 in last 2 days, will monitor for bleeding                  # Financial/Environmental Concerns: none         Social Drivers of Health          Disposition Plan     Medically Ready for Discharge: Ready Now         The patient's care was discussed with the Attending Physician, Dr. Rod.    Selin Grimaldo  Medical Student  Medicine Service, [address]  Securely message with [employer] (more info)  Text page via [employer] Paging/Directory   See signed in provider for up to date coverage information  ______________________________________________________________________    Interval History   Tera was seen lying in bed this morning. He denies pain, headaches, abdominal pain, fevers, chills, or changes in bowel movements and urination. He was not interactive in conversation with writer, but did head nod in response to questions. Did not respond when asked about his mood today. Writer let Tera know we are encouraged that his platelets continue to be stable today and that we are working on discharge to ARU.    Physical Exam   Vital Signs: Temp: 97.7  F (36.5  C) Temp src: Oral BP: 128/58 Pulse: 79   Resp: 16 SpO2: 98 % O2 Device: None (Room air)    Weight: 224 lbs 11.2 oz    General Appearance: Appears well, lying in bed in no acute distress  Respiratory: CTAB, breathing comfortably on room  air   Cardiovascular: regular rate and rhythm, no murmur  GI: soft, nontender to palpation, nondistended  Skin: scattered petechiae on bilateral lower extremiteis  Other: Dysarthria, r facial droop, and r arm weakness consistent with neuro baseline. Answers questions with head nod or yes/no one word replies.     Medical Decision Making       Please see A&P for additional details of medical decision making.      Data     I have personally reviewed the following data over the past 24 hrs:    7.56  \   10.0 (L)   / 98 (L)     142 107 22.2 (H) /  115 (H)   4.2 24 1.03 \     ALT: 26 AST: 23 AP: 64 TBILI: 0.3   ALB: 3.8 TOT PROTEIN: 6.6 LIPASE: N/A       Imaging results reviewed over the past 24 hrs:   No results found for this or any previous visit (from the past 24 hours).

## 2025-08-19 NOTE — PLAN OF CARE
"Status: Admitted 7/15 for N/V and lethargy, found to have infact in L MCA, R parietal, R frontal centrum in the setting of new metastatic lung adenocarcinoma. Stay c/b acute personality change, new infarct in R posterior temporal lobe found 8/13, thromobocytopenia, and delirium (code 21 8/13 flora), syncope (rapid code called 8/15 afternoon). Restraints discontinued 8/14 9h45 sitter discontinued 8/15 07h00   Vitals: VSS on home CPAP overnight.   Neuros: Ox4 with choices. Garbled speech and intermittent illogical speech, aphasia. R droop. LUE 4/5, RUE 3/5, LLE 4/5, RLE 3-4/5. Absent dorsi/plantar RLE. Appropriate behavior.  IV: LDL PICC HL  Labs/Electrolytes: Plt yesterday 95 - improving.  Resp: LSC  Diet: Regular  GI: LBM 8/17  : Voids spont in BR  Skin: scattered bruising, \"compound wound\" to LUE forearm covered with primapore.   Pain: Denies   Activity: A1-2 GB + cane.   SCDs on? If no, why?: Yes  Plan: SLP consulted, keep trending platelets. Discharge TCU vs ARU.    Goal Outcome Evaluation:      Plan of Care Reviewed With: patient    Overall Patient Progress: improvingOverall Patient Progress: improving               "

## 2025-08-19 NOTE — PLAN OF CARE
"/62 (BP Location: Right arm)   Pulse 73   Temp 98.2  F (36.8  C) (Oral)   Resp 16   Ht 1.88 m (6' 2\")   Wt 101.9 kg (224 lb 11.2 oz)   SpO2 98%   BMI 28.85 kg/m  PICC is C/D/I and HL. Patient is A & O x 3. Patient c/o mild bilateral legs when moving but declines for pain medication. No c/o chest pain or shortness of breath. Patient eating and drinking good. 2 friends from his work visited him and brought him food, ate 100%. Patient is up with assist + cane to chair or bathroom and bed alarm on. Continue with plan of care and notify MD for status changes.     Problem: Fall Injury Risk  Goal: Absence of Fall and Fall-Related Injury  Intervention: Identify and Manage Contributors  Recent Flowsheet Documentation  Taken 8/18/2025 1700 by Alejandra Nova RN  Self-Care Promotion: independence encouraged  Taken 8/18/2025 1655 by Alejandra Nova RN  Medication Review/Management: medications reviewed     Problem: Fall Injury Risk  Goal: Absence of Fall and Fall-Related Injury  Intervention: Promote Injury-Free Environment  Recent Flowsheet Documentation  Taken 8/18/2025 1655 by Alejandra Nova RN  Safety Promotion/Fall Prevention:   activity supervised   clutter free environment maintained   increased rounding and observation   increase visualization of patient   lighting adjusted   mobility aid in reach   nonskid shoes/slippers when out of bed   patient and family education     Problem: Comorbidity Management  Goal: Maintenance of Asthma Control  Outcome: Progressing     Problem: Comorbidity Management  Goal: Maintenance of Asthma Control  Intervention: Maintain Asthma Symptom Control  Recent Flowsheet Documentation  Taken 8/18/2025 1655 by Alejandra Nova RN  Medication Review/Management: medications reviewed     Problem: Comorbidity Management  Goal: Maintenance of Behavioral Health Symptom Control  Intervention: Maintain Behavioral Health Symptom Control  Recent Flowsheet " Documentation  Taken 8/18/2025 1655 by Alejandra Nova RN  Medication Review/Management: medications reviewed     Problem: Comorbidity Management  Goal: Maintenance of COPD Symptom Control  Intervention: Maintain COPD Symptom Control  Recent Flowsheet Documentation  Taken 8/18/2025 1655 by Alejandra Nova RN  Medication Review/Management: medications reviewed     Problem: Comorbidity Management  Goal: Blood Glucose Levels Within Targeted Range  Intervention: Monitor and Manage Glycemia  Recent Flowsheet Documentation  Taken 8/18/2025 1655 by Alejandra Nova RN  Medication Review/Management: medications reviewed     Problem: Comorbidity Management  Goal: Maintenance of Heart Failure Symptom Control  Intervention: Maintain Heart Failure Management  Recent Flowsheet Documentation  Taken 8/18/2025 1655 by Alejandra Nova RN  Medication Review/Management: medications reviewed     Problem: Comorbidity Management  Goal: Blood Pressure in Desired Range  Intervention: Maintain Blood Pressure Management  Recent Flowsheet Documentation  Taken 8/18/2025 1655 by Alejandra Nova RN  Medication Review/Management: medications reviewed     Problem: Comorbidity Management  Goal: Maintenance of Seizure Control  Intervention: Maintain Seizure Symptom Control  Recent Flowsheet Documentation  Taken 8/18/2025 1655 by Alejandra Nova RN  Sensory Stimulation Regulation: quiet environment promoted  Medication Review/Management: medications reviewed     Problem: Adult Inpatient Plan of Care  Goal: Plan of Care Review  Description: The Plan of Care Review/Shift note should be completed every shift.  The Outcome Evaluation is a brief statement about your assessment that the patient is improving, declining, or no change.  This information will be displayed automatically on your shift  note.  Outcome: Progressing  Flowsheets (Taken 8/18/2025 1906)  Plan of Care Reviewed With:   patient    friend  Overall Patient Progress: no change     Problem: Adult Inpatient Plan of Care  Goal: Absence of Hospital-Acquired Illness or Injury  Intervention: Identify and Manage Fall Risk  Recent Flowsheet Documentation  Taken 8/18/2025 1655 by Alejandra Nova RN  Safety Promotion/Fall Prevention:   activity supervised   clutter free environment maintained   increased rounding and observation   increase visualization of patient   lighting adjusted   mobility aid in reach   nonskid shoes/slippers when out of bed   patient and family education     Problem: Adult Inpatient Plan of Care  Goal: Absence of Hospital-Acquired Illness or Injury  Intervention: Prevent Skin Injury  Recent Flowsheet Documentation  Taken 8/18/2025 1645 by Alejandra Nova RN  Body Position:   position changed independently   heels elevated   legs elevated   foot of bed elevated     Problem: Adult Inpatient Plan of Care  Goal: Absence of Hospital-Acquired Illness or Injury  Intervention: Prevent Infection  Recent Flowsheet Documentation  Taken 8/18/2025 1655 by Alejandra Nova RN  Infection Prevention:   cohorting utilized   environmental surveillance performed   equipment surfaces disinfected   hand hygiene promoted   personal protective equipment utilized     Problem: Adult Inpatient Plan of Care  Goal: Optimal Comfort and Wellbeing  Intervention: Provide Person-Centered Care  Recent Flowsheet Documentation  Taken 8/18/2025 1655 by Alejandra Nova RN  Trust Relationship/Rapport:   choices provided   care explained   emotional support provided   empathic listening provided   questions answered   questions encouraged   reassurance provided   thoughts/feelings acknowledged     Problem: Adult Inpatient Plan of Care  Goal: Readiness for Transition of Care  Outcome: Progressing     Problem: Pain Acute  Goal: Optimal Pain Control and Function  Intervention: Optimize Psychosocial Wellbeing  Recent Flowsheet  Documentation  Taken 8/18/2025 1655 by Alejandra Nova RN  Supportive Measures:   active listening utilized   relaxation techniques promoted  Diversional Activities:   movies   smartphone   television     Problem: Pain Acute  Goal: Optimal Pain Control and Function  Intervention: Prevent or Manage Pain  Recent Flowsheet Documentation  Taken 8/18/2025 1655 by Alejandra Nova RN  Sensory Stimulation Regulation: quiet environment promoted  Sleep/Rest Enhancement: relaxation techniques promoted  Bowel Elimination Promotion:   adequate fluid intake promoted   ambulation promoted   commode/bedpan at bedside  Medication Review/Management: medications reviewed     Problem: Adult Inpatient Plan of Care  Goal: Absence of Hospital-Acquired Illness or Injury  Intervention: Identify and Manage Fall Risk  Recent Flowsheet Documentation  Taken 8/18/2025 1655 by Alejandra Nova RN  Safety Promotion/Fall Prevention:   activity supervised   clutter free environment maintained   increased rounding and observation   increase visualization of patient   lighting adjusted   mobility aid in reach   nonskid shoes/slippers when out of bed   patient and family education     Problem: Adult Inpatient Plan of Care  Goal: Absence of Hospital-Acquired Illness or Injury  Intervention: Prevent Skin Injury  Recent Flowsheet Documentation  Taken 8/18/2025 1645 by Alejandra Nova RN  Body Position:   position changed independently   heels elevated   legs elevated   foot of bed elevated     Problem: Adult Inpatient Plan of Care  Goal: Absence of Hospital-Acquired Illness or Injury  Intervention: Prevent Infection  Recent Flowsheet Documentation  Taken 8/18/2025 1655 by Alejandra Nova RN  Infection Prevention:   cohorting utilized   environmental surveillance performed   equipment surfaces disinfected   hand hygiene promoted   personal protective equipment utilized     Problem: Adult Inpatient Plan of Care  Goal:  Optimal Comfort and Wellbeing  Intervention: Provide Person-Centered Care  Recent Flowsheet Documentation  Taken 8/18/2025 1655 by Alejandra Nova RN  Trust Relationship/Rapport:   choices provided   care explained   emotional support provided   empathic listening provided   questions answered   questions encouraged   reassurance provided   thoughts/feelings acknowledged     Problem: Risk for Delirium  Goal: Optimal Coping  Intervention: Optimize Psychosocial Adjustment to Delirium  Recent Flowsheet Documentation  Taken 8/18/2025 1655 by Alejandra Nova RN  Supportive Measures:   active listening utilized   relaxation techniques promoted  Family/Support System Care:   presence promoted   self-care encouraged   support provided   involvement promoted     Problem: Risk for Delirium  Goal: Improved Behavioral Control  Intervention: Prevent and Manage Agitation  Recent Flowsheet Documentation  Taken 8/18/2025 1655 by Alejandra Nova RN  Environment Familiarity/Consistency: daily routine followed     Problem: Risk for Delirium  Goal: Improved Attention and Thought Clarity  Intervention: Maximize Cognitive Function  Recent Flowsheet Documentation  Taken 8/18/2025 1655 by Alejandra Nova RN  Sensory Stimulation Regulation: quiet environment promoted  Reorientation Measures:   glasses use encouraged   hearing device use encouraged   reorientation provided    Goal Outcome Evaluation:      Plan of Care Reviewed With: patient, friend    Overall Patient Progress: no changeOverall Patient Progress: no change

## 2025-08-19 NOTE — PROGRESS NOTES
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 Work up revealed metastatic lung adenocarcinoma with likely malignancy-related hypercoagulability and acute change in personality beginning 8/10 with new findings of R posterior temporal lobe infarct, now improving on risperidone.\" Concern from providers and RN regarding patient's communication status. Unclear if related to language vs cognitive vs behavioral, but change noted since new stroke discovered.   Pain Assessment   Patient Currently in Pain No   Type of Evaluation   Type of Evaluation Speech, Language, Cognition   Motor Speech   Speech Intelligibility (Motor Speech) WNL   Respiration (motor speech) None   Western Aphasia Battery- Revised Bedside Record From   Spontaneous Speech Content Score (out of 10) 4   Spontaneous Speech Fluency Score (out of 10) 5   Auditory Verbal Comprehension Score (out of 10) 6   Sequential Commands Score (out of 10) 8   Repetition Score (out of 10) 7   Object Naming Score (out of 10) 7   Bedside Aphasia Sum 37   WAB-R Bedside Aphasia Score 61.67   Bedside Aphasia Classification Wernicke's Aphasia   Aphasia Severity Level Moderate Aphasia   Auditory Comprehension   Follows Commands (Auditory Comprehension) 1-step command;2-step commands   2 Step, Follows Commands (Auditory Comprehension) 50-74% accuracy;achieved with repetition   1 Step, Follows Commands (Auditory Comprehension) 75-90% accuracy   Yes/No Questions (Auditory " Comprehension) simple/factual questions;complex questions   Complex Questions (Auditory Comprehension) 25-49% accuracy   Simple/Factual Questions (Auditory Comprehension) 50-74% accuracy   Verbal Expression   Automatic Speech (Verbal Expression) counting;days of the week   Counting, Automatic Speech (Verbal Expression) intact   Days of the Week, Automatic Speech (Verbal Expression) impaired;achieved with intermittent cues   Confrontational Naming (Verbal Expression) body parts;objects   Body Parts, Confrontational Naming (Verbal Expression) intact   Objects, Confrontational Naming (Verbal Expression) impaired;achieved with modeling   Responsive Naming (Verbal Expression) semantic/function   Semantic/Function, Responsive Naming (Verbal Expression) intact   Word Finding Skills (Verbal Expression) category item naming/divergent thinking   Category Item Naming/Divergent Thinking, Word Finding (Verbal Expression) impaired;achieved with cues   Repetition Skills (Verbal Expression) words;phrases   Words, Repetition Skills (Verbal Expression) intact   Phrases, Repetition Skills (Verbal Expression) achieved with multiple trials   Cognition   Affect/Mental Status (Cognition) other (see comments)  (Flat)   General Therapy Interventions   Planned Therapy Interventions Language   Language Verbal expression;Auditory comprehension   Clinical Impression   Criteria for Skilled Therapeutic Interventions Met (SLP Eval) Yes, treatment indicated   SLP Diagnosis Moderate Expressive and Receptive Aphasia   Risks & Benefits of therapy have been explained evaluation/treatment results reviewed;care plan/treatment goals reviewed;risks/benefits reviewed;current/potential barriers reviewed;participants voiced agreement with care plan;participants included;patient   Clinical Impression Comments   Language Evaluation completed at the bedside today including use of the WAB-Bedside. Patient upright in the chair for session. Concerns for impaired  language function since recently discovered new stroke, though questions surrounding communication related to true language impairment vs cognitive function vs behavioral. Patient scoring in the Moderately Impaired range with both expressive and receptive impairments overall. Patient previously completed this evaluation on 7/16/2025 revealing only Mild Anomic Aphasia. Today's evaluation represents a decline in overall language function compared to last month's evaluation. Patient's automatic speech intact for counting, but required starter cues for other tasks. Spontaneous speech was halting and with whole word paraphasias and neologisms, noted significant word finding impairment in conversation. Confrontational naming impaired, but not as severely as conversational speech. Patient was able to follow 1-step directions with occasional cues, but required 1-step at a time instructions for any multi-step tasks. Yes/No response intact for basic information, but not consistently accurate for more complex information.     Recommend ongoing SLP services for expressive and receptive language instruction. Please use short, simple sentences when communicating with patient. Use single-step instructions when asking patient to complete tasks. Question patient's comprehension of more complex information. SLP will continue to follow.     SLP Total Evaluation Time   Eval: Sound production with lang comprehension and expression Minutes (14038) 20   SLP Discharge Planning   SLP Discharge Recommendation Acute Rehab Center-Motivated patient will benefit from intensive, interdisciplinary therapy.  Anticipate will be able to tolerate 3 hours of therapy per day   SLP Rationale for DC Rec Moderate Aphasia

## 2025-08-20 ENCOUNTER — APPOINTMENT (OUTPATIENT)
Dept: PHYSICAL THERAPY | Facility: CLINIC | Age: 35
DRG: 064 | End: 2025-08-20
Payer: COMMERCIAL

## 2025-08-20 ENCOUNTER — APPOINTMENT (OUTPATIENT)
Dept: OCCUPATIONAL THERAPY | Facility: CLINIC | Age: 35
DRG: 064 | End: 2025-08-20
Payer: COMMERCIAL

## 2025-08-20 LAB
ATRIAL RATE - MUSE: 74 BPM
DIASTOLIC BLOOD PRESSURE - MUSE: NORMAL MMHG
ERYTHROCYTE [DISTWIDTH] IN BLOOD BY AUTOMATED COUNT: 13.6 % (ref 10–15)
HCT VFR BLD AUTO: 33.4 % (ref 40–53)
HGB BLD-MCNC: 10.4 G/DL (ref 13.3–17.7)
INTERPRETATION ECG - MUSE: NORMAL
MCH RBC QN AUTO: 25.9 PG (ref 26.5–33)
MCHC RBC AUTO-ENTMCNC: 31.1 G/DL (ref 31.5–36.5)
MCV RBC AUTO: 83.1 FL (ref 78–100)
P AXIS - MUSE: 57 DEGREES
PLATELET # BLD AUTO: 129 10E3/UL (ref 150–450)
PR INTERVAL - MUSE: 124 MS
QRS DURATION - MUSE: 80 MS
QT - MUSE: 358 MS
QTC - MUSE: 397 MS
R AXIS - MUSE: 69 DEGREES
RBC # BLD AUTO: 4.02 10E6/UL (ref 4.4–5.9)
SYSTOLIC BLOOD PRESSURE - MUSE: NORMAL MMHG
T AXIS - MUSE: -18 DEGREES
VENTRICULAR RATE- MUSE: 74 BPM
WBC # BLD AUTO: 8.51 10E3/UL (ref 4–11)

## 2025-08-20 PROCEDURE — 250N000013 HC RX MED GY IP 250 OP 250 PS 637

## 2025-08-20 PROCEDURE — 250N000013 HC RX MED GY IP 250 OP 250 PS 637: Performed by: STUDENT IN AN ORGANIZED HEALTH CARE EDUCATION/TRAINING PROGRAM

## 2025-08-20 PROCEDURE — 97116 GAIT TRAINING THERAPY: CPT | Mod: GP

## 2025-08-20 PROCEDURE — 99232 SBSQ HOSP IP/OBS MODERATE 35: CPT | Mod: GC | Performed by: STUDENT IN AN ORGANIZED HEALTH CARE EDUCATION/TRAINING PROGRAM

## 2025-08-20 PROCEDURE — 97535 SELF CARE MNGMENT TRAINING: CPT | Mod: GO

## 2025-08-20 PROCEDURE — 120N000002 HC R&B MED SURG/OB UMMC

## 2025-08-20 PROCEDURE — 97112 NEUROMUSCULAR REEDUCATION: CPT | Mod: GP

## 2025-08-20 PROCEDURE — 85018 HEMOGLOBIN: CPT | Performed by: INTERNAL MEDICINE

## 2025-08-20 PROCEDURE — 250N000011 HC RX IP 250 OP 636

## 2025-08-20 RX ORDER — LORAZEPAM 0.5 MG/1
0.5 TABLET ORAL EVERY 4 HOURS PRN
Qty: 30 TABLET | Refills: 0 | Status: CANCELLED | OUTPATIENT
Start: 2025-08-20

## 2025-08-20 RX ADMIN — Medication 2.5 MG: at 19:46

## 2025-08-20 RX ADMIN — Medication 10 ML: at 21:53

## 2025-08-20 RX ADMIN — ROSUVASTATIN CALCIUM 10 MG: 10 TABLET, FILM COATED ORAL at 08:44

## 2025-08-20 RX ADMIN — Medication 5 ML: at 05:07

## 2025-08-20 RX ADMIN — ACETAMINOPHEN 1000 MG: 500 TABLET ORAL at 12:04

## 2025-08-20 RX ADMIN — RISPERIDONE 0.5 MG: 0.5 TABLET ORAL at 08:44

## 2025-08-20 RX ADMIN — ENOXAPARIN SODIUM 100 MG: 100 INJECTION SUBCUTANEOUS at 10:16

## 2025-08-20 RX ADMIN — LEVETIRACETAM 750 MG: 750 TABLET, FILM COATED ORAL at 08:44

## 2025-08-20 RX ADMIN — Medication 5 MG: at 19:46

## 2025-08-20 RX ADMIN — Medication 2.5 MG: at 13:44

## 2025-08-20 RX ADMIN — ENOXAPARIN SODIUM 100 MG: 100 INJECTION SUBCUTANEOUS at 21:53

## 2025-08-20 RX ADMIN — ALLOPURINOL 100 MG: 100 TABLET ORAL at 08:42

## 2025-08-20 RX ADMIN — ACETAMINOPHEN 1000 MG: 500 TABLET ORAL at 17:11

## 2025-08-20 RX ADMIN — Medication 5 MG: at 13:44

## 2025-08-20 RX ADMIN — RISPERIDONE 0.5 MG: 0.5 TABLET ORAL at 19:47

## 2025-08-20 RX ADMIN — FLUTICASONE PROPIONATE 1 SPRAY: 50 SPRAY, METERED NASAL at 08:39

## 2025-08-20 RX ADMIN — METOPROLOL SUCCINATE 25 MG: 25 TABLET, EXTENDED RELEASE ORAL at 08:43

## 2025-08-20 RX ADMIN — SENNOSIDES AND DOCUSATE SODIUM 1 TABLET: 50; 8.6 TABLET ORAL at 08:43

## 2025-08-20 RX ADMIN — PROCHLORPERAZINE MALEATE 10 MG: 10 TABLET ORAL at 05:10

## 2025-08-20 RX ADMIN — LEVETIRACETAM 750 MG: 750 TABLET, FILM COATED ORAL at 19:47

## 2025-08-20 RX ADMIN — Medication 5 MG: at 08:42

## 2025-08-20 RX ADMIN — Medication 2.5 MG: at 08:41

## 2025-08-20 ASSESSMENT — ACTIVITIES OF DAILY LIVING (ADL)
ADLS_ACUITY_SCORE: 45

## 2025-08-20 NOTE — PROGRESS NOTES
"SPIRITUAL HEALTH SERVICES - Progress Note  [place] [facility]    Referral Source: Follow Up Visit per Family Request    Attempted to visit Tera this morning but he was outside. The second time I attempted to see him he said, \"I'm sleeping,\" so I offered to come another day.    Plan:  will try to visit Tera tomorrow 8/20 or Friday 8/21. Spiritual Health Services remain available on request. Please place a standard consult order on Epic.    Brooke Bush,   Chaplain Resident    [facility] routine referrals *[phone]   [facility] available 24/7 for emergent requests/referrals, either by paging the on-call  or by entering an ASAP/STAT consult in Epic (this will also page the on-call ).   "

## 2025-08-20 NOTE — PROGRESS NOTES
Rehab Admissions:  I met w/ Bernard and his father Duane today to discuss his upcoming transfer to [facility]. Discussed setup and structure of [facility] level of care w/ PMR oversight of his medical and rehab needs, skilled rehab nursing care, and skilled PT/OT/SLP w/ ELOS of 14 days. Discussed how pt cannot go to OP appts while on [facility] and this has been discussed w/ Dr. Walker from oncology yesterday. Pt will not have oncology f/up while on [facility]. Pt will need to self supply his chemo medication while at [facility], just like he is currently doing. Pt's father was engaged throughout, asking thoughtful questions and advocating on behalf of the patient. Discussed anticipate need for 24/7 supervision after pt discharges from [facility] given moderate expressive and receptive aphasia. Father reports he and the pt's mother work opposite shifts so they plan to provide 24/7 assist, may need to find additional cover for an hour or two when shifts overlap.  Discussed location, parking options (including where to buy a parking pass), and visitor policy. Provided brochure w/ our address and contact information. Encouraged them to call w/ any further questions. Bernard was alert during conversation, did not vocalize during our meeting. Pt is currently on [facility] waitlist. Insurance authorization has been submitted earlier today and is pending.     Thank you for the referral, we will continue to follow this patient for post acute placement.     Determination of admission is based upon the patient's need for an intensive, interdisciplinary approach to rehabilitation, their ability to progress, their ability to tolerate intensive therapies, their need for daily physician supervision, their need for twenty four hour nursing assistance, and their ability and willingness to participate in such a program.    Isabel Douglas CM  Rehab Liaison/  [facility] [place] [facility]  8/20/2025    1:31 PM

## 2025-08-20 NOTE — PLAN OF CARE
"Status: Admitted 7/15 for N/V and lethargy, found to have infact in L MCA, R parietal, R frontal centrum in the setting of new metastatic lung adenocarcinoma. Stay c/b acute personality change, new infarct in R posterior temporal lobe found 8/13, thromobocytopenia, and delirium (code 21 8/13 flora), syncope (rapid code called 8/15 afternoon). Restraints discontinued 8/14 9h45 sitter discontinued 8/15 07h00   Vitals: BP (!) 144/66 (BP Location: Right arm)   Pulse 64   Temp 97.7  F (36.5  C) (Oral)   Resp 16   Ht 1.88 m (6' 2\")   Wt 101.9 kg (224 lb 11.2 oz)   SpO2 100%   BMI 28.85 kg/m    SBP goal: 120-160     Neuros: Ox4 with choices. Garbled speech and intermittent illogical speech, aphasia. R droop. LUE 4/5, RUE 3/5, LLE 4/5, RLE 3-4/5. Absent dorsi/plantar RLE. IV: LDL PICC HL  Labs/Electrolytes: Plt 129 - improving.  Resp: LSC. RA. Satting above 92%  Diet: Regular. Takes pills whole in applesauce.  GI: LBM 8/17. Senna given, Miralax refused.   : Voids spont in BR - needs encouragement.   Skin: Abd bruising, \"compound wound\" to LUE forearm covered with primapore.   Pain: Denies   Activity: Ax1 GB + cane.   SCDs on? If no, why?: Yes  Plan/Updates: SLP consulted, keep trending platelets. Pt is currently on [facility] waitlist. Insurance authorization has been submitted earlier today and is pending (leaving tomorrow?)     Latest Reference Range & Units 08/20/25 05:13   WBC 4.00 - 11.00 10e3/uL 8.51   Hemoglobin 13.3 - 17.7 g/dL 10.4 (L)   Hematocrit 40.0 - 53.0 % 33.4 (L)   Platelet Count 150 - 450 10e3/uL 129 (L)   RBC Count 4.40 - 5.90 10e6/uL 4.02 (L)   MCV 78.0 - 100.0 fL 83.1   MCH 26.5 - 33.0 pg 25.9 (L)   MCHC 31.5 - 36.5 g/dL 31.1 (L)   RDW 10.0 - 15.0 % 13.6   (L): Data is abnormally low  "

## 2025-08-20 NOTE — PROGRESS NOTES
Care Management Follow Up    Length of Stay (days): 36    Expected Discharge Date: 08/21/2025     Concerns to be Addressed: discharge planning     Patient plan of care discussed at interdisciplinary rounds: Yes    Anticipated Discharge Disposition: Acute Rehab     [facility]  [address], 5th floor  [facility] [address]   P: [phone]  Adm P: [phone]  F: [phone]     Anticipated Discharge Services: Transportation Services, Other (see comment) (PT/OT)    [facility] Ph: [phone]  Transport Type:Wheel Chair  Indication/Need: w/c to be provided by transport    Ride Date: 8/21 Window Scheduled: 12:24 PM - 1:09 PM   Private Pay Cost Discussed: n/a  PCS Completed:N/A    Anticipated Discharge DME: None    Patient/family educated on Medicare website which has current facility and service quality ratings: yes  Education Provided on the Discharge Plan: Yes  Patient/Family in Agreement with the Plan: yes    Referrals Placed by CM/SW: Post Acute Facilities  Private pay costs discussed: Not applicable    Discussed  Partnership in Safe Discharge Planning  document with patient/family: No     Handoff Completed: No, handoff not indicated or clinically appropriate    Additional Information:   spoke with  [facility] Admission Coordinator Isabel Douglas who confirmed this pt is on the wait list for [facility]. Isabel met with the pt and his father at bed side this morning to provide education on [facility]. Pt and family in agreement with  [facility]. Isabel said she is going to submit for insurance authorization today.      received an update from  [facility] that they received the insurance authorization approval.  called pt's mother and provided an update on discharge planning and got the pt's father added to the chart as a contact.  met with pt at bed side and provided an update on discharge planning. Pt and family are in agreement with the discharge plan.     Hematology wanted to know  "if pt could get the following labs done once a week at [facility] as he will need it once a week for the next 4 weeks: \"EKG, CBC with Diff, CMP, Mag, Uric acid, CK weekly for 4 weeks\" Writer confirmed with [facility] that they are able to manage these labs.     Next Steps: SW to send discharge orders to [facility]      BRUNILDA Haskins   [facility] Acute Care Management  Searchable on Vocera: 6A Neuro SW  "

## 2025-08-20 NOTE — PROGRESS NOTES
[facility]    Progress Note - Medicine Service, MAROON TEAM 3       Date of Admission:  7/15/2025    Assessment & Plan   Bernard Chapman is a 35 year old male with a past medical history of hypertension, diabetes, ELENA, PE/DVT (on rivaroxaban) admitted on 7/15/2025 for lethargy, N/V, and blurry vision found to have multifocal acute CVA in L MCA , R parietal and R frontal centrum semiovale. Work up revealed metastatic lung adenocarcinoma with likely malignancy-related hypercoagulability and acute change in personality beginning 8/10 with new findings of R posterior temporal lobe infarct, now improving on risperidone.     Today  - Medically ready for discharge, plan to discharge to ARU on 8/21          #New metastatic lung adenocarcinoma, ROS1 rearrangement with right lung nodule and multiple enlarged mediastinal, paraesophageal, and hilar lymph nodes  Malignancy work up initiated on admission due to significant hypercoagulability. CT CAP significant for [id number] spiculated density in R lung apex, 9mm pulmonary nodule in R lung base, L thyroid nodule, paratracheal LAD, splenic infarct, bilateral renal infarcts, and L retroperitoneal nodule. Underwent FNA of lymph node which was positive for lung adenocarcinoma (resulted 7/22).  PDL-1 resulted with low expression (40%). PET 7/26 with distant metastases and MRI brain showing no signs of metastasis. Patient started chemotherapy 8/13. Medically ready for discharge with plan to discharge to ARU on 8/21.  Oncology consulted, appreciate recs              - NGS; positive for a ROS1 gene rearrangement (SDC4::ROS1)                - Guardant 360; T p53 mutation and MITCHELL   - Taletrectinib 600 mg daily on empty stomach, avoid food for two hours before and after (moved to 6am)  - allopurinol 100 mg daily   - EKG, CBC with Diff, CMP, Mag, Uric acid, CK weekly for 4 weeks  - Follow up with Dr. Mackenzie around September 10  Anti-nausea:               - compazine 10 mg scheduled prior to chemotherapy  - ativan 0.5 mg prn (avoiding additional zofran and compazine to decrease risk of prolonged QT given patient taking keppra and taletrectinib)  Anti-diarrhea              - imodium prn  - Drifting 2nd opinion; Patient will need to call [phone] to make appointment once there is a timeline for discharging from this hospitalization       #Hypercoagulability d/t metastatic lung adenocarcinoma with infarcts of brain, kidney, spleen. New brain infarct 8/13.  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 TTE with mobile echodensities on tricuspid and aortic valve. Initial concern for TTP or catastrophic APS, however work up ultimately significant for new diagnosis of metastatic lung cancer. Hypercoagulable state presumed to be most likely 2/2 malignancy. Started on heparin gtt 7/17, but briefly held for 7/18 endobronchial needle aspiration of lymph nodes. Stroke code called 7/19 for worsening R-sided hemiparesis and hemianopia. Repeat CTA with new focal high-grade stenosis of other part of left MCA. Heparin gtt was stopped during code stroke, but restarting (heparin gtt + levophed) led to 10/10 headache. CTH with small left frontal SAH. Brain MRI with new L basal ganglia stroke. Heparin gtt resumed, and repeat CTH stable. Switched to therapeutic Lovenox 7/22. Declining fibrinogen, so received cryoprecipitate 7/25. Concern for continued consumptive process, but on therapeutic Lovenox. PLT downtrending, so receiving transfusions intermittently. 8/14 lupus anticoagulant profile indeterminant. Cardiology consulted given cardiac valve thrombus and concern for stroke, agreed with therapeutic AC on lovenox, not likely surgical  candidate, and recommended consideration for ID workup of indolent culture negative endocarditis. Lovenox was decreased to 80 mg BID per pharmacy on 8/9 when Anti-Xa level supratherapeutic. Patient was transitioned to heparin gtt on 8/13 given evidence of new infarct on 8/14. Transitioned back to lovenox 100 mg BID per neuro and heme recs. Platelets have now stabilized as of 8/17, working with social work for discharge planning.  - Neurology consulted as below  - Hematology consulted, appreciate recs              -Transfuse if   -Hgb<7  -PLT<50k-- CONDITIONAL ORDERS IN PLACE  - Lovenox 100 mg BID  - Consider transition to DOAC once outpatient with oncology. Per Heme, plan to discharge to TCU/ARU on therapeutic lovenox  - Follow up lupus anticoagulant outpatient  - CBC tomorrow am  - per Onc, can transition to labs q1-2 weeks at ARU     #New acute infarct in Right Posterior Temporal Lobe    #Multifocal acute ischemic strokes, in L MCA territory and new L basal ganglia stroke; Increasing mass effect on L lateral ventricle by L MCA infarction  #Subtotal occlusive thrombus in left M1   #Left frontoparietal SAH (7/19)  Etiology most likely hypercoagulability d/t malignancy. Emboli stemming from tricuspid and aortic valves, appreciated on TTE 7/16. He continues to have R facial droop, R extremity weakness. Left frontoparietal subarachnoid hemorrhage stable per CT.  Repeat brain MRI on 8/4 shows 2 new small infarcts in the right frontal lobe. Neuro evaluation as below due to personality changes. Repeat MRI on 8/14 with new infarct of posterior right temporal lobe and scattered punctate areas of left temporal lobe. Low concern for hemorrhagic conversion per Neuro. Heme/Onc recommending change to therapeutic lovenox 1 mg/kg q12h, only check Xa if signs of bleeding.   - Neuro consulted, signed off              - Keppra 750 mg BID              - ativan 4mg IV if evidence of seizures >  3 minutes  -OT/PT/Speech               -therapies doing aggressive rehabilitation as able              - SLP consulted for evaluation 8/18  - nutrition consult              -monitor oral intake, weights  - Anticoagulation as above  - [  ] Repeat TTE outpatient in 2 months to ensure improvement of AR and TR (and vegetations)  - [  ] Follow up in the stroke clinic in 2-3 months (order placed)      #Acute change in personality with inappropriate affect and agitation suspect 2/2 new posterior R. Temporal infarct  Starting around 8/10, patient has had significant personality changes, including becoming less participatory/interactive with therapies and with family. CT head from 8/11 without any acute findings. At 6:10 pm on 8/12, concern for seizure like activity with 10 seconds of general tremulousness and subsequent emesis. Given Keppra load with subsequent vEEG without evidence of seizures. Daily keppra started 8/13. MRI/MRA head 8/13 showing new right posterior temporal lobe infarct. Increased aggression and agitation overnight on 8/14 requiring Code 21 and use of physical 4 point restraints. Patient with inappropriate laughter and humor. Difficult to assess voluntariness of the decreased responsiveness. Potential component of delirium at play versus depression vs neurological damage secondary to new infarct. Patient's mood/affect is improving, now cooperative with cares, and has not required psychiatric prns since 8/13.  Psychiatry consult, appreciate recs              - 0.5 mg risperidone BID               - 1 mg risperidone prn for agitation               - 50 mg seroquel prn for agitation and sleep              - 2-5 mg Haldol IV Q4 prn for agitation               - EKG NSR with Qtc 398 today  - Delirium precautions     # Maculopapular rash on face, chest, bilateral upper extremities, improved  # Numerous palpable purpura across the scalp and lower extremities   # Petechiae on bilateral lower extremities, improving  C/f palpable purpura, so s/p L leg  s/p punch biopsy with dermatology: rupture folliculitis. New rash noted 7/26 with flushing of face/chest after PET scan, suspected  secondary to contrast from PET scan.   Plan:  - Benadryl as needed for itching  - Chlorhexidine wash  - Will continue to monitor     #DM2, A1c 5.3  On HSSI with adequate control after stopping dexamethasone. CTM.   - POC glucose ACHS  - High intensity insulin sliding scale      #HLD, chronic  -Cont 10 mg rosuvastatin     #HTN  - Continue PTA Metoprolol succinate 25mg q daily (ok to hold if HR< 60)  - Hold PTA Losartan, could consider restarting vs discontinuing given normotensive  - Continue PTA hydrochlorothiazide     #ELENA  Currently not on CPAP, consider resuming if mental status improves     #Leukocytosis, resolved  Found to have leukocytosis, no clear cause or signs of infection, possible 2/2 stress induced.      #Hiccups, resolved   #chronic back pain  Chronic back pain. Hiccups resolved (suspected 2/2 dexamethasone).   Plan:  - Thorazine 25mg q6hr discontinued due to contraindication with chemotherapy  - Melatonin 3mg daily  - Pepcid discontinued due to contraindication with chemotherapy  - continue baclofen  - acetaminophen 1000 mg every 6 hours  - cyclobenzaprine 10 mg 3x daily PRN  - PTA gabapentin 100 mg 3x daily  - PTA oxycodone 5 mg PRN every 6h      #allergies  - fluticasone 1 spray daily      #Epistaxis, currently resolved  On 8/5 patient found to have epistaxis. At the time, physical exam with blood coming from the right nares.  - Afrin spray as needed  - Apply pressure to the nasal passage to help with bleeding         Diet: Regular Diet Adult    DVT Prophylaxis: Lovenox  Avalos Catheter: Not present  Lines: PRESENT      PICC 08/14/25 Double Lumen Left Brachial vein medial Access-Site Assessment: WDL      Cardiac Monitoring: None  Code Status: Full Code      Clinically Significant Risk Factors                 # Thrombocytopenia: Lowest platelets = 98 in last 2 days, will  monitor for bleeding                  # Financial/Environmental Concerns: none         Social Drivers of Health          Disposition Plan     Medically Ready for Discharge: Anticipated Tomorrow       The patient's care was discussed with the Attending Physician, Dr. Rod.    OBEY LU MD  Medicine Service, [address]  Securely message with [employer] (more info)  Text page via [employer] Paging/Directory   See signed in provider for up to date coverage information  ______________________________________________________________________    Interval History   NAEO. Patient able to verbally answer questions this AM. No pain or other concerns.    Physical Exam   Vital Signs: Temp: 97.7  F (36.5  C) Temp src: Oral BP: (!) 144/66 Pulse: 64   Resp: 16 SpO2: 100 % O2 Device: None (Room air)    Weight: 224 lbs 11.2 oz    General Appearance:  Appears well, sitting in chair in no acute distress  Respiratory: CTAB, breathing comfortably on room air           Cardiovascular: regular rate and rhythm, no murmur  GI: soft, nontender to palpation, nondistended  Skin: scattered petechiae on bilateral lower extremities, improving  Other:  R facial droop, RUE 3-4/5, 05 R hand  strength. 5/5 LUE and LLE. Answers questions with head nod or yes/no one word replies.     Medical Decision Making         Data     I have personally reviewed the following data over the past 24 hrs:    8.51  \   10.4 (L)   / 129 (L)     N/A N/A N/A /  N/A   N/A N/A N/A \       Imaging results reviewed over the past 24 hrs:   No results found for this or any previous visit (from the past 24 hours).

## 2025-08-20 NOTE — PLAN OF CARE
"Status: Admitted 7/15 for N/V and lethargy, found to have infact in L MCA, R parietal, R frontal centrum in the setting of new metastatic lung adenocarcinoma. Stay c/b acute personality change, new infarct in R posterior temporal lobe found 8/13, thromobocytopenia, and delirium (code 21 8/13 flora), syncope (rapid code called 8/15 afternoon). Restraints discontinued 8/14 9h45 sitter discontinued 8/15 07h00   Vitals: VSS on home CPAP overnight.   Neuros: Ox4 with choices. Garbled speech and intermittent illogical speech, aphasia. R droop. LUE 4/5, RUE 3/5, LLE 4/5, RLE 3-4/5. Absent dorsi/plantar RLE. Giggled when asked questions - team aware.  IV: LDL PICC HL  Labs/Electrolytes: Plt 98 - improving.  Resp: LSC  Diet: Regular. Takes pills whole in applesauce.  GI: LBM 8/17  : Voids spont in BR - needs encouragement.   Skin: Abd bruising, \"compound wound\" to LUE forearm covered with primapore.   Pain: Denies   Activity: Ax1 GB + cane.   SCDs on? If no, why?: Yes  Plan/Updates: SLP consulted, keep trending platelets. Discharge TCU vs ARU. DND 9605-8505.     Goal Outcome Evaluation:      Plan of Care Reviewed With: patient    Overall Patient Progress: improvingOverall Patient Progress: improving               "

## 2025-08-20 NOTE — PROGRESS NOTES
CLINICAL NUTRITION SERVICES - REASSESSMENT NOTE     Registered Dietitian Interventions:   Offered snacks/supplements, pt politely declined.   Encouraged continued intakes of 100% of meals as able.     Future/Additional Recommendations:   Monitor weight/intake trends at next follow-up, as able.     INFORMATION OBTAINED   Assessed patient in room.    CURRENT NUTRITION ORDERS   Diet: Regular    CURRENT INTAKE/TOLERANCE   Pt answered with a thumbs up when asked how his appetite was. Thumbs down when asked if he wanted any scheduled snacks or supplements.     Per flowsheets, intake documentation likely missing some meals/snacks, but on average 100% intake of meals documented.    NEW FINDINGS   GI symptoms: Reviewed; Per flowsheets, last BM noted 8/17.  Skin/wounds: Reviewed; Giovany score 17 with nutrition marked as adequate per flowsheets.   Nutrition-relevant labs: Reviewed  Nutrition-relevant medications: Reviewed    Weight:   Pt with 4 lb (2 %) weight loss over 1 week.  Wt Readings from Last Encounters:   08/16/25 101.9 kg (224 lb 11.2 oz)   07/15/25 113.3 kg (249 lb 12.8 oz)     Date/Time Weight Weight Method   08/16/25 101.9 kg (224 lb 11.2 oz) Standing scale   08/09/25 103.6 kg (228 lb 6.4 oz) Standing scale   08/03/25 108.5 kg (239 lb 3.2 oz) Bed scale   07/29/25 108.9 kg (240 lb) --   07/22/25 127.4 kg (280 lb 13.9 oz) Bed scale   07/21/25 127.8 kg (281 lb 12 oz) Bed scale   07/18/25 128.5 kg (283 lb 4.7 oz) Bed scale   07/16/25 117 kg (257 lb 15 oz) Bed scale   07/16/25 113.9 kg (251 lb 1.7 oz) --   07/15/25 112.9 kg (249 lb) --     MALNUTRITION   % Intake: No decreased intake noted  % Weight Loss: 1-2% in 1 week (moderate)   Subcutaneous Fat Loss: None observed  Muscle Loss: None observed  Fluid Accumulation/Edema: None noted  Malnutrition Diagnosis: Patient does not meet two of the established criteria necessary for diagnosing malnutrition  Malnutrition Present on Admission: No    EVALUATION OF THE PROGRESS  TOWARD GOALS   Previous Goals   Patient to consume % of nutritionally adequate meal trays TID, or the equivalent with supplements/snacks.   Evaluation: Met    Previous Nutrition Diagnosis  Predicted inadequate nutrient intake (kcal/protein) related to decreased appetite but family involved and encouraging PO.   Evaluation: No change    CURRENT NUTRITION DIAGNOSIS   Predicted inadequate nutrient intake (kcal/protein) related to decreased appetite but family involved and encouraging PO.     INTERVENTIONS   Manage composition of oral intake  Medical food supplement therapy    CURRENT GOALS   Patient to consume % of nutritionally adequate meal trays TID, or the equivalent with supplements/snacks.     MONITORING/EVALUATION  Progress toward goals will be monitored and evaluated per policy.

## 2025-08-21 ENCOUNTER — APPOINTMENT (OUTPATIENT)
Dept: PHYSICAL THERAPY | Facility: CLINIC | Age: 35
DRG: 064 | End: 2025-08-21
Payer: COMMERCIAL

## 2025-08-21 ENCOUNTER — APPOINTMENT (OUTPATIENT)
Dept: OCCUPATIONAL THERAPY | Facility: CLINIC | Age: 35
DRG: 064 | End: 2025-08-21
Payer: COMMERCIAL

## 2025-08-21 ENCOUNTER — HOSPITAL ENCOUNTER (INPATIENT)
Facility: CLINIC | Age: 35
DRG: 057 | End: 2025-08-21
Attending: STUDENT IN AN ORGANIZED HEALTH CARE EDUCATION/TRAINING PROGRAM | Admitting: STUDENT IN AN ORGANIZED HEALTH CARE EDUCATION/TRAINING PROGRAM
Payer: COMMERCIAL

## 2025-08-21 VITALS
BODY MASS INDEX: 29.71 KG/M2 | TEMPERATURE: 97.5 F | HEIGHT: 74 IN | DIASTOLIC BLOOD PRESSURE: 59 MMHG | HEART RATE: 71 BPM | RESPIRATION RATE: 16 BRPM | OXYGEN SATURATION: 95 % | SYSTOLIC BLOOD PRESSURE: 135 MMHG | WEIGHT: 231.5 LBS

## 2025-08-21 VITALS
HEART RATE: 72 BPM | DIASTOLIC BLOOD PRESSURE: 63 MMHG | BODY MASS INDEX: 28.84 KG/M2 | HEIGHT: 74 IN | TEMPERATURE: 97.6 F | SYSTOLIC BLOOD PRESSURE: 130 MMHG | RESPIRATION RATE: 16 BRPM | WEIGHT: 224.7 LBS | OXYGEN SATURATION: 97 %

## 2025-08-21 PROBLEM — Z86.73 HISTORY OF TIA (TRANSIENT ISCHEMIC ATTACK) AND STROKE: Status: ACTIVE | Noted: 2025-08-21

## 2025-08-21 PROBLEM — I63.9 ACUTE ISCHEMIC STROKE (H): Status: ACTIVE | Noted: 2025-07-15

## 2025-08-21 PROBLEM — G40.909 SEIZURE DISORDER (H): Status: ACTIVE | Noted: 2025-08-21

## 2025-08-21 PROBLEM — R11.0 NAUSEA: Status: ACTIVE | Noted: 2025-08-21

## 2025-08-21 PROBLEM — G47.9 SLEEP DISORDER: Status: ACTIVE | Noted: 2025-08-21

## 2025-08-21 PROBLEM — I10 BENIGN ESSENTIAL HYPERTENSION: Status: ACTIVE | Noted: 2025-08-21

## 2025-08-21 PROBLEM — K59.01 SLOW TRANSIT CONSTIPATION: Status: ACTIVE | Noted: 2025-08-21

## 2025-08-21 PROBLEM — R21 RASH AND NONSPECIFIC SKIN ERUPTION: Status: ACTIVE | Noted: 2025-08-21

## 2025-08-21 PROBLEM — I63.9 ACUTE ISCHEMIC STROKE (H): Status: ACTIVE | Noted: 2025-08-21

## 2025-08-21 PROCEDURE — 97530 THERAPEUTIC ACTIVITIES: CPT | Mod: GO

## 2025-08-21 PROCEDURE — 250N000013 HC RX MED GY IP 250 OP 250 PS 637: Performed by: STUDENT IN AN ORGANIZED HEALTH CARE EDUCATION/TRAINING PROGRAM

## 2025-08-21 PROCEDURE — 250N000013 HC RX MED GY IP 250 OP 250 PS 637

## 2025-08-21 PROCEDURE — 99239 HOSP IP/OBS DSCHRG MGMT >30: CPT | Mod: GC | Performed by: STUDENT IN AN ORGANIZED HEALTH CARE EDUCATION/TRAINING PROGRAM

## 2025-08-21 PROCEDURE — 250N000011 HC RX IP 250 OP 636

## 2025-08-21 PROCEDURE — 250N000013 HC RX MED GY IP 250 OP 250 PS 637: Performed by: PHYSICIAN ASSISTANT

## 2025-08-21 PROCEDURE — 250N000011 HC RX IP 250 OP 636: Performed by: PHYSICIAN ASSISTANT

## 2025-08-21 PROCEDURE — 97116 GAIT TRAINING THERAPY: CPT | Mod: GP | Performed by: PHYSICAL THERAPIST

## 2025-08-21 PROCEDURE — 128N000003 HC R&B REHAB

## 2025-08-21 RX ORDER — ACETAMINOPHEN 500 MG
1000 TABLET ORAL EVERY 6 HOURS
Status: CANCELLED | OUTPATIENT
Start: 2025-08-21

## 2025-08-21 RX ORDER — RISPERIDONE 1 MG/1
1 TABLET ORAL
Status: DISCONTINUED | OUTPATIENT
Start: 2025-08-21 | End: 2025-08-22

## 2025-08-21 RX ORDER — GABAPENTIN 100 MG/1
100 CAPSULE ORAL 3 TIMES DAILY PRN
Status: DISCONTINUED | OUTPATIENT
Start: 2025-08-21 | End: 2025-08-21

## 2025-08-21 RX ORDER — RISPERIDONE 1 MG/1
1 TABLET ORAL
Status: CANCELLED | OUTPATIENT
Start: 2025-08-21

## 2025-08-21 RX ORDER — METOPROLOL SUCCINATE 25 MG/1
25 TABLET, EXTENDED RELEASE ORAL DAILY
Status: DISPENSED | OUTPATIENT
Start: 2025-08-22

## 2025-08-21 RX ORDER — LOPERAMIDE HYDROCHLORIDE 2 MG/1
2 CAPSULE ORAL 3 TIMES DAILY PRN
Status: CANCELLED | OUTPATIENT
Start: 2025-08-21

## 2025-08-21 RX ORDER — ACETAMINOPHEN 500 MG
1000 TABLET ORAL EVERY 6 HOURS
Status: DISCONTINUED | OUTPATIENT
Start: 2025-08-21 | End: 2025-08-21

## 2025-08-21 RX ORDER — FLUTICASONE PROPIONATE 50 MCG
1 SPRAY, SUSPENSION (ML) NASAL DAILY
Status: DISCONTINUED | OUTPATIENT
Start: 2025-08-22 | End: 2025-08-21

## 2025-08-21 RX ORDER — LEVETIRACETAM 750 MG/1
750 TABLET ORAL 2 TIMES DAILY
Status: DISPENSED | OUTPATIENT
Start: 2025-08-21

## 2025-08-21 RX ORDER — AMOXICILLIN 250 MG
1-2 CAPSULE ORAL 2 TIMES DAILY
Qty: 240 TABLET | Refills: 0 | Status: ON HOLD | OUTPATIENT
Start: 2025-08-21

## 2025-08-21 RX ORDER — PROCHLORPERAZINE MALEATE 10 MG
10 TABLET ORAL DAILY
Status: DISPENSED | OUTPATIENT
Start: 2025-08-22

## 2025-08-21 RX ORDER — AMOXICILLIN 250 MG
1-2 CAPSULE ORAL 2 TIMES DAILY
Status: DISCONTINUED | OUTPATIENT
Start: 2025-08-21 | End: 2025-08-21

## 2025-08-21 RX ORDER — GABAPENTIN 100 MG/1
100 CAPSULE ORAL 3 TIMES DAILY PRN
Status: CANCELLED | OUTPATIENT
Start: 2025-08-21

## 2025-08-21 RX ORDER — ASCORBIC ACID 500 MG
500 TABLET ORAL DAILY
Status: DISPENSED | OUTPATIENT
Start: 2025-08-22

## 2025-08-21 RX ORDER — ACETAMINOPHEN 500 MG
1000 TABLET ORAL EVERY 6 HOURS
Qty: 90 TABLET | Refills: 0 | Status: ON HOLD | OUTPATIENT
Start: 2025-08-21

## 2025-08-21 RX ORDER — BISACODYL 10 MG
10 SUPPOSITORY, RECTAL RECTAL DAILY PRN
Status: CANCELLED | OUTPATIENT
Start: 2025-08-21

## 2025-08-21 RX ORDER — ROSUVASTATIN CALCIUM 10 MG/1
10 TABLET, COATED ORAL DAILY
Status: DISCONTINUED | OUTPATIENT
Start: 2025-08-22 | End: 2025-08-21

## 2025-08-21 RX ORDER — OXYMETAZOLINE HYDROCHLORIDE 0.05 G/100ML
2 SPRAY NASAL 2 TIMES DAILY PRN
Status: CANCELLED | OUTPATIENT
Start: 2025-08-21

## 2025-08-21 RX ORDER — POLYETHYLENE GLYCOL 3350 17 G/17G
17 POWDER, FOR SOLUTION ORAL DAILY
Status: CANCELLED | OUTPATIENT
Start: 2025-08-22

## 2025-08-21 RX ORDER — RISPERIDONE 0.5 MG/1
0.5 TABLET ORAL 2 TIMES DAILY
Status: DISCONTINUED | OUTPATIENT
Start: 2025-08-21 | End: 2025-08-21

## 2025-08-21 RX ORDER — QUETIAPINE FUMARATE 25 MG/1
50 TABLET, FILM COATED ORAL
Status: ACTIVE | OUTPATIENT
Start: 2025-08-21

## 2025-08-21 RX ORDER — LORAZEPAM 0.5 MG/1
0.5 TABLET ORAL EVERY 4 HOURS PRN
Status: CANCELLED | OUTPATIENT
Start: 2025-08-21

## 2025-08-21 RX ORDER — GABAPENTIN 100 MG/1
100 CAPSULE ORAL 3 TIMES DAILY PRN
Status: ACTIVE | OUTPATIENT
Start: 2025-08-21

## 2025-08-21 RX ORDER — CYCLOBENZAPRINE HCL 5 MG
10 TABLET ORAL 3 TIMES DAILY PRN
Status: ACTIVE | OUTPATIENT
Start: 2025-08-21

## 2025-08-21 RX ORDER — AMOXICILLIN 250 MG
1-2 CAPSULE ORAL 2 TIMES DAILY
Status: CANCELLED | OUTPATIENT
Start: 2025-08-21

## 2025-08-21 RX ORDER — FLUTICASONE PROPIONATE 50 MCG
1 SPRAY, SUSPENSION (ML) NASAL DAILY
Status: CANCELLED | OUTPATIENT
Start: 2025-08-22

## 2025-08-21 RX ORDER — OLANZAPINE 5 MG/1
10 TABLET, ORALLY DISINTEGRATING ORAL DAILY PRN
Status: DISCONTINUED | OUTPATIENT
Start: 2025-08-21 | End: 2025-08-21

## 2025-08-21 RX ORDER — LOSARTAN POTASSIUM 50 MG/1
50 TABLET ORAL DAILY
Status: DISCONTINUED | OUTPATIENT
Start: 2025-08-21 | End: 2025-08-21

## 2025-08-21 RX ORDER — LOSARTAN POTASSIUM 50 MG/1
50 TABLET ORAL DAILY
Status: ON HOLD | DISCHARGE
Start: 2025-08-21

## 2025-08-21 RX ORDER — SIMETHICONE 80 MG
40 TABLET,CHEWABLE ORAL EVERY 6 HOURS PRN
Status: CANCELLED | OUTPATIENT
Start: 2025-08-21

## 2025-08-21 RX ORDER — RISPERIDONE 1 MG/1
1 TABLET ORAL
Qty: 30 TABLET | Refills: 0 | Status: ON HOLD | OUTPATIENT
Start: 2025-08-21

## 2025-08-21 RX ORDER — OXYMETAZOLINE HYDROCHLORIDE 0.05 G/100ML
2 SPRAY NASAL 2 TIMES DAILY PRN
Status: ACTIVE | OUTPATIENT
Start: 2025-08-21

## 2025-08-21 RX ORDER — OLANZAPINE 10 MG/1
10 TABLET, ORALLY DISINTEGRATING ORAL DAILY PRN
Status: CANCELLED | OUTPATIENT
Start: 2025-08-21

## 2025-08-21 RX ORDER — LEVETIRACETAM 750 MG/1
750 TABLET ORAL 2 TIMES DAILY
Qty: 180 TABLET | Refills: 0 | Status: ON HOLD | OUTPATIENT
Start: 2025-08-21

## 2025-08-21 RX ORDER — PROCHLORPERAZINE MALEATE 10 MG
10 TABLET ORAL EVERY 6 HOURS PRN
Status: DISCONTINUED | OUTPATIENT
Start: 2025-08-21 | End: 2025-08-21

## 2025-08-21 RX ORDER — LOSARTAN POTASSIUM 50 MG/1
50 TABLET ORAL DAILY
Status: DISPENSED | OUTPATIENT
Start: 2025-08-22

## 2025-08-21 RX ORDER — LOSARTAN POTASSIUM 50 MG/1
50 TABLET ORAL DAILY
Status: DISCONTINUED | OUTPATIENT
Start: 2025-08-21 | End: 2025-08-21 | Stop reason: HOSPADM

## 2025-08-21 RX ORDER — BISACODYL 10 MG
10 SUPPOSITORY, RECTAL RECTAL DAILY PRN
Status: DISCONTINUED | OUTPATIENT
Start: 2025-08-21 | End: 2025-08-21

## 2025-08-21 RX ORDER — METOPROLOL SUCCINATE 25 MG/1
25 TABLET, EXTENDED RELEASE ORAL DAILY
Status: DISCONTINUED | OUTPATIENT
Start: 2025-08-22 | End: 2025-08-21

## 2025-08-21 RX ORDER — POLYETHYLENE GLYCOL 3350 17 G/17G
17 POWDER, FOR SOLUTION ORAL DAILY
Qty: 510 G | Refills: 0 | Status: ON HOLD | OUTPATIENT
Start: 2025-08-21

## 2025-08-21 RX ORDER — ENOXAPARIN SODIUM 100 MG/ML
1 INJECTION SUBCUTANEOUS EVERY 12 HOURS
Status: CANCELLED | OUTPATIENT
Start: 2025-08-21

## 2025-08-21 RX ORDER — RISPERIDONE 0.5 MG/1
0.5 TABLET ORAL 2 TIMES DAILY
Status: DISCONTINUED | OUTPATIENT
Start: 2025-08-21 | End: 2025-08-24

## 2025-08-21 RX ORDER — BACLOFEN 5 MG/1
TABLET ORAL
Qty: 360 TABLET | Refills: 0 | Status: ON HOLD | OUTPATIENT
Start: 2025-08-21

## 2025-08-21 RX ORDER — METOPROLOL SUCCINATE 25 MG/1
25 TABLET, EXTENDED RELEASE ORAL DAILY
Status: CANCELLED | OUTPATIENT
Start: 2025-08-22

## 2025-08-21 RX ORDER — CYCLOBENZAPRINE HCL 5 MG
10 TABLET ORAL 3 TIMES DAILY PRN
Status: DISCONTINUED | OUTPATIENT
Start: 2025-08-21 | End: 2025-08-21

## 2025-08-21 RX ORDER — ALLOPURINOL 100 MG/1
100 TABLET ORAL DAILY
Qty: 90 TABLET | Refills: 0 | Status: ON HOLD | OUTPATIENT
Start: 2025-08-21

## 2025-08-21 RX ORDER — ALLOPURINOL 100 MG/1
100 TABLET ORAL DAILY
Status: CANCELLED | OUTPATIENT
Start: 2025-08-22

## 2025-08-21 RX ORDER — LORAZEPAM 0.5 MG/1
0.5 TABLET ORAL EVERY 4 HOURS PRN
Status: DISCONTINUED | OUTPATIENT
Start: 2025-08-21 | End: 2025-08-21

## 2025-08-21 RX ORDER — POLYETHYLENE GLYCOL 3350 17 G/17G
17 POWDER, FOR SOLUTION ORAL DAILY
Status: DISCONTINUED | OUTPATIENT
Start: 2025-08-22 | End: 2025-08-21

## 2025-08-21 RX ORDER — QUETIAPINE FUMARATE 50 MG/1
50 TABLET, FILM COATED ORAL
Qty: 30 TABLET | Refills: 0 | Status: ON HOLD | OUTPATIENT
Start: 2025-08-21

## 2025-08-21 RX ORDER — ENOXAPARIN SODIUM 100 MG/ML
1 INJECTION SUBCUTANEOUS EVERY 12 HOURS
Status: DISCONTINUED | OUTPATIENT
Start: 2025-08-21 | End: 2025-08-21

## 2025-08-21 RX ORDER — BISACODYL 10 MG
10 SUPPOSITORY, RECTAL RECTAL DAILY PRN
Qty: 10 SUPPOSITORY | Refills: 0 | Status: ON HOLD | OUTPATIENT
Start: 2025-08-21

## 2025-08-21 RX ORDER — ALLOPURINOL 100 MG/1
100 TABLET ORAL DAILY
Status: DISPENSED | OUTPATIENT
Start: 2025-08-22

## 2025-08-21 RX ORDER — LOPERAMIDE HYDROCHLORIDE 2 MG/1
2 CAPSULE ORAL 3 TIMES DAILY PRN
Status: ACTIVE | OUTPATIENT
Start: 2025-08-21

## 2025-08-21 RX ORDER — LEVETIRACETAM 750 MG/1
750 TABLET ORAL 2 TIMES DAILY
Status: DISCONTINUED | OUTPATIENT
Start: 2025-08-21 | End: 2025-08-21

## 2025-08-21 RX ORDER — CHLORHEXIDINE GLUCONATE 40 MG/ML
SOLUTION TOPICAL DAILY PRN
Status: CANCELLED | OUTPATIENT
Start: 2025-08-21

## 2025-08-21 RX ORDER — ROSUVASTATIN CALCIUM 10 MG/1
10 TABLET, COATED ORAL DAILY
Status: CANCELLED | OUTPATIENT
Start: 2025-08-22

## 2025-08-21 RX ORDER — BACLOFEN 5 MG/1
5 TABLET ORAL 3 TIMES DAILY
Status: DISCONTINUED | OUTPATIENT
Start: 2025-08-21 | End: 2025-08-21

## 2025-08-21 RX ORDER — ACETAMINOPHEN 500 MG
1000 TABLET ORAL 3 TIMES DAILY
Status: DISPENSED | OUTPATIENT
Start: 2025-08-21

## 2025-08-21 RX ORDER — ROSUVASTATIN CALCIUM 10 MG/1
10 TABLET, COATED ORAL DAILY
Status: DISPENSED | OUTPATIENT
Start: 2025-08-22

## 2025-08-21 RX ORDER — THERA TABS 400 MCG
1 TAB ORAL DAILY
Status: DISPENSED | OUTPATIENT
Start: 2025-08-22

## 2025-08-21 RX ORDER — POLYETHYLENE GLYCOL 3350 17 G/17G
17 POWDER, FOR SOLUTION ORAL DAILY PRN
Status: ACTIVE | OUTPATIENT
Start: 2025-08-21

## 2025-08-21 RX ORDER — ENOXAPARIN SODIUM 100 MG/ML
1 INJECTION SUBCUTANEOUS EVERY 12 HOURS
Status: DISPENSED | OUTPATIENT
Start: 2025-08-21

## 2025-08-21 RX ORDER — AMOXICILLIN 250 MG
1 CAPSULE ORAL 2 TIMES DAILY PRN
Status: DISPENSED | OUTPATIENT
Start: 2025-08-21

## 2025-08-21 RX ORDER — ROSUVASTATIN CALCIUM 10 MG/1
10 TABLET, COATED ORAL DAILY
Qty: 90 TABLET | Refills: 0 | Status: ON HOLD | OUTPATIENT
Start: 2025-08-21

## 2025-08-21 RX ORDER — ENOXAPARIN SODIUM 100 MG/ML
1 INJECTION SUBCUTANEOUS EVERY 12 HOURS
Qty: 180 ML | Refills: 0 | Status: ON HOLD | OUTPATIENT
Start: 2025-08-21

## 2025-08-21 RX ORDER — FLUTICASONE PROPIONATE 50 MCG
1 SPRAY, SUSPENSION (ML) NASAL DAILY
Status: DISPENSED | OUTPATIENT
Start: 2025-08-22

## 2025-08-21 RX ORDER — RISPERIDONE 0.5 MG/1
0.5 TABLET ORAL 2 TIMES DAILY
Qty: 120 TABLET | Refills: 0 | Status: ON HOLD | OUTPATIENT
Start: 2025-08-21

## 2025-08-21 RX ORDER — BACLOFEN 5 MG/1
5 TABLET ORAL 3 TIMES DAILY
Status: CANCELLED | OUTPATIENT
Start: 2025-08-21

## 2025-08-21 RX ORDER — LOSARTAN POTASSIUM 100 MG/1
50 TABLET ORAL DAILY
Qty: 90 TABLET | Refills: 0 | Status: SHIPPED | OUTPATIENT
Start: 2025-08-21 | End: 2025-08-21

## 2025-08-21 RX ORDER — RISPERIDONE 0.5 MG/1
0.5 TABLET ORAL 2 TIMES DAILY
Status: CANCELLED | OUTPATIENT
Start: 2025-08-21

## 2025-08-21 RX ORDER — CHLORHEXIDINE GLUCONATE 40 MG/ML
SOLUTION TOPICAL DAILY PRN
Status: DISCONTINUED | OUTPATIENT
Start: 2025-08-21 | End: 2025-08-22

## 2025-08-21 RX ORDER — LORAZEPAM 0.5 MG/1
0.5 TABLET ORAL EVERY 4 HOURS PRN
Status: ON HOLD | DISCHARGE
Start: 2025-08-21

## 2025-08-21 RX ORDER — LEVETIRACETAM 750 MG/1
750 TABLET ORAL 2 TIMES DAILY
Status: CANCELLED | OUTPATIENT
Start: 2025-08-21

## 2025-08-21 RX ORDER — SIMETHICONE 80 MG
40 TABLET,CHEWABLE ORAL EVERY 6 HOURS PRN
Status: DISPENSED | OUTPATIENT
Start: 2025-08-21

## 2025-08-21 RX ORDER — CYCLOBENZAPRINE HCL 10 MG
10 TABLET ORAL 3 TIMES DAILY PRN
Status: CANCELLED | OUTPATIENT
Start: 2025-08-21

## 2025-08-21 RX ORDER — OXYCODONE HYDROCHLORIDE 5 MG/1
5 TABLET ORAL EVERY 6 HOURS PRN
Refills: 0 | Status: CANCELLED | OUTPATIENT
Start: 2025-08-21

## 2025-08-21 RX ORDER — LORAZEPAM 0.5 MG/1
0.5 TABLET ORAL EVERY 4 HOURS PRN
Status: ACTIVE | OUTPATIENT
Start: 2025-08-21

## 2025-08-21 RX ORDER — OXYCODONE HYDROCHLORIDE 5 MG/1
5 TABLET ORAL EVERY 6 HOURS PRN
Status: DISCONTINUED | OUTPATIENT
Start: 2025-08-21 | End: 2025-08-21

## 2025-08-21 RX ORDER — LOSARTAN POTASSIUM 50 MG/1
50 TABLET ORAL DAILY
Status: CANCELLED | OUTPATIENT
Start: 2025-08-21

## 2025-08-21 RX ORDER — RISPERIDONE 1 MG/1
1 TABLET ORAL
Status: DISCONTINUED | OUTPATIENT
Start: 2025-08-21 | End: 2025-08-21

## 2025-08-21 RX ORDER — CHLORHEXIDINE GLUCONATE 40 MG/ML
SOLUTION TOPICAL DAILY PRN
Status: DISPENSED | OUTPATIENT
Start: 2025-08-21

## 2025-08-21 RX ORDER — CHLORHEXIDINE GLUCONATE 40 MG/ML
SOLUTION TOPICAL DAILY PRN
Qty: 473 ML | Refills: 2 | Status: ON HOLD | OUTPATIENT
Start: 2025-08-21

## 2025-08-21 RX ORDER — PROCHLORPERAZINE MALEATE 10 MG
10 TABLET ORAL EVERY 8 HOURS PRN
Status: ACTIVE | OUTPATIENT
Start: 2025-08-21

## 2025-08-21 RX ORDER — BISACODYL 10 MG
10 SUPPOSITORY, RECTAL RECTAL DAILY PRN
Status: ACTIVE | OUTPATIENT
Start: 2025-08-21

## 2025-08-21 RX ORDER — QUETIAPINE FUMARATE 50 MG/1
50 TABLET, FILM COATED ORAL
Status: CANCELLED | OUTPATIENT
Start: 2025-08-21

## 2025-08-21 RX ORDER — ALLOPURINOL 100 MG/1
100 TABLET ORAL DAILY
Status: DISCONTINUED | OUTPATIENT
Start: 2025-08-22 | End: 2025-08-21

## 2025-08-21 RX ADMIN — ALLOPURINOL 100 MG: 100 TABLET ORAL at 07:59

## 2025-08-21 RX ADMIN — ENOXAPARIN SODIUM 100 MG: 100 INJECTION SUBCUTANEOUS at 21:51

## 2025-08-21 RX ADMIN — RISPERIDONE 0.5 MG: 0.5 TABLET ORAL at 07:59

## 2025-08-21 RX ADMIN — ACETAMINOPHEN 1000 MG: 500 TABLET ORAL at 12:02

## 2025-08-21 RX ADMIN — ACETAMINOPHEN 1000 MG: 500 TABLET ORAL at 21:52

## 2025-08-21 RX ADMIN — Medication 5 MG: at 07:59

## 2025-08-21 RX ADMIN — ENOXAPARIN SODIUM 100 MG: 100 INJECTION SUBCUTANEOUS at 10:21

## 2025-08-21 RX ADMIN — Medication 5 ML: at 08:03

## 2025-08-21 RX ADMIN — RISPERIDONE 0.5 MG: 0.5 TABLET, FILM COATED ORAL at 21:51

## 2025-08-21 RX ADMIN — LEVETIRACETAM 750 MG: 750 TABLET, FILM COATED ORAL at 07:59

## 2025-08-21 RX ADMIN — LOSARTAN POTASSIUM 50 MG: 50 TABLET, FILM COATED ORAL at 12:02

## 2025-08-21 RX ADMIN — ROSUVASTATIN CALCIUM 10 MG: 10 TABLET, FILM COATED ORAL at 07:59

## 2025-08-21 RX ADMIN — PROCHLORPERAZINE MALEATE 10 MG: 10 TABLET ORAL at 05:41

## 2025-08-21 RX ADMIN — Medication 7.5 MG: at 21:52

## 2025-08-21 RX ADMIN — Medication 7.5 MG: at 14:33

## 2025-08-21 RX ADMIN — Medication 2.5 MG: at 10:21

## 2025-08-21 RX ADMIN — METOPROLOL SUCCINATE 25 MG: 25 TABLET, EXTENDED RELEASE ORAL at 07:59

## 2025-08-21 RX ADMIN — LEVETIRACETAM 750 MG: 750 TABLET, FILM COATED ORAL at 21:52

## 2025-08-21 RX ADMIN — FLUTICASONE PROPIONATE 1 SPRAY: 50 SPRAY, METERED NASAL at 08:02

## 2025-08-21 RX ADMIN — ACETAMINOPHEN 1000 MG: 500 TABLET ORAL at 06:18

## 2025-08-21 ASSESSMENT — ACTIVITIES OF DAILY LIVING (ADL)
ADLS_ACUITY_SCORE: 45
ADLS_ACUITY_SCORE: 49
ADLS_ACUITY_SCORE: 45
ADLS_ACUITY_SCORE: 51
ADLS_ACUITY_SCORE: 45
ADLS_ACUITY_SCORE: 58
ADLS_ACUITY_SCORE: 51
ADLS_ACUITY_SCORE: 45
ADLS_ACUITY_SCORE: 45
ADLS_ACUITY_SCORE: 51
ADLS_ACUITY_SCORE: 45
ADLS_ACUITY_SCORE: 49
ADLS_ACUITY_SCORE: 51
ADLS_ACUITY_SCORE: 51
ADLS_ACUITY_SCORE: 45
ADLS_ACUITY_SCORE: 45
ADLS_ACUITY_SCORE: 51
ADLS_ACUITY_SCORE: 45
ADLS_ACUITY_SCORE: 49
ADLS_ACUITY_SCORE: 45
ADLS_ACUITY_SCORE: 58

## 2025-08-21 NOTE — PLAN OF CARE
Status: Admitted 7/15 for N/V and lethargy, found to have infact in L MCA, R parietal, R frontal centrum in the setting of new metastatic lung adenocarcinoma. Stay c/b acute personality change, new infarct in R posterior temporal lobe found 8/13, thromobocytopenia, and delirium (code 21 8/13 flora), syncope (rapid code called 8/15 afternoon). Restraints discontinued 8/14 9h45 sitter discontinued 8/15 07h00   Vitals: VSS on RA. Wears home CPAP at night. Intermittently refusing.  Neuros: Ox4 with choices. Garbled speech and intermittent illogical speech, aphasia. R droop. LUE 4/5, RUE 3/5, LLE 4/5, RLE 3-4/5. Absent dorsi/plantar RLE.   IV: PICC pulled. Pressure dressing in place on L arm.  Labs/Electrolytes: no draws per team this AM.  Resp: on RA. Denies SOB.  Diet: regular diet , good PO. Pills in applesauce.  GI: LBM 8/20. BS+.  : VDSP in large amounts.  Skin: Abdominal bruising. Pressure dressing to L upper arm PICC site.  Pain: denies, taking scheduled meds still.  Activity: A1/GB/cane/R AFO/R arm brace. R orthotic/brace to be worn @ night.  SCDs on? If no, why?: no, refused.  Social: patient father @ bedside, supportive and anxious.  Plan: discharge to ARU between [id number].  Updates this shift: dsicharged to ARU. PICC removed.

## 2025-08-21 NOTE — PLAN OF CARE
Occupational Therapy Discharge Summary    Reason for therapy discharge:    Discharged to acute rehabilitation facility.    Progress towards therapy goal(s). See goals on Care Plan in Psychiatric electronic health record for goal details.  Goals partially met.  Barriers to achieving goals:   discharge from facility.    Therapy recommendation(s):    Continued therapy is recommended.  Rationale/Recommendations:  to maximize functional IND.

## 2025-08-21 NOTE — PLAN OF CARE
Discharge time/date: 8/21/25 @ 1239  Walked or Wheelchair: wheelchair via EMS  PIV removed: PICC removed.  Reviewed AVS with patient: no, ARU discharge.  Medication due times added to AVS in EPIC: yes, ARU dishcarge.  Verbalized understanding of discharge with teachback: yes  Medications retrieved from pharmacy: yes  Supplies sent home: yes  Belongings from security with patient: N/A    Sent via wheelchair with EMS to [facility]. Belongings sent with patient and chemo med needed for ARU.

## 2025-08-21 NOTE — PROGRESS NOTES
Care Management Discharge Note    Discharge Date: 08/21/2025       Discharge Disposition: Acute Rehab    [facility]  [address], [address]th [address]  [facility] [address]   P: [phone]  Adm P: [phone]  F: [phone]    Discharge Services: Transportation Services    [facility] Ph: [phone]  Transport Type:Wheel Chair  Indication/Need: w/c to be provided by transport    Ride Date: 8/21 Window Scheduled: 12:24 PM - 1:09 PM   Private Pay Cost Discussed: n/a  PCS Completed:N/A    Discharge DME: None    Discharge Transportation: agency    Is transportation arrangement complete? Yes    Private pay costs discussed: Not applicable    Does the patient's insurance plan have a 3 day qualifying hospital stay waiver?  No    PAS Confirmation Code:  N/A  Patient/family educated on Medicare website which has current facility and service quality ratings: yes    Education Provided on the Discharge Plan: Yes  Persons Notified of Discharge Plans: Provider, bed side nurse, patient, patient [facility], PCP, ARU, EMS     Patient/Family in Agreement with the Plan: yes    Handoff Referral Completed: No, handoff not indicated or clinically appropriate    Additional Information:  Writer spoke with [facility] ARU admissions and confirmed they are in agreement of admission today. Writer met with pt and family and confirmed they are in agreement of the plan today. Nurse to Nurse is [phone].     BRUNILDA Haskins  Float   [facility] Acute Care Management  Searchable on Vocera: 6A Neuro SW

## 2025-08-21 NOTE — PLAN OF CARE
Neuro: A&Ox4. Flat affect and withdrawn. RUE 2-3/5 LUE 4/5, slight right facial droop  Cardiac:  VSS.   Respiratory: Sating > 92% on RA. Home CPAP at night  GI/: low urine output. Usually goes many hours between voids. BM yesterday  Diet/appetite: Tolerating regular  Activity:  Assist of 1 w/ cane and gait belt  Pain: Denies  Skin: See flowsheet for assessment and interventions.   LDA's: L DL PICC    Plan: discharge to ARU today; determine if PICC is to be pulled first; Continue with POC. Notify primary team with changes.      Goal Outcome Evaluation:      Plan of Care Reviewed With: patient    Overall Patient Progress: improving    Outcome Evaluation: Neuros unchanged, flat affect. slept well overnight

## 2025-08-21 NOTE — DISCHARGE SUMMARY
[facility]  Discharge Summary - Medicine & Pediatrics       Date of Admission:  7/15/2025  Date of Discharge:  8/21/2025  Discharging Provider: Dr. Rod  Discharge Service: Medicine Service, GULSHAN TEAM 3    Discharge Diagnoses   #New metastatic lung adenocarcinoma, ROS1 rearrangement with right lung nodule and multiple enlarged mediastinal, paraesophageal, and hilar lymph nodes   #New acute infarct in Right Posterior Temporal Lobe    #Multifocal acute ischemic strokes, in L MCA territory and new L basal ganglia stroke; Increasing mass effect on L lateral ventricle by L MCA infarction  #Subtotal occlusive thrombus in left M1   #Left frontoparietal SAH (7/19)  #Hypercoagulability d/t metastatic lung adenocarcinoma with infarcts of brain, kidney, spleen. New brain infarct 8/13.  #Cardiac valve thrombus on tricuspid and aortic valve 7/16 on TTE   #Hx pulmonary embolism (6/23/25)  #Acute thrombocytopenia, in the setting of metastatic lung adenocarcinoma d/t ITP vs drug-induced vs consumptive coagulopathy  #Acute anemia  #Acute change in personality with inappropriate affect and agitation suspect 2/2 new posterior R. Temporal infarct   # Maculopapular rash on face, chest, bilateral upper extremities, improved  # Numerous palpable purpura across the scalp and lower extremities   # Petechiae on bilateral lower extremities, improving  #Type 2 Diabetes Mellitus, A1c 5.3   #Hyperlipidemia, chronic   #Hypertension  #Obstructive sleep apnea  #Leukocytosis, resolved   #Hiccups, resolved   #chronic back pain  #allergies   #Epistaxis    Clinically Significant Risk Factors          Follow-ups Needed After Discharge   Follow-up Appointments       Follow Up and recommended labs and tests      Follow up with Oncology (Dr. Mackenzie) around 9/10  Follow up with Stroke team in 2-3 months  Repeat TTE in 2 months            Labs: EKG, CBC with Diff, CMP, Mag, Uric acid, CK weekly for 4 weeks  (first set of labs ordered, to be completed 8/22)    Unresulted Labs Ordered in the Past 30 Days of this Admission       Date and Time Order Name Status Description    7/26/2025  2:44 PM Prepare pheresed platelets (unit) Preliminary     7/25/2025  1:50 PM [id number] Liquid In process     7/16/2025  1:10 AM Prepare plasma (unit) Preliminary     7/16/2025  1:10 AM Prepare plasma (unit) Preliminary     7/16/2025  1:10 AM Prepare plasma (unit) Preliminary     7/16/2025  1:10 AM Prepare plasma (unit) Preliminary     7/16/2025  1:10 AM Prepare plasma (unit) Preliminary     7/16/2025  1:10 AM Prepare plasma (unit) Preliminary     7/16/2025  1:10 AM Prepare plasma (unit) Preliminary     7/16/2025  1:10 AM Prepare plasma (unit) Preliminary     7/16/2025  1:10 AM Prepare plasma (unit) Preliminary     7/16/2025  1:10 AM Prepare plasma (unit) Preliminary     7/16/2025  1:10 AM Prepare plasma (unit) Preliminary     7/16/2025  1:10 AM Prepare plasma (unit) Preliminary     7/16/2025  1:10 AM Prepare plasma (unit) Preliminary     7/16/2025  1:10 AM Prepare plasma (unit) Preliminary     7/16/2025  1:08 AM Prepare plasma (in mL) Preliminary         These results will be followed up by ARU    Discharge Disposition   Discharged to rehabilitation facility  Condition at discharge: Stable    Hospital Course   Bernard Chapman is a 35 year old male with a past medical history of hypertension, diabetes, ELENA, PE/DVT (on rivaroxaban) admitted on 7/15/2025 for lethargy, N/V, and blurry vision found to have multifocal acute CVA in L MCA , R parietal and R frontal centrum semiovale. Work up revealed metastatic lung adenocarcinoma with likely malignancy-related hypercoagulability and acute change in personality beginning 8/10 with new findings of R posterior temporal lobe infarct, now improving on risperidone and ready for discharge to acute rehab facility.          #New metastatic lung adenocarcinoma, ROS1 rearrangement with right lung nodule  and multiple enlarged mediastinal, paraesophageal, and hilar lymph nodes  Malignancy work up initiated on admission due to significant hypercoagulability. CT CAP significant for [id number] spiculated density in R lung apex, 9mm pulmonary nodule in R lung base, L thyroid nodule, paratracheal LAD, splenic infarct, bilateral renal infarcts, and L retroperitoneal nodule. Underwent FNA of lymph node which was positive for lung adenocarcinoma (resulted 7/22).  PDL-1 resulted with low expression (40%). PET 7/26 with distant metastases and MRI brain showing no signs of metastasis. Patient started chemotherapy 8/13. Medically ready for discharge with plan to discharge to ARU on 8/21.  Oncology consulted, appreciate recs              - NGS; positive for a ROS1 gene rearrangement (SDC4::ROS1)                - Guardant 360; T p53 mutation and MITCHELL   - Taletrectinib 600 mg daily on empty stomach, avoid food for two hours before and after (moved to 6am)  - allopurinol 100 mg daily   - EKG, CBC with Diff, CMP, Mag, Uric acid, CK weekly for 4 weeks (first set of labs ordered for 8/22)  - Follow up with Dr. Mackenzie around September 10  Anti-nausea:              - compazine 10 mg scheduled prior to chemotherapy  - ativan 0.5 mg prn (avoiding additional zofran and compazine to decrease risk of prolonged QT given patient taking keppra and taletrectinib)  Anti-diarrhea              - imodium prn  - [facility] 2nd opinion; Patient will need to call [phone] to make appointment if still interested     #Hypercoagulability d/t metastatic lung adenocarcinoma with infarcts of brain, kidney, spleen. New brain infarct 8/13.  #Cardiac valve thrombus on tricuspid and aortic valve 7/16 on TTE  #Hx pulmonary embolism (6/23/25)  #Acute thrombocytopenia, in the setting of metastatic lung adenocarcinoma d/t ITP vs drug-induced vs consumptive coagulopathy  #Acute anemia  Found to have multiple CVA, likely embolic. TTE with mobile echodensities on tricuspid and  aortic valve. Initial concern for TTP or catastrophic APS, however work up ultimately significant for new diagnosis of metastatic lung cancer. Hypercoagulable state presumed to be most likely 2/2 malignancy. Started on heparin gtt 7/17, but briefly held for 7/18 endobronchial needle aspiration of lymph nodes. Stroke code called 7/19 for worsening R-sided hemiparesis and hemianopia. Repeat CTA with new focal high-grade stenosis of other part of left MCA. Heparin gtt was stopped during code stroke, but restarting (heparin gtt + levophed) led to 10/10 headache. CTH with small left frontal SAH. Brain MRI with new L basal ganglia stroke. Heparin gtt resumed, and repeat CTH stable. Switched to therapeutic Lovenox 7/22. Declining fibrinogen, so received cryoprecipitate 7/25. Concern for continued consumptive process, but on therapeutic Lovenox. PLT downtrending, so receiving transfusions intermittently. 8/14 lupus anticoagulant profile indeterminant. Cardiology consulted given cardiac valve thrombus and concern for stroke, agreed with therapeutic AC on lovenox, not likely surgical candidate. Lovenox was decreased to 80 mg BID per pharmacy on 8/9 when Anti-Xa level supratherapeutic. Patient was transitioned to heparin gtt on 8/13 given evidence of new infarct on 8/14. Transitioned back to lovenox 100 mg BID per neuro and heme recs. Platelets have now stabilized as of 8/17.  - Neurology consulted as below  - Hematology consulted, appreciate recs              -Transfuse if   -Hgb<7  -PLT<50k  - Lovenox 100 mg BID  - Consider transition to DOAC once outpatient with oncology. Per Heme, plan to discharge to ARU on therapeutic lovenox  - Follow up lupus anticoagulant outpatient with Heme/Onc     #New acute infarct in Right Posterior Temporal Lobe    #Multifocal acute ischemic strokes, in L MCA territory and new L basal ganglia stroke; Increasing mass effect on L lateral ventricle by L MCA infarction  #Subtotal occlusive thrombus  in left M1   #Left frontoparietal SAH (7/19)  Etiology most likely hypercoagulability d/t malignancy. Emboli stemming from tricuspid and aortic valves, appreciated on TTE 7/16. He continues to have R facial droop, R extremity weakness. Left frontoparietal subarachnoid hemorrhage stable per CT.  Repeat brain MRI on 8/4 shows 2 new small infarcts in the right frontal lobe. Neuro evaluation as below due to personality changes. Repeat MRI on 8/14 with new infarct of posterior right temporal lobe and scattered punctate areas of left temporal lobe. Low concern for hemorrhagic conversion per Neuro. Heme/Onc recommending change to therapeutic lovenox 1 mg/kg q12h, only check Xa if signs of bleeding.   - Neuro consulte              - Keppra 750 mg BID              - ativan 4mg IV if evidence of seizures >  3 minutes  -OT/PT/Speech              -therapies doing aggressive rehabilitation as able  - nutrition consult              -monitor oral intake, weights  - Anticoagulation as above  - [  ] Repeat TTE outpatient in 2 months to ensure improvement of AR and TR (and vegetations)  - [  ] Follow up in the stroke clinic in 2-3 months (order placed)      #Acute change in personality with inappropriate affect and agitation suspect 2/2 new posterior R. Temporal infarct  Starting around 8/10, patient has had significant personality changes, including becoming less participatory/interactive with therapies and with family. CT head from 8/11 without any acute findings. On 8/12, concern for seizure like activity with 10 seconds of general tremulousness and subsequent emesis. Given Keppra load with subsequent vEEG without evidence of seizures. Daily keppra started 8/13. MRI/MRA head 8/13 showing new right posterior temporal lobe infarct. Increased aggression and agitation overnight on 8/14 requiring Code 21 and use of physical 4 point restraints. Patient with inappropriate laughter and humor. Difficult to assess voluntariness of the  decreased responsiveness. Potential component of delirium at play versus depression vs neurological damage secondary to new infarct. Patient's mood/affect is improving, now cooperative with cares, and has not required psychiatric prns since 8/13.  Psychiatry consult, appreciate recs              - 0.5 mg risperidone BID               - 1 mg risperidone prn for agitation               - 50 mg seroquel prn for agitation and sleep              - 2-5 mg Haldol IV Q4 prn for agitation   - Delirium precautions     # Maculopapular rash on face, chest, bilateral upper extremities, improved  # Numerous palpable purpura across the scalp and lower extremities   # Petechiae on bilateral lower extremities, improving  C/f palpable purpura, so s/p L leg s/p punch biopsy with dermatology: rupture folliculitis. New rash noted 7/26 with flushing of face/chest after PET scan, suspected  secondary to contrast from PET scan.   - Benadryl as needed for itching  - Chlorhexidine wash  - Monitor     #DM2, A1c 5.3  On HSSI with adequate control after stopping dexamethasone. CTM.   - POC glucose ACHS  - High intensity insulin sliding scale  - Hold PTA dulaglutide, consider resuming outpatient     #HLD, chronic  -Cont 10 mg rosuvastatin     #HTN  - Continue PTA Metoprolol succinate 25mg q daily (ok to hold if HR< 60)  - Restarted PTA losartan at 50% dosing - 50mg PO daily on 8/21  - Holding PTA hydrochlorothiazide on discharge, can resume as needed.      #ELENA  Currently not on CPAP, consider resuming if mental status improves     #Leukocytosis, resolved  Found to have leukocytosis, no clear cause or signs of infection, possible 2/2 stress induced.      #Hiccups, resolved   #Chronic back pain  Chronic back pain. Hiccups resolved (suspected 2/2 dexamethasone).   Plan:  - Thorazine 25mg q6hr discontinued due to contraindication with chemotherapy  - Melatonin 3mg daily  - Pepcid discontinued due to contraindication with chemotherapy  - Continue  baclofen  - Acetaminophen 1000 mg every 6 hours  - cyclobenzaprine 10 mg 3x daily PRN  - PTA gabapentin 100 mg 3x daily  - PTA oxycodone 5 mg PRN every 6h      #allergies  - fluticasone 1 spray daily      #Epistaxis, currently resolved  On 8/5 patient found to have epistaxis. At the time, physical exam with blood coming from the right nares.  - Afrin spray as needed  - Apply pressure to the nasal passage to help with bleeding     Consultations This Hospital Stay   APHERESIS TRANSFUSION ADULT/PEDS IP CONSULT  SPEECH LANGUAGE PATH ADULT IP CONSULT  PHARMACY IP CONSULT  PHARMACY IP CONSULT  PHYSICAL THERAPY ADULT IP CONSULT  OCCUPATIONAL THERAPY ADULT IP CONSULT  REHAB ADMISSIONS LIAISON IP CONSULT  CARE MANAGEMENT / SOCIAL WORK IP CONSULT  CLASSICAL HEMATOLOGY ADULT IP CONSULT  DERMATOLOGY IP CONSULT  RHEUMATOLOGY IP CONSULT  SURGICAL ONCOLOGY ADULT IP CONSULT  SPIRITUAL HEALTH SERVICES IP CONSULT  INTERVENTIONAL PULMONARY ADULT IP CONSULT  CONSULT FOR INPATIENT VASCULAR ACCESS CARE  THORACIC SURGERY ADULT IP CONSULT  INTERVENTIONAL PULMONARY ADULT IP CONSULT  PHARMACY IP CONSULT  INTERVENTIONAL RADIOLOGY ADULT/PEDS IP CONSULT  ANESTHESIOLOGY IP CONSULT  PHARMACY IP CONSULT  CONSULT FOR INPATIENT VASCULAR ACCESS CARE  PHARMACY IP CONSULT  IV TEAM IP CONSULT  IV TEAM IP CONSULT  CONSULT FOR INPATIENT VASCULAR ACCESS CARE  CARE MANAGEMENT / SOCIAL WORK IP CONSULT  NEUROLOGY STROKE ADULT IP CONSULT  ONCOLOGY ADULT IP CONSULT  CONSULT FOR INPATIENT VASCULAR ACCESS CARE  PHARMACY LIAISON FOR MEDICATION COVERAGE CONSULT  CONSULT FOR INPATIENT VASCULAR ACCESS CARE  CONSULT FOR INPATIENT VASCULAR ACCESS CARE  NEUROLOGY STROKE ADULT IP CONSULT  CARDIOLOGY GENERAL ADULT IP CONSULT  CONSULT FOR INPATIENT VASCULAR ACCESS CARE  PHARMACY IP CONSULT  CONSULT FOR INPATIENT VASCULAR ACCESS CARE  CONSULT FOR INPATIENT VASCULAR ACCESS CARE  CONSULT FOR INPATIENT VASCULAR ACCESS CARE  PSYCHIATRY IP CONSULT  CONSULT FOR INPATIENT VASCULAR  ACCESS CARE  VASCULAR ACCESS FOR PICC PLACEMENT ADULT IP CONSULT  PHARMACY IP CONSULT  SPEECH LANGUAGE PATH ADULT IP CONSULT  PHYSICAL THERAPY ADULT IP CONSULT  OCCUPATIONAL THERAPY ADULT IP CONSULT  SPEECH LANGUAGE PATH ADULT IP CONSULT  PHYSICAL THERAPY ADULT IP CONSULT  OCCUPATIONAL THERAPY ADULT IP CONSULT  SPEECH LANGUAGE PATH ADULT IP CONSULT  SMOKING CESSATION PROGRAM IP CONSULT    Code Status   Full Code       The patient was discussed with MD Shiela Marcum 3 Service  [facility] [address] [address]  Phone: [phone]  ______________________________________________________________________    Physical Exam   Vital Signs: Temp: 97.6  F (36.4  C) Temp src: Oral BP: 130/63 Pulse: 72   Resp: 16 SpO2: 97 % O2 Device: None (Room air)    Weight: 224 lbs 11.2 oz  General Appearance:  Appears well, standing, in no acute distress  Respiratory: Breathing comfortably on room air           GI: Deferred  Skin: scattered petechiae on bilateral lower extremities, improving  Other:  R facial droop, RUE 3-4/5, 05 R hand  strength. 5/5 LUE and LLE. Answers questions with head nod or yes/no one word replies.       Primary Care Physician   Kushal Matthews    Discharge Orders      Occupational Therapy  Referral      Adult Oncology/Hematology  Referral      Med Therapy Management Referral      General info for SNF    Length of Stay Estimate: Short Term Care: Estimated # of Days <30  Condition at Discharge: Stable  Level of care:skilled   Rehabilitation Potential: Fair  Admission H&P remains valid and up-to-date: Yes  Recent Chemotherapy: Date:         Daily immunotherapy              Use Nursing Home Standing Orders: Yes     Mantoux instructions    Give two-step Mantoux (PPD) Per Facility Policy Yes     Follow Up and recommended labs and tests    Follow up with Oncology (Dr. Mackenzie) around 9/10  Follow up with Stroke team in 2-3  months  Repeat TTE in 2 months     Tubes and Drains    Current Tubes and Drains:  None     Activity - Up ad chaim     Reason for your hospital stay    You were hospitalized due to     Full Code     Physical Therapy Adult Consult    Evaluate and treat as clinically indicated.    Reason:  Rehab s/p multifocal strokes     Occupational Therapy Adult Consult    Evaluate and treat as clinically indicated.    Reason:  Rehab s/p multifocal strokes     Speech Language Path Adult Consult    Evaluate and treat as clinically indicated.    Reason:  R facial droop and dysarthria s/p multifocal strokes     Physical Therapy Adult Consult    Evaluate and treat as clinically indicated.    Reason:  rehab s/p multifocal stroke     Occupational Therapy Adult Consult    Evaluate and treat as clinically indicated.    Reason:  rehab s/p multifocal stroke     Speech Language Path Adult Consult    Evaluate and treat as clinically indicated.    Reason:  dysarthria s/p multifocal stroke     Seizure precautions     Diet    Follow this diet upon discharge: Current Diet:Orders Placed This Encounter      Regular Diet Adult     Stroke Hospital Follow Up (for neurologist use only)    Please be aware that coverage of these services is subject to the terms and limitations of your health insurance plan.  Call member services at your health plan with any benefit or coverage questions.  [employer] will call you to coordinate care as prescribed by your provider. If you don t hear from a representative within 2 business days, please call [phone].         Significant Results and Procedures   Most Recent 3 CBC's:  Recent Labs   Lab Test 08/20/25  0513 08/19/25  0623 08/18/25  0600   WBC 8.51 7.56 7.93   HGB 10.4* 10.0* 10.2*   MCV 83.1 82.7 81.6   * 98* 95*     Most Recent 3 BMP's:  Recent Labs   Lab Test 08/19/25  0623 08/18/25  0600 08/17/25  0553    140 141   POTASSIUM 4.2 4.2 3.9   CHLORIDE 107 106 104   CO2 24 24 26   BUN 22.2*  20.9* 26.0*   CR 1.03 1.06 1.11   ANIONGAP 11 10 11   SHARAN 9.0 9.1 9.0   * 102* 122*     Most Recent 2 LFT's:  Recent Labs   Lab Test 08/19/25  0623 08/18/25  0600   AST 23 21   ALT 26 23   ALKPHOS 64 64   BILITOTAL 0.3 0.4   ,   Results for orders placed or performed during the hospital encounter of 07/15/25   Head CT w/o contrast    Narrative    EXAM: CT HEAD W/O CONTRAST  LOCATION: [facility]  DATE: 7/15/2025    INDICATION: Nausea, headache, dizziness, on anticoagulation  COMPARISON: None.  TECHNIQUE: Routine CT Head without IV contrast. Multiplanar reformats. Dose reduction techniques were used.    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 Associated dysmorphic appearance of the lateral ventricles. No hydrocephalus. No intracranial hemorrhage, extraaxial   collection, or mass effect.  No CT evidence of acute infarct. Normal parenchymal attenuation.      VISUALIZED ORBITS/SINUSES/MASTOIDS: No intraorbital abnormality. No paranasal sinus mucosal disease. No middle ear or mastoid effusion.    BONES/SOFT TISSUES: No acute abnormality.      Impression    IMPRESSION:  1.  No evidence of acute intracranial abnormality.  2.  Callosal dysgenesis.     MR Brain w/o & w Contrast    Narrative    EXAM: MR BRAIN W/O and W CONTRAST  LOCATION: [facility]  DATE: 7/15/2025    INDICATION: Dizziness, altered mental status, and headache  COMPARISON: CT performed earlier today  CONTRAST: 11.2 mL Gadavist  TECHNIQUE: Routine multiplanar multisequence head MRI without and with intravenous contrast    FINDINGS:  INTRACRANIAL CONTENTS: Numerous scattered punctate and patchy cortical and white matter foci of restricted diffusion involving the left middle cerebral artery territory with relative sparing of the left temporal as well as  parasagittal left frontal and   occipital lobes. A few smaller foci of restricted diffusion on the right predominantly centered at the occipitotemporal junction with additional foci in the right parietal lobe and frontal centrum semiovale. Corresponding low ADC values and FLAIR   hyperintensity without significant associated mass effect. Curvilinear FLAIR hyperintensity along the posterior aspect of the left middle frontal gyrus with corresponding loss of signal on the susceptibility weighted images without a correlate on the T1-   or diffusion-weighted images, which may represent slow flow or thrombosis of a cortical vessel versus trace subarachnoid hemorrhage (for example, series 9 image 24 and series 12 image 59). No confluent intraparenchymal hematoma. No mass or pathologic   enhancement. No significant brain parenchymal signal abnormality elsewhere. Redemonstrated callosal dysgenesis with absence of the genu as well as the posterior body, isthmus, and splenium. Normal size of the ventricles and sulci for age. Normal position   of the cerebellar tonsils.     SELLA: Within technique limitations, no gross abnormality.    OSSEOUS STRUCTURES/SOFT TISSUES: No suspicious bone marrow signal intensity. The major intracranial vascular flow voids are maintained. Increased FLAIR signal intensity within multiple distal M2-M4 segment branches of the left MCA, likely due to slow   flow.    SINUSES/MASTOIDS: No evidence of significant paranasal sinus or mastoid mucosal disease.        Impression    IMPRESSION:  1.  Numerous multifocal acute cortical and white matter left cerebral hemisphere infarcts predominantly involving the left MCA territory with a few acute infarcts in the right cerebral hemisphere, as described. No significant mass effect.  2.  Trace acute subarachnoid hemorrhage versus slow flow or thrombosis of a cortical vessel along the parasagittal left frontal lobe, the latter favored. No confluent intraparenchymal  hematoma.      CTA Head Neck with Contrast    Narrative    EXAM: CTA HEAD NECK W CONTRAST  LOCATION: [facility]  DATE: 7/15/2025    INDICATION: stroke on MRI  COMPARISON: Same day noncontrast head CT and MRI brain.  CONTRAST: 67mL Isovue 370  TECHNIQUE: Head and neck CT angiogram with IV contrast. CT images of the head and neck vessels obtained during the arterial phase of intravenous contrast administration. Axial 2D reconstructed images and multiplanar 3D MIP reconstructed images of the   head and neck vessels were performed by the technologist. Dose reduction techniques were used. All stenosis measurements made according to NASCET criteria unless otherwise specified.    FINDINGS:   NECK CTA:  AORTIC ARCH: Normal caliber two-vessel aortic arch. Patent proximal great vessels.    RIGHT CAROTID: Patent.    LEFT CAROTID: Patent.    VERTEBRAL ARTERIES: Patent. Balanced vertebral arteries.    HEAD CTA:  ANTERIOR CIRCULATION: Patent intracranial carotid vasculature. Near occlusion of the left MCA-M1 segment (8 mm length), measuring 8 mm in length; distal opacification throughout the M2 segments.    POSTERIOR CIRCULATION: Patent intracranial vertebral vasculature. Patent basilar artery. No large vessel occlusion or aneurysm.    DURAL VENOUS SINUSES: Expected enhancement of the major dural venous sinuses.    NONVASCULAR STRUCTURES: Unremarkable.        Impression    IMPRESSION:   NECK CTA:  1.  Patent neck vasculature.    HEAD CTA:   1.  Near occlusion of the left MCA-M1 segment (8 mm length) with distal opacification throughout the M2 segments.    Findings discussed with Dr. Pond by Dr. Owens at 8:25 PM 7/15/2025.   CTV Head with Contrast    Narrative    EXAM: CTV HEAD WITH CONTRAST  LOCATION: [facility]  DATE: 7/15/2025    INDICATION: r o CVST  COMPARISON: Same day noncontrast head CT and MRI.  CONTRAST: 67mL Isovue  370  TECHNIQUE: Head CT venogram with IV contrast. CT images of the intracranial vessels obtained during the venous phase of intravenous contrast administration. Axial helical 2D reconstructed images and multiplanar 3D MIP reconstructed images of the   intracranial vessels were performed by the technologist. Dose reduction techniques were used.     FINDINGS:   DURAL SINUSES: No significant stenosis or occlusion. Dominant right and smaller left transverse and sigmoid dural sinuses.    INTERNAL CEREBRAL VEINS: No significant stenosis or occlusion.      MAJOR CORTICAL VENOUS BRANCHES: No significant stenosis or occlusion; specifically, no findings to suggest thrombus along the left cerebral hemisphere as queried on recent MRI..      Impression    IMPRESSION:   1.  No intracranial venous thrombosis.   XR Chest Port 1 View    Narrative    Exam: XR CHEST PORT 1 VIEW, 7/16/2025 3:40 AM    Comparison: 11/3/2015    History: line placement    Findings:  Portable AP view of the semiupright chest. Right IJ CVC with tip at  the cavoatrial junction. Trachea is midline. Cardiomediastinal  silhouette is within normal limits. No focal airspace opacity. No  pneumothorax or pleural effusion. The visualized upper abdomen is  unremarkable. No acute osseous abnormalities.      Impression    Impression:   Right IJ CVC with tip at the cavoatrial junction. No focal airspace  opacity.    I have personally reviewed the examination and initial interpretation  and I agree with the findings.    KOSTAS HARRELL DO         SYSTEM ID:  [id number]   CT Chest/Abdomen/Pelvis w Contrast    Narrative    EXAMINATION: CT CHEST/ABDOMEN/PELVIS W CONTRAST, 7/16/2025 12:08 PM    INDICATION: malignancy, 3 vessel territrory stroke    COMPARISON STUDY: CTA chest 7/16/2025    TECHNIQUE: CT scan of the chest, abdomen and pelvis was performed on  multidetector CT scanner using volumetric acquisition technique and  images were reconstructed in multiple planes with  variable thickness  and reviewed on dedicated workstations.     CONTRAST: 135cc of isovue 370 injected IV without oral contrast    CT scan radiation dose is optimized to minimum requisite dose using  automated dose modulation techniques.    FINDINGS:    Lungs/pleura: 11 x 11 mm spiculated density with central lucency  within the right apex. Inferior peribronchovascular pulmonary  opacities within the subsegmental right upper lobe bronchi leading to  this lesion. Bronchial wall thickening most notably within the right  upper lobe. Scattered groundglass pulmonary nodules in the left upper  lobe and right middle and lower lobes. Solid pulmonary nodule in the  lateral right lung base adjacent to the dome of the liver, measuring  approximately 9 mm (8/105). Bibasilar atelectasis.    Mediastinum: Superior left thyroid lobe nodule measuring approximately  10 x 7 mm. Irregular heterogenous nodular extension of the superior  aspect of the right thyroid lobe projecting into the paraesophageal  soft tissues; consider sonographic correlation. The trachea and  central airways are clear. Heart size is normal without pericardial  effusion. Normal caliber ascending thoracic aorta and pulmonary trunk.  There are scattered areas of filling defect in bilateral lower lobe  segmental pulmonary arteries. Right IJ central line with tip in the  low SVC. Enlarged right paratracheal nodes measuring up to 14 mm in  short axis, best appreciated on coronal image (series 10 image 58).  Necrotic-appearing paraesophageal node inferior to the gerald near the  right hilum (6/100), measuring up to 1.9 cm. Additional enlarged right  hilar nodes measuring up to 14 mm (6/85).    Liver: No mass. No intrahepatic biliary ductal dilation.    Biliary System: Normal gallbladder. No extrahepatic biliary ductal  dilation.    Pancreas: No mass or pancreatic ductal dilation.    Adrenal glands: No mass or nodules    Spleen: Measures approximately 14 mm, borderline  enlarged.  Wedge-shaped hypodensity at the periphery of the spleen (6/186 and  10/83).    Kidneys: No suspicious mass, obstructing calculus or hydronephrosis.  Subtle scattered wedge-shaped hypodensities in both renal parenchymas.    Gastrointestinal tract: Normal appendix. Normal caliber small bowel.    Mesentery/peritoneum/retroperitoneum: 11 mm soft tissue density nodule  in the left retroperitoneum (6/307). No free fluid or air.    Lymph nodes: No mesenteric or retroperitoneal lymphadenopathy.     Vasculature: Patent major abdominal vasculature.  Eccentric filling  defect in the left external iliac vein (10/66).    Pelvis: Urinary bladder is normal.    Osseous structures: No aggressive or acute osseous lesion.      Soft tissues: Fat-containing left inguinal hernia.      Impression    IMPRESSION:     1. Multiple enlarged mediastinal and hilar lymph nodes with necrotic  changes in the paraesophageal lymph nodes. Leading consideration is  neoplastic etiology, lymphoproliferative versus metastasis. Recommend  correlation with tissue sampling.    2. Spiculated-appearing nodule within the right lung apex measuring up  to 11 mm with central lucency. Adjacent peribronchovascular opacities  surrounding the subsegmental bronchi inferior to this lesion. Findings  are nonspecific, but given history could potentially represent a  region of small pulmonary infarction. Other scattered subcentimeter  groundglass nodules are probably infective/inflammatory in etiology.  Recommend comparison with any prior imaging (none available in the  system) and attention on short interval follow-up.    3. Mild splenomegaly.    4. 11 mm soft tissue density nodule in the left retroperitoneum is  indeterminate, metastasis is not excluded.    5. Small wedge-shaped hypodensity at the periphery of the spleen and  few scattered wedge-shaped areas in both kidneys likely represent  infarcts.    6. Scattered areas of filling defect in bilateral lower  lobe segmental  pulmonary arteries are indeterminate for flow artifacts versus emboli  on this nondedicated exam. Consider CT PE for further evaluation.  Additionally, focal filling defect in the left external iliac vein is  indeterminate, consider dedicated Doppler for further evaluation.        I have personally reviewed the examination and initial interpretation  and I agree with the findings.    WARNER ROBLES MD         SYSTEM ID:  [id number]   CTA  Angiogram Heart    Narrative    Procedure: CTA HEART STRUCTURE   Examination Date: 7/16/2025 12:09 PM   INDICATION: Evaluate for intracardiac thrombus after stroke.  Ordering Provider: Neurology.  Overall quality of the study: Non-diagnostic due to poor contrast  opacification and cardiac motion artifact. .     PROCEDURE: ECG gated multi-slice computed tomography of the heart   with intravenous contrast (Isovue 370, 135 mL) was performed on a  [employer] Dual Source Flash scanner without incident. CTA was performed  in the spiral mode at a heart rate of 101 bpm with 120 kVp. A delayed  Flash scan was performed after 1 minute to evaluate for thrombus.  Images were reconstructed and analyzed on a [employer] workstation.  Scan protocol was optimized to minimize radiation exposure. The total  radiation exposure was calculated to be 2123 DLP and 29.7 mSv.      Impression    IMPRESSION:  1.  Non-diagnostic evaluation for left atrial appendage thrombus.   2.  Please review the separate Radiology report for incidental  noncardiac findings.    FINDINGS:      1.  Non-diagnostic evaluation for left atrial appendage thrombus.   2.  The left atrial appendage has a  chicken wing morphology.  3.   Coronary images are non-diagnostic for stenosis/atherosclerosis  due to cardiac motion artifact. No coronary artery calcifications  seen.   4.   The visualized aortic root, ascending aorta, and descending  thoracic aorta are normal in caliber.    ABDOUL GARCIA MD         SYSTEM ID:  [id number]    Radiologist Consult For Cardiology    Narrative    Radiologist consult cardiology    INDICATION: Stroke    COMPARISON: None     unremarkable.  Detailed included portion of the lungs post punctate calcified  granuloma right lung there are dependent atelectasis bilaterally.  Calcified granuloma left lower lobe. No suspicious dominant nodule.  However, there is right hilar fullness consistent with enlarged hilar  lymph node measuring approximately 25 x 24 mm. Bulky subcarinal node  measures approximately 26 x 29 mm. The right hilar soheila mass appears  to abut the posterior surface of the right upper lobar pulmonary  artery. There are incidental hypodense filling defects in the  right-sided pulmonary arterial system consistent with pulmonary emboli  which appear nonobstructive. There is also left lower lobe hypodense  filling defect again nonobstructive consistent with pulmonary embolus.  No left atrial or obvious atrial appendage thrombus. No effusions. No  nodules.      Impression    IMPRESSION:  1. Incidental pulmonary emboli bilaterally without obvious right heart  strain.  2. Bulky right hilar and subcarinal lymphadenopathy.    NATALIIA ALCAZAR MD         SYSTEM ID:  [id number]   CT Head w/o Contrast    Narrative    CT HEAD W/O CONTRAST, CT HEAD PERFUSION W CONTRAST, CTA HEAD NECK W  CONTRAST 7/17/2025 1:02 PM    CT of the Head without contrast   CTA Head and neck  CT Brain Perfusion study with contrast    History:  stroke f/u     Comparison: MRI brain from 7/15/2025, and CTA head and neck/CT  perfusion from 7/15/2025    Technique:   HEAD CT: Using multidetector thin collimation helical acquisition  technique, axial, coronal and sagittal CT images from the skull base  to the vertex were obtained without intravenous contrast.     HEAD and NECK CTA: During rapid bolus intravenous injection of  nonionic contrast material, axial images were obtained using thin  collimation multidetector helical technique from  the base of the upper  aortic arch through the cranial vertex. This CT angiogram data was  reconstructed at thin intervals with mild overlap. Images were sent to  the 3D workstation, and 3D reconstructions were obtained. The axial  source images, multiplanar reformations, 3D reconstructions in both  maximum intensity projection display and volume rendered models were  reviewed, with reconstructions performed by the technologist.    CT BRAIN PERFUSION: Dynamic perfusion CT of the brain was performed at  multiple levels; perfusion was measured and imaged during rapid bolus  intravenous injection of nonionic iodinated contrast medium. Images  were post-processed, reconstructed, and reviewed.     Contrast: iopamidol (ISOVUE-370) solution 188 mL    Findings:   Support devices: Incompletely visualized right IJ central venous  catheter.     Head CT:   Foci of hypodensity in the left middle cerebral artery territory  correlates with foci of restricted diffusion seen on MRI from  7/15/2025. No new gray-white matter interface loss.  No hemorrhage. No midline shift. Atraumatic calvarium. Paranasal  sinuses and mastoid air cells are relatively clear. Normal orbits.    CTA HEAD and neck:  Head CTA: 7 mm segment of the distal M1 branch of the left middle  cerebral artery that is almost completely occluded. The degree of  occlusion is similar to slightly increased compared to the 7/15/2025  CTA exam exam. Thin slice axial postcontrast series within threadlike  wisps of contrast can be seen communicating from the opacified mid M1  segment to the M2 branches (series 6 image 294-297).  There is no new stenosis of the major intracranial vessels.    CTA NECK: Widely patent cervical arterial vasculature. No stenosis or  aneurysm. Patent and conventional branching of the aortic arch.    CT Perfusion: Images documenting regional cerebral blood volume,  cerebral blood flow and mean transit time demonstrate hypoperfusion  throughout the  left middle cerebral artery territory.      Impression    Impression:   Noncontrast head CT: Patchy hypodensities in the left frontal,  parietal, temporal and occipital lobes, which correlate with the  diffusion restricting infarcts seen on MR brain from 7/15/2025. No  hemorrhage. No midline shift.    CTA head and neck: Similar to slightly increased occlusion of a 7 mm  segment of incompletely opacified distal M1 branch of the left middle  cerebral artery. The remaining major intracranial arterial vasculature  is patent without significant stenosis.    CT PERFUSION: There is hypoperfusion demonstrated diffusely in the  left middle cerebral artery territory.     I have personally reviewed the examination and initial interpretation  and I agree with the findings.    ANUJ YOO MD         SYSTEM ID:  [id number]   CT Chest Pulmonary Embolism w Contrast    Narrative    EXAM: CTA pulmonary angiogram, 7/17/2025 1:05 PM    HISTORY: h/o PE, inc O2    COMPARISON: CT chest abdomen pelvis 7/16/2025.    TECHNIQUE: Volumetric CT images obtained through the chest with  contrast. Coronal and axial MIP reformatted images obtained.  Three-dimensional (3D) post-processed angiographic images were  reconstructed, archived to PACS and used in interpretation of this  study.     CONTRAST: iopamidol (ISOVUE-370) solution 188 mL ml isovue 370 IV.    FINDINGS:     Vascular: There is good contrast opacification of the pulmonary  arterial vasculature. Proximal segmental pulmonary emboli (e.G. on the  left on series 5 image 124, in the right lower lobe image 185). Heart  is normal size without pericardial effusion. There is bowing of the  interatrial septum. No thoracic aortic aneurysm. The main pulmonary  artery measures at the upper limit of normal, 3.1 cm (series 7 image  106).    Remaining Chest: Central tracheobronchial tree is patent. Right apical  confluent opacity with spiculated margins adjacent to a thin-walled  pulmonary cyst.  (series 7 image 44). Subpleural right lower lobe 9 mm  nodule (series 7 image 166). No pleural effusion or pneumothorax.  Bulky right hilar and subcarinal soft tissue attenuating confluent  adenopathy, with necrotic changes. Bibasilar subsegmental atelectasis  with confluent nodular atelectasis/consolidation in the left lung  base.    Upper Abdomen: Limited evaluation demonstrates no acute findings.  Excreted contrast in the kidneys.    Bones/Soft Tissues: No acute abnormalities or suspicious lesions.      Impression    IMPRESSION:  1. Exam is positive for pulmonary embolism, unchanged from 7/16/2025.  Multiple segmental pulmonary emboli as above. There is bowing of the  interatrial septum and mild enlargement of the pulmonary artery,  consistent with mild right heart strain.     2. Dependent subsegmental lower lobe atelectasis with engorgement of  the pulmonary vasculature & interlobular septal thickening may  reflect a degree of superimposed pulmonary edema.      In the event of a positive result for acute pulmonary embolism  resulting in right heart strain, consider calling the   [facility] hospital  for PERT (Pulmonary Embolism Response Team)  Activation?    PERT -- Pulmonary Embolism Response Team (Multidisciplinary team  including cardiology, interventional radiology, critical care,  hematology)    I have personally reviewed the examination and initial interpretation  and I agree with the findings.    KOSTAS HARRELL DO         SYSTEM ID:  [id number]   CTA Head Neck with Contrast    Narrative    CT HEAD W/O CONTRAST, CT HEAD PERFUSION W CONTRAST, CTA HEAD NECK W  CONTRAST 7/17/2025 1:02 PM    CT of the Head without contrast   CTA Head and neck  CT Brain Perfusion study with contrast    History:  stroke f/u     Comparison: MRI brain from 7/15/2025, and CTA head and neck/CT  perfusion from 7/15/2025    Technique:   HEAD CT: Using multidetector thin collimation helical acquisition  technique, axial, coronal and  sagittal CT images from the skull base  to the vertex were obtained without intravenous contrast.     HEAD and NECK CTA: During rapid bolus intravenous injection of  nonionic contrast material, axial images were obtained using thin  collimation multidetector helical technique from the base of the upper  aortic arch through the cranial vertex. This CT angiogram data was  reconstructed at thin intervals with mild overlap. Images were sent to  the 3D workstation, and 3D reconstructions were obtained. The axial  source images, multiplanar reformations, 3D reconstructions in both  maximum intensity projection display and volume rendered models were  reviewed, with reconstructions performed by the technologist.    CT BRAIN PERFUSION: Dynamic perfusion CT of the brain was performed at  multiple levels; perfusion was measured and imaged during rapid bolus  intravenous injection of nonionic iodinated contrast medium. Images  were post-processed, reconstructed, and reviewed.     Contrast: iopamidol (ISOVUE-370) solution 188 mL    Findings:   Support devices: Incompletely visualized right IJ central venous  catheter.     Head CT:   Foci of hypodensity in the left middle cerebral artery territory  correlates with foci of restricted diffusion seen on MRI from  7/15/2025. No new gray-white matter interface loss.  No hemorrhage. No midline shift. Atraumatic calvarium. Paranasal  sinuses and mastoid air cells are relatively clear. Normal orbits.    CTA HEAD and neck:  Head CTA: 7 mm segment of the distal M1 branch of the left middle  cerebral artery that is almost completely occluded. The degree of  occlusion is similar to slightly increased compared to the 7/15/2025  CTA exam exam. Thin slice axial postcontrast series within threadlike  wisps of contrast can be seen communicating from the opacified mid M1  segment to the M2 branches (series 6 image 294-297).  There is no new stenosis of the major intracranial vessels.    CTA NECK:  Widely patent cervical arterial vasculature. No stenosis or  aneurysm. Patent and conventional branching of the aortic arch.    CT Perfusion: Images documenting regional cerebral blood volume,  cerebral blood flow and mean transit time demonstrate hypoperfusion  throughout the left middle cerebral artery territory.      Impression    Impression:   Noncontrast head CT: Patchy hypodensities in the left frontal,  parietal, temporal and occipital lobes, which correlate with the  diffusion restricting infarcts seen on MR brain from 7/15/2025. No  hemorrhage. No midline shift.    CTA head and neck: Similar to slightly increased occlusion of a 7 mm  segment of incompletely opacified distal M1 branch of the left middle  cerebral artery. The remaining major intracranial arterial vasculature  is patent without significant stenosis.    CT PERFUSION: There is hypoperfusion demonstrated diffusely in the  left middle cerebral artery territory.     I have personally reviewed the examination and initial interpretation  and I agree with the findings.    ANUJ YOO MD         SYSTEM ID:  [id number]   CT Head Perfusion w Contrast    Narrative    CT HEAD W/O CONTRAST, CT HEAD PERFUSION W CONTRAST, CTA HEAD NECK W  CONTRAST 7/17/2025 1:02 PM    CT of the Head without contrast   CTA Head and neck  CT Brain Perfusion study with contrast    History:  stroke f/u     Comparison: MRI brain from 7/15/2025, and CTA head and neck/CT  perfusion from 7/15/2025    Technique:   HEAD CT: Using multidetector thin collimation helical acquisition  technique, axial, coronal and sagittal CT images from the skull base  to the vertex were obtained without intravenous contrast.     HEAD and NECK CTA: During rapid bolus intravenous injection of  nonionic contrast material, axial images were obtained using thin  collimation multidetector helical technique from the base of the upper  aortic arch through the cranial vertex. This CT angiogram data  was  reconstructed at thin intervals with mild overlap. Images were sent to  the 3D workstation, and 3D reconstructions were obtained. The axial  source images, multiplanar reformations, 3D reconstructions in both  maximum intensity projection display and volume rendered models were  reviewed, with reconstructions performed by the technologist.    CT BRAIN PERFUSION: Dynamic perfusion CT of the brain was performed at  multiple levels; perfusion was measured and imaged during rapid bolus  intravenous injection of nonionic iodinated contrast medium. Images  were post-processed, reconstructed, and reviewed.     Contrast: iopamidol (ISOVUE-370) solution 188 mL    Findings:   Support devices: Incompletely visualized right IJ central venous  catheter.     Head CT:   Foci of hypodensity in the left middle cerebral artery territory  correlates with foci of restricted diffusion seen on MRI from  7/15/2025. No new gray-white matter interface loss.  No hemorrhage. No midline shift. Atraumatic calvarium. Paranasal  sinuses and mastoid air cells are relatively clear. Normal orbits.    CTA HEAD and neck:  Head CTA: 7 mm segment of the distal M1 branch of the left middle  cerebral artery that is almost completely occluded. The degree of  occlusion is similar to slightly increased compared to the 7/15/2025  CTA exam exam. Thin slice axial postcontrast series within threadlike  wisps of contrast can be seen communicating from the opacified mid M1  segment to the M2 branches (series 6 image 294-297).  There is no new stenosis of the major intracranial vessels.    CTA NECK: Widely patent cervical arterial vasculature. No stenosis or  aneurysm. Patent and conventional branching of the aortic arch.    CT Perfusion: Images documenting regional cerebral blood volume,  cerebral blood flow and mean transit time demonstrate hypoperfusion  throughout the left middle cerebral artery territory.      Impression    Impression:   Noncontrast head  CT: Patchy hypodensities in the left frontal,  parietal, temporal and occipital lobes, which correlate with the  diffusion restricting infarcts seen on MR brain from 7/15/2025. No  hemorrhage. No midline shift.    CTA head and neck: Similar to slightly increased occlusion of a 7 mm  segment of incompletely opacified distal M1 branch of the left middle  cerebral artery. The remaining major intracranial arterial vasculature  is patent without significant stenosis.    CT PERFUSION: There is hypoperfusion demonstrated diffusely in the  left middle cerebral artery territory.     I have personally reviewed the examination and initial interpretation  and I agree with the findings.    ANUJ YOO MD         SYSTEM ID:  [id number]   CT Head w/o Contrast    Narrative    EXAM: CT HEAD W/O CONTRAST  7/18/2025 11:45 AM     HISTORY:  monitor for interval changes following initiation of  heparin; left MCA stroke       COMPARISON:  Multiple prior examinations on CT and MR over the past  few days    TECHNIQUE: Using multidetector thin collimation helical acquisition  technique, axial, coronal and sagittal CT images from the skull base  to the vertex were obtained without intravenous contrast.   (topogram) image(s) also obtained and reviewed.    FINDINGS:  No , mass effect, or midline shift. Patchy focal areas of  low-attenuation are again seen within the left MCA territory. While  the majority of these lesions are within the left hemispheric white  matter, a few appear to involve the cortex is well. Nevertheless the  findings are much more obvious on the previous MR.. Ventricles are  proportionate to the cerebral sulci. Clear basal cisterns.    Continued demonstration of multiple scattered left MCA territory  infarctions, in a patient with hypogenesis of the corpus callosum. The  right-sided lesions seen on the DWI of the 7/15/2025 MRI are not well  visualized on the CT scan, with the exception of one lesion in the  right  parietal lobe which exhibits increased density and suggests a  small hemorrhage.    The bony calvaria and the bones of the skull base are normal. The  visualized portions of the paranasal sinuses and mastoid air cells are  clear. Grossly normal orbits.       Impression    IMPRESSION: Continued demonstration of left MCA territory scattered  infarctions, in a patient with hypogenesis of the corpus callosum. The  right-sided lesions seen on the DWI of the 7/15/2025 MRI are not well  visualized on the CT scan. However, one small lesion in the right  parietal lobe exhibits some increased density, especially on the  coronal view. This could represent focal cortical laminar necrosis, or  perhaps less likely, a small hemorrhage. If clinically indicated,  follow up exam might help differentiate this. Susceptibility-weighted  MR imaging would likely resolve this question. Either way, this is a  small focus.    MARIAM SALGUERO MD         SYSTEM ID:  [id number]   US Testicular & Scrotum w Doppler Ltd    Narrative    EXAMINATION: US TESTICULAR AND SCROTUM WITH DOPPLER LIMITED, 7/19/2025  10:25 AM     COMPARISON: None.    HISTORY: rule out cancer    TECHNIQUE: The was scanned in standard fashion with specialized  ultrasound transducer(s) using techniques.    Findings:  The testes demonstrate normal and symmetric echotexture and  vascularity. No evidence of a focal lesion. The right testicle  measures 3.3 x 3.0 x 4.9 cm and the left measures 3.4 x 2.8 x 5.1 cm.     There is no evidence of a significant hydrocele.    Anechoic right epididymal head cyst measuring 8 x 9 x 15 mm. Both the  right and left epididymis are otherwise within normal limits        Impression    Impression: Benign right epididymal head cyst measuring up to 1.5 cm.  Otherwise normal testicular ultrasound.    SERGEY ACEVEDO DO         SYSTEM ID:  [id number]   CT Head w/o Contrast    Narrative    EXAM: CT HEAD W/O CONTRAST, CT HEAD PERFUSION W CONTRAST, CTA HEAD  NECK W CONTRAST  LOCATION: [facility]  DATE: 7/19/2025    INDICATION: Code Stroke, rule out hemorrhage and evaluate for potential thrombolysis thrombectomy. PLEASE READ IMMEDIATELY.  COMPARISON: MRI of the brain and CTA of the head and neck dated 07/15/2025, and head CT dated 07/18/2025  TECHNIQUE: Head and neck CT angiogram with IV contrast. Noncontrast head CT followed by axial helical CT images of the head and neck vessels obtained during the arterial phase of intravenous contrast administration. Axial 2D reconstructed images and   multiplanar 3D MIP reconstructed images of the head and neck vessels were performed by the technologist. Additional CT cerebral perfusion was performed utilizing a second contrast bolus. Perfusion data were post processed with generation of standard   perfusion maps and estimation of ischemic/infarcted volumes utilizing standard threshold values. Dose reduction techniques were used. All stenosis measurements made according to NASCET criteria unless otherwise specified.  CONTRAST: iopamidol (ISOVUE 370) solution 117 mL    FINDINGS:   NONCONTRAST HEAD CT:   INTRACRANIAL CONTENTS: No intracranial hemorrhage, extraaxial collection, or mass effect.  Small acute to subacute infarct involving the left lentiform nucleus appears new. Tiny acute to subacute infarct involving the right caudate head appears new since   the previous MRI but is probably not significantly changed when compared to the prior head CT. Additional scattered subacute infarcts in the left middle cerebral artery territory are probably not significantly changed. Few scattered small acute to   subacute infarcts seen in the right cerebral hemisphere on the MRI are not readily apparent by CT. Normal parenchymal attenuation. 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 The lateral ventricles   again have a mildly colpocephalic  configuration. No hydrocephalus. Basilar cisterns are patent. No midline shift.     VISUALIZED ORBITS/SINUSES/MASTOIDS: No intraorbital abnormality. No paranasal sinus mucosal disease. No middle ear or mastoid effusion.    BONES/SOFT TISSUES: No acute abnormality.    HEAD CTA:  ANTERIOR CIRCULATION: Subtotal occlusion of the mid to distal M1 segment of the left middle cerebral artery is not significantly changed. There is again opacification of the left middle cerebral artery branches distal to the subtotal occlusion although   there is minimally decreased enhancement compared to the left. There is new focal high-grade stenosis involving a posterior parietal branch of the left middle cerebral artery near the M2/M3 junction (image 47 series 10 and image 32 series 12). No   aneurysm or high flow vascular malformation. Standard [place] anatomy.    POSTERIOR CIRCULATION: No stenosis/occlusion, aneurysm, or high flow vascular malformation. Balanced vertebral arteries supply a normal basilar artery.     DURAL VENOUS SINUSES: Expected enhancement of the major dural venous sinuses.    NECK CTA:  RIGHT CAROTID: No measurable stenosis or dissection.    LEFT CAROTID: No measurable stenosis or dissection.    VERTEBRAL ARTERIES: No focal stenosis or dissection. Balanced vertebral arteries.    AORTIC ARCH: Classic aortic arch anatomy with no significant stenosis at the origin of the great vessels.    NONVASCULAR STRUCTURES: New incompletely imaged right internal jugular central venous catheter. Partial fatty replacement of the parotid glands. Mediastinal and right hilar adenopathy was incompletely imaged on the prior study. Enlarged mediastinal lymph   nodes seen previously are probably not significant changed. Bilateral pleural effusions appear new since the prior study. Mild patchy infiltrates in both upper lobes and superior segments of the lower lobes also appears new.    CT PERFUSION:  PERFUSION MAPS: Small focal  area of increased Tmax of greater than 6 seconds in the left middle cerebral artery territory in the region of the left corona radiata/centrum semiovale. If the threshold for the Tmax is decreased to greater than greater than   4 seconds, there is a much larger of oligemia in the left middle cerebral artery territory as well as a small area in the anterior right frontal lobe with a volume of 98 mL. There is normal symmetric cerebral blood flow in both cerebral hemispheres with   no focal area of decreased cerebral blood flow.    RAPID ANALYSIS:  CBF<30%: 0 mL  Tmax>6sec: 5 mL  Mismatch volume: 5 mL  Mismatch ratio: Infinite      Impression    IMPRESSION:   HEAD CT:  1.  New small acute to subacute infarct left lentiform nucleus.  2.  Tiny acute to subacute infarct right caudate head appears new when compared to the prior MRI probably not significantly changed when compared to the prior CT.  3.  Scattered subacute infarcts left middle cerebral artery territory probably not significant changed.  4.  Few scattered small acute to subacute infarcts in the right cerebral hemisphere seen on the MRI are not readily apparent by CT.  5.  No acute intracranial hemorrhage.  6.  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    HEAD CTA:  1.  Stable subtotal occlusion mid to distal M1 segment left middle cerebral artery.  2.  New focal high-grade stenosis posterior parietal branch left middle cerebral artery near the M2/M3 junction.    NECK CTA:  1.  No hemodynamically significant stenosis in the neck vessels.  2.  No evidence for dissection.  3.  New bilateral pleural effusions and patchy infiltrates in both upper lobes and superior segments of the lower lobes.  4.  Mediastinal and right hilar adenopathy was incompletely imaged on the prior study and concerning for metastatic disease or lymphoma.    CT PERFUSION:  1.  No evidence of core infarct by perfusion imaging.  2.  Small focal area of increased Tmax greater  than 6 seconds in the left middle cerebral artery territory in the region of the left corona radiata/centrum semiovale concerning for at risk brain.  3.  If the threshold for the Tmax is decreased to greater than 4 seconds, there is a much larger of area of oligemia in the left middle cerebral artery territory as well as a small area in the anterior right frontal lobe.    4.  Findings discussed with Dr. Rodas at 5:41 AM CST on 07/19/2025.     CTA Head Neck w Contrast    Narrative    EXAM: CT HEAD W/O CONTRAST, CT HEAD PERFUSION W CONTRAST, CTA HEAD NECK W CONTRAST  LOCATION: [facility]  DATE: 7/19/2025    INDICATION: Code Stroke, rule out hemorrhage and evaluate for potential thrombolysis thrombectomy. PLEASE READ IMMEDIATELY.  COMPARISON: MRI of the brain and CTA of the head and neck dated 07/15/2025, and head CT dated 07/18/2025  TECHNIQUE: Head and neck CT angiogram with IV contrast. Noncontrast head CT followed by axial helical CT images of the head and neck vessels obtained during the arterial phase of intravenous contrast administration. Axial 2D reconstructed images and   multiplanar 3D MIP reconstructed images of the head and neck vessels were performed by the technologist. Additional CT cerebral perfusion was performed utilizing a second contrast bolus. Perfusion data were post processed with generation of standard   perfusion maps and estimation of ischemic/infarcted volumes utilizing standard threshold values. Dose reduction techniques were used. All stenosis measurements made according to NASCET criteria unless otherwise specified.  CONTRAST: iopamidol (ISOVUE 370) solution 117 mL    FINDINGS:   NONCONTRAST HEAD CT:   INTRACRANIAL CONTENTS: No intracranial hemorrhage, extraaxial collection, or mass effect.  Small acute to subacute infarct involving the left lentiform nucleus appears new. Tiny acute to subacute infarct involving the right caudate head  appears new since   the previous MRI but is probably not significantly changed when compared to the prior head CT. Additional scattered subacute infarcts in the left middle cerebral artery territory are probably not significantly changed. Few scattered small acute to   subacute infarcts seen in the right cerebral hemisphere on the MRI are not readily apparent by CT. Normal parenchymal attenuation. 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 The lateral ventricles   again have a mildly colpocephalic configuration. No hydrocephalus. Basilar cisterns are patent. No midline shift.     VISUALIZED ORBITS/SINUSES/MASTOIDS: No intraorbital abnormality. No paranasal sinus mucosal disease. No middle ear or mastoid effusion.    BONES/SOFT TISSUES: No acute abnormality.    HEAD CTA:  ANTERIOR CIRCULATION: Subtotal occlusion of the mid to distal M1 segment of the left middle cerebral artery is not significantly changed. There is again opacification of the left middle cerebral artery branches distal to the subtotal occlusion although   there is minimally decreased enhancement compared to the left. There is new focal high-grade stenosis involving a posterior parietal branch of the left middle cerebral artery near the M2/M3 junction (image 47 series 10 and image 32 series 12). No   aneurysm or high flow vascular malformation. Standard [place] anatomy.    POSTERIOR CIRCULATION: No stenosis/occlusion, aneurysm, or high flow vascular malformation. Balanced vertebral arteries supply a normal basilar artery.     DURAL VENOUS SINUSES: Expected enhancement of the major dural venous sinuses.    NECK CTA:  RIGHT CAROTID: No measurable stenosis or dissection.    LEFT CAROTID: No measurable stenosis or dissection.    VERTEBRAL ARTERIES: No focal stenosis or dissection. Balanced vertebral arteries.    AORTIC ARCH: Classic aortic arch anatomy with no significant stenosis at the  origin of the great vessels.    NONVASCULAR STRUCTURES: New incompletely imaged right internal jugular central venous catheter. Partial fatty replacement of the parotid glands. Mediastinal and right hilar adenopathy was incompletely imaged on the prior study. Enlarged mediastinal lymph   nodes seen previously are probably not significant changed. Bilateral pleural effusions appear new since the prior study. Mild patchy infiltrates in both upper lobes and superior segments of the lower lobes also appears new.    CT PERFUSION:  PERFUSION MAPS: Small focal area of increased Tmax of greater than 6 seconds in the left middle cerebral artery territory in the region of the left corona radiata/centrum semiovale. If the threshold for the Tmax is decreased to greater than greater than   4 seconds, there is a much larger of oligemia in the left middle cerebral artery territory as well as a small area in the anterior right frontal lobe with a volume of 98 mL. There is normal symmetric cerebral blood flow in both cerebral hemispheres with   no focal area of decreased cerebral blood flow.    RAPID ANALYSIS:  CBF<30%: 0 mL  Tmax>6sec: 5 mL  Mismatch volume: 5 mL  Mismatch ratio: Infinite      Impression    IMPRESSION:   HEAD CT:  1.  New small acute to subacute infarct left lentiform nucleus.  2.  Tiny acute to subacute infarct right caudate head appears new when compared to the prior MRI probably not significantly changed when compared to the prior CT.  3.  Scattered subacute infarcts left middle cerebral artery territory probably not significant changed.  4.  Few scattered small acute to subacute infarcts in the right cerebral hemisphere seen on the MRI are not readily apparent by CT.  5.  No acute intracranial hemorrhage.  6.  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    HEAD CTA:  1.  Stable subtotal occlusion mid to distal M1 segment left middle cerebral artery.  2.  New focal high-grade stenosis posterior  parietal branch left middle cerebral artery near the M2/M3 junction.    NECK CTA:  1.  No hemodynamically significant stenosis in the neck vessels.  2.  No evidence for dissection.  3.  New bilateral pleural effusions and patchy infiltrates in both upper lobes and superior segments of the lower lobes.  4.  Mediastinal and right hilar adenopathy was incompletely imaged on the prior study and concerning for metastatic disease or lymphoma.    CT PERFUSION:  1.  No evidence of core infarct by perfusion imaging.  2.  Small focal area of increased Tmax greater than 6 seconds in the left middle cerebral artery territory in the region of the left corona radiata/centrum semiovale concerning for at risk brain.  3.  If the threshold for the Tmax is decreased to greater than 4 seconds, there is a much larger of area of oligemia in the left middle cerebral artery territory as well as a small area in the anterior right frontal lobe.    4.  Findings discussed with Dr. Rodas at 5:41 AM CST on 07/19/2025.     CT Head Perfusion w Contrast    Narrative    EXAM: CT HEAD W/O CONTRAST, CT HEAD PERFUSION W CONTRAST, CTA HEAD NECK W CONTRAST  LOCATION: [facility]  DATE: 7/19/2025    INDICATION: Code Stroke, rule out hemorrhage and evaluate for potential thrombolysis thrombectomy. PLEASE READ IMMEDIATELY.  COMPARISON: MRI of the brain and CTA of the head and neck dated 07/15/2025, and head CT dated 07/18/2025  TECHNIQUE: Head and neck CT angiogram with IV contrast. Noncontrast head CT followed by axial helical CT images of the head and neck vessels obtained during the arterial phase of intravenous contrast administration. Axial 2D reconstructed images and   multiplanar 3D MIP reconstructed images of the head and neck vessels were performed by the technologist. Additional CT cerebral perfusion was performed utilizing a second contrast bolus. Perfusion data were post processed with generation  of standard   perfusion maps and estimation of ischemic/infarcted volumes utilizing standard threshold values. Dose reduction techniques were used. All stenosis measurements made according to NASCET criteria unless otherwise specified.  CONTRAST: iopamidol (ISOVUE 370) solution 117 mL    FINDINGS:   NONCONTRAST HEAD CT:   INTRACRANIAL CONTENTS: No intracranial hemorrhage, extraaxial collection, or mass effect.  Small acute to subacute infarct involving the left lentiform nucleus appears new. Tiny acute to subacute infarct involving the right caudate head appears new since   the previous MRI but is probably not significantly changed when compared to the prior head CT. Additional scattered subacute infarcts in the left middle cerebral artery territory are probably not significantly changed. Few scattered small acute to   subacute infarcts seen in the right cerebral hemisphere on the MRI are not readily apparent by CT. Normal parenchymal attenuation. 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 The lateral ventricles   again have a mildly colpocephalic configuration. No hydrocephalus. Basilar cisterns are patent. No midline shift.     VISUALIZED ORBITS/SINUSES/MASTOIDS: No intraorbital abnormality. No paranasal sinus mucosal disease. No middle ear or mastoid effusion.    BONES/SOFT TISSUES: No acute abnormality.    HEAD CTA:  ANTERIOR CIRCULATION: Subtotal occlusion of the mid to distal M1 segment of the left middle cerebral artery is not significantly changed. There is again opacification of the left middle cerebral artery branches distal to the subtotal occlusion although   there is minimally decreased enhancement compared to the left. There is new focal high-grade stenosis involving a posterior parietal branch of the left middle cerebral artery near the M2/M3 junction (image 47 series 10 and image 32 series 12). No   aneurysm or high flow vascular malformation.  Standard [place] anatomy.    POSTERIOR CIRCULATION: No stenosis/occlusion, aneurysm, or high flow vascular malformation. Balanced vertebral arteries supply a normal basilar artery.     DURAL VENOUS SINUSES: Expected enhancement of the major dural venous sinuses.    NECK CTA:  RIGHT CAROTID: No measurable stenosis or dissection.    LEFT CAROTID: No measurable stenosis or dissection.    VERTEBRAL ARTERIES: No focal stenosis or dissection. Balanced vertebral arteries.    AORTIC ARCH: Classic aortic arch anatomy with no significant stenosis at the origin of the great vessels.    NONVASCULAR STRUCTURES: New incompletely imaged right internal jugular central venous catheter. Partial fatty replacement of the parotid glands. Mediastinal and right hilar adenopathy was incompletely imaged on the prior study. Enlarged mediastinal lymph   nodes seen previously are probably not significant changed. Bilateral pleural effusions appear new since the prior study. Mild patchy infiltrates in both upper lobes and superior segments of the lower lobes also appears new.    CT PERFUSION:  PERFUSION MAPS: Small focal area of increased Tmax of greater than 6 seconds in the left middle cerebral artery territory in the region of the left corona radiata/centrum semiovale. If the threshold for the Tmax is decreased to greater than greater than   4 seconds, there is a much larger of oligemia in the left middle cerebral artery territory as well as a small area in the anterior right frontal lobe with a volume of 98 mL. There is normal symmetric cerebral blood flow in both cerebral hemispheres with   no focal area of decreased cerebral blood flow.    RAPID ANALYSIS:  CBF<30%: 0 mL  Tmax>6sec: 5 mL  Mismatch volume: 5 mL  Mismatch ratio: Infinite      Impression    IMPRESSION:   HEAD CT:  1.  New small acute to subacute infarct left lentiform nucleus.  2.  Tiny acute to subacute infarct right caudate head appears new when compared to the  prior MRI probably not significantly changed when compared to the prior CT.  3.  Scattered subacute infarcts left middle cerebral artery territory probably not significant changed.  4.  Few scattered small acute to subacute infarcts in the right cerebral hemisphere seen on the MRI are not readily apparent by CT.  5.  No acute intracranial hemorrhage.  6.  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    HEAD CTA:  1.  Stable subtotal occlusion mid to distal M1 segment left middle cerebral artery.  2.  New focal high-grade stenosis posterior parietal branch left middle cerebral artery near the M2/M3 junction.    NECK CTA:  1.  No hemodynamically significant stenosis in the neck vessels.  2.  No evidence for dissection.  3.  New bilateral pleural effusions and patchy infiltrates in both upper lobes and superior segments of the lower lobes.  4.  Mediastinal and right hilar adenopathy was incompletely imaged on the prior study and concerning for metastatic disease or lymphoma.    CT PERFUSION:  1.  No evidence of core infarct by perfusion imaging.  2.  Small focal area of increased Tmax greater than 6 seconds in the left middle cerebral artery territory in the region of the left corona radiata/centrum semiovale concerning for at risk brain.  3.  If the threshold for the Tmax is decreased to greater than 4 seconds, there is a much larger of area of oligemia in the left middle cerebral artery territory as well as a small area in the anterior right frontal lobe.    4.  Findings discussed with Dr. Rodas at 5:41 AM CST on 07/19/2025.     MR Brain w/o & w Contrast    Narrative    EXAM: MR BRAIN W/O & W CONTRAST  7/19/2025 8:19 AM     HISTORY:  left M1 occlusion; worsening exam       COMPARISON:  MRI brain dated 07/15/2025, CT head dated 07/15/2025,  07/16/2025, 07/17/2025, 07/18/2025 and 07/19/2025.    TECHNIQUE: Images were obtained with axial diffusion, axial ADC, axial  T2, axial FLAIR, axial SWI,  postcontrast T1 axial, precontrast  sagittal T1 and postcontrast coronal T1 planes.    CONTRAST: 10 mL Gadavist.    FINDINGS:  New areas of diffusion restriction noted in the left anterior temporal  lobe and left caudate nucleus and putamen as compared to previous MRI  dated 7/15/2025, however these correlate with the hypodensities seen  on CT dated 7/19/2025 suggestive of acute to subacute infarcts  and  appear stable with other unchanged areas of diffusion restriction  noted in the left middle cerebral artery territory in the cortex and  subcortical white matter.  FLAIR hyperintensity noted in the sulci overlying the left cerebral  convexity with blooming on corresponding SWI images consistent with  known subarachnoid hemorrhage..   Hyperintensity in left M2 MCA segmental branches remains unchanged.  Corpus callosum dysgenesis remains unchanged.    There is no mass effect or midline shift, . The ventricles are  proportionate to the cerebral sulci.   No suspicious abnormality of the skull marrow signal. Clear paranasal  sinuses. Mastoid air cells are clear. No suspicious abnormality of the  pituitary gland, sella, skull base and upper cervical spinal  structures on sagittal images. Orbits are within normal limits.      Impression    IMPRESSION:  1. Increased areas of diffusion restriction with corresponding ADC  dropout in the left MCA territory cortical and subcortical regions  including the left basal ganglia structures when compared to the prior  MRI, but likely similar to the hypodensities seen on the recent CT.  Areas of restricted diffusion with corresponding ADC dropout in the  right cerebral hemisphere appear unchanged as compared to the previous  MRI dated 07/15/2025.  2. FLAIR hyperintensity with corresponding blooming on SWI left  cerebral convexity sulci consistent with known subarachnoid  hemorrhage.    I have personally reviewed the examination and initial interpretation  and I agree with the  findings.    ANUJ YOO MD         SYSTEM ID:  [id number]   CT Head w/o Contrast     Value    Radiologist flags Subarachnoid hemorrhage (Urgent)    Narrative    CT HEAD W/O CONTRAST 7/19/2025 6:56 AM    History: sudden onset headache; on heparin   ICD-10:    Comparison: 7/19/2025 at 4:54 AM    Technique: Using multidetector thin collimation helical acquisition  technique, axial, coronal and sagittal CT images from the skull base  to the vertex were obtained without intravenous contrast.   (topogram) image(s) also obtained and reviewed.    Findings: Subarachnoid hemorrhage in the sulci along the left cerebral  convexity. Evolution of the multifocal, patchy acute to subacute  infarcts in the left middle cerebral artery territory. 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 Ventricles are proportionate to the  cerebral sulci. The basal cisterns are clear.    The bony calvaria and the bones of the skull base are normal. The  visualized portions of the paranasal sinuses and mastoid air cells are  clear.      Impression    Impression:  1. Subarachnoid hemorrhage in the sulci along the left cerebral  convexity.  2. Evolution of the patchy left middle cerebral artery territory  infarcts.  2. 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    [Urgent Result: Subarachnoid hemorrhage]    Finding was identified on 7/19/2025 7:07 AM.     Dr. Rodas was contacted by Dr. Hidalgo at 7/19/2025 7:08 AM and  verbalized understanding of the urgent finding.          I have personally reviewed the examination and initial interpretation  and I agree with the findings.    ANUJ YOO MD         SYSTEM ID:  [id number]   CT Head w/o Contrast    Narrative    EXAM: CT HEAD W/O CONTRAST  7/19/2025 3:47 PM     HISTORY:  Dual energy, known left MCA stroke and L SDH       COMPARISON:  Same day MRI brain, same day CT head at 6:47 TECHNIQUE:  Using multidetector thin collimation helical acquisition technique,  axial, coronal and  sagittal CT images from the skull base to the  vertex were obtained without intravenous contrast.  (topogram)  image(s) also obtained and reviewed.    FINDINGS:  No mass effect, or midline shift. No acute loss of gray-white matter  differentiation in the cerebral hemispheres. The ventricles are  proportionate to the cerebral sulci. Clear basal cisterns. Unchanged  subarachnoid hemorrhage sulci of the left cerebral convexity near the  vertex. Multifocal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 these medications is not yet available    Ask your nurse or doctor about these medications  LORazepam 0.5 MG tablet       Allergies   Allergies   Allergen Reactions    Apixaban Rash    Amlodipine Other (See Comments) and Rash    Fludeoxyglucose F 18 Rash     Rash after PET scan, consider pre-treating with benadryl for future scans

## 2025-08-22 ENCOUNTER — APPOINTMENT (OUTPATIENT)
Dept: SPEECH THERAPY | Facility: CLINIC | Age: 35
DRG: 057 | End: 2025-08-22
Attending: STUDENT IN AN ORGANIZED HEALTH CARE EDUCATION/TRAINING PROGRAM
Payer: COMMERCIAL

## 2025-08-22 ENCOUNTER — APPOINTMENT (OUTPATIENT)
Dept: OCCUPATIONAL THERAPY | Facility: CLINIC | Age: 35
DRG: 057 | End: 2025-08-22
Attending: STUDENT IN AN ORGANIZED HEALTH CARE EDUCATION/TRAINING PROGRAM
Payer: COMMERCIAL

## 2025-08-22 ENCOUNTER — APPOINTMENT (OUTPATIENT)
Dept: PHYSICAL THERAPY | Facility: CLINIC | Age: 35
DRG: 057 | End: 2025-08-22
Attending: STUDENT IN AN ORGANIZED HEALTH CARE EDUCATION/TRAINING PROGRAM
Payer: COMMERCIAL

## 2025-08-22 LAB
ALBUMIN SERPL BCG-MCNC: 3.8 G/DL (ref 3.5–5.2)
ALP SERPL-CCNC: 75 U/L (ref 40–150)
ALT SERPL W P-5'-P-CCNC: 23 U/L (ref 0–70)
ANION GAP SERPL CALCULATED.3IONS-SCNC: 11 MMOL/L (ref 7–15)
AST SERPL W P-5'-P-CCNC: 25 U/L (ref 0–45)
ATRIAL RATE - MUSE: 63 BPM
BASOPHILS # BLD AUTO: 0.04 10E3/UL (ref 0–0.2)
BASOPHILS NFR BLD AUTO: 0.5 %
BILIRUB DIRECT SERPL-MCNC: 0.11 MG/DL (ref 0–0.3)
BILIRUB SERPL-MCNC: 0.3 MG/DL
BUN SERPL-MCNC: 19.9 MG/DL (ref 6–20)
CALCIUM SERPL-MCNC: 8.9 MG/DL (ref 8.8–10.4)
CHLORIDE SERPL-SCNC: 106 MMOL/L (ref 98–107)
CK SERPL-CCNC: 30 U/L (ref 39–308)
CREAT SERPL-MCNC: 1.11 MG/DL (ref 0.67–1.17)
DIASTOLIC BLOOD PRESSURE - MUSE: NORMAL MMHG
EGFRCR SERPLBLD CKD-EPI 2021: 89 ML/MIN/1.73M2
EOSINOPHIL # BLD AUTO: 0.33 10E3/UL (ref 0–0.7)
EOSINOPHIL NFR BLD AUTO: 3.9 %
ERYTHROCYTE [DISTWIDTH] IN BLOOD BY AUTOMATED COUNT: 13.6 % (ref 10–15)
GLUCOSE SERPL-MCNC: 91 MG/DL (ref 70–99)
HCO3 SERPL-SCNC: 25 MMOL/L (ref 22–29)
HCT VFR BLD AUTO: 33.3 % (ref 40–53)
HGB BLD-MCNC: 10.9 G/DL (ref 13.3–17.7)
IMM GRANULOCYTES # BLD: 0.2 10E3/UL
IMM GRANULOCYTES NFR BLD: 2.4 %
INTERPRETATION ECG - MUSE: NORMAL
LYMPHOCYTES # BLD AUTO: 1.22 10E3/UL (ref 0.8–5.3)
LYMPHOCYTES NFR BLD AUTO: 14.5 %
MAGNESIUM SERPL-MCNC: 2.1 MG/DL (ref 1.7–2.3)
MCH RBC QN AUTO: 26.2 PG (ref 26.5–33)
MCHC RBC AUTO-ENTMCNC: 32.7 G/DL (ref 31.5–36.5)
MCV RBC AUTO: 80 FL (ref 78–100)
MONOCYTES # BLD AUTO: 0.67 10E3/UL (ref 0–1.3)
MONOCYTES NFR BLD AUTO: 8 %
NEUTROPHILS # BLD AUTO: 5.95 10E3/UL (ref 1.6–8.3)
NEUTROPHILS NFR BLD AUTO: 70.7 %
NRBC # BLD AUTO: <0.03 10E3/UL
NRBC BLD AUTO-RTO: 0 /100
P AXIS - MUSE: -2 DEGREES
PLATELET # BLD AUTO: 147 10E3/UL (ref 150–450)
POTASSIUM SERPL-SCNC: 4.2 MMOL/L (ref 3.4–5.3)
PR INTERVAL - MUSE: 124 MS
PROT SERPL-MCNC: 6.6 G/DL (ref 6.4–8.3)
QRS DURATION - MUSE: 74 MS
QT - MUSE: 386 MS
QTC - MUSE: 395 MS
R AXIS - MUSE: 32 DEGREES
RBC # BLD AUTO: 4.16 10E6/UL (ref 4.4–5.9)
SODIUM SERPL-SCNC: 142 MMOL/L (ref 135–145)
SYSTOLIC BLOOD PRESSURE - MUSE: NORMAL MMHG
T AXIS - MUSE: -20 DEGREES
URATE SERPL-MCNC: 5.5 MG/DL (ref 3.4–7)
VENTRICULAR RATE- MUSE: 63 BPM
WBC # BLD AUTO: 8.41 10E3/UL (ref 4–11)

## 2025-08-22 PROCEDURE — 83735 ASSAY OF MAGNESIUM: CPT | Performed by: PHYSICIAN ASSISTANT

## 2025-08-22 PROCEDURE — 97530 THERAPEUTIC ACTIVITIES: CPT | Mod: GO

## 2025-08-22 PROCEDURE — 97535 SELF CARE MNGMENT TRAINING: CPT | Mod: GO

## 2025-08-22 PROCEDURE — 82248 BILIRUBIN DIRECT: CPT | Performed by: PHYSICIAN ASSISTANT

## 2025-08-22 PROCEDURE — 36415 COLL VENOUS BLD VENIPUNCTURE: CPT | Performed by: PHYSICIAN ASSISTANT

## 2025-08-22 PROCEDURE — 250N000013 HC RX MED GY IP 250 OP 250 PS 637: Performed by: PHYSICIAN ASSISTANT

## 2025-08-22 PROCEDURE — 82550 ASSAY OF CK (CPK): CPT | Performed by: PHYSICIAN ASSISTANT

## 2025-08-22 PROCEDURE — 97163 PT EVAL HIGH COMPLEX 45 MIN: CPT | Mod: GP

## 2025-08-22 PROCEDURE — 92523 SPEECH SOUND LANG COMPREHEN: CPT | Mod: GN

## 2025-08-22 PROCEDURE — 128N000003 HC R&B REHAB

## 2025-08-22 PROCEDURE — 97166 OT EVAL MOD COMPLEX 45 MIN: CPT | Mod: GO

## 2025-08-22 PROCEDURE — 84550 ASSAY OF BLOOD/URIC ACID: CPT | Performed by: PHYSICIAN ASSISTANT

## 2025-08-22 PROCEDURE — 85004 AUTOMATED DIFF WBC COUNT: CPT | Performed by: PHYSICIAN ASSISTANT

## 2025-08-22 PROCEDURE — 93005 ELECTROCARDIOGRAM TRACING: CPT

## 2025-08-22 PROCEDURE — 97530 THERAPEUTIC ACTIVITIES: CPT | Mod: GP

## 2025-08-22 PROCEDURE — 250N000011 HC RX IP 250 OP 636: Performed by: PHYSICIAN ASSISTANT

## 2025-08-22 RX ADMIN — Medication 7.5 MG: at 20:09

## 2025-08-22 RX ADMIN — OXYCODONE HYDROCHLORIDE AND ACETAMINOPHEN 500 MG: 500 TABLET ORAL at 08:38

## 2025-08-22 RX ADMIN — LOSARTAN POTASSIUM 50 MG: 50 TABLET, FILM COATED ORAL at 08:45

## 2025-08-22 RX ADMIN — FLUTICASONE PROPIONATE 1 SPRAY: 50 SPRAY, METERED NASAL at 08:39

## 2025-08-22 RX ADMIN — ALLOPURINOL 100 MG: 100 TABLET ORAL at 08:39

## 2025-08-22 RX ADMIN — ROSUVASTATIN 10 MG: 10 TABLET, FILM COATED ORAL at 08:39

## 2025-08-22 RX ADMIN — ACETAMINOPHEN 1000 MG: 500 TABLET ORAL at 14:41

## 2025-08-22 RX ADMIN — PROCHLORPERAZINE MALEATE 10 MG: 10 TABLET ORAL at 06:03

## 2025-08-22 RX ADMIN — Medication 7.5 MG: at 08:45

## 2025-08-22 RX ADMIN — THERA TABS 1 TABLET: TAB at 08:45

## 2025-08-22 RX ADMIN — LEVETIRACETAM 750 MG: 750 TABLET, FILM COATED ORAL at 08:39

## 2025-08-22 RX ADMIN — METOPROLOL SUCCINATE 25 MG: 25 TABLET, EXTENDED RELEASE ORAL at 08:39

## 2025-08-22 RX ADMIN — ENOXAPARIN SODIUM 100 MG: 100 INJECTION SUBCUTANEOUS at 08:39

## 2025-08-22 RX ADMIN — ENOXAPARIN SODIUM 100 MG: 100 INJECTION SUBCUTANEOUS at 20:09

## 2025-08-22 RX ADMIN — Medication 7.5 MG: at 14:40

## 2025-08-22 RX ADMIN — RISPERIDONE 0.5 MG: 0.5 TABLET, FILM COATED ORAL at 08:39

## 2025-08-22 RX ADMIN — RISPERIDONE 0.5 MG: 0.5 TABLET, FILM COATED ORAL at 20:10

## 2025-08-22 RX ADMIN — ACETAMINOPHEN 1000 MG: 500 TABLET ORAL at 20:09

## 2025-08-22 RX ADMIN — ACETAMINOPHEN 1000 MG: 500 TABLET ORAL at 08:44

## 2025-08-22 RX ADMIN — LEVETIRACETAM 750 MG: 750 TABLET, FILM COATED ORAL at 20:09

## 2025-08-22 ASSESSMENT — ACTIVITIES OF DAILY LIVING (ADL)
ADLS_ACUITY_SCORE: 51
ADLS_ACUITY_SCORE: 52
BADLS,_PREVIOUS_FUNCTIONAL_LEVEL: INDEPENDENT
IADLS,_PREVIOUS_FUNCTIONAL_LEVEL: INDEPENDENT
ADLS_ACUITY_SCORE: 52
ADLS_ACUITY_SCORE: 51
BADLS,_PREVIOUS_FUNCTIONAL_LEVEL: INDEPENDENT
ADLS_ACUITY_SCORE: 51
ADLS_ACUITY_SCORE: 52
IADLS,_PREVIOUS_FUNCTIONAL_LEVEL: INDEPENDENT
ADLS_ACUITY_SCORE: 53
ADLS_ACUITY_SCORE: 52
ADLS_ACUITY_SCORE: 51
ADLS_ACUITY_SCORE: 51
ADLS_ACUITY_SCORE: 53
ADLS_ACUITY_SCORE: 51
ADLS_ACUITY_SCORE: 52
ADLS_ACUITY_SCORE: 52
ADLS_ACUITY_SCORE: 51
ADLS_ACUITY_SCORE: 51
ADLS_ACUITY_SCORE: 52
ADLS_ACUITY_SCORE: 52

## 2025-08-23 ENCOUNTER — APPOINTMENT (OUTPATIENT)
Dept: SPEECH THERAPY | Facility: CLINIC | Age: 35
DRG: 057 | End: 2025-08-23
Attending: STUDENT IN AN ORGANIZED HEALTH CARE EDUCATION/TRAINING PROGRAM
Payer: COMMERCIAL

## 2025-08-23 ENCOUNTER — APPOINTMENT (OUTPATIENT)
Dept: OCCUPATIONAL THERAPY | Facility: CLINIC | Age: 35
DRG: 057 | End: 2025-08-23
Attending: STUDENT IN AN ORGANIZED HEALTH CARE EDUCATION/TRAINING PROGRAM
Payer: COMMERCIAL

## 2025-08-23 ENCOUNTER — APPOINTMENT (OUTPATIENT)
Dept: PHYSICAL THERAPY | Facility: CLINIC | Age: 35
DRG: 057 | End: 2025-08-23
Attending: STUDENT IN AN ORGANIZED HEALTH CARE EDUCATION/TRAINING PROGRAM
Payer: COMMERCIAL

## 2025-08-23 PROCEDURE — 5A09357 ASSISTANCE WITH RESPIRATORY VENTILATION, LESS THAN 24 CONSECUTIVE HOURS, CONTINUOUS POSITIVE AIRWAY PRESSURE: ICD-10-PCS | Performed by: STUDENT IN AN ORGANIZED HEALTH CARE EDUCATION/TRAINING PROGRAM

## 2025-08-23 PROCEDURE — 128N000003 HC R&B REHAB

## 2025-08-23 PROCEDURE — 250N000013 HC RX MED GY IP 250 OP 250 PS 637: Performed by: PHYSICIAN ASSISTANT

## 2025-08-23 PROCEDURE — 97530 THERAPEUTIC ACTIVITIES: CPT | Mod: GP

## 2025-08-23 PROCEDURE — 92507 TX SP LANG VOICE COMM INDIV: CPT | Mod: GN | Performed by: SPEECH-LANGUAGE PATHOLOGIST

## 2025-08-23 PROCEDURE — 250N000011 HC RX IP 250 OP 636: Performed by: PHYSICIAN ASSISTANT

## 2025-08-23 PROCEDURE — 97750 PHYSICAL PERFORMANCE TEST: CPT | Mod: GP

## 2025-08-23 PROCEDURE — 97535 SELF CARE MNGMENT TRAINING: CPT | Mod: GO

## 2025-08-23 PROCEDURE — 97112 NEUROMUSCULAR REEDUCATION: CPT | Mod: GP

## 2025-08-23 RX ADMIN — ALLOPURINOL 100 MG: 100 TABLET ORAL at 08:10

## 2025-08-23 RX ADMIN — RISPERIDONE 0.5 MG: 0.5 TABLET, FILM COATED ORAL at 20:18

## 2025-08-23 RX ADMIN — ROSUVASTATIN 10 MG: 10 TABLET, FILM COATED ORAL at 08:10

## 2025-08-23 RX ADMIN — RISPERIDONE 0.5 MG: 0.5 TABLET, FILM COATED ORAL at 08:10

## 2025-08-23 RX ADMIN — OXYCODONE HYDROCHLORIDE AND ACETAMINOPHEN 500 MG: 500 TABLET ORAL at 08:09

## 2025-08-23 RX ADMIN — LEVETIRACETAM 750 MG: 750 TABLET, FILM COATED ORAL at 08:10

## 2025-08-23 RX ADMIN — Medication 7.5 MG: at 08:09

## 2025-08-23 RX ADMIN — ACETAMINOPHEN 1000 MG: 500 TABLET ORAL at 08:10

## 2025-08-23 RX ADMIN — ACETAMINOPHEN 1000 MG: 500 TABLET ORAL at 13:46

## 2025-08-23 RX ADMIN — Medication 7.5 MG: at 20:17

## 2025-08-23 RX ADMIN — ENOXAPARIN SODIUM 100 MG: 100 INJECTION SUBCUTANEOUS at 08:09

## 2025-08-23 RX ADMIN — METOPROLOL SUCCINATE 25 MG: 25 TABLET, EXTENDED RELEASE ORAL at 08:09

## 2025-08-23 RX ADMIN — THERA TABS 1 TABLET: TAB at 08:10

## 2025-08-23 RX ADMIN — LEVETIRACETAM 750 MG: 750 TABLET, FILM COATED ORAL at 20:18

## 2025-08-23 RX ADMIN — ACETAMINOPHEN 1000 MG: 500 TABLET ORAL at 20:18

## 2025-08-23 RX ADMIN — FLUTICASONE PROPIONATE 1 SPRAY: 50 SPRAY, METERED NASAL at 08:10

## 2025-08-23 RX ADMIN — Medication 7.5 MG: at 13:46

## 2025-08-23 RX ADMIN — ENOXAPARIN SODIUM 100 MG: 100 INJECTION SUBCUTANEOUS at 20:17

## 2025-08-23 RX ADMIN — PROCHLORPERAZINE MALEATE 10 MG: 10 TABLET ORAL at 05:43

## 2025-08-23 RX ADMIN — LOSARTAN POTASSIUM 50 MG: 50 TABLET, FILM COATED ORAL at 08:10

## 2025-08-23 ASSESSMENT — ACTIVITIES OF DAILY LIVING (ADL)
ADLS_ACUITY_SCORE: 56
ADLS_ACUITY_SCORE: 53
ADLS_ACUITY_SCORE: 56
ADLS_ACUITY_SCORE: 56
ADLS_ACUITY_SCORE: 53
ADLS_ACUITY_SCORE: 56
ADLS_ACUITY_SCORE: 56
ADLS_ACUITY_SCORE: 53
ADLS_ACUITY_SCORE: 56
ADLS_ACUITY_SCORE: 53
ADLS_ACUITY_SCORE: 56
ADLS_ACUITY_SCORE: 53
ADLS_ACUITY_SCORE: 56
ADLS_ACUITY_SCORE: 56
ADLS_ACUITY_SCORE: 53
ADLS_ACUITY_SCORE: 53
ADLS_ACUITY_SCORE: 56
ADLS_ACUITY_SCORE: 53
ADLS_ACUITY_SCORE: 56
ADLS_ACUITY_SCORE: 53
ADLS_ACUITY_SCORE: 56
ADLS_ACUITY_SCORE: 56
ADLS_ACUITY_SCORE: 53

## 2025-08-24 ENCOUNTER — APPOINTMENT (OUTPATIENT)
Dept: SPEECH THERAPY | Facility: CLINIC | Age: 35
DRG: 057 | End: 2025-08-24
Attending: STUDENT IN AN ORGANIZED HEALTH CARE EDUCATION/TRAINING PROGRAM
Payer: COMMERCIAL

## 2025-08-24 ENCOUNTER — APPOINTMENT (OUTPATIENT)
Dept: PHYSICAL THERAPY | Facility: CLINIC | Age: 35
DRG: 057 | End: 2025-08-24
Attending: STUDENT IN AN ORGANIZED HEALTH CARE EDUCATION/TRAINING PROGRAM
Payer: COMMERCIAL

## 2025-08-24 ENCOUNTER — APPOINTMENT (OUTPATIENT)
Dept: OCCUPATIONAL THERAPY | Facility: CLINIC | Age: 35
DRG: 057 | End: 2025-08-24
Attending: STUDENT IN AN ORGANIZED HEALTH CARE EDUCATION/TRAINING PROGRAM
Payer: COMMERCIAL

## 2025-08-24 PROCEDURE — 92507 TX SP LANG VOICE COMM INDIV: CPT | Mod: GN | Performed by: SPEECH-LANGUAGE PATHOLOGIST

## 2025-08-24 PROCEDURE — 250N000013 HC RX MED GY IP 250 OP 250 PS 637

## 2025-08-24 PROCEDURE — 250N000013 HC RX MED GY IP 250 OP 250 PS 637: Performed by: PHYSICIAN ASSISTANT

## 2025-08-24 PROCEDURE — 97530 THERAPEUTIC ACTIVITIES: CPT | Mod: GP

## 2025-08-24 PROCEDURE — 128N000003 HC R&B REHAB

## 2025-08-24 PROCEDURE — 97530 THERAPEUTIC ACTIVITIES: CPT | Mod: GO

## 2025-08-24 PROCEDURE — 97112 NEUROMUSCULAR REEDUCATION: CPT | Mod: GO

## 2025-08-24 PROCEDURE — 97112 NEUROMUSCULAR REEDUCATION: CPT | Mod: GP

## 2025-08-24 PROCEDURE — 97535 SELF CARE MNGMENT TRAINING: CPT | Mod: GO

## 2025-08-24 PROCEDURE — 250N000011 HC RX IP 250 OP 636: Performed by: PHYSICIAN ASSISTANT

## 2025-08-24 RX ORDER — RISPERIDONE 0.5 MG/1
0.5 TABLET ORAL 2 TIMES DAILY
Status: DISCONTINUED | OUTPATIENT
Start: 2025-08-24 | End: 2025-08-26

## 2025-08-24 RX ADMIN — ENOXAPARIN SODIUM 100 MG: 100 INJECTION SUBCUTANEOUS at 21:07

## 2025-08-24 RX ADMIN — LEVETIRACETAM 750 MG: 750 TABLET, FILM COATED ORAL at 08:25

## 2025-08-24 RX ADMIN — RISPERIDONE 0.5 MG: 0.5 TABLET, FILM COATED ORAL at 08:25

## 2025-08-24 RX ADMIN — OXYCODONE HYDROCHLORIDE AND ACETAMINOPHEN 500 MG: 500 TABLET ORAL at 08:25

## 2025-08-24 RX ADMIN — ALLOPURINOL 100 MG: 100 TABLET ORAL at 08:25

## 2025-08-24 RX ADMIN — LEVETIRACETAM 750 MG: 750 TABLET, FILM COATED ORAL at 21:07

## 2025-08-24 RX ADMIN — ACETAMINOPHEN 1000 MG: 500 TABLET ORAL at 08:25

## 2025-08-24 RX ADMIN — Medication 7.5 MG: at 13:52

## 2025-08-24 RX ADMIN — THERA TABS 1 TABLET: TAB at 08:25

## 2025-08-24 RX ADMIN — Medication 7.5 MG: at 21:07

## 2025-08-24 RX ADMIN — RISPERIDONE 0.5 MG: 0.5 TABLET, FILM COATED ORAL at 21:07

## 2025-08-24 RX ADMIN — ACETAMINOPHEN 1000 MG: 500 TABLET ORAL at 21:07

## 2025-08-24 RX ADMIN — ROSUVASTATIN 10 MG: 10 TABLET, FILM COATED ORAL at 08:25

## 2025-08-24 RX ADMIN — LOSARTAN POTASSIUM 50 MG: 50 TABLET, FILM COATED ORAL at 08:25

## 2025-08-24 RX ADMIN — METOPROLOL SUCCINATE 25 MG: 25 TABLET, EXTENDED RELEASE ORAL at 08:25

## 2025-08-24 RX ADMIN — FLUTICASONE PROPIONATE 1 SPRAY: 50 SPRAY, METERED NASAL at 08:33

## 2025-08-24 RX ADMIN — PROCHLORPERAZINE MALEATE 10 MG: 10 TABLET ORAL at 05:45

## 2025-08-24 RX ADMIN — ENOXAPARIN SODIUM 100 MG: 100 INJECTION SUBCUTANEOUS at 08:25

## 2025-08-24 RX ADMIN — Medication 7.5 MG: at 08:25

## 2025-08-24 RX ADMIN — ACETAMINOPHEN 1000 MG: 500 TABLET ORAL at 13:52

## 2025-08-24 ASSESSMENT — ACTIVITIES OF DAILY LIVING (ADL)
ADLS_ACUITY_SCORE: 53
ADLS_ACUITY_SCORE: 56
ADLS_ACUITY_SCORE: 53
ADLS_ACUITY_SCORE: 53
ADLS_ACUITY_SCORE: 56
ADLS_ACUITY_SCORE: 53
ADLS_ACUITY_SCORE: 56
ADLS_ACUITY_SCORE: 53
ADLS_ACUITY_SCORE: 56
ADLS_ACUITY_SCORE: 53
ADLS_ACUITY_SCORE: 56
ADLS_ACUITY_SCORE: 53

## 2025-08-25 ENCOUNTER — PATIENT OUTREACH (OUTPATIENT)
Dept: NEUROLOGY | Facility: CLINIC | Age: 35
End: 2025-08-25
Payer: COMMERCIAL

## 2025-08-25 ENCOUNTER — APPOINTMENT (OUTPATIENT)
Dept: PHYSICAL THERAPY | Facility: CLINIC | Age: 35
DRG: 057 | End: 2025-08-25
Attending: STUDENT IN AN ORGANIZED HEALTH CARE EDUCATION/TRAINING PROGRAM
Payer: COMMERCIAL

## 2025-08-25 ENCOUNTER — APPOINTMENT (OUTPATIENT)
Dept: OCCUPATIONAL THERAPY | Facility: CLINIC | Age: 35
DRG: 057 | End: 2025-08-25
Attending: STUDENT IN AN ORGANIZED HEALTH CARE EDUCATION/TRAINING PROGRAM
Payer: COMMERCIAL

## 2025-08-25 ENCOUNTER — APPOINTMENT (OUTPATIENT)
Dept: SPEECH THERAPY | Facility: CLINIC | Age: 35
DRG: 057 | End: 2025-08-25
Attending: STUDENT IN AN ORGANIZED HEALTH CARE EDUCATION/TRAINING PROGRAM
Payer: COMMERCIAL

## 2025-08-25 LAB
ALBUMIN SERPL BCG-MCNC: 4.1 G/DL (ref 3.5–5.2)
ALP SERPL-CCNC: 77 U/L (ref 40–150)
ALT SERPL W P-5'-P-CCNC: 25 U/L (ref 0–70)
ANION GAP SERPL CALCULATED.3IONS-SCNC: 12 MMOL/L (ref 7–15)
AST SERPL W P-5'-P-CCNC: 24 U/L (ref 0–45)
BILIRUB DIRECT SERPL-MCNC: 0.09 MG/DL (ref 0–0.3)
BILIRUB SERPL-MCNC: 0.3 MG/DL
BUN SERPL-MCNC: 19.4 MG/DL (ref 6–20)
CALCIUM SERPL-MCNC: 8.9 MG/DL (ref 8.8–10.4)
CHLORIDE SERPL-SCNC: 105 MMOL/L (ref 98–107)
CK SERPL-CCNC: 36 U/L (ref 39–308)
CREAT SERPL-MCNC: 1.06 MG/DL (ref 0.67–1.17)
EGFRCR SERPLBLD CKD-EPI 2021: >90 ML/MIN/1.73M2
ERYTHROCYTE [DISTWIDTH] IN BLOOD BY AUTOMATED COUNT: 13.8 % (ref 10–15)
GLUCOSE SERPL-MCNC: 97 MG/DL (ref 70–99)
HCO3 SERPL-SCNC: 22 MMOL/L (ref 22–29)
HCT VFR BLD AUTO: 34.3 % (ref 40–53)
HGB BLD-MCNC: 11.4 G/DL (ref 13.3–17.7)
MAGNESIUM SERPL-MCNC: 2.1 MG/DL (ref 1.7–2.3)
MCH RBC QN AUTO: 26.8 PG (ref 26.5–33)
MCHC RBC AUTO-ENTMCNC: 33.2 G/DL (ref 31.5–36.5)
MCV RBC AUTO: 80.5 FL (ref 78–100)
PLATELET # BLD AUTO: 213 10E3/UL (ref 150–450)
POTASSIUM SERPL-SCNC: 3.9 MMOL/L (ref 3.4–5.3)
PROT SERPL-MCNC: 6.7 G/DL (ref 6.4–8.3)
RBC # BLD AUTO: 4.26 10E6/UL (ref 4.4–5.9)
SODIUM SERPL-SCNC: 139 MMOL/L (ref 135–145)
URATE SERPL-MCNC: 5.1 MG/DL (ref 3.4–7)
WBC # BLD AUTO: 7.44 10E3/UL (ref 4–11)

## 2025-08-25 PROCEDURE — 97535 SELF CARE MNGMENT TRAINING: CPT | Mod: GO | Performed by: OCCUPATIONAL THERAPIST

## 2025-08-25 PROCEDURE — 85027 COMPLETE CBC AUTOMATED: CPT | Performed by: PHYSICIAN ASSISTANT

## 2025-08-25 PROCEDURE — 82310 ASSAY OF CALCIUM: CPT | Performed by: PHYSICIAN ASSISTANT

## 2025-08-25 PROCEDURE — 250N000013 HC RX MED GY IP 250 OP 250 PS 637: Performed by: PHYSICIAN ASSISTANT

## 2025-08-25 PROCEDURE — 97112 NEUROMUSCULAR REEDUCATION: CPT | Mod: GO

## 2025-08-25 PROCEDURE — 97530 THERAPEUTIC ACTIVITIES: CPT | Mod: GO

## 2025-08-25 PROCEDURE — 92507 TX SP LANG VOICE COMM INDIV: CPT | Mod: GN

## 2025-08-25 PROCEDURE — 97112 NEUROMUSCULAR REEDUCATION: CPT | Mod: GP

## 2025-08-25 PROCEDURE — 82550 ASSAY OF CK (CPK): CPT | Performed by: PHYSICIAN ASSISTANT

## 2025-08-25 PROCEDURE — 97535 SELF CARE MNGMENT TRAINING: CPT | Mod: GO

## 2025-08-25 PROCEDURE — 128N000003 HC R&B REHAB

## 2025-08-25 PROCEDURE — 250N000011 HC RX IP 250 OP 636: Performed by: PHYSICIAN ASSISTANT

## 2025-08-25 PROCEDURE — 82248 BILIRUBIN DIRECT: CPT | Performed by: PHYSICIAN ASSISTANT

## 2025-08-25 PROCEDURE — 97112 NEUROMUSCULAR REEDUCATION: CPT | Mod: GO | Performed by: OCCUPATIONAL THERAPIST

## 2025-08-25 PROCEDURE — 97530 THERAPEUTIC ACTIVITIES: CPT | Mod: GP

## 2025-08-25 PROCEDURE — 84550 ASSAY OF BLOOD/URIC ACID: CPT | Performed by: PHYSICIAN ASSISTANT

## 2025-08-25 PROCEDURE — 83735 ASSAY OF MAGNESIUM: CPT | Performed by: PHYSICIAN ASSISTANT

## 2025-08-25 PROCEDURE — 250N000013 HC RX MED GY IP 250 OP 250 PS 637

## 2025-08-25 PROCEDURE — 36415 COLL VENOUS BLD VENIPUNCTURE: CPT | Performed by: PHYSICIAN ASSISTANT

## 2025-08-25 RX ADMIN — THERA TABS 1 TABLET: TAB at 08:13

## 2025-08-25 RX ADMIN — ROSUVASTATIN 10 MG: 10 TABLET, FILM COATED ORAL at 08:12

## 2025-08-25 RX ADMIN — RISPERIDONE 0.5 MG: 0.5 TABLET, FILM COATED ORAL at 20:13

## 2025-08-25 RX ADMIN — OXYCODONE HYDROCHLORIDE AND ACETAMINOPHEN 500 MG: 500 TABLET ORAL at 08:13

## 2025-08-25 RX ADMIN — LEVETIRACETAM 750 MG: 750 TABLET, FILM COATED ORAL at 08:11

## 2025-08-25 RX ADMIN — LEVETIRACETAM 750 MG: 750 TABLET, FILM COATED ORAL at 20:13

## 2025-08-25 RX ADMIN — ACETAMINOPHEN 1000 MG: 500 TABLET ORAL at 14:15

## 2025-08-25 RX ADMIN — METOPROLOL SUCCINATE 25 MG: 25 TABLET, EXTENDED RELEASE ORAL at 08:12

## 2025-08-25 RX ADMIN — LOSARTAN POTASSIUM 50 MG: 50 TABLET, FILM COATED ORAL at 08:13

## 2025-08-25 RX ADMIN — RISPERIDONE 0.5 MG: 0.5 TABLET, FILM COATED ORAL at 08:13

## 2025-08-25 RX ADMIN — ALLOPURINOL 100 MG: 100 TABLET ORAL at 08:13

## 2025-08-25 RX ADMIN — FLUTICASONE PROPIONATE 1 SPRAY: 50 SPRAY, METERED NASAL at 08:14

## 2025-08-25 RX ADMIN — Medication 7.5 MG: at 14:16

## 2025-08-25 RX ADMIN — Medication 7.5 MG: at 08:13

## 2025-08-25 RX ADMIN — ENOXAPARIN SODIUM 100 MG: 100 INJECTION SUBCUTANEOUS at 20:12

## 2025-08-25 RX ADMIN — PROCHLORPERAZINE MALEATE 10 MG: 10 TABLET ORAL at 06:12

## 2025-08-25 RX ADMIN — ENOXAPARIN SODIUM 100 MG: 100 INJECTION SUBCUTANEOUS at 08:13

## 2025-08-25 RX ADMIN — Medication 7.5 MG: at 20:13

## 2025-08-25 RX ADMIN — ACETAMINOPHEN 1000 MG: 500 TABLET ORAL at 20:13

## 2025-08-25 RX ADMIN — ACETAMINOPHEN 1000 MG: 500 TABLET ORAL at 08:12

## 2025-08-25 ASSESSMENT — ACTIVITIES OF DAILY LIVING (ADL)
ADLS_ACUITY_SCORE: 53

## 2025-08-26 ENCOUNTER — APPOINTMENT (OUTPATIENT)
Dept: PHYSICAL THERAPY | Facility: CLINIC | Age: 35
DRG: 057 | End: 2025-08-26
Attending: STUDENT IN AN ORGANIZED HEALTH CARE EDUCATION/TRAINING PROGRAM
Payer: COMMERCIAL

## 2025-08-26 ENCOUNTER — APPOINTMENT (OUTPATIENT)
Dept: SPEECH THERAPY | Facility: CLINIC | Age: 35
DRG: 057 | End: 2025-08-26
Attending: STUDENT IN AN ORGANIZED HEALTH CARE EDUCATION/TRAINING PROGRAM
Payer: COMMERCIAL

## 2025-08-26 ENCOUNTER — APPOINTMENT (OUTPATIENT)
Dept: OCCUPATIONAL THERAPY | Facility: CLINIC | Age: 35
DRG: 057 | End: 2025-08-26
Attending: STUDENT IN AN ORGANIZED HEALTH CARE EDUCATION/TRAINING PROGRAM
Payer: COMMERCIAL

## 2025-08-26 ENCOUNTER — DOCUMENTATION ONLY (OUTPATIENT)
Dept: ONCOLOGY | Facility: CLINIC | Age: 35
End: 2025-08-26
Payer: COMMERCIAL

## 2025-08-26 PROCEDURE — 250N000011 HC RX IP 250 OP 636: Performed by: PHYSICIAN ASSISTANT

## 2025-08-26 PROCEDURE — 250N000013 HC RX MED GY IP 250 OP 250 PS 637: Performed by: PHYSICAL MEDICINE & REHABILITATION

## 2025-08-26 PROCEDURE — 128N000003 HC R&B REHAB

## 2025-08-26 PROCEDURE — 97112 NEUROMUSCULAR REEDUCATION: CPT | Mod: GP | Performed by: PHYSICAL THERAPIST

## 2025-08-26 PROCEDURE — 90791 PSYCH DIAGNOSTIC EVALUATION: CPT | Performed by: CLINICAL NEUROPSYCHOLOGIST

## 2025-08-26 PROCEDURE — 92507 TX SP LANG VOICE COMM INDIV: CPT | Mod: GN

## 2025-08-26 PROCEDURE — 250N000013 HC RX MED GY IP 250 OP 250 PS 637

## 2025-08-26 PROCEDURE — 97112 NEUROMUSCULAR REEDUCATION: CPT | Mod: GO | Performed by: OCCUPATIONAL THERAPIST

## 2025-08-26 PROCEDURE — 250N000013 HC RX MED GY IP 250 OP 250 PS 637: Performed by: PHYSICIAN ASSISTANT

## 2025-08-26 RX ORDER — RISPERIDONE 0.25 MG/1
0.25 TABLET ORAL 2 TIMES DAILY
Status: DISPENSED | OUTPATIENT
Start: 2025-08-26

## 2025-08-26 RX ADMIN — LEVETIRACETAM 750 MG: 750 TABLET, FILM COATED ORAL at 21:04

## 2025-08-26 RX ADMIN — ALLOPURINOL 100 MG: 100 TABLET ORAL at 08:31

## 2025-08-26 RX ADMIN — OXYCODONE HYDROCHLORIDE AND ACETAMINOPHEN 500 MG: 500 TABLET ORAL at 08:32

## 2025-08-26 RX ADMIN — METOPROLOL SUCCINATE 25 MG: 25 TABLET, EXTENDED RELEASE ORAL at 08:32

## 2025-08-26 RX ADMIN — ACETAMINOPHEN 1000 MG: 500 TABLET ORAL at 08:32

## 2025-08-26 RX ADMIN — THERA TABS 1 TABLET: TAB at 08:31

## 2025-08-26 RX ADMIN — RISPERIDONE 0.5 MG: 0.5 TABLET, FILM COATED ORAL at 08:32

## 2025-08-26 RX ADMIN — ACETAMINOPHEN 1000 MG: 500 TABLET ORAL at 19:56

## 2025-08-26 RX ADMIN — ENOXAPARIN SODIUM 100 MG: 100 INJECTION SUBCUTANEOUS at 21:04

## 2025-08-26 RX ADMIN — ACETAMINOPHEN 1000 MG: 500 TABLET ORAL at 15:09

## 2025-08-26 RX ADMIN — SENNOSIDES, DOCUSATE SODIUM 1 TABLET: 50; 8.6 TABLET, FILM COATED ORAL at 08:32

## 2025-08-26 RX ADMIN — FLUTICASONE PROPIONATE 1 SPRAY: 50 SPRAY, METERED NASAL at 08:30

## 2025-08-26 RX ADMIN — Medication 7.5 MG: at 19:56

## 2025-08-26 RX ADMIN — ENOXAPARIN SODIUM 100 MG: 100 INJECTION SUBCUTANEOUS at 08:30

## 2025-08-26 RX ADMIN — Medication 7.5 MG: at 15:09

## 2025-08-26 RX ADMIN — LEVETIRACETAM 750 MG: 750 TABLET, FILM COATED ORAL at 08:32

## 2025-08-26 RX ADMIN — Medication 7.5 MG: at 08:31

## 2025-08-26 RX ADMIN — ROSUVASTATIN 10 MG: 10 TABLET, FILM COATED ORAL at 08:32

## 2025-08-26 RX ADMIN — PROCHLORPERAZINE MALEATE 10 MG: 10 TABLET ORAL at 06:11

## 2025-08-26 RX ADMIN — RISPERIDONE 0.25 MG: 0.25 TABLET, FILM COATED ORAL at 21:04

## 2025-08-26 RX ADMIN — LOSARTAN POTASSIUM 50 MG: 50 TABLET, FILM COATED ORAL at 08:31

## 2025-08-26 ASSESSMENT — ACTIVITIES OF DAILY LIVING (ADL)
ADLS_ACUITY_SCORE: 53

## 2025-08-27 ENCOUNTER — APPOINTMENT (OUTPATIENT)
Dept: OCCUPATIONAL THERAPY | Facility: CLINIC | Age: 35
DRG: 057 | End: 2025-08-27
Attending: STUDENT IN AN ORGANIZED HEALTH CARE EDUCATION/TRAINING PROGRAM
Payer: COMMERCIAL

## 2025-08-27 ENCOUNTER — APPOINTMENT (OUTPATIENT)
Dept: SPEECH THERAPY | Facility: CLINIC | Age: 35
DRG: 057 | End: 2025-08-27
Attending: STUDENT IN AN ORGANIZED HEALTH CARE EDUCATION/TRAINING PROGRAM
Payer: COMMERCIAL

## 2025-08-27 ENCOUNTER — APPOINTMENT (OUTPATIENT)
Dept: PHYSICAL THERAPY | Facility: CLINIC | Age: 35
DRG: 057 | End: 2025-08-27
Attending: STUDENT IN AN ORGANIZED HEALTH CARE EDUCATION/TRAINING PROGRAM
Payer: COMMERCIAL

## 2025-08-27 PROCEDURE — 97112 NEUROMUSCULAR REEDUCATION: CPT | Mod: GP | Performed by: PHYSICAL THERAPIST

## 2025-08-27 PROCEDURE — 250N000013 HC RX MED GY IP 250 OP 250 PS 637: Performed by: PHYSICAL MEDICINE & REHABILITATION

## 2025-08-27 PROCEDURE — 92507 TX SP LANG VOICE COMM INDIV: CPT | Mod: GN

## 2025-08-27 PROCEDURE — 250N000013 HC RX MED GY IP 250 OP 250 PS 637: Performed by: PHYSICIAN ASSISTANT

## 2025-08-27 PROCEDURE — 97535 SELF CARE MNGMENT TRAINING: CPT | Mod: GO | Performed by: OCCUPATIONAL THERAPIST

## 2025-08-27 PROCEDURE — 128N000003 HC R&B REHAB

## 2025-08-27 PROCEDURE — 250N000011 HC RX IP 250 OP 636: Performed by: PHYSICIAN ASSISTANT

## 2025-08-27 RX ADMIN — ACETAMINOPHEN 1000 MG: 500 TABLET ORAL at 13:43

## 2025-08-27 RX ADMIN — ROSUVASTATIN 10 MG: 10 TABLET, FILM COATED ORAL at 08:46

## 2025-08-27 RX ADMIN — PROCHLORPERAZINE MALEATE 10 MG: 10 TABLET ORAL at 06:21

## 2025-08-27 RX ADMIN — ACETAMINOPHEN 1000 MG: 500 TABLET ORAL at 20:13

## 2025-08-27 RX ADMIN — FLUTICASONE PROPIONATE 1 SPRAY: 50 SPRAY, METERED NASAL at 08:45

## 2025-08-27 RX ADMIN — ALLOPURINOL 100 MG: 100 TABLET ORAL at 08:46

## 2025-08-27 RX ADMIN — Medication 7.5 MG: at 13:43

## 2025-08-27 RX ADMIN — METOPROLOL SUCCINATE 25 MG: 25 TABLET, EXTENDED RELEASE ORAL at 08:46

## 2025-08-27 RX ADMIN — RISPERIDONE 0.25 MG: 0.25 TABLET, FILM COATED ORAL at 20:12

## 2025-08-27 RX ADMIN — THERA TABS 1 TABLET: TAB at 08:46

## 2025-08-27 RX ADMIN — ENOXAPARIN SODIUM 100 MG: 100 INJECTION SUBCUTANEOUS at 08:45

## 2025-08-27 RX ADMIN — LEVETIRACETAM 750 MG: 750 TABLET, FILM COATED ORAL at 20:13

## 2025-08-27 RX ADMIN — RISPERIDONE 0.25 MG: 0.25 TABLET, FILM COATED ORAL at 08:45

## 2025-08-27 RX ADMIN — Medication 7.5 MG: at 08:45

## 2025-08-27 RX ADMIN — LOSARTAN POTASSIUM 50 MG: 50 TABLET, FILM COATED ORAL at 08:45

## 2025-08-27 RX ADMIN — OXYCODONE HYDROCHLORIDE AND ACETAMINOPHEN 500 MG: 500 TABLET ORAL at 08:45

## 2025-08-27 RX ADMIN — Medication 7.5 MG: at 20:12

## 2025-08-27 RX ADMIN — LEVETIRACETAM 750 MG: 750 TABLET, FILM COATED ORAL at 08:46

## 2025-08-27 RX ADMIN — ACETAMINOPHEN 1000 MG: 500 TABLET ORAL at 08:45

## 2025-08-27 RX ADMIN — ENOXAPARIN SODIUM 100 MG: 100 INJECTION SUBCUTANEOUS at 20:13

## 2025-08-27 ASSESSMENT — ACTIVITIES OF DAILY LIVING (ADL)
ADLS_ACUITY_SCORE: 53

## 2025-08-28 ENCOUNTER — APPOINTMENT (OUTPATIENT)
Dept: PHYSICAL THERAPY | Facility: CLINIC | Age: 35
DRG: 057 | End: 2025-08-28
Attending: STUDENT IN AN ORGANIZED HEALTH CARE EDUCATION/TRAINING PROGRAM
Payer: COMMERCIAL

## 2025-08-28 ENCOUNTER — APPOINTMENT (OUTPATIENT)
Dept: SPEECH THERAPY | Facility: CLINIC | Age: 35
DRG: 057 | End: 2025-08-28
Attending: STUDENT IN AN ORGANIZED HEALTH CARE EDUCATION/TRAINING PROGRAM
Payer: COMMERCIAL

## 2025-08-28 ENCOUNTER — APPOINTMENT (OUTPATIENT)
Dept: OCCUPATIONAL THERAPY | Facility: CLINIC | Age: 35
DRG: 057 | End: 2025-08-28
Attending: STUDENT IN AN ORGANIZED HEALTH CARE EDUCATION/TRAINING PROGRAM
Payer: COMMERCIAL

## 2025-08-28 VITALS
TEMPERATURE: 97.1 F | RESPIRATION RATE: 18 BRPM | DIASTOLIC BLOOD PRESSURE: 65 MMHG | HEART RATE: 55 BPM | BODY MASS INDEX: 29.71 KG/M2 | OXYGEN SATURATION: 98 % | SYSTOLIC BLOOD PRESSURE: 122 MMHG | WEIGHT: 231.5 LBS | HEIGHT: 74 IN

## 2025-08-28 LAB
ANION GAP SERPL CALCULATED.3IONS-SCNC: 12 MMOL/L (ref 7–15)
BASOPHILS # BLD AUTO: 0.05 10E3/UL (ref 0–0.2)
BASOPHILS NFR BLD AUTO: 0.7 %
BUN SERPL-MCNC: 21.9 MG/DL (ref 6–20)
CALCIUM SERPL-MCNC: 8.9 MG/DL (ref 8.8–10.4)
CHLORIDE SERPL-SCNC: 107 MMOL/L (ref 98–107)
CREAT SERPL-MCNC: 1.05 MG/DL (ref 0.67–1.17)
EGFRCR SERPLBLD CKD-EPI 2021: >90 ML/MIN/1.73M2
EOSINOPHIL # BLD AUTO: 0.21 10E3/UL (ref 0–0.7)
EOSINOPHIL NFR BLD AUTO: 3 %
ERYTHROCYTE [DISTWIDTH] IN BLOOD BY AUTOMATED COUNT: 14.2 % (ref 10–15)
GLUCOSE SERPL-MCNC: 91 MG/DL (ref 70–99)
HCO3 SERPL-SCNC: 23 MMOL/L (ref 22–29)
HCT VFR BLD AUTO: 36.2 % (ref 40–53)
HGB BLD-MCNC: 11.3 G/DL (ref 13.3–17.7)
IMM GRANULOCYTES # BLD: 0.03 10E3/UL
IMM GRANULOCYTES NFR BLD: 0.4 %
LYMPHOCYTES # BLD AUTO: 1.43 10E3/UL (ref 0.8–5.3)
LYMPHOCYTES NFR BLD AUTO: 20.4 %
MAGNESIUM SERPL-MCNC: 2 MG/DL (ref 1.7–2.3)
MCH RBC QN AUTO: 26.2 PG (ref 26.5–33)
MCHC RBC AUTO-ENTMCNC: 31.2 G/DL (ref 31.5–36.5)
MCV RBC AUTO: 82.3 FL (ref 78–100)
MCV RBC AUTO: 84 FL (ref 78–100)
MONOCYTES # BLD AUTO: 0.4 10E3/UL (ref 0–1.3)
MONOCYTES NFR BLD AUTO: 5.7 %
NEUTROPHILS # BLD AUTO: 4.9 10E3/UL (ref 1.6–8.3)
NEUTROPHILS NFR BLD AUTO: 69.8 %
NRBC # BLD AUTO: <0.03 10E3/UL
NRBC BLD AUTO-RTO: 0 /100
PLATELET # BLD AUTO: 222 10E3/UL (ref 150–450)
POTASSIUM SERPL-SCNC: 4.2 MMOL/L (ref 3.4–5.3)
RBC # BLD AUTO: 4.31 10E6/UL (ref 4.4–5.9)
SODIUM SERPL-SCNC: 142 MMOL/L (ref 135–145)
WBC # BLD AUTO: 7.02 10E3/UL (ref 4–11)
WBC # BLD AUTO: 7.13 10E3/UL (ref 4–11)

## 2025-08-28 PROCEDURE — 97530 THERAPEUTIC ACTIVITIES: CPT | Mod: GP

## 2025-08-28 PROCEDURE — 80048 BASIC METABOLIC PNL TOTAL CA: CPT | Performed by: PHYSICIAN ASSISTANT

## 2025-08-28 PROCEDURE — 97112 NEUROMUSCULAR REEDUCATION: CPT | Mod: GO

## 2025-08-28 PROCEDURE — 92507 TX SP LANG VOICE COMM INDIV: CPT | Mod: GN

## 2025-08-28 PROCEDURE — 36415 COLL VENOUS BLD VENIPUNCTURE: CPT | Performed by: PHYSICIAN ASSISTANT

## 2025-08-28 PROCEDURE — 83735 ASSAY OF MAGNESIUM: CPT | Performed by: PHYSICIAN ASSISTANT

## 2025-08-28 PROCEDURE — 250N000013 HC RX MED GY IP 250 OP 250 PS 637: Performed by: PHYSICIAN ASSISTANT

## 2025-08-28 PROCEDURE — 128N000003 HC R&B REHAB

## 2025-08-28 PROCEDURE — 250N000013 HC RX MED GY IP 250 OP 250 PS 637: Performed by: PHYSICAL MEDICINE & REHABILITATION

## 2025-08-28 PROCEDURE — 85025 COMPLETE CBC W/AUTO DIFF WBC: CPT | Performed by: PHYSICIAN ASSISTANT

## 2025-08-28 PROCEDURE — 250N000011 HC RX IP 250 OP 636: Performed by: PHYSICIAN ASSISTANT

## 2025-08-28 PROCEDURE — 97112 NEUROMUSCULAR REEDUCATION: CPT | Mod: GP

## 2025-08-28 RX ADMIN — THERA TABS 1 TABLET: TAB at 08:32

## 2025-08-28 RX ADMIN — ACETAMINOPHEN 1000 MG: 500 TABLET ORAL at 20:58

## 2025-08-28 RX ADMIN — METOPROLOL SUCCINATE 25 MG: 25 TABLET, EXTENDED RELEASE ORAL at 08:31

## 2025-08-28 RX ADMIN — LEVETIRACETAM 750 MG: 750 TABLET, FILM COATED ORAL at 20:59

## 2025-08-28 RX ADMIN — ROSUVASTATIN 10 MG: 10 TABLET, FILM COATED ORAL at 08:32

## 2025-08-28 RX ADMIN — ENOXAPARIN SODIUM 100 MG: 100 INJECTION SUBCUTANEOUS at 08:33

## 2025-08-28 RX ADMIN — Medication 7.5 MG: at 13:30

## 2025-08-28 RX ADMIN — LEVETIRACETAM 750 MG: 750 TABLET, FILM COATED ORAL at 08:32

## 2025-08-28 RX ADMIN — ACETAMINOPHEN 1000 MG: 500 TABLET ORAL at 08:32

## 2025-08-28 RX ADMIN — RISPERIDONE 0.25 MG: 0.25 TABLET, FILM COATED ORAL at 08:32

## 2025-08-28 RX ADMIN — RISPERIDONE 0.25 MG: 0.25 TABLET, FILM COATED ORAL at 20:59

## 2025-08-28 RX ADMIN — LOSARTAN POTASSIUM 50 MG: 50 TABLET, FILM COATED ORAL at 08:32

## 2025-08-28 RX ADMIN — ENOXAPARIN SODIUM 100 MG: 100 INJECTION SUBCUTANEOUS at 20:58

## 2025-08-28 RX ADMIN — Medication 7.5 MG: at 20:58

## 2025-08-28 RX ADMIN — PROCHLORPERAZINE MALEATE 10 MG: 10 TABLET ORAL at 06:43

## 2025-08-28 RX ADMIN — OXYCODONE HYDROCHLORIDE AND ACETAMINOPHEN 500 MG: 500 TABLET ORAL at 08:31

## 2025-08-28 RX ADMIN — FLUTICASONE PROPIONATE 1 SPRAY: 50 SPRAY, METERED NASAL at 08:33

## 2025-08-28 RX ADMIN — Medication 7.5 MG: at 08:32

## 2025-08-28 RX ADMIN — ACETAMINOPHEN 1000 MG: 500 TABLET ORAL at 13:30

## 2025-08-28 RX ADMIN — ALLOPURINOL 100 MG: 100 TABLET ORAL at 08:32

## 2025-08-28 ASSESSMENT — ACTIVITIES OF DAILY LIVING (ADL)
ADLS_ACUITY_SCORE: 53

## 2025-08-29 ENCOUNTER — APPOINTMENT (OUTPATIENT)
Dept: OCCUPATIONAL THERAPY | Facility: CLINIC | Age: 35
DRG: 057 | End: 2025-08-29
Attending: STUDENT IN AN ORGANIZED HEALTH CARE EDUCATION/TRAINING PROGRAM
Payer: COMMERCIAL

## 2025-08-29 ENCOUNTER — APPOINTMENT (OUTPATIENT)
Dept: SPEECH THERAPY | Facility: CLINIC | Age: 35
DRG: 057 | End: 2025-08-29
Attending: STUDENT IN AN ORGANIZED HEALTH CARE EDUCATION/TRAINING PROGRAM
Payer: COMMERCIAL

## 2025-08-29 ENCOUNTER — APPOINTMENT (OUTPATIENT)
Dept: PHYSICAL THERAPY | Facility: CLINIC | Age: 35
DRG: 057 | End: 2025-08-29
Attending: STUDENT IN AN ORGANIZED HEALTH CARE EDUCATION/TRAINING PROGRAM
Payer: COMMERCIAL

## 2025-08-30 ENCOUNTER — APPOINTMENT (OUTPATIENT)
Dept: PHYSICAL THERAPY | Facility: CLINIC | Age: 35
DRG: 057 | End: 2025-08-30
Attending: STUDENT IN AN ORGANIZED HEALTH CARE EDUCATION/TRAINING PROGRAM
Payer: COMMERCIAL

## 2025-08-30 ENCOUNTER — APPOINTMENT (OUTPATIENT)
Dept: OCCUPATIONAL THERAPY | Facility: CLINIC | Age: 35
DRG: 057 | End: 2025-08-30
Attending: STUDENT IN AN ORGANIZED HEALTH CARE EDUCATION/TRAINING PROGRAM
Payer: COMMERCIAL

## 2025-08-31 ENCOUNTER — APPOINTMENT (OUTPATIENT)
Dept: OCCUPATIONAL THERAPY | Facility: CLINIC | Age: 35
DRG: 057 | End: 2025-08-31
Attending: STUDENT IN AN ORGANIZED HEALTH CARE EDUCATION/TRAINING PROGRAM
Payer: COMMERCIAL

## 2025-09-02 LAB
ALBUMIN SERPL BCG-MCNC: 4 G/DL (ref 3.5–5.2)
ALP SERPL-CCNC: 70 U/L (ref 40–150)
ALT SERPL W P-5'-P-CCNC: 35 U/L (ref 0–70)
ANION GAP SERPL CALCULATED.3IONS-SCNC: 11 MMOL/L (ref 7–15)
AST SERPL W P-5'-P-CCNC: 25 U/L (ref 0–45)
ATRIAL RATE - MUSE: 64 BPM
BASOPHILS # BLD AUTO: 0.05 10E3/UL (ref 0–0.2)
BASOPHILS NFR BLD AUTO: 0.8 %
BILIRUB DIRECT SERPL-MCNC: 0.11 MG/DL (ref 0–0.3)
BILIRUB SERPL-MCNC: 0.3 MG/DL
BUN SERPL-MCNC: 20 MG/DL (ref 6–20)
CALCIUM SERPL-MCNC: 8.8 MG/DL (ref 8.8–10.4)
CHLORIDE SERPL-SCNC: 109 MMOL/L (ref 98–107)
CK SERPL-CCNC: 35 U/L (ref 39–308)
CREAT SERPL-MCNC: 1 MG/DL (ref 0.67–1.17)
DIASTOLIC BLOOD PRESSURE - MUSE: NORMAL MMHG
EGFRCR SERPLBLD CKD-EPI 2021: >90 ML/MIN/1.73M2
EOSINOPHIL # BLD AUTO: 0.2 10E3/UL (ref 0–0.7)
EOSINOPHIL NFR BLD AUTO: 3.2 %
ERYTHROCYTE [DISTWIDTH] IN BLOOD BY AUTOMATED COUNT: 14.7 % (ref 10–15)
GLUCOSE SERPL-MCNC: 90 MG/DL (ref 70–99)
HCO3 SERPL-SCNC: 22 MMOL/L (ref 22–29)
HCT VFR BLD AUTO: 36.1 % (ref 40–53)
HGB BLD-MCNC: 11.6 G/DL (ref 13.3–17.7)
IMM GRANULOCYTES # BLD: <0.03 10E3/UL
IMM GRANULOCYTES NFR BLD: 0.3 %
INTERPRETATION ECG - MUSE: NORMAL
LYMPHOCYTES # BLD AUTO: 0.99 10E3/UL (ref 0.8–5.3)
LYMPHOCYTES NFR BLD AUTO: 15.7 %
MAGNESIUM SERPL-MCNC: 2 MG/DL (ref 1.7–2.3)
MCH RBC QN AUTO: 26.1 PG (ref 26.5–33)
MCHC RBC AUTO-ENTMCNC: 32.1 G/DL (ref 31.5–36.5)
MCV RBC AUTO: 81.3 FL (ref 78–100)
MONOCYTES # BLD AUTO: 0.38 10E3/UL (ref 0–1.3)
MONOCYTES NFR BLD AUTO: 6 %
NEUTROPHILS # BLD AUTO: 4.66 10E3/UL (ref 1.6–8.3)
NEUTROPHILS NFR BLD AUTO: 74 %
NRBC # BLD AUTO: <0.03 10E3/UL
NRBC BLD AUTO-RTO: 0 /100
P AXIS - MUSE: 32 DEGREES
PLATELET # BLD AUTO: 167 10E3/UL (ref 150–450)
POTASSIUM SERPL-SCNC: 4.4 MMOL/L (ref 3.4–5.3)
PR INTERVAL - MUSE: 136 MS
PROT SERPL-MCNC: 6.5 G/DL (ref 6.4–8.3)
QRS DURATION - MUSE: 80 MS
QT - MUSE: 386 MS
QTC - MUSE: 398 MS
R AXIS - MUSE: 30 DEGREES
RBC # BLD AUTO: 4.44 10E6/UL (ref 4.4–5.9)
SODIUM SERPL-SCNC: 142 MMOL/L (ref 135–145)
SYSTOLIC BLOOD PRESSURE - MUSE: NORMAL MMHG
T AXIS - MUSE: -12 DEGREES
URATE SERPL-MCNC: 4.1 MG/DL (ref 3.4–7)
VENTRICULAR RATE- MUSE: 64 BPM
WBC # BLD AUTO: 6.3 10E3/UL (ref 4–11)

## 2025-09-04 ENCOUNTER — THERAPY VISIT (OUTPATIENT)
Dept: PHYSICAL THERAPY | Facility: CLINIC | Age: 35
End: 2025-09-04
Attending: PHYSICIAN ASSISTANT
Payer: COMMERCIAL

## 2025-09-04 DIAGNOSIS — I63.9 ACUTE ISCHEMIC STROKE (H): Primary | ICD-10-CM

## 2025-09-04 DIAGNOSIS — C78.01: ICD-10-CM

## 2025-09-04 PROCEDURE — 97530 THERAPEUTIC ACTIVITIES: CPT | Mod: GP

## 2025-09-04 PROCEDURE — 97162 PT EVAL MOD COMPLEX 30 MIN: CPT | Mod: GP

## 2025-09-16 ENCOUNTER — PRE VISIT (OUTPATIENT)
Dept: ONCOLOGY | Facility: CLINIC | Age: 35
End: 2025-09-16
Payer: COMMERCIAL

## (undated) DEVICE — TUBING SUCTION 10'X3/16" N510

## (undated) DEVICE — SYR 30ML SLIP TIP W/O NDL 302833

## (undated) DEVICE — KIT ENDO FIRST STEP DISINFECTANT 200ML W/POUCH EP-4

## (undated) DEVICE — ENDO ADPT BRONCH SWIVEL Y A1002

## (undated) DEVICE — LUBRICANT INST KIT ENDO-LUBE 220-90

## (undated) DEVICE — ANTIFOG SOLUTION W/FOAM PAD CF-1001

## (undated) DEVICE — LABEL MEDICATION SYSTEM 3303-P

## (undated) DEVICE — SPECIMEN TRAP MUCOUS 40ML LUKI C30200A

## (undated) DEVICE — ENDO VALVE BX EVIS MAJ-210

## (undated) DEVICE — SYR 10ML SLIP TIP W/O NDL 303134

## (undated) DEVICE — ENDO VALVE SUCTION BRONCH EVIS MAJ-209

## (undated) DEVICE — ENDO VALVE SUCTION ULTRASOUND BRONCH MAJ-1414

## (undated) DEVICE — PITCHER STERILE 1000ML  SSK9004A

## (undated) DEVICE — NDL BLUNT 18GA 1" W/O FILTER 305181

## (undated) DEVICE — NDL ASPIRATION VIZISHOT 25GX2MM NA-U401SX-4025N

## (undated) DEVICE — LINEN TOWEL PACK X5 5464

## (undated) RX ORDER — FENTANYL CITRATE 50 UG/ML
INJECTION, SOLUTION INTRAMUSCULAR; INTRAVENOUS
Status: DISPENSED
Start: 2025-07-17

## (undated) RX ORDER — FENTANYL CITRATE 50 UG/ML
INJECTION, SOLUTION INTRAMUSCULAR; INTRAVENOUS
Status: DISPENSED
Start: 2025-07-18

## (undated) RX ORDER — HYDROMORPHONE HYDROCHLORIDE 1 MG/ML
INJECTION, SOLUTION INTRAMUSCULAR; INTRAVENOUS; SUBCUTANEOUS
Status: DISPENSED
Start: 2025-07-18

## (undated) RX ORDER — LIDOCAINE HYDROCHLORIDE 20 MG/ML
SOLUTION OROPHARYNGEAL
Status: DISPENSED
Start: 2025-07-17